# Patient Record
Sex: FEMALE | Race: WHITE | NOT HISPANIC OR LATINO | Employment: UNEMPLOYED | URBAN - METROPOLITAN AREA
[De-identification: names, ages, dates, MRNs, and addresses within clinical notes are randomized per-mention and may not be internally consistent; named-entity substitution may affect disease eponyms.]

---

## 2018-04-30 ENCOUNTER — OFFICE VISIT CONVERTED (OUTPATIENT)
Dept: FAMILY MEDICINE CLINIC | Facility: CLINIC | Age: 23
End: 2018-04-30
Attending: NURSE PRACTITIONER

## 2019-02-05 ENCOUNTER — HOSPITAL ENCOUNTER (OUTPATIENT)
Dept: URGENT CARE | Facility: CLINIC | Age: 24
Discharge: HOME | End: 2019-02-05
Attending: FAMILY MEDICINE

## 2019-02-06 ENCOUNTER — HOSPITAL ENCOUNTER (OUTPATIENT)
Dept: FAMILY MEDICINE CLINIC | Facility: CLINIC | Age: 24
Discharge: HOME | End: 2019-02-06
Attending: NURSE PRACTITIONER

## 2019-02-06 ENCOUNTER — OFFICE VISIT CONVERTED (OUTPATIENT)
Dept: FAMILY MEDICINE CLINIC | Facility: CLINIC | Age: 24
End: 2019-02-06
Attending: NURSE PRACTITIONER

## 2019-02-08 LAB — BACTERIA UR CULT: NORMAL

## 2019-06-19 LAB
BASOPHILS # BLD AUTO: 0.08 10*3/UL (ref 0–0.2)
BASOPHILS NFR BLD AUTO: 0.7 % (ref 0–3)
CONV ABS IMM GRAN: 0.06 10*3/UL (ref 0–0.2)
CONV IMMATURE GRAN: 0.5 % (ref 0–1.8)
DEPRECATED RDW RBC AUTO: 43.1 FL (ref 36.4–46.3)
EOSINOPHIL # BLD AUTO: 0.23 10*3/UL (ref 0–0.7)
EOSINOPHIL # BLD AUTO: 2.1 % (ref 0–7)
ERYTHROCYTE [DISTWIDTH] IN BLOOD BY AUTOMATED COUNT: 14.7 % (ref 11.7–14.4)
HBA1C MFR BLD: 12.8 G/DL (ref 12–16)
HCG INTACT+B SERPL-ACNC: <0.5 M[IU]/ML (ref 0–5)
HCT VFR BLD AUTO: 40.5 % (ref 37–47)
LYMPHOCYTES # BLD AUTO: 3.14 10*3/UL (ref 1–5)
MCH RBC QN AUTO: 25.7 PG (ref 27–31)
MCHC RBC AUTO-ENTMCNC: 31.6 G/DL (ref 33–37)
MCV RBC AUTO: 81.3 FL (ref 81–99)
MONOCYTES # BLD AUTO: 0.99 10*3/UL (ref 0.2–1.2)
MONOCYTES NFR BLD AUTO: 8.9 % (ref 3–10)
NEUTROPHILS # BLD AUTO: 6.64 10*3/UL (ref 2–8)
NEUTROPHILS NFR BLD AUTO: 59.6 % (ref 30–85)
NRBC CBCN: 0 % (ref 0–0.7)
PLATELET # BLD AUTO: 346 10*3/UL (ref 130–400)
PMV BLD AUTO: 10.7 FL (ref 9.4–12.3)
RBC # BLD AUTO: 4.98 10*6/UL (ref 4.2–5.4)
VARIANT LYMPHS NFR BLD MANUAL: 28.2 % (ref 20–45)
WBC # BLD AUTO: 11.14 10*3/UL (ref 4.8–10.8)

## 2019-06-20 ENCOUNTER — HOSPITAL ENCOUNTER (OUTPATIENT)
Dept: PERIOP | Facility: HOSPITAL | Age: 24
Setting detail: HOSPITAL OUTPATIENT SURGERY
Discharge: HOME | End: 2019-06-21
Attending: OBSTETRICS & GYNECOLOGY

## 2019-06-20 LAB
ABO GROUP BLD: NORMAL
BLD GP AB SCN SERPL QL: NORMAL
CONV ABD CONTROL: NORMAL
HBA1C MFR BLD: 12.2 G/DL (ref 12–16)
HCT VFR BLD AUTO: 39.1 % (ref 37–47)
RH BLD: NORMAL

## 2019-06-21 LAB
BASOPHILS # BLD AUTO: 0.06 10*3/UL (ref 0–0.2)
BASOPHILS NFR BLD AUTO: 0.3 % (ref 0–3)
CONV ABS IMM GRAN: 0.16 10*3/UL (ref 0–0.2)
CONV IMMATURE GRAN: 0.7 % (ref 0–1.8)
DEPRECATED RDW RBC AUTO: 44.3 FL (ref 36.4–46.3)
EOSINOPHIL # BLD AUTO: 0.03 10*3/UL (ref 0–0.7)
EOSINOPHIL # BLD AUTO: 0.1 % (ref 0–7)
ERYTHROCYTE [DISTWIDTH] IN BLOOD BY AUTOMATED COUNT: 14.6 % (ref 11.7–14.4)
HBA1C MFR BLD: 11.1 G/DL (ref 12–16)
HCT VFR BLD AUTO: 36.6 % (ref 37–47)
LYMPHOCYTES # BLD AUTO: 1.99 10*3/UL (ref 1–5)
MCH RBC QN AUTO: 25.1 PG (ref 27–31)
MCHC RBC AUTO-ENTMCNC: 30.3 G/DL (ref 33–37)
MCV RBC AUTO: 82.8 FL (ref 81–99)
MONOCYTES # BLD AUTO: 1.91 10*3/UL (ref 0.2–1.2)
MONOCYTES NFR BLD AUTO: 8.5 % (ref 3–10)
NEUTROPHILS # BLD AUTO: 18.28 10*3/UL (ref 2–8)
NEUTROPHILS NFR BLD AUTO: 81.5 % (ref 30–85)
NRBC CBCN: 0 % (ref 0–0.7)
PLATELET # BLD AUTO: 353 10*3/UL (ref 130–400)
PMV BLD AUTO: 11 FL (ref 9.4–12.3)
RBC # BLD AUTO: 4.42 10*6/UL (ref 4.2–5.4)
VARIANT LYMPHS NFR BLD MANUAL: 8.9 % (ref 20–45)
WBC # BLD AUTO: 22.43 10*3/UL (ref 4.8–10.8)

## 2020-07-30 ENCOUNTER — HOSPITAL ENCOUNTER (OUTPATIENT)
Dept: URGENT CARE | Facility: CLINIC | Age: 25
Discharge: HOME | End: 2020-07-30
Attending: FAMILY MEDICINE

## 2020-09-08 ENCOUNTER — TELEPHONE CONVERTED (OUTPATIENT)
Dept: FAMILY MEDICINE CLINIC | Facility: CLINIC | Age: 25
End: 2020-09-08
Attending: NURSE PRACTITIONER

## 2020-09-14 ENCOUNTER — HOSPITAL ENCOUNTER (OUTPATIENT)
Dept: GENERAL RADIOLOGY | Facility: HOSPITAL | Age: 25
Discharge: HOME | End: 2020-09-14
Attending: NURSE PRACTITIONER

## 2020-09-14 LAB
ALBUMIN SERPL-MCNC: 4 G/DL (ref 3.5–5)
ALBUMIN/GLOB SERPL: 1.3 {RATIO} (ref 1.4–2.6)
ALP SERPL-CCNC: 85 U/L (ref 42–98)
ALT SERPL-CCNC: 15 U/L (ref 10–40)
AMYLASE SERPL-CCNC: 36 U/L (ref 30–110)
ANION GAP SERPL CALC-SCNC: 18 MMOL/L (ref 8–19)
AST SERPL-CCNC: 16 U/L (ref 15–50)
BASOPHILS # BLD AUTO: 0.09 10*3/UL (ref 0–0.2)
BASOPHILS NFR BLD AUTO: 0.7 % (ref 0–3)
BILIRUB SERPL-MCNC: 0.31 MG/DL (ref 0.2–1.3)
BUN SERPL-MCNC: 10 MG/DL (ref 5–25)
BUN/CREAT SERPL: 14 {RATIO} (ref 6–20)
C DIFF TOX B STL QL CT TISS CULT: NEGATIVE
CALCIUM SERPL-MCNC: 9.3 MG/DL (ref 8.7–10.4)
CHLORIDE SERPL-SCNC: 105 MMOL/L (ref 99–111)
CONV 027 TOXIN: NEGATIVE
CONV ABS IMM GRAN: 0.06 10*3/UL (ref 0–0.2)
CONV CO2: 22 MMOL/L (ref 22–32)
CONV IMMATURE GRAN: 0.5 % (ref 0–1.8)
CONV TOTAL PROTEIN: 7 G/DL (ref 6.3–8.2)
CREAT UR-MCNC: 0.74 MG/DL (ref 0.5–0.9)
DEPRECATED RDW RBC AUTO: 49.1 FL (ref 36.4–46.3)
EOSINOPHIL # BLD AUTO: 0.38 10*3/UL (ref 0–0.7)
EOSINOPHIL # BLD AUTO: 3.1 % (ref 0–7)
ERYTHROCYTE [DISTWIDTH] IN BLOOD BY AUTOMATED COUNT: 15.8 % (ref 11.7–14.4)
GFR SERPLBLD BASED ON 1.73 SQ M-ARVRAT: >60 ML/MIN/{1.73_M2}
GLOBULIN UR ELPH-MCNC: 3 G/DL (ref 2–3.5)
GLUCOSE SERPL-MCNC: 86 MG/DL (ref 65–99)
HCT VFR BLD AUTO: 43.2 % (ref 37–47)
HGB BLD-MCNC: 13.4 G/DL (ref 12–16)
LIPASE SERPL-CCNC: 26 U/L (ref 5–51)
LYMPHOCYTES # BLD AUTO: 3.53 10*3/UL (ref 1–5)
LYMPHOCYTES NFR BLD AUTO: 29.1 % (ref 20–45)
MCH RBC QN AUTO: 26.8 PG (ref 27–31)
MCHC RBC AUTO-ENTMCNC: 31 G/DL (ref 33–37)
MCV RBC AUTO: 86.4 FL (ref 81–99)
MONOCYTES # BLD AUTO: 0.9 10*3/UL (ref 0.2–1.2)
MONOCYTES NFR BLD AUTO: 7.4 % (ref 3–10)
NEUTROPHILS # BLD AUTO: 7.17 10*3/UL (ref 2–8)
NEUTROPHILS NFR BLD AUTO: 59.2 % (ref 30–85)
NRBC CBCN: 0 % (ref 0–0.7)
OSMOLALITY SERPL CALC.SUM OF ELEC: 290 MOSM/KG (ref 273–304)
PLATELET # BLD AUTO: 374 10*3/UL (ref 130–400)
PMV BLD AUTO: 11.1 FL (ref 9.4–12.3)
POTASSIUM SERPL-SCNC: 3.8 MMOL/L (ref 3.5–5.3)
RBC # BLD AUTO: 5 10*6/UL (ref 4.2–5.4)
SODIUM SERPL-SCNC: 141 MMOL/L (ref 135–147)
TSH SERPL-ACNC: 1.57 M[IU]/L (ref 0.27–4.2)
VIT B12 SERPL-MCNC: 572 PG/ML (ref 211–911)
WBC # BLD AUTO: 12.13 10*3/UL (ref 4.8–10.8)

## 2020-09-16 LAB — BACTERIA SPEC AEROBE CULT: NORMAL

## 2020-09-17 LAB
CONV CELIAC DISEASE AB-IGA: 192 MG/DL (ref 68–408)
TTG IGA SER-ACNC: <2 U/ML (ref 0–3)

## 2020-09-21 LAB
CONV ALPHA-GALACTOSIDASE, INTERPRETATION: NORMAL
CONV ALPHA-GALACTOSIDASE, REVIEW: NORMAL
CONV ALPHA-GALACTOSIDASE, SERUM: 0.17 U/L (ref 0.07–0.46)

## 2020-09-25 ENCOUNTER — TELEMEDICINE CONVERTED (OUTPATIENT)
Dept: GASTROENTEROLOGY | Facility: CLINIC | Age: 25
End: 2020-09-25
Attending: NURSE PRACTITIONER

## 2020-10-02 ENCOUNTER — TELEPHONE CONVERTED (OUTPATIENT)
Dept: FAMILY MEDICINE CLINIC | Facility: CLINIC | Age: 25
End: 2020-10-02
Attending: NURSE PRACTITIONER

## 2020-11-19 ENCOUNTER — HOSPITAL ENCOUNTER (OUTPATIENT)
Dept: PREADMISSION TESTING | Facility: HOSPITAL | Age: 25
Discharge: HOME | End: 2020-11-19
Attending: INTERNAL MEDICINE

## 2020-11-21 LAB — SARS-COV-2 RNA SPEC QL NAA+PROBE: NOT DETECTED

## 2021-03-18 ENCOUNTER — OFFICE VISIT CONVERTED (OUTPATIENT)
Dept: FAMILY MEDICINE CLINIC | Facility: CLINIC | Age: 26
End: 2021-03-18
Attending: NURSE PRACTITIONER

## 2021-03-23 ENCOUNTER — HOSPITAL ENCOUNTER (OUTPATIENT)
Dept: ULTRASOUND IMAGING | Facility: HOSPITAL | Age: 26
Discharge: HOME | End: 2021-03-23
Attending: NURSE PRACTITIONER

## 2021-04-06 ENCOUNTER — OFFICE VISIT CONVERTED (OUTPATIENT)
Dept: ORTHOPEDIC SURGERY | Facility: CLINIC | Age: 26
End: 2021-04-06
Attending: ORTHOPAEDIC SURGERY

## 2021-04-06 ENCOUNTER — CONVERSION ENCOUNTER (OUTPATIENT)
Dept: ORTHOPEDIC SURGERY | Facility: CLINIC | Age: 26
End: 2021-04-06

## 2021-04-12 ENCOUNTER — HOSPITAL ENCOUNTER (OUTPATIENT)
Dept: VACCINE CLINIC | Facility: HOSPITAL | Age: 26
Discharge: HOME | End: 2021-04-12
Attending: INTERNAL MEDICINE

## 2021-04-16 ENCOUNTER — HOSPITAL ENCOUNTER (OUTPATIENT)
Dept: MRI IMAGING | Facility: HOSPITAL | Age: 26
Discharge: HOME | End: 2021-04-16
Attending: ORTHOPAEDIC SURGERY

## 2021-04-23 ENCOUNTER — OFFICE VISIT CONVERTED (OUTPATIENT)
Dept: ORTHOPEDIC SURGERY | Facility: CLINIC | Age: 26
End: 2021-04-23
Attending: ORTHOPAEDIC SURGERY

## 2021-05-03 ENCOUNTER — HOSPITAL ENCOUNTER (OUTPATIENT)
Dept: VACCINE CLINIC | Facility: HOSPITAL | Age: 26
Discharge: HOME | End: 2021-05-03
Attending: INTERNAL MEDICINE

## 2021-05-11 NOTE — H&P
"   History and Physical      Patient Name: Palmira Dwyer   Patient ID: [id number]   Sex: Female   YOB: 1995    Primary Care Provider: Coretta MORSE   Referring Provider: Coretta MORSE    Visit Date: April 6, 2021    Provider: Marco A Pitts MD   Location: [facility]   Location Address: [address] [place] [address]  [id number]   Location Phone: [phone]          Chief Complaint  · Right knee pain      History Of Present Illness  With no improvement.Palmira Dwyer is a 25 year old /White female who presents today to [facility]. Patient presents for evaluation of right knee pain, patient states her knee pain began about a month ago, the beginning of March, she states she has had \"bad knee pain \"when she noticed that she had a \"lump on the back of the knee \". Patient saw her primary care physician, they ordered ultrasound and x-rays which came back negative, they advised patient to rest, take ibuprofen. Patient states that she has not had any recent injury, denies surgeries to the knee. Patient states the pain is deep inside the knee, she also admits to pain in the posterior knee patient denies improvement of her knee pain, states she takes ibuprofen as often as she can to try to relieve the pain but states the pain is persistent. Patient denies locking catching, she states she had recent episode where the knee felt like it was going to buckle but denies buckling. Patient denies swelling. Patient states she has pain throughout the day with activities, including impact or running. Patient has rested the knee with no improvement.       Past Medical History  PTSD (post-traumatic stress disorder); Renal calculus or stone; Yeast vaginitis         Past Surgical History  Cystoscopy; Hysterectomy; Kidney Stone Surgery, Unspecified         Allergy List  CEPHALOSPORINS; PENICILLINS       Allergies Reconciled  Family Medical History  Cancer, Unspecified " "        Social History  Alcohol (Light); Single; Tobacco (Current every day)         Immunizations  Name Date Admin   Tdap 01/04/2019         Review of Systems  · Constitutional  o Denies  o : fever, chills, weight loss  · Cardiovascular  o Denies  o : chest pain, shortness of breath  · Gastrointestinal  o Denies  o : liver disease, heartburn, nausea, blood in stools  · Genitourinary  o Denies  o : painful urination, blood in urine  · Integument  o Denies  o : rash, itching  · Neurologic  o Denies  o : headache, weakness, loss of consciousness  · Musculoskeletal  o Denies  o : painful, swollen joints  · Psychiatric  o Denies  o : drug/alcohol addiction, anxiety, depression      Vitals  Date Time BP Position Site L\R Cuff Size HR RR TEMP (F) WT  HT  BMI kg/m2 BSA m2 O2 Sat FR L/min FiO2 HC       04/06/2021 02:16 PM         156lbs 16oz 5'  4\" 26.95 1.79             Physical Examination  · Constitutional  o Appearance  o : well developed, well-nourished, no obvious deformities present  · Head and Face  o Head  o :   § Inspection  § : normocephalic  o Face  o :   § Inspection  § : no facial lesions  · Eyes  o Conjunctivae  o : conjunctivae normal  o Sclerae  o : sclerae white  · Ears, Nose, Mouth and Throat  o Ears  o :   § External Ears  § : appearance within normal limits  § Hearing  § : intact  o Nose  o :   § External Nose  § : appearance normal  · Neck  o Inspection/Palpation  o : normal appearance  o Range of Motion  o : full range of motion  · Respiratory  o Respiratory Effort  o : breathing unlabored  o Inspection of Chest  o : normal appearance  o Auscultation of Lungs  o : no audible wheezing or rales  · Cardiovascular  o Heart  o : regular rate  · Gastrointestinal  o Abdominal Examination  o : soft and non-tender  · Skin and Subcutaneous Tissue  o General Inspection  o : intact, no rashes  · Psychiatric  o General  o : Alert and oriented x3  o Judgement and Insight  o : judgment and insight intact  o Mood and " Affect  o : mood normal, affect appropriate  · Right Knee  o Inspection  o : Skin is intact, no erythema, ecchymosis, no swelling, no effusion, tenderness to palpation of the posterior knee and medial knee, full extension, flexion 120, stable anterior/posterior drawer, stable to varus/valgus stress mild pain with Lynda.  · Imaging  o Imaging  o : X-ray right knee, 3/23/2021, conclusion: No acute disease.          Assessment  · Right knee pain, unspecified chronicity     719.46/M25.561      Plan  · Medications  o Medications have been Reconciled  o Transition of Care or Provider Policy  · Instructions  o Reviewed the patient's Past Medical, Social, and Family history as well as the ROS at today's visit, no changes.  o Call or return if worsening symptoms.  o Follow up after MRI.  o The above service was scribed by Markos Hooker PA-C on my behalf and I attest to the accuracy of the note. jsb  o Reviewed x-rays with the patient, advised her that we will order MRI of the right knee with contrast, she was given home exercise program, continue to rest and work on home exercises, continue ibuprofen. Follow-up after MRI.            Electronically Signed by: Pardeep Hooker PA-C -Author on April 6, 2021 03:19:17 PM  Electronically Co-signed by: Marco A Pitts MD -Reviewer on April 6, 2021 09:28:40 PM

## 2021-05-13 NOTE — PROGRESS NOTES
Progress Note      Patient Name: Palmira Dwyer   Patient ID: [id number]   Sex: Female   YOB: 1995    Primary Care Provider: Coretta MORSE   Referring Provider: Coretta MORSE    Visit Date: October 2, 2020    Provider: MINI Mhoan   Location: [facility]   Location Address: [address] [place] [address]  [id number]   Location Phone: [phone]          Chief Complaint     medication follow up       History Of Present Illness  TELEHEALTH TELEPHONE VISIT  Palmira Dwyer is a 25 year old /White female who is presenting for evaluation via telehealth telephone visit. Verbal consent obtained before beginning visit.   Provider spent 11 minutes with patient during telehealth visit.   The following staff were present during this visit: gm   Past Medical History/Overview of Patient Symptoms  Palmira Dwyer is a 25 year old /White female who presents for evaluation and treatment of:      Follow-up on diarrhea, she is having about 6 watery stools per day, they have a mucousy texture.  She has not tried any dietary journaling or food elimination.  She has tried Flagyl and Bentyl with no improvement.  Her labs were all unremarkable and stool cultures were normal.  Her white count was elevated to 12.8.  She has had a follow-up appointment with gastro and they have a colonoscopy scheduled for her in November.  She states she stays well-hydrated and overall feels fine.  Both of her children have a dairy intolerance and she is willing to try dairy elimination and then gluten elimination.    i reviewed her note from gastro and reviewed all of her recent labs with her on the phone.       Past Medical History  Disease Name Date Onset Notes   PTSD (post-traumatic stress disorder) --  --    Renal calculus or stone --  --    Yeast vaginitis 09/29/2014 --          Past Surgical History  Procedure Name Date Notes   Cystoscopy --  --     Hysterectomy --  --    Kidney Stone Surgery, Unspecified --  --          Medication List  Name Date Started Instructions   NuLYTELY with Flavor Packs 420 gram oral recon soln 09/25/2020 follow written instructions given by ordering providers office   Probiotic 20 billion cell oral capsule 09/08/2020 take 1 capsule by oral route daily for 30 days         Allergy List  Allergen Name Date Reaction Notes   CEPHALOSPORINS --  Stomach trouble --    PENICILLINS --  Rash --        Allergies Reconciled  Family Medical History  Disease Name Relative/Age Notes   Cancer, Unspecified Grandmother (maternal)/   --          Social History  Finding Status Start/Stop Quantity Notes   Alcohol Light --/-- --  --    Single --  --/-- --  one child   Tobacco Current every day 14/21 1 ppd --          Immunizations  NameDate Admin Mfg Trade Name Lot Number Route Inj VIS Given VIS Publication   Tdap01/04/2019 SKB BOOSTRIX [id number] IM RD 01/04/2019 02/24/2015   Comments: pt tolerated inj well         Review of Systems  · Constitutional  o Denies  o : fever, fatigue, weight loss, weight gain  · Cardiovascular  o Denies  o : lower extremity edema, claudication, chest pressure, palpitations  · Respiratory  o Denies  o : shortness of breath, wheezing, cough, hemoptysis, dyspnea on exertion  · Gastrointestinal  o Admits  o : diarrhea  o Denies  o : nausea, vomiting, constipation, abdominal pain          Assessment  · Chronic diarrhea     787.91/K52.9      Plan  · Orders  o ACO-39: Current medications updated and reviewed (1159F, ) - - 10/02/2020  · Medications  o Medications have been Reconciled  o Transition of Care or Provider Policy  · Instructions  o Plan Of Care: try eliminating dairy for 2 weeks and then try eliminating gluten for 2 weeks. Let me know if this helps. Stay hydrated. f/u with gastro for increased symptoms  · Disposition  o Call or Return if symptoms worsen or persist.            Electronically Signed by: Coretta Arce  MINI Swanson -Author on October 2, 2020 01:38:49 PM

## 2021-05-13 NOTE — PROGRESS NOTES
Quick Note      Patient Name: Palmira Dwyer   Patient ID: [id number]   Sex: Female   YOB: 1995    Primary Care Provider: Coretta MORSE    Visit Date: September 8, 2020    Provider: MINI Mohan   Location: [facility]   Location Address: [address] [facility] [place]  [id number]   Location Phone: [phone]          History Of Present Illness  TELEHEALTH TELEPHONE VISIT  Palmira Dwyer is a 25 year old /White female who is presenting for evaluation via telehealth telephone visit. Verbal consent obtained before beginning visit. Patient alone on call.   Provider spent 13 minutes with patient during telehealth visit.   The following staff were present during this visit: AT/CMA   Past Medical History/Overview of Patient Symptoms  Palmira Dwyer is a 25 year old /White female who presents for evaluation and treatment of:      Patient wants referral to gastro  Diarrhea X 4-5 months- pt has not taken anything for it. She has watery BMs 5-6 x day. They do have well water but she drinks bottled water. No fever. She has low abdominal cramping and pain occassionally. She hasn't taken anything otc. There are no other family members with the same symptoms. She denies acid reflux, no nausea. Occ there is mucous in the stool.     Feet and hands peeling. Started with her finger tips, then her palms, now her feet are peeling. This happened a few weeks after she took an antibiotic for a strep infection. She's not sure if they are related.    She has had multiple tick bites this year.           Assessment  · Abdominal pain     789.00/R10.9  · Diarrhea     787.91/R19.7  · Fatigue     780.79/R53.83  · Peeling skin     709.8/R23.4  · Tick bites       Bitten or stung by nonvenomous insect and other nonvenomous arthropods, initial encounter     919.4/W57.XXXA      Plan  · Orders  o Amylase and lipase panel (23111, 74179) - 789.00/R10.9 -  09/08/2020  o Giardia and Cryptosporidium Enzyme Immunoassay [facility] (04333, 70712) - 787.91/R19.7 - 09/08/2020  o Celiac Disease Antibody Panel(Profile) (CELID) - 787.91/R19.7 - 09/08/2020  o ACO-39: Current medications updated and reviewed () - - 09/08/2020  o ACO-14: Influenza immunization was not administered for reasons documented () - - 09/08/2020  o Physician Telephone Evaluation, 11-20 minutes (56406) - - 09/08/2020  o Female Fatigue Panel (CBC, CMP, TSH, B12) [facility] (84211, 05617, 12390, 23168) - 780.79/R53.83 - 09/08/2020  o Alpha galactosidase ser/plas (19491) - 787.91/R19.7, 919.4/W57.XXXA - 09/08/2020  o Stool bacterial culture (04633) - 787.91/R19.7 - 09/08/2020  o C difficile Toxigenic Assay (PCR) [facility] (20341) - 787.91/R19.7 - 09/08/2020  o C Diff Culture [facility] (33118) - 787.91/R19.7 - 09/08/2020  o Gastroenterology Consultation (GASTR) - 789.00/R10.9, 787.91/R19.7 - 09/08/2020  · Medications  o dicyclomine 10 mg oral capsule   SIG: take 1 capsule (10 mg) by oral route 3 times per day for 30 days   DISP: (90) capsules with 0 refills  Prescribed on 09/08/2020     o Probiotic 20 billion cell oral capsule   SIG: take 1 capsule by oral route daily for 30 days   DISP: (30) capsules with 2 refills  Prescribed on 09/08/2020     o Medications have been Reconciled  o Transition of Care or Provider Policy  · Instructions  o Instructed to seek medical attention urgently for new or worsening symptoms.  o BRAT diet, Avoid dairy products.  o Plan Of Care: will check labs and stool cx, refer to gastro  o Chronic conditions reviewed and taken into consideration for today's treatment plan.  o Rest. Increase Fluids.  o Call the office with any concerns or questions.  · Disposition  o Call or Return if symptoms worsen or persist.  o Care Transition            Electronically Signed by: MINI Mohan -Author on September 8, 2020 02:19:22 PM

## 2021-05-13 NOTE — PROGRESS NOTES
"   Quick Note      Patient Name: Palmira Dwyer   Patient ID: [id number]   Sex: Female   YOB: 1995    Primary Care Provider: Coretta MORSE   Referring Provider: Coretta MORSE    Visit Date: September 25, 2020    Provider: MINI Kwok   [facility]   Location Address: [address] [place] [address], [address] [place] [address]  [id number]   Location Phone: [phone]          History Of Present Illness  TELEHEALTH TELEPHONE VISIT  Palmira Dwyer is a 25 year old /White female who is presenting for evaluation via telehealth telephone visit. Verbal consent obtained before beginning visit.   Provider spent 16 minutes with the patient during the telehealth visit.   The following staff were present during this visit: Betsey Cobb; Amelia Cantu   Past Medical History/ Overview of Patient Symptoms     Patient reports diarrhea for the last 6 months. She is having a bowel movement 6-8x daily, loose stool. Has a \"mucous appearance\" to it. Symptoms do not correlate with meals. Abdominal cramping with BM's. Denies any hematochezia, melena, or family hx of colon cancer.     She has tried dicyclomine and OTC Lomotil with no relief of diarrhea.     Appetite and weight stable. Denies any nausea, vomiting, or frequent NSAID usage.     CBC 9/14/2020: WBC 12.13, hemoglobin 13.4, hematocrit 43.2, platelets 374.  CMP 9/14/2020: GFR greater than 60, alkaline phosphatase 85, ALT 15, AST 16, total bilirubin 0.31.    C. difficile toxin 9/8/2020: Negative.  Giardia and cryptosporidium 9/8/2020: Negative.  Stool culture 9/8/2020:  Celiac profile 9/8/2020: Negative.  Alpha gal 9/8/20: negative       Vitals  Date Time BP Position Site L\R Cuff Size HR RR TEMP (F) WT  HT  BMI kg/m2 BSA m2 O2 Sat HC       09/25/2020 10:18 AM         150lbs 0oz 5'  4\" 25.75 1.75               Assessment  · Diarrhea     787.91/R19.7  · Abdominal " claudia     789.00/R10.9      Plan  · Orders  o Physician Telephone Evaluation, 11-20 minutes (52467) - - 09/25/2020  o Consent for Colonoscopy - Possible risks/complications, benefits, and alternatives to surgical or invasive procedure have been explained to patient and/or legal guardian. -Patient has been evaluated and can tolerate anesthesia and/or sedation. Risks, benefits, and alternatives to anesthesia and sedation have been explained to patient and/or legal guardian. (72452) - - 09/25/2020  · Medications  o Medications have been Reconciled  · Instructions  o Plan Of Care:   o Chronic conditions reviewed and taken into consideration for today's treatment plan.  o Patient instructed to seek medical attention urgently for new or worsening symptoms.  o Call the office with any concerns or questions.  o Electronically Identified Patient Education Materials Provided Electronically  · Disposition  o Follow up after procedure            Electronically Signed by: MINI Kwok -Author on September 25, 2020 10:31:42 AM

## 2021-05-14 VITALS
OXYGEN SATURATION: 98 % | BODY MASS INDEX: 26.8 KG/M2 | RESPIRATION RATE: 16 BRPM | SYSTOLIC BLOOD PRESSURE: 117 MMHG | DIASTOLIC BLOOD PRESSURE: 65 MMHG | TEMPERATURE: 98.4 F | WEIGHT: 157 LBS | HEART RATE: 84 BPM | HEIGHT: 64 IN

## 2021-05-14 VITALS — WEIGHT: 150 LBS | HEIGHT: 64 IN | BODY MASS INDEX: 25.61 KG/M2

## 2021-05-14 VITALS — HEIGHT: 64 IN | WEIGHT: 157 LBS | BODY MASS INDEX: 26.8 KG/M2

## 2021-05-14 NOTE — PROGRESS NOTES
Progress Note      Patient Name: Palmira Dwyer   Patient ID: [id number]   Sex: Female   YOB: 1995    Primary Care Provider: Coretta MORSE   Referring Provider: Coretta MORSE    Visit Date: March 18, 2021    Provider: MINI Mohan   Location: [facility]   Location Address: [address] [place] [address]  [id number]   Location Phone: [phone]          Chief Complaint  · cyst behind R knee      History Of Present Illness  Palmira Dwyer is a 25 year old /White female who presents for evaluation and treatment of:      knot behind the right knee for 2 weeks that's painful. She does admit to bilateral knee pain, although no known injury. She is a stay at home mom. She is not working out.  She has family hx of osteo arthritis. She has been taking motrin 600mg three times a day.       Past Medical History  Disease Name Date Onset Notes   PTSD (post-traumatic stress disorder) --  --    Renal calculus or stone --  --    Yeast vaginitis 09/29/2014 --          Past Surgical History  Procedure Name Date Notes   Cystoscopy --  --    Hysterectomy --  --    Kidney Stone Surgery, Unspecified --  --          Allergy List  Allergen Name Date Reaction Notes   CEPHALOSPORINS --  Stomach trouble --    PENICILLINS --  Rash --        Allergies Reconciled  Family Medical History  Disease Name Relative/Age Notes   Cancer, Unspecified Grandmother (maternal)/   --          Social History  Finding Status Start/Stop Quantity Notes   Alcohol Light --/-- --  --    Single --  --/-- --  one child   Tobacco Current every day 14/21 1 ppd --          Immunizations  NameDate Admin Mfg Trade Name Lot Number Route Inj VIS Given VIS Publication   Tdap01/04/2019 SKB BOOSTRIX [id number] IM RD 01/04/2019 02/24/2015   Comments: pt tolerated inj well         Review of Systems  · Constitutional  o Denies  o : fever, fatigue, weight loss, weight gain  · Cardiovascular  o Denies  o :  "lower extremity edema, claudication, chest pressure, palpitations  · Respiratory  o Denies  o : shortness of breath, wheezing, cough, hemoptysis, dyspnea on exertion  · Gastrointestinal  o Denies  o : nausea, vomiting, diarrhea, constipation, abdominal pain  · Musculoskeletal  o Admits  o : knee pain      Vitals  Date Time BP Position Site L\R Cuff Size HR RR TEMP (F) WT  HT  BMI kg/m2 BSA m2 O2 Sat FR L/min FiO2 HC       03/18/2021 10:23 /65 Sitting    84 - R 16 98.4 156lbs 16oz 5'  4\" 26.95 1.79 98 %            Physical Examination  · Constitutional  o Appearance  o : well developed, well-nourished, no acute distress  · Neck  o Inspection/Palpation  o : normal appearance, no masses or tenderness, trachea midline  o Thyroid  o : gland size normal, nontender, no nodules or masses present on palpation  · Respiratory  o Respiratory Effort  o : breathing unlabored  o Inspection of Chest  o : chest rise symmetric bilaterally  o Auscultation of Lungs  o : clear to auscultation bilaterally throughout inspiration and expiration  · Cardiovascular  o Heart  o :   § Auscultation of Heart  § : regular rate and rhythm, no murmurs, gallops or rubs  o Peripheral Vascular System  o :   § Extremities  § : no edema  · Lymphatic  o Neck  o : no cervical lymphadenopathy, no supraclavicular lymphadenopathy  · Psychiatric  o Mood and Affect  o : mood normal, affect appropriate     small lump palpable right popliteal area, no redness, min. tenderness. Neg homans sign.               Assessment  · Screening for depression     V79.0/Z13.89  · Knee pain     719.46/M25.569  · Popliteal cyst     727.51/M71.20      Plan  · Orders  o ACO-18: Negative screen for clinical depression using a standardized tool () - V79.0/Z13.89 - 03/18/2021  o ACO-14: Influenza immunization was not administered for reasons documented [facility] () - - 03/18/2021   pt declined  o ACO-39: Current medications updated and reviewed (, 9425K) - - " 03/18/2021  o Xray knee right [facility] (75550-SX) - 719.46/M25.569 - 03/18/2021  o Ultrasound of soft tissue of leg (71953) - 719.46/M25.569, 727.51/M71.20 - 03/18/2021   popliteal right/ r/o bakers cyst  · Medications  o Medications have been Reconciled  o Transition of Care or Provider Policy  · Instructions  o Depression Screen completed and scanned into the EMR under the designated folder within the patient's documents.  o Today's PHQ-9 result is __1_  o Handouts were given to patient: uptodate on bakers cyst  o Take all medications as prescribed/directed.  o Patient was educated/instructed on their diagnosis, treatment and medications prior to discharge from the clinic today.  o Patient instructed to seek medical attention urgently for new or worsening symptoms.  o Call the office with any concerns or questions.  o continue NSAIDS/ICe/heat. Will do xray and u/s. Referral to ortho if indicated.   · Disposition  o Call or Return if symptoms worsen or persist.            Electronically Signed by: MINI Mohan -Author on March 18, 2021 12:38:56 PM

## 2021-05-14 NOTE — PROGRESS NOTES
Progress Note      Patient Name: Palmira Dwyer   Patient ID: [id number]   Sex: Female   YOB: 1995    Primary Care Provider: Coretta MORSE   Referring Provider: Coretta MORSE    Visit Date: April 23, 2021    Provider: Marco A Pitts MD   Location: [facility]   Location Address: [address] [place] [address]  [id number]   Location Phone: [phone]          Chief Complaint  · Right knee pain      History Of Present Illness  Palmira Dwyer is a 25 year old /White female who presents today to [facility].      The patient presents here today for follow up evaluation of right knee pain. Overall she states he knee is about the same. She is still having some pain to the back of the knee, some pain deep seated within the knee. She recently had an MRI.       Past Medical History  PTSD (post-traumatic stress disorder); Reflux; Renal calculus or stone; Seasonal allergies; Yeast vaginitis         Past Surgical History  Cesarian Section; Cystoscopy; Hysterectomy; Kidney Stone Surgery, Unspecified         Allergy List  CEPHALOSPORINS; PENICILLINS       Allergies Reconciled  Family Medical History  Cancer, Unspecified; Family history of Arthritis         Social History  Alcohol (Light); Alcohol Use (Current some day); Claustophobic (Unknown); Homemaker.; lives with children; lives with other; lives with parents; Recreational Drug Use (Never); Single; Single.; Tobacco (Current every day)         Review of Systems  · Constitutional  o Denies  o : fever, chills, weight loss  · Cardiovascular  o Denies  o : chest pain, shortness of breath  · Gastrointestinal  o Denies  o : liver disease, heartburn, nausea, blood in stools  · Genitourinary  o Denies  o : painful urination, blood in urine  · Integument  o Denies  o : rash, itching  · Neurologic  o Denies  o : headache, weakness, loss of consciousness  · Musculoskeletal  o Denies  o : painful, swollen  joints  · Psychiatric  o Denies  o : drug/alcohol addiction, anxiety, depression      Physical Examination  · Constitutional  o Appearance  o : well developed, well-nourished, no obvious deformities present  · Head and Face  o Head  o :   § Inspection  § : normocephalic  o Face  o :   § Inspection  § : no facial lesions  · Eyes  o Conjunctivae  o : conjunctivae normal  o Sclerae  o : sclerae white  · Ears, Nose, Mouth and Throat  o Ears  o :   § External Ears  § : appearance within normal limits  § Hearing  § : intact  o Nose  o :   § External Nose  § : appearance normal  · Neck  o Inspection/Palpation  o : normal appearance  o Range of Motion  o : full range of motion  · Respiratory  o Respiratory Effort  o : breathing unlabored  o Inspection of Chest  o : normal appearance  o Auscultation of Lungs  o : no audible wheezing or rales  · Cardiovascular  o Heart  o : regular rate  · Gastrointestinal  o Abdominal Examination  o : soft and non-tender  · Skin and Subcutaneous Tissue  o General Inspection  o : intact, no rashes  · Psychiatric  o General  o : Alert and oriented x3  o Judgement and Insight  o : judgment and insight intact  o Mood and Affect  o : mood normal, affect appropriate  · Right Knee  o Inspection  o : Positive EHL, FHL, GS and TA. Sensation intact to light touch to all 5 nerves of the foot. Positive pulses. Stable to varus/valgus stress. Staple to anterior/posterior drawer. No patella instability. No patella crepitus or compression pain. Mild pain to the posterior knee. Full ROM. Ambulates with non-antalgic gait.   · Imaging  o Imaging  o : MRI [facility] 4/2021- Mild edema in Hoffa''s fat pad suggestive of Hoffa fat pad impingement syndrome 2. 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           Assessment  · Right knee pain, unspecified chronicity     719.46/M25.561  · Patellar maltracking,  right     719.86/M22.8X1      Plan  · Medications  o Medications have been Reconciled  o Transition of Care or Provider Policy  · Instructions  o Reviewed the patient's Past Medical, Social, and Family history as well as the ROS at today's visit, no changes.  o Call or return if worsening symptoms.  o Discussed the treatment options with the patient, operative vs non-operative. I recommend conservative treatment with physical therapy evaluation and treatment. Prescription for diclofenac given today. Follow up in 8 weeks to recheck.   o Electronically Identified Patient Education Materials Provided Electronically            Electronically Signed by: Ade Hawthorne MA -Author on April 26, 2021 10:50:47 AM  Electronically Co-signed by: Marco A Pitts MD -Reviewer on April 26, 2021 10:07:44 PM

## 2021-05-16 VITALS
SYSTOLIC BLOOD PRESSURE: 103 MMHG | RESPIRATION RATE: 14 BRPM | HEIGHT: 64 IN | HEART RATE: 68 BPM | TEMPERATURE: 97.9 F | BODY MASS INDEX: 21.17 KG/M2 | WEIGHT: 124 LBS | OXYGEN SATURATION: 99 % | DIASTOLIC BLOOD PRESSURE: 54 MMHG

## 2021-05-16 VITALS
RESPIRATION RATE: 16 BRPM | DIASTOLIC BLOOD PRESSURE: 66 MMHG | SYSTOLIC BLOOD PRESSURE: 112 MMHG | OXYGEN SATURATION: 99 % | HEART RATE: 78 BPM | BODY MASS INDEX: 25.18 KG/M2 | TEMPERATURE: 98.4 F | HEIGHT: 64 IN | WEIGHT: 147.5 LBS

## 2021-06-17 ENCOUNTER — OFFICE VISIT (OUTPATIENT)
Dept: ORTHOPEDIC SURGERY | Facility: CLINIC | Age: 26
End: 2021-06-17

## 2021-06-17 VITALS — BODY MASS INDEX: 28.41 KG/M2 | HEIGHT: 64 IN | OXYGEN SATURATION: 99 % | HEART RATE: 75 BPM | WEIGHT: 166.4 LBS

## 2021-06-17 DIAGNOSIS — M22.8X1 PATELLAR MALTRACKING, RIGHT: Primary | ICD-10-CM

## 2021-06-17 DIAGNOSIS — M25.861 IMPINGEMENT SYNDROME INVOLVING PATELLAR FAT PAD OF RIGHT KNEE: ICD-10-CM

## 2021-06-17 PROCEDURE — 99212 OFFICE O/P EST SF 10 MIN: CPT | Performed by: PHYSICIAN ASSISTANT

## 2021-06-17 RX ORDER — ARIPIPRAZOLE 5 MG/1
5 TABLET ORAL DAILY
COMMUNITY
End: 2021-06-24

## 2021-06-17 RX ORDER — DICLOFENAC SODIUM 75 MG/1
TABLET, DELAYED RELEASE ORAL
COMMUNITY
Start: 2021-04-23 | End: 2021-06-24

## 2021-06-17 NOTE — PROGRESS NOTES
Chief Complaint  Pain of the Right Knee    Subjective          Palmira Carbajal presents to [facility] for   History of Present Illness    Patient presents for follow-up evaluation of right knee pain, right patellar maltracking, right knee fat pad impingement.  Patient states her knee pain has worsened since her last visit she attended 8 visits of physical therapy, states therapy made her pain worse.  She points to the anterior knee around the kneecap as well as her posterior knee as areas of worse pain.  Patient cannot tolerate therapy and stopped.  Patient has been taken diclofenac with no relief.  Patient denies dislocation of her knee, denies locking catching or buckling of the knee.  Patient does not use any type of bracing.  Patient states her pain began back in March, she denies injury.  Patient denies swelling.  Patient states she has worse pain with driving, cannot sleep at night due to the pain, cannot stand for long periods she has 3 children and it is difficult to take care of her children due to her knee pain.  Patient states the Dr. Pitts mentioned in the past that surgery could help if symptoms do not improve.  Patient would like to discuss these options.  Allergies   Allergen Reactions   • Cephalexin Diarrhea   • Cephalosporins GI Intolerance   • Penicillins Rash        Social History     Socioeconomic History   • Marital status: Single     Spouse name: Not on file   • Number of children: Not on file   • Years of education: Not on file   • Highest education level: Not on file   Tobacco Use   • Smoking status: Current Some Day Smoker   • Smokeless tobacco: Never Used   • Tobacco comment: 0.5 pack per day, smoked 5 years   Vaping Use   • Vaping Use: Every day   Substance and Sexual Activity   • Alcohol use: Yes     Comment: light, occasionally drinks, less than 1 drink per day, has been drinking for 5 years   • Drug use: Never        REVIEW OF  "SYSTEMS    Constitutional: Denies fevers, chills, weight loss  Cardiovascular: Denies chest pain, shortness of breath  Skin: Denies rashes, acute skin changes  Neurologic: Denies headache, loss of consciousness  MSK: Right knee pain.      Objective   Vital Signs:   Pulse 75   Ht 162.6 cm (64\")   Wt 75.5 kg (166 lb 6.4 oz)   SpO2 99%   BMI 28.56 kg/m²     Body mass index is 28.56 kg/m².    Physical Exam    Right knee: Skin is intact, no erythema, no ecchymosis, no swelling, no effusion.  Pain with range of motion, full extension, flexion 120, stable anterior/posterior drawer, stable to varus/varus stress, mild pain with Lynda, negative Lachman.  No patellar crepitus or compression pain.  Mild pain with palpation of the posterior knee.  Ambulates with nonantalgic gait.    Procedures    Imaging Results (Most Recent)     None         MRI [facility] 4/2021- Mild edema in Hoffa''s fat pad suggestive of Hoffa fat pad impingement syndrome 2. 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    Result Review :   The following data was reviewed by: ANGELICA Meza on 06/17/2021:               Assessment and Plan    Diagnoses and all orders for this visit:    1. Patellar maltracking, right (Primary)    2. Impingement syndrome involving patellar fat pad of right knee      Discussed diagnosis and treatment options with the patient, she was given a knee brace today she was advised to use this with activities, she has discontinued physical therapy, she was advised to continue home exercises, continue diclofenac.  Follow-up for next available appointment in 1 to 2 weeks for reevaluation and discussion of possible surgical intervention with Dr. Pitts.        Follow Up   Return 1-2 weeks.  Patient was given instructions and counseling regarding her condition or for health maintenance advice. Please see specific information pulled into the AVS if appropriate.       "

## 2021-06-24 ENCOUNTER — OFFICE VISIT (OUTPATIENT)
Dept: ORTHOPEDIC SURGERY | Facility: CLINIC | Age: 26
End: 2021-06-24

## 2021-06-24 ENCOUNTER — PREP FOR SURGERY (OUTPATIENT)
Dept: OTHER | Facility: HOSPITAL | Age: 26
End: 2021-06-24

## 2021-06-24 VITALS — OXYGEN SATURATION: 99 % | WEIGHT: 166.8 LBS | BODY MASS INDEX: 28.48 KG/M2 | HEART RATE: 77 BPM | HEIGHT: 64 IN

## 2021-06-24 DIAGNOSIS — M22.8X1 MALTRACKING OF RIGHT PATELLA: Primary | ICD-10-CM

## 2021-06-24 DIAGNOSIS — M25.861 IMPINGEMENT SYNDROME INVOLVING PATELLAR FAT PAD OF RIGHT KNEE: ICD-10-CM

## 2021-06-24 DIAGNOSIS — M94.20 CHONDROMALACIA: ICD-10-CM

## 2021-06-24 PROBLEM — M23.91 PATELLAR MALALIGNMENT SYNDROME OF RIGHT KNEE: Status: ACTIVE | Noted: 2021-06-24

## 2021-06-24 PROCEDURE — 99213 OFFICE O/P EST LOW 20 MIN: CPT | Performed by: ORTHOPAEDIC SURGERY

## 2021-06-24 RX ORDER — DICLOFENAC SODIUM 75 MG/1
75 TABLET, DELAYED RELEASE ORAL 2 TIMES DAILY
Qty: 60 TABLET | Refills: 1 | Status: SHIPPED | OUTPATIENT
Start: 2021-06-24 | End: 2022-02-11

## 2021-06-24 RX ORDER — CLINDAMYCIN PHOSPHATE 900 MG/50ML
900 INJECTION INTRAVENOUS ONCE
Status: CANCELLED | OUTPATIENT
Start: 2021-06-24 | End: 2021-06-24

## 2021-06-24 RX ORDER — VENLAFAXINE HYDROCHLORIDE 37.5 MG/1
37.5 CAPSULE, EXTENDED RELEASE ORAL DAILY
COMMUNITY
Start: 2021-06-21 | End: 2022-02-11

## 2021-06-24 NOTE — PROGRESS NOTES
"Chief Complaint  Pain of the Right Knee     Subjective      Palmira Carbajal presents to [facility] for follow up evaluation of the right knee. Physical therapy says it is getting worse and they have discontinued physical therapy. She is wearing a brace on her right knee. She still admits to pain to her knee cap area. She reports her knee pain is keeping her from playing with her kids and her daily activities. The patient has tried conservative treatment such as NSAIDS, physical therapy, bracing and home exercises with no relief.    Allergies   Allergen Reactions   • Cephalexin Diarrhea   • Cephalosporins GI Intolerance   • Penicillins Rash        Social History     Socioeconomic History   • Marital status: Single     Spouse name: Not on file   • Number of children: Not on file   • Years of education: Not on file   • Highest education level: Not on file   Tobacco Use   • Smoking status: Current Some Day Smoker   • Smokeless tobacco: Current User   • Tobacco comment: 0.5 pack per day, smoked 5 years   Vaping Use   • Vaping Use: Every day   Substance and Sexual Activity   • Alcohol use: Yes     Comment: light, occasionally drinks, less than 1 drink per day, has been drinking for 5 years   • Drug use: Never        Review of Systems     Objective   Vital Signs:   Pulse 77   Ht 162.6 cm (64\")   Wt 75.7 kg (166 lb 12.8 oz)   SpO2 99%   BMI 28.63 kg/m²       Physical Exam  Constitutional:       Appearance: Normal appearance. He is well-developed and normal weight.   HENT:      Head: Normocephalic.      Right Ear: Hearing and external ear normal.      Left Ear: Hearing and external ear normal.      Nose: Nose normal.   Eyes:      Conjunctiva/sclera: Conjunctivae normal.   Cardiovascular:      Rate and Rhythm: Normal rate.   Pulmonary:      Effort: Pulmonary effort is normal.      Breath sounds: No wheezing or rales.   Abdominal:      Palpations: Abdomen is soft.      Tenderness: There is " no abdominal tenderness.   Musculoskeletal:      Cervical back: Normal range of motion.   Skin:     Findings: No rash.   Neurological:      Mental Status: He is alert and oriented to person, place, and time.   Psychiatric:         Mood and Affect: Mood and affect normal.         Judgment: Judgment normal.       Ortho Exam      Right knee- ROM 0-120 degrees. Crepitus with ROM. No effusion. Tender to pre-patellar region. Mild tenderness to posterior knee. Stable to varus/valgus stress. Stable to anterior/posterior drawer. Neurovascularly intact. Positive EHL, FHL, GS and TA. Sensation intact to all 5 nerves of the foot. Positive pulses.     Procedures      Imaging Results (Most Recent)     None           Result Review :       No results found.           Assessment and Plan     DX: Patellar mal-tracking. Impingement syndrome involving patellar fat pad of right knee. Chondromalacia      Discussed the treatment options with the patient, operative vs non-operative. Discussed the risks and benefits of operative treatment. The patient expressed understanding and wished to proceed. Plan for Right knee arthroscopic chondroplasty and lateral release. Refill for diclofenac 75mg given today.     Discussed surgery., Risks/benefits discussed with patient including, but not limited to: infection, bleeding, neurovascular damage, malunion, nonunion, aesthetic deformity, need for further surgery, and death., Discussed with patient the implant type being used during surgery and patient understands and desires to proceed., Surgery pamphlet given. and Call or return if worsening symptoms.    Follow Up     Follow up 2 weeks post-operatively.      Patient was given instructions and counseling regarding her condition or for health maintenance advice. Please see specific information pulled into the AVS if appropriate.     Scribed for Marco A Pitts MD by Ade Hawthorne.  06/24/21   09:14 EDT    I have personally performed the  services described in this document as scribed by the above individual and it is both accurate and complete.  Marco A Pitts MD 06/24/21  09:14 EDT

## 2021-10-05 ENCOUNTER — OFFICE VISIT (OUTPATIENT)
Dept: OBSTETRICS AND GYNECOLOGY | Facility: CLINIC | Age: 26
End: 2021-10-05

## 2021-10-05 VITALS
SYSTOLIC BLOOD PRESSURE: 109 MMHG | BODY MASS INDEX: 28.68 KG/M2 | HEIGHT: 64 IN | WEIGHT: 168 LBS | DIASTOLIC BLOOD PRESSURE: 71 MMHG | HEART RATE: 73 BPM

## 2021-10-05 DIAGNOSIS — B96.89 BV (BACTERIAL VAGINOSIS): ICD-10-CM

## 2021-10-05 DIAGNOSIS — N76.0 BV (BACTERIAL VAGINOSIS): ICD-10-CM

## 2021-10-05 DIAGNOSIS — N89.8 VAGINAL DISCHARGE: Primary | ICD-10-CM

## 2021-10-05 DIAGNOSIS — B37.31 YEAST VAGINITIS: ICD-10-CM

## 2021-10-05 LAB
CANDIDA SPECIES: POSITIVE
GARDNERELLA VAGINALIS: POSITIVE
T VAGINALIS DNA VAG QL PROBE+SIG AMP: NEGATIVE

## 2021-10-05 PROCEDURE — 99213 OFFICE O/P EST LOW 20 MIN: CPT | Performed by: OBSTETRICS & GYNECOLOGY

## 2021-10-05 PROCEDURE — 87510 GARDNER VAG DNA DIR PROBE: CPT | Performed by: OBSTETRICS & GYNECOLOGY

## 2021-10-05 PROCEDURE — 87480 CANDIDA DNA DIR PROBE: CPT | Performed by: OBSTETRICS & GYNECOLOGY

## 2021-10-05 PROCEDURE — 87660 TRICHOMONAS VAGIN DIR PROBE: CPT | Performed by: OBSTETRICS & GYNECOLOGY

## 2021-10-05 RX ORDER — FLUCONAZOLE 200 MG/1
200 TABLET ORAL
Qty: 3 TABLET | Refills: 0 | Status: SHIPPED | OUTPATIENT
Start: 2021-10-05 | End: 2022-02-11

## 2021-10-05 RX ORDER — METRONIDAZOLE 500 MG/1
500 TABLET ORAL 2 TIMES DAILY
Qty: 14 TABLET | Refills: 0 | Status: SHIPPED | OUTPATIENT
Start: 2021-10-05 | End: 2021-10-12

## 2021-10-05 NOTE — PROGRESS NOTES
"GYN Visit    CC: Vaginal discharge    HPI:   26 y.o. who complains of 2 weeks of vaginal discharge, vaginal odor, and vaginal itching. Also complains of vaginal pain. No OTC med use.      History: PMHx, Meds, Allergies, PSHx, Social Hx, and POBHx all reviewed and updated.    /71   Pulse 73   Ht 162.6 cm (64\")   Wt 76.2 kg (168 lb)   BMI 28.84 kg/m²     Physical Exam  Vitals and nursing note reviewed. Exam conducted with a chaperone present.   Constitutional:       General: She is not in acute distress.     Appearance: Normal appearance. She is normal weight. She is not ill-appearing.   Neck:      Thyroid: No thyroid mass or thyromegaly.   Abdominal:      General: Abdomen is flat. There is no distension.      Palpations: Abdomen is soft. There is no mass.      Tenderness: There is no abdominal tenderness. There is no guarding or rebound.   Genitourinary:     General: Normal vulva.      Exam position: Lithotomy position.      Labia:         Right: No rash, tenderness, lesion or injury.         Left: No rash, tenderness, lesion or injury.       Vagina: No signs of injury. Vaginal discharge and tenderness present. No erythema, bleeding, lesions or prolapsed vaginal walls.      Cervix: No cervical motion tenderness, discharge, friability, lesion, erythema or cervical bleeding.      Uterus: Normal. Not enlarged, not fixed and not tender.       Adnexa: Right adnexa normal and left adnexa normal.        Right: No mass, tenderness or fullness.          Left: No mass, tenderness or fullness.        Comments: Copious white discharge present within the vaginal vault  Skin:     General: Skin is warm and dry.   Neurological:      Mental Status: She is alert and oriented to person, place, and time.   Psychiatric:         Mood and Affect: Mood normal.         Behavior: Behavior normal.         Thought Content: Thought content normal.           ASSESSMENT AND PLAN:  Diagnoses and all orders for this visit:    1. " Vaginal discharge (Primary)  -     Gardnerella vaginalis, Trichomonas vaginalis, Candida albicans, DNA - Swab, Vagina    2. BV (bacterial vaginosis)  -     metroNIDAZOLE (Flagyl) 500 MG tablet; Take 1 tablet by mouth 2 (Two) Times a Day for 7 days.  Dispense: 14 tablet; Refill: 0    3. Yeast vaginitis  -     fluconazole (Diflucan) 200 MG tablet; Take 1 tablet by mouth Every 3 (Three) Days.  Dispense: 3 tablet; Refill: 0        Follow Up:  Return if symptoms worsen or fail to improve.    Jamshid Veronica MD  10/05/2021

## 2021-10-06 ENCOUNTER — TELEPHONE (OUTPATIENT)
Dept: OBSTETRICS AND GYNECOLOGY | Facility: CLINIC | Age: 26
End: 2021-10-06

## 2021-10-06 NOTE — TELEPHONE ENCOUNTER
----- Message from Jamshid Veronica MD sent at 10/6/2021  8:16 AM EDT -----  Notify patient of results. She was treated at the time of her OV

## 2022-01-06 ENCOUNTER — OFFICE VISIT (OUTPATIENT)
Dept: FAMILY MEDICINE CLINIC | Facility: CLINIC | Age: 27
End: 2022-01-06

## 2022-01-06 ENCOUNTER — DOCUMENTATION (OUTPATIENT)
Dept: FAMILY MEDICINE CLINIC | Facility: CLINIC | Age: 27
End: 2022-01-06

## 2022-01-06 VITALS
WEIGHT: 164.6 LBS | RESPIRATION RATE: 16 BRPM | HEART RATE: 93 BPM | DIASTOLIC BLOOD PRESSURE: 76 MMHG | OXYGEN SATURATION: 99 % | SYSTOLIC BLOOD PRESSURE: 126 MMHG | BODY MASS INDEX: 28.1 KG/M2 | HEIGHT: 64 IN | TEMPERATURE: 98 F

## 2022-01-06 DIAGNOSIS — R30.0 DYSURIA: Primary | ICD-10-CM

## 2022-01-06 DIAGNOSIS — N30.10 INTERSTITIAL CYSTITIS: ICD-10-CM

## 2022-01-06 DIAGNOSIS — R19.7 DIARRHEA, UNSPECIFIED TYPE: ICD-10-CM

## 2022-01-06 DIAGNOSIS — E04.9 ENLARGED THYROID: ICD-10-CM

## 2022-01-06 DIAGNOSIS — F41.9 ANXIETY: ICD-10-CM

## 2022-01-06 LAB
ALBUMIN SERPL-MCNC: 4.5 G/DL (ref 3.5–5.2)
ALBUMIN/GLOB SERPL: 1.9 G/DL
ALP SERPL-CCNC: 87 U/L (ref 39–117)
ALT SERPL W P-5'-P-CCNC: 14 U/L (ref 1–33)
ANION GAP SERPL CALCULATED.3IONS-SCNC: 8.3 MMOL/L (ref 5–15)
AST SERPL-CCNC: 22 U/L (ref 1–32)
BASOPHILS # BLD AUTO: 0.09 10*3/MM3 (ref 0–0.2)
BASOPHILS NFR BLD AUTO: 0.8 % (ref 0–1.5)
BILIRUB BLD-MCNC: ABNORMAL MG/DL
BILIRUB SERPL-MCNC: 0.4 MG/DL (ref 0–1.2)
BUN SERPL-MCNC: 9 MG/DL (ref 6–20)
BUN/CREAT SERPL: 23.1 (ref 7–25)
CALCIUM SPEC-SCNC: 9.3 MG/DL (ref 8.6–10.5)
CHLORIDE SERPL-SCNC: 104 MMOL/L (ref 98–107)
CLARITY, POC: ABNORMAL
CO2 SERPL-SCNC: 24.7 MMOL/L (ref 22–29)
COLOR UR: YELLOW
CREAT SERPL-MCNC: 0.39 MG/DL (ref 0.57–1)
DEPRECATED RDW RBC AUTO: 41.4 FL (ref 37–54)
EOSINOPHIL # BLD AUTO: 0.27 10*3/MM3 (ref 0–0.4)
EOSINOPHIL NFR BLD AUTO: 2.3 % (ref 0.3–6.2)
ERYTHROCYTE [DISTWIDTH] IN BLOOD BY AUTOMATED COUNT: 13.3 % (ref 12.3–15.4)
EXPIRATION DATE: ABNORMAL
GFR SERPL CREATININE-BSD FRML MDRD: >150 ML/MIN/1.73
GLOBULIN UR ELPH-MCNC: 2.4 GM/DL
GLUCOSE SERPL-MCNC: 75 MG/DL (ref 65–99)
GLUCOSE UR STRIP-MCNC: NEGATIVE MG/DL
HCT VFR BLD AUTO: 42 % (ref 34–46.6)
HGB BLD-MCNC: 13.7 G/DL (ref 12–15.9)
IMM GRANULOCYTES # BLD AUTO: 0.05 10*3/MM3 (ref 0–0.05)
IMM GRANULOCYTES NFR BLD AUTO: 0.4 % (ref 0–0.5)
KETONES UR QL: NEGATIVE
LEUKOCYTE EST, POC: NEGATIVE
LYMPHOCYTES # BLD AUTO: 2.83 10*3/MM3 (ref 0.7–3.1)
LYMPHOCYTES NFR BLD AUTO: 23.7 % (ref 19.6–45.3)
Lab: ABNORMAL
MCH RBC QN AUTO: 27.6 PG (ref 26.6–33)
MCHC RBC AUTO-ENTMCNC: 32.6 G/DL (ref 31.5–35.7)
MCV RBC AUTO: 84.7 FL (ref 79–97)
MONOCYTES # BLD AUTO: 1.08 10*3/MM3 (ref 0.1–0.9)
MONOCYTES NFR BLD AUTO: 9.1 % (ref 5–12)
NEUTROPHILS NFR BLD AUTO: 63.7 % (ref 42.7–76)
NEUTROPHILS NFR BLD AUTO: 7.6 10*3/MM3 (ref 1.7–7)
NITRITE UR-MCNC: NEGATIVE MG/ML
NRBC BLD AUTO-RTO: 0 /100 WBC (ref 0–0.2)
PH UR: 6 [PH] (ref 5–8)
PLATELET # BLD AUTO: 325 10*3/MM3 (ref 140–450)
PMV BLD AUTO: 12.1 FL (ref 6–12)
POTASSIUM SERPL-SCNC: 4.8 MMOL/L (ref 3.5–5.2)
PROT SERPL-MCNC: 6.9 G/DL (ref 6–8.5)
PROT UR STRIP-MCNC: NEGATIVE MG/DL
RBC # BLD AUTO: 4.96 10*6/MM3 (ref 3.77–5.28)
RBC # UR STRIP: NEGATIVE /UL
SODIUM SERPL-SCNC: 137 MMOL/L (ref 136–145)
SP GR UR: 1.03 (ref 1–1.03)
TSH SERPL DL<=0.05 MIU/L-ACNC: 1.25 UIU/ML (ref 0.27–4.2)
UROBILINOGEN UR QL: NORMAL
WBC NRBC COR # BLD: 11.92 10*3/MM3 (ref 3.4–10.8)

## 2022-01-06 PROCEDURE — 84480 ASSAY TRIIODOTHYRONINE (T3): CPT | Performed by: NURSE PRACTITIONER

## 2022-01-06 PROCEDURE — 87086 URINE CULTURE/COLONY COUNT: CPT | Performed by: NURSE PRACTITIONER

## 2022-01-06 PROCEDURE — 84436 ASSAY OF TOTAL THYROXINE: CPT | Performed by: NURSE PRACTITIONER

## 2022-01-06 PROCEDURE — 84443 ASSAY THYROID STIM HORMONE: CPT | Performed by: NURSE PRACTITIONER

## 2022-01-06 PROCEDURE — 80053 COMPREHEN METABOLIC PANEL: CPT | Performed by: NURSE PRACTITIONER

## 2022-01-06 PROCEDURE — 99214 OFFICE O/P EST MOD 30 MIN: CPT | Performed by: NURSE PRACTITIONER

## 2022-01-06 PROCEDURE — 85025 COMPLETE CBC W/AUTO DIFF WBC: CPT | Performed by: NURSE PRACTITIONER

## 2022-01-06 PROCEDURE — 84479 ASSAY OF THYROID (T3 OR T4): CPT | Performed by: NURSE PRACTITIONER

## 2022-01-06 RX ORDER — NITROFURANTOIN 25; 75 MG/1; MG/1
100 CAPSULE ORAL 2 TIMES DAILY
Qty: 14 CAPSULE | Refills: 0 | Status: SHIPPED | OUTPATIENT
Start: 2022-01-06 | End: 2022-02-11

## 2022-01-06 NOTE — PROGRESS NOTES
"Chief Complaint  Difficulty Urinating (burning after urinating x one week )    Subjective      History of Present Illness  Palmira Carbajal presents to [facility]     Burning with urination for 1 week. She also has hx of IC. She has not had flank pain, hematuria or fever. No discharge, no odor. No vaginal itch. She has been drinking more water.    Her son's pediatrician noted that her thyroid looked enlarged. She has been told she has a goiter in the past but has not had any follow up for several years.     Palmira Carbajal  reports that she has been smoking cigarettes. She has been smoking about 0.50 packs per day. She has never used smokeless tobacco.. I have educated her on the risk of diseases from using tobacco products such as cancer, COPD and heart disease.     I advised her to quit and she is not willing to quit.    I spent 3  minutes counseling the patient.         Allergies  Cephalexin, Cephalosporins, and Penicillins    Objective     Vitals:    01/06/22 0930   BP: 126/76   Pulse: 93   Resp: 16   Temp: 98 °F (36.7 °C)   SpO2: 99%     Body mass index is 28.25 kg/m².     Review of Systems   Constitutional: Negative for fatigue.   Respiratory: Negative.    Cardiovascular: Negative.    Gastrointestinal: Positive for diarrhea.   Endocrine: Positive for heat intolerance.   Genitourinary: Positive for dysuria. Negative for difficulty urinating.   Neurological: Negative.    Psychiatric/Behavioral: The patient is nervous/anxious.        Physical Exam  Vitals reviewed.   Constitutional:       Appearance: Normal appearance. She is well-developed.   Neck:      Comments: Thyroid feels enlarged, soft tissue surrounding is \"puffy\"  Cardiovascular:      Rate and Rhythm: Normal rate and regular rhythm.      Heart sounds: Normal heart sounds. No murmur heard.      Pulmonary:      Effort: Pulmonary effort is normal.      Breath sounds: Normal breath sounds.   Musculoskeletal:      Cervical " back: Normal range of motion and neck supple. No tenderness.   Neurological:      Mental Status: She is alert and oriented to person, place, and time.      Cranial Nerves: No cranial nerve deficit.      Motor: No weakness.   Psychiatric:         Mood and Affect: Mood and affect normal.         Result Review :    The following data was reviewed by: MINI Mohan on 01/06/2022:       Depression: Not at risk   • PHQ-2 Score: 0                           Assessment and Plan     Diagnoses and all orders for this visit:    1. Dysuria (Primary)  -     POCT urinalysis dipstick, automated  -     nitrofurantoin, macrocrystal-monohydrate, (Macrobid) 100 MG capsule; Take 1 capsule by mouth 2 (Two) Times a Day.  Dispense: 14 capsule; Refill: 0  -     Urine Culture - Urine, Urine, Clean Catch    2. Enlarged thyroid  -     US Head Neck Soft Tissue; Future  -     Thyroid Profile II    3. Diarrhea, unspecified type  -     Comprehensive Metabolic Panel  -     CBC & Differential    4. Interstitial cystitis  Comments:  counseled on dietary triggers    5. Anxiety  Comments:  she reports it's manageable, may be related to thyroid  Orders:  -     TSH    will call with lab and imaging results.         Follow Up     Return if symptoms worsen or fail to improve.    Patient was given instructions and counseling regarding her condition or for health maintenance advice. Please see specific information pulled into the AVS if appropriate.     MINI Mohan

## 2022-01-07 LAB
BACTERIA SPEC AEROBE CULT: NORMAL
FT4I SERPL CALC-MCNC: 1.5 (ref 1.2–4.9)
T3 SERPL-MCNC: 122 NG/DL (ref 71–180)
T3RU NFR SERPL: 25 % (ref 24–39)
T4 SERPL-MCNC: 6 UG/DL (ref 4.5–12)
TSH SERPL DL<=0.005 MIU/L-ACNC: 1.25 UIU/ML (ref 0.45–4.5)

## 2022-02-03 ENCOUNTER — HOSPITAL ENCOUNTER (OUTPATIENT)
Dept: ULTRASOUND IMAGING | Facility: HOSPITAL | Age: 27
Discharge: HOME | End: 2022-02-03
Admitting: NURSE PRACTITIONER

## 2022-02-03 ENCOUNTER — APPOINTMENT (OUTPATIENT)
Dept: ULTRASOUND IMAGING | Facility: HOSPITAL | Age: 27
End: 2022-02-03

## 2022-02-03 DIAGNOSIS — E04.9 ENLARGED THYROID: ICD-10-CM

## 2022-02-03 PROCEDURE — 76536 US EXAM OF HEAD AND NECK: CPT

## 2022-02-07 ENCOUNTER — TELEPHONE (OUTPATIENT)
Dept: FAMILY MEDICINE CLINIC | Facility: CLINIC | Age: 27
End: 2022-02-07

## 2022-02-07 NOTE — TELEPHONE ENCOUNTER
Contacted patient and she wanted to speak with Coretta in regards to her US. Advised patient Coretta was out of the office today and asked if their was anything she would like to ask me. Patient declined and requested Coretta give her a call once she is back in the office.

## 2022-02-07 NOTE — TELEPHONE ENCOUNTER
Caller: Palmira Carbajal    Relationship to patient: Self    Best call back number: [phone]     Patient is needing: PATIENT IS WANTING TO DISCUSS ABOUT HER ULTRA SOUND RESULTS. PLEASE CALL AND ADVISE.

## 2022-02-08 DIAGNOSIS — E04.9 ENLARGED THYROID: Primary | ICD-10-CM

## 2022-02-08 DIAGNOSIS — E04.1 THYROID NODULE: ICD-10-CM

## 2022-02-11 ENCOUNTER — OFFICE VISIT (OUTPATIENT)
Dept: FAMILY MEDICINE CLINIC | Facility: CLINIC | Age: 27
End: 2022-02-11

## 2022-02-11 VITALS
HEIGHT: 64 IN | RESPIRATION RATE: 18 BRPM | HEART RATE: 76 BPM | TEMPERATURE: 98.4 F | SYSTOLIC BLOOD PRESSURE: 114 MMHG | OXYGEN SATURATION: 99 % | WEIGHT: 163 LBS | BODY MASS INDEX: 27.83 KG/M2 | DIASTOLIC BLOOD PRESSURE: 68 MMHG

## 2022-02-11 DIAGNOSIS — Z83.3 FAMILY HISTORY OF DIABETES MELLITUS: ICD-10-CM

## 2022-02-11 DIAGNOSIS — R63.1 INCREASED THIRST: ICD-10-CM

## 2022-02-11 DIAGNOSIS — R51.9 CHRONIC NONINTRACTABLE HEADACHE, UNSPECIFIED HEADACHE TYPE: ICD-10-CM

## 2022-02-11 DIAGNOSIS — G89.29 CHRONIC NONINTRACTABLE HEADACHE, UNSPECIFIED HEADACHE TYPE: ICD-10-CM

## 2022-02-11 DIAGNOSIS — R35.0 INCREASED FREQUENCY OF URINATION: ICD-10-CM

## 2022-02-11 DIAGNOSIS — E04.1 THYROID NODULE: ICD-10-CM

## 2022-02-11 DIAGNOSIS — H53.2 BINOCULAR VISION DISORDER WITH DIPLOPIA: Primary | ICD-10-CM

## 2022-02-11 DIAGNOSIS — F41.9 ANXIETY: ICD-10-CM

## 2022-02-11 LAB
HBA1C MFR BLD: 4.8 % (ref 4.8–5.6)
T3FREE SERPL-MCNC: 2.85 PG/ML (ref 2–4.4)
T4 FREE SERPL-MCNC: 1.03 NG/DL (ref 0.93–1.7)

## 2022-02-11 PROCEDURE — 84439 ASSAY OF FREE THYROXINE: CPT | Performed by: NURSE PRACTITIONER

## 2022-02-11 PROCEDURE — 99214 OFFICE O/P EST MOD 30 MIN: CPT | Performed by: NURSE PRACTITIONER

## 2022-02-11 PROCEDURE — 83519 RIA NONANTIBODY: CPT | Performed by: NURSE PRACTITIONER

## 2022-02-11 PROCEDURE — 86376 MICROSOMAL ANTIBODY EACH: CPT | Performed by: NURSE PRACTITIONER

## 2022-02-11 PROCEDURE — 86255 FLUORESCENT ANTIBODY SCREEN: CPT | Performed by: NURSE PRACTITIONER

## 2022-02-11 PROCEDURE — 84481 FREE ASSAY (FT-3): CPT | Performed by: NURSE PRACTITIONER

## 2022-02-11 PROCEDURE — 83036 HEMOGLOBIN GLYCOSYLATED A1C: CPT | Performed by: NURSE PRACTITIONER

## 2022-02-11 PROCEDURE — 84482 T3 REVERSE: CPT | Performed by: NURSE PRACTITIONER

## 2022-02-11 RX ORDER — CITALOPRAM 20 MG/1
20 TABLET ORAL DAILY
Qty: 30 TABLET | Refills: 0 | Status: SHIPPED | OUTPATIENT
Start: 2022-02-11 | End: 2022-02-22 | Stop reason: SDUPTHER

## 2022-02-11 NOTE — PROGRESS NOTES
"Chief Complaint  Follow-up (Ophthalomology visit  2/9/22)    Subjective      History of Present Illness  Palmira Carbajal presents to [facility]     She has been to see her eye doctor because she was experiencing blurred vision and double vision. He asked her to f/u her for orders to r/o diabetes, thyroid problems, brain lesion and myasthenia gravis. She is very anxious about this. She says she has had this for a long time and didn't think anything about it. She is asking for meds for anxiety. She has tried prozac, paxil, propranol, buspar in the past. The only thing that's helped her was ativan.     She has been having headaches, those are chronic for her.    She has family history of diabetes, she has had increased thirst and urination.     \" Everything else has been doing ok. \"    Thyroid nodule, enlarged area surrounding the thyroid, she has an appt to see ENT this month. TSH was normal. We will do further thyroid labs today.     Palmira Carbajal  reports that she has been smoking cigarettes. She has a 7.50 pack-year smoking history. She has never used smokeless tobacco.. I have educated her on the risk of diseases from using tobacco products such as cancer, COPD and heart disease.     I advised her to quit and she is not willing to quit.    I spent 3  minutes counseling the patient.         Allergies  Cephalexin, Cephalosporins, and Penicillins    Objective     Vitals:    02/11/22 1031   BP: 114/68   Pulse: 76   Resp: 18   Temp: 98.4 °F (36.9 °C)   SpO2: 99%     Body mass index is 27.98 kg/m².     Review of Systems    Physical Exam  Vitals reviewed.   Constitutional:       Appearance: Normal appearance. She is well-developed.   Neck:      Comments: The thyroid feels normal but there is an area surrounding the thyroid that feels like fatty tissue that is noticeable visually and to touch as well.   Cardiovascular:      Rate and Rhythm: Normal rate and regular rhythm.      Heart " sounds: Normal heart sounds. No murmur heard.      Pulmonary:      Effort: Pulmonary effort is normal.      Breath sounds: Normal breath sounds.   Musculoskeletal:      Cervical back: Normal range of motion and neck supple. No tenderness.   Neurological:      Mental Status: She is alert and oriented to person, place, and time.      Cranial Nerves: No cranial nerve deficit.      Motor: No weakness.   Psychiatric:         Mood and Affect: Mood and affect normal.            Result Review :    The following data was reviewed by: MINI Mohan on 02/11/2022:       Depression: Not at risk   • PHQ-2 Score: 0       Common labs    Common Labsle 1/6/22 1/6/22    1007 1007   Glucose  75   BUN  9   Creatinine  0.39 (A)   eGFR Non African Am  >150   Sodium  137   Potassium  4.8   Chloride  104   Calcium  9.3   Albumin  4.50   Total Bilirubin  0.4   Alkaline Phosphatase  87   AST (SGOT)  22   ALT (SGPT)  14   WBC 11.92 (A)    Hemoglobin 13.7    Hematocrit 42.0    Platelets 325    (A) Abnormal value              Data reviewed: Radiologic studies thyroid ultrasound              Assessment and Plan     Diagnoses and all orders for this visit:    1. Binocular vision disorder with diplopia (Primary)  -     MRI Brain Without Contrast; Future  -     CT Orbits With Contrast; Future  -     Myasthenia Gravis Full Panel w/MuSK Reflex    2. Chronic nonintractable headache, unspecified headache type  -     MRI Brain Without Contrast; Future    3. Increased thirst  -     Hemoglobin A1c    4. Increased frequency of urination  -     Hemoglobin A1c    5. Thyroid nodule  -     Thyroid Peroxidase Antibody  -     T3, Reverse  -     T4, free  -     T3, free    6. Family history of diabetes mellitus  -     Hemoglobin A1c    7. Anxiety  -     citalopram (CeleXA) 20 MG tablet; Take 1 tablet by mouth Daily.  Dispense: 30 tablet; Refill: 0            Follow Up     Return in about 4 weeks (around 3/11/2022) for Next scheduled follow  up.    Patient was given instructions and counseling regarding her condition or for health maintenance advice. Please see specific information pulled into the AVS if appropriate.     Coretta Swanson, APRN

## 2022-02-13 LAB — THYROPEROXIDASE AB SERPL-ACNC: <8 IU/ML (ref 0–34)

## 2022-02-15 ENCOUNTER — TELEPHONE (OUTPATIENT)
Dept: FAMILY MEDICINE CLINIC | Facility: CLINIC | Age: 27
End: 2022-02-15

## 2022-02-15 NOTE — TELEPHONE ENCOUNTER
Caller: Palmira Carbajal    Relationship: Self    Best call back number: [phone]    What medication are you requesting: ANXIETY MEDICATION    What are your current symptoms: PATIENT IS HAVING AN MRI ON 03.02.2022, SHE IS CLAUSTROPHOBIC AND HAS BEEN REQUESTED TO ASK FOR A MEDICATION TO HELP HER DURING HER MRI.     Have you had these symptoms before:    [] Yes  [x] No    Have you been treated for these symptoms before:   [] Yes  [x] No    If a prescription is needed, what is your preferred pharmacy and phone number: [place], [place] - [address] [place] [address] [phone] [facility] [phone] FX

## 2022-02-18 LAB — ACETYLCHOLINE MODULATING AB: 0 %

## 2022-02-19 LAB — T3REVERSE SERPL-MCNC: 14.5 NG/DL (ref 9.2–24.1)

## 2022-02-22 ENCOUNTER — OFFICE VISIT (OUTPATIENT)
Dept: FAMILY MEDICINE CLINIC | Facility: CLINIC | Age: 27
End: 2022-02-22

## 2022-02-22 VITALS
HEIGHT: 64 IN | RESPIRATION RATE: 18 BRPM | WEIGHT: 161.6 LBS | SYSTOLIC BLOOD PRESSURE: 111 MMHG | OXYGEN SATURATION: 98 % | HEART RATE: 90 BPM | BODY MASS INDEX: 27.59 KG/M2 | DIASTOLIC BLOOD PRESSURE: 76 MMHG | TEMPERATURE: 98.3 F

## 2022-02-22 DIAGNOSIS — R51.9 CHRONIC NONINTRACTABLE HEADACHE, UNSPECIFIED HEADACHE TYPE: ICD-10-CM

## 2022-02-22 DIAGNOSIS — Z51.81 MEDICATION MONITORING ENCOUNTER: ICD-10-CM

## 2022-02-22 DIAGNOSIS — F41.9 ANXIETY: ICD-10-CM

## 2022-02-22 DIAGNOSIS — H53.2 BINOCULAR VISION DISORDER WITH DIPLOPIA: ICD-10-CM

## 2022-02-22 DIAGNOSIS — F41.8 SITUATIONAL ANXIETY: Primary | ICD-10-CM

## 2022-02-22 DIAGNOSIS — G89.29 CHRONIC NONINTRACTABLE HEADACHE, UNSPECIFIED HEADACHE TYPE: ICD-10-CM

## 2022-02-22 DIAGNOSIS — H53.2 DOUBLE VISION: ICD-10-CM

## 2022-02-22 PROCEDURE — 80305 DRUG TEST PRSMV DIR OPT OBS: CPT | Performed by: NURSE PRACTITIONER

## 2022-02-22 PROCEDURE — 99214 OFFICE O/P EST MOD 30 MIN: CPT | Performed by: NURSE PRACTITIONER

## 2022-02-22 RX ORDER — ALPRAZOLAM 0.25 MG/1
0.25 TABLET ORAL 2 TIMES DAILY PRN
Qty: 2 TABLET | Refills: 0 | Status: CANCELLED | OUTPATIENT
Start: 2022-02-22

## 2022-02-22 RX ORDER — ALPRAZOLAM 0.5 MG/1
0.5 TABLET ORAL 2 TIMES DAILY PRN
Qty: 2 TABLET | Refills: 0 | Status: SHIPPED | OUTPATIENT
Start: 2022-02-22 | End: 2022-03-25

## 2022-02-22 RX ORDER — CITALOPRAM 20 MG/1
20 TABLET ORAL DAILY
Qty: 30 TABLET | Refills: 5 | Status: SHIPPED | OUTPATIENT
Start: 2022-02-22 | End: 2022-03-31 | Stop reason: SDUPTHER

## 2022-02-22 NOTE — PROGRESS NOTES
Chief Complaint  Photophobia (Small spaces, Needs anxiety meds for MRI) and Constipation (Since starting Celexa )    Subjective      History of Present Illness  Palmira Carbajal presents to [facility]    Anxiety disorder.  Improved with Celexa and she would like it refilled.    Double vision and headaches, lab work-up so far has been normal.  We are still waiting on a CT scan and MRI.  She is requesting a benzo to help get her through the imaging.  She has panic disorder in tight spaces.    Palmira Carbajal  reports that she quit smoking 8 days ago. Her smoking use included cigarettes. She has a 7.50 pack-year smoking history. She has never used smokeless tobacco.         Allergies  Cephalexin, Cephalosporins, and Penicillins    Objective     Vitals:    02/22/22 1038   BP: 111/76   Pulse: 90   Resp: 18   Temp: 98.3 °F (36.8 °C)   SpO2: 98%     Body mass index is 27.74 kg/m².     Review of Systems    Physical Exam  Vitals reviewed.   Constitutional:       Appearance: Normal appearance. She is well-developed.   HENT:      Mouth/Throat:      Pharynx: No oropharyngeal exudate.   Neck:      Comments: The thyroid gland itself feels normal without nodules, she does seem to have a fat pad surrounding the thyroid.  Cardiovascular:      Rate and Rhythm: Normal rate and regular rhythm.      Heart sounds: Normal heart sounds. No murmur heard.      Pulmonary:      Effort: Pulmonary effort is normal.      Breath sounds: Normal breath sounds.   Musculoskeletal:      Cervical back: Neck supple. No tenderness.   Neurological:      Mental Status: She is alert and oriented to person, place, and time.      Cranial Nerves: No cranial nerve deficit.      Motor: No weakness.   Psychiatric:         Mood and Affect: Mood and affect normal.              Result Review :    The following data was reviewed by: MINI Mohan on 02/22/2022:       Depression: Not at risk   • PHQ-2 Score: 0        Common labs    Common Labsle 1/6/22 1/6/22 2/11/22    1007 1007    Glucose  75    BUN  9    Creatinine  0.39 (A)    eGFR Non African Am  >150    Sodium  137    Potassium  4.8    Chloride  104    Calcium  9.3    Albumin  4.50    Total Bilirubin  0.4    Alkaline Phosphatase  87    AST (SGOT)  22    ALT (SGPT)  14    WBC 11.92 (A)     Hemoglobin 13.7     Hematocrit 42.0     Platelets 325     Hemoglobin A1C   4.80   (A) Abnormal value              Data reviewed: Consultant notes ophthalmology              Assessment and Plan     Diagnoses and all orders for this visit:    1. Situational anxiety (Primary)  -     ALPRAZolam (Xanax) 0.5 MG tablet; Take 1 tablet by mouth 2 (Two) Times a Day As Needed for Anxiety.  Dispense: 2 tablet; Refill: 0    2. Medication monitoring encounter  -     POC Urine Drug Screen [facility]-Cup    3. Anxiety  -     citalopram (CeleXA) 20 MG tablet; Take 1 tablet by mouth Daily.  Dispense: 30 tablet; Refill: 5    4. Chronic nonintractable headache, unspecified headache type  -     Ambulatory Referral to Neurology    5. Double vision  -     Ambulatory Referral to Neurology    6. Binocular vision disorder with diplopia  Comments:  will call with imaging results, labs were all normal including extensive thyroid test, diabetes, myasthenia gravis testing            Follow Up     Return if symptoms worsen or fail to improve.    Patient was given instructions and counseling regarding her condition or for health maintenance advice. Please see specific information pulled into the AVS if appropriate.     Coretta Swanson, APRN

## 2022-02-23 ENCOUNTER — OFFICE VISIT (OUTPATIENT)
Dept: OTOLARYNGOLOGY | Facility: CLINIC | Age: 27
End: 2022-02-23

## 2022-02-23 VITALS — BODY MASS INDEX: 27.9 KG/M2 | HEIGHT: 64 IN | WEIGHT: 163.4 LBS | TEMPERATURE: 97.7 F

## 2022-02-23 DIAGNOSIS — E01.0 THYROMEGALY: Primary | ICD-10-CM

## 2022-02-23 DIAGNOSIS — E04.1 THYROID NODULE: ICD-10-CM

## 2022-02-23 PROCEDURE — 99203 OFFICE O/P NEW LOW 30 MIN: CPT | Performed by: NURSE PRACTITIONER

## 2022-02-23 NOTE — PROGRESS NOTES
Patient Name: Palmira Carbajal   Visit Date: 2022   Patient ID: [id number]  Provider: MINI Allen    Sex: female  Location: [facility]   YOB: 1995  Location Address: [address] [place] [address], [address] [place] [address],?[place]?[id number]    Primary Care Provider Coretta Jerome APRN  Location Phone: [phone]    Referring Provider: Coretta Swanson, *        Chief Complaint  Thyroid N ODULE    Subjective   Palmira Carbajal is a 26 y.o. female who presents to [facility] today as a consult from Coretta Swanson, * for evaluation of her thyroid.  Patient states over the past year or 2 people have told her that they feel like her thyroid is enlarged.  She states that she has not really noticed an enlargement to her neck.  She states that she has recently been evaluated for thyroid conditions but all of her lab work have come back normal.  She states that she is having some fatigue as well as eye symptoms but she is currently seeing an ophthalmologist for.  She did recently have an ultrasound of her thyroid performed on 2/3/2022 that showed an unremarkable thyroid with the exception of a small 4 mm cystic and solid nodule in the left lobe that is likely a T RADS 2 nodule.  She denies compressive symptoms.  She denies family history of thyroid cancer or personal radiation exposure.      Current Outpatient Medications on File Prior to Visit   Medication Sig   • ALPRAZolam (Xanax) 0.5 MG tablet Take 1 tablet by mouth 2 (Two) Times a Day As Needed for Anxiety.   • citalopram (CeleXA) 20 MG tablet Take 1 tablet by mouth Daily.     No current facility-administered medications on file prior to visit.        Social History     Tobacco Use   • Smoking status: Former Smoker     Packs/day: 0.50     Years: 15.00     Pack years: 7.50     Types: Cigarettes     Quit date: 2022     Years since quittin.0   • Smokeless tobacco: Never Used   •  "Tobacco comment: INST PER ANESTHESIA PROTOCOL   Vaping Use   • Vaping Use: Every day   • Substances: Nicotine, Flavoring   • Devices: Disposable   Substance Use Topics   • Alcohol use: Yes     Comment: light, occasionally drinks, less than 1 drink per day, has been drinking for 5 years   • Drug use: Never       Objective     Vital Signs:   Temp 97.7 °F (36.5 °C) (Temporal)   Ht 162.6 cm (64\")   Wt 74.1 kg (163 lb 6.4 oz)   BMI 28.05 kg/m²       Physical Exam  Constitutional:       General: She is not in acute distress.     Appearance: Normal appearance. She is not ill-appearing.   HENT:      Head: Normocephalic and atraumatic.      Jaw: There is normal jaw occlusion. No tenderness or pain on movement.      Salivary Glands: Right salivary gland is not diffusely enlarged or tender. Left salivary gland is not diffusely enlarged or tender.      Right Ear: Tympanic membrane, ear canal and external ear normal.      Left Ear: Tympanic membrane, ear canal and external ear normal.      Nose: Nose normal. No septal deviation.      Right Sinus: No maxillary sinus tenderness or frontal sinus tenderness.      Left Sinus: No maxillary sinus tenderness or frontal sinus tenderness.      Mouth/Throat:      Lips: Pink. No lesions.      Mouth: Mucous membranes are moist. No oral lesions.      Dentition: Normal dentition.      Tongue: No lesions.      Palate: No mass and lesions.      Pharynx: Oropharynx is clear. Uvula midline.      Tonsils: No tonsillar exudate.   Eyes:      Extraocular Movements: Extraocular movements intact.      Conjunctiva/sclera: Conjunctivae normal.      Pupils: Pupils are equal, round, and reactive to light.   Neck:      Thyroid: Thyromegaly present. No thyroid mass or thyroid tenderness.      Trachea: Trachea normal.   Pulmonary:      Effort: Pulmonary effort is normal. No respiratory distress.   Musculoskeletal:         General: Normal range of motion.      Cervical back: Normal range of motion and neck " supple. No tenderness.   Lymphadenopathy:      Cervical: No cervical adenopathy.   Skin:     General: Skin is warm and dry.   Neurological:      General: No focal deficit present.      Mental Status: She is alert and oriented to person, place, and time.      Cranial Nerves: No cranial nerve deficit.      Motor: No weakness.      Gait: Gait normal.   Psychiatric:         Mood and Affect: Mood normal.         Behavior: Behavior normal.         Thought Content: Thought content normal.         Judgment: Judgment normal.               Result Review :         US Head Neck Soft Tissue (02/03/2022 09:27)  T3, free (02/11/2022 11:37)  T4, free (02/11/2022 11:37)  T3, Reverse (02/11/2022 11:37)  Thyroid Peroxidase Antibody (02/11/2022 11:37)  TSH (01/06/2022 10:07)  Thyroid Profile II (01/06/2022 10:07)       Assessment and Plan    Diagnoses and all orders for this visit:    1. Thyromegaly (Primary)  -     US Thyroid; Future    2. Thyroid nodule  -     US Thyroid; Future    On examination today her overall thyroid appearance does look mildly enlarged with no obvious appearing masses or overlying skin changes.  I have gone over the results of her ultrasound with her today and also reviewed her recent lab work.  I would like to get a repeat thyroid ultrasound in 9 to 12 months for follow-up.       Follow Up   No follow-ups on file.  Patient was given instructions and counseling regarding her condition or for health maintenance advice. Please see specific information pulled into the AVS if appropriate.      MINI Allen

## 2022-02-24 LAB
ACHR BIND AB SER-SCNC: <0.03 NMOL/L (ref 0–0.24)
ACHR BLOCK AB SER-ACNC: 23 % (ref 0–25)
ACHR MOD AB/ACHR TOTAL SFR SER: NORMAL %
MUSK AB SER-SCNC: <1 U/ML
REFLEX INFORMATION: NORMAL
STRIA MUS AB TITR SER IF: NORMAL {TITER}

## 2022-03-02 ENCOUNTER — HOSPITAL ENCOUNTER (OUTPATIENT)
Dept: MRI IMAGING | Facility: HOSPITAL | Age: 27
Discharge: HOME | End: 2022-03-02

## 2022-03-02 ENCOUNTER — HOSPITAL ENCOUNTER (OUTPATIENT)
Dept: CT IMAGING | Facility: HOSPITAL | Age: 27
Discharge: HOME | End: 2022-03-02

## 2022-03-02 DIAGNOSIS — G89.29 CHRONIC NONINTRACTABLE HEADACHE, UNSPECIFIED HEADACHE TYPE: ICD-10-CM

## 2022-03-02 DIAGNOSIS — H53.2 BINOCULAR VISION DISORDER WITH DIPLOPIA: ICD-10-CM

## 2022-03-02 DIAGNOSIS — R51.9 CHRONIC NONINTRACTABLE HEADACHE, UNSPECIFIED HEADACHE TYPE: ICD-10-CM

## 2022-03-02 PROCEDURE — 70551 MRI BRAIN STEM W/O DYE: CPT

## 2022-03-02 PROCEDURE — 0 IOPAMIDOL PER 1 ML: Performed by: NURSE PRACTITIONER

## 2022-03-02 PROCEDURE — 70481 CT ORBIT/EAR/FOSSA W/DYE: CPT

## 2022-03-02 RX ADMIN — IOPAMIDOL 50 ML: 755 INJECTION, SOLUTION INTRAVENOUS at 16:17

## 2022-03-25 ENCOUNTER — OFFICE VISIT (OUTPATIENT)
Dept: NEUROLOGY | Facility: CLINIC | Age: 27
End: 2022-03-25

## 2022-03-25 VITALS
SYSTOLIC BLOOD PRESSURE: 139 MMHG | HEART RATE: 81 BPM | BODY MASS INDEX: 28.61 KG/M2 | WEIGHT: 167.6 LBS | HEIGHT: 64 IN | DIASTOLIC BLOOD PRESSURE: 80 MMHG

## 2022-03-25 DIAGNOSIS — G43.009 MIGRAINE WITHOUT AURA AND WITHOUT STATUS MIGRAINOSUS, NOT INTRACTABLE: Primary | ICD-10-CM

## 2022-03-25 PROBLEM — G89.29 CHRONIC NONINTRACTABLE HEADACHE: Status: RESOLVED | Noted: 2022-02-22 | Resolved: 2022-03-25

## 2022-03-25 PROBLEM — R51.9 CHRONIC NONINTRACTABLE HEADACHE: Status: RESOLVED | Noted: 2022-02-22 | Resolved: 2022-03-25

## 2022-03-25 PROCEDURE — 99204 OFFICE O/P NEW MOD 45 MIN: CPT | Performed by: PSYCHIATRY & NEUROLOGY

## 2022-03-25 RX ORDER — TOPIRAMATE 25 MG/1
TABLET ORAL
Qty: 120 TABLET | Refills: 4 | Status: SHIPPED | OUTPATIENT
Start: 2022-03-25 | End: 2022-07-06

## 2022-03-25 RX ORDER — SUMATRIPTAN 50 MG/1
TABLET, FILM COATED ORAL
Qty: 8 TABLET | Refills: 5 | Status: SHIPPED | OUTPATIENT
Start: 2022-03-25 | End: 2022-05-31

## 2022-03-25 NOTE — PROGRESS NOTES
"Chief Complaint  Migraine (MRI BRAIN [facility] 03/02/2022)    Subjective          Palmira Carbajal is a 26 y.o. female who presents to [facility]  History of Present Illness  26-year-old woman evaluated for chronic headaches.  She states that she started having headaches about a year ago and it severe at least 10 times a month lasting for 5 to 6 hours.  She states that it is been severe for the last 4 to 5 months.  She has a daily headache.  She takes ibuprofen or Tylenol.  She takes it every 6-8 hours.  The headaches are mild to moderate in severity and from a scale of 1-10 4-5 out of 10 and then it goes down to 3 out of 10 after taking Tylenol.  She states that the severe headaches are associated light and noise sensitivity, some nausea.  She states that the headaches are usually around her eye and temples and it is a pressure and throbbing.  She is never taken prophylactic antimigraine therapy in the past.  She said an MRI of the brain which is unremarkable.  Her father has migraines.    She has had a hysterectomy.  She has 3 children.  Has a history of anxiety.    Objective   Vital Signs:   /80 (BP Location: Right arm, Patient Position: Sitting, Cuff Size: Adult)   Pulse 81   Ht 162.6 cm (64.02\")   Wt 76 kg (167 lb 9.6 oz)   BMI 28.75 kg/m²     Physical Exam   She is alert, fluent, phasic, follows commands well.  Optic disc are normal bilaterally visual fields full confrontation, EOMs are full all directions gaze, facial strength is full, soft palate elevation and tongue are normal.  There is no weakness of the upper or lower extremities in these muscle testing.  Fine finger movements are intact.  Reflexes are normoactive and symmetrical in biceps, triceps, patellar's and ankles.  Cerebellar testing is intact.  And Station and gait she is able to tiptoe, heel, peggy and tandem without difficulty.  Heart is regular rhythm normal in rate.        Assessment and " Plan  Diagnoses and all orders for this visit:    1. Migraine without aura and without status migrainosus, not intractable (Primary)  Assessment & Plan:  She has chronic migraine headaches.  I will start her on topiramate at 25 mg initially and increase the dose by 25 mg every week into divided doses until she is taking up to 50 mg twice a day.  Explained to her the adverse effects of the medication including paresthesias, dizziness, sleepiness rarely kidney stones, metabolic acidosis, weight loss.  Should she have any problems she is to call our office.  I discussed with her the rational for giving her prophylactic antimigraine therapy.  For abortive treatment she is to take sumatriptan 50 mg at the onset of headache and repeat again in 2 hours as needed maximum 2/day or 8/month.  Discussed with her that if topiramate is not effective for  Visit I will increase it to a higher dose and start her on amitriptyline.  We will see her again in 6 weeks time for follow-up.          Other orders  -     topiramate (TOPAMAX) 25 MG tablet; 1 daily first week, twice a day second week, 1 in the morning 2 in the evening third week then 2 twice a day fourth-week and on.  Dispense: 120 tablet; Refill: 4  -     SUMAtriptan (IMITREX) 50 MG tablet; Take one tablet at onset of headache. May repeat dose one time in 2 hours if headache not relieved.  Maximum 2/day or 8/month  Dispense: 8 tablet; Refill: 5       Total time spent with the patient and coordinating patient care was 30 minutes.    Follow Up  No follow-ups on file.  Patient was given instructions and counseling regarding her condition or for health maintenance advice. Please see specific information pulled into the AVS if appropriate.

## 2022-03-25 NOTE — ASSESSMENT & PLAN NOTE
She has chronic migraine headaches.  I will start her on topiramate at 25 mg initially and increase the dose by 25 mg every week into divided doses until she is taking up to 50 mg twice a day.  Explained to her the adverse effects of the medication including paresthesias, dizziness, sleepiness rarely kidney stones, metabolic acidosis, weight loss.  Should she have any problems she is to call our office.  I discussed with her the rational for giving her prophylactic antimigraine therapy.  For abortive treatment she is to take sumatriptan 50 mg at the onset of headache and repeat again in 2 hours as needed maximum 2/day or 8/month.  Discussed with her that if topiramate is not effective for  Visit I will increase it to a higher dose and start her on amitriptyline.  We will see her again in 6 weeks time for follow-up.

## 2022-03-31 ENCOUNTER — TELEPHONE (OUTPATIENT)
Dept: FAMILY MEDICINE CLINIC | Facility: CLINIC | Age: 27
End: 2022-03-31

## 2022-03-31 DIAGNOSIS — F41.9 ANXIETY: ICD-10-CM

## 2022-03-31 RX ORDER — CITALOPRAM 20 MG/1
20 TABLET ORAL DAILY
Qty: 30 TABLET | Refills: 5 | Status: SHIPPED | OUTPATIENT
Start: 2022-03-31 | End: 2022-09-08

## 2022-03-31 NOTE — TELEPHONE ENCOUNTER
Caller: Palmira Carbajal    Relationship: Self    Best call back number: [phone]    Requested Prescriptions:   Requested Prescriptions     Pending Prescriptions Disp Refills   • citalopram (CeleXA) 20 MG tablet 30 tablet 5     Sig: Take 1 tablet by mouth Daily.        Pharmacy where request should be sent: [address] [phone] [facility] [phone] FX         Does the patient have less than a 3 day supply:  [x] Yes  [] No    Teresita Mehta Rep   03/31/22 10:47 EDT

## 2022-05-13 ENCOUNTER — OFFICE VISIT (OUTPATIENT)
Dept: NEUROLOGY | Facility: CLINIC | Age: 27
End: 2022-05-13

## 2022-05-13 VITALS
HEIGHT: 64 IN | BODY MASS INDEX: 26.8 KG/M2 | SYSTOLIC BLOOD PRESSURE: 110 MMHG | DIASTOLIC BLOOD PRESSURE: 66 MMHG | WEIGHT: 157 LBS | HEART RATE: 78 BPM

## 2022-05-13 DIAGNOSIS — G43.009 MIGRAINE WITHOUT AURA AND WITHOUT STATUS MIGRAINOSUS, NOT INTRACTABLE: Primary | ICD-10-CM

## 2022-05-13 PROCEDURE — 99214 OFFICE O/P EST MOD 30 MIN: CPT | Performed by: PSYCHIATRY & NEUROLOGY

## 2022-05-13 RX ORDER — PROPRANOLOL HYDROCHLORIDE 40 MG/1
TABLET ORAL
Qty: 120 TABLET | Refills: 4 | Status: SHIPPED | OUTPATIENT
Start: 2022-05-13 | End: 2022-05-31

## 2022-05-13 NOTE — PROGRESS NOTES
"Chief Complaint  Migraine    Subjective          Palmira Carbajal is a 26 y.o. female who presents to [facility]  History of Present Illness  26-year-old woman here for follow-up of her migraine headaches.  She states that her migraine headaches are the same.  She is taking topiramate 50 mg twice a day without any improvement.  She states that the topiramate gave her paresthesias.  She states that she is getting at least 10-15 headaches a month.  She has no history of asthma.  She states that she does not like the side effects of sumatriptan which is making her nauseated and sleepy and she does not want to take it again.    Objective   Vital Signs:   /66 (BP Location: Right arm, Patient Position: Sitting, Cuff Size: Adult)   Pulse 78   Ht 162.6 cm (64.02\")   Wt 71.2 kg (157 lb)   BMI 26.94 kg/m²     Physical Exam   She is alert, fluent, aphasic, follows commands well.  Her heart rate is 76.  She knows how to count her pulse and she showed me how she does it and it was accurate.  Her lungs are clear to auscultation, heart is regular rhythm normal rate.        Assessment and Plan  Diagnoses and all orders for this visit:    1. Migraine without aura and without status migrainosus, not intractable (Primary)  Assessment & Plan:  She is to taper herself off topiramate.  She is to take 50 mg daily for 1 week and then stop taking topiramate.  I will start her on propranolol 40 mg twice a day this week.  I explained to her to take her pulse and if it is over 60 bpm she can increase it to 40 mg in the morning and 80 mg in the evening for the second week and up to 80 mg twice a day thereafter.  Explained to her the adverse effects of propranolol including lightheadedness dizziness, feeling like passing out, tiredness, palpitations, depression.  If she has adverse effects she is to inform our office.  I will see her again in 6 weeks time for follow-up.  Should she not " Personalized Preventive Plan for Maico Wilkins - 12/12/2017  Medicare offers a range of preventive health benefits. Some of the tests and screenings are paid in full while other may be subject to a deductible, co-insurance, and/or copay. Some of these benefits include a comprehensive review of your medical history including lifestyle, illnesses that may run in your family, and various assessments and screenings as appropriate. After reviewing your medical record and screening and assessments performed today your provider may have ordered immunizations, labs, imaging, and/or referrals for you. A list of these orders (if applicable) as well as your Preventive Care list are included within your After Visit Summary for your review. Other Preventive Recommendations:    · A preventive eye exam performed by an eye specialist is recommended every 1-2 years to screen for glaucoma; cataracts, macular degeneration, and other eye disorders. · A preventive dental visit is recommended every 6 months. · Try to get at least 150 minutes of exercise per week or 10,000 steps per day on a pedometer . · Order or download the FREE \"Exercise & Physical Activity: Your Everyday Guide\" from [employer]. Call 4-768.839.8992 or search [employer] online. · You need 9440-1463 mg of calcium and 7614-8568 IU of vitamin D per day. It is possible to meet your calcium requirement with diet alone, but a vitamin D supplement is usually necessary to meet this goal.  · When exposed to the sun, use a sunscreen that protects against both UVA and UVB radiation with an SPF of 30 or greater. Reapply every 2 to 3 hours or after sweating, drying off with a towel, or swimming. · Always wear a seat belt when traveling in a car. Always wear a helmet when riding a bicycle or motorcycle. improve on propranolol I will give her one of the injections since she is taking citalopram and I do not want to start her on amitriptyline.          Other orders  -     propranolol (INDERAL) 40 MG tablet; 1 twice a day for 1 week and increase as needed to  1 in the morning 2 in the evening second week and 2 twice a day thereafter.  Dispense: 120 tablet; Refill: 4       Total time spent with the patient and coordinating patient care was 35 minutes.    Follow Up  No follow-ups on file.  Patient was given instructions and counseling regarding her condition or for health maintenance advice. Please see specific information pulled into the AVS if appropriate.

## 2022-05-13 NOTE — ASSESSMENT & PLAN NOTE
She is to taper herself off topiramate.  She is to take 50 mg daily for 1 week and then stop taking topiramate.  I will start her on propranolol 40 mg twice a day this week.  I explained to her to take her pulse and if it is over 60 bpm she can increase it to 40 mg in the morning and 80 mg in the evening for the second week and up to 80 mg twice a day thereafter.  Explained to her the adverse effects of propranolol including lightheadedness dizziness, feeling like passing out, tiredness, palpitations, depression.  If she has adverse effects she is to inform our office.  I will see her again in 6 weeks time for follow-up.  Should she not improve on propranolol I will give her one of the injections since she is taking citalopram and I do not want to start her on amitriptyline.

## 2022-05-16 ENCOUNTER — TELEPHONE (OUTPATIENT)
Dept: NEUROLOGY | Facility: CLINIC | Age: 27
End: 2022-05-16

## 2022-05-16 NOTE — TELEPHONE ENCOUNTER
Caller: VALENTINE     Relationship: PT    Best call back number:[phone]    What medications are you currently taking:   Current Outpatient Medications on File Prior to Visit   Medication Sig Dispense Refill   • citalopram (CeleXA) 20 MG tablet Take 1 tablet by mouth Daily. 30 tablet 5   • propranolol (INDERAL) 40 MG tablet 1 twice a day for 1 week and increase as needed to  1 in the morning 2 in the evening second week and 2 twice a day thereafter. 120 tablet 4   • SUMAtriptan (IMITREX) 50 MG tablet Take one tablet at onset of headache. May repeat dose one time in 2 hours if headache not relieved.  Maximum 2/day or 8/month 8 tablet 5   • topiramate (TOPAMAX) 25 MG tablet 1 daily first week, twice a day second week, 1 in the morning 2 in the evening third week then 2 twice a day fourth-week and on. 120 tablet 4     No current facility-administered medications on file prior to visit.          When did you start taking these medications: Saturday     Which medication are you concerned about: PROPANOLOL     Who prescribed you this medication: DR. BALBUENA     What are your concerns: PT STATES THAT HER PULSE DROPPED AFTER TAKING THE MEDICATION, STATES THAT SHE TOOK ONE DOES STATES THAT HER PULSE WAS 48-50    How long have you had these concerns: Saturday STATES THAT HER PULSE WAS VERY LOW AND TOOK 5 HOURS FOR THIS TO GO BACK TO NORMAL.

## 2022-05-16 NOTE — TELEPHONE ENCOUNTER
Advised patient that being as she is on 40 mg she can stop medication. Moved patients appt up to 05/31.

## 2022-05-30 ENCOUNTER — HOSPITAL ENCOUNTER (EMERGENCY)
Facility: HOSPITAL | Age: 27
Discharge: HOME | End: 2022-05-30
Attending: EMERGENCY MEDICINE | Admitting: EMERGENCY MEDICINE

## 2022-05-30 VITALS
DIASTOLIC BLOOD PRESSURE: 79 MMHG | HEART RATE: 99 BPM | SYSTOLIC BLOOD PRESSURE: 116 MMHG | OXYGEN SATURATION: 100 % | RESPIRATION RATE: 18 BRPM | BODY MASS INDEX: 27.25 KG/M2 | HEIGHT: 64 IN | TEMPERATURE: 98.2 F | WEIGHT: 159.61 LBS

## 2022-05-30 DIAGNOSIS — T23.201A PARTIAL THICKNESS BURN OF MULTIPLE SITES OF RIGHT HAND, INITIAL ENCOUNTER: Primary | ICD-10-CM

## 2022-05-30 PROCEDURE — 99283 EMERGENCY DEPT VISIT LOW MDM: CPT

## 2022-05-30 RX ORDER — GINSENG 100 MG
1 CAPSULE ORAL DAILY
Qty: 14 G | Refills: 0 | Status: SHIPPED | OUTPATIENT
Start: 2022-05-30 | End: 2022-05-31

## 2022-05-30 RX ORDER — GINSENG 100 MG
1 CAPSULE ORAL ONCE
Status: DISCONTINUED | OUTPATIENT
Start: 2022-05-30 | End: 2022-05-30 | Stop reason: HOSPADM

## 2022-05-30 RX ORDER — LIDOCAINE 40 MG/G
1 CREAM TOPICAL AS NEEDED
Qty: 15 G | Refills: 0 | Status: SHIPPED | OUTPATIENT
Start: 2022-05-30 | End: 2022-06-13

## 2022-05-30 RX ADMIN — IBUPROFEN 600 MG: 200 TABLET, FILM COATED ORAL at 20:32

## 2022-05-31 ENCOUNTER — OFFICE VISIT (OUTPATIENT)
Dept: NEUROLOGY | Facility: CLINIC | Age: 27
End: 2022-05-31

## 2022-05-31 ENCOUNTER — OFFICE VISIT (OUTPATIENT)
Dept: FAMILY MEDICINE CLINIC | Facility: CLINIC | Age: 27
End: 2022-05-31

## 2022-05-31 ENCOUNTER — PRIOR AUTHORIZATION (OUTPATIENT)
Dept: NEUROLOGY | Facility: CLINIC | Age: 27
End: 2022-05-31

## 2022-05-31 VITALS
RESPIRATION RATE: 18 BRPM | OXYGEN SATURATION: 98 % | SYSTOLIC BLOOD PRESSURE: 112 MMHG | DIASTOLIC BLOOD PRESSURE: 64 MMHG | WEIGHT: 157.8 LBS | HEART RATE: 72 BPM | TEMPERATURE: 98.1 F | HEIGHT: 64 IN | BODY MASS INDEX: 26.94 KG/M2

## 2022-05-31 VITALS
HEIGHT: 64 IN | BODY MASS INDEX: 26.96 KG/M2 | WEIGHT: 157.9 LBS | HEART RATE: 75 BPM | DIASTOLIC BLOOD PRESSURE: 70 MMHG | SYSTOLIC BLOOD PRESSURE: 110 MMHG

## 2022-05-31 DIAGNOSIS — K92.1 BLOODY STOOLS: Primary | ICD-10-CM

## 2022-05-31 DIAGNOSIS — T23.241D PARTIAL THICKNESS BURN OF MULTIPLE DIGITS OF RIGHT HAND INCLUDING PARTIAL THICKNESS BURN OF THUMB, SUBSEQUENT ENCOUNTER: ICD-10-CM

## 2022-05-31 DIAGNOSIS — G43.009 MIGRAINE WITHOUT AURA AND WITHOUT STATUS MIGRAINOSUS, NOT INTRACTABLE: Primary | ICD-10-CM

## 2022-05-31 DIAGNOSIS — K64.9 HEMORRHOIDS, UNSPECIFIED HEMORRHOID TYPE: ICD-10-CM

## 2022-05-31 DIAGNOSIS — K52.9 CHRONIC DIARRHEA: ICD-10-CM

## 2022-05-31 PROBLEM — B37.31 YEAST VAGINITIS: Status: RESOLVED | Noted: 2021-10-05 | Resolved: 2022-05-31

## 2022-05-31 PROBLEM — N89.8 VAGINAL DISCHARGE: Status: RESOLVED | Noted: 2021-10-05 | Resolved: 2022-05-31

## 2022-05-31 PROBLEM — F43.10 PTSD (POST-TRAUMATIC STRESS DISORDER): Status: ACTIVE | Noted: 2022-05-31

## 2022-05-31 PROBLEM — T23.241A BURN OF FINGER AND THUMB OF RIGHT HAND, SECOND DEGREE: Status: ACTIVE | Noted: 2022-05-31

## 2022-05-31 PROBLEM — N20.0 RENAL CALCULUS OR STONE: Status: ACTIVE | Noted: 2022-05-31

## 2022-05-31 PROBLEM — K21.9 ACID REFLUX: Status: ACTIVE | Noted: 2022-05-31

## 2022-05-31 PROBLEM — J30.2 SEASONAL ALLERGIC RHINITIS: Status: ACTIVE | Noted: 2022-05-31

## 2022-05-31 PROCEDURE — 96372 THER/PROPH/DIAG INJ SC/IM: CPT | Performed by: PSYCHIATRY & NEUROLOGY

## 2022-05-31 PROCEDURE — 99214 OFFICE O/P EST MOD 30 MIN: CPT | Performed by: NURSE PRACTITIONER

## 2022-05-31 PROCEDURE — 99213 OFFICE O/P EST LOW 20 MIN: CPT | Performed by: PSYCHIATRY & NEUROLOGY

## 2022-05-31 RX ORDER — HYDROCORTISONE ACETATE 25 MG/1
25 SUPPOSITORY RECTAL 2 TIMES DAILY
Qty: 28 SUPPOSITORY | Refills: 0 | Status: SHIPPED | OUTPATIENT
Start: 2022-05-31 | End: 2022-06-13

## 2022-05-31 RX ORDER — FREMANEZUMAB-VFRM 225 MG/1.5ML
225 INJECTION SUBCUTANEOUS
Qty: 1 EACH | Refills: 5 | Status: SHIPPED | OUTPATIENT
Start: 2022-05-31 | End: 2022-08-16

## 2022-05-31 NOTE — PROGRESS NOTES
"Chief Complaint  Medication Problem    Subjective          Palmira Carbajal is a 26 y.o. female who presents to [facility]  History of Present Illness  26-year-old woman here for follow-up for headaches.  She states that she tried propanolol and it made her dizzy woozy and she did not feel right.  She states her pulse rate was in between 48 to 52 bpm and she did not take it again.  She is here for follow-up patient is getting at least 10-15 severe headaches a month that is usually right-sided or left-sided from the front of her eye to the side of her head that is sharp.  It throbs at times.  There is associated light and noise sensitivity.  She states that the last time she had 1 it lasted for 3 days.    Objective   Vital Signs:   /70 (BP Location: Left arm, Patient Position: Sitting, Cuff Size: Adult)   Pulse 75   Ht 162.6 cm (64.02\")   Wt 71.6 kg (157 lb 14.4 oz)   BMI 27.09 kg/m²     Physical Exam   She is alert, fluent, phasic, follows commands well.  Optic disc are normal bilaterally heart is regular rhythm normal in rate.        Assessment and Plan  Diagnoses and all orders for this visit:    1. Migraine without aura and without status migrainosus, not intractable (Primary)  Assessment & Plan:  Discussed with her regarding Ajovy injection and its adverse effects.  She was given a sample of Ajovy injection and our office and we will get it preauthorized that she gets another injection next month and then we will see her again in 2 months time for follow-up.             Total time spent with the patient and coordinating patient care was 20 minutes.    Follow Up  No follow-ups on file.  Patient was given instructions and counseling regarding her condition or for health maintenance advice. Please see specific information pulled into the AVS if appropriate.       "

## 2022-05-31 NOTE — ASSESSMENT & PLAN NOTE
Discussed with her regarding Ajovy injection and its adverse effects.  She was given a sample of Ajovy injection and our office and we will get it preauthorized that she gets another injection next month and then we will see her again in 2 months time for follow-up.

## 2022-05-31 NOTE — PROGRESS NOTES
"Chief Complaint  Establish Care (Transition from [facility] to [facility]), Rectal Bleeding (Blood in stool), and Abdominal Pain    Subjective          Palmira Carbajal, 26 y.o. female presents to [facility]  History of Present Illness   She presents today for an acute visit, needing a referral.  She is also establishing care, she is a previous patient of [facility].    She has complaints of blood in her stool and abdominal pain, and sometimes has constipation/diarrhea.  She states that the Celexa causes her constipation or she would have diarrhea.  She also states her lower abdomen will hurt at times.  She also complains of pain when she has bowel movements, states it feels like glass when she is having a bowel movement.  She states she has a lot of blood with her bowel movements.  She states [facility] was going to send her for colonoscopy but she canceled it due to COVID and other obligations.  She states that she would like a referral now for colonoscopy.    She burned her right hand last night when she tripped while carrying some hot grease.  She states she went to the emergency.  She states she was told she had a second-degree burn.  The ER applied bacitracin and wrapped it with Kerlix.    Objective   Vital Signs:   /64   Pulse 72   Temp 98.1 °F (36.7 °C)   Resp 18   Ht 162.6 cm (64\")   Wt 71.6 kg (157 lb 12.8 oz)   SpO2 98%   BMI 27.09 kg/m²     Current Outpatient Medications on File Prior to Visit   Medication Sig Dispense Refill   • citalopram (CeleXA) 20 MG tablet Take 1 tablet by mouth Daily. 30 tablet 5   • lidocaine (LMX) 4 % cream Apply 1 application topically to the appropriate area as directed As Needed for Mild Pain  or Moderate Pain . 15 g 0   • Fremanezumab-vfrm (Ajovy) 225 MG/1.5ML solution prefilled syringe Inject 225 mg under the skin into the appropriate area as directed Every 30 (Thirty) Days. 1 each 5   • topiramate (TOPAMAX) 25 MG tablet " 1 daily first week, twice a day second week, 1 in the morning 2 in the evening third week then 2 twice a day fourth-week and on. 120 tablet 4   • [DISCONTINUED] bacitracin 500 UNIT/GM ointment Apply 1 application topically to the appropriate area as directed Daily. 14 g 0   • [DISCONTINUED] propranolol (INDERAL) 40 MG tablet 1 twice a day for 1 week and increase as needed to  1 in the morning 2 in the evening second week and 2 twice a day thereafter. 120 tablet 4   • [DISCONTINUED] SUMAtriptan (IMITREX) 50 MG tablet Take one tablet at onset of headache. May repeat dose one time in 2 hours if headache not relieved.  Maximum 2/day or 8/month 8 tablet 5     Current Facility-Administered Medications on File Prior to Visit   Medication Dose Route Frequency Provider Last Rate Last Admin   • Fremanezumab-vfrm solution auto-injector 225 mg  225 mg Subcutaneous Q30 Days Angel Arita MD   225 mg at 05/31/22 1457   • [COMPLETED] ibuprofen (ADVIL,MOTRIN) tablet 600 mg  600 mg Oral Once Royal Terry PA-C   600 mg at 05/30/22 2032   • [DISCONTINUED] bacitracin 500 UNIT/GM ointment 1 application  1 application Topical Once Royal Terry PA-C         Past Medical History:   Diagnosis Date   • Acid reflux    • Kidney stone     Renal calculus or stone   • PONV (postoperative nausea and vomiting)     GOOD RESULTS WITH SCOPALAMINE   • PTSD (post-traumatic stress disorder)     WAKES UP FROM ANESTHESIA WITH PANIC ATTACK   • Seasonal allergies       Physical Exam  Vitals reviewed.   Constitutional:       Appearance: Normal appearance. She is well-developed.   Neck:      Thyroid: No thyroid mass, thyromegaly or thyroid tenderness.   Cardiovascular:      Rate and Rhythm: Normal rate and regular rhythm.      Heart sounds: No murmur heard.    No friction rub. No gallop.   Pulmonary:      Effort: Pulmonary effort is normal.      Breath sounds: Normal breath sounds. No wheezing or rhonchi.   Lymphadenopathy:       Cervical: No cervical adenopathy.   Skin:     General: Skin is warm and dry.      Findings: Burn present.      Comments: Raised blister burn noted to right hand, thumb and index finger.   Neurological:      Mental Status: She is alert and oriented to person, place, and time.      Cranial Nerves: No cranial nerve deficit.   Psychiatric:         Mood and Affect: Mood and affect normal.         Behavior: Behavior normal.         Thought Content: Thought content normal. Thought content does not include homicidal or suicidal ideation.         Judgment: Judgment normal.        Result Review :                 Assessment and Plan    Diagnoses and all orders for this visit:    1. Bloody stools (Primary)  -     Ambulatory Referral to Gastroenterology    2. Chronic diarrhea  -     Ambulatory Referral to Gastroenterology    3. Hemorrhoids, unspecified hemorrhoid type  Assessment & Plan:  I will start her on Anusol suppositories.      4. Partial thickness burn of multiple digits of right hand including partial thickness burn of thumb, subsequent encounter  Assessment & Plan:  Dressing was removed today.  She has second-degree burn with blistering noted.  I had her gently wash her hands with soap and water.  I applied a thin layer of Silvadene cream, Telfa dressing and wrapped with dry Kerlix dressing.  Advised patient to keep the area clean and dry.  I advised her to change the dressing once daily or more often if it gets soiled.  Also advised not to wrap with Coban so the area can breathe.  I will have her follow-up in 3 days or sooner if any worsening of symptoms.      Other orders  -     hydrocortisone (ANUSOL-HC) 25 MG suppository; Insert 1 suppository into the rectum 2 (Two) Times a Day for 14 days.  Dispense: 28 suppository; Refill: 0      Follow Up   Return in about 3 days (around 6/3/2022) for Recheck burn.  Patient was given instructions and counseling regarding her condition or for health maintenance advice. Please see  specific information pulled into the AVS if appropriate.

## 2022-05-31 NOTE — ASSESSMENT & PLAN NOTE
Dressing was removed today.  She has second-degree burn with blistering noted.  I had her gently wash her hands with soap and water.  I applied a thin layer of Silvadene cream, Telfa dressing and wrapped with dry Kerlix dressing.  Advised patient to keep the area clean and dry.  I advised her to change the dressing once daily or more often if it gets soiled.  Also advised not to wrap with Coban so the area can breathe.  I will have her follow-up in 3 days or sooner if any worsening of symptoms.

## 2022-06-01 ENCOUNTER — PATIENT ROUNDING (BHMG ONLY) (OUTPATIENT)
Dept: FAMILY MEDICINE CLINIC | Facility: CLINIC | Age: 27
End: 2022-06-01

## 2022-06-01 NOTE — PROGRESS NOTES
June 1, 2022    Hello, may I speak with Palmira Carbajal?    My name is Amelia      I am  with [facility]  [address] [place] [address] [phone].    Before we get started may I verify your date of birth? 1995    I am calling to officially welcome you to our practice and ask about your recent visit. Is this a good time to talk? yes    Tell me about your visit with us. What things went well?  It went really well.       We're always looking for ways to make our patients' experiences even better. Do you have recommendations on ways we may improve?  no    Overall were you satisfied with your first visit to our practice? yes       I appreciate you taking the time to speak with me today. Is there anything else I can do for you? no      Thank you, and have a great day.

## 2022-06-03 ENCOUNTER — OFFICE VISIT (OUTPATIENT)
Dept: FAMILY MEDICINE CLINIC | Facility: CLINIC | Age: 27
End: 2022-06-03

## 2022-06-03 VITALS
WEIGHT: 159.5 LBS | HEART RATE: 74 BPM | DIASTOLIC BLOOD PRESSURE: 58 MMHG | BODY MASS INDEX: 27.23 KG/M2 | OXYGEN SATURATION: 99 % | SYSTOLIC BLOOD PRESSURE: 107 MMHG | RESPIRATION RATE: 18 BRPM | TEMPERATURE: 98 F | HEIGHT: 64 IN

## 2022-06-03 DIAGNOSIS — M79.641 PAIN OF RIGHT HAND: ICD-10-CM

## 2022-06-03 DIAGNOSIS — T23.241D PARTIAL THICKNESS BURN OF MULTIPLE DIGITS OF RIGHT HAND INCLUDING PARTIAL THICKNESS BURN OF THUMB, SUBSEQUENT ENCOUNTER: Primary | ICD-10-CM

## 2022-06-03 PROCEDURE — 99213 OFFICE O/P EST LOW 20 MIN: CPT | Performed by: NURSE PRACTITIONER

## 2022-06-03 RX ORDER — BACITRACIN ZINC 500 [USP'U]/G
500 OINTMENT TOPICAL 2 TIMES DAILY
COMMUNITY
Start: 2022-06-01 | End: 2022-06-13

## 2022-06-03 RX ORDER — ACETAMINOPHEN AND CODEINE PHOSPHATE 300; 30 MG/1; MG/1
1 TABLET ORAL EVERY 4 HOURS PRN
Qty: 18 TABLET | Refills: 0 | Status: SHIPPED | OUTPATIENT
Start: 2022-06-03 | End: 2022-06-06

## 2022-06-03 NOTE — PROGRESS NOTES
"Chief Complaint  Hand Pain (Follow up on right hand)    Subjective          Palmira Carbajal, 26 y.o. female presents to [facility]  History of Present Illness   26-year-old female presents today for a follow-up on second-degree burn of right hand.  She is complaining that the burn part of her hand is very painful.  She states she has tried taking ibuprofen without any good relief.  She is also using lidocaine cream without any good relief.    Objective   Vital Signs:   /58   Pulse 74   Temp 98 °F (36.7 °C)   Resp 18   Ht 162.6 cm (64.02\")   Wt 72.3 kg (159 lb 8 oz)   SpO2 99%   BMI 27.36 kg/m²     Current Outpatient Medications on File Prior to Visit   Medication Sig Dispense Refill   • bacitracin 500 UNIT/GM ointment Apply 500 g topically to the appropriate area as directed 2 (Two) Times a Day.     • citalopram (CeleXA) 20 MG tablet Take 1 tablet by mouth Daily. 30 tablet 5   • Fremanezumab-vfrm (Ajovy) 225 MG/1.5ML solution prefilled syringe Inject 225 mg under the skin into the appropriate area as directed Every 30 (Thirty) Days. 1 each 5   • hydrocortisone (ANUSOL-HC) 25 MG suppository Insert 1 suppository into the rectum 2 (Two) Times a Day for 14 days. 28 suppository 0   • lidocaine (LMX) 4 % cream Apply 1 application topically to the appropriate area as directed As Needed for Mild Pain  or Moderate Pain . 15 g 0   • topiramate (TOPAMAX) 25 MG tablet 1 daily first week, twice a day second week, 1 in the morning 2 in the evening third week then 2 twice a day fourth-week and on. 120 tablet 4     Current Facility-Administered Medications on File Prior to Visit   Medication Dose Route Frequency Provider Last Rate Last Admin   • Fremanezumab-vfrm solution auto-injector 225 mg  225 mg Subcutaneous Q30 Days Angel Arita MD   225 mg at 05/31/22 5102     Past Medical History:   Diagnosis Date   • Acid reflux    • Kidney stone     Renal calculus or stone   • " PONV (postoperative nausea and vomiting)     GOOD RESULTS WITH SCOPALAMINE   • PTSD (post-traumatic stress disorder)     WAKES UP FROM ANESTHESIA WITH PANIC ATTACK   • Seasonal allergies       Physical Exam  Constitutional:       Appearance: Normal appearance.   Pulmonary:      Effort: Pulmonary effort is normal.   Skin:     Findings: Burn present.      Comments: Raised blister burn noted to right hand, thumb and index finger, only red line around border of blister noted, no other redness noted, very tender while applying creams   Neurological:      Mental Status: She is alert and oriented to person, place, and time.   Psychiatric:         Mood and Affect: Mood normal.         Behavior: Behavior normal.        Result Review :                 Assessment and Plan    Diagnoses and all orders for this visit:    1. Partial thickness burn of multiple digits of right hand including partial thickness burn of thumb, subsequent encounter (Primary)  Assessment & Plan:  Dressing was removed today.  She has second-degree burn with blistering noted.  I applied a thin layer of triple antibiotic ointment, Silvadene cream, Telfa dressing and wrapped with dry Kerlix dressing and then with a breathable Ace bandage.  Advised patient to keep the area clean and dry.  I advised her to change the dressing once daily or more often if it gets soiled.  I will have her follow-up in 3 days or sooner if any worsening of symptoms.    Orders:  -     acetaminophen-codeine (TYLENOL #3) 300-30 MG per tablet; Take 1 tablet by mouth Every 4 (Four) Hours As Needed for Moderate Pain  for up to 3 days.  Dispense: 18 tablet; Refill: 0    2. Pain of right hand  Assessment & Plan:  Right hand pain from second-degree burn not well controlled with ibuprofen or lidocaine cream.  Kenyon was reviewed and is consistent.  I will give her a 3-day supply of Tylenol with codeine.  Please do not consume alcohol, drive or operate heavy machinery while under the  influence of this medication.  Pain medications may cause drowsiness, nausea and constipation.    Orders:  -     acetaminophen-codeine (TYLENOL #3) 300-30 MG per tablet; Take 1 tablet by mouth Every 4 (Four) Hours As Needed for Moderate Pain  for up to 3 days.  Dispense: 18 tablet; Refill: 0      Follow Up   Return in about 3 days (around 6/6/2022) for Recheck burn of hand.  Patient was given instructions and counseling regarding her condition or for health maintenance advice. Please see specific information pulled into the AVS if appropriate.

## 2022-06-03 NOTE — ASSESSMENT & PLAN NOTE
Right hand pain from second-degree burn not well controlled with ibuprofen or lidocaine cream.  Kenyon was reviewed and is consistent.  I will give her a 3-day supply of Tylenol with codeine.  Please do not consume alcohol, drive or operate heavy machinery while under the influence of this medication.  Pain medications may cause drowsiness, nausea and constipation.

## 2022-06-03 NOTE — ASSESSMENT & PLAN NOTE
Dressing was removed today.  She has second-degree burn with blistering noted.  I applied a thin layer of triple antibiotic ointment, Silvadene cream, Telfa dressing and wrapped with dry Kerlix dressing and then with a breathable Ace bandage.  Advised patient to keep the area clean and dry.  I advised her to change the dressing once daily or more often if it gets soiled.  I will have her follow-up in 3 days or sooner if any worsening of symptoms.

## 2022-06-06 ENCOUNTER — OFFICE VISIT (OUTPATIENT)
Dept: FAMILY MEDICINE CLINIC | Facility: CLINIC | Age: 27
End: 2022-06-06

## 2022-06-06 ENCOUNTER — HOSPITAL ENCOUNTER (OUTPATIENT)
Dept: INFUSION THERAPY | Facility: HOSPITAL | Age: 27
Discharge: HOME | End: 2022-06-06
Admitting: NURSE PRACTITIONER

## 2022-06-06 VITALS
BODY MASS INDEX: 27.26 KG/M2 | TEMPERATURE: 98 F | SYSTOLIC BLOOD PRESSURE: 108 MMHG | HEART RATE: 79 BPM | DIASTOLIC BLOOD PRESSURE: 70 MMHG | RESPIRATION RATE: 18 BRPM | HEIGHT: 64 IN | OXYGEN SATURATION: 97 % | WEIGHT: 159.7 LBS

## 2022-06-06 DIAGNOSIS — M79.641 RIGHT HAND PAIN: ICD-10-CM

## 2022-06-06 DIAGNOSIS — K59.03 DRUG-INDUCED CONSTIPATION: ICD-10-CM

## 2022-06-06 DIAGNOSIS — T23.241D PARTIAL THICKNESS BURN OF MULTIPLE DIGITS OF RIGHT HAND INCLUDING PARTIAL THICKNESS BURN OF THUMB, SUBSEQUENT ENCOUNTER: Primary | ICD-10-CM

## 2022-06-06 PROCEDURE — 99214 OFFICE O/P EST MOD 30 MIN: CPT | Performed by: NURSE PRACTITIONER

## 2022-06-06 PROCEDURE — G0463 HOSPITAL OUTPT CLINIC VISIT: HCPCS

## 2022-06-06 RX ORDER — SULFAMETHOXAZOLE AND TRIMETHOPRIM 800; 160 MG/1; MG/1
1 TABLET ORAL 2 TIMES DAILY
Qty: 20 TABLET | Refills: 0 | Status: SHIPPED | OUTPATIENT
Start: 2022-06-06 | End: 2022-06-16

## 2022-06-06 RX ORDER — POLYETHYLENE GLYCOL 3350 17 G/17G
17 POWDER, FOR SOLUTION ORAL DAILY
Qty: 225 G | Refills: 0 | Status: SHIPPED | OUTPATIENT
Start: 2022-06-06 | End: 2022-06-13

## 2022-06-06 RX ORDER — HYDROCODONE BITARTRATE AND ACETAMINOPHEN 5; 325 MG/1; MG/1
1 TABLET ORAL EVERY 4 HOURS PRN
Qty: 18 TABLET | Refills: 0 | Status: SHIPPED | OUTPATIENT
Start: 2022-06-06 | End: 2022-06-09 | Stop reason: SDUPTHER

## 2022-06-06 NOTE — PROGRESS NOTES
"Chief Complaint  Follow-up (Burn rt hand)    Subjective          Palmira Carbajal, 26 y.o. female presents to [facility]  History of Present Illness   She is here today to follow-up on burn of her right hand with pain.  She had burned her right hand when she spilled hot grease on it.  She was in a lot of pain on Friday so I prescribed her Tylenol with codeine to take as needed.  The blister on her hand is now opened up and she is having severe pain.  She states the Tylenol with codeine did not help at all.  She is also having difficulty moving her right thumb.    She is having constipation due to the pain medication.  She states she did start taking some Colace constipation.  Objective   Vital Signs:   /70   Pulse 79   Temp 98 °F (36.7 °C)   Resp 18   Ht 162.6 cm (64.02\")   Wt 72.4 kg (159 lb 11.2 oz)   SpO2 97%   BMI 27.40 kg/m²     Current Outpatient Medications on File Prior to Visit   Medication Sig Dispense Refill   • bacitracin 500 UNIT/GM ointment Apply 500 g topically to the appropriate area as directed 2 (Two) Times a Day.     • citalopram (CeleXA) 20 MG tablet Take 1 tablet by mouth Daily. 30 tablet 5   • Fremanezumab-vfrm (Ajovy) 225 MG/1.5ML solution prefilled syringe Inject 225 mg under the skin into the appropriate area as directed Every 30 (Thirty) Days. 1 each 5   • hydrocortisone (ANUSOL-HC) 25 MG suppository Insert 1 suppository into the rectum 2 (Two) Times a Day for 14 days. 28 suppository 0   • lidocaine (LMX) 4 % cream Apply 1 application topically to the appropriate area as directed As Needed for Mild Pain  or Moderate Pain . 15 g 0   • [DISCONTINUED] acetaminophen-codeine (TYLENOL #3) 300-30 MG per tablet Take 1 tablet by mouth Every 4 (Four) Hours As Needed for Moderate Pain  for up to 3 days. 18 tablet 0   • topiramate (TOPAMAX) 25 MG tablet 1 daily first week, twice a day second week, 1 in the morning 2 in the evening third week then 2 twice " a day fourth-week and on. 120 tablet 4     Current Facility-Administered Medications on File Prior to Visit   Medication Dose Route Frequency Provider Last Rate Last Admin   • Fremanezumab-vfrm solution auto-injector 225 mg  225 mg Subcutaneous Q30 Days Angel Arita MD   225 mg at 05/31/22 1457     Past Medical History:   Diagnosis Date   • Acid reflux    • Kidney stone     Renal calculus or stone   • PONV (postoperative nausea and vomiting)     GOOD RESULTS WITH SCOPALAMINE   • PTSD (post-traumatic stress disorder)     WAKES UP FROM ANESTHESIA WITH PANIC ATTACK   • Seasonal allergies       Physical Exam  Constitutional:       Appearance: Normal appearance. She is normal weight.   Pulmonary:      Effort: Pulmonary effort is normal.   Musculoskeletal:      Right hand: Tenderness present. Decreased range of motion.   Skin:     Comments: Right index finger, right thumb and skin and between the 2 digits erythematous, blister opened.   Neurological:      Mental Status: She is alert and oriented to person, place, and time.   Psychiatric:         Mood and Affect: Mood normal.         Behavior: Behavior normal.         Thought Content: Thought content normal.         Judgment: Judgment normal.        Result Review :                 Assessment and Plan    Diagnoses and all orders for this visit:    1. Partial thickness burn of multiple digits of right hand including partial thickness burn of thumb, subsequent encounter (Primary)  Assessment & Plan:  Blister had ruptured and pain is worse.  Wound is needing debridement and needs wound clinic to evaluate and treat.  Wet-to-dry saline dressing applied and hand wrapped with gauze.  Spoke with wound ostomy nurse and she said she will call the patient to schedule an appointment either today or tomorrow.  She also recommended referral to the wound clinic Dr. Diggs, order placed.  Patient will follow-up with me in 3 days.    Orders:  -     sulfamethoxazole-trimethoprim  (Bactrim DS) 800-160 MG per tablet; Take 1 tablet by mouth 2 (Two) Times a Day for 10 days.  Dispense: 20 tablet; Refill: 0  -     HYDROcodone-acetaminophen (NORCO) 5-325 MG per tablet; Take 1 tablet by mouth Every 4 (Four) Hours As Needed (right hand pain due to severe burn) for up to 3 days.  Dispense: 18 tablet; Refill: 0  -     Cancel: Ambulatory Referral to Wound Clinic  -     Ambulatory Referral to Wound Ostomy  -     Ambulatory Referral to Wound Clinic    2. Right hand pain  Assessment & Plan:  Right hand pain from second-degree burn worse and not controlled with lidocaine cream, ibuprofen or Tylenol with codeine.  Kenyon is reviewed and is consistent.  Urine drug screen was done 3 days ago and was negative which is consistent.  I will give her 3-day supply of hydrocodone-APAP 5-325 mg 1 every 4 hours as needed.  Please do not consume alcohol, drive or operate heavy machinery while under the influence of this medication.  Pain medications may cause drowsiness, nausea and constipation.  Patient will follow-up with me in 3 days.    Orders:  -     HYDROcodone-acetaminophen (NORCO) 5-325 MG per tablet; Take 1 tablet by mouth Every 4 (Four) Hours As Needed (right hand pain due to severe burn) for up to 3 days.  Dispense: 18 tablet; Refill: 0    3. Drug-induced constipation  Assessment & Plan:  Constipation newly identified due to pain medications.  I will start her on MiraLAX once daily while on pain medications.    Orders:  -     polyethylene glycol (MiraLax) 17 GM/SCOOP powder; Take 17 g by mouth Daily.  Dispense: 225 g; Refill: 0      Follow Up   Return in about 3 years (around 6/6/2025) for Recheck RT hand burn.  Patient was given instructions and counseling regarding her condition or for health maintenance advice. Please see specific information pulled into the AVS if appropriate.

## 2022-06-06 NOTE — TELEPHONE ENCOUNTER
Approvedtoday  The request has been approved. The authorization is effective for a maximum of 3 fills from 06/06/2022 to 09/05/2022, as long as the member is enrolled in their current health plan. The request was reviewed and approved by a licensed clinical pharmacist. A written notification letter will follow with additional details.

## 2022-06-06 NOTE — ASSESSMENT & PLAN NOTE
Right hand pain from second-degree burn worse and not controlled with lidocaine cream, ibuprofen or Tylenol with codeine.  Kenyon is reviewed and is consistent.  Urine drug screen was done 3 days ago and was negative which is consistent.  I will give her 3-day supply of hydrocodone-APAP 5-325 mg 1 every 4 hours as needed.  Please do not consume alcohol, drive or operate heavy machinery while under the influence of this medication.  Pain medications may cause drowsiness, nausea and constipation.  Patient will follow-up with me in 3 days.

## 2022-06-06 NOTE — ASSESSMENT & PLAN NOTE
Constipation newly identified due to pain medications.  I will start her on MiraLAX once daily while on pain medications.

## 2022-06-06 NOTE — ASSESSMENT & PLAN NOTE
Blister had ruptured and pain is worse.  Wound is needing debridement and needs wound clinic to evaluate and treat.  Wet-to-dry saline dressing applied and hand wrapped with gauze.  Spoke with wound ostomy nurse and she said she will call the patient to schedule an appointment either today or tomorrow.  She also recommended referral to the wound clinic Dr. Diggs, order placed.  Patient will follow-up with me in 3 days.

## 2022-06-06 NOTE — SIGNIFICANT NOTE
Wound Eval / Progress Noted    REGIS Gerard     Patient Name: Palmira Carbajal  : 1995  MRN: [id number]  Today's Date: 2022                 Admit Date: 2022    Visit Dx:  No diagnosis found.    Patient Active Problem List   Diagnosis    Maltracking of right patella    Impingement syndrome involving patellar fat pad of right knee    Chondromalacia    Patellar malalignment syndrome of right knee    BV (bacterial vaginosis)    Binocular vision disorder with diplopia    Anxiety    Migraine without aura and without status migrainosus, not intractable    Acid reflux    PTSD (post-traumatic stress disorder)    Renal calculus or stone    Seasonal allergic rhinitis    Burn of finger and thumb of right hand, second degree    Hemorrhoids    Right hand pain    Drug-induced constipation        Past Medical History:   Diagnosis Date    Acid reflux     Kidney stone     Renal calculus or stone    PONV (postoperative nausea and vomiting)     GOOD RESULTS WITH SCOPALAMINE    PTSD (post-traumatic stress disorder)     WAKES UP FROM ANESTHESIA WITH PANIC ATTACK    Seasonal allergies         Past Surgical History:   Procedure Laterality Date     SECTION      CYSTOSCOPY      HYSTERECTOMY      KIDNEY STONE SURGERY      unspecified    WISDOM TOOTH EXTRACTION           Physical Assessment:  Wound 22 Right posterior thumb Burn (Active)   Wound Image   22 1545   Dressing Appearance intact;no drainage 22 1545   Closure None 22 1545   Base pink;red;dry 22 1545   Periwound pink;redness 22 1545   Periwound Temperature warm 22 1545   Periwound Skin Turgor soft 22 1545   Edges open 22 1545   Drainage Amount none 22 1545   Care, Wound cleansed with;sterile normal saline 22 1545   Dressing Care dressing applied;non-adherent;other (see comments) 22 1545   Periwound Care absorptive dressing applied 22 1545       Wound 22 Right posterior index  finger Burn (Active)   Wound Image    06/06/22 1545   Dressing Appearance no drainage;intact 06/06/22 1545   Closure None 06/06/22 1545   Base pink;red;dry 06/06/22 1545   Periwound intact;pink 06/06/22 1545   Periwound Temperature warm 06/06/22 1545   Periwound Skin Turgor soft 06/06/22 1545   Edges open 06/06/22 1545   Drainage Amount none 06/06/22 1545   Care, Wound cleansed with;sterile normal saline 06/06/22 1545   Dressing Care dressing applied;non-adherent;gauze;other (see comments) 06/06/22 1545   Periwound Care absorptive dressing applied 06/06/22 1545        Wound Check / Follow-up:  Patient seen today for wound consult after sustaining burn last week. Patient has been seen at PCP's office over that past week. Dressings removed. Superficial tissue loss with peeling. Tissue is pink to red and dry. Cleansed gently with NS and gauze and NS moistened gauze. Initially attempted petroleum based gauze, patient unable to tolerate, stating it burned and was increasing. Dressing removed, wound re-cleansed and then non-adherent oil based dressing applied. Patient tolerated much better with only minimal stinging. Wrapped with gauze roll. Supplies provided for patient to change dressing at home tomorrow and follow-up in [facility] on Wednesday for wound check. Patient has follow-up appointment with Dr. Diggs on 06/15/22.     Impression: Burns to right thumb and index finger to include tissue between.     Short term goals:  Regain skin integrity, daily dressing changes and wound monitoring    Marychuy Thapa RN    6/6/2022    16:12 EDT

## 2022-06-07 ENCOUNTER — OFFICE VISIT (OUTPATIENT)
Dept: GASTROENTEROLOGY | Facility: CLINIC | Age: 27
End: 2022-06-07

## 2022-06-07 VITALS
HEART RATE: 99 BPM | DIASTOLIC BLOOD PRESSURE: 65 MMHG | SYSTOLIC BLOOD PRESSURE: 107 MMHG | BODY MASS INDEX: 27.39 KG/M2 | HEIGHT: 64 IN | WEIGHT: 160.4 LBS

## 2022-06-07 DIAGNOSIS — K62.5 RECTAL BLEEDING: Primary | ICD-10-CM

## 2022-06-07 DIAGNOSIS — R63.0 DECREASED APPETITE: ICD-10-CM

## 2022-06-07 DIAGNOSIS — K62.89 ANAL OR RECTAL PAIN: ICD-10-CM

## 2022-06-07 PROCEDURE — 99214 OFFICE O/P EST MOD 30 MIN: CPT | Performed by: NURSE PRACTITIONER

## 2022-06-07 RX ORDER — POLYETHYLENE GLYCOL 3350, SODIUM SULFATE, SODIUM CHLORIDE, POTASSIUM CHLORIDE, ASCORBIC ACID, SODIUM ASCORBATE 140-9-5.2G
140 KIT ORAL ONCE
Qty: 1 EACH | Refills: 0 | COMMUNITY
Start: 2022-06-07 | End: 2022-06-07

## 2022-06-07 NOTE — PROGRESS NOTES
Chief Complaint  Abdominal Pain (Lower abdomen ), Diarrhea, Constipation, and Black or Bloody Stool    History of Present Illness  Palmira Carbajal is a 26 y.o. who presents to [facility] for follow up of Abdominal Pain (Lower abdomen ), Diarrhea, Constipation, and Black or Bloody Stool.    Ms. Carbajal reports that she is having copious amounts of blood with bowel movements.  Rectal pain present.  Diarrhea has decreased since starting Celexa. Now having a bowel movement daily to every other day. Describes stool to be a #3-4 on the bristol stool chart. Denies melena. Decreased appetite. Has lost 10 lb in the last month however she r/t starting topamax in March. She has since d/c the topamax. Taking NSAIDs daily for the last week due to a right hand injury.  Was previously scheduled for colonoscopy over a year ago however had to cancel due to her children being sick.  Would like to reschedule today.    Labs Result Review Imaging    Past Medical History:   Diagnosis Date   • Acid reflux    • Kidney stone     Renal calculus or stone   • PONV (postoperative nausea and vomiting)     GOOD RESULTS WITH SCOPALAMINE   • PTSD (post-traumatic stress disorder)     WAKES UP FROM ANESTHESIA WITH PANIC ATTACK   • Seasonal allergies        Past Surgical History:   Procedure Laterality Date   •  SECTION     • CYSTOSCOPY     • HYSTERECTOMY     • KIDNEY STONE SURGERY      unspecified   • WISDOM TOOTH EXTRACTION         Current Outpatient Medications on File Prior to Visit   Medication Sig Dispense Refill   • bacitracin 500 UNIT/GM ointment Apply 500 g topically to the appropriate area as directed 2 (Two) Times a Day.     • citalopram (CeleXA) 20 MG tablet Take 1 tablet by mouth Daily. 30 tablet 5   • Fremanezumab-vfrm (Ajovy) 225 MG/1.5ML solution prefilled syringe Inject 225 mg under the skin into the appropriate area as directed Every 30 (Thirty) Days. 1 each 5   •  "HYDROcodone-acetaminophen (NORCO) 5-325 MG per tablet Take 1 tablet by mouth Every 4 (Four) Hours As Needed (right hand pain due to severe burn) for up to 3 days. 18 tablet 0   • hydrocortisone (ANUSOL-HC) 25 MG suppository Insert 1 suppository into the rectum 2 (Two) Times a Day for 14 days. 28 suppository 0   • lidocaine (LMX) 4 % cream Apply 1 application topically to the appropriate area as directed As Needed for Mild Pain  or Moderate Pain . 15 g 0   • polyethylene glycol (MiraLax) 17 GM/SCOOP powder Take 17 g by mouth Daily. 225 g 0   • sulfamethoxazole-trimethoprim (Bactrim DS) 800-160 MG per tablet Take 1 tablet by mouth 2 (Two) Times a Day for 10 days. 20 tablet 0   • topiramate (TOPAMAX) 25 MG tablet 1 daily first week, twice a day second week, 1 in the morning 2 in the evening third week then 2 twice a day fourth-week and on. 120 tablet 4     Current Facility-Administered Medications on File Prior to Visit   Medication Dose Route Frequency Provider Last Rate Last Admin   • Fremanezumab-vfrm solution auto-injector 225 mg  225 mg Subcutaneous Q30 Days Angel Arita MD   225 mg at 05/31/22 8917       Social History     Social History Narrative   • Not on file         Objective     Review of Systems   Constitutional: Positive for appetite change.   Gastrointestinal: Positive for anal bleeding.        Vital Signs:   /65 (BP Location: Left arm, Patient Position: Sitting, Cuff Size: Small Adult)   Pulse 99   Ht 162.6 cm (64\")   Wt 72.8 kg (160 lb 6.4 oz)   BMI 27.53 kg/m²       Physical Exam  Constitutional:       General: She is not in acute distress.     Appearance: Normal appearance. She is well-developed and normal weight.   HENT:      Head: Normocephalic and atraumatic.   Eyes:      Conjunctiva/sclera: Conjunctivae normal.      Pupils: Pupils are equal, round, and reactive to light.      Visual Fields: Right eye visual fields normal and left eye visual fields normal.   Cardiovascular: "      Rate and Rhythm: Normal rate and regular rhythm.      Heart sounds: Normal heart sounds.   Pulmonary:      Effort: Pulmonary effort is normal. No retractions.      Breath sounds: Normal breath sounds and air entry.   Abdominal:      General: Bowel sounds are normal. There is no distension.      Palpations: Abdomen is soft.      Tenderness: There is no abdominal tenderness.      Comments: No appreciable hepatosplenomegaly or ascites   Musculoskeletal:         General: Normal range of motion.      Cervical back: Normal range of motion and neck supple.   Skin:     General: Skin is warm and dry.   Neurological:      Mental Status: She is alert and oriented to person, place, and time.   Psychiatric:         Mood and Affect: Mood and affect normal.         Behavior: Behavior normal.         Result Review :   The following data was reviewed by: MINI Kwok on 06/07/2022:  CBC w/diff    CBC w/Diff 1/6/22   WBC 11.92 (A)   RBC 4.96   Hemoglobin 13.7   Hematocrit 42.0   MCV 84.7   MCH 27.6   MCHC 32.6   RDW 13.3   Platelets 325   Neutrophil Rel % 63.7   Immature Granulocyte Rel % 0.4   Lymphocyte Rel % 23.7   Monocyte Rel % 9.1   Eosinophil Rel % 2.3   Basophil Rel % 0.8   (A) Abnormal value            CMP    CMP 1/6/22   Glucose 75   BUN 9   Creatinine 0.39 (A)   eGFR Non African Am >150   Sodium 137   Potassium 4.8   Chloride 104   Calcium 9.3   Albumin 4.50   Total Bilirubin 0.4   Alkaline Phosphatase 87   AST (SGOT) 22   ALT (SGPT) 14   (A) Abnormal value            Lipase   Date Value Ref Range Status   09/14/2020 26 5 - 51 U/L Final     Amylase   Date Value Ref Range Status   09/14/2020 36 30 - 110 U/L Final             Assessment and Plan    Diagnoses and all orders for this visit:    1. Rectal bleeding (Primary)  -     Case Request; Standing  -     Case Request    2. Anal or rectal pain  -     Case Request; Standing  -     Case Request    3. Decreased appetite  -     Case Request; Standing  -     Case  Request    Other orders  -     Follow Anesthesia Guidelines / Protocol; Future  -     Obtain Informed Consent; Future  -     PEG-KCl-NaCl-NaSulf-Na Asc-C (Plenvu) 140 g reconstituted solution solution; Take 140 g by mouth 1 (One) Time for 1 dose.  Dispense: 1 each; Refill: 0      COLONOSCOPY (N/A)       Follow Up   Return for Follow up after procedure.  Patient was given instructions and counseling regarding her condition or for health maintenance advice. Please see specific information pulled into the AVS if appropriate.

## 2022-06-08 ENCOUNTER — HOSPITAL ENCOUNTER (OUTPATIENT)
Dept: INFUSION THERAPY | Facility: HOSPITAL | Age: 27
Setting detail: INFUSION SERIES
Discharge: HOME | End: 2022-06-08

## 2022-06-08 VITALS
SYSTOLIC BLOOD PRESSURE: 119 MMHG | OXYGEN SATURATION: 100 % | HEART RATE: 68 BPM | RESPIRATION RATE: 16 BRPM | TEMPERATURE: 98.2 F | DIASTOLIC BLOOD PRESSURE: 67 MMHG

## 2022-06-08 DIAGNOSIS — T23.241A PARTIAL THICKNESS BURN OF MULTIPLE DIGITS OF RIGHT HAND INCLUDING PARTIAL THICKNESS BURN OF THUMB, INITIAL ENCOUNTER: Primary | ICD-10-CM

## 2022-06-08 PROCEDURE — G0463 HOSPITAL OUTPT CLINIC VISIT: HCPCS

## 2022-06-08 NOTE — SIGNIFICANT NOTE
Wound Eval / Progress Noted    REGIS Gerard     Patient Name: Palmira Carbajal  : 1995  MRN: [id number]  Today's Date: 2022                 Admit Date: 2022    Visit Dx:    ICD-10-CM ICD-9-CM   1. Partial thickness burn of multiple digits of right hand including partial thickness burn of thumb, initial encounter  T23.241A 944.24       Patient Active Problem List   Diagnosis    Maltracking of right patella    Impingement syndrome involving patellar fat pad of right knee    Chondromalacia    Patellar malalignment syndrome of right knee    BV (bacterial vaginosis)    Binocular vision disorder with diplopia    Anxiety    Migraine without aura and without status migrainosus, not intractable    Acid reflux    PTSD (post-traumatic stress disorder)    Renal calculus or stone    Seasonal allergic rhinitis    Burn of finger and thumb of right hand, second degree    Hemorrhoids    Right hand pain    Drug-induced constipation    Rectal bleeding    Anal or rectal pain    Decreased appetite        Past Medical History:   Diagnosis Date    Acid reflux     Kidney stone     Renal calculus or stone    PONV (postoperative nausea and vomiting)     GOOD RESULTS WITH SCOPALAMINE    PTSD (post-traumatic stress disorder)     WAKES UP FROM ANESTHESIA WITH PANIC ATTACK    Seasonal allergies         Past Surgical History:   Procedure Laterality Date     SECTION      CYSTOSCOPY      HYSTERECTOMY      KIDNEY STONE SURGERY      unspecified    WISDOM TOOTH EXTRACTION           Physical Assessment:  Wound 22 Right posterior thumb Burn (Active)   Wound Image   2245   Dressing Appearance intact;dried drainage 2245   Closure None 22   Base pink;red;dry 2245   Periwound intact;pink 2245   Periwound Temperature warm 2245   Periwound Skin Turgor soft 2245   Edges open 22 0945   Drainage Characteristics/Odor serous 22 0945   Drainage Amount scant  06/08/22 0945   Care, Wound cleansed with;sterile normal saline 06/08/22 0945   Dressing Care dressing applied;foam;gauze;non-adherent;other (see comments) 06/08/22 0945   Periwound Care absorptive dressing applied 06/08/22 0945       Wound 05/30/22 Right posterior index finger Burn (Active)   Wound Image   06/08/22 0945   Dressing Appearance intact;no drainage 06/08/22 0945   Closure None 06/08/22 0945   Base pink;red;dry 06/08/22 0945   Periwound intact;pink 06/08/22 0945   Periwound Temperature warm 06/08/22 0945   Periwound Skin Turgor soft 06/08/22 0945   Edges open 06/08/22 0945   Drainage Amount none 06/08/22 0945   Care, Wound cleansed with;sterile normal saline 06/08/22 0945   Dressing Care dressing applied;gauze;foam;silver impregnated;non-adherent;other (see comments) 06/08/22 0945   Periwound Care absorptive dressing applied 06/08/22 0945        Wound Check / Follow-up:  Patient presents to [facility] for routine wound dressing change and wound check. Dressing was changed yesterday by spouse. Patient tolerated well per her account. Dressing with small amount of dried drainage to thumb. Moistened adaptic with saline and removed. Dried tissue pulled from bed. No bleeding or trauma. Cleansed areas with NS and gauze. Oil based gauze (Adaptic) applied to wounds after cleansing and then covered with silver impregnated foam for added absorption, protection and treatment. Secured with gauze. Patient to change dressing every other day at home. Patient with follow-up at wound clinic on Wednesday with Dr. Diggs. Patient would prefer to not have to come back for wound check unless necessary. Discussed signs and symptoms of infection. Explained that patient should call with any questions or concerns or if she feels wound is worsening before seen next Wednesday.   Verbalized understanding.   Further discussed with patient to continue to mobilize thumb and finger affected by burn to not lose range of motion.     Impression:  Burn to right thumb and index finger    Short term goals:  Regain skin integrity. Dressing changes every other day.    Marychuy Thapa RN    6/8/2022    16:14 EDT

## 2022-06-09 ENCOUNTER — OFFICE VISIT (OUTPATIENT)
Dept: FAMILY MEDICINE CLINIC | Facility: CLINIC | Age: 27
End: 2022-06-09

## 2022-06-09 VITALS
DIASTOLIC BLOOD PRESSURE: 68 MMHG | HEIGHT: 64 IN | SYSTOLIC BLOOD PRESSURE: 118 MMHG | HEART RATE: 82 BPM | WEIGHT: 160.1 LBS | OXYGEN SATURATION: 99 % | TEMPERATURE: 98.2 F | RESPIRATION RATE: 18 BRPM | BODY MASS INDEX: 27.33 KG/M2

## 2022-06-09 DIAGNOSIS — T23.241D PARTIAL THICKNESS BURN OF MULTIPLE DIGITS OF RIGHT HAND INCLUDING PARTIAL THICKNESS BURN OF THUMB, SUBSEQUENT ENCOUNTER: ICD-10-CM

## 2022-06-09 DIAGNOSIS — M79.641 RIGHT HAND PAIN: ICD-10-CM

## 2022-06-09 PROCEDURE — 99213 OFFICE O/P EST LOW 20 MIN: CPT | Performed by: NURSE PRACTITIONER

## 2022-06-09 RX ORDER — HYDROCODONE BITARTRATE AND ACETAMINOPHEN 5; 325 MG/1; MG/1
1 TABLET ORAL EVERY 6 HOURS PRN
Qty: 12 TABLET | Refills: 0 | Status: SHIPPED | OUTPATIENT
Start: 2022-06-09 | End: 2022-06-12

## 2022-06-09 NOTE — ASSESSMENT & PLAN NOTE
Dressing was removed and had to use saline to remove emollient dressing because it was stuck to her thumb.  One area on the thumb is more erythematous than others.  Burn wound was cleansed with saline.  New emollient dressing was applied and wrapped with Kerlix.  Patient will follow-up in 4 days on Monday.

## 2022-06-09 NOTE — PROGRESS NOTES
"Chief Complaint  Burn (Follow up, burn on right hand)    Subjective          Palmira Carbajal, 26 y.o. female presents to [facility]  History of Present Illness   She presents today for follow-up on right hand/finger burn with pain.  She was having severe pain previously that was not relieved with Tylenol with codeine, I started her on hydrocodone for the pain.  She was referred to the wound clinic and has been getting dressing changes per the wound clinic.  She states she is now doing the dressing changes at home.  She has an appointment with the wound doctor next Wednesday.  She states she is still having a lot of pain in her hand.  She has been taking hydrocodone but not so much during the day because she states she is taking care of her children at home and does not want to be drowsy.  She will probably need more pain medication before next Monday.    Objective   Vital Signs:   /68   Pulse 82   Temp 98.2 °F (36.8 °C)   Resp 18   Ht 162.6 cm (64\")   Wt 72.6 kg (160 lb 1.6 oz)   SpO2 99%   BMI 27.48 kg/m²     Current Outpatient Medications on File Prior to Visit   Medication Sig Dispense Refill   • bacitracin 500 UNIT/GM ointment Apply 500 g topically to the appropriate area as directed 2 (Two) Times a Day.     • citalopram (CeleXA) 20 MG tablet Take 1 tablet by mouth Daily. 30 tablet 5   • Fremanezumab-vfrm (Ajovy) 225 MG/1.5ML solution prefilled syringe Inject 225 mg under the skin into the appropriate area as directed Every 30 (Thirty) Days. 1 each 5   • hydrocortisone (ANUSOL-HC) 25 MG suppository Insert 1 suppository into the rectum 2 (Two) Times a Day for 14 days. 28 suppository 0   • lidocaine (LMX) 4 % cream Apply 1 application topically to the appropriate area as directed As Needed for Mild Pain  or Moderate Pain . 15 g 0   • polyethylene glycol (MiraLax) 17 GM/SCOOP powder Take 17 g by mouth Daily. 225 g 0   • sulfamethoxazole-trimethoprim (Bactrim DS) " 800-160 MG per tablet Take 1 tablet by mouth 2 (Two) Times a Day for 10 days. 20 tablet 0   • topiramate (TOPAMAX) 25 MG tablet 1 daily first week, twice a day second week, 1 in the morning 2 in the evening third week then 2 twice a day fourth-week and on. 120 tablet 4   • [DISCONTINUED] HYDROcodone-acetaminophen (NORCO) 5-325 MG per tablet Take 1 tablet by mouth Every 4 (Four) Hours As Needed (right hand pain due to severe burn) for up to 3 days. 18 tablet 0     Current Facility-Administered Medications on File Prior to Visit   Medication Dose Route Frequency Provider Last Rate Last Admin   • Fremanezumab-vfrm solution auto-injector 225 mg  225 mg Subcutaneous Q30 Days MarypiAngel mckinney MD   225 mg at 05/31/22 1457     Past Medical History:   Diagnosis Date   • Acid reflux    • Kidney stone     Renal calculus or stone   • PONV (postoperative nausea and vomiting)     GOOD RESULTS WITH SCOPALAMINE   • PTSD (post-traumatic stress disorder)     WAKES UP FROM ANESTHESIA WITH PANIC ATTACK   • Seasonal allergies       Physical Exam  Vitals and nursing note reviewed.   Constitutional:       Appearance: Normal appearance.   Pulmonary:      Effort: Pulmonary effort is normal.   Skin:     General: Skin is warm and dry.      Findings: Burn present.      Comments: Burn to right thumb and index finger erythematous, no drainage (see media picture).   Neurological:      General: No focal deficit present.      Mental Status: She is alert.   Psychiatric:         Mood and Affect: Mood normal.         Behavior: Behavior normal.        Result Review :                 Assessment and Plan    Diagnoses and all orders for this visit:    1. Partial thickness burn of multiple digits of right hand including partial thickness burn of thumb, subsequent encounter  Assessment & Plan:  Dressing was removed and had to use saline to remove emollient dressing because it was stuck to her thumb.  One area on the thumb is more erythematous than  others.  Burn wound was cleansed with saline.  New emollient dressing was applied and wrapped with Kerlix.  Patient will follow-up in 4 days on Monday.    Orders:  -     HYDROcodone-acetaminophen (NORCO) 5-325 MG per tablet; Take 1 tablet by mouth Every 6 (Six) Hours As Needed (right hand pain due to severe burn) for up to 3 days.  Dispense: 12 tablet; Refill: 0    2. Right hand pain  Assessment & Plan:  Right hand pain from second-degree burn stable on hydrocodone/APAP 5/325 mg as needed.  She is still having pain and will need more hydrocodone before her next appointment in 4 days.  I will prescribe her 12 more tablets.  Kenyon reviewed and is consistent.  Urine drug screen was done last week and was consistent.    Orders:  -     HYDROcodone-acetaminophen (NORCO) 5-325 MG per tablet; Take 1 tablet by mouth Every 6 (Six) Hours As Needed (right hand pain due to severe burn) for up to 3 days.  Dispense: 12 tablet; Refill: 0      Follow Up   Return in about 4 days (around 6/13/2022) for Recheck burn on hand.  Patient was given instructions and counseling regarding her condition or for health maintenance advice. Please see specific information pulled into the AVS if appropriate.

## 2022-06-09 NOTE — ASSESSMENT & PLAN NOTE
Right hand pain from second-degree burn stable on hydrocodone/APAP 5/325 mg as needed.  She is still having pain and will need more hydrocodone before her next appointment in 4 days.  I will prescribe her 12 more tablets.  Kenyon reviewed and is consistent.  Urine drug screen was done last week and was consistent.

## 2022-06-10 ENCOUNTER — PATIENT ROUNDING (BHMG ONLY) (OUTPATIENT)
Dept: FAMILY MEDICINE CLINIC | Facility: CLINIC | Age: 27
End: 2022-06-10

## 2022-06-10 NOTE — PROGRESS NOTES
Haleigh 10, 2022    Hello, may I speak with Palmira Carbajal?    My name is Amelia      I am  with [facility]  [address] [place] [address] [phone].    Before we get started may I verify your date of birth? 1995    I am calling to officially welcome you to our practice and ask about your recent visit. Is this a good time to talk? yes    Tell me about your visit with us. What things went well?  Really good Ireally like her she is super nice she has been great        We're always looking for ways to make our patients' experiences even better. Do you have recommendations on ways we may improve?  no    Overall were you satisfied with your first visit to our practice? yes       I appreciate you taking the time to speak with me today. Is there anything else I can do for you? no      Thank you, and have a great day.

## 2022-06-13 ENCOUNTER — OFFICE VISIT (OUTPATIENT)
Dept: FAMILY MEDICINE CLINIC | Facility: CLINIC | Age: 27
End: 2022-06-13

## 2022-06-13 VITALS
DIASTOLIC BLOOD PRESSURE: 67 MMHG | HEART RATE: 83 BPM | SYSTOLIC BLOOD PRESSURE: 113 MMHG | BODY MASS INDEX: 27.26 KG/M2 | WEIGHT: 159.7 LBS | RESPIRATION RATE: 18 BRPM | TEMPERATURE: 97.9 F | OXYGEN SATURATION: 99 % | HEIGHT: 64 IN

## 2022-06-13 DIAGNOSIS — K62.89 ANAL OR RECTAL PAIN: ICD-10-CM

## 2022-06-13 DIAGNOSIS — T23.241D PARTIAL THICKNESS BURN OF MULTIPLE DIGITS OF RIGHT HAND INCLUDING PARTIAL THICKNESS BURN OF THUMB, SUBSEQUENT ENCOUNTER: Primary | ICD-10-CM

## 2022-06-13 DIAGNOSIS — F41.9 ANXIETY: ICD-10-CM

## 2022-06-13 DIAGNOSIS — G43.009 MIGRAINE WITHOUT AURA AND WITHOUT STATUS MIGRAINOSUS, NOT INTRACTABLE: ICD-10-CM

## 2022-06-13 PROBLEM — B96.89 BV (BACTERIAL VAGINOSIS): Status: RESOLVED | Noted: 2021-10-05 | Resolved: 2022-06-13

## 2022-06-13 PROBLEM — N76.0 BV (BACTERIAL VAGINOSIS): Status: RESOLVED | Noted: 2021-10-05 | Resolved: 2022-06-13

## 2022-06-13 PROBLEM — K59.03 DRUG-INDUCED CONSTIPATION: Status: RESOLVED | Noted: 2022-06-06 | Resolved: 2022-06-13

## 2022-06-13 PROCEDURE — 99214 OFFICE O/P EST MOD 30 MIN: CPT | Performed by: NURSE PRACTITIONER

## 2022-06-13 RX ORDER — FREMANEZUMAB-VFRM 225 MG/1.5ML
225 INJECTION SUBCUTANEOUS
COMMUNITY
Start: 2022-06-09 | End: 2022-06-13 | Stop reason: SDUPTHER

## 2022-06-13 NOTE — ASSESSMENT & PLAN NOTE
Anxiety stable on Celexa, patient will continue current dose.  Follow-up in 3 months or sooner if any new or worsening issues.

## 2022-06-13 NOTE — ASSESSMENT & PLAN NOTE
Dressing was removed from the right hand/fingers. Burn wound was cleansed with saline. New emollient dressing was applied and wrapped with Kerlix.  Patient will follow-up with wound clinic on Wednesday.  Follow-up here as needed.

## 2022-06-13 NOTE — ASSESSMENT & PLAN NOTE
Patient did not improve with Anusol rectal suppositories.  She is scheduled for colonoscopy in October but awaiting for any cancellations to get in sooner.

## 2022-06-13 NOTE — PROGRESS NOTES
"Chief Complaint  Burn (Follow up on hand burn )    Subjective          Palmira Carbajal, 26 y.o. female presents to [facility]  History of Present Illness   She is here to follow-up on right hand burn.  She states it is doing better now but is itching.  She does have an appointment with the wound clinic on Wednesday, 6/15/2022.  She is still taking Bactrim and has a few days left to finish.    She did have originally constipation from taking the pain medications so she was prescribed MiraLAX.  She states she took 1 dose and then she had diarrhea for several days.  She is no longer having any issues with constipation.    Anxiety/PTSD: She is stable on citalopram 20 mg daily.    Rectal pain: She has finished the Anusol rectal suppositories twice daily for 2 weeks but states it did not help.  She did see gastroenterology and she is scheduled for colonoscopy.    Migraine headaches: She is following with neurology and is currently stable on Topamax daily and Ajovy injections monthly.    Objective   Vital Signs:   /67   Pulse 83   Temp 97.9 °F (36.6 °C)   Resp 18   Ht 162.6 cm (64\")   Wt 72.4 kg (159 lb 11.2 oz)   SpO2 99%   BMI 27.41 kg/m²     Current Outpatient Medications on File Prior to Visit   Medication Sig Dispense Refill   • citalopram (CeleXA) 20 MG tablet Take 1 tablet by mouth Daily. 30 tablet 5   • Fremanezumab-vfrm (Ajovy) 225 MG/1.5ML solution prefilled syringe Inject 225 mg under the skin into the appropriate area as directed Every 30 (Thirty) Days. 1 each 5   • sulfamethoxazole-trimethoprim (Bactrim DS) 800-160 MG per tablet Take 1 tablet by mouth 2 (Two) Times a Day for 10 days. 20 tablet 0   • topiramate (TOPAMAX) 25 MG tablet 1 daily first week, twice a day second week, 1 in the morning 2 in the evening third week then 2 twice a day fourth-week and on. 120 tablet 4   • [] HYDROcodone-acetaminophen (NORCO) 5-325 MG per tablet Take 1 tablet by mouth " Every 6 (Six) Hours As Needed (right hand pain due to severe burn) for up to 3 days. 12 tablet 0   • [DISCONTINUED] Ajovy 225 MG/1.5ML solution auto-injector Inject 225 mg as directed Every 30 (Thirty) Days.     • [DISCONTINUED] bacitracin 500 UNIT/GM ointment Apply 500 g topically to the appropriate area as directed 2 (Two) Times a Day.     • [DISCONTINUED] hydrocortisone (ANUSOL-HC) 25 MG suppository Insert 1 suppository into the rectum 2 (Two) Times a Day for 14 days. 28 suppository 0   • [DISCONTINUED] lidocaine (LMX) 4 % cream Apply 1 application topically to the appropriate area as directed As Needed for Mild Pain  or Moderate Pain . 15 g 0   • [DISCONTINUED] polyethylene glycol (MiraLax) 17 GM/SCOOP powder Take 17 g by mouth Daily. 225 g 0     Current Facility-Administered Medications on File Prior to Visit   Medication Dose Route Frequency Provider Last Rate Last Admin   • Fremanezumab-vfrm solution auto-injector 225 mg  225 mg Subcutaneous Q30 Days Angel Arita MD   225 mg at 05/31/22 1457     Past Medical History:   Diagnosis Date   • Acid reflux    • Kidney stone     Renal calculus or stone   • PONV (postoperative nausea and vomiting)     GOOD RESULTS WITH SCOPALAMINE   • PTSD (post-traumatic stress disorder)     WAKES UP FROM ANESTHESIA WITH PANIC ATTACK   • Seasonal allergic rhinitis 5/31/2022      Physical Exam  Vitals reviewed.   Constitutional:       Appearance: Normal appearance. She is well-developed.   Neck:      Thyroid: No thyroid mass, thyromegaly or thyroid tenderness.   Cardiovascular:      Rate and Rhythm: Normal rate and regular rhythm.      Heart sounds: No murmur heard.    No friction rub. No gallop.   Pulmonary:      Effort: Pulmonary effort is normal.      Breath sounds: Normal breath sounds. No wheezing or rhonchi.   Lymphadenopathy:      Cervical: No cervical adenopathy.   Skin:     General: Skin is warm and dry.      Findings: Burn present.      Comments: Burn to right  thumb and index finger erythematous, no drainage.   Neurological:      Mental Status: She is alert and oriented to person, place, and time.      Cranial Nerves: No cranial nerve deficit.   Psychiatric:         Mood and Affect: Mood and affect normal.         Behavior: Behavior normal.         Thought Content: Thought content normal. Thought content does not include homicidal or suicidal ideation.         Judgment: Judgment normal.        Result Review :                 Assessment and Plan    Diagnoses and all orders for this visit:    1. Partial thickness burn of multiple digits of right hand including partial thickness burn of thumb, subsequent encounter (Primary)  Assessment & Plan:  Dressing was removed from the right hand/fingers. Burn wound was cleansed with saline. New emollient dressing was applied and wrapped with Kerlix.  Patient will follow-up with wound clinic on Wednesday.  Follow-up here as needed.      2. Anxiety  Assessment & Plan:  Anxiety stable on Celexa, patient will continue current dose.  Follow-up in 3 months or sooner if any new or worsening issues.      3. Anal or rectal pain  Assessment & Plan:  Patient did not improve with Anusol rectal suppositories.  She is scheduled for colonoscopy in October but awaiting for any cancellations to get in sooner.      4. Migraine without aura and without status migrainosus, not intractable  Assessment & Plan:  Headaches are improving with treatment.  Continue current treatment regimen.  Follow up in 3 months, or sooner should new symptoms or problems arise.            Follow Up   Return in about 3 months (around 9/13/2022) for Next scheduled follow up for anxiety..  Patient was given instructions and counseling regarding her condition or for health maintenance advice. Please see specific information pulled into the AVS if appropriate.

## 2022-06-13 NOTE — ASSESSMENT & PLAN NOTE
Headaches are improving with treatment.  Continue current treatment regimen.  Follow up in 3 months, or sooner should new symptoms or problems arise.

## 2022-06-15 ENCOUNTER — OFFICE VISIT (OUTPATIENT)
Dept: WOUND CARE | Facility: HOSPITAL | Age: 27
End: 2022-06-15

## 2022-06-15 VITALS
SYSTOLIC BLOOD PRESSURE: 106 MMHG | HEART RATE: 77 BPM | DIASTOLIC BLOOD PRESSURE: 64 MMHG | RESPIRATION RATE: 16 BRPM | TEMPERATURE: 97.4 F

## 2022-06-15 DIAGNOSIS — T23.261A PARTIAL THICKNESS BURN OF BACK OF RIGHT HAND, INITIAL ENCOUNTER: ICD-10-CM

## 2022-06-15 DIAGNOSIS — T23.241A PARTIAL THICKNESS BURN OF MULTIPLE DIGITS OF RIGHT HAND INCLUDING PARTIAL THICKNESS BURN OF THUMB, INITIAL ENCOUNTER: Primary | ICD-10-CM

## 2022-06-15 PROCEDURE — 99203 OFFICE O/P NEW LOW 30 MIN: CPT | Performed by: EMERGENCY MEDICINE

## 2022-06-15 PROCEDURE — G0463 HOSPITAL OUTPT CLINIC VISIT: HCPCS | Performed by: EMERGENCY MEDICINE
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
## 2022-06-15 NOTE — SIGNIFICANT NOTE
Epithelial %: %    Exposed Bone: no    Exposed Tendon: no    Impression: improved    Short Term Goals: Short term goal: Increase granultion and decrease size

## 2022-06-21 ENCOUNTER — PREP FOR SURGERY (OUTPATIENT)
Dept: OTHER | Facility: HOSPITAL | Age: 27
End: 2022-06-21

## 2022-06-21 ENCOUNTER — OFFICE VISIT (OUTPATIENT)
Dept: ORTHOPEDIC SURGERY | Facility: CLINIC | Age: 27
End: 2022-06-21

## 2022-06-21 VITALS — WEIGHT: 160 LBS | HEIGHT: 64 IN | BODY MASS INDEX: 27.31 KG/M2 | HEART RATE: 99 BPM | OXYGEN SATURATION: 100 %

## 2022-06-21 DIAGNOSIS — M94.20 CHONDROMALACIA: ICD-10-CM

## 2022-06-21 DIAGNOSIS — M25.861 IMPINGEMENT SYNDROME INVOLVING PATELLAR FAT PAD OF RIGHT KNEE: ICD-10-CM

## 2022-06-21 DIAGNOSIS — M22.8X1 MALTRACKING OF RIGHT PATELLA: Primary | ICD-10-CM

## 2022-06-21 PROCEDURE — 99214 OFFICE O/P EST MOD 30 MIN: CPT | Performed by: ORTHOPAEDIC SURGERY

## 2022-06-21 RX ORDER — CLINDAMYCIN PHOSPHATE 900 MG/50ML
900 INJECTION INTRAVENOUS ONCE
Status: CANCELLED | OUTPATIENT
Start: 2022-06-21 | End: 2022-06-21

## 2022-06-21 NOTE — PROGRESS NOTES
"Chief Complaint  Pain and Initial Evaluation of the Right Knee     Subjective      Palmira Carbajal presents to [facility] for evaluation of the right knee. The patient was scheduled for a Right knee arthroscopic chondroplasty and lateral release but had to cancel due to her child getting sick. She is still having pain. She has not gotten relief with physical therapy, bracing and medication.       Allergies   Allergen Reactions   • Cephalexin Diarrhea   • Cephalosporins GI Intolerance   • Penicillins Rash        Social History     Socioeconomic History   • Marital status:      Spouse name: TRISTAN   • Number of children: 3   Tobacco Use   • Smoking status: Former Smoker     Packs/day: 0.50     Years: 15.00     Pack years: 7.50     Types: Cigarettes     Quit date: 2022     Years since quittin.3   • Smokeless tobacco: Never Used   • Tobacco comment: INST PER ANESTHESIA PROTOCOL   Vaping Use   • Vaping Use: Every day   • Substances: Nicotine, Flavoring   • Devices: Disposable   Substance and Sexual Activity   • Alcohol use: Yes     Comment: light, occasionally drinks, less than 1 drink per day, has been drinking for 5 years   • Drug use: Never   • Sexual activity: Yes     Partners: Male        Review of Systems     Objective   Vital Signs:   Pulse 99   Ht 162.6 cm (64\")   Wt 72.6 kg (160 lb)   SpO2 100%   BMI 27.46 kg/m²       Physical Exam  Constitutional:       Appearance: Normal appearance. The patient is well-developed and normal weight.   HENT:      Head: Normocephalic.      Right Ear: Hearing and external ear normal.      Left Ear: Hearing and external ear normal.      Nose: Nose normal.   Eyes:      Conjunctiva/sclera: Conjunctivae normal.   Cardiovascular:      Rate and Rhythm: Normal rate.   Pulmonary:      Effort: Pulmonary effort is normal.      Breath sounds: No wheezing or rales.   Abdominal:      Palpations: Abdomen is soft.      Tenderness: There is no " abdominal tenderness.   Musculoskeletal:      Cervical back: Normal range of motion.   Skin:     Findings: No rash.   Neurological:      Mental Status: The patient is alert and oriented to person, place, and time.   Psychiatric:         Mood and Affect: Mood and affect normal.         Judgment: Judgment normal.       Ortho Exam      Right knee- pain with knee extension. ROM 0-130 degrees. Positive crepitus. Neurovascularly intact. Positive EHL, FHL, GS and TA. Sensation intact to all 5 nerves of the foot. Positive pulses. Patella apprehension. 1+ lateral translation. Lateral patella tilt. No patella compression. Stable to varus/valgus stress. Stable to anterior/posterior drawer. Negative Lachman's. Negative Lynda's.     Procedures      Imaging Results (Most Recent)     None           Result Review :       No results found.           Assessment and Plan     Diagnoses and all orders for this visit:    1. Maltracking of right patella (Primary)    2. Impingement syndrome involving patellar fat pad of right knee    3. Chondromalacia        Discussed the treatment options with the patient, operative vs non-operative. Discussed the risks and benefits of operative treatment. The patient expressed understanding and wished to proceed. Plan for Right knee arthroscopic chondroplasty and lateral release.       Discussed surgery., Risks/benefits discussed with patient including, but not limited to: infection, bleeding, neurovascular damage, malunion, nonunion, aesthetic deformity, need for further surgery, and death., Discussed with patient the implant type being used during surgery and patient understands and desires to proceed., Surgery pamphlet given. and Call or return if worsening symptoms.    Follow Up     2 weeks postoperatively.        Patient was given instructions and counseling regarding her condition or for health maintenance advice. Please see specific information pulled into the AVS if appropriate.     Scribed  for Marco A Pitts MD by Ade Hawthorne.  06/21/22   08:47 EDT    I have personally performed the services described in this document as scribed by the above individual and it is both accurate and complete. Marco A Pitts MD 06/21/22

## 2022-07-06 RX ORDER — LANOLIN ALCOHOL/MO/W.PET/CERES
3 CREAM (GRAM) TOPICAL NIGHTLY
COMMUNITY
End: 2022-10-10

## 2022-07-11 ENCOUNTER — ANESTHESIA EVENT (OUTPATIENT)
Dept: PERIOP | Facility: HOSPITAL | Age: 27
End: 2022-07-11

## 2022-07-11 ENCOUNTER — ANESTHESIA (OUTPATIENT)
Dept: PERIOP | Facility: HOSPITAL | Age: 27
End: 2022-07-11

## 2022-07-11 ENCOUNTER — HOSPITAL ENCOUNTER (OUTPATIENT)
Facility: HOSPITAL | Age: 27
Setting detail: HOSPITAL OUTPATIENT SURGERY
Discharge: HOME | End: 2022-07-11
Attending: ORTHOPAEDIC SURGERY | Admitting: ORTHOPAEDIC SURGERY

## 2022-07-11 VITALS
DIASTOLIC BLOOD PRESSURE: 88 MMHG | WEIGHT: 161.16 LBS | TEMPERATURE: 97 F | RESPIRATION RATE: 27 BRPM | HEART RATE: 64 BPM | SYSTOLIC BLOOD PRESSURE: 124 MMHG | OXYGEN SATURATION: 100 % | BODY MASS INDEX: 27.51 KG/M2 | HEIGHT: 64 IN

## 2022-07-11 DIAGNOSIS — M22.8X1 MALTRACKING OF RIGHT PATELLA: ICD-10-CM

## 2022-07-11 PROCEDURE — 25010000002 MIDAZOLAM PER 1 MG: Performed by: ANESTHESIOLOGY

## 2022-07-11 PROCEDURE — 25010000002 FENTANYL CITRATE (PF) 50 MCG/ML SOLUTION: Performed by: NURSE ANESTHETIST, CERTIFIED REGISTERED

## 2022-07-11 PROCEDURE — 29873 ARTHRS KNEE SURG W/LAT RLS: CPT | Performed by: ORTHOPAEDIC SURGERY

## 2022-07-11 PROCEDURE — 25010000002 KETOROLAC TROMETHAMINE PER 15 MG: Performed by: NURSE ANESTHETIST, CERTIFIED REGISTERED

## 2022-07-11 PROCEDURE — 25010000002 MORPHINE PER 10 MG: Performed by: ORTHOPAEDIC SURGERY

## 2022-07-11 PROCEDURE — 25010000002 HYDROMORPHONE 1 MG/ML SOLUTION: Performed by: NURSE ANESTHETIST, CERTIFIED REGISTERED

## 2022-07-11 PROCEDURE — 25010000002 PROPOFOL 10 MG/ML EMULSION: Performed by: NURSE ANESTHETIST, CERTIFIED REGISTERED

## 2022-07-11 PROCEDURE — 25010000002 ONDANSETRON PER 1 MG: Performed by: NURSE ANESTHETIST, CERTIFIED REGISTERED

## 2022-07-11 RX ORDER — MORPHINE SULFATE 1 MG/ML
INJECTION, SOLUTION EPIDURAL; INTRATHECAL; INTRAVENOUS AS NEEDED
Status: DISCONTINUED | OUTPATIENT
Start: 2022-07-11 | End: 2022-07-11 | Stop reason: HOSPADM

## 2022-07-11 RX ORDER — MIDAZOLAM HYDROCHLORIDE 1 MG/ML
2 INJECTION INTRAMUSCULAR; INTRAVENOUS ONCE
Status: COMPLETED | OUTPATIENT
Start: 2022-07-11 | End: 2022-07-11

## 2022-07-11 RX ORDER — PROPOFOL 10 MG/ML
VIAL (ML) INTRAVENOUS AS NEEDED
Status: DISCONTINUED | OUTPATIENT
Start: 2022-07-11 | End: 2022-07-11 | Stop reason: SURG

## 2022-07-11 RX ORDER — FENTANYL CITRATE 50 UG/ML
INJECTION, SOLUTION INTRAMUSCULAR; INTRAVENOUS AS NEEDED
Status: DISCONTINUED | OUTPATIENT
Start: 2022-07-11 | End: 2022-07-11 | Stop reason: SURG

## 2022-07-11 RX ORDER — HYDROCODONE BITARTRATE AND ACETAMINOPHEN 7.5; 325 MG/1; MG/1
1 TABLET ORAL EVERY 4 HOURS PRN
Qty: 30 TABLET | Refills: 0 | Status: SHIPPED | OUTPATIENT
Start: 2022-07-11 | End: 2022-07-25 | Stop reason: SDUPTHER

## 2022-07-11 RX ORDER — PROMETHAZINE HYDROCHLORIDE 25 MG/1
25 SUPPOSITORY RECTAL ONCE AS NEEDED
Status: DISCONTINUED | OUTPATIENT
Start: 2022-07-11 | End: 2022-07-11 | Stop reason: HOSPADM

## 2022-07-11 RX ORDER — KETOROLAC TROMETHAMINE 30 MG/ML
INJECTION, SOLUTION INTRAMUSCULAR; INTRAVENOUS AS NEEDED
Status: DISCONTINUED | OUTPATIENT
Start: 2022-07-11 | End: 2022-07-11 | Stop reason: SURG

## 2022-07-11 RX ORDER — ONDANSETRON 2 MG/ML
4 INJECTION INTRAMUSCULAR; INTRAVENOUS ONCE AS NEEDED
Status: DISCONTINUED | OUTPATIENT
Start: 2022-07-11 | End: 2022-07-11 | Stop reason: HOSPADM

## 2022-07-11 RX ORDER — SODIUM CHLORIDE, SODIUM LACTATE, POTASSIUM CHLORIDE, CALCIUM CHLORIDE 600; 310; 30; 20 MG/100ML; MG/100ML; MG/100ML; MG/100ML
9 INJECTION, SOLUTION INTRAVENOUS CONTINUOUS PRN
Status: DISCONTINUED | OUTPATIENT
Start: 2022-07-11 | End: 2022-07-11 | Stop reason: HOSPADM

## 2022-07-11 RX ORDER — SCOLOPAMINE TRANSDERMAL SYSTEM 1 MG/1
1 PATCH, EXTENDED RELEASE TRANSDERMAL ONCE
Status: DISCONTINUED | OUTPATIENT
Start: 2022-07-11 | End: 2022-07-11 | Stop reason: HOSPADM

## 2022-07-11 RX ORDER — MEPERIDINE HYDROCHLORIDE 25 MG/ML
12.5 INJECTION INTRAMUSCULAR; INTRAVENOUS; SUBCUTANEOUS
Status: DISCONTINUED | OUTPATIENT
Start: 2022-07-11 | End: 2022-07-11 | Stop reason: HOSPADM

## 2022-07-11 RX ORDER — PROMETHAZINE HYDROCHLORIDE 12.5 MG/1
25 TABLET ORAL ONCE AS NEEDED
Status: DISCONTINUED | OUTPATIENT
Start: 2022-07-11 | End: 2022-07-11 | Stop reason: HOSPADM

## 2022-07-11 RX ORDER — DEXMEDETOMIDINE HYDROCHLORIDE 100 UG/ML
INJECTION, SOLUTION INTRAVENOUS AS NEEDED
Status: DISCONTINUED | OUTPATIENT
Start: 2022-07-11 | End: 2022-07-11 | Stop reason: SURG

## 2022-07-11 RX ORDER — BUPIVACAINE HYDROCHLORIDE 5 MG/ML
INJECTION, SOLUTION EPIDURAL; INTRACAUDAL AS NEEDED
Status: DISCONTINUED | OUTPATIENT
Start: 2022-07-11 | End: 2022-07-11 | Stop reason: HOSPADM

## 2022-07-11 RX ORDER — GLYCOPYRROLATE 0.2 MG/ML
INJECTION INTRAMUSCULAR; INTRAVENOUS AS NEEDED
Status: DISCONTINUED | OUTPATIENT
Start: 2022-07-11 | End: 2022-07-11 | Stop reason: SURG

## 2022-07-11 RX ORDER — ACETAMINOPHEN 500 MG
1000 TABLET ORAL ONCE
Status: COMPLETED | OUTPATIENT
Start: 2022-07-11 | End: 2022-07-11

## 2022-07-11 RX ORDER — LIDOCAINE HYDROCHLORIDE 20 MG/ML
INJECTION, SOLUTION EPIDURAL; INFILTRATION; INTRACAUDAL; PERINEURAL AS NEEDED
Status: DISCONTINUED | OUTPATIENT
Start: 2022-07-11 | End: 2022-07-11 | Stop reason: SURG

## 2022-07-11 RX ORDER — CLINDAMYCIN PHOSPHATE 900 MG/50ML
900 INJECTION INTRAVENOUS ONCE
Status: COMPLETED | OUTPATIENT
Start: 2022-07-11 | End: 2022-07-11

## 2022-07-11 RX ORDER — OXYCODONE HYDROCHLORIDE 5 MG/1
5 TABLET ORAL
Status: COMPLETED | OUTPATIENT
Start: 2022-07-11 | End: 2022-07-11

## 2022-07-11 RX ADMIN — HYDROMORPHONE HYDROCHLORIDE 0.5 MG: 1 INJECTION, SOLUTION INTRAMUSCULAR; INTRAVENOUS; SUBCUTANEOUS at 11:13

## 2022-07-11 RX ADMIN — LIDOCAINE HYDROCHLORIDE 100 MG: 20 INJECTION, SOLUTION EPIDURAL; INFILTRATION; INTRACAUDAL; PERINEURAL at 09:54

## 2022-07-11 RX ADMIN — GLYCOPYRROLATE 0.2 MG: 0.2 INJECTION INTRAMUSCULAR; INTRAVENOUS at 10:22

## 2022-07-11 RX ADMIN — OXYCODONE HYDROCHLORIDE 5 MG: 5 TABLET ORAL at 11:30

## 2022-07-11 RX ADMIN — FENTANYL CITRATE 25 MCG: 50 INJECTION, SOLUTION INTRAMUSCULAR; INTRAVENOUS at 10:31

## 2022-07-11 RX ADMIN — HYDROMORPHONE HYDROCHLORIDE 0.5 MG: 1 INJECTION, SOLUTION INTRAMUSCULAR; INTRAVENOUS; SUBCUTANEOUS at 11:03

## 2022-07-11 RX ADMIN — OXYCODONE HYDROCHLORIDE 5 MG: 5 TABLET ORAL at 11:14

## 2022-07-11 RX ADMIN — DEXMEDETOMIDINE HYDROCHLORIDE 5 MCG: 100 INJECTION, SOLUTION, CONCENTRATE INTRAVENOUS at 10:31

## 2022-07-11 RX ADMIN — DEXMEDETOMIDINE HYDROCHLORIDE 10 MCG: 100 INJECTION, SOLUTION, CONCENTRATE INTRAVENOUS at 09:58

## 2022-07-11 RX ADMIN — PROPOFOL 180 MG: 10 INJECTION, EMULSION INTRAVENOUS at 09:54

## 2022-07-11 RX ADMIN — SODIUM CHLORIDE, POTASSIUM CHLORIDE, SODIUM LACTATE AND CALCIUM CHLORIDE 9 ML/HR: 600; 310; 30; 20 INJECTION, SOLUTION INTRAVENOUS at 09:43

## 2022-07-11 RX ADMIN — FENTANYL CITRATE 25 MCG: 50 INJECTION, SOLUTION INTRAMUSCULAR; INTRAVENOUS at 10:24

## 2022-07-11 RX ADMIN — ACETAMINOPHEN 1000 MG: 500 TABLET ORAL at 09:43

## 2022-07-11 RX ADMIN — FENTANYL CITRATE 25 MCG: 50 INJECTION, SOLUTION INTRAMUSCULAR; INTRAVENOUS at 09:54

## 2022-07-11 RX ADMIN — ONDANSETRON 4 MG: 2 INJECTION INTRAMUSCULAR; INTRAVENOUS at 11:01

## 2022-07-11 RX ADMIN — SCOPALAMINE 1 PATCH: 1 PATCH, EXTENDED RELEASE TRANSDERMAL at 09:43

## 2022-07-11 RX ADMIN — KETOROLAC TROMETHAMINE 30 MG: 30 INJECTION, SOLUTION INTRAMUSCULAR; INTRAVENOUS at 10:30

## 2022-07-11 RX ADMIN — CLINDAMYCIN IN 5 PERCENT DEXTROSE 900 MG: 18 INJECTION, SOLUTION INTRAVENOUS at 09:57

## 2022-07-11 RX ADMIN — SODIUM CHLORIDE, POTASSIUM CHLORIDE, SODIUM LACTATE AND CALCIUM CHLORIDE: 600; 310; 30; 20 INJECTION, SOLUTION INTRAVENOUS at 10:36

## 2022-07-11 RX ADMIN — MIDAZOLAM HYDROCHLORIDE 2 MG: 1 INJECTION, SOLUTION INTRAMUSCULAR; INTRAVENOUS at 09:49

## 2022-07-11 NOTE — ANESTHESIA PREPROCEDURE EVALUATION
Anesthesia Evaluation     Patient summary reviewed and Nursing notes reviewed   history of anesthetic complications: PONV  NPO Solid Status: > 8 hours  NPO Liquid Status: > 2 hours           Airway   Mallampati: I  TM distance: >3 FB  Neck ROM: full  No difficulty expected  Dental    (+) poor dentition    Pulmonary - negative pulmonary ROS and normal exam    breath sounds clear to auscultation  Cardiovascular - negative cardio ROS and normal exam  Exercise tolerance: good (4-7 METS)    Rhythm: regular  Rate: normal        Neuro/Psych- negative ROS  GI/Hepatic/Renal/Endo - negative ROS     Musculoskeletal (-) negative ROS    Abdominal    Substance History - negative use     OB/GYN negative ob/gyn ROS         Other - negative ROS                       Anesthesia Plan    ASA 2     general       Anesthetic plan, risks, benefits, and alternatives have been provided, discussed and informed consent has been obtained with: patient and spouse/significant other.        CODE STATUS:

## 2022-07-11 NOTE — ANESTHESIA POSTPROCEDURE EVALUATION
Patient: Palmira Carbajal    Procedure Summary     Date: 07/11/22 Room / Location: [facility]  / [facility]    Anesthesia Start: 0952 Anesthesia Stop: 1044    Procedure: KNEE ARTHROSCOPY WITH A LATERAL RELEASE AND CHONDROPLASTY (Right Knee) Diagnosis:       Maltracking of right patella      (Maltracking of right patella [M22.8X1])    Surgeons: Marco A Pitts MD Provider: Andrew Patel MD    Anesthesia Type: general ASA Status: 2          Anesthesia Type: general    Vitals  Vitals Value Taken Time   /88 07/11/22 1112   Temp 36.1 °C (97 °F) 07/11/22 1040   Pulse 61 07/11/22 1116   Resp 27 07/11/22 1111   SpO2 100 % 07/11/22 1116   Vitals shown include unvalidated device data.        Post Anesthesia Care and Evaluation    Patient location during evaluation: bedside  Patient participation: complete - patient participated  Level of consciousness: awake  Pain management: adequate    Airway patency: patent  Anesthetic complications: No anesthetic complications  PONV Status: none  Cardiovascular status: acceptable and stable  Respiratory status: acceptable  Hydration status: acceptable    Comments: An Anesthesiologist personally participated in the most demanding procedures (including induction and emergence if applicable) in the anesthesia plan, monitored the course of anesthesia administration at frequent intervals and remained physically present and available for immediate diagnosis and treatment of emergencies.

## 2022-07-11 NOTE — OP NOTE
KNEE ARTHROSCOPY  Procedure Report    Patient Name:  Palmira Carbajal  YOB: 1995    Date of Surgery:  7/11/2022     Indications: The patient has failed conservative treatment and wishes to proceed with operative treatment.  We discussed the risk of surgery including bleeding, infection, damage to neurovascular structures, anesthesia complications, continued pain and disability, need for additional procedures among others.  Informed consent was obtained and they wished to proceed.    Pre-op Diagnosis:   Maltracking of right patella [M22.8X1]       Post-Op Diagnosis Codes:     * Maltracking of right patella [M22.8X1]   Right patella chondromalacia    Procedure/CPT® Codes:      Procedure(s):  Right KNEE ARTHROSCOPY WITH A LATERAL RELEASE AND CHONDROPLASTY    Staff:  Surgeon(s):  Marco A Pitts MD    Assistant: Luis Enrique Antonio RN    Anesthesia: General    Estimated Blood Loss: 5 mL    Implants:    Nothing was implanted during the procedure    Specimen:          None        Findings: Right knee patellar maltracking, chondromalacia    Complications: None    Description of Procedure: Operative site was marked in the preoperative holding area.  The patient was brought to the operating room and placed supine on the operating room table.  General anesthesia was given.  All bony prominences were padded.  The operative leg had a nonsterile tourniquet placed on the thigh and was placed in arthroscopic leg infante.  The opposite leg was placed in a padded leg infante and the foot of the bed was lowered.      The patient received preoperative antibiotic.  After formal timeout was held, Esmarch was used to exsanguinate the limb and tourniquet was inflated.  A stab incision was made over the anterolateral aspect of the knee and a trocar was inserted into the knee.  The knee was extended, and diagnostic arthroscopy was performed. Anteromedial portal was made with the spinal needle from outside in.  A stab  incision was made.  An arthroscopic shaver was inserted into the knee and the knee was débrided.      Patellofemoral inspection revealed maltracking of the patella.  There was a lateral tilt and subluxation of the patella.  No evidence of patellar dislocation.  Mild chondromalacia of the inferior patella on the lateral side with grade I-II chondromalacia.  Small area of grade I-II chondromalacia in the central trochlea.  This was debrided for a chondroplasty with a motorized shaver on the patellar and trochlear side.  A surface ablation probe with a 45 degree hook was used to perform a lateral release.  The arthroscope was switched to the medial portal and from the lateral portal of the ablation probe was used to release the retinaculum from the superior to inferior patella.  The patellar tracking was improved after the release.     The knee was flexed.  The valgus stress was placed to the knee.  A probe was inserted into the medial compartment.  Medial compartment exam revealed intact medial compartment cartilage.  Intact medial meniscus.      At this point attention was turned to the notch in the femur.  The ACL was probed and found to be stable. The PCL was intact.     The lateral compartment findings included lateral tibiofemoral cartilage and intact lateral meniscus.    At this point then fluid was drained from the knee.  Tourniquet was released.  The incisions were closed with 3-0 nylon in figure-of-8 fashion.  Local anesthetic, 10 mL of Marcaine and 10 mg of morphine, were injected into the knee.  Xeroform, 4x4s, soft roll and an Ace wrap were placed.  The patient awoke from anesthesia in stable condition.  There were no complications.  All counts were correct.  The patient was stable to recovery.     Assistant: Luis Enrique Antonio RN  was responsible for performing the following activities: Retraction, Suction, Irrigation and Placing Dressing and their skilled assistance was necessary for the success of  this case.    Marco A Pitts MD     Date: 7/11/2022  Time: 10:42 EDT

## 2022-07-22 ENCOUNTER — TREATMENT (OUTPATIENT)
Dept: PHYSICAL THERAPY | Facility: CLINIC | Age: 27
End: 2022-07-22

## 2022-07-22 DIAGNOSIS — M25.661 DECREASED RANGE OF MOTION OF RIGHT KNEE: ICD-10-CM

## 2022-07-22 DIAGNOSIS — Z98.890 S/P RIGHT KNEE ARTHROSCOPY: Primary | ICD-10-CM

## 2022-07-22 DIAGNOSIS — R29.898 WEAKNESS OF RIGHT LOWER EXTREMITY: ICD-10-CM

## 2022-07-22 DIAGNOSIS — M25.561 ACUTE PAIN OF RIGHT KNEE: ICD-10-CM

## 2022-07-22 DIAGNOSIS — R26.9 GAIT DISTURBANCE: ICD-10-CM

## 2022-07-22 PROCEDURE — 97161 PT EVAL LOW COMPLEX 20 MIN: CPT | Performed by: PHYSICAL THERAPIST

## 2022-07-22 NOTE — PROGRESS NOTES
Physical Therapy Initial Evaluation and Plan of Care    Patient: Palmira Carbajal   : 1995  Diagnosis/ICD-10 Code:  S/P right knee arthroscopy [Z98.890]  Referring practitioner: Marco A Pitts MD  Date of Initial Visit: 2022  Today's Date: 2022  Patient seen for 1 sessions           Subjective Questionnaire: LEFS: 25/80 or 60-79% limited      Subjective Evaluation    History of Present Illness  Mechanism of injury: Pt had a R knee scope with lateral release on 22.  Pt has had issues with her knee for about a year and a half but had to postpone her surgery.  Pt used crutches for about one week after her knee surgery.  Pt is now ambulating without an AD.  Pt is continuing to ice and elevate her knee.  Pt reports her knee is still sore and rates it a 8/10 at worst, 4/10 currently, and a 3/10 at lowest.  Pt was not working prior to this surgery.  Pt would like to be able to walk around normally and not have pain while doing it.      Medical history: kidney stone removal surgery, exploratory lap (x2)    Pain  Current pain ratin  At best pain rating: 3  At worst pain ratin  Relieving factors: ice  Aggravating factors: movement, stairs, standing, ambulation, prolonged positioning and sleeping    Social Support  Lives in: one-story house (no CAITLIN)  Lives with: spouse and young children    Treatments  Previous treatment: physical therapy  Patient Goals  Patient goals for therapy: decreased pain, increased motion, increased strength, independence with ADLs/IADLs and return to sport/leisure activities             Objective          Tenderness     Right Knee   Tenderness in the lateral joint line, medial joint line and patellar tendon.     Neurological Testing     Sensation     Knee   Left Knee   Intact: light touch    Right Knee   Intact: light touch     Additional Neurological Details  B LE sensation intact    Active Range of Motion   Left Knee   Flexion: 144 degrees     Right Knee    Flexion: 107 degrees   Extension: 0 degrees     Passive Range of Motion     Right Knee   Flexion: 120 degrees   Extension: 0 degrees     Patellar Mobility     Right Knee Hypermobile in the medial, lateral, superior and inferior patellar tendon(s).     Strength/Myotome Testing     Left Hip   Planes of Motion   Flexion: 4+  Abduction: 5    Right Hip   Planes of Motion   Flexion: 3  Abduction: 4-    Left Knee   Flexion: 5  Extension: 5    Right Knee   Flexion: 5  Extension: 4-    Left Ankle/Foot   Dorsiflexion: 5  Plantar flexion: 5  Inversion: 5  Eversion: 5    Right Ankle/Foot   Dorsiflexion: 5  Plantar flexion: 5  Inversion: 5  Eversion: 5        See Exercise, Manual, and Modality Logs for complete treatment.       Assessment & Plan     Assessment  Impairments: abnormal gait, abnormal or restricted ROM, impaired balance, impaired physical strength, lacks appropriate home exercise program and pain with function  Functional Limitations: sleeping, walking, uncomfortable because of pain, sitting, standing and stooping  Assessment details: Pt presents with limitations, noted below, that impede her ability to walk, go up/down stairs, and perform ADLs.  The patient presents with a diagnosis of s/p R knee scope with lateral release and has R knee pain, decreased ROM, weakness, and gait dysfunction.  The patient would like to go to therapy at [facility] in [place] since that is where she lives.  She was provided with a HEP and exercises were reviewed today.  She will discharge from PT at this facility.  She also has a follow up with orthopedics on Monday 7/25/22.  Prognosis: good    Plan  Therapy options: will not be seen for skilled therapy services  Treatment plan discussed with: patient        History # of Personal Factors and/or Comorbidities: LOW (0)  Examination of Body System(s): # of elements: LOW (1-2)  Clinical Presentation: STABLE   Clinical Decision Making: LOW       Timed:         Manual Therapy:         mins   73451;     Therapeutic Exercise:         mins  95555;     Neuromuscular Harper:        mins  29011;    Therapeutic Activity:          mins  17365;     Gait Training:           mins  42587;     Ultrasound:          mins  31019;    Ionto                                   mins   06467  Self Care                            mins   91286  Aquatic Therapy                 mins   55140      Un-Timed:  Electrical Stimulation:         mins  87538 ( );  Dry Needling          mins self-pay  Traction          mins 72883  Low Eval     30     Mins  17135  Mod Eval          Mins  06770  High Eval                            Mins  44882  Re-Eval                               mins  16011  Canalith Repos         mins 74895      Timed Treatment:   0   mins   Total Treatment:     30   mins    PT SIGNATURE: Electronically signed by YOSEPH Hammond License: [id number]      Initial Certification  Certification Period: 7/22/2022 thru 10/19/2022  I certify that the therapy services are furnished while this patient is under my care.  The services outlined above are required by this patient, and will be reviewed every 90 days.     PHYSICIAN: Marco A Pitts MD  NPI: [id number]                                      DATE:        Please sign and return via fax to [phone].Thank you, [facility].

## 2022-07-25 ENCOUNTER — OFFICE VISIT (OUTPATIENT)
Dept: ORTHOPEDIC SURGERY | Facility: CLINIC | Age: 27
End: 2022-07-25

## 2022-07-25 VITALS — WEIGHT: 165.6 LBS | HEART RATE: 94 BPM | OXYGEN SATURATION: 98 % | BODY MASS INDEX: 28.27 KG/M2 | HEIGHT: 64 IN

## 2022-07-25 DIAGNOSIS — M22.8X1 MALTRACKING OF RIGHT PATELLA: ICD-10-CM

## 2022-07-25 DIAGNOSIS — Z47.89 AFTERCARE FOLLOWING SURGERY OF THE MUSCULOSKELETAL SYSTEM: Primary | ICD-10-CM

## 2022-07-25 PROCEDURE — 99024 POSTOP FOLLOW-UP VISIT: CPT | Performed by: PHYSICIAN ASSISTANT

## 2022-07-25 RX ORDER — HYDROCODONE BITARTRATE AND ACETAMINOPHEN 7.5; 325 MG/1; MG/1
TABLET ORAL
Qty: 32 TABLET | Refills: 0 | Status: SHIPPED | OUTPATIENT
Start: 2022-07-25 | End: 2022-08-16

## 2022-07-25 NOTE — PROGRESS NOTES
"Chief Complaint  Pain and Follow-up of the Right Knee and Suture / Staple Removal    Subjective          Palmira Carbajal is a 27 y.o. female  presents to [facility] for   History of Present Illness      Patient presents for 2-week postoperative evaluation of right knee arthroscopic chondroplasty with lateral release, 2022.  Patient states she started physical therapy this past Friday she is going to  and [place] she is stating that she ran out of her pain medication she is requesting a refill.  Patient states last week she twisted her knee and this did cause some pain in the lateral knee but states she is up and walking well she presents without walker/knee brace or cane.  Patient states incisions have healed well, sutures were removed today, no new complaints.      Allergies   Allergen Reactions   • Cephalexin Diarrhea   • Cephalosporins GI Intolerance   • Penicillins Rash        Social History     Socioeconomic History   • Marital status:      Spouse name: TRISTAN   • Number of children: 3   Tobacco Use   • Smoking status: Former Smoker     Packs/day: 0.50     Years: 15.00     Pack years: 7.50     Types: Cigarettes     Quit date: 2022     Years since quittin.4   • Smokeless tobacco: Never Used   • Tobacco comment: INST PER ANESTHESIA PROTOCOL   Vaping Use   • Vaping Use: Every day   • Substances: Nicotine, Flavoring   • Devices: Disposable   Substance and Sexual Activity   • Alcohol use: Yes     Comment: light, occasionally drinks, less than 1 drink per day, has been drinking for 5 years   • Drug use: Never   • Sexual activity: Defer        REVIEW OF SYSTEMS    Constitutional: Denies fevers, chills, weight loss  Cardiovascular: Denies chest pain, shortness of breath  Skin: Denies rashes, acute skin changes  Neurologic: Denies headache, loss of consciousness  MSK: Right knee pain      Objective   Vital Signs:   Pulse 94   Ht 162.6 cm (64\")   Wt 75.1 kg (165 " lb 9.6 oz)   SpO2 98%   BMI 28.43 kg/m²     Body mass index is 28.43 kg/m².    Physical Exam    Right knee: Sutures removed today, incisions are healing well, no erythema, no ecchymosis, no swelling, no effusion, tender palpation of the lateral knee, full extension, flexion 120, stable to varus/valgus stress, stable anterior/posterior drawer.    Procedures    Imaging Results (Most Recent)     None           Result Review :   The following data was reviewed by: ANGELICA Meza on 07/25/2022:               Assessment and Plan {CC Problem List  Visit Diagnosis   ROS  Review (Popup)  Health Maintenance  Quality  BestPractice  Medications  SmartSets  SnapShot Encounters  Media :23}   Diagnoses and all orders for this visit:    1. Aftercare following surgery of right knee arthroscopic chondroplasty and lateral release, 7/11/2022 (Primary)  -     Ambulatory Referral to Physical Therapy Evaluate and treat (2-3x/week for 6-8 weeks), Ortho, POST OP        Reviewed operative imaging and reports with the patient, she was advised to continue plan for physical therapy new orders written to go to PT [facility], follow-up in 4 weeks for recheck.  Follow-up sooner if any new or concerning symptoms occur, patient agreed    Call or return if worsening symptoms.    Follow Up   Return in about 4 weeks (around 8/22/2022) for Recheck.  Patient was given instructions and counseling regarding her condition or for health maintenance advice. Please see specific information pulled into the AVS if appropriate.

## 2022-07-26 ENCOUNTER — TELEPHONE (OUTPATIENT)
Dept: FAMILY MEDICINE CLINIC | Facility: CLINIC | Age: 27
End: 2022-07-26

## 2022-08-16 ENCOUNTER — PRIOR AUTHORIZATION (OUTPATIENT)
Dept: NEUROLOGY | Facility: CLINIC | Age: 27
End: 2022-08-16

## 2022-08-16 ENCOUNTER — OFFICE VISIT (OUTPATIENT)
Dept: NEUROLOGY | Facility: CLINIC | Age: 27
End: 2022-08-16

## 2022-08-16 VITALS
DIASTOLIC BLOOD PRESSURE: 65 MMHG | BODY MASS INDEX: 28.17 KG/M2 | HEIGHT: 64 IN | WEIGHT: 165 LBS | HEART RATE: 87 BPM | SYSTOLIC BLOOD PRESSURE: 105 MMHG

## 2022-08-16 DIAGNOSIS — G43.009 MIGRAINE WITHOUT AURA AND WITHOUT STATUS MIGRAINOSUS, NOT INTRACTABLE: Primary | ICD-10-CM

## 2022-08-16 PROCEDURE — 99213 OFFICE O/P EST LOW 20 MIN: CPT | Performed by: PSYCHIATRY & NEUROLOGY

## 2022-08-16 NOTE — ASSESSMENT & PLAN NOTE
I will try her on Emgality.  She was given a loading dose sample 240 mg in our office to take home with her to be given the end of this month.  She will be injecting herself 120 mg monthly thereafter.  She was shown how to inject the medication.  I will see her again in 2 months time for follow-up.

## 2022-08-16 NOTE — PROGRESS NOTES
"Chief Complaint  Migraine    Subjective          Palmira Carbajal is a 27 y.o. female who presents to [facility]  History of Present Illness  27-year-old woman here for follow-up for migraine headaches.  She states that Ajovy is helping her but not that much.  It is only 30% effective.  Prior to getting Ajovy for the last 3 months she was getting 10-15 headaches a month and 4 of those headaches were severe lasting from a day to 2 days.  Now she is getting 2-3 severe headaches a month and getting 5-6 regular headaches a month.  She is getting 8-10 headaches a month at this time.  She has not tried Emgality.    Objective   Vital Signs:   /65   Pulse 87   Ht 162.6 cm (64.02\")   Wt 74.8 kg (165 lb)   BMI 28.31 kg/m²     Physical Exam   Alert, fluent, phasic, follows commands well.  Optic disc are normal bilaterally there is no focal weakness but heart is regular rhythm normal in rate.        Assessment and Plan  Diagnoses and all orders for this visit:    1. Migraine without aura and without status migrainosus, not intractable (Primary)  Assessment & Plan:      I will try her on Emgality.  She was given a loading dose sample 240 mg in our office to take home with her to be given the end of this month.  She will be injecting herself 120 mg monthly thereafter.  She was shown how to inject the medication.  I will see her again in 2 months time for follow-up.      Other orders  -     galcanezumab-gnlm (EMGALITY) 120 MG/ML auto-injector pen; Inject 1 mL under the skin into the appropriate area as directed Every 30 (Thirty) Days.  Dispense: 1.12 mL; Refill: 6       Total time spent with the patient and coordinating patient care was 25 minutes.    Follow Up  No follow-ups on file.  Patient was given instructions and counseling regarding her condition or for health maintenance advice. Please see specific information pulled into the AVS if appropriate.       "

## 2022-08-17 NOTE — TELEPHONE ENCOUNTER
Patient was given sample in office, she is due for injection on 08/29. Will wait to do PA until September so she has longer time frame on auth.,

## 2022-08-26 ENCOUNTER — APPOINTMENT (OUTPATIENT)
Dept: CT IMAGING | Facility: HOSPITAL | Age: 27
End: 2022-08-26

## 2022-08-26 ENCOUNTER — APPOINTMENT (OUTPATIENT)
Dept: ULTRASOUND IMAGING | Facility: HOSPITAL | Age: 27
End: 2022-08-26

## 2022-08-26 ENCOUNTER — HOSPITAL ENCOUNTER (EMERGENCY)
Facility: HOSPITAL | Age: 27
Discharge: HOME | End: 2022-08-26
Attending: EMERGENCY MEDICINE | Admitting: EMERGENCY MEDICINE

## 2022-08-26 VITALS
OXYGEN SATURATION: 96 % | HEART RATE: 58 BPM | DIASTOLIC BLOOD PRESSURE: 61 MMHG | TEMPERATURE: 98.7 F | RESPIRATION RATE: 16 BRPM | HEIGHT: 64 IN | WEIGHT: 165.12 LBS | SYSTOLIC BLOOD PRESSURE: 96 MMHG | BODY MASS INDEX: 28.19 KG/M2

## 2022-08-26 DIAGNOSIS — R10.13 EPIGASTRIC PAIN: Primary | ICD-10-CM

## 2022-08-26 DIAGNOSIS — K21.9 GERD WITHOUT ESOPHAGITIS: ICD-10-CM

## 2022-08-26 LAB
ALBUMIN SERPL-MCNC: 4.5 G/DL (ref 3.5–5.2)
ALBUMIN/GLOB SERPL: 1.5 G/DL
ALP SERPL-CCNC: 87 U/L (ref 39–117)
ALT SERPL W P-5'-P-CCNC: 12 U/L (ref 1–33)
ANION GAP SERPL CALCULATED.3IONS-SCNC: 12.6 MMOL/L (ref 5–15)
AST SERPL-CCNC: 14 U/L (ref 1–32)
BASOPHILS # BLD AUTO: 0.08 10*3/MM3 (ref 0–0.2)
BASOPHILS NFR BLD AUTO: 0.7 % (ref 0–1.5)
BILIRUB SERPL-MCNC: 0.2 MG/DL (ref 0–1.2)
BILIRUB UR QL STRIP: NEGATIVE
BUN SERPL-MCNC: 10 MG/DL (ref 6–20)
BUN/CREAT SERPL: 15.2 (ref 7–25)
CALCIUM SPEC-SCNC: 9.2 MG/DL (ref 8.6–10.5)
CHLORIDE SERPL-SCNC: 104 MMOL/L (ref 98–107)
CLARITY UR: CLEAR
CO2 SERPL-SCNC: 22.4 MMOL/L (ref 22–29)
COLOR UR: YELLOW
CREAT SERPL-MCNC: 0.66 MG/DL (ref 0.57–1)
DEPRECATED RDW RBC AUTO: 41 FL (ref 37–54)
EGFRCR SERPLBLD CKD-EPI 2021: 122 ML/MIN/1.73
EOSINOPHIL # BLD AUTO: 0.35 10*3/MM3 (ref 0–0.4)
EOSINOPHIL NFR BLD AUTO: 3.1 % (ref 0.3–6.2)
ERYTHROCYTE [DISTWIDTH] IN BLOOD BY AUTOMATED COUNT: 13.2 % (ref 12.3–15.4)
GLOBULIN UR ELPH-MCNC: 3 GM/DL
GLUCOSE SERPL-MCNC: 90 MG/DL (ref 65–99)
GLUCOSE UR STRIP-MCNC: NEGATIVE MG/DL
H PYLORI IGG SER IA-ACNC: NEGATIVE
HCT VFR BLD AUTO: 41 % (ref 34–46.6)
HGB BLD-MCNC: 13.5 G/DL (ref 12–15.9)
HGB UR QL STRIP.AUTO: NEGATIVE
HOLD SPECIMEN: 11
HOLD SPECIMEN: 11
IMM GRANULOCYTES # BLD AUTO: 0.04 10*3/MM3 (ref 0–0.05)
IMM GRANULOCYTES NFR BLD AUTO: 0.4 % (ref 0–0.5)
KETONES UR QL STRIP: NEGATIVE
LEUKOCYTE ESTERASE UR QL STRIP.AUTO: NEGATIVE
LIPASE SERPL-CCNC: 68 U/L (ref 13–60)
LYMPHOCYTES # BLD AUTO: 3.31 10*3/MM3 (ref 0.7–3.1)
LYMPHOCYTES NFR BLD AUTO: 29.7 % (ref 19.6–45.3)
MCH RBC QN AUTO: 28 PG (ref 26.6–33)
MCHC RBC AUTO-ENTMCNC: 32.9 G/DL (ref 31.5–35.7)
MCV RBC AUTO: 85.1 FL (ref 79–97)
MONOCYTES # BLD AUTO: 1.05 10*3/MM3 (ref 0.1–0.9)
MONOCYTES NFR BLD AUTO: 9.4 % (ref 5–12)
NEUTROPHILS NFR BLD AUTO: 56.7 % (ref 42.7–76)
NEUTROPHILS NFR BLD AUTO: 6.31 10*3/MM3 (ref 1.7–7)
NITRITE UR QL STRIP: NEGATIVE
NRBC BLD AUTO-RTO: 0 /100 WBC (ref 0–0.2)
PH UR STRIP.AUTO: 7.5 [PH] (ref 5–8)
PLATELET # BLD AUTO: 339 10*3/MM3 (ref 140–450)
PMV BLD AUTO: 10.1 FL (ref 6–12)
POTASSIUM SERPL-SCNC: 4 MMOL/L (ref 3.5–5.2)
PROT SERPL-MCNC: 7.5 G/DL (ref 6–8.5)
PROT UR QL STRIP: NEGATIVE
RBC # BLD AUTO: 4.82 10*6/MM3 (ref 3.77–5.28)
SODIUM SERPL-SCNC: 139 MMOL/L (ref 136–145)
SP GR UR STRIP: 1.02 (ref 1–1.03)
UROBILINOGEN UR QL STRIP: NORMAL
WBC NRBC COR # BLD: 11.14 10*3/MM3 (ref 3.4–10.8)
WHOLE BLOOD HOLD COAG: 11
WHOLE BLOOD HOLD SPECIMEN: 11

## 2022-08-26 PROCEDURE — 86677 HELICOBACTER PYLORI ANTIBODY: CPT | Performed by: NURSE PRACTITIONER

## 2022-08-26 PROCEDURE — 36415 COLL VENOUS BLD VENIPUNCTURE: CPT

## 2022-08-26 PROCEDURE — 99283 EMERGENCY DEPT VISIT LOW MDM: CPT

## 2022-08-26 PROCEDURE — 96375 TX/PRO/DX INJ NEW DRUG ADDON: CPT

## 2022-08-26 PROCEDURE — 80053 COMPREHEN METABOLIC PANEL: CPT | Performed by: EMERGENCY MEDICINE

## 2022-08-26 PROCEDURE — 83690 ASSAY OF LIPASE: CPT | Performed by: EMERGENCY MEDICINE

## 2022-08-26 PROCEDURE — 25010000002 ONDANSETRON PER 1 MG: Performed by: NURSE PRACTITIONER

## 2022-08-26 PROCEDURE — 74176 CT ABD & PELVIS W/O CONTRAST: CPT

## 2022-08-26 PROCEDURE — 81003 URINALYSIS AUTO W/O SCOPE: CPT | Performed by: EMERGENCY MEDICINE

## 2022-08-26 PROCEDURE — 96374 THER/PROPH/DIAG INJ IV PUSH: CPT

## 2022-08-26 PROCEDURE — 85025 COMPLETE CBC W/AUTO DIFF WBC: CPT | Performed by: EMERGENCY MEDICINE

## 2022-08-26 PROCEDURE — 76705 ECHO EXAM OF ABDOMEN: CPT

## 2022-08-26 RX ORDER — ESOMEPRAZOLE MAGNESIUM 20 MG/1
FOR SUSPENSION ORAL
Qty: 42 EACH | Refills: 0 | Status: SHIPPED | OUTPATIENT
Start: 2022-08-26 | End: 2022-09-23

## 2022-08-26 RX ORDER — DICYCLOMINE HYDROCHLORIDE 10 MG/1
20 CAPSULE ORAL ONCE
Status: COMPLETED | OUTPATIENT
Start: 2022-08-26 | End: 2022-08-26

## 2022-08-26 RX ORDER — SODIUM CHLORIDE 0.9 % (FLUSH) 0.9 %
10 SYRINGE (ML) INJECTION AS NEEDED
Status: DISCONTINUED | OUTPATIENT
Start: 2022-08-26 | End: 2022-08-26 | Stop reason: HOSPADM

## 2022-08-26 RX ORDER — LIDOCAINE HYDROCHLORIDE 20 MG/ML
15 SOLUTION OROPHARYNGEAL ONCE
Status: COMPLETED | OUTPATIENT
Start: 2022-08-26 | End: 2022-08-26

## 2022-08-26 RX ORDER — PANTOPRAZOLE SODIUM 40 MG/10ML
80 INJECTION, POWDER, LYOPHILIZED, FOR SOLUTION INTRAVENOUS ONCE
Status: COMPLETED | OUTPATIENT
Start: 2022-08-26 | End: 2022-08-26

## 2022-08-26 RX ORDER — DICYCLOMINE HCL 20 MG
20 TABLET ORAL EVERY 6 HOURS PRN
Qty: 36 TABLET | Refills: 0 | Status: SHIPPED | OUTPATIENT
Start: 2022-08-26

## 2022-08-26 RX ORDER — ALUMINA, MAGNESIA, AND SIMETHICONE 2400; 2400; 240 MG/30ML; MG/30ML; MG/30ML
15 SUSPENSION ORAL ONCE
Status: COMPLETED | OUTPATIENT
Start: 2022-08-26 | End: 2022-08-26

## 2022-08-26 RX ORDER — ONDANSETRON 2 MG/ML
4 INJECTION INTRAMUSCULAR; INTRAVENOUS ONCE
Status: COMPLETED | OUTPATIENT
Start: 2022-08-26 | End: 2022-08-26

## 2022-08-26 RX ORDER — NAPROXEN 500 MG/1
500 TABLET ORAL EVERY 12 HOURS PRN
Qty: 36 TABLET | Refills: 0 | Status: SHIPPED | OUTPATIENT
Start: 2022-08-26 | End: 2022-11-07

## 2022-08-26 RX ORDER — ONDANSETRON 4 MG/1
4 TABLET, ORALLY DISINTEGRATING ORAL EVERY 6 HOURS PRN
Qty: 28 TABLET | Refills: 0 | Status: SHIPPED | OUTPATIENT
Start: 2022-08-26 | End: 2022-10-07

## 2022-08-26 RX ADMIN — ONDANSETRON 4 MG: 2 INJECTION INTRAMUSCULAR; INTRAVENOUS at 09:04

## 2022-08-26 RX ADMIN — PANTOPRAZOLE SODIUM 80 MG: 40 INJECTION, POWDER, FOR SOLUTION INTRAVENOUS at 09:01

## 2022-08-26 RX ADMIN — ALUMINUM HYDROXIDE, MAGNESIUM HYDROXIDE, AND DIMETHICONE 15 ML: 400; 400; 40 SUSPENSION ORAL at 09:05

## 2022-08-26 RX ADMIN — LIDOCAINE HYDROCHLORIDE 15 ML: 20 SOLUTION ORAL; TOPICAL at 09:05

## 2022-08-26 RX ADMIN — DICYCLOMINE HYDROCHLORIDE 20 MG: 10 CAPSULE ORAL at 11:46

## 2022-08-26 RX ADMIN — SODIUM CHLORIDE 1000 ML: 9 INJECTION, SOLUTION INTRAVENOUS at 09:00

## 2022-08-26 NOTE — ED PROVIDER NOTES
Subjective   Pt presents with complaint of epigastric pain and nausea that comes in waves since waking up yesterday morning. Denies vomiting, diarrhea, constipation, fever, chills, cough, chest pain or shortness of breath. She states she has been taking tylenol/ibuprofen for the last week for sinus congestion/cold symptoms. She is awake and alert and in no acute distress on exam. Mother is at BS.      History provided by:  Patient   used: No        Review of Systems   Constitutional: Negative.  Negative for fever.   HENT: Positive for congestion and rhinorrhea.    Eyes: Negative.    Respiratory: Negative.  Negative for cough and shortness of breath.    Cardiovascular: Negative.  Negative for chest pain and palpitations.   Gastrointestinal: Positive for abdominal pain and nausea. Negative for constipation and diarrhea.   Endocrine: Negative.    Genitourinary: Negative.  Negative for difficulty urinating and dysuria.   Musculoskeletal: Negative.    Skin: Negative.    Allergic/Immunologic: Negative.    Neurological: Negative.    Hematological: Negative.    Psychiatric/Behavioral: Negative.        Past Medical History:   Diagnosis Date   • Acid reflux    • Kidney stone     HX OF   • Maltracking of right patella    • Migraines    • PONV (postoperative nausea and vomiting)     GOOD RESULTS WITH SCOPALAMINE   • PTSD (post-traumatic stress disorder)     WAKES UP FROM ANESTHESIA WITH PANIC ATTACK   • Seasonal allergic rhinitis 2022       Allergies   Allergen Reactions   • Cephalexin Diarrhea   • Cephalosporins GI Intolerance   • Penicillins Rash       Past Surgical History:   Procedure Laterality Date   •  SECTION     • CYSTOSCOPY     • HYSTERECTOMY     • KIDNEY STONE SURGERY      unspecified   • KNEE ARTHROSCOPY Right 2022    Procedure: KNEE ARTHROSCOPY WITH A LATERAL RELEASE AND CHONDROPLASTY;  Surgeon: Marco A Pitts MD;  Location: [facility] OR [facility];  Service: Orthopedics;   Laterality: Right;   • WISDOM TOOTH EXTRACTION         Family History   Problem Relation Age of Onset   • Arthritis Mother    • Colon polyps Father    • Diabetes Father    • Cancer Maternal Grandmother    • Malig Hyperthermia Neg Hx        Social History     Socioeconomic History   • Marital status:      Spouse name: TRISTAN   • Number of children: 3   Tobacco Use   • Smoking status: Former Smoker     Packs/day: 0.50     Years: 15.00     Pack years: 7.50     Types: Cigarettes     Quit date: 2022     Years since quittin.5   • Smokeless tobacco: Never Used   • Tobacco comment: INST PER ANESTHESIA PROTOCOL   Vaping Use   • Vaping Use: Every day   • Substances: Nicotine, Flavoring   • Devices: Disposable   Substance and Sexual Activity   • Alcohol use: Yes     Comment: light, occasionally drinks, less than 1 drink per day, has been drinking for 5 years   • Drug use: Never   • Sexual activity: Defer           Objective   Physical Exam  Vitals and nursing note reviewed.   Constitutional:       Appearance: Normal appearance. She is normal weight.   HENT:      Head: Normocephalic and atraumatic.      Nose: Congestion and rhinorrhea present.      Mouth/Throat:      Mouth: Mucous membranes are moist.   Eyes:      Pupils: Pupils are equal, round, and reactive to light.   Cardiovascular:      Rate and Rhythm: Normal rate and regular rhythm.      Pulses: Normal pulses.   Pulmonary:      Effort: Pulmonary effort is normal.      Breath sounds: Normal breath sounds.   Abdominal:      General: Abdomen is flat. Bowel sounds are normal.      Palpations: Abdomen is soft.      Tenderness: There is abdominal tenderness in the right upper quadrant and epigastric area.   Musculoskeletal:         General: Normal range of motion.      Cervical back: Normal range of motion.   Skin:     General: Skin is warm and dry.      Capillary Refill: Capillary refill takes less than 2 seconds.   Neurological:      General: No focal deficit  present.      Mental Status: She is alert and oriented to person, place, and time. Mental status is at baseline.   Psychiatric:         Mood and Affect: Mood normal.         Procedures           ED Course                                           MDM  Number of Diagnoses or Management Options  Epigastric pain: new and requires workup  GERD without esophagitis: new and requires workup     Amount and/or Complexity of Data Reviewed  Clinical lab tests: reviewed and ordered  Tests in the radiology section of CPT®: reviewed and ordered    Risk of Complications, Morbidity, and/or Mortality  Presenting problems: low  Diagnostic procedures: low  Management options: low    Patient Progress  Patient progress: stable      Final diagnoses:   Epigastric pain   GERD without esophagitis       ED Disposition  ED Disposition     ED Disposition   Discharge    Condition   Stable    Comment   --             Jaja Castillo, APRN  [id number] [place] [address] [phone]    Schedule an appointment as soon as possible for a visit            Medication List      New Prescriptions    esomeprazole 20 MG packet  Commonly known as: NexIUM  Take 40 mg by mouth Every Morning Before Breakfast for 14 days, THEN 20 mg Every Morning Before Breakfast for 14 days.  Start taking on: August 26, 2022     ondansetron ODT 4 MG disintegrating tablet  Commonly known as: ZOFRAN-ODT  Place 1 tablet on the tongue Every 6 (Six) Hours As Needed for Nausea or Vomiting.        Changed    citalopram 20 MG tablet  Commonly known as: CeleXA  Take 1 tablet by mouth Daily.  What changed: when to take this           Where to Get Your Medications      These medications were sent to [facility] - [place], [place] - [address] [place], [place] [address] - [phone]  - [phone]   [address] [facility] [address]    Phone: [phone]   · esomeprazole 20 MG packet  · ondansetron ODT 4 MG disintegrating tablet          Eulalia Ley,  APRN  08/26/22 1037

## 2022-09-08 ENCOUNTER — OFFICE VISIT (OUTPATIENT)
Dept: FAMILY MEDICINE CLINIC | Facility: CLINIC | Age: 27
End: 2022-09-08

## 2022-09-08 VITALS
HEART RATE: 95 BPM | HEIGHT: 64 IN | DIASTOLIC BLOOD PRESSURE: 70 MMHG | RESPIRATION RATE: 12 BRPM | BODY MASS INDEX: 28.83 KG/M2 | WEIGHT: 168.9 LBS | TEMPERATURE: 98.1 F | OXYGEN SATURATION: 98 % | SYSTOLIC BLOOD PRESSURE: 108 MMHG

## 2022-09-08 DIAGNOSIS — F41.1 GENERALIZED ANXIETY DISORDER: ICD-10-CM

## 2022-09-08 DIAGNOSIS — N20.0 KIDNEY STONE ON LEFT SIDE: ICD-10-CM

## 2022-09-08 DIAGNOSIS — R10.13 EPIGASTRIC PAIN: ICD-10-CM

## 2022-09-08 DIAGNOSIS — R52 GENERALIZED PAIN: Primary | ICD-10-CM

## 2022-09-08 LAB — ASO AB SERPL-ACNC: NEGATIVE [IU]/ML

## 2022-09-08 PROCEDURE — 86225 DNA ANTIBODY NATIVE: CPT | Performed by: NURSE PRACTITIONER

## 2022-09-08 PROCEDURE — 86431 RHEUMATOID FACTOR QUANT: CPT | Performed by: NURSE PRACTITIONER

## 2022-09-08 PROCEDURE — 84550 ASSAY OF BLOOD/URIC ACID: CPT | Performed by: NURSE PRACTITIONER

## 2022-09-08 PROCEDURE — 99214 OFFICE O/P EST MOD 30 MIN: CPT | Performed by: NURSE PRACTITIONER

## 2022-09-08 PROCEDURE — 86140 C-REACTIVE PROTEIN: CPT | Performed by: NURSE PRACTITIONER

## 2022-09-08 PROCEDURE — 86038 ANTINUCLEAR ANTIBODIES: CPT | Performed by: NURSE PRACTITIONER

## 2022-09-08 PROCEDURE — 86063 ANTISTREPTOLYSIN O SCREEN: CPT | Performed by: NURSE PRACTITIONER

## 2022-09-08 RX ORDER — DULOXETIN HYDROCHLORIDE 30 MG/1
30 CAPSULE, DELAYED RELEASE ORAL DAILY
Qty: 30 CAPSULE | Refills: 1 | Status: SHIPPED | OUTPATIENT
Start: 2022-09-08 | End: 2022-09-12 | Stop reason: SINTOL

## 2022-09-08 NOTE — PROGRESS NOTES
"Chief Complaint  Anxiety (3 month follow up Anxiety /ER visit abd pain 8/26/22) and Headache    Subjective          Palmira Carbajal, 27 y.o. female presents to [facility]  History of Present Illness   She presents today for 3-month follow-up on anxiety.  She is on Celexa 20 mg every evening.  She is complaining of generalized body aches all the time.  States she cannot stand to be touched.  She states that this generalized ache is new for her over the past few months.  She takes Tylenol or ibuprofen daily.  She states she is having difficulty sleeping.  She has been taking melatonin without much relief.  She states she had upper epigastric pain and went to the emergency room on 8/26/2022.  She thinks she is developed an ulcer due to taking ibuprofen.  She is now taking Nexium.  She states that they did a CT scan in the emergency room which did show she had a 6 mm kidney stone in the kidney.    PHQ-9 Total Score: 0   Objective   Vital Signs:   /70   Pulse 95   Temp 98.1 °F (36.7 °C)   Resp 12   Ht 162.6 cm (64\")   Wt 76.6 kg (168 lb 14.4 oz)   SpO2 98%   BMI 28.99 kg/m²     Current Outpatient Medications on File Prior to Visit   Medication Sig Dispense Refill   • esomeprazole (NexIUM) 20 MG packet Take 40 mg by mouth Every Morning Before Breakfast for 14 days, THEN 20 mg Every Morning Before Breakfast for 14 days. 42 each 0   • galcanezumab-gnlm (EMGALITY) 120 MG/ML auto-injector pen Inject 1 mL under the skin into the appropriate area as directed Every 30 (Thirty) Days. 1.12 mL 6   • melatonin 3 MG tablet Take 3 mg by mouth Every Night.     • naproxen (EC NAPROSYN) 500 MG EC tablet Take 1 tablet by mouth Every 12 (Twelve) Hours As Needed for Mild Pain . 36 tablet 0   • ondansetron ODT (ZOFRAN-ODT) 4 MG disintegrating tablet Place 1 tablet on the tongue Every 6 (Six) Hours As Needed for Nausea or Vomiting. 28 tablet 0   • [DISCONTINUED] citalopram (CeleXA) 20 MG tablet " Take 1 tablet by mouth Daily. (Patient taking differently: Take 20 mg by mouth Every Night.) 30 tablet 5   • dicyclomine (BENTYL) 20 MG tablet Take 1 tablet by mouth Every 6 (Six) Hours As Needed (abdominal cramping). 36 tablet 0     No current facility-administered medications on file prior to visit.     Past Medical History:   Diagnosis Date   • Acid reflux    • Kidney stone     HX OF   • Maltracking of right patella    • Migraines    • PONV (postoperative nausea and vomiting)     GOOD RESULTS WITH SCOPALAMINE   • PTSD (post-traumatic stress disorder)     WAKES UP FROM ANESTHESIA WITH PANIC ATTACK   • Seasonal allergic rhinitis 05/31/2022      Physical Exam  Vitals reviewed.   Constitutional:       Appearance: Normal appearance. She is well-developed.   Neck:      Thyroid: No thyroid mass, thyromegaly or thyroid tenderness.   Cardiovascular:      Rate and Rhythm: Normal rate and regular rhythm.      Heart sounds: No murmur heard.    No friction rub. No gallop.   Pulmonary:      Effort: Pulmonary effort is normal.      Breath sounds: Normal breath sounds. No wheezing or rhonchi.   Lymphadenopathy:      Cervical: No cervical adenopathy.   Skin:     General: Skin is warm and dry.   Neurological:      Mental Status: She is alert and oriented to person, place, and time.      Cranial Nerves: No cranial nerve deficit.   Psychiatric:         Mood and Affect: Mood and affect normal.         Behavior: Behavior normal.         Thought Content: Thought content normal. Thought content does not include homicidal or suicidal ideation.         Judgment: Judgment normal.        Result Review :     CMP    CMP 1/6/22 8/26/22   Glucose 75 90   BUN 9 10   Creatinine 0.39 (A) 0.66   eGFR Non African Am >150    Sodium 137 139   Potassium 4.8 4.0   Chloride 104 104   Calcium 9.3 9.2   Albumin 4.50 4.50   Total Bilirubin 0.4 0.2   Alkaline Phosphatase 87 87   AST (SGOT) 22 14   ALT (SGPT) 14 12   (A) Abnormal value            CBC w/diff     CBC w/Diff 1/6/22 8/26/22   WBC 11.92 (A) 11.14 (A)   RBC 4.96 4.82   Hemoglobin 13.7 13.5   Hematocrit 42.0 41.0   MCV 84.7 85.1   MCH 27.6 28.0   MCHC 32.6 32.9   RDW 13.3 13.2   Platelets 325 339   Neutrophil Rel % 63.7 56.7   Immature Granulocyte Rel % 0.4 0.4   Lymphocyte Rel % 23.7 29.7   Monocyte Rel % 9.1 9.4   Eosinophil Rel % 2.3 3.1   Basophil Rel % 0.8 0.7   (A) Abnormal value            Data reviewed: Radiologic studies CT abdomen and pelvis reviewed.          Assessment and Plan    Diagnoses and all orders for this visit:    1. Generalized pain (Primary)  Assessment & Plan:  We will check labs to rule out physiological cause.  I will start her on Cymbalta 30 mg daily to help with both her anxiety and pain.  She will follow-up in 1 month or sooner if any worsening of symptoms.    Orders:  -     Uric acid  -     Antistreptolysin O screen  -     Rheumatoid factor  -     C-reactive protein  -     LUDA    2. Generalized anxiety disorder  Assessment & Plan:  Psychological condition is worsening.  Medication changes per orders.  I will have her wean off of Celexa, advised to take half a tablet daily for 1 week and then stop.  She will start Cymbalta 30 mg once daily which will also help with her generalized aches.  Psychological condition  will be reassessed in 4 weeks.      3. Kidney stone on left side  Assessment & Plan:  Kidney stone is nonobstructing and nonpainful at this time.  No further work-up or referral needed at this time.      4. Epigastric pain  Assessment & Plan:  Patient will continue Nexium.      Other orders  -     DULoxetine (CYMBALTA) 30 MG capsule; Take 1 capsule by mouth Daily.  Dispense: 30 capsule; Refill: 1      Follow Up   Return in about 4 weeks (around 10/6/2022).  Patient was given instructions and counseling regarding her condition or for health maintenance advice. Please see specific information pulled into the AVS if appropriate.

## 2022-09-08 NOTE — ASSESSMENT & PLAN NOTE
We will check labs to rule out physiological cause.  I will start her on Cymbalta 30 mg daily to help with both her anxiety and pain.  She will follow-up in 1 month or sooner if any worsening of symptoms.

## 2022-09-08 NOTE — ASSESSMENT & PLAN NOTE
Psychological condition is worsening.  Medication changes per orders.  I will have her wean off of Celexa, advised to take half a tablet daily for 1 week and then stop.  She will start Cymbalta 30 mg once daily which will also help with her generalized aches.  Psychological condition  will be reassessed in 4 weeks.

## 2022-09-08 NOTE — ASSESSMENT & PLAN NOTE
Kidney stone is nonobstructing and nonpainful at this time.  No further work-up or referral needed at this time.

## 2022-09-09 LAB
CHROMATIN AB SERPL-ACNC: <10 IU/ML (ref 0–14)
CRP SERPL-MCNC: 0.77 MG/DL (ref 0–0.5)
URATE SERPL-MCNC: 4.7 MG/DL (ref 2.4–5.7)

## 2022-09-10 LAB
DSDNA IGG SERPL IA-ACNC: NEGATIVE [IU]/ML
NUCLEAR IGG SER IA-RTO: NEGATIVE

## 2022-09-12 ENCOUNTER — TELEPHONE (OUTPATIENT)
Dept: FAMILY MEDICINE CLINIC | Facility: CLINIC | Age: 27
End: 2022-09-12

## 2022-09-12 DIAGNOSIS — R52 GENERALIZED PAIN: ICD-10-CM

## 2022-09-12 DIAGNOSIS — F41.1 GENERALIZED ANXIETY DISORDER: Primary | ICD-10-CM

## 2022-09-12 RX ORDER — DESVENLAFAXINE SUCCINATE 50 MG/1
50 TABLET, EXTENDED RELEASE ORAL DAILY
Qty: 30 TABLET | Refills: 2 | Status: SHIPPED | OUTPATIENT
Start: 2022-09-12 | End: 2022-10-07 | Stop reason: SDUPTHER

## 2022-09-12 NOTE — TELEPHONE ENCOUNTER
Caller: Palmira Carbajal    Relationship: Self    Best call back number: [phone]    What is the best time to reach you: ANY    Who are you requesting to speak with (clinical staff, provider,  specific staff member): CLINICAL    What was the call regarding: PATIENT STATED THAT ON 9/8/22 SHE STARTED CYMBALTA 30 MG AND IS UNABLE TO SLEEP AND FEELING GLITTERY WITH RACING THOUGHTS.     Do you require a callback: YES

## 2022-09-12 NOTE — TELEPHONE ENCOUNTER
I called and spoke with patient and told her to stop the Cymbalta.  I am going to start her on Pristiq 50 mg once daily.  She will probably need a prior authorization for this medication.  She has tried and failed on Cymbalta, Celexa and Abilify.

## 2022-09-27 ENCOUNTER — TELEPHONE (OUTPATIENT)
Dept: GASTROENTEROLOGY | Facility: CLINIC | Age: 27
End: 2022-09-27

## 2022-10-07 ENCOUNTER — OFFICE VISIT (OUTPATIENT)
Dept: FAMILY MEDICINE CLINIC | Facility: CLINIC | Age: 27
End: 2022-10-07

## 2022-10-07 VITALS
HEART RATE: 98 BPM | OXYGEN SATURATION: 98 % | TEMPERATURE: 97.8 F | BODY MASS INDEX: 28.71 KG/M2 | HEIGHT: 64 IN | WEIGHT: 168.2 LBS | DIASTOLIC BLOOD PRESSURE: 81 MMHG | SYSTOLIC BLOOD PRESSURE: 129 MMHG

## 2022-10-07 DIAGNOSIS — R52 GENERALIZED PAIN: ICD-10-CM

## 2022-10-07 DIAGNOSIS — F51.01 PRIMARY INSOMNIA: ICD-10-CM

## 2022-10-07 DIAGNOSIS — F33.1 MODERATE EPISODE OF RECURRENT MAJOR DEPRESSIVE DISORDER: ICD-10-CM

## 2022-10-07 DIAGNOSIS — L68.0 FEMALE HIRSUTISM: ICD-10-CM

## 2022-10-07 DIAGNOSIS — F41.1 GENERALIZED ANXIETY DISORDER: Primary | ICD-10-CM

## 2022-10-07 DIAGNOSIS — N83.201 RIGHT OVARIAN CYST: ICD-10-CM

## 2022-10-07 PROCEDURE — 99214 OFFICE O/P EST MOD 30 MIN: CPT | Performed by: NURSE PRACTITIONER

## 2022-10-07 RX ORDER — HYDROXYZINE HYDROCHLORIDE 25 MG/1
25-50 TABLET, FILM COATED ORAL 3 TIMES DAILY PRN
Qty: 90 TABLET | Refills: 2 | Status: SHIPPED | OUTPATIENT
Start: 2022-10-07 | End: 2022-10-28 | Stop reason: SINTOL

## 2022-10-07 RX ORDER — SPIRONOLACTONE 50 MG/1
50 TABLET, FILM COATED ORAL EVERY MORNING
Qty: 30 TABLET | Refills: 0 | Status: SHIPPED | OUTPATIENT
Start: 2022-10-07 | End: 2022-11-07 | Stop reason: SDUPTHER

## 2022-10-07 RX ORDER — DESVENLAFAXINE 100 MG/1
100 TABLET, EXTENDED RELEASE ORAL DAILY
Qty: 30 TABLET | Refills: 2 | Status: SHIPPED | OUTPATIENT
Start: 2022-10-07 | End: 2022-10-28

## 2022-10-07 NOTE — ASSESSMENT & PLAN NOTE
We will be increasing the Pristiq dose to see if this helps with her generalized pain.  She also takes naproxen as needed.

## 2022-10-07 NOTE — ASSESSMENT & PLAN NOTE
Due to her hirsutism and previous history of cysts on her ovaries we will get a pelvic ultrasound for diagnosis of PCOS.

## 2022-10-07 NOTE — PROGRESS NOTES
"Chief Complaint  Anxiety (Three month follow up ), Medication Problem (Pristiq- Medication is not helping), and Insomnia (Has not improved )    Subjective          Palmira Carbajal, 27 y.o. female presents to [facility]  History of Present Illness   She presents today for a follow-up on anxiety and depression.  She is currently on Pristiq 50 mg daily and does not feel like it is helping.  She has been on this medication for about 1 month.  She also has chronic generalized pain.  She had previously been on Celexa but it did not help with her chronic pain also.  We tried Cymbalta but she had side effects.  She states that she cannot sleep at night.  She has tried trazodone in the past without relief.    She states she has had a hysterectomy but has 1 ovary.  She has some previous cyst on her ovaries but never has been told she has PCOS.  She complains of hair on her neck and she complains of neck swelling.  She has had a thyroid ultrasound that was normal except a small cyst.  She is following with ENT for her thyroid.  Objective   Vital Signs:   /81   Pulse 98   Temp 97.8 °F (36.6 °C)   Ht 162.6 cm (64\")   Wt 76.3 kg (168 lb 3.2 oz)   SpO2 98%   BMI 28.87 kg/m²     Current Outpatient Medications on File Prior to Visit   Medication Sig Dispense Refill   • dicyclomine (BENTYL) 20 MG tablet Take 1 tablet by mouth Every 6 (Six) Hours As Needed (abdominal cramping). 36 tablet 0   • galcanezumab-gnlm (EMGALITY) 120 MG/ML auto-injector pen Inject 1 mL under the skin into the appropriate area as directed Every 30 (Thirty) Days. 1.12 mL 6   • melatonin 3 MG tablet Take 3 mg by mouth Every Night.     • naproxen (EC NAPROSYN) 500 MG EC tablet Take 1 tablet by mouth Every 12 (Twelve) Hours As Needed for Mild Pain . 36 tablet 0   • [DISCONTINUED] desvenlafaxine (Pristiq) 50 MG 24 hr tablet Take 1 tablet by mouth Daily. 30 tablet 2   • [DISCONTINUED] ondansetron ODT (ZOFRAN-ODT) 4 MG " disintegrating tablet Place 1 tablet on the tongue Every 6 (Six) Hours As Needed for Nausea or Vomiting. 28 tablet 0     No current facility-administered medications on file prior to visit.     Past Medical History:   Diagnosis Date   • Acid reflux    • Kidney stone     HX OF   • Maltracking of right patella    • Migraines    • PONV (postoperative nausea and vomiting)     GOOD RESULTS WITH SCOPALAMINE   • PTSD (post-traumatic stress disorder)     WAKES UP FROM ANESTHESIA WITH PANIC ATTACK   • Seasonal allergic rhinitis 05/31/2022      Physical Exam  Vitals reviewed.   Constitutional:       Appearance: Normal appearance. She is well-developed and overweight.   Neck:      Thyroid: No thyroid mass, thyromegaly or thyroid tenderness.      Comments: Anterior swelling noted below thyroid gland.  Large patch of male pattern dark hair noted to anterior neck.  Cardiovascular:      Rate and Rhythm: Normal rate and regular rhythm.      Heart sounds: No murmur heard.    No friction rub. No gallop.   Pulmonary:      Effort: Pulmonary effort is normal.      Breath sounds: Normal breath sounds. No wheezing or rhonchi.   Lymphadenopathy:      Cervical: No cervical adenopathy.   Skin:     General: Skin is warm and dry.   Neurological:      Mental Status: She is alert and oriented to person, place, and time.      Cranial Nerves: No cranial nerve deficit.   Psychiatric:         Mood and Affect: Mood and affect normal.         Behavior: Behavior normal.         Thought Content: Thought content normal. Thought content does not include homicidal or suicidal ideation.         Judgment: Judgment normal.        Result Review :                 Assessment and Plan    Diagnoses and all orders for this visit:    1. Generalized anxiety disorder (Primary)  Assessment & Plan:  Psychological condition is unchanged.  Medication changes per orders.  I will increase her Pristiq dose to 100 mg daily.  We will also get a GeneSight test today due to her  multiple drug failures.  Psychological condition  will be reassessed In 3 weeks.    Orders:  -     GeneSight - Swab,  -     desvenlafaxine (Pristiq) 100 MG 24 hr tablet; Take 1 tablet by mouth Daily.  Dispense: 30 tablet; Refill: 2  -     hydrOXYzine (ATARAX) 25 MG tablet; Take 1-2 tablets by mouth 3 (Three) Times a Day As Needed for Anxiety.  Dispense: 90 tablet; Refill: 2    2. Primary insomnia  -     hydrOXYzine (ATARAX) 25 MG tablet; Take 1-2 tablets by mouth 3 (Three) Times a Day As Needed for Anxiety.  Dispense: 90 tablet; Refill: 2    3. Moderate episode of recurrent major depressive disorder (HCC)  Assessment & Plan:  Patient's depression is recurrent and is moderate without psychosis. Their depression is currently active and the condition is unchanged. This will be reassessed In 3 weeks. F/U as described:I will increase Pristiq dose today to 100 mg daily.  We will also get a GeneSight test today due to not having good response to antidepressants.    Orders:  -     GeneSight - Swab,  -     desvenlafaxine (Pristiq) 100 MG 24 hr tablet; Take 1 tablet by mouth Daily.  Dispense: 30 tablet; Refill: 2    4. Generalized pain  Assessment & Plan:  We will be increasing the Pristiq dose to see if this helps with her generalized pain.  She also takes naproxen as needed.    Orders:  -     desvenlafaxine (Pristiq) 100 MG 24 hr tablet; Take 1 tablet by mouth Daily.  Dispense: 30 tablet; Refill: 2    5. Female hirsutism  Assessment & Plan:  I will start her on spironolactone 50 mg every morning.  She will be following up with me in 3 weeks.    Orders:  -     spironolactone (Aldactone) 50 MG tablet; Take 1 tablet by mouth Every Morning.  Dispense: 30 tablet; Refill: 0  -     US Pelvis Complete; Future    6. Right ovarian cyst  Assessment & Plan:  Due to her hirsutism and previous history of cysts on her ovaries we will get a pelvic ultrasound for diagnosis of PCOS.    Orders:  -     US Pelvis Complete; Future      Follow Up    Return in about 3 weeks (around 10/28/2022) for Recheck Anx/Depression/Genesight results.  Patient was given instructions and counseling regarding her condition or for health maintenance advice. Please see specific information pulled into the AVS if appropriate.       Jaja Castillo, APRN  10/07/2022

## 2022-10-07 NOTE — ASSESSMENT & PLAN NOTE
Psychological condition is unchanged.  Medication changes per orders.  I will increase her Pristiq dose to 100 mg daily.  We will also get a GeneSTech urSelf test today due to her multiple drug failures.  Psychological condition  will be reassessed In 3 weeks.

## 2022-10-07 NOTE — ASSESSMENT & PLAN NOTE
I will start her on spironolactone 50 mg every morning.  She will be following up with me in 3 weeks.

## 2022-10-07 NOTE — ASSESSMENT & PLAN NOTE
Patient's depression is recurrent and is moderate without psychosis. Their depression is currently active and the condition is unchanged. This will be reassessed In 3 weeks. F/U as described:I will increase Pristiq dose today to 100 mg daily.  We will also get a GeneSight test today due to not having good response to antidepressants.

## 2022-10-11 ENCOUNTER — HOSPITAL ENCOUNTER (OUTPATIENT)
Facility: HOSPITAL | Age: 27
Setting detail: HOSPITAL OUTPATIENT SURGERY
Discharge: HOME | End: 2022-10-11
Attending: INTERNAL MEDICINE | Admitting: INTERNAL MEDICINE

## 2022-10-11 ENCOUNTER — TELEPHONE (OUTPATIENT)
Dept: GASTROENTEROLOGY | Facility: CLINIC | Age: 27
End: 2022-10-11

## 2022-10-11 ENCOUNTER — ANESTHESIA EVENT (OUTPATIENT)
Dept: GASTROENTEROLOGY | Facility: HOSPITAL | Age: 27
End: 2022-10-11

## 2022-10-11 ENCOUNTER — ANESTHESIA (OUTPATIENT)
Dept: GASTROENTEROLOGY | Facility: HOSPITAL | Age: 27
End: 2022-10-11

## 2022-10-11 VITALS
BODY MASS INDEX: 28.46 KG/M2 | TEMPERATURE: 98.3 F | DIASTOLIC BLOOD PRESSURE: 64 MMHG | WEIGHT: 165.79 LBS | OXYGEN SATURATION: 100 % | RESPIRATION RATE: 16 BRPM | SYSTOLIC BLOOD PRESSURE: 93 MMHG | HEART RATE: 84 BPM

## 2022-10-11 LAB — B-HCG UR QL: NEGATIVE

## 2022-10-11 PROCEDURE — 45378 DIAGNOSTIC COLONOSCOPY: CPT | Performed by: INTERNAL MEDICINE

## 2022-10-11 PROCEDURE — 81025 URINE PREGNANCY TEST: CPT | Performed by: ANESTHESIOLOGY

## 2022-10-11 PROCEDURE — 25010000002 PROPOFOL 10 MG/ML EMULSION: Performed by: NURSE ANESTHETIST, CERTIFIED REGISTERED

## 2022-10-11 RX ORDER — HYDROCORTISONE ACETATE 25 MG/1
25 SUPPOSITORY RECTAL 2 TIMES DAILY PRN
Qty: 28 SUPPOSITORY | Refills: 0 | Status: SHIPPED | OUTPATIENT
Start: 2022-10-11 | End: 2022-10-25

## 2022-10-11 RX ORDER — SODIUM CHLORIDE, SODIUM LACTATE, POTASSIUM CHLORIDE, CALCIUM CHLORIDE 600; 310; 30; 20 MG/100ML; MG/100ML; MG/100ML; MG/100ML
30 INJECTION, SOLUTION INTRAVENOUS CONTINUOUS
Status: DISCONTINUED | OUTPATIENT
Start: 2022-10-11 | End: 2022-10-11 | Stop reason: HOSPADM

## 2022-10-11 RX ORDER — LIDOCAINE HYDROCHLORIDE 20 MG/ML
INJECTION, SOLUTION EPIDURAL; INFILTRATION; INTRACAUDAL; PERINEURAL AS NEEDED
Status: DISCONTINUED | OUTPATIENT
Start: 2022-10-11 | End: 2022-10-11 | Stop reason: SURG

## 2022-10-11 RX ORDER — PROPOFOL 10 MG/ML
VIAL (ML) INTRAVENOUS AS NEEDED
Status: DISCONTINUED | OUTPATIENT
Start: 2022-10-11 | End: 2022-10-11 | Stop reason: SURG

## 2022-10-11 RX ADMIN — LIDOCAINE HYDROCHLORIDE 60 MG: 20 INJECTION, SOLUTION EPIDURAL; INFILTRATION; INTRACAUDAL; PERINEURAL at 09:05

## 2022-10-11 RX ADMIN — SODIUM CHLORIDE, POTASSIUM CHLORIDE, SODIUM LACTATE AND CALCIUM CHLORIDE 30 ML/HR: 600; 310; 30; 20 INJECTION, SOLUTION INTRAVENOUS at 08:35

## 2022-10-11 RX ADMIN — PROPOFOL 100 MG: 10 INJECTION, EMULSION INTRAVENOUS at 09:05

## 2022-10-11 RX ADMIN — PROPOFOL 200 MCG/KG/MIN: 10 INJECTION, EMULSION INTRAVENOUS at 09:05

## 2022-10-11 NOTE — H&P
Pre Procedure History & Physical    Chief Complaint:   Anorectal pain, rectal bleeding    Subjective     HPI:   28 yo F here for eval of anorectal pain, rectal bleeding.    Past Medical History:   Past Medical History:   Diagnosis Date   • Acid reflux    • Kidney stone     HX OF   • Maltracking of right patella    • Migraines    • PONV (postoperative nausea and vomiting)     GOOD RESULTS WITH SCOPALAMINE   • PTSD (post-traumatic stress disorder)     WAKES UP FROM ANESTHESIA WITH PANIC ATTACK   • Seasonal allergic rhinitis 2022       Past Surgical History:  Past Surgical History:   Procedure Laterality Date   •  SECTION     • CYSTOSCOPY     • HYSTERECTOMY     • KIDNEY STONE SURGERY      unspecified   • KNEE ARTHROSCOPY Right 2022    Procedure: KNEE ARTHROSCOPY WITH A LATERAL RELEASE AND CHONDROPLASTY;  Surgeon: Marco A Pitts MD;  [place] [facility] [place] [facility] [place]  Service: [place]opedics;  Laterality: Right;   • WISDOM TOOTH EXTRACTION         Family History:  Family History   Problem Relation Age of Onset   • Arthritis Mother    • Colon polyps Father    • Diabetes Father    • Cancer Maternal Grandmother    • Malig Hyperthermia Neg Hx        Social History:   reports that she quit smoking about 7 months ago. Her smoking use included cigarettes. She has a 7.50 pack-year smoking history. She has never used smokeless tobacco. She reports current alcohol use. She reports that she does not use drugs.    Medications:   Medications Prior to Admission   Medication Sig Dispense Refill Last Dose   • desvenlafaxine (Pristiq) 100 MG 24 hr tablet Take 1 tablet by mouth Daily. 30 tablet 2 10/9/2022   • dicyclomine (BENTYL) 20 MG tablet Take 1 tablet by mouth Every 6 (Six) Hours As Needed (abdominal cramping). 36 tablet 0 Past Month   • galcanezumab-gnlm (EMGALITY) 120 MG/ML auto-injector pen Inject 1 mL under the skin into the appropriate area as directed Every 30 (Thirty) Days. 1.12 mL 6 2022   • hydrOXYzine  (ATARAX) 25 MG tablet Take 1-2 tablets by mouth 3 (Three) Times a Day As Needed for Anxiety. 90 tablet 2 10/9/2022   • naproxen (EC NAPROSYN) 500 MG EC tablet Take 1 tablet by mouth Every 12 (Twelve) Hours As Needed for Mild Pain . 36 tablet 0 10/7/2022   • spironolactone (Aldactone) 50 MG tablet Take 1 tablet by mouth Every Morning. 30 tablet 0 10/9/2022       Allergies:  Penicillins, Cephalexin, Cephalosporins, and Cymbalta [duloxetine hcl]    ROS:    Pertinent items are noted in HPI     Objective     Blood pressure 111/78, pulse 76, temperature 98 °F (36.7 °C), temperature source Temporal, resp. rate 18, weight 75.2 kg (165 lb 12.6 oz), SpO2 98 %.    Physical Exam   Constitutional: Pt is oriented to person, place, and time and well-developed, well-nourished, and in no distress.   Mouth/Throat: Oropharynx is clear and moist.   Neck: Normal range of motion.   Cardiovascular: Normal rate, regular rhythm and normal heart sounds.    Pulmonary/Chest: Effort normal and breath sounds normal.   Abdominal: Soft. Nontender  Skin: Skin is warm and dry.   Psychiatric: Mood, memory, affect and judgment normal.     Assessment & Plan     Diagnosis:  Anorectal pain, rectal bleeding    Anticipated Surgical Procedure:  Colonoscopy    The risks, benefits, and alternatives of this procedure have been discussed with the patient or the responsible party- the patient understands and agrees to proceed.

## 2022-10-11 NOTE — ANESTHESIA POSTPROCEDURE EVALUATION
Patient: Palmira Carbajal    Procedure Summary     Date: 10/11/22 Room / Location: [facility] ENDOSCOPY 1 / [facility] END[facility]COPY    Anesthesia Start: 0901 Anesthesia Stop: 0926    Procedure: COLONOSCOPY Diagnosis:       Rectal bleeding      Anal or rectal pain      Decreased appetite      (Rectal bleeding [K62.5])      (Anal or rectal pain [K62.89])      (Decreased appetite [R63.0])    Surgeons: Dyan Griffin MD Provider: Reyes, Mirabelle, DO    Anesthesia Type: general ASA Status: 2          Anesthesia Type: general    Vitals  Vitals Value Taken Time   BP 93/64 10/11/22 0940   Temp 36.8 °C (98.3 °F) 10/11/22 0940   Pulse 65 10/11/22 0944   Resp 16 10/11/22 0940   SpO2 100 % 10/11/22 0944   Vitals shown include unvalidated device data.        Post Anesthesia Care and Evaluation    Patient location during evaluation: bedside  Patient participation: complete - patient participated  Level of consciousness: awake  Pain management: adequate    Airway patency: patent  Anesthetic complications: No anesthetic complications  PONV Status: none  Cardiovascular status: acceptable and stable  Respiratory status: acceptable  Hydration status: acceptable    Comments: An Anesthesiologist personally participated in the most demanding procedures (including induction and emergence if applicable) in the anesthesia plan, monitored the course of anesthesia administration at frequent intervals and remained physically present and available for immediate diagnosis and treatment of emergencies.

## 2022-10-11 NOTE — ANESTHESIA PREPROCEDURE EVALUATION
Anesthesia Evaluation     Patient summary reviewed and Nursing notes reviewed   history of anesthetic complications:  NPO Solid Status: > 8 hours  NPO Liquid Status: > 2 hours           Airway   Mallampati: I  TM distance: >3 FB  Neck ROM: full  No difficulty expected  Dental - normal exam         Pulmonary - negative pulmonary ROS and normal exam    breath sounds clear to auscultation  Cardiovascular - negative cardio ROS and normal exam  Exercise tolerance: good (4-7 METS)    Rhythm: regular  Rate: normal        Neuro/Psych  (+) headaches, psychiatric history Anxiety and PTSD,    GI/Hepatic/Renal/Endo    (+)  GERD, GI bleeding lower , renal disease stones,     Musculoskeletal     Abdominal    Substance History - negative use     OB/GYN negative ob/gyn ROS         Other   arthritis (knee chondromalacia patellae),      ROS/Med Hx Other: PAT Nursing Notes unavailable.                 Anesthesia Plan    ASA 2     general     (Patient understands anesthesia not responsible for dental damage.)  intravenous induction     Anesthetic plan, risks, benefits, and alternatives have been provided, discussed and informed consent has been obtained with: patient.  Pre-procedure education provided  Use of blood products discussed with patient .   Plan discussed with CRNA.        CODE STATUS:

## 2022-10-11 NOTE — DISCHARGE INSTRUCTIONS
You may resume normal activity on 10/12/22 at 0925.  Stay away from greasy, gas producing, and spicy foods today.  Start off with bland foods.  Be near a bathroom when you eat, the bowel prep might still be working it's way through your body.  It is normal to find a little blood on your toilet paper.  IF YOU ARE PASSING BLOOD CLOTS OR FILLING THE TOILET, CALL 911 AND/OR GO TO YOUR NEAREST ER.    NO DRIVING, SIGNING LEGAL DOCUMENTS, OR ALCOHOL CONSUMPTION FOR 24 HOURS.

## 2022-10-11 NOTE — ANESTHESIA POSTPROCEDURE EVALUATION
Patient: Palmira Carbajal    Procedure Summary     Date: 10/11/22 Room / Location: [facility] ENDOSCOPY 1 / [facility] ENDOSCOPY    Anesthesia Start: 0901 Anesthesia Stop: 0926    Procedure: COLONOSCOPY Diagnosis:       Rectal bleeding      Anal or rectal pain      Decreased appetite      (Rectal bleeding [K62.5])      (Anal or rectal pain [K62.89])      (Decreased appetite [R63.0])    Surgeons: Dyan Griffin MD Provider: Reyes, Mirabelle, DO    Anesthesia Type: general ASA Status: 2          Anesthesia Type: general    Vitals  Vitals Value Taken Time   BP 93/64 10/11/22 0940   Temp 36.8 °C (98.3 °F) 10/11/22 0940   Pulse 65 10/11/22 0944   Resp 16 10/11/22 0940   SpO2 100 % 10/11/22 0944   Vitals shown include unvalidated device data.        Post Anesthesia Care and Evaluation    Patient location during evaluation: bedside  Patient participation: complete - patient participated  Level of consciousness: awake  Pain management: adequate    Airway patency: patent  Anesthetic complications: No anesthetic complications  PONV Status: none  Cardiovascular status: acceptable and stable  Respiratory status: acceptable  Hydration status: acceptable    Comments: An Anesthesiologist personally participated in the most demanding procedures (including induction and emergence if applicable) in the anesthesia plan, monitored the course of anesthesia administration at frequent intervals and remained physically present and available for immediate diagnosis and treatment of emergencies.

## 2022-10-11 NOTE — TELEPHONE ENCOUNTER
Please place patient in colon recall for 5 years.  Mail letter to patient and PCP.  Schedule follow-up.

## 2022-10-11 NOTE — NURSING NOTE
Patient vitals are stable.  No complaints of pain or discomfort.  Pertinent information/education provided in discharge packet.  Family contacted and educated on discharge instructions and complications that could happen at home.  Patient and family verbalize understanding and are able to perform teach back.      Ria Pinto RN 09:28 EDT 10/11/2022

## 2022-10-27 DIAGNOSIS — F41.1 GENERALIZED ANXIETY DISORDER: ICD-10-CM

## 2022-10-28 ENCOUNTER — OFFICE VISIT (OUTPATIENT)
Dept: FAMILY MEDICINE CLINIC | Facility: CLINIC | Age: 27
End: 2022-10-28

## 2022-10-28 ENCOUNTER — TELEPHONE (OUTPATIENT)
Dept: FAMILY MEDICINE CLINIC | Facility: CLINIC | Age: 27
End: 2022-10-28

## 2022-10-28 VITALS
BODY MASS INDEX: 28.82 KG/M2 | WEIGHT: 168.8 LBS | HEIGHT: 64 IN | SYSTOLIC BLOOD PRESSURE: 117 MMHG | HEART RATE: 78 BPM | TEMPERATURE: 97.9 F | OXYGEN SATURATION: 99 % | DIASTOLIC BLOOD PRESSURE: 76 MMHG

## 2022-10-28 DIAGNOSIS — F33.1 MODERATE EPISODE OF RECURRENT MAJOR DEPRESSIVE DISORDER: Primary | ICD-10-CM

## 2022-10-28 DIAGNOSIS — R52 GENERALIZED BODY ACHES: ICD-10-CM

## 2022-10-28 DIAGNOSIS — F51.01 PRIMARY INSOMNIA: ICD-10-CM

## 2022-10-28 PROCEDURE — 99214 OFFICE O/P EST MOD 30 MIN: CPT | Performed by: NURSE PRACTITIONER

## 2022-10-28 RX ORDER — LEVOMILNACIPRAN HYDROCHLORIDE 80 MG/1
80 CAPSULE, EXTENDED RELEASE ORAL DAILY
Qty: 30 CAPSULE | Refills: 2 | Status: SHIPPED | OUTPATIENT
Start: 2022-10-28 | End: 2022-11-02 | Stop reason: SINTOL

## 2022-10-28 RX ORDER — QUETIAPINE FUMARATE 25 MG/1
25 TABLET, FILM COATED ORAL NIGHTLY
Qty: 30 TABLET | Refills: 1 | Status: SHIPPED | OUTPATIENT
Start: 2022-10-28 | End: 2022-12-02 | Stop reason: SINTOL

## 2022-10-28 NOTE — ASSESSMENT & PLAN NOTE
I discussed with her that the Fetzima may help with her body aches.  We will reevaluate when she returns in 5 weeks.

## 2022-10-28 NOTE — ASSESSMENT & PLAN NOTE
Patient's depression is recurrent and is moderate without psychosis. Their depression is currently active and the condition is unchanged. This will be reassessed in 5 weeks. F/U as described:We reviewed the [facility] profile today and will try her on Fetzima.  Advised her to continue Pristiq until she is able to get the Fetzima prescription.  Instructed her to stop Pristiq when she gets the Fetzima prescription and start Fetzima the next day..

## 2022-10-28 NOTE — PROGRESS NOTES
"Chief Complaint  Anxiety (Anxiety/Depression follow up ) and Medication Problem (Pristiq causing ace breakouts, and Atarax is causing paranomia and jitteriness )    Subjective          Palmira Carbajal, 27 y.o. female presents to [facility]  History of Present Illness   She presents today for a follow-up on depression, anxiety and generalized body aches.  She was started on Pristiq and the dose was increased to 100 mg 3 weeks ago.  She states she still does not feel any different while on it.  She has had side effects to multiple drugs in the past.  We did do a Ideal Me test and we have the results today to review.  She has multiple gene drug interactions.  Pristiq is actually does not have any gene drug interactions but she is complaining that she is not feeling any different even with the dose increase.  She wants to change to a different medication.  She also wants something that will help her with her generalized body aches.  She thinks she may have fibromyalgia, states her mother has fibromyalgia.  She also is complaining that hydroxyzine does not help with her sleep and an increase in dose caused her side effects.  She is wanting something to help her sleep also.  Objective   Vital Signs:   /76 (BP Location: Left arm, Patient Position: Sitting, Cuff Size: Adult)   Pulse 78   Temp 97.9 °F (36.6 °C)   Ht 162.6 cm (64\")   Wt 76.6 kg (168 lb 12.8 oz)   SpO2 99%   BMI 28.97 kg/m²     Current Outpatient Medications on File Prior to Visit   Medication Sig Dispense Refill   • dicyclomine (BENTYL) 20 MG tablet Take 1 tablet by mouth Every 6 (Six) Hours As Needed (abdominal cramping). 36 tablet 0   • galcanezumab-gnlm (EMGALITY) 120 MG/ML auto-injector pen Inject 1 mL under the skin into the appropriate area as directed Every 30 (Thirty) Days. 1.12 mL 6   • naproxen (EC NAPROSYN) 500 MG EC tablet Take 1 tablet by mouth Every 12 (Twelve) Hours As Needed for Mild Pain . 36 " tablet 0   • spironolactone (Aldactone) 50 MG tablet Take 1 tablet by mouth Every Morning. 30 tablet 0   • [DISCONTINUED] desvenlafaxine (Pristiq) 100 MG 24 hr tablet Take 1 tablet by mouth Daily. 30 tablet 2   • [DISCONTINUED] hydrOXYzine (ATARAX) 25 MG tablet Take 1-2 tablets by mouth 3 (Three) Times a Day As Needed for Anxiety. 90 tablet 2     No current facility-administered medications on file prior to visit.     Past Medical History:   Diagnosis Date   • Acid reflux    • Kidney stone     HX OF   • Maltracking of right patella    • Migraines    • PONV (postoperative nausea and vomiting)     GOOD RESULTS WITH SCOPALAMINE   • PTSD (post-traumatic stress disorder)     WAKES UP FROM ANESTHESIA WITH PANIC ATTACK   • Seasonal allergic rhinitis 05/31/2022      Physical Exam  Vitals reviewed.   Constitutional:       Appearance: Normal appearance. She is well-developed.   Neck:      Thyroid: No thyroid mass, thyromegaly or thyroid tenderness.   Cardiovascular:      Rate and Rhythm: Normal rate and regular rhythm.      Heart sounds: No murmur heard.    No friction rub. No gallop.   Pulmonary:      Effort: Pulmonary effort is normal.      Breath sounds: Normal breath sounds. No wheezing or rhonchi.   Lymphadenopathy:      Cervical: No cervical adenopathy.   Skin:     General: Skin is warm and dry.   Neurological:      Mental Status: She is alert and oriented to person, place, and time.      Cranial Nerves: No cranial nerve deficit.   Psychiatric:         Mood and Affect: Mood and affect normal.         Behavior: Behavior normal.         Thought Content: Thought content normal. Thought content does not include homicidal or suicidal ideation.         Judgment: Judgment normal.        Result Review :            [employer] results reviewed with patient today, copy given to patient.     Assessment and Plan    Diagnoses and all orders for this visit:    1. Moderate episode of recurrent major depressive disorder (HCC)  (Primary)  Assessment & Plan:  Patient's depression is recurrent and is moderate without psychosis. Their depression is currently active and the condition is unchanged. This will be reassessed in 5 weeks. F/U as described:We reviewed the GeneSight profile today and will try her on Fetzima.  Advised her to continue Pristiq until she is able to get the Fetzima prescription.  Instructed her to stop Pristiq when she gets the Fetzima prescription and start Fetzima the next day..    Orders:  -     Levomilnacipran HCl ER (Fetzima) 80 MG capsule sustained-release 24 hr; Take 80 mg by mouth Daily.  Dispense: 30 capsule; Refill: 2    2. Generalized body aches  Assessment & Plan:  I discussed with her that the Fetzima may help with her body aches.  We will reevaluate when she returns in 5 weeks.    Orders:  -     Levomilnacipran HCl ER (Fetzima) 80 MG capsule sustained-release 24 hr; Take 80 mg by mouth Daily.  Dispense: 30 capsule; Refill: 2    3. Primary insomnia  Assessment & Plan:  Insomnia not controlled with hydroxyzine or other previous medications.  I will start her on Seroquel 25 mg every evening.  She will follow-up with me in about 5 weeks.    Orders:  -     QUEtiapine (SEROquel) 25 MG tablet; Take 1 tablet by mouth Every Night.  Dispense: 30 tablet; Refill: 1      Follow Up   Return in about 5 weeks (around 12/2/2022) for Recheck depression/insomnia/general body aches.  Patient was given instructions and counseling regarding her condition or for health maintenance advice. Please see specific information pulled into the AVS if appropriate.       Jaja Castillo, APRN  10/28/2022

## 2022-10-28 NOTE — TELEPHONE ENCOUNTER
Caller: Palmira Carbajal    Relationship: Self    Best call back number: [phone]    What medications are you currently taking:   Current Outpatient Medications on File Prior to Visit   Medication Sig Dispense Refill   • dicyclomine (BENTYL) 20 MG tablet Take 1 tablet by mouth Every 6 (Six) Hours As Needed (abdominal cramping). 36 tablet 0   • galcanezumab-gnlm (EMGALITY) 120 MG/ML auto-injector pen Inject 1 mL under the skin into the appropriate area as directed Every 30 (Thirty) Days. 1.12 mL 6   • Levomilnacipran HCl ER (Fetzima) 80 MG capsule sustained-release 24 hr Take 80 mg by mouth Daily. 30 capsule 2   • naproxen (EC NAPROSYN) 500 MG EC tablet Take 1 tablet by mouth Every 12 (Twelve) Hours As Needed for Mild Pain . 36 tablet 0   • QUEtiapine (SEROquel) 25 MG tablet Take 1 tablet by mouth Every Night. 30 tablet 1   • spironolactone (Aldactone) 50 MG tablet Take 1 tablet by mouth Every Morning. 30 tablet 0   • [DISCONTINUED] desvenlafaxine (Pristiq) 100 MG 24 hr tablet Take 1 tablet by mouth Daily. 30 tablet 2   • [DISCONTINUED] hydrOXYzine (ATARAX) 25 MG tablet Take 1-2 tablets by mouth 3 (Three) Times a Day As Needed for Anxiety. 90 tablet 2     No current facility-administered medications on file prior to visit.          When did you start taking these medications: NOT STARTED YET    Which medication are you concerned about: SEROQUEL    Who prescribed you this medication: TOYA SALAZAR    What are your concerns: THE PATIENT STATED SHE GOT BACK HER GENE SITE TESTING RESULTS AND SEROQUEL IS ON THE LIST OF MEDICATIONS SHE MAY HAVE A REACTION TO. SHE WOULD LIKE A CALL BACK TO DISCUSS IF LUNESTA WOULD BE POSSIBLE TO TRY.    How long have you had these concerns: 10/28/22

## 2022-10-28 NOTE — ASSESSMENT & PLAN NOTE
Insomnia not controlled with hydroxyzine or other previous medications.  I will start her on Seroquel 25 mg every evening.  She will follow-up with me in about 5 weeks.

## 2022-10-31 ENCOUNTER — OFFICE VISIT (OUTPATIENT)
Dept: NEUROLOGY | Facility: CLINIC | Age: 27
End: 2022-10-31

## 2022-10-31 ENCOUNTER — HOSPITAL ENCOUNTER (OUTPATIENT)
Dept: ULTRASOUND IMAGING | Facility: HOSPITAL | Age: 27
Discharge: HOME | End: 2022-10-31

## 2022-10-31 VITALS
SYSTOLIC BLOOD PRESSURE: 137 MMHG | HEIGHT: 64 IN | HEART RATE: 103 BPM | DIASTOLIC BLOOD PRESSURE: 83 MMHG | BODY MASS INDEX: 29.06 KG/M2 | WEIGHT: 170.2 LBS

## 2022-10-31 DIAGNOSIS — G43.009 MIGRAINE WITHOUT AURA AND WITHOUT STATUS MIGRAINOSUS, NOT INTRACTABLE: Primary | ICD-10-CM

## 2022-10-31 DIAGNOSIS — L68.0 FEMALE HIRSUTISM: ICD-10-CM

## 2022-10-31 DIAGNOSIS — N83.201 RIGHT OVARIAN CYST: ICD-10-CM

## 2022-10-31 DIAGNOSIS — E04.1 THYROID NODULE: ICD-10-CM

## 2022-10-31 DIAGNOSIS — E01.0 THYROMEGALY: ICD-10-CM

## 2022-10-31 PROCEDURE — 76536 US EXAM OF HEAD AND NECK: CPT

## 2022-10-31 PROCEDURE — 99212 OFFICE O/P EST SF 10 MIN: CPT | Performed by: PSYCHIATRY & NEUROLOGY

## 2022-10-31 PROCEDURE — 76830 TRANSVAGINAL US NON-OB: CPT

## 2022-10-31 NOTE — ASSESSMENT & PLAN NOTE
I discussed with her that I will refer her for Botox injections.  She is continue taking Emgality until she is evaluated for Botox injections.

## 2022-10-31 NOTE — PROGRESS NOTES
"Chief Complaint  Migraine    Subjective          Palmira Carbajal is a 27 y.o. female who presents to [facility]  History of Present Illness  27-year-old woman here for follow-up of her migraine headaches.  She states that she still getting 10 headaches a month.  She states that the last 1 and half weeks before her next Emgality shot she gets a headache on a daily basis and is like she is not taking any medications.  He gets severe.  They can last for 5 to 6 hours.  Otherwise she is getting 1-2 headaches a week that is not as severe but still there.  It is better than when she was taking Ajovy.    Objective   Vital Signs:   /83   Pulse 103   Ht 162.6 cm (64\")   Wt 77.2 kg (170 lb 3.2 oz)   BMI 29.21 kg/m²     Physical Exam   Alert, fluent, phasic, follows commands well.  Optic disc are normal bilaterally heart is regular rhythm normal in rate.        Assessment and Plan  Diagnoses and all orders for this visit:    1. Migraine without aura and without status migrainosus, not intractable (Primary)  Assessment & Plan:  I discussed with her that I will refer her for Botox injections.  She is continue taking Emgality until she is evaluated for Botox injections.           Total time spent with the patient and coordinating patient care was 15 minutes.    Follow Up  No follow-ups on file.  Patient was given instructions and counseling regarding her condition or for health maintenance advice. Please see specific information pulled into the AVS if appropriate.       "

## 2022-11-02 ENCOUNTER — TELEPHONE (OUTPATIENT)
Dept: FAMILY MEDICINE CLINIC | Facility: CLINIC | Age: 27
End: 2022-11-02

## 2022-11-02 DIAGNOSIS — F33.1 MODERATE EPISODE OF RECURRENT MAJOR DEPRESSIVE DISORDER: Primary | ICD-10-CM

## 2022-11-02 DIAGNOSIS — R52 GENERALIZED BODY ACHES: ICD-10-CM

## 2022-11-02 DIAGNOSIS — F41.1 GENERALIZED ANXIETY DISORDER: ICD-10-CM

## 2022-11-02 RX ORDER — VILAZODONE HYDROCHLORIDE 10 MG/1
10 TABLET ORAL DAILY
Qty: 30 TABLET | Refills: 1 | Status: SHIPPED | OUTPATIENT
Start: 2022-11-02 | End: 2022-11-07 | Stop reason: SINTOL

## 2022-11-02 NOTE — TELEPHONE ENCOUNTER
Caller: Palmira Carbajal    Relationship: Self    Best call back number: [phone]    What is the best time to reach you: N/A    Who are you requesting to speak with (clinical staff, provider,  specific staff member): CLINICAL    Do you know the name of the person who called: PATIENT    What was the call regarding: PATIENT WAS PUT ON TWO NEW MEDICATIONS. THE SEROQUEL HAS BEEN GREAT. HOWEVER, THE FETZIMA HAS  CAUSED HER TO SWEAT PROFUSELY. PLEASE CALL PATIENT BACK AND ADVISE.    Do you require a callback: YES

## 2022-11-07 ENCOUNTER — TELEPHONE (OUTPATIENT)
Dept: FAMILY MEDICINE CLINIC | Facility: CLINIC | Age: 27
End: 2022-11-07

## 2022-11-07 DIAGNOSIS — F33.1 MODERATE EPISODE OF RECURRENT MAJOR DEPRESSIVE DISORDER: ICD-10-CM

## 2022-11-07 DIAGNOSIS — R52 GENERALIZED BODY ACHES: ICD-10-CM

## 2022-11-07 DIAGNOSIS — L68.0 FEMALE HIRSUTISM: ICD-10-CM

## 2022-11-07 DIAGNOSIS — F41.1 GENERALIZED ANXIETY DISORDER: Primary | ICD-10-CM

## 2022-11-07 RX ORDER — AMITRIPTYLINE HYDROCHLORIDE 25 MG/1
25 TABLET, FILM COATED ORAL NIGHTLY
Qty: 30 TABLET | Refills: 0 | Status: SHIPPED | OUTPATIENT
Start: 2022-11-07 | End: 2022-11-07

## 2022-11-07 RX ORDER — SPIRONOLACTONE 50 MG/1
50 TABLET, FILM COATED ORAL EVERY MORNING
Qty: 30 TABLET | Refills: 2 | Status: SHIPPED | OUTPATIENT
Start: 2022-11-07

## 2022-11-07 NOTE — TELEPHONE ENCOUNTER
I called patient and discussed with her about previous treatments.  I will start her on amitriptyline 25 mg every evening.  Advised her to hold Seroquel because amitriptyline will probably help her sleep enough.  She will follow-up with me on 12/2/2022 as scheduled.

## 2022-11-07 NOTE — TELEPHONE ENCOUNTER
If she thinks that it is causing restless legs she can stop taking it.  I am going to refer her to psychiatry for further treatment of her depression and anxiety (make sure this is okay with her and send this message back to me and I will put the order in).  I sent the refill for spironolactone.  Continue all other meds as prescribed.

## 2022-11-07 NOTE — TELEPHONE ENCOUNTER
Caller: Palmira Carbajal    Relationship: Self    Best call back number:    [phone]      What medications are you currently taking:   Current Outpatient Medications on File Prior to Visit   Medication Sig Dispense Refill   • dicyclomine (BENTYL) 20 MG tablet Take 1 tablet by mouth Every 6 (Six) Hours As Needed (abdominal cramping). 36 tablet 0   • galcanezumab-gnlm (EMGALITY) 120 MG/ML auto-injector pen Inject 1 mL under the skin into the appropriate area as directed Every 30 (Thirty) Days. 1.12 mL 6   • naproxen (EC NAPROSYN) 500 MG EC tablet Take 1 tablet by mouth Every 12 (Twelve) Hours As Needed for Mild Pain . 36 tablet 0   • QUEtiapine (SEROquel) 25 MG tablet Take 1 tablet by mouth Every Night. 30 tablet 1   • spironolactone (Aldactone) 50 MG tablet Take 1 tablet by mouth Every Morning. 30 tablet 0   • vilazodone (VIIBRYD) 10 MG tablet tablet Take 1 tablet by mouth Daily. 30 tablet 1     No current facility-administered medications on file prior to visit.          When did you start taking these medications: 11/2/22    Which medication are you concerned about: vilazodone (VIIBRYD) 10 MG tablet tablet    Who prescribed you this medication: TOYA SALAZAR    What are your concerns: SINCE PATIENT HAS BEEN TAKING THE VIBRYD, THE SEROQUEL DOESN'T SEEM TO BE WORKING.   SEROQUEL WAS WORKING GREAT UNTIL SHE STARTED TAKING THE VIBRYD    THINKS IT IS CAUSING RESTLESS LEGS AS WELL    How long have you had these concerns: 11/2/22

## 2022-11-07 NOTE — TELEPHONE ENCOUNTER
ACCORDING TO HER [employer]  TEST IT SAYS THAT SHE SHOULDN'T TAKE THE ELAVIL UNDER THE SIGNIFICANT GENE DRUG INTERACTION COLUMN SO SHE WANTS TO KNOW WHAT SHE SHOULD DO NOW

## 2022-11-07 NOTE — TELEPHONE ENCOUNTER
I called and spoke with patient and advised her not to take amitriptyline after reviewing [facility] results again.  We discussed she mostly has generalized body aches and is thinking she might have fibromyalgia.  We will start diclofenac 50 mg twice daily.  Advised her to stop naproxen.  She will continue Seroquel at night to help her sleep.  She will follow-up with me on 12/2/2022 or sooner if any new or worsening of symptoms.

## 2022-11-07 NOTE — TELEPHONE ENCOUNTER
Caller: Palmira Carbajal    Relationship: Self    Best call back number: [phone]    Who are you requesting to speak with (clinical staff, provider,  specific staff member): MEDICAL STAFF    What was the call regarding: PATIENT IS BEING TAKEN OFF OF VIIBRYD AND GOING TO BE REFERRED TO PSYCH FOR AN ANTIDEPRESSANT. SHE NEEDS A MEDICATION FOR HER FIBROMYALGIA SINCE SHE IS BEING TAKEN OFF OF VIBRIID. SHE WOULD LIKE TO KNOW IF SOMETHING CAN BE PRESCRIBED FOR HER FIBROMYALGIA.

## 2022-11-07 NOTE — TELEPHONE ENCOUNTER
Caller: Palmira Carbajal    Relationship: Self    Best call back number:    [phone]      Requested Prescriptions:   Requested Prescriptions     Pending Prescriptions Disp Refills   • spironolactone (Aldactone) 50 MG tablet 30 tablet 0     Sig: Take 1 tablet by mouth Every Morning.        Pharmacy where request should be sent: [address] [phone] [facility] [phone] [facility]     Additional details provided by patient:     Does the patient have less than a 3 day supply:  [x] Yes  [] No    Teresita Garcia Rep   11/07/22 10:29 EST

## 2022-11-07 NOTE — TELEPHONE ENCOUNTER
Palmira Carbajal 1995  aware and voiced understanding. She is fine with being referred to psychiatry

## 2022-11-23 ENCOUNTER — OFFICE VISIT (OUTPATIENT)
Dept: OTOLARYNGOLOGY | Facility: CLINIC | Age: 27
End: 2022-11-23

## 2022-11-23 ENCOUNTER — LAB (OUTPATIENT)
Dept: LAB | Facility: HOSPITAL | Age: 27
End: 2022-11-23

## 2022-11-23 VITALS — BODY MASS INDEX: 30.15 KG/M2 | WEIGHT: 176.6 LBS | TEMPERATURE: 97.6 F | HEIGHT: 64 IN

## 2022-11-23 DIAGNOSIS — R53.83 FATIGUE, UNSPECIFIED TYPE: ICD-10-CM

## 2022-11-23 DIAGNOSIS — E04.1 THYROID NODULE: Primary | ICD-10-CM

## 2022-11-23 DIAGNOSIS — M79.10 MYALGIA: ICD-10-CM

## 2022-11-23 LAB
25(OH)D3 SERPL-MCNC: 18.4 NG/ML (ref 30–100)
VIT B12 BLD-MCNC: 457 PG/ML (ref 211–946)

## 2022-11-23 PROCEDURE — 36415 COLL VENOUS BLD VENIPUNCTURE: CPT

## 2022-11-23 PROCEDURE — 99214 OFFICE O/P EST MOD 30 MIN: CPT | Performed by: NURSE PRACTITIONER

## 2022-11-23 PROCEDURE — 82607 VITAMIN B-12: CPT

## 2022-11-23 PROCEDURE — 82306 VITAMIN D 25 HYDROXY: CPT

## 2022-11-23 RX ORDER — AMITRIPTYLINE HYDROCHLORIDE 25 MG/1
TABLET, FILM COATED ORAL
COMMUNITY
Start: 2022-11-07 | End: 2022-12-02

## 2022-11-23 NOTE — PROGRESS NOTES
Patient Name: Palmira Carbajal   Visit Date: 11/23/2022   Patient ID: [id number]  Provider: MINI Allen    Sex: female  Location: [facility]   YOB: 1995  Location Address: [address] [place] [address], [address] [place] [address],?[place]?[id number]    Primary Care Provider Jaja Castillo APRN  Location Phone: [phone]    Referring Provider: No ref. provider found        Chief Complaint  9 month US Results    Subjective          Palmira Carbajal is a 27 y.o. female who presents to [facility] for a follow-up visit of thyroid nodule.  I last saw her in February of this year at which time she had a small 4 mm left-sided thyroid nodule that was partially cystic.  She presents today for repeat ultrasound results.  This was performed on 10/31/2022 and shows a normal thyroid size and echotexture.  There is a small colloid cyst in the left thyroid lobe measuring 3 mm today with impression of no further evaluation required.  Patient states that she has had extreme fatigue and myalgias since July.  She states she is being worked up for this by her PCP.  She states she is very tired and feels like she constantly has the flu because her body is so achy.  She does not take a multivitamin.  I was able to review her labs that showed elevation in C-reactive protein but normal LUDA.      Current Outpatient Medications on File Prior to Visit   Medication Sig   • amitriptyline (ELAVIL) 25 MG tablet    • diclofenac (VOLTAREN) 50 MG EC tablet Take 1 tablet by mouth 2 (Two) Times a Day.   • dicyclomine (BENTYL) 20 MG tablet Take 1 tablet by mouth Every 6 (Six) Hours As Needed (abdominal cramping).   • galcanezumab-gnlm (EMGALITY) 120 MG/ML auto-injector pen Inject 1 mL under the skin into the appropriate area as directed Every 30 (Thirty) Days.   • QUEtiapine (SEROquel) 25 MG tablet Take 1 tablet by mouth Every Night.   • spironolactone (Aldactone) 50 MG tablet Take 1  "tablet by mouth Every Morning.     No current facility-administered medications on file prior to visit.        Social History     Tobacco Use   • Smoking status: Former     Packs/day: 0.50     Years: 15.00     Pack years: 7.50     Types: Cigarettes     Quit date: 2022     Years since quittin.7   • Smokeless tobacco: Never   • Tobacco comments:     INST PER ANESTHESIA PROTOCOL   Vaping Use   • Vaping Use: Every day   • Substances: Nicotine, Flavoring   • Devices: Disposable   Substance Use Topics   • Alcohol use: Yes     Comment: light, occasionally drinks, less than 1 drink per day, has been drinking for 5 years   • Drug use: Never        Objective     Vital Signs:   Temp 97.6 °F (36.4 °C) (Temporal)   Ht 162.6 cm (64\")   Wt 80.1 kg (176 lb 9.6 oz)   BMI 30.31 kg/m²       Physical Exam  Constitutional:       General: She is not in acute distress.     Appearance: Normal appearance. She is not ill-appearing.   HENT:      Head: Normocephalic and atraumatic.      Jaw: There is normal jaw occlusion. No tenderness or pain on movement.      Salivary Glands: Right salivary gland is not diffusely enlarged or tender. Left salivary gland is not diffusely enlarged or tender.      Right Ear: Tympanic membrane, ear canal and external ear normal.      Left Ear: Tympanic membrane, ear canal and external ear normal.      Nose: Nose normal. No septal deviation.      Right Sinus: No maxillary sinus tenderness or frontal sinus tenderness.      Left Sinus: No maxillary sinus tenderness or frontal sinus tenderness.      Mouth/Throat:      Lips: Pink. No lesions.      Mouth: Mucous membranes are moist. No oral lesions.      Dentition: Normal dentition.      Tongue: No lesions.      Palate: No mass and lesions.      Pharynx: Oropharynx is clear. Uvula midline.      Tonsils: No tonsillar exudate.   Eyes:      Extraocular Movements: Extraocular movements intact.      Conjunctiva/sclera: Conjunctivae normal.      Pupils: Pupils " are equal, round, and reactive to light.   Neck:      Thyroid: No thyroid mass, thyromegaly or thyroid tenderness.      Trachea: Trachea normal.   Pulmonary:      Effort: Pulmonary effort is normal. No respiratory distress.   Musculoskeletal:         General: Normal range of motion.      Cervical back: Normal range of motion and neck supple. No tenderness.   Lymphadenopathy:      Cervical: No cervical adenopathy.   Skin:     General: Skin is warm and dry.   Neurological:      General: No focal deficit present.      Mental Status: She is alert and oriented to person, place, and time.      Cranial Nerves: No cranial nerve deficit.      Motor: No weakness.      Gait: Gait normal.   Psychiatric:         Mood and Affect: Mood normal.         Behavior: Behavior normal.         Thought Content: Thought content normal.         Judgment: Judgment normal.                Result Review :          US Thyroid (10/31/2022 15:02)  See HPI     Assessment and Plan    Diagnoses and all orders for this visit:    1. Thyroid nodule (Primary)    2. Fatigue, unspecified type  -     Vitamin D 25 Hydroxy; Future  -     Vitamin B12; Future    3. Myalgia  -     Vitamin D 25 Hydroxy; Future  -     Vitamin B12; Future    No further work-up needed for tiny thyroid cyst.  I will order some lab work to check vitamin D and B12 levels because of her fatigue and myalgias.  I will do a phone visit follow-up afterwards.  I encouraged her to start taking a women's multivitamin.       Follow Up   No follow-ups on file.  Patient was given instructions and counseling regarding her condition or for health maintenance advice. Please see specific information pulled into the AVS if appropriate.     MINI Allen

## 2022-11-29 ENCOUNTER — OFFICE VISIT (OUTPATIENT)
Dept: OTOLARYNGOLOGY | Facility: CLINIC | Age: 27
End: 2022-11-29

## 2022-11-29 DIAGNOSIS — E04.1 THYROID NODULE: ICD-10-CM

## 2022-11-29 DIAGNOSIS — R53.83 FATIGUE, UNSPECIFIED TYPE: Primary | ICD-10-CM

## 2022-11-29 DIAGNOSIS — E55.9 VITAMIN D DEFICIENCY: ICD-10-CM

## 2022-11-29 DIAGNOSIS — M79.10 MYALGIA: ICD-10-CM

## 2022-11-29 PROCEDURE — 99212 OFFICE O/P EST SF 10 MIN: CPT | Performed by: NURSE PRACTITIONER

## 2022-11-29 RX ORDER — ERGOCALCIFEROL 1.25 MG/1
50000 CAPSULE ORAL WEEKLY
Qty: 12 CAPSULE | Refills: 0 | Status: SHIPPED | OUTPATIENT
Start: 2022-11-29 | End: 2023-02-15

## 2022-11-29 NOTE — PROGRESS NOTES
Patient Name: Palmira Carbajal   Visit Date: 11/29/2022   Patient ID: [id number]  Provider: MINI Allen    Sex: female  Location: [facility]   YOB: 1995  Location Address: [address] [place] [address], [address] [place] [address],?[place]?[id number]    Primary Care Provider Jaja Castillo APRN  Location Phone: [phone]    Referring Provider: No ref. provider found        Chief Complaint  Phone visit Lab results    Subjective          Palmira Carbajal is a 27 y.o. female who presents to [facility] for a follow-up visit of phone visit follow-up today to review her recent lab work.  I last saw her for thyroid nodules she mentioned significant fatigue and myalgias.  My recommendation was to do some blood work checking vitamin D and B12 levels.  We did forget these results back that showed normal vitamin B-12 but a low vitamin D at 18.4.  I discussed my recommendation with her would be to go on a 12-week high-dose of vitamin D and have her PCP recheck her levels at that time.  She is agreeable to this.      Current Outpatient Medications on File Prior to Visit   Medication Sig   • amitriptyline (ELAVIL) 25 MG tablet    • diclofenac (VOLTAREN) 50 MG EC tablet Take 1 tablet by mouth 2 (Two) Times a Day.   • dicyclomine (BENTYL) 20 MG tablet Take 1 tablet by mouth Every 6 (Six) Hours As Needed (abdominal cramping).   • galcanezumab-gnlm (EMGALITY) 120 MG/ML auto-injector pen Inject 1 mL under the skin into the appropriate area as directed Every 30 (Thirty) Days.   • QUEtiapine (SEROquel) 25 MG tablet Take 1 tablet by mouth Every Night.   • spironolactone (Aldactone) 50 MG tablet Take 1 tablet by mouth Every Morning.     No current facility-administered medications on file prior to visit.        Social History     Tobacco Use   • Smoking status: Former     Packs/day: 0.50     Years: 15.00     Pack years: 7.50     Types: Cigarettes     Quit date: 2/14/2022      Years since quittin.7   • Smokeless tobacco: Never   • Tobacco comments:     INST PER ANESTHESIA PROTOCOL   Vaping Use   • Vaping Use: Every day   • Substances: Nicotine, Flavoring   • Devices: Disposable   Substance Use Topics   • Alcohol use: Yes     Comment: light, occasionally drinks, less than 1 drink per day, has been drinking for 5 years   • Drug use: Never        Objective     Vital Signs:   There were no vitals taken for this visit.      Physical Exam         No physical exam, phone visit  Result Review :               Assessment and Plan    Diagnoses and all orders for this visit:    1. Fatigue, unspecified type (Primary)    2. Myalgia    3. Thyroid nodule    4. Vitamin D deficiency  -     vitamin D (ERGOCALCIFEROL) 1.25 MG (22661 UT) capsule capsule; Take 1 capsule by mouth 1 (One) Time Per Week for 12 doses.  Dispense: 12 capsule; Refill: 0           Follow Up   No follow-ups on file.  Patient was given instructions and counseling regarding her condition or for health maintenance advice. Please see specific information pulled into the AVS if appropriate.     MINI Allen

## 2022-12-02 ENCOUNTER — OFFICE VISIT (OUTPATIENT)
Dept: FAMILY MEDICINE CLINIC | Facility: CLINIC | Age: 27
End: 2022-12-02

## 2022-12-02 VITALS
HEART RATE: 84 BPM | BODY MASS INDEX: 29.91 KG/M2 | DIASTOLIC BLOOD PRESSURE: 74 MMHG | WEIGHT: 175.2 LBS | HEIGHT: 64 IN | OXYGEN SATURATION: 98 % | SYSTOLIC BLOOD PRESSURE: 125 MMHG | TEMPERATURE: 97.5 F

## 2022-12-02 DIAGNOSIS — N20.0 KIDNEY STONE: ICD-10-CM

## 2022-12-02 DIAGNOSIS — F51.01 PRIMARY INSOMNIA: ICD-10-CM

## 2022-12-02 DIAGNOSIS — F33.1 MODERATE EPISODE OF RECURRENT MAJOR DEPRESSIVE DISORDER: ICD-10-CM

## 2022-12-02 DIAGNOSIS — E55.9 VITAMIN D DEFICIENCY: ICD-10-CM

## 2022-12-02 DIAGNOSIS — F41.1 GENERALIZED ANXIETY DISORDER: ICD-10-CM

## 2022-12-02 DIAGNOSIS — R52 GENERALIZED BODY ACHES: Primary | ICD-10-CM

## 2022-12-02 PROCEDURE — 99214 OFFICE O/P EST MOD 30 MIN: CPT | Performed by: NURSE PRACTITIONER

## 2022-12-02 RX ORDER — DICLOFENAC SODIUM 75 MG/1
75 TABLET, DELAYED RELEASE ORAL 2 TIMES DAILY
Qty: 60 TABLET | Refills: 2 | Status: SHIPPED | OUTPATIENT
Start: 2022-12-02 | End: 2023-03-07

## 2022-12-02 NOTE — ASSESSMENT & PLAN NOTE
She has tried and failed multiple medications.  She is stable.  She has an appointment with psychiatry next week.

## 2022-12-02 NOTE — ASSESSMENT & PLAN NOTE
Generalized body aches not improving.  I will increase diclofenac dose to 75 mg twice daily.  I will also refer her to rheumatology for further evaluation and treatment.

## 2022-12-02 NOTE — PROGRESS NOTES
"Chief Complaint  Depression, Insomnia, and Generalized Body Aches    Subjective          Palmira Carbajal, 27 y.o. female presents to [facility]  History of Present Illness   She presents today for a follow-up on fibromyalgia symptoms/general body aches, insomnia, and depression.  She has tried and failed multiple antidepressant medications due to side effects or due to being ineffective.  I have prescribed Fetzima on her last visit but she had side effects.  I then sent her in amitriptyline but after reviewing the NERIight results again, it was noted that it was on the severe gene drug interaction so I told her not to take it.  I started her on diclofenac 50 mg twice daily and had her stop naproxen to see if that would help with her body aches.  She states that she does not feel the diclofenac is helping much with her generalized body aches but does help her knee pain.  She was given Seroquel at night to help her sleep but states she stopped it after 2 weeks due to weight gain.  She has tried and failed multiple medications.  She does have an appointment with psychiatry next week.    She did see ENT who checked her vitamin D level.  Her vitamin D level was low so she was started on vitamin D.  She was told she needs to have that rechecked in 3 months.    She has had a kidney stone in the past.  She states she feels like she is having another kidney stone \"moving.\"  She was wanting a referral to urology.  Objective   Vital Signs:   /74   Pulse 84   Temp 97.5 °F (36.4 °C)   Ht 162.6 cm (64\")   Wt 79.5 kg (175 lb 3.2 oz)   SpO2 98%   BMI 30.07 kg/m²       Current Outpatient Medications:   •  diclofenac (VOLTAREN) 75 MG EC tablet, Take 1 tablet by mouth 2 (Two) Times a Day., Disp: 60 tablet, Rfl: 2  •  dicyclomine (BENTYL) 20 MG tablet, Take 1 tablet by mouth Every 6 (Six) Hours As Needed (abdominal cramping)., Disp: 36 tablet, Rfl: 0  •  galcanezumab-gnlm (EMGALITY) 120 " MG/ML auto-injector pen, Inject 1 mL under the skin into the appropriate area as directed Every 30 (Thirty) Days., Disp: 1.12 mL, Rfl: 6  •  spironolactone (Aldactone) 50 MG tablet, Take 1 tablet by mouth Every Morning., Disp: 30 tablet, Rfl: 2  •  vitamin D (ERGOCALCIFEROL) 1.25 MG (15110 UT) capsule capsule, Take 1 capsule by mouth 1 (One) Time Per Week for 12 doses., Disp: 12 capsule, Rfl: 0   Past Medical History:   Diagnosis Date   • Acid reflux    • Kidney stone     HX OF   • Maltracking of right patella    • PONV (postoperative nausea and vomiting)     GOOD RESULTS WITH SCOPALAMINE   • PTSD (post-traumatic stress disorder)     WAKES UP FROM ANESTHESIA WITH PANIC ATTACK   • Seasonal allergic rhinitis 05/31/2022      Physical Exam  Vitals reviewed.   Constitutional:       Appearance: Normal appearance. She is well-developed.   Neck:      Thyroid: No thyroid mass, thyromegaly or thyroid tenderness.   Cardiovascular:      Rate and Rhythm: Normal rate and regular rhythm.      Heart sounds: No murmur heard.    No friction rub. No gallop.   Pulmonary:      Effort: Pulmonary effort is normal.      Breath sounds: Normal breath sounds. No wheezing or rhonchi.   Lymphadenopathy:      Cervical: No cervical adenopathy.   Skin:     General: Skin is warm and dry.   Neurological:      Mental Status: She is alert and oriented to person, place, and time.      Cranial Nerves: No cranial nerve deficit.   Psychiatric:         Mood and Affect: Mood and affect normal.         Behavior: Behavior normal.         Thought Content: Thought content normal. Thought content does not include homicidal or suicidal ideation.         Judgment: Judgment normal.        Result Review :                 Assessment and Plan    Diagnoses and all orders for this visit:    1. Generalized body aches (Primary)  Assessment & Plan:  Generalized body aches not improving.  I will increase diclofenac dose to 75 mg twice daily.  I will also refer her to  rheumatology for further evaluation and treatment.    Orders:  -     diclofenac (VOLTAREN) 75 MG EC tablet; Take 1 tablet by mouth 2 (Two) Times a Day.  Dispense: 60 tablet; Refill: 2  -     Cancel: Ambulatory Referral to Rheumatology  -     Ambulatory Referral to Rheumatology    2. Vitamin D deficiency  Assessment & Plan:  We will check her vitamin D level on her follow-up in 3 months.      3. Kidney stone  Comments:  I advised her she will need to have a CT of the scan with renal protocol before she can be referred to urology.  Advised to go to the ER for any worsening of sy  Orders:  -     CT Abdomen Pelvis Stone Protocol; Future    4. Generalized anxiety disorder  Assessment & Plan:  She has tried and failed multiple medications.  She is stable.  She has an appointment with psychiatry next week.      5. Moderate episode of recurrent major depressive disorder (HCC)  Assessment & Plan:  Patient's depression is recurrent and is moderate without psychosis.  She is stable.  No suicidal or homicidal ideation.  Their depression is currently active and the condition is unchanged. F/U as described:patient referred to Mental Health Specialist.      6. Primary insomnia  Assessment & Plan:  She has tried and failed multiple medications.  She will see psychiatry next week.        Follow Up   Return in about 3 months (around 3/2/2023) for Next scheduled follow up vitamin D, body aches.  Patient was given instructions and counseling regarding her condition or for health maintenance advice. Please see specific information pulled into the AVS if appropriate.       Jaja Castillo, APRN  12/02/2022

## 2022-12-02 NOTE — ASSESSMENT & PLAN NOTE
Patient's depression is recurrent and is moderate without psychosis.  She is stable.  No suicidal or homicidal ideation.  Their depression is currently active and the condition is unchanged. F/U as described:patient referred to Mental Health Specialist.

## 2022-12-05 ENCOUNTER — PRIOR AUTHORIZATION (OUTPATIENT)
Dept: NEUROLOGY | Facility: CLINIC | Age: 27
End: 2022-12-05

## 2022-12-05 NOTE — TELEPHONE ENCOUNTER
Emgality PA has been initiated through [facility].

## 2022-12-08 ENCOUNTER — OFFICE VISIT (OUTPATIENT)
Dept: PSYCHIATRY | Facility: CLINIC | Age: 27
End: 2022-12-08

## 2022-12-08 VITALS
WEIGHT: 174.2 LBS | BODY MASS INDEX: 29.74 KG/M2 | SYSTOLIC BLOOD PRESSURE: 121 MMHG | HEIGHT: 64 IN | HEART RATE: 98 BPM | DIASTOLIC BLOOD PRESSURE: 82 MMHG

## 2022-12-08 DIAGNOSIS — F41.1 GENERALIZED ANXIETY DISORDER: ICD-10-CM

## 2022-12-08 DIAGNOSIS — F43.10 POST TRAUMATIC STRESS DISORDER (PTSD): ICD-10-CM

## 2022-12-08 DIAGNOSIS — F51.05 INSOMNIA DUE TO MENTAL DISORDER: ICD-10-CM

## 2022-12-08 DIAGNOSIS — F33.1 MAJOR DEPRESSIVE DISORDER, RECURRENT EPISODE, MODERATE: Primary | ICD-10-CM

## 2022-12-08 PROCEDURE — 90792 PSYCH DIAG EVAL W/MED SRVCS: CPT | Performed by: NURSE PRACTITIONER

## 2022-12-08 RX ORDER — LAMOTRIGINE 25 MG/1
25 TABLET ORAL DAILY
Qty: 30 TABLET | Refills: 0 | Status: SHIPPED | OUTPATIENT
Start: 2022-12-08 | End: 2022-12-29 | Stop reason: SDUPTHER

## 2022-12-08 NOTE — TREATMENT PLAN
Multi-Disciplinary Problems (from Behavioral Health Treatment Plan)    Active Problems     Problem: Anxiety  Start Date: 12/08/22    Problem Details: The patient self-scales this problem as a 5 with 10 being the worst.        Goal Priority Start Date Expected End Date End Date    Patient will develop and implement behavioral and cognitive strategies to reduce anxiety and irrational fears. -- 12/08/22 -- --    Goal Details: Progress toward goal:  Not appropriate to rate progress toward goal since this is the initial treatment plan.        Goal Intervention Frequency Start Date End Date    Help patient explore past emotional issues in relation to present anxiety. Weekly 12/08/22 --    Intervention Details: Duration of treatment until until remission of symptoms.        Goal Intervention Frequency Start Date End Date    Help patient develop an awareness of their cognitive and physical responses to anxiety. Weekly 12/08/22 --    Intervention Details: Duration of treatment until until remission of symptoms.              Problem: Depression  Start Date: 12/08/22    Problem Details: The patient self-scales this problem as a 5 with 10 being the worst.        Goal Priority Start Date Expected End Date End Date    Patient will demonstrate the ability to initiate new constructive life skills outside of sessions on a consistent basis. -- 12/08/22 -- --    Goal Details: Progress toward goal:  Not appropriate to rate progress toward goal since this is the initial treatment plan.        Goal Intervention Frequency Start Date End Date    Assist patient in setting attainable activities of daily living goals. PRN 12/08/22 --    Goal Intervention Frequency Start Date End Date    Provide education about depression Weekly 12/08/22 --    Intervention Details: Duration of treatment until until remission of symptoms.        Goal Intervention Frequency Start Date End Date    Assist patient in developing healthy coping strategies. Weekly  12/08/22 --    Intervention Details: Duration of treatment until until remission of symptoms.              Problem: Post Traumatic Stress  Start Date: 12/08/22    Problem Details: The patient self-scales this problem as a 5 with 10 being the worst.        Goal Priority Start Date Expected End Date End Date    Patient will process and move through trauma in a way that improves self regard and the patients ability to function optimally in the world around them. -- 12/08/22 -- --    Goal Details: Progress toward goal:  Not appropriate to rate progress toward goal since this is the initial treatment plan.        Goal Intervention Frequency Start Date End Date    Assist patient in identifying ways that trauma has negatively impacted their view of themselves and the world. Weekly 12/08/22 --    Intervention Details: Duration of treatment until until remission of symptoms.        Goal Intervention Frequency Start Date End Date    Process trauma in the context of the safe session environment. Weekly 12/08/22 --    Intervention Details: Duration of treatment until until remission of symptoms.        Goal Intervention Frequency Start Date End Date    Develop a plan of behavior changes that will reduce the stress of the trauma. Weekly 12/08/22 --    Intervention Details: Duration of treatment until until remission of symptoms.                    Reviewed By     Cassius Cantrell APRN 12/08/22 1017                 I have discussed and reviewed this treatment plan with the patient.

## 2022-12-08 NOTE — PROGRESS NOTES
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 MARSHALL MD       ELECTRONICALLY SIGNED AND APPROVED BY: CLAY MARSHALL MD ON 8/26/2022 AT 9:21                 Assessment & Plan   Diagnoses and all orders for this visit:    1. Major depressive disorder, recurrent episode, moderate (HCC) (Primary)  -     lamoTRIgine (LaMICtal) 25 MG tablet; Take 1 tablet by mouth Daily for 30 days.  Dispense: 30 tablet; Refill: 0    2. Generalized anxiety disorder    3. Insomnia due to mental disorder    4. Post traumatic stress disorder (PTSD)      Presents with history of depression, anxiety and PTSD. Will start lamotrigine to target depression. Psychotherapy for anxiety and PTSD. Sleep hygiene education for insomnia. 20 minutes of supportive psychotherapy with goal to strengthen defenses, promote problems solving, restore adaptive functioning and provide symptom relief. The therapeutic alliance was strengthened to encourage the patient to express their thoughts and feelings. Esteem building was enhanced through praise, reassurance, normalizing and encouragement. Coping skills were enhanced to build distress tolerance skills and emotional regulation. Allowed patient to freely discuss issues without interruption or judgement with unconditional positive regard, active listening skills, and empathy. Provided a safe,  confidential environment to facilitate the development of a positive therapeutic relationship and encourage open, honest communication. Assisted patient in identifying risk factors which would indicate the need for higher level of care including thoughts to harm self or others and/or self-harming behavior and encouraged patient to contact this office, call 911, or present to the nearest emergency room should any of these events occur. Assisted patient in processing session content; acknowledged and normalized patient's thoughts, feelings, and concerns by utilizing a person-centered approach in efforts to build appropriate rapport and a positive therapeutic relationship with open and honest communication. Patient given education on medication side effects, diagnosis/illness and relapse symptoms. Plan to continue supportive psychotherapy in next appointment to provide symptom relief. 4 weeks    Diagnoses: as above  Symptoms: as above  Functional status: good  Mental Status Exam: as above     Treatment plan: medication management and supportive psychotherapy  Prognosis: good  Progress: initial visit    Visit Diagnoses:    ICD-10-CM ICD-9-CM   1. Major depressive disorder, recurrent episode, moderate (HCC)  F33.1 296.32   2. Generalized anxiety disorder  F41.1 300.02   3. Insomnia due to mental disorder  F51.05 300.9     327.02   4. Post traumatic stress disorder (PTSD)  F43.10 309.81       PLAN:  1. Safety: No acute safety concerns.   2. Therapy: Declines  3. Risk Assessment: Risk of self-harm acutely is moderate.  Risk factors include anxiety disorder, mood disorder, and recent psychosocial stressors (pandemic). Protective factors include no family history, denies access to guns/weapons, no present SI, minimal AODA, healthcare seeking, future orientation, willingness to engage in care.  Risk of self-harm chronically is also moderate, but could be further elevated in the event of treatment noncompliance and/or  AODA.  4. Medications: Start lamotrigine 25mg po qhs to target mood. Risks, benefits, alternatives discussed with patient including rash, rebound depressive or manic symptoms if prompt discontinuation, GI upset, agitation, sedation/falls risk.  After discussion of these risks and benefits, patient voiced understanding and agreed to proceed.  5. Labs/studies: No labs/studies ordered at this time  6. Follow-up: 4 weeks    Patient screened positive for depression based on a PHQ-9 score of 22 on 12/8/2022. Follow-up recommendations include: Suicide Risk Assessment performed.         TREATMENT PLAN/GOALS: Continue supportive psychotherapy efforts and medications as indicated. Treatment and medication options discussed during today's visit. Patient ackowledged and verbally consented to continue with current treatment plan and was educated on the importance of compliance with treatment and follow-up appointments.      MEDICATION ISSUES:  DEBORAH reviewed as expected.  Discussed medication options and treatment plan of prescribed medication as well as the risks, benefits, and side effects including potential falls, possible impaired driving and metabolic adversities among others. Patient is agreeable to call the office with any worsening of symptoms or onset of side effects. Patient is agreeable to call 911 or go to the nearest ER should he/she begin having SI/HI. No medication side effects or related complaints today.     MEDS ORDERED DURING VISIT:  New Medications Ordered This Visit   Medications   • lamoTRIgine (LaMICtal) 25 MG tablet     Sig: Take 1 tablet by mouth Daily for 30 days.     Dispense:  30 tablet     Refill:  0       Return in about 4 weeks (around 1/5/2023) for Next scheduled follow up.         This document has been electronically signed by MINI Poon  December 8, 2022 10:33 EST      Part of this note may be an electronic transcription/translation of spoken language to printed text using the Dragon  Dictation System.

## 2022-12-09 ENCOUNTER — HOSPITAL ENCOUNTER (OUTPATIENT)
Dept: CT IMAGING | Facility: HOSPITAL | Age: 27
Discharge: HOME | End: 2022-12-09
Admitting: NURSE PRACTITIONER

## 2022-12-09 DIAGNOSIS — N20.0 KIDNEY STONE: ICD-10-CM

## 2022-12-09 PROCEDURE — 74176 CT ABD & PELVIS W/O CONTRAST: CPT

## 2022-12-12 ENCOUNTER — TELEPHONE (OUTPATIENT)
Dept: FAMILY MEDICINE CLINIC | Facility: CLINIC | Age: 27
End: 2022-12-12

## 2022-12-12 NOTE — TELEPHONE ENCOUNTER
Please let the patient know that the level rheumatologist will not see her for her diagnosis.  I am going to send her to Dr. Zamora, rheumatologist in [facility].  Can you change the referral to Dr. Zamora?

## 2022-12-13 ENCOUNTER — TELEPHONE (OUTPATIENT)
Dept: FAMILY MEDICINE CLINIC | Facility: CLINIC | Age: 27
End: 2022-12-13

## 2022-12-13 NOTE — TELEPHONE ENCOUNTER
Advised patient that we will wait and let psychiatry treat her to see if she gets better.  You may close out the referral for rheumatology.

## 2022-12-13 NOTE — TELEPHONE ENCOUNTER
"(I faxed over appt request and records to Dr JENNA Zamora\"s office this morning).  The patient just called and states Dr Zamora's office just called her and told her they do not treat for the diagnosis on the referral.  Patient wanting to know what to do now?  "

## 2022-12-15 ENCOUNTER — TELEPHONE (OUTPATIENT)
Dept: GASTROENTEROLOGY | Facility: CLINIC | Age: 27
End: 2022-12-15

## 2022-12-15 NOTE — TELEPHONE ENCOUNTER
Called patient to inform her that Betsey Gaffney will no longer be with the practice and that I was needing to cancel their appointment on 1/26/2023. Patient verbalized understand and requested a 6 month recall.

## 2022-12-15 NOTE — TELEPHONE ENCOUNTER
Caller: Palmira Carbajal    Relationship: Self    Best call back number: [phone]    Who are you requesting to speak with (clinical staff, provider,  specific staff member): PROVIDER    What was the call regarding: PATIENT STATES SHE DOESN'T AGREE TO PSYCHIATRY TAKING OVER HER PAIN MANAGEMENT MEDICATION. PATIENT STATES SHE WOULD LIKE A CALL FROM KATHIE SALAZAR DIRECTLY TO DISCUSS THIS ISSUE.    Do you require a callback: YES

## 2022-12-15 NOTE — TELEPHONE ENCOUNTER
If she is wanting pain management, I will refer her to pain management.  I did not say that psychiatry will treat her pain but I felt the medications prescribed by psychiatry may help with her pain.  Let me know if she wants pain management referral?

## 2022-12-20 ENCOUNTER — PREP FOR SURGERY (OUTPATIENT)
Dept: OTHER | Facility: HOSPITAL | Age: 27
End: 2022-12-20

## 2022-12-20 ENCOUNTER — OFFICE VISIT (OUTPATIENT)
Dept: UROLOGY | Facility: CLINIC | Age: 27
End: 2022-12-20

## 2022-12-20 VITALS — BODY MASS INDEX: 29.81 KG/M2 | RESPIRATION RATE: 16 BRPM | HEIGHT: 64 IN | WEIGHT: 174.6 LBS

## 2022-12-20 DIAGNOSIS — N20.0 LEFT NEPHROLITHIASIS: Primary | ICD-10-CM

## 2022-12-20 DIAGNOSIS — N20.0 KIDNEY STONE ON LEFT SIDE: Primary | ICD-10-CM

## 2022-12-20 DIAGNOSIS — R10.9 FLANK PAIN: ICD-10-CM

## 2022-12-20 PROCEDURE — 99204 OFFICE O/P NEW MOD 45 MIN: CPT | Performed by: UROLOGY

## 2022-12-20 RX ORDER — CEFAZOLIN SODIUM 2 G/100ML
2 INJECTION, SOLUTION INTRAVENOUS ONCE
Status: CANCELLED | OUTPATIENT
Start: 2022-12-20 | End: 2022-12-20

## 2022-12-20 NOTE — PROGRESS NOTES
UROLOGY OFFICE follow up NOTE    Subjective   HPI  Palmira Carbajal is a 27 y.o. female.  Presents for evaluation of nephrolithiasis.     The patient reports that she has a history of kidney stones. She states that she had a kidney stone removed 7 to 8 years ago by Dr. Thompson.  The patient reports that she had a laser lithotripsy and a stent placed.     She notes that she has been having recent stone symptoms.   The patient reports that she has been experiencing pain in her kidney.   She states that she was having symptoms within the past month.    She states that she has had enough stones in the past 10 years that she knows what they feel like.     The patient reports that she has a history of urinary tract infections and interstitial cystitis.  _____________  CT abdomen pelvis stone protocol, 2022: CT scan of the abdomen and pelvis without contrast demonstrating 7 mm nonobstructing stone in the   lower pole collecting system of the left kidney, unchanged.  Tiny 1 mm stones are seen in the lower pole collecting system of the right kidney.     Medical History:  Past Medical History:   Diagnosis Date   • Acid reflux    • Chronic pain disorder    • Depression    • Kidney stone     HX OF   • Maltracking of right patella    • Panic disorder    • PONV (postoperative nausea and vomiting)     GOOD RESULTS WITH SCOPALAMINE   • PTSD (post-traumatic stress disorder)     WAKES UP FROM ANESTHESIA WITH PANIC ATTACK   • Seasonal allergic rhinitis 2022   • Self-injurious behavior    • Suicide attempt (HCC)         Social History:  Social History     Socioeconomic History   • Marital status:      Spouse name: TRISTAN   • Number of children: 3   Tobacco Use   • Smoking status: Former     Packs/day: 0.50     Years: 15.00     Pack years: 7.50     Types: Cigarettes     Quit date: 2022     Years since quittin.8   • Smokeless tobacco: Never   • Tobacco comments:     INST PER ANESTHESIA PROTOCOL   Vaping  Use   • Vaping Use: Every day   • Substances: Nicotine, Flavoring   • Devices: Disposable   Substance and Sexual Activity   • Alcohol use: Not Currently   • Drug use: Not Currently     Types: Marijuana   • Sexual activity: Defer        Family History:  Family History   Problem Relation Age of Onset   • Arthritis Mother    • Thyroid disease Father    • Colon polyps Father    • Diabetes Father    • Drug abuse Maternal Uncle    • Alcohol abuse Maternal Uncle    • Cancer Maternal Grandmother    • Malig Hyperthermia Neg Hx         Surgical History:  Past Surgical History:   Procedure Laterality Date   •  SECTION     • COLONOSCOPY N/A 10/11/2022    Procedure: COLONOSCOPY;  Surgeon: Dyan Griffin MD;  Location: [facility] ENDOSCOPY;  Service: Gastroenterology;  Laterality: N/A;  HEMORRHOIDS   • CYSTOSCOPY     • HYSTERECTOMY     • KIDNEY STONE SURGERY      unspecified   • KNEE ARTHROSCOPY Right 2022    Procedure: KNEE ARTHROSCOPY WITH A LATERAL RELEASE AND CHONDROPLASTY;  Surgeon: Marco A Pitts MD;  [place] [facility] [place] [facility] [place]  Service: Orthopedics;  Laterality: Right;   • WISDOM TOOTH EXTRACTION          Allergies:  Allergies   Allergen Reactions   • Penicillins Rash   • Cephalexin Diarrhea   • Cephalosporins GI Intolerance   • Cymbalta [Duloxetine Hcl] Other (See Comments)     Jittery and insomnia        Current Medications:  Current Outpatient Medications   Medication Sig Dispense Refill   • diclofenac (VOLTAREN) 75 MG EC tablet Take 1 tablet by mouth 2 (Two) Times a Day. 60 tablet 2   • dicyclomine (BENTYL) 20 MG tablet Take 1 tablet by mouth Every 6 (Six) Hours As Needed (abdominal cramping). 36 tablet 0   • galcanezumab-gnlm (EMGALITY) 120 MG/ML auto-injector pen Inject 1 mL under the skin into the appropriate area as directed Every 30 (Thirty) Days. 1.12 mL 6   • lamoTRIgine (LaMICtal) 25 MG tablet Take 1 tablet by mouth Daily for 30 days. 30 tablet 0   • spironolactone (Aldactone) 50 MG tablet  "Take 1 tablet by mouth Every Morning. 30 tablet 2   • vitamin D (ERGOCALCIFEROL) 1.25 MG (50916 UT) capsule capsule Take 1 capsule by mouth 1 (One) Time Per Week for 12 doses. 12 capsule 0     No current facility-administered medications for this visit.       Review of systems  A review of systems was performed, and positive findings are noted in the HPI.    Objective     Vital Signs:   Resp 16   Ht 162.6 cm (64.02\")   Wt 79.2 kg (174 lb 9.6 oz)   BMI 29.96 kg/m²       Physical exam  No acute distress, well-nourished  Awake alert and oriented  Mood normal; affect normal    Results  PROCEDURE:  CT ABDOMEN PELVIS STONE PROTOCOL     COMPARISON: [facility], CT, CT ABDOMEN PELVIS WO CONTRAST, 8/26/2022, 11:34.     INDICATIONS:  LT FLANK PAIN, LT MID ABDOMEN PAIN, POSS KIDNEY STONE     TECHNIQUE:    CT images were created without intravenous contrast.       PROTOCOL:     Standard imaging protocol performed                 RADIATION:      DLP: 546.3mGy*cm               Automated exposure control was utilized to minimize radiation dose.      FINDINGS:          The lung bases are clear.     The liver is of normal size.  The gallbladder is not abnormally distended.  No pancreatic or   adrenal mass is evident.  The spleen is of normal size.     Six mm nonobstructing stone is seen in the lower pole collecting system of the left kidney.  Tiny 1   mm stones are seen in the lower pole collecting system of the right kidney.  No ureteral stones are   seen.  There is no evidence of hydronephrosis.  The urinary bladder is not abnormally distended.     The uterus is absent.  No pelvic mass is seen.     Bowel loops are not abnormally dilated.  The appendix is normal.  No diverticula are evident.     The anterior abdominal wall is intact, no hernia is evident.     Bony structures appear intact.     CONTINUED ON NEXT PAGE...           IMPRESSION:                 CT scan of the abdomen and pelvis without contrast " demonstrating 7 mm nonobstructing stone in the   lower pole collecting system of the left kidney, unchanged.     Tiny 1 mm stones are seen in the lower pole collecting system of the right kidney.     Post hysterectomy.            ALICE TALBERT MD         Electronically Signed and Approved By: ALICE TALBERT MD on 12/09/2022 at 16:52       Problem List:  Patient Active Problem List   Diagnosis   • Maltracking of right patella   • Impingement syndrome involving patellar fat pad of right knee   • Chondromalacia   • Patellar malalignment syndrome of right knee   • Binocular vision disorder with diplopia   • Generalized anxiety disorder   • Migraine without aura and without status migrainosus, not intractable   • Acid reflux   • PTSD (post-traumatic stress disorder)   • Kidney stone on left side   • Seasonal allergic rhinitis   • Burn of finger and thumb of right hand, second degree   • Hemorrhoids   • Right hand pain   • Rectal bleeding   • Anal or rectal pain   • Decreased appetite   • Aftercare following surgery of right knee arthroscopic chondroplasty and lateral release, 7/11/2022   • Generalized body aches   • Epigastric pain   • Female hirsutism   • Right ovarian cyst   • Moderate episode of recurrent major depressive disorder (HCC)   • Primary insomnia   • Vitamin D deficiency       Assessment & Plan      Diagnoses and all orders for this visit:    1. Kidney stone on left side (Primary)      Left flank pain, left nephrolithiasis- CT images from 12/9/2022 and 8/26/2022 personally reviewed by me, demonstrated patient discussed with patient at length; left nonobstructing intrarenal calculus in lower pole; stable in size and position since 8/26/2022.   Discussed the natural history of intrarenal stones which are typically asymptomatic.    Uncertain if this intrarenal stone is the source of her left flank pain and current complaints.       Discussed that there is no evidence of ureteral calculi, hydronephrosis, or  obstructive uropathy.  Discussed that it is these findings on CT scan which are usually attributable to intermittent renal colic and stone symptoms.   Believe it is unlikely that patient's current symptoms are due to her intrarenal stones shifting however, patient very convinced that this is the source of her symptoms.      Discussed that the only management for this intrarenal stone is continued observation versus surgical intervention.    Discussed the option of proceeding with cystoscopy, left retrograde pyelogram, ureteroscopy, laser lithotripsy, and stent with patient at length as definitive management of her nephrolithiasis.  Risk, benefits, and alternatives discussed with patient.  Risks discussed in details including worsening and/or persistent pain despite stone treatment, bleeding, infection, damage to surrounding structures, stone recurrence, and stricture.    Patient also notes understanding that if unable to reach the level of the stone or if significant purulent discharge noted upon initiation of procedure that stent will be placed in definitive stone management will be deferred until the collecting system is optimized.  Patient notes understanding of the above, participated in the discussion.  Wishes to proceed with definitive stone management.    Schedule OR  All questions addressed                 MDM moderate: 1 chronic illness with exacerbation, progression; decision regarding surgery including patient or procedure identified risk factors; independent interpretation of test performed by other professional; i.e. actual CT imaging from 12/9/2022 and 8/26/2022 performed at [facility] reviewed with patient  provided independent interpretation and management recommendation    Transcribed from ambient dictation for Melisa Garcia MD by Isabella Robertson.  12/20/22   09:26 EST    Patient or patient representative verbalized consent to the visit recording.  I have personally performed the services described in this  document as transcribed by the above individual, and it is both accurate and complete.

## 2022-12-29 ENCOUNTER — OFFICE VISIT (OUTPATIENT)
Dept: PSYCHIATRY | Facility: CLINIC | Age: 27
End: 2022-12-29

## 2022-12-29 VITALS
DIASTOLIC BLOOD PRESSURE: 71 MMHG | HEIGHT: 64 IN | WEIGHT: 175 LBS | SYSTOLIC BLOOD PRESSURE: 127 MMHG | BODY MASS INDEX: 29.88 KG/M2

## 2022-12-29 DIAGNOSIS — F51.05 INSOMNIA DUE TO MENTAL DISORDER: ICD-10-CM

## 2022-12-29 DIAGNOSIS — F43.10 POST TRAUMATIC STRESS DISORDER (PTSD): ICD-10-CM

## 2022-12-29 DIAGNOSIS — F41.1 GENERALIZED ANXIETY DISORDER: ICD-10-CM

## 2022-12-29 DIAGNOSIS — F33.1 MAJOR DEPRESSIVE DISORDER, RECURRENT EPISODE, MODERATE: Primary | ICD-10-CM

## 2022-12-29 PROCEDURE — 90833 PSYTX W PT W E/M 30 MIN: CPT | Performed by: NURSE PRACTITIONER

## 2022-12-29 PROCEDURE — 99214 OFFICE O/P EST MOD 30 MIN: CPT | Performed by: NURSE PRACTITIONER

## 2022-12-29 RX ORDER — LAMOTRIGINE 100 MG/1
50 TABLET ORAL DAILY
Qty: 30 TABLET | Refills: 0 | Status: SHIPPED | OUTPATIENT
Start: 2022-12-29 | End: 2023-02-21 | Stop reason: SDUPTHER

## 2022-12-29 RX ORDER — AMITRIPTYLINE HYDROCHLORIDE 10 MG/1
10 TABLET, FILM COATED ORAL NIGHTLY
Qty: 30 TABLET | Refills: 0 | Status: SHIPPED | OUTPATIENT
Start: 2022-12-29 | End: 2023-01-19 | Stop reason: SDUPTHER

## 2022-12-29 NOTE — PROGRESS NOTES
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 tablet by mouth Every Night for 30 days.  Dispense: 30 tablet; Refill: 0    2. Generalized anxiety disorder    3. Insomnia due to mental disorder    4. Post traumatic stress disorder (PTSD)      Increase lamotrigine to target depression. Start amitriptyline to target depression and anxiety. Psychotherapy for anxiety and PTSD. Sleep hygiene education for insomnia. 16 minutes of supportive psychotherapy with goal to strengthen defenses, promote problems solving, restore adaptive functioning and provide symptom relief. The therapeutic alliance was strengthened to encourage the patient to express their thoughts and feelings. Esteem building was enhanced through praise, reassurance, normalizing and encouragement. Coping skills were enhanced to build distress tolerance skills and emotional regulation. Allowed patient to freely discuss issues without interruption or judgement with unconditional positive regard, active listening skills, and empathy. Provided a safe, confidential environment to facilitate the development of a positive therapeutic relationship and encourage open, honest communication. Assisted patient in identifying risk factors which would indicate the need for higher level of care including thoughts to harm self or others and/or self-harming behavior  and encouraged patient to contact this office, call 911, or present to the nearest emergency room should any of these events occur. Assisted patient in processing session content; acknowledged and normalized patient's thoughts, feelings, and concerns by utilizing a person-centered approach in efforts to build appropriate rapport and a positive therapeutic relationship with open and honest communication. Patient given education on medication side effects, diagnosis/illness and relapse symptoms. Plan to continue supportive psychotherapy in next appointment to provide symptom relief. 4 weeks    Diagnoses: as above  Symptoms: as above  Functional status: good  Mental Status Exam: as above     Treatment plan: medication management and supportive psychotherapy  Prognosis: good  Progress: continued improvement    Visit Diagnoses:    ICD-10-CM ICD-9-CM   1. Major depressive disorder, recurrent episode, moderate (HCC)  F33.1 296.32   2. Generalized anxiety disorder  F41.1 300.02   3. Insomnia due to mental disorder  F51.05 300.9     327.02   4. Post traumatic stress disorder (PTSD)  F43.10 309.81       PLAN:  1. Safety: No acute safety concerns.   2. Therapy: Declines  3. Risk Assessment: Risk of self-harm acutely is moderate.  Risk factors include anxiety disorder, mood disorder, and recent psychosocial stressors (pandemic). Protective factors include no family history, denies access to guns/weapons, no present SI, minimal AODA, healthcare seeking, future orientation, willingness to engage in care.  Risk of self-harm chronically is also moderate, but could be further elevated in the event of treatment noncompliance and/or AODA.  4. Medications: Increase lamotrigine 25mg to 50mg po qhs to target mood. Risks, benefits, alternatives discussed with patient including rash, rebound depressive or manic symptoms if prompt discontinuation, GI upset, agitation, sedation/falls risk.  After discussion of these risks and benefits,  patient voiced understanding and agreed to proceed. Start amitriptyline 10mg po qhs to target depression and anxiety. Risks, benefits, alternatives discussed with patient including sedation, dizziness, falls, GI upset, constipation, urinary retention, dry mouth.  After discussion of these risks and benefits, the patient voiced understanding and agreed to proceed.  5. Labs/studies: No labs/studies ordered at this time  6. Follow-up: 4 weeks    Patient screened positive for depression based on a PHQ-9 score of 22 on 12/8/2022. Follow-up recommendations include: Suicide Risk Assessment performed.         TREATMENT PLAN/GOALS: Continue supportive psychotherapy efforts and medications as indicated. Treatment and medication options discussed during today's visit. Patient ackowledged and verbally consented to continue with current treatment plan and was educated on the importance of compliance with treatment and follow-up appointments.      MEDICATION ISSUES:  DEBORAH reviewed as expected.  Discussed medication options and treatment plan of prescribed medication as well as the risks, benefits, and side effects including potential falls, possible impaired driving and metabolic adversities among others. Patient is agreeable to call the office with any worsening of symptoms or onset of side effects. Patient is agreeable to call 911 or go to the nearest ER should he/she begin having SI/HI. No medication side effects or related complaints today.     MEDS ORDERED DURING VISIT:  New Medications Ordered This Visit   Medications   • lamoTRIgine (LaMICtal) 100 MG tablet     Sig: Take 0.5 tablets by mouth Daily for 60 days.     Dispense:  30 tablet     Refill:  0   • amitriptyline (ELAVIL) 10 MG tablet     Sig: Take 1 tablet by mouth Every Night for 30 days.     Dispense:  30 tablet     Refill:  0       Return in about 4 weeks (around 1/26/2023) for Next scheduled follow up.         This document has been electronically signed by  Cassius Cantrell, APRN  December 29, 2022 09:59 EST      Part of this note may be an electronic transcription/translation of spoken language to printed text using the Dragon Dictation System.

## 2023-01-19 ENCOUNTER — OFFICE VISIT (OUTPATIENT)
Dept: PSYCHIATRY | Facility: CLINIC | Age: 28
End: 2023-01-19
Payer: COMMERCIAL

## 2023-01-19 VITALS
WEIGHT: 173.8 LBS | HEIGHT: 64 IN | SYSTOLIC BLOOD PRESSURE: 108 MMHG | BODY MASS INDEX: 29.67 KG/M2 | DIASTOLIC BLOOD PRESSURE: 64 MMHG | HEART RATE: 101 BPM

## 2023-01-19 DIAGNOSIS — F41.1 GENERALIZED ANXIETY DISORDER: ICD-10-CM

## 2023-01-19 DIAGNOSIS — F43.10 POST TRAUMATIC STRESS DISORDER (PTSD): ICD-10-CM

## 2023-01-19 DIAGNOSIS — F33.1 MAJOR DEPRESSIVE DISORDER, RECURRENT EPISODE, MODERATE: Primary | ICD-10-CM

## 2023-01-19 DIAGNOSIS — F51.01 PRIMARY INSOMNIA: ICD-10-CM

## 2023-01-19 PROCEDURE — 99214 OFFICE O/P EST MOD 30 MIN: CPT | Performed by: NURSE PRACTITIONER

## 2023-01-19 PROCEDURE — 90833 PSYTX W PT W E/M 30 MIN: CPT | Performed by: NURSE PRACTITIONER

## 2023-01-19 RX ORDER — AMITRIPTYLINE HYDROCHLORIDE 25 MG/1
25 TABLET, FILM COATED ORAL NIGHTLY
Qty: 30 TABLET | Refills: 0 | Status: SHIPPED | OUTPATIENT
Start: 2023-01-19 | End: 2023-02-18

## 2023-01-19 NOTE — PROGRESS NOTES
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 screened positive for depression based on a PHQ-9 score of 22 on 12/8/2022. Follow-up recommendations include: Suicide Risk Assessment performed.         TREATMENT PLAN/GOALS: Continue supportive psychotherapy efforts and medications as indicated. Treatment and medication options discussed during today's visit. Patient ackowledged and verbally consented to continue with current treatment plan and was educated on the importance of compliance with treatment and follow-up appointments.      MEDICATION ISSUES:  DEBORAH reviewed as expected.  Discussed medication options and treatment plan of prescribed medication as well as the risks, benefits, and side effects including potential falls, possible impaired driving and metabolic adversities among others. Patient is agreeable to call the office with any worsening of symptoms or onset of side effects. Patient is agreeable to call 911 or go to the nearest ER should he/she begin having SI/HI. No medication side effects or related complaints today.     MEDS ORDERED DURING VISIT:  New Medications Ordered This Visit   Medications   • amitriptyline (ELAVIL) 25 MG tablet     Sig: Take 1 tablet by mouth Every Night for 30 days.     Dispense:  30 tablet     Refill:  0       Return in about 4 weeks (around 2/16/2023).         This document has  been electronically signed by MINI Poon  January 19, 2023 12:48 EST      Part of this note may be an electronic transcription/translation of spoken language to printed text using the Dragon Dictation System.

## 2023-01-26 RX ORDER — MELATONIN 10 MG
10 CAPSULE ORAL
COMMUNITY

## 2023-01-27 ENCOUNTER — ANESTHESIA EVENT (OUTPATIENT)
Dept: PERIOP | Facility: HOSPITAL | Age: 28
End: 2023-01-27
Payer: COMMERCIAL

## 2023-01-30 ENCOUNTER — HOSPITAL ENCOUNTER (OUTPATIENT)
Facility: HOSPITAL | Age: 28
Setting detail: HOSPITAL OUTPATIENT SURGERY
Discharge: HOME | End: 2023-01-30
Attending: UROLOGY | Admitting: UROLOGY
Payer: COMMERCIAL

## 2023-01-30 ENCOUNTER — ANESTHESIA (OUTPATIENT)
Dept: PERIOP | Facility: HOSPITAL | Age: 28
End: 2023-01-30
Payer: COMMERCIAL

## 2023-01-30 ENCOUNTER — TELEPHONE (OUTPATIENT)
Dept: UROLOGY | Facility: CLINIC | Age: 28
End: 2023-01-30

## 2023-01-30 ENCOUNTER — APPOINTMENT (OUTPATIENT)
Dept: GENERAL RADIOLOGY | Facility: HOSPITAL | Age: 28
End: 2023-01-30
Payer: COMMERCIAL

## 2023-01-30 VITALS
HEART RATE: 54 BPM | BODY MASS INDEX: 29.7 KG/M2 | SYSTOLIC BLOOD PRESSURE: 112 MMHG | OXYGEN SATURATION: 100 % | DIASTOLIC BLOOD PRESSURE: 63 MMHG | WEIGHT: 173.94 LBS | RESPIRATION RATE: 16 BRPM | TEMPERATURE: 97.6 F | HEIGHT: 64 IN

## 2023-01-30 DIAGNOSIS — N13.30 HYDRONEPHROSIS, UNSPECIFIED HYDRONEPHROSIS TYPE: Primary | ICD-10-CM

## 2023-01-30 DIAGNOSIS — N20.0 LEFT NEPHROLITHIASIS: ICD-10-CM

## 2023-01-30 LAB
LAB AP CASE REPORT: NORMAL
LAB AP CLINICAL INFORMATION: NORMAL
PATH REPORT.FINAL DX SPEC: NORMAL
PATH REPORT.GROSS SPEC: NORMAL

## 2023-01-30 PROCEDURE — 88300 SURGICAL PATH GROSS: CPT | Performed by: UROLOGY

## 2023-01-30 PROCEDURE — 25010000002 KETOROLAC TROMETHAMINE PER 15 MG: Performed by: NURSE ANESTHETIST, CERTIFIED REGISTERED

## 2023-01-30 PROCEDURE — C2617 STENT, NON-COR, TEM W/O DEL: HCPCS | Performed by: UROLOGY

## 2023-01-30 PROCEDURE — C1769 GUIDE WIRE: HCPCS | Performed by: UROLOGY

## 2023-01-30 PROCEDURE — S0260 H&P FOR SURGERY: HCPCS | Performed by: UROLOGY

## 2023-01-30 PROCEDURE — 25010000002 ONDANSETRON PER 1 MG: Performed by: NURSE ANESTHETIST, CERTIFIED REGISTERED

## 2023-01-30 PROCEDURE — 25010000002 HYDROMORPHONE 1 MG/ML SOLUTION: Performed by: NURSE ANESTHETIST, CERTIFIED REGISTERED

## 2023-01-30 PROCEDURE — 82365 CALCULUS SPECTROSCOPY: CPT | Performed by: UROLOGY

## 2023-01-30 PROCEDURE — 25010000002 FENTANYL CITRATE (PF) 50 MCG/ML SOLUTION: Performed by: NURSE ANESTHETIST, CERTIFIED REGISTERED

## 2023-01-30 PROCEDURE — 25010000002 CEFAZOLIN IN DEXTROSE 2-4 GM/100ML-% SOLUTION: Performed by: UROLOGY

## 2023-01-30 PROCEDURE — 63710000001 PROMETHAZINE PER 12.5 MG: Performed by: NURSE ANESTHETIST, CERTIFIED REGISTERED

## 2023-01-30 PROCEDURE — 52356 CYSTO/URETERO W/LITHOTRIPSY: CPT | Performed by: UROLOGY

## 2023-01-30 PROCEDURE — 74420 UROGRAPHY RTRGR +-KUB: CPT

## 2023-01-30 PROCEDURE — 25010000002 MIDAZOLAM PER 1 MG: Performed by: ANESTHESIOLOGY

## 2023-01-30 PROCEDURE — C1894 INTRO/SHEATH, NON-LASER: HCPCS | Performed by: UROLOGY

## 2023-01-30 PROCEDURE — 25010000002 DEXAMETHASONE PER 1 MG: Performed by: NURSE ANESTHETIST, CERTIFIED REGISTERED

## 2023-01-30 PROCEDURE — 25010000002 PROPOFOL 10 MG/ML EMULSION: Performed by: NURSE ANESTHETIST, CERTIFIED REGISTERED

## 2023-01-30 PROCEDURE — C1758 CATHETER, URETERAL: HCPCS | Performed by: UROLOGY

## 2023-01-30 DEVICE — STNT CLASSC DBL PIG 4.5F 24CM: Type: IMPLANTABLE DEVICE | Site: URETER | Status: FUNCTIONAL

## 2023-01-30 RX ORDER — ROCURONIUM BROMIDE 10 MG/ML
INJECTION, SOLUTION INTRAVENOUS AS NEEDED
Status: DISCONTINUED | OUTPATIENT
Start: 2023-01-30 | End: 2023-01-30 | Stop reason: SURG

## 2023-01-30 RX ORDER — OXYCODONE HYDROCHLORIDE 5 MG/1
5 TABLET ORAL
Status: DISCONTINUED | OUTPATIENT
Start: 2023-01-30 | End: 2023-01-30 | Stop reason: HOSPADM

## 2023-01-30 RX ORDER — DEXMEDETOMIDINE HYDROCHLORIDE 100 UG/ML
INJECTION, SOLUTION INTRAVENOUS AS NEEDED
Status: DISCONTINUED | OUTPATIENT
Start: 2023-01-30 | End: 2023-01-30 | Stop reason: SURG

## 2023-01-30 RX ORDER — OXYBUTYNIN CHLORIDE 5 MG/1
5 TABLET ORAL 3 TIMES DAILY PRN
Qty: 12 TABLET | Refills: 0 | Status: SHIPPED | OUTPATIENT
Start: 2023-01-30 | End: 2023-02-09

## 2023-01-30 RX ORDER — PROPOFOL 10 MG/ML
VIAL (ML) INTRAVENOUS AS NEEDED
Status: DISCONTINUED | OUTPATIENT
Start: 2023-01-30 | End: 2023-01-30 | Stop reason: SURG

## 2023-01-30 RX ORDER — ONDANSETRON 2 MG/ML
4 INJECTION INTRAMUSCULAR; INTRAVENOUS ONCE AS NEEDED
Status: DISCONTINUED | OUTPATIENT
Start: 2023-01-30 | End: 2023-01-30 | Stop reason: HOSPADM

## 2023-01-30 RX ORDER — FENTANYL CITRATE 50 UG/ML
INJECTION, SOLUTION INTRAMUSCULAR; INTRAVENOUS AS NEEDED
Status: DISCONTINUED | OUTPATIENT
Start: 2023-01-30 | End: 2023-01-30 | Stop reason: SURG

## 2023-01-30 RX ORDER — MIDAZOLAM HYDROCHLORIDE 1 MG/ML
2 INJECTION INTRAMUSCULAR; INTRAVENOUS ONCE
Status: COMPLETED | OUTPATIENT
Start: 2023-01-30 | End: 2023-01-30

## 2023-01-30 RX ORDER — ACETAMINOPHEN 325 MG/1
650 TABLET ORAL ONCE
Status: DISCONTINUED | OUTPATIENT
Start: 2023-01-30 | End: 2023-01-30 | Stop reason: HOSPADM

## 2023-01-30 RX ORDER — PHENAZOPYRIDINE HYDROCHLORIDE 200 MG/1
200 TABLET, FILM COATED ORAL 3 TIMES DAILY PRN
Qty: 20 TABLET | Refills: 0 | Status: SHIPPED | OUTPATIENT
Start: 2023-01-30 | End: 2023-02-09

## 2023-01-30 RX ORDER — DEXAMETHASONE SODIUM PHOSPHATE 4 MG/ML
INJECTION, SOLUTION INTRA-ARTICULAR; INTRALESIONAL; INTRAMUSCULAR; INTRAVENOUS; SOFT TISSUE AS NEEDED
Status: DISCONTINUED | OUTPATIENT
Start: 2023-01-30 | End: 2023-01-30 | Stop reason: SURG

## 2023-01-30 RX ORDER — KETOROLAC TROMETHAMINE 10 MG/1
10 TABLET, FILM COATED ORAL EVERY 6 HOURS PRN
Qty: 6 TABLET | Refills: 0 | OUTPATIENT
Start: 2023-01-30 | End: 2023-02-01

## 2023-01-30 RX ORDER — LIDOCAINE HYDROCHLORIDE 20 MG/ML
INJECTION, SOLUTION EPIDURAL; INFILTRATION; INTRACAUDAL; PERINEURAL AS NEEDED
Status: DISCONTINUED | OUTPATIENT
Start: 2023-01-30 | End: 2023-01-30 | Stop reason: SURG

## 2023-01-30 RX ORDER — SCOLOPAMINE TRANSDERMAL SYSTEM 1 MG/1
1 PATCH, EXTENDED RELEASE TRANSDERMAL ONCE
Status: DISCONTINUED | OUTPATIENT
Start: 2023-01-30 | End: 2023-01-30 | Stop reason: HOSPADM

## 2023-01-30 RX ORDER — MAGNESIUM HYDROXIDE 1200 MG/15ML
LIQUID ORAL AS NEEDED
Status: DISCONTINUED | OUTPATIENT
Start: 2023-01-30 | End: 2023-01-30 | Stop reason: HOSPADM

## 2023-01-30 RX ORDER — PROMETHAZINE HYDROCHLORIDE 12.5 MG/1
12.5 TABLET ORAL ONCE AS NEEDED
Status: DISCONTINUED | OUTPATIENT
Start: 2023-01-30 | End: 2023-01-30 | Stop reason: HOSPADM

## 2023-01-30 RX ORDER — CEFAZOLIN SODIUM 2 G/100ML
2 INJECTION, SOLUTION INTRAVENOUS ONCE
Status: COMPLETED | OUTPATIENT
Start: 2023-01-30 | End: 2023-01-30

## 2023-01-30 RX ORDER — TAMSULOSIN HYDROCHLORIDE 0.4 MG/1
1 CAPSULE ORAL DAILY
Qty: 10 CAPSULE | Refills: 0 | Status: SHIPPED | OUTPATIENT
Start: 2023-01-30 | End: 2023-02-09

## 2023-01-30 RX ORDER — PROMETHAZINE HYDROCHLORIDE 25 MG/1
25 SUPPOSITORY RECTAL ONCE AS NEEDED
Status: COMPLETED | OUTPATIENT
Start: 2023-01-30 | End: 2023-01-30

## 2023-01-30 RX ORDER — ACETAMINOPHEN 500 MG
1000 TABLET ORAL ONCE
Status: COMPLETED | OUTPATIENT
Start: 2023-01-30 | End: 2023-01-30

## 2023-01-30 RX ORDER — KETOROLAC TROMETHAMINE 30 MG/ML
INJECTION, SOLUTION INTRAMUSCULAR; INTRAVENOUS AS NEEDED
Status: DISCONTINUED | OUTPATIENT
Start: 2023-01-30 | End: 2023-01-30 | Stop reason: SURG

## 2023-01-30 RX ORDER — SODIUM CHLORIDE, SODIUM LACTATE, POTASSIUM CHLORIDE, CALCIUM CHLORIDE 600; 310; 30; 20 MG/100ML; MG/100ML; MG/100ML; MG/100ML
9 INJECTION, SOLUTION INTRAVENOUS CONTINUOUS PRN
Status: DISCONTINUED | OUTPATIENT
Start: 2023-01-30 | End: 2023-01-30 | Stop reason: HOSPADM

## 2023-01-30 RX ORDER — OXYCODONE HYDROCHLORIDE AND ACETAMINOPHEN 5; 325 MG/1; MG/1
1-2 TABLET ORAL EVERY 6 HOURS PRN
Qty: 14 TABLET | Refills: 0 | Status: SHIPPED | OUTPATIENT
Start: 2023-01-30 | End: 2023-02-09

## 2023-01-30 RX ORDER — ONDANSETRON 4 MG/1
4 TABLET, FILM COATED ORAL ONCE AS NEEDED
Status: DISCONTINUED | OUTPATIENT
Start: 2023-01-30 | End: 2023-01-30 | Stop reason: HOSPADM

## 2023-01-30 RX ORDER — PROMETHAZINE HYDROCHLORIDE 12.5 MG/1
25 TABLET ORAL ONCE AS NEEDED
Status: COMPLETED | OUTPATIENT
Start: 2023-01-30 | End: 2023-01-30

## 2023-01-30 RX ORDER — IBUPROFEN 600 MG/1
600 TABLET ORAL EVERY 6 HOURS PRN
Status: DISCONTINUED | OUTPATIENT
Start: 2023-01-30 | End: 2023-01-30 | Stop reason: HOSPADM

## 2023-01-30 RX ORDER — MEPERIDINE HYDROCHLORIDE 25 MG/ML
12.5 INJECTION INTRAMUSCULAR; INTRAVENOUS; SUBCUTANEOUS
Status: DISCONTINUED | OUTPATIENT
Start: 2023-01-30 | End: 2023-01-30 | Stop reason: HOSPADM

## 2023-01-30 RX ADMIN — PROPOFOL 160 MG: 10 INJECTION, EMULSION INTRAVENOUS at 08:37

## 2023-01-30 RX ADMIN — HYDROMORPHONE HYDROCHLORIDE 0.5 MG: 1 INJECTION, SOLUTION INTRAMUSCULAR; INTRAVENOUS; SUBCUTANEOUS at 10:01

## 2023-01-30 RX ADMIN — DEXMEDETOMIDINE HYDROCHLORIDE 10 MCG: 100 INJECTION, SOLUTION, CONCENTRATE INTRAVENOUS at 09:02

## 2023-01-30 RX ADMIN — PROMETHAZINE HYDROCHLORIDE 25 MG: 12.5 TABLET ORAL at 10:31

## 2023-01-30 RX ADMIN — SUGAMMADEX 200 MG: 100 INJECTION, SOLUTION INTRAVENOUS at 09:10

## 2023-01-30 RX ADMIN — FENTANYL CITRATE 50 MCG: 50 INJECTION, SOLUTION INTRAMUSCULAR; INTRAVENOUS at 09:08

## 2023-01-30 RX ADMIN — DEXAMETHASONE SODIUM PHOSPHATE 4 MG: 4 INJECTION, SOLUTION INTRA-ARTICULAR; INTRALESIONAL; INTRAMUSCULAR; INTRAVENOUS; SOFT TISSUE at 08:42

## 2023-01-30 RX ADMIN — ACETAMINOPHEN 1000 MG: 500 TABLET ORAL at 07:35

## 2023-01-30 RX ADMIN — MIDAZOLAM HYDROCHLORIDE 2 MG: 2 INJECTION, SOLUTION INTRAMUSCULAR; INTRAVENOUS at 08:27

## 2023-01-30 RX ADMIN — ONDANSETRON 4 MG: 2 INJECTION INTRAMUSCULAR; INTRAVENOUS at 09:43

## 2023-01-30 RX ADMIN — OXYCODONE HYDROCHLORIDE 5 MG: 5 TABLET ORAL at 10:32

## 2023-01-30 RX ADMIN — FENTANYL CITRATE 50 MCG: 50 INJECTION, SOLUTION INTRAMUSCULAR; INTRAVENOUS at 08:35

## 2023-01-30 RX ADMIN — CEFAZOLIN SODIUM 2 G: 2 INJECTION, SOLUTION INTRAVENOUS at 08:41

## 2023-01-30 RX ADMIN — KETOROLAC TROMETHAMINE 30 MG: 30 INJECTION, SOLUTION INTRAMUSCULAR; INTRAVENOUS at 09:06

## 2023-01-30 RX ADMIN — SCOPALAMINE 1 PATCH: 1 PATCH, EXTENDED RELEASE TRANSDERMAL at 07:36

## 2023-01-30 RX ADMIN — DEXMEDETOMIDINE HYDROCHLORIDE 10 MCG: 100 INJECTION, SOLUTION, CONCENTRATE INTRAVENOUS at 08:48

## 2023-01-30 RX ADMIN — SODIUM CHLORIDE, POTASSIUM CHLORIDE, SODIUM LACTATE AND CALCIUM CHLORIDE 9 ML/HR: 600; 310; 30; 20 INJECTION, SOLUTION INTRAVENOUS at 07:28

## 2023-01-30 RX ADMIN — ROCURONIUM BROMIDE 40 MG: 10 INJECTION, SOLUTION INTRAVENOUS at 08:37

## 2023-01-30 RX ADMIN — LIDOCAINE HYDROCHLORIDE 80 MG: 20 INJECTION, SOLUTION EPIDURAL; INFILTRATION; INTRACAUDAL; PERINEURAL at 08:35

## 2023-01-30 RX ADMIN — HYDROMORPHONE HYDROCHLORIDE 0.5 MG: 1 INJECTION, SOLUTION INTRAMUSCULAR; INTRAVENOUS; SUBCUTANEOUS at 09:50

## 2023-01-30 NOTE — ANESTHESIA POSTPROCEDURE EVALUATION
Patient: Palmira Carbajal    Procedure Summary     Date: 01/30/23 Room / Location: [facility] OR  / [facility] MAIN OR    Anesthesia Start: 0831 Anesthesia Stop: 0923    Procedure: CYSTOSCOPY URETEROSCOPY RETROGRADE PYELOGRAM HOLMIUM LASER STENT INSERTION, left (Left) Diagnosis:       Left nephrolithiasis      (Left nephrolithiasis [N20.0])    Surgeons: Melisa Garcia MD Provider: Dusty Deng MD    Anesthesia Type: general ASA Status: 2          Anesthesia Type: general    Vitals  Vitals Value Taken Time   /49 01/30/23 0948   Temp 36.4 °C (97.5 °F) 01/30/23 0920   Pulse 64 01/30/23 0949   Resp 16 01/30/23 0920   SpO2 100 % 01/30/23 0949   Vitals shown include unvalidated device data.        Post Anesthesia Care and Evaluation    Patient location during evaluation: bedside  Patient participation: complete - patient participated  Level of consciousness: awake  Pain management: adequate    Airway patency: patent  Anesthetic complications: No anesthetic complications  PONV Status: none  Cardiovascular status: acceptable and stable  Respiratory status: acceptable  Hydration status: acceptable    Comments: An Anesthesiologist personally participated in the most demanding procedures (including induction and emergence if applicable) in the anesthesia plan, monitored the course of anesthesia administration at frequent intervals and remained physically present and available for immediate diagnosis and treatment of emergencies.

## 2023-01-30 NOTE — ANESTHESIA POSTPROCEDURE EVALUATION
Patient: Palmira Carbajal    Procedure Summary     Date: 01/30/23 Room / Location: [facility] OR 07 / [facility] MAIN OR    Anesthesia Start: 0831 Anesthesia Stop: 0923    Procedure: CYSTOSCOPY URETEROSCOPY RETROGRADE PYELOGRAM HOLMIUM LASER STENT INSERTION, left (Left) Diagnosis:       Left nephrolithiasis      (Left nephrolithiasis [N20.0])    Surgeons: Melisa Garcia MD Provider: Dusty Deng MD    Anesthesia Type: general ASA Status: 2          Anesthesia Type: general    Vitals  Vitals Value Taken Time   BP 90/44 01/30/23 0933   Temp     Pulse 60 01/30/23 0937   Resp     SpO2 98 % 01/30/23 0937   Vitals shown include unvalidated device data.        Post Anesthesia Care and Evaluation    Patient location during evaluation: bedside  Patient participation: complete - patient participated  Level of consciousness: awake  Pain management: adequate    Airway patency: patent  Anesthetic complications: No anesthetic complications  PONV Status: none  Cardiovascular status: acceptable and stable  Respiratory status: acceptable  Hydration status: acceptable    Comments: An Anesthesiologist personally participated in the most demanding procedures (including induction and emergence if applicable) in the anesthesia plan, monitored the course of anesthesia administration at frequent intervals and remained physically present and available for immediate diagnosis and treatment of emergencies.

## 2023-01-30 NOTE — TELEPHONE ENCOUNTER
Patient called and stated that her stent has dropped out it is now hanging out of her urethra she stated that she is urinating all the time. This nurse notified dr manuel and she stated that she would need to take out stent ureteral spasm are expected but if woresening despite medication and are unbearable  Of with fever of 100.4 or greater to go the ed.

## 2023-01-30 NOTE — ANESTHESIA PREPROCEDURE EVALUATION
Anesthesia Evaluation     Patient summary reviewed and Nursing notes reviewed   history of anesthetic complications: PONV  NPO Solid Status: > 8 hours  NPO Liquid Status: > 2 hours           Airway   Mallampati: I  TM distance: >3 FB  Neck ROM: full  No difficulty expected  Dental      Pulmonary - normal exam    breath sounds clear to auscultation  (+) a smoker Former,   Cardiovascular - negative cardio ROS and normal exam  Exercise tolerance: good (4-7 METS)    Rhythm: regular  Rate: normal        Neuro/Psych  (+) psychiatric history PTSD and Depression,    GI/Hepatic/Renal/Endo    (+)   renal disease stones,     Musculoskeletal     Abdominal    Substance History      OB/GYN          Other        ROS/Med Hx Other: >4METS, HX PONV, PANIC ATTACKS UPON AWAKENING FROM ANESTHESIA. KT                   Anesthesia Plan    ASA 2     general     (Patient understands anesthesia not responsible for dental damage.)  intravenous induction     Anesthetic plan, risks, benefits, and alternatives have been provided, discussed and informed consent has been obtained with: patient.    Plan discussed with CRNA.        CODE STATUS:

## 2023-01-30 NOTE — ANESTHESIA POSTPROCEDURE EVALUATION
Patient: Palmira Carbajal    Procedure Summary     Date: 01/30/23 Room / Location: [facility] OR 07 / [facility] MAIN OR    Anesthesia Start: 0831 Anesthesia Stop: 0923    Procedure: CYSTOSCOPY URETEROSCOPY RETROGRADE PYELOGRAM HOLMIUM LASER STENT INSERTION, left (Left) Diagnosis:       Left nephrolithiasis      (Left nephrolithiasis [N20.0])    Surgeons: Melisa Garcia MD Provider: Dusty Deng MD    Anesthesia Type: general ASA Status: 2          Anesthesia Type: general    Vitals  Vitals Value Taken Time   BP 90/44 01/30/23 0933   Temp     Pulse 60 01/30/23 0937   Resp     SpO2 98 % 01/30/23 0937   Vitals shown include unvalidated device data.        Post Anesthesia Care and Evaluation    Patient location during evaluation: bedside  Patient participation: complete - patient participated  Level of consciousness: awake  Pain management: adequate    Airway patency: patent  Anesthetic complications: No anesthetic complications  PONV Status: none  Cardiovascular status: acceptable and stable  Respiratory status: acceptable  Hydration status: acceptable    Comments: An Anesthesiologist personally participated in the most demanding procedures (including induction and emergence if applicable) in the anesthesia plan, monitored the course of anesthesia administration at frequent intervals and remained physically present and available for immediate diagnosis and treatment of emergencies.

## 2023-02-01 ENCOUNTER — TELEPHONE (OUTPATIENT)
Dept: UROLOGY | Facility: CLINIC | Age: 28
End: 2023-02-01
Payer: COMMERCIAL

## 2023-02-01 ENCOUNTER — APPOINTMENT (OUTPATIENT)
Dept: CT IMAGING | Facility: HOSPITAL | Age: 28
End: 2023-02-01
Payer: COMMERCIAL

## 2023-02-01 ENCOUNTER — HOSPITAL ENCOUNTER (EMERGENCY)
Facility: HOSPITAL | Age: 28
Discharge: HOME | End: 2023-02-01
Attending: EMERGENCY MEDICINE | Admitting: EMERGENCY MEDICINE
Payer: COMMERCIAL

## 2023-02-01 VITALS
BODY MASS INDEX: 31.92 KG/M2 | OXYGEN SATURATION: 96 % | TEMPERATURE: 98.3 F | HEART RATE: 87 BPM | HEIGHT: 64 IN | SYSTOLIC BLOOD PRESSURE: 134 MMHG | DIASTOLIC BLOOD PRESSURE: 78 MMHG | RESPIRATION RATE: 20 BRPM | WEIGHT: 186.95 LBS

## 2023-02-01 DIAGNOSIS — R10.32 LEFT LOWER QUADRANT ABDOMINAL PAIN: Primary | ICD-10-CM

## 2023-02-01 DIAGNOSIS — N20.1 URETEROLITHIASIS: ICD-10-CM

## 2023-02-01 DIAGNOSIS — N39.0 ACUTE UTI: ICD-10-CM

## 2023-02-01 LAB
ALBUMIN SERPL-MCNC: 3.7 G/DL (ref 3.5–5.2)
ALBUMIN/GLOB SERPL: 1.4 G/DL
ALP SERPL-CCNC: 83 U/L (ref 39–117)
ALT SERPL W P-5'-P-CCNC: 10 U/L (ref 1–33)
ANION GAP SERPL CALCULATED.3IONS-SCNC: 8.7 MMOL/L (ref 5–15)
AST SERPL-CCNC: 9 U/L (ref 1–32)
BACTERIA UR QL AUTO: ABNORMAL /HPF
BASOPHILS # BLD AUTO: 0.08 10*3/MM3 (ref 0–0.2)
BASOPHILS NFR BLD AUTO: 0.5 % (ref 0–1.5)
BILIRUB SERPL-MCNC: <0.2 MG/DL (ref 0–1.2)
BILIRUB UR QL STRIP: NEGATIVE
BUN SERPL-MCNC: 8 MG/DL (ref 6–20)
BUN/CREAT SERPL: 12.9 (ref 7–25)
CALCIUM SPEC-SCNC: 8.6 MG/DL (ref 8.6–10.5)
CHLORIDE SERPL-SCNC: 104 MMOL/L (ref 98–107)
CLARITY UR: CLEAR
CO2 SERPL-SCNC: 25.3 MMOL/L (ref 22–29)
COLOR UR: ABNORMAL
CREAT SERPL-MCNC: 0.62 MG/DL (ref 0.57–1)
DEPRECATED RDW RBC AUTO: 41.8 FL (ref 37–54)
EGFRCR SERPLBLD CKD-EPI 2021: 123.8 ML/MIN/1.73
EOSINOPHIL # BLD AUTO: 0.4 10*3/MM3 (ref 0–0.4)
EOSINOPHIL NFR BLD AUTO: 2.3 % (ref 0.3–6.2)
ERYTHROCYTE [DISTWIDTH] IN BLOOD BY AUTOMATED COUNT: 13.4 % (ref 12.3–15.4)
GLOBULIN UR ELPH-MCNC: 2.7 GM/DL
GLUCOSE SERPL-MCNC: 113 MG/DL (ref 65–99)
GLUCOSE UR STRIP-MCNC: NEGATIVE MG/DL
HCT VFR BLD AUTO: 38.7 % (ref 34–46.6)
HGB BLD-MCNC: 12.8 G/DL (ref 12–15.9)
HGB UR QL STRIP.AUTO: ABNORMAL
HOLD SPECIMEN: NORMAL
HOLD SPECIMEN: NORMAL
HYALINE CASTS UR QL AUTO: ABNORMAL /LPF
IMM GRANULOCYTES # BLD AUTO: 0.1 10*3/MM3 (ref 0–0.05)
IMM GRANULOCYTES NFR BLD AUTO: 0.6 % (ref 0–0.5)
KETONES UR QL STRIP: NEGATIVE
LEUKOCYTE ESTERASE UR QL STRIP.AUTO: ABNORMAL
LIPASE SERPL-CCNC: 28 U/L (ref 13–60)
LYMPHOCYTES # BLD AUTO: 5.18 10*3/MM3 (ref 0.7–3.1)
LYMPHOCYTES NFR BLD AUTO: 30.1 % (ref 19.6–45.3)
MCH RBC QN AUTO: 28.2 PG (ref 26.6–33)
MCHC RBC AUTO-ENTMCNC: 33.1 G/DL (ref 31.5–35.7)
MCV RBC AUTO: 85.2 FL (ref 79–97)
MONOCYTES # BLD AUTO: 1.07 10*3/MM3 (ref 0.1–0.9)
MONOCYTES NFR BLD AUTO: 6.2 % (ref 5–12)
NEUTROPHILS NFR BLD AUTO: 10.36 10*3/MM3 (ref 1.7–7)
NEUTROPHILS NFR BLD AUTO: 60.3 % (ref 42.7–76)
NITRITE UR QL STRIP: POSITIVE
NRBC BLD AUTO-RTO: 0 /100 WBC (ref 0–0.2)
PH UR STRIP.AUTO: 7 [PH] (ref 5–8)
PLATELET # BLD AUTO: 346 10*3/MM3 (ref 140–450)
PMV BLD AUTO: 10.7 FL (ref 6–12)
POTASSIUM SERPL-SCNC: 3.3 MMOL/L (ref 3.5–5.2)
PROT SERPL-MCNC: 6.4 G/DL (ref 6–8.5)
PROT UR QL STRIP: ABNORMAL
RBC # BLD AUTO: 4.54 10*6/MM3 (ref 3.77–5.28)
RBC # UR STRIP: ABNORMAL /HPF
REF LAB TEST METHOD: ABNORMAL
SODIUM SERPL-SCNC: 138 MMOL/L (ref 136–145)
SP GR UR STRIP: <=1.005 (ref 1–1.03)
SQUAMOUS #/AREA URNS HPF: ABNORMAL /HPF
UROBILINOGEN UR QL STRIP: ABNORMAL
WBC # UR STRIP: ABNORMAL /HPF
WBC NRBC COR # BLD: 17.19 10*3/MM3 (ref 3.4–10.8)
WHOLE BLOOD HOLD COAG: NORMAL
WHOLE BLOOD HOLD SPECIMEN: NORMAL

## 2023-02-01 PROCEDURE — 81001 URINALYSIS AUTO W/SCOPE: CPT | Performed by: EMERGENCY MEDICINE

## 2023-02-01 PROCEDURE — 83690 ASSAY OF LIPASE: CPT | Performed by: EMERGENCY MEDICINE

## 2023-02-01 PROCEDURE — 25010000002 KETOROLAC TROMETHAMINE PER 15 MG: Performed by: EMERGENCY MEDICINE

## 2023-02-01 PROCEDURE — 80053 COMPREHEN METABOLIC PANEL: CPT | Performed by: EMERGENCY MEDICINE

## 2023-02-01 PROCEDURE — 96374 THER/PROPH/DIAG INJ IV PUSH: CPT

## 2023-02-01 PROCEDURE — 99283 EMERGENCY DEPT VISIT LOW MDM: CPT

## 2023-02-01 PROCEDURE — 85025 COMPLETE CBC W/AUTO DIFF WBC: CPT | Performed by: EMERGENCY MEDICINE

## 2023-02-01 PROCEDURE — 74176 CT ABD & PELVIS W/O CONTRAST: CPT

## 2023-02-01 PROCEDURE — 36415 COLL VENOUS BLD VENIPUNCTURE: CPT

## 2023-02-01 PROCEDURE — 87086 URINE CULTURE/COLONY COUNT: CPT | Performed by: EMERGENCY MEDICINE

## 2023-02-01 RX ORDER — DICLOFENAC SODIUM 75 MG/1
75 TABLET, DELAYED RELEASE ORAL 2 TIMES DAILY PRN
Qty: 20 TABLET | Refills: 0 | Status: SHIPPED | OUTPATIENT
Start: 2023-02-01 | End: 2023-03-07

## 2023-02-01 RX ORDER — SODIUM CHLORIDE 0.9 % (FLUSH) 0.9 %
10 SYRINGE (ML) INJECTION AS NEEDED
Status: DISCONTINUED | OUTPATIENT
Start: 2023-02-01 | End: 2023-02-01 | Stop reason: HOSPADM

## 2023-02-01 RX ORDER — SULFAMETHOXAZOLE AND TRIMETHOPRIM 800; 160 MG/1; MG/1
1 TABLET ORAL ONCE
Status: COMPLETED | OUTPATIENT
Start: 2023-02-01 | End: 2023-02-01

## 2023-02-01 RX ORDER — KETOROLAC TROMETHAMINE 30 MG/ML
30 INJECTION, SOLUTION INTRAMUSCULAR; INTRAVENOUS ONCE
Status: COMPLETED | OUTPATIENT
Start: 2023-02-01 | End: 2023-02-01

## 2023-02-01 RX ORDER — SULFAMETHOXAZOLE AND TRIMETHOPRIM 800; 160 MG/1; MG/1
1 TABLET ORAL 2 TIMES DAILY
Qty: 20 TABLET | Refills: 0 | Status: SHIPPED | OUTPATIENT
Start: 2023-02-01 | End: 2023-02-14

## 2023-02-01 RX ADMIN — SULFAMETHOXAZOLE AND TRIMETHOPRIM 1 TABLET: 800; 160 TABLET ORAL at 17:36

## 2023-02-01 RX ADMIN — KETOROLAC TROMETHAMINE 30 MG: 30 INJECTION, SOLUTION INTRAMUSCULAR; INTRAVENOUS at 15:56

## 2023-02-01 NOTE — ED NOTES
Time: 3:06 PM EST  Date of encounter:  2023  Independent Historian/Clinical History and Information was obtained by:   Patient  Chief Complaint: Abdominal pain    History is limited by: N/A    History of Present Illness:  Patient is a 27 y.o. year old female who presents to the emergency department for evaluation of abdominal pain. Pt states that she underwent last lithotripsy with stent placement on . Following her procedure, the pt removed her stent and since then has been experiencing left sided abdominal pain and left sided flank pain. She states that her pain has been constant since onset and she describes her pain as stabbing/aching. She denies any aggravating or relieving factors. She has used percocet and toradol with no symptomatic improvement. Pt states that her pain is currently an 8-8.5/10. Pt also reports urinary frequency, urgency, and a subjective fever. Pt denies hematuria, nausea, vomiting, and diarrhea.    HPI    Patient Care Team  Primary Care Provider: Jaja Castillo APRN    Past Medical History:     Allergies   Allergen Reactions   • Cephalexin Diarrhea   • Cephalosporins GI Intolerance   • Cymbalta [Duloxetine Hcl] Other (See Comments)     Jittery and insomnia   • Penicillins Rash     Past Medical History:   Diagnosis Date   • Acid reflux    • Chronic pain disorder    • Depression    • Eczema    • Kidney stone     HX OF   • Maltracking of right patella    • Panic disorder    • PONV (postoperative nausea and vomiting)     GOOD RESULTS WITH SCOPALAMINE   • PTSD (post-traumatic stress disorder)     WAKES UP FROM ANESTHESIA WITH PANIC ATTACK   • Seasonal allergic rhinitis 2022   • Self-injurious behavior     as a teenager   • Suicide attempt (HCC)      Past Surgical History:   Procedure Laterality Date   •  SECTION     • COLONOSCOPY N/A 10/11/2022    Procedure: COLONOSCOPY;  Surgeon: Dyan Griffin MD;  Location: [facility] ENDOSCOPY;  Service:  Gastroenterology;  Laterality: N/A;  HEMORRHOIDS   • CYSTOSCOPY     • CYSTOSCOPY URETEROSCOPY Left 1/30/2023    Procedure: CYSTOSCOPY URETEROSCOPY RETROGRADE PYELOGRAM HOLMIUM LASER STENT INSERTION, left;  Surgeon: Melisa Garcia MD;  Location: [facility] MAIN OR;  Service: Urology;  Laterality: Left;   • HYSTERECTOMY     • KIDNEY STONE SURGERY      unspecified   • KNEE ARTHROSCOPY Right 7/11/2022    Procedure: KNEE ARTHROSCOPY WITH A LATERAL RELEASE AND CHONDROPLASTY;  Surgeon: Marco A Pitts MD;  Location: [facility] OR [facility];  Service: Orthopedics;  Laterality: Right;   • WISDOM TOOTH EXTRACTION       Family History   Problem Relation Age of Onset   • Arthritis Mother    • Thyroid disease Father    • Colon polyps Father    • Diabetes Father    • Drug abuse Maternal Uncle    • Alcohol abuse Maternal Uncle    • Cancer Maternal Grandmother    • Malig Hyperthermia Neg Hx        Home Medications:  Prior to Admission medications    Medication Sig Start Date End Date Taking? Authorizing Provider   amitriptyline (ELAVIL) 25 MG tablet Take 1 tablet by mouth Every Night for 30 days. 1/19/23 2/18/23  Cassius Cantrell APRN   diclofenac (VOLTAREN) 75 MG EC tablet Take 1 tablet by mouth 2 (Two) Times a Day. 12/2/22   Jaja Castillo APRN   dicyclomine (BENTYL) 20 MG tablet Take 1 tablet by mouth Every 6 (Six) Hours As Needed (abdominal cramping). 8/26/22   Eulalia Ley APRN   galcanezumab-gnlm (EMGALITY) 120 MG/ML auto-injector pen Inject 1 mL under the skin into the appropriate area as directed Every 30 (Thirty) Days. 8/16/22   Angel Arita MD   ketorolac (TORADOL) 10 MG tablet Take 1 tablet by mouth Every 6 (Six) Hours As Needed for Moderate Pain. 1/30/23   Melisa Garcia MD   lamoTRIgine (LaMICtal) 100 MG tablet Take 0.5 tablets by mouth Daily for 60 days.  Patient taking differently: Take 50 mg by mouth Every Evening. 12/29/22 2/27/23  Cassius Cantrell APRN   Melatonin 10 MG capsule Take 10 mg by  mouth.    Provider, MD Amrit   oxybutynin (DITROPAN) 5 MG tablet Take 1 tablet by mouth 3 (Three) Times a Day As Needed (Bladder spasm). 23   Melisa Garcia MD   oxyCODONE-acetaminophen (Percocet) 5-325 MG per tablet Take 1-2 tablets by mouth Every 6 (Six) Hours As Needed for Severe Pain. 23   Melisa Garcia MD   phenazopyridine (Pyridium) 200 MG tablet Take 1 tablet by mouth 3 (Three) Times a Day As Needed for Bladder Spasms (Burning, urinary discomfort). 23   Melisa Garcia MD   spironolactone (Aldactone) 50 MG tablet Take 1 tablet by mouth Every Morning. 22   Jaja Castillo APRN   tamsulosin (FLOMAX) 0.4 MG capsule 24 hr capsule Take 1 capsule by mouth Daily. 23   Melisa Garcia MD   vitamin D (ERGOCALCIFEROL) 1.25 MG (79281 UT) capsule capsule Take 1 capsule by mouth 1 (One) Time Per Week for 12 doses. 11/29/22 2/15/23  Virginia Santana APRN        Social History:   Social History     Tobacco Use   • Smoking status: Former     Packs/day: 0.50     Years: 15.00     Pack years: 7.50     Types: Cigarettes     Quit date: 2022     Years since quittin.9   • Smokeless tobacco: Never   Vaping Use   • Vaping Use: Every day   • Substances: Nicotine, Flavoring   • Devices: Disposable   Substance Use Topics   • Alcohol use: Not Currently   • Drug use: Not Currently     Types: Marijuana         Review of Systems:  Review of Systems   Constitutional: Positive for fever. Negative for chills and diaphoresis.   HENT: Negative for congestion, ear pain and sore throat.    Eyes: Negative for pain.   Respiratory: Negative for cough, chest tightness and shortness of breath.    Cardiovascular: Negative for chest pain and leg swelling.   Gastrointestinal: Positive for abdominal pain (left sided). Negative for diarrhea, nausea and vomiting.   Genitourinary: Positive for flank pain (left sided), frequency and urgency. Negative for hematuria.   Musculoskeletal: Negative for back  "pain and joint swelling.   Skin: Negative for pallor.   Neurological: Negative for seizures and headaches.   All other systems reviewed and are negative.      Physical Exam:  /78 (BP Location: Right arm, Patient Position: Lying)   Pulse 87   Temp 98.3 °F (36.8 °C) (Oral)   Resp 20   Ht 162.6 cm (64\")   Wt 84.8 kg (186 lb 15.2 oz)   SpO2 96%   BMI 32.09 kg/m²     Physical Exam  Vitals and nursing note reviewed.   Constitutional:       General: She is not in acute distress.     Appearance: She is not ill-appearing.   Cardiovascular:      Rate and Rhythm: Normal rate and regular rhythm.      Heart sounds: Normal heart sounds.   Pulmonary:      Effort: Pulmonary effort is normal. No respiratory distress.      Breath sounds: Normal breath sounds.   Abdominal:      General: Abdomen is flat. Bowel sounds are normal. There is no distension.      Palpations: Abdomen is soft.      Tenderness: There is abdominal tenderness in the left lower quadrant. There is left CVA tenderness. There is no right CVA tenderness, guarding or rebound.   Skin:     General: Skin is warm and dry.   Neurological:      Mental Status: She is alert and oriented to person, place, and time.              Procedures:  Procedures      Medical Decision Making:      Comorbidities that affect care:    Nephrolithiasis, depression, acid reflux    External Notes reviewed:    Hospital Discharge Summary, Previous Admission Note, Previous Clinic Note and Previous Operation Note      The following orders were placed and all results were independently analyzed by me:  Orders Placed This Encounter   Procedures   • Urine Culture - Urine, Urine, Clean Catch   • CT Abdomen Pelvis Without Contrast   • [place] Draw   • Comprehensive Metabolic Panel   • Lipase   • Urinalysis With Microscopic If Indicated (No Culture) - Urine, Clean Catch   • CBC Auto Differential   • Urinalysis, Microscopic Only - Urine, Clean Catch   • NPO Diet NPO Type: Strict NPO   • Undress " & Gown   • Insert Peripheral IV   • CBC & Differential   • Green Top (Gel)   • Lavender Top   • Gold Top - SST   • Light Blue Top       Medications Given in the Emergency Department:  Medications   sodium chloride 0.9 % flush 10 mL (has no administration in time range)   sulfamethoxazole-trimethoprim (BACTRIM DS,SEPTRA DS) 800-160 MG per tablet 1 tablet (has no administration in time range)   ketorolac (TORADOL) injection 30 mg (30 mg Intravenous Given 2/1/23 1711)        ED Course:     The patient was seen and evaluated the ED by me.  The above history and physical examination was performed as document.  Diagnostic data was obtained peer results reviewed.  Findings were discussed with the patient.  Of note, her left side looks good.  Patient is noted to be passing a 2 mm stone on her right side.  This is not the location of her pain.  There is no evidence of any kind of hydronephrosis or other intra-abdominal pathology on the left side.  Patient's urine is nitrate positive and will be treated.  Urine culture will be obtained.  Patient be treated with Bactrim for 10 days and instructed to follow-up with Dr. Garcia if her symptoms or not improving after completion of her treatment.    Labs:    Lab Results (last 24 hours)     Procedure Component Value Units Date/Time    CBC & Differential [[id number]]  (Abnormal) Collected: 02/01/23 1424    Specimen: Blood Updated: 02/01/23 1603    Narrative:      The following orders were created for panel order CBC & Differential.  Procedure                               Abnormality         Status                     ---------                               -----------         ------                     CBC Auto Differential[[id number]]        Abnormal            Final result                 Please view results for these tests on the individual orders.    Comprehensive Metabolic Panel [[id number]]  (Abnormal) Collected: 02/01/23 1424    Specimen: Blood Updated: 02/01/23 1618      Glucose 113 mg/dL      BUN 8 mg/dL      Creatinine 0.62 mg/dL      Sodium 138 mmol/L      Potassium 3.3 mmol/L      Chloride 104 mmol/L      CO2 25.3 mmol/L      Calcium 8.6 mg/dL      Total Protein 6.4 g/dL      Albumin 3.7 g/dL      ALT (SGPT) 10 U/L      AST (SGOT) 9 U/L      Alkaline Phosphatase 83 U/L      Total Bilirubin <0.2 mg/dL      Globulin 2.7 gm/dL      A/G Ratio 1.4 g/dL      BUN/Creatinine Ratio 12.9     Anion Gap 8.7 mmol/L      eGFR 123.8 mL/min/1.73     Narrative:      GFR Normal >60  Chronic Kidney Disease <60  Kidney Failure <15      Lipase [[id number]]  (Normal) Collected: 02/01/23 1424    Specimen: Blood Updated: 02/01/23 1618     Lipase 28 U/L     CBC Auto Differential [[id number]]  (Abnormal) Collected: 02/01/23 1424    Specimen: Blood Updated: 02/01/23 1603     WBC 17.19 10*3/mm3      RBC 4.54 10*6/mm3      Hemoglobin 12.8 g/dL      Hematocrit 38.7 %      MCV 85.2 fL      MCH 28.2 pg      MCHC 33.1 g/dL      RDW 13.4 %      RDW-SD 41.8 fl      MPV 10.7 fL      Platelets 346 10*3/mm3      Neutrophil % 60.3 %      Lymphocyte % 30.1 %      Monocyte % 6.2 %      Eosinophil % 2.3 %      Basophil % 0.5 %      Immature Grans % 0.6 %      Neutrophils, Absolute 10.36 10*3/mm3      Lymphocytes, Absolute 5.18 10*3/mm3      Monocytes, Absolute 1.07 10*3/mm3      Eosinophils, Absolute 0.40 10*3/mm3      Basophils, Absolute 0.08 10*3/mm3      Immature Grans, Absolute 0.10 10*3/mm3      nRBC 0.0 /100 WBC     Urinalysis With Microscopic If Indicated (No Culture) - Urine, Clean Catch [[id number]]  (Abnormal) Collected: 02/01/23 1553    Specimen: Urine, Clean Catch Updated: 02/01/23 1607     Color, UA Dark Yellow     Appearance, UA Clear     pH, UA 7.0     Specific Gravity, UA <=1.005     Glucose, UA Negative     Ketones, UA Negative     Bilirubin, UA Negative     Blood, UA Large (3+)     Protein, UA Trace     Leuk Esterase, UA Small (1+)     Nitrite, UA Positive     Urobilinogen, UA 1.0 E.U./dL    Urinalysis,  Microscopic Only - Urine, Clean Catch [[id number]]  (Abnormal) Collected: 02/01/23 1553    Specimen: Urine, Clean Catch Updated: 02/01/23 1607     RBC, UA Too Numerous to Count /HPF      WBC, UA 6-12 /HPF      Bacteria, UA None Seen /HPF      Squamous Epithelial Cells, UA 0-2 /HPF      Hyaline Casts, UA 3-6 /LPF      Methodology Automated Microscopy    Urine Culture - Urine, Urine, Clean Catch [[id number]] Collected: 02/01/23 1553    Specimen: Urine, Clean Catch Updated: 02/01/23 1721           Imaging:    CT Abdomen Pelvis Without Contrast    Result Date: 2/1/2023  PROCEDURE: CT ABDOMEN PELVIS WO CONTRAST  COMPARISON: 12/09/2022  INDICATIONS: left renal colic. left flank pain  TECHNIQUE: CT images were created without intravenous contrast.   PROTOCOL:   Standard imaging protocol performed    RADIATION:   DLP: 521.4 mGy*cm   Automated exposure control was utilized to minimize radiation dose.  FINDINGS:  There are calcified granulomatous changes in the lung bases.  There are calcified granulomatous changes in the spleen and liver.  Limited unenhanced evaluation of the spleen, pancreas, adrenals, liver is otherwise grossly unremarkable.  The gallbladder is contracted.  There is mild right-sided hydronephrosis and proximal hydroureter.  There is a 2-3 mm calculus which appears to be in the distal right ureter just proximal to the ureterovesical junction.  Evaluation is somewhat limited due to adjacent phleboliths.  There is a nonobstructing 2 mm calculus in the upper pole and two 1 mm calculi in the lower pole of the right kidney.  There is a nonobstructing 3-4 mm calculus in the lower pole of the left kidney.  No left ureteral calculi are identified.  Urinary bladder is grossly unremarkable.  Patient is status post hysterectomy.  There is a small hiatal hernia.  There is no evidence of bowel obstruction, free air or free fluid.  The appendix is unremarkable.  There is moderate colonic fecal retention.  Visualized  osseous structures are unremarkable for age.         1. There is mild right renal collecting system dilatation, with probable distal right ureteral 2 mm calculus.  2. Additional nonobstructing intrarenal calculi bilaterally.      SHARON RUFFIN MD       Electronically Signed and Approved By: SHARON RUFFIN MD on 2/01/2023 at 16:30                 Differential Diagnosis and Discussion:    Abdominal Pain: Based on the patient's signs and symptoms, I considered abdominal aortic aneurysm, small bowel obstruction, pancreatitis, acute cholecystitis, acute appendecitis, peptic ulcer disease, gastritis, colitis, endocrine disorders, irritable bowel syndrome and other differential diagnosis an etiology of the patient's abdominal pain.    All labs were reviewed and analyzed by me.  CT scan radiology interpretation was reviewed by me.    MDM     Patient Care Considerations:          Consultants/Shared Management Plan:    None    Social Determinants of Health:    Patient is independent, reliable, and has access to care.       Disposition and Care Coordination:    Discharged: The patient is suitable and stable for discharge with no need for consideration of observation or admission.    I have explained the patient´s condition, diagnoses and treatment plan based on the information available to me at this time. I have answered questions and addressed any concerns. The patient has a good  understanding of the patient´s diagnosis, condition, and treatment plan as can be expected at this point. The vital signs have been stable. The patient´s condition is stable and appropriate for discharge from the emergency department.      The patient will pursue further outpatient evaluation with the primary care physician or other designated or consulting physician as outlined in the discharge instructions. They are agreeable to this plan of care and follow-up instructions have been explained in detail. The patient has received these instructions in  written format and have expressed an understanding of the discharge instructions. The patient is aware that any significant change in condition or worsening of symptoms should prompt an immediate return to this or the closest emergency department or call to 911.  I have explained discharge medications and the need for follow up with the patient/caretakers. This was also printed in the discharge instructions. Patient was discharged with the following medications and follow up:      Medication List      New Prescriptions    sulfamethoxazole-trimethoprim 800-160 MG per tablet  Commonly known as: BACTRIM DS,SEPTRA DS  Take 1 tablet by mouth 2 (Two) Times a Day.        Changed    * diclofenac 75 MG EC tablet  Commonly known as: VOLTAREN  Take 1 tablet by mouth 2 (Two) Times a Day.  What changed: Another medication with the same name was added. Make sure you understand how and when to take each.     * diclofenac 75 MG EC tablet  Commonly known as: VOLTAREN  Take 1 tablet by mouth 2 (Two) Times a Day As Needed (Pain).  What changed: You were already taking a medication with the same name, and this prescription was added. Make sure you understand how and when to take each.     lamoTRIgine 100 MG tablet  Commonly known as: LaMICtal  Take 0.5 tablets by mouth Daily for 60 days.  What changed: when to take this         * This list has 2 medication(s) that are the same as other medications prescribed for you. Read the directions carefully, and ask your doctor or other care provider to review them with you.            Stop    ketorolac 10 MG tablet  Commonly known as: TORADOL           Where to Get Your Medications      These medications were sent to [place], [place] - [address] [facility] - [phone] [facility] [phone] [address] [address]    Phone: [phone]   · diclofenac 75 MG EC tablet  · sulfamethoxazole-trimethoprim 800-160 MG per tablet      Jaja Castillo,  APRN  [address] [place] [address]  [phone]      As needed    Melisa Garcia MD  [address] [place] [address]  [phone]    In 1 week  If symptoms fail to resolve or improve       Final diagnoses:   Left lower quadrant abdominal pain   Acute UTI   Ureterolithiasis        ED Disposition     ED Disposition   Discharge    Condition   Stable    Comment   --             This medical record created using voice recognition software.             Kory Dorman DO  02/01/23 [id number]

## 2023-02-01 NOTE — DISCHARGE INSTRUCTIONS
Rest at home.  Drink plenty fluids.  Take home medications as prescribed.  Take the antibiotics as prescribed.  Follow-up with Dr. Gomez in 7 to 10 days if symptoms or not improving.  Return to the ER in the interim for worsening or uncontrollable pain, fever greater than 101, intractable vomiting, or any other concerns issues that may arise.

## 2023-02-01 NOTE — TELEPHONE ENCOUNTER
Cystoscopy, left retrograde pyelogram, ureteroscopy, laser lithotripsy, ureteral stent insertion on 01/30/23.    Patient said her stent was coming out some, and she was instructed to remove it on 01/30/23, so she removed it.      She has extreme pain on on her left side in her ureter and bladder.  No issue passing urine.  Temperature 99.5.  Some chills.  Unsure if the pain is from removing stent.    Pain medication is not controlling her pain.  She is out of Toradol, and only has a few Percocet left, but they aren't really helping.    She doesn't know what she needs to do.  Does she need to go to ER, or be seen in office?  Doesn't know if she needs more medication.

## 2023-02-02 ENCOUNTER — CLINICAL SUPPORT (OUTPATIENT)
Dept: UROLOGY | Facility: CLINIC | Age: 28
End: 2023-02-02
Payer: COMMERCIAL

## 2023-02-02 ENCOUNTER — TELEPHONE (OUTPATIENT)
Dept: UROLOGY | Facility: CLINIC | Age: 28
End: 2023-02-02
Payer: COMMERCIAL

## 2023-02-02 VITALS — HEIGHT: 64 IN | BODY MASS INDEX: 31.76 KG/M2 | WEIGHT: 186 LBS | RESPIRATION RATE: 16 BRPM

## 2023-02-02 DIAGNOSIS — R33.9 URINARY RETENTION: ICD-10-CM

## 2023-02-02 LAB — BACTERIA SPEC AEROBE CULT: NO GROWTH

## 2023-02-02 PROCEDURE — 51701 INSERT BLADDER CATHETER: CPT | Performed by: UROLOGY

## 2023-02-02 NOTE — PROGRESS NOTES
"Chief Complaint  Urinary Retention    Subjective        Palmira Carbajal presents to [facility] UROLOGY  History of Present Illness    Objective   Vital Signs:  Resp 16   Ht 162.6 cm (64\")   Wt 84.4 kg (186 lb)   BMI 31.93 kg/m²   Estimated body mass index is 31.93 kg/m² as calculated from the following:    Height as of this encounter: 162.6 cm (64\").    Weight as of this encounter: 84.4 kg (186 lb).             Physical Exam   Result Review :            Insert Non-indwelling Bladder Catheter    Date/Time: 2/2/2023 5:04 PM  Performed by: Mavis Ricketts RN  Authorized by: Melisa Garcia MD   Preparation: Patient was prepped and draped in the usual sterile fashion.  Local anesthesia used: no    Anesthesia:  Local anesthesia used: no    Sedation:  Patient sedated: no    Patient tolerance: patient tolerated the procedure well with no immediate complications  Comments: Patient was taught how to cic.  Patient was able to return demonstration on how to cic. All questions were addressed and supply was given.             Assessment and Plan   There are no diagnoses linked to this encounter.         Follow Up   No follow-ups on file.  Patient was given instructions and counseling regarding her condition or for health maintenance advice. Please see specific information pulled into the AVS if appropriate.       "

## 2023-02-02 NOTE — PROGRESS NOTES
Patient here today to learn how to cic. Patient was draped and was able to return demonstration on how to cic.  Patient was taught to wash hands clean area and then insert catheter also when done emptying bladder to dispose of catheter, additonal information was sent with the patient if patient had any additional questions, also was educated that there is a urolologist on call 24/7.  All questions was addressed.  Patient had in bladder 100ml of orange urine, she stated that she urinated 45 minutes before this office visit.

## 2023-02-02 NOTE — TELEPHONE ENCOUNTER
Patient called to f/u on p/o concerns.    She said she has a temperature of 100.00.    Per Mavis, I told her that Dr. Garcia has the message and is reviewing records and someone will call her.

## 2023-02-02 NOTE — TELEPHONE ENCOUNTER
Cystoscopy, left retrograde pyelogram, ureteroscopy, laser lithotripsy, ureteral stent insertion 01/30/23.    Her stent was coming out the same day, and she removed, as she was instructed.    Patient called yesterday with p/o concern.  She went to the ER.  She was told she has a bad UTI and is passing a stone right now, and there is swelling of kidney on right side.  Blood in urine.    She is still in extreme pain.  Nausea.  Feels like she has to urinate a lot, but stream feels like it is struggling to come out.  She is urinating and it is more than a dribble.     She was prescribed Diclofenac and Bactrim, and said she was given Toradol in the hospital.    She asked if something can be called in for pain, and to find out what to do.

## 2023-02-03 ENCOUNTER — TELEPHONE (OUTPATIENT)
Dept: UROLOGY | Facility: CLINIC | Age: 28
End: 2023-02-03
Payer: COMMERCIAL

## 2023-02-03 LAB
COLOR STONE: NORMAL
COM MFR STONE: 70 %
COMPN STONE: NORMAL
HYDROXYAPATITE: 30 %
LABORATORY COMMENT REPORT: NORMAL
Lab: NORMAL
Lab: NORMAL
PHOTO: NORMAL
SIZE STONE: NORMAL MM
SPEC SOURCE SUBJ: NORMAL
STONE ANALYSIS-IMP: NORMAL
WT STONE: 36 MG

## 2023-02-03 NOTE — TELEPHONE ENCOUNTER
Called and spoke with patient and she stated that she is feeling so much better today she has cic x 2 and this is a very helpful tool. Will call on Monday to see how patient is doing as well to see if she needs more catheters.

## 2023-02-06 ENCOUNTER — TELEPHONE (OUTPATIENT)
Dept: UROLOGY | Facility: CLINIC | Age: 28
End: 2023-02-06
Payer: COMMERCIAL

## 2023-02-06 NOTE — TELEPHONE ENCOUNTER
----- Message from Mavis Ricketts RN sent at 2/3/2023 11:28 AM EST -----  Can you call patient on Monday 2/6/23 to see if she is needing more catheters.

## 2023-02-09 ENCOUNTER — TELEMEDICINE (OUTPATIENT)
Dept: NEUROLOGY | Facility: CLINIC | Age: 28
End: 2023-02-09
Payer: COMMERCIAL

## 2023-02-09 ENCOUNTER — PRIOR AUTHORIZATION (OUTPATIENT)
Dept: NEUROLOGY | Facility: CLINIC | Age: 28
End: 2023-02-09

## 2023-02-09 ENCOUNTER — PATIENT MESSAGE (OUTPATIENT)
Dept: NEUROLOGY | Facility: CLINIC | Age: 28
End: 2023-02-09

## 2023-02-09 ENCOUNTER — TELEPHONE (OUTPATIENT)
Dept: NEUROLOGY | Facility: CLINIC | Age: 28
End: 2023-02-09

## 2023-02-09 DIAGNOSIS — G43.019 INTRACTABLE MIGRAINE WITHOUT AURA AND WITHOUT STATUS MIGRAINOSUS: Primary | ICD-10-CM

## 2023-02-09 PROBLEM — G43.009 MIGRAINE WITHOUT AURA AND WITHOUT STATUS MIGRAINOSUS, NOT INTRACTABLE: Status: RESOLVED | Noted: 2022-03-25 | Resolved: 2023-02-09

## 2023-02-09 PROCEDURE — 99215 OFFICE O/P EST HI 40 MIN: CPT | Performed by: NURSE PRACTITIONER

## 2023-02-09 RX ORDER — RIZATRIPTAN BENZOATE 10 MG/1
10 TABLET ORAL ONCE AS NEEDED
Qty: 9 TABLET | Refills: 3 | Status: SHIPPED | OUTPATIENT
Start: 2023-02-09

## 2023-02-09 RX ORDER — SODIUM CHLORIDE 9 MG/ML
250 INJECTION, SOLUTION INTRAVENOUS ONCE
Status: CANCELLED | OUTPATIENT
Start: 2023-02-27

## 2023-02-09 NOTE — TELEPHONE ENCOUNTER
PT CALLED I HEARD ABOUT CEILING BUT JUST RESCHED. AFTER HUNG UP FOUND OUT YOU ALL WERE GOING TO CALL TO RESCHED. PLEASE CALL PT AND TRY TO GET IN SOONER HAD TO PUT HER IN MAY. ALSO SHE MAY BE ABLE TO DO VIDEO APPT

## 2023-02-09 NOTE — PROGRESS NOTES
Chief Complaint  Migraine    Subjective          Palmira Carbajal presents to [facility]  History of Present Illness  She reports that she developed headaches many years ago. Since that time, her headaches have progressively worsened.   Currently, she reports headaches that are located frontal and retro-orbital. She characterizes the headaches as 6-8/10 in severity, throbbing  and sharp in nature with associated photophobia. She reports headaches last 5+ hours. She reports 10 headache days per month. She denies associated aura. She denies focal numbness, weakness, speech and vision changes.   Triggers: stress   Symptoms improved by: Dark quiet room and Sleep   She states she is sleeping poorly. Reports getting 5 hours of sleep per night. denies snoring. Reports unrefreshing sleep.   Prior prophylactic medications include: topiramate, propranolol, amitriptyline, ajovy, emgality,   She  uses abortive therapy such as: imitrex,   Caffeine Use: 1 servings daily  Childbearing potential : hysterectomy   History of Kidney Stones: Yes  She denies a family history of cerebral aneurysm.     Having good success with Emgality for the first 2 weeks, but significant wearing off about the last 10 days of the injection cycle.       Objective   Vital Signs:   There were no vitals taken for this visit.    Physical Exam  HENT:      Head: Normocephalic.   Pulmonary:      Effort: Pulmonary effort is normal.   Neurological:      Mental Status: She is alert and oriented to person, place, and time.      Sensory: Sensation is intact.      Motor: Motor function is intact.      Coordination: Coordination is intact.      Deep Tendon Reflexes: Reflexes are normal and symmetric.        Neurologic Exam     Mental Status   Oriented to person, place, and time.        Result Review :               Assessment and Plan    Diagnoses and all orders for this visit:    1. Intractable migraine without aura and  without status migrainosus (Primary)  Assessment & Plan:  D/c Emgality due to wearing off.  Will start Vyepti infusions for preventative therapy.  Maxalt PRN for abortive therapy.          Orders:  -     rizatriptan (Maxalt) 10 MG tablet; Take 1 tablet by mouth 1 (One) Time As Needed for Migraine. May repeat in 2 hours once if needed  Dispense: 9 tablet; Refill: 3  -     Ambulatory Referral to [facility] For Infusion Treatment    I spent 42 minutes caring for Palmira on this date of service. This time includes time spent by me in the following activities:preparing for the visit, reviewing tests, obtaining and/or reviewing a separately obtained history, performing a medically appropriate examination and/or evaluation , counseling and educating the patient/family/caregiver, ordering medications, tests, or procedures, documenting information in the medical record and independently interpreting results and communicating that information with the patient/family/caregiver  Follow Up   No follow-ups on file.  Patient was given instructions and counseling regarding her condition or for health maintenance advice. Please see specific information pulled into the AVS if appropriate.

## 2023-02-09 NOTE — ASSESSMENT & PLAN NOTE
D/c Emgality due to wearing off.  Will start Vyepti infusions for preventative therapy.  Maxalt PRN for abortive therapy.

## 2023-02-14 ENCOUNTER — OFFICE VISIT (OUTPATIENT)
Dept: OBSTETRICS AND GYNECOLOGY | Facility: CLINIC | Age: 28
End: 2023-02-14
Payer: COMMERCIAL

## 2023-02-14 VITALS
WEIGHT: 179 LBS | HEART RATE: 91 BPM | SYSTOLIC BLOOD PRESSURE: 112 MMHG | BODY MASS INDEX: 30.56 KG/M2 | HEIGHT: 64 IN | DIASTOLIC BLOOD PRESSURE: 77 MMHG

## 2023-02-14 DIAGNOSIS — N63.10 MASS OF RIGHT BREAST, UNSPECIFIED QUADRANT: ICD-10-CM

## 2023-02-14 DIAGNOSIS — Z01.411 ENCOUNTER FOR GYNECOLOGICAL EXAMINATION WITH ABNORMAL FINDING: Primary | ICD-10-CM

## 2023-02-14 DIAGNOSIS — N64.52 NIPPLE DISCHARGE: ICD-10-CM

## 2023-02-14 DIAGNOSIS — N89.8 VAGINAL ITCHING: ICD-10-CM

## 2023-02-14 LAB
CANDIDA SPECIES: NEGATIVE
GARDNERELLA VAGINALIS: NEGATIVE
T VAGINALIS DNA VAG QL PROBE+SIG AMP: NEGATIVE

## 2023-02-14 PROCEDURE — 3008F BODY MASS INDEX DOCD: CPT | Performed by: NURSE PRACTITIONER

## 2023-02-14 PROCEDURE — 87660 TRICHOMONAS VAGIN DIR PROBE: CPT | Performed by: NURSE PRACTITIONER

## 2023-02-14 PROCEDURE — 84146 ASSAY OF PROLACTIN: CPT | Performed by: NURSE PRACTITIONER

## 2023-02-14 PROCEDURE — 87480 CANDIDA DNA DIR PROBE: CPT | Performed by: NURSE PRACTITIONER

## 2023-02-14 PROCEDURE — 99395 PREV VISIT EST AGE 18-39: CPT | Performed by: NURSE PRACTITIONER

## 2023-02-14 PROCEDURE — 99214 OFFICE O/P EST MOD 30 MIN: CPT | Performed by: NURSE PRACTITIONER

## 2023-02-14 PROCEDURE — 2014F MENTAL STATUS ASSESS: CPT | Performed by: NURSE PRACTITIONER

## 2023-02-14 PROCEDURE — 87510 GARDNER VAG DNA DIR PROBE: CPT | Performed by: NURSE PRACTITIONER

## 2023-02-14 RX ORDER — FLUCONAZOLE 150 MG/1
150 TABLET ORAL ONCE
Qty: 1 TABLET | Refills: 0 | Status: SHIPPED | OUTPATIENT
Start: 2023-02-14 | End: 2023-02-14

## 2023-02-14 NOTE — PROGRESS NOTES
Venipuncture Blood Specimen Collection  Venipuncture performed in left arm by Ashlee Walker with good hemostasis. Patient tolerated the procedure well without complications.   02/14/23   Ashlee Walker

## 2023-02-14 NOTE — PROGRESS NOTES
"Well Woman Visit    CC: Scheduled annual well gyn visit  Chief Complaint   Patient presents with   • Gynecologic Exam     Wwe and Yeast screen        Myriad intake in the past?: No    NOT DONE TODAY due to: patient declines. Her dad has colon cancer and she recently has a screening colonoscopy.    s/p HYST, still has one or both ovaries, benign indications  Social History     Substance and Sexual Activity   Sexual Activity Defer       HPI:   27 y.o.     Menses: none - hysterectomy       PCP: does manage PMHx and preventative labs  History: PMHx, Meds, Allergies, PSHx, Social Hx, and POBHx all reviewed and updated.    Pt has concerns she would like to discuss. Finished antibiotics about a week ago and has been having vaginal odor and itching. She has tried a 3 day Monistat with little relief. She also has felt a lump on her right breast at home via self exam. She has had milky/clear discharge from her right breast for 2 months that is produced spontaneously and with expression. She denies breast pain or tenderness.    PHYSICAL EXAM:  /77   Pulse 91   Ht 162.6 cm (64.02\")   Wt 81.2 kg (179 lb)   Breastfeeding No   BMI 30.71 kg/m²  Not found.  General- NAD, alert and oriented, appropriate  Psych- Normal mood, good memory  Neck- No masses, no thyroid enlargement  CV- Regular rhythm, no murnurs  Resp- CTA to bases, no wheezes  Abdomen- Soft, non distended, non tender, no masses    Breast left-  Bilaterally symmetrical, no masses, non tender, no nipple discharge  Breast right- Bilaterally symmetrical, no nipple discharge, small pea-sized lump palpated at 5 o'clock, non tender    External genitalia- Normal female, no lesions  Urethra/meatus- Normal, no masses, non tender  Bladder- Normal, no masses, non tender  Vagina- Normal, no atrophy, no lesions, thick white discharge.  Prolapse: none noted, not examined with split speculum to delineate  Cvx- Absent  Uterus- Absent  Adnexa- " Absent  Anus/Rectum/Perineum- Not performed    Lymphatic- No palpable neck, axillary, or groin nodes  Ext- No edema, no cyanosis    Skin- No lesions, no rashes, no acanthosis nigricans      ASSESSMENT and PLAN:    Diagnoses and all orders for this visit:    1. Encounter for gynecological examination with abnormal finding (Primary)    2. Mass of right breast, unspecified quadrant  -     Mammo Diagnostic Digital Tomosynthesis Bilateral With CAD; Future  -     US Breast Right Limited; Future    3. Nipple discharge  -     Mammo Diagnostic Digital Tomosynthesis Bilateral With CAD; Future  -     US Breast Right Limited; Future  -     Prolactin    4. Vaginal itching  -     Gardnerella vaginalis, Trichomonas vaginalis, Candida albicans, DNA - Swab, Vagina  -     fluconazole (Diflucan) 150 MG tablet; Take 1 tablet by mouth 1 (One) Time for 1 dose.  Dispense: 1 tablet; Refill: 0        Preventative:  • BREAST HEALTH- Monthly self breast exam importance and how to reviewed. MMG and/or MRI (prn) reviewed per society guidelines and her individual history. Screen: Updated today  • Smoking status- NON SMOKER  MYRIAD: she declines today  SAFE SEX/condoms importance reviewed.    She understands the importance of having any ordered tests to be performed in a timely fashion.  The risks of not performing them include, but are not limited to, advanced cancer stages, bone loss from osteoporosis and/or subsequent increase in morbidity and/or mortality.  She is encouraged to review her results online and/or contact or office if she has questions.     Follow Up:  Return in about 1 year (around 2/14/2024) for Annual physical.            Srini Green, APRN  02/14/2023    [facility] [address]  [facility]  [address] [place] [address]  Dept: [phone]  Loc: [phone]

## 2023-02-15 LAB — PROLACTIN SERPL-MCNC: 8.11 NG/ML (ref 4.79–23.3)

## 2023-02-21 ENCOUNTER — OFFICE VISIT (OUTPATIENT)
Dept: PSYCHIATRY | Facility: CLINIC | Age: 28
End: 2023-02-21
Payer: COMMERCIAL

## 2023-02-21 VITALS
WEIGHT: 175 LBS | HEART RATE: 97 BPM | BODY MASS INDEX: 30.02 KG/M2 | DIASTOLIC BLOOD PRESSURE: 70 MMHG | SYSTOLIC BLOOD PRESSURE: 102 MMHG

## 2023-02-21 DIAGNOSIS — F51.05 INSOMNIA DUE TO MENTAL DISORDER: ICD-10-CM

## 2023-02-21 DIAGNOSIS — F43.10 POST TRAUMATIC STRESS DISORDER (PTSD): ICD-10-CM

## 2023-02-21 DIAGNOSIS — F41.1 GENERALIZED ANXIETY DISORDER: ICD-10-CM

## 2023-02-21 DIAGNOSIS — F33.1 MAJOR DEPRESSIVE DISORDER, RECURRENT EPISODE, MODERATE: Primary | ICD-10-CM

## 2023-02-21 PROCEDURE — 90833 PSYTX W PT W E/M 30 MIN: CPT | Performed by: NURSE PRACTITIONER

## 2023-02-21 PROCEDURE — 99214 OFFICE O/P EST MOD 30 MIN: CPT | Performed by: NURSE PRACTITIONER

## 2023-02-21 RX ORDER — ZOLPIDEM TARTRATE 5 MG/1
5 TABLET ORAL NIGHTLY PRN
Qty: 30 TABLET | Refills: 0 | Status: SHIPPED | OUTPATIENT
Start: 2023-02-21 | End: 2023-03-20 | Stop reason: SDUPTHER

## 2023-02-21 RX ORDER — LAMOTRIGINE 100 MG/1
50 TABLET ORAL 2 TIMES DAILY
Qty: 30 TABLET | Refills: 2 | Status: SHIPPED | OUTPATIENT
Start: 2023-02-21 | End: 2023-05-22

## 2023-02-21 RX ORDER — AMITRIPTYLINE HYDROCHLORIDE 10 MG/1
10 TABLET, FILM COATED ORAL NIGHTLY
Qty: 30 TABLET | Refills: 2 | Status: SHIPPED | OUTPATIENT
Start: 2023-02-21 | End: 2023-05-22

## 2023-02-21 NOTE — PROGRESS NOTES
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 lamotrigine to target depression. Continue amitriptyline to target depression and anxiety. Psychotherapy for anxiety and PTSD. Start ambien to target insomnia. 18 minutes of supportive psychotherapy with goal to strengthen defenses, promote problems solving, restore adaptive functioning and provide symptom relief. The therapeutic alliance was strengthened to encourage the patient to express their thoughts and feelings. Esteem building was enhanced through praise, reassurance, normalizing and encouragement. Coping skills were enhanced to build distress tolerance skills and emotional regulation. Allowed patient to freely discuss issues without interruption or judgement with unconditional positive regard, active listening skills, and empathy. Provided a safe, confidential environment to facilitate the development of a positive therapeutic relationship and encourage open, honest communication. Assisted patient in identifying risk factors which would indicate the need for higher level of care including thoughts to harm self or others and/or self-harming behavior and encouraged patient to contact this office, call 911, or present to the nearest emergency room should any of these events occur. Assisted patient in processing  session content; acknowledged and normalized patient's thoughts, feelings, and concerns by utilizing a person-centered approach in efforts to build appropriate rapport and a positive therapeutic relationship with open and honest communication. Patient given education on medication side effects, diagnosis/illness and relapse symptoms. Plan to continue supportive psychotherapy in next appointment to provide symptom relief. 4 weeks    Diagnoses: as above  Symptoms: as above  Functional status: good  Mental Status Exam: as above     Treatment plan: medication management and supportive psychotherapy  Prognosis: good  Progress: continued improvement    Visit Diagnoses:    ICD-10-CM ICD-9-CM   1. Major depressive disorder, recurrent episode, moderate (HCC)  F33.1 296.32   2. Generalized anxiety disorder  F41.1 300.02   3. Post traumatic stress disorder (PTSD)  F43.10 309.81   4. Insomnia due to mental disorder  F51.05 300.9     327.02       PLAN:  1. Safety: No acute safety concerns.   2. Therapy: Declines  3. Risk Assessment: Risk of self-harm acutely is moderate.  Risk factors include anxiety disorder, mood disorder, and recent psychosocial stressors (pandemic). Protective factors include no family history, denies access to guns/weapons, no present SI, minimal AODA, healthcare seeking, future orientation, willingness to engage in care.  Risk of self-harm chronically is also moderate, but could be further elevated in the event of treatment noncompliance and/or AODA.  4. Medications: Increase lamotrigine 50mg po qhs to 50mg po bid to target mood. Risks, benefits, alternatives discussed with patient including rash, rebound depressive or manic symptoms if prompt discontinuation, GI upset, agitation, sedation/falls risk.  After discussion of these risks and benefits, patient voiced understanding and agreed to proceed. Continue amitriptyline 10 mg po qhs to target depression and anxiety. Risks, benefits, alternatives discussed  with patient including sedation, dizziness, falls, GI upset, constipation, urinary retention, dry mouth.  After discussion of these risks and benefits, the patient voiced understanding and agreed to proceed. Start ambien 5mg po qhs to target insomnia. Risks, benefits, side effects discussed with patient including sedation, dizziness, GI upset, hallucinations, sleepwalking, sleep-eating.  After discussion of these risks and benefits, the patient voiced understanding and agreed to proceed.  5. Labs/studies: No labs/studies ordered at this time  6. Follow-up: 4 weeks    Patient screened positive for depression based on a PHQ-9 score of 22 on 12/8/2022. Follow-up recommendations include: Suicide Risk Assessment performed.         TREATMENT PLAN/GOALS: Continue supportive psychotherapy efforts and medications as indicated. Treatment and medication options discussed during today's visit. Patient ackowledged and verbally consented to continue with current treatment plan and was educated on the importance of compliance with treatment and follow-up appointments.      MEDICATION ISSUES:  DEBORAH reviewed as expected.  Discussed medication options and treatment plan of prescribed medication as well as the risks, benefits, and side effects including potential falls, possible impaired driving and metabolic adversities among others. Patient is agreeable to call the office with any worsening of symptoms or onset of side effects. Patient is agreeable to call 911 or go to the nearest ER should he/she begin having SI/HI. No medication side effects or related complaints today.     MEDS ORDERED DURING VISIT:  New Medications Ordered This Visit   Medications   • lamoTRIgine (LaMICtal) 100 MG tablet     Sig: Take 0.5 tablets by mouth 2 (Two) Times a Day for 90 days.     Dispense:  30 tablet     Refill:  2   • amitriptyline (ELAVIL) 10 MG tablet     Sig: Take 1 tablet by mouth Every Night for 90 days.     Dispense:  30 tablet     Refill:   2   • zolpidem (Ambien) 5 MG tablet     Sig: Take 1 tablet by mouth At Night As Needed for Sleep for up to 30 days.     Dispense:  30 tablet     Refill:  0       Return in about 4 weeks (around 3/21/2023) for Next scheduled follow up.         This document has been electronically signed by MINI Poon  February 21, 2023 11:44 EST      Part of this note may be an electronic transcription/translation of spoken language to printed text using the Dragon Dictation System.

## 2023-02-22 ENCOUNTER — TELEPHONE (OUTPATIENT)
Dept: GASTROENTEROLOGY | Facility: CLINIC | Age: 28
End: 2023-02-22
Payer: COMMERCIAL

## 2023-02-22 NOTE — TELEPHONE ENCOUNTER
Called and spoke with patient regarding scheduling an appointment with Kayla. Patient agreed to be scheduled and was scheduled 04/05/2023 at 10:15. Patient verbalized understanding.

## 2023-03-01 ENCOUNTER — HOSPITAL ENCOUNTER (OUTPATIENT)
Dept: INFUSION THERAPY | Facility: HOSPITAL | Age: 28
Discharge: HOME | End: 2023-03-01
Admitting: NURSE PRACTITIONER
Payer: COMMERCIAL

## 2023-03-01 VITALS
RESPIRATION RATE: 16 BRPM | HEIGHT: 64 IN | OXYGEN SATURATION: 100 % | BODY MASS INDEX: 29.77 KG/M2 | SYSTOLIC BLOOD PRESSURE: 121 MMHG | DIASTOLIC BLOOD PRESSURE: 70 MMHG | TEMPERATURE: 98.3 F | HEART RATE: 85 BPM | WEIGHT: 174.38 LBS

## 2023-03-01 DIAGNOSIS — G43.019 INTRACTABLE MIGRAINE WITHOUT AURA AND WITHOUT STATUS MIGRAINOSUS: Primary | ICD-10-CM

## 2023-03-01 PROCEDURE — 25010000002 EPTINEZUMAB-JJMR 100 MG/ML SOLUTION 1 ML VIAL: Performed by: NURSE PRACTITIONER

## 2023-03-01 PROCEDURE — 96365 THER/PROPH/DIAG IV INF INIT: CPT

## 2023-03-01 RX ORDER — SODIUM CHLORIDE 9 MG/ML
250 INJECTION, SOLUTION INTRAVENOUS ONCE
Status: DISCONTINUED | OUTPATIENT
Start: 2023-03-01 | End: 2023-03-03 | Stop reason: HOSPADM

## 2023-03-01 RX ORDER — SODIUM CHLORIDE 9 MG/ML
250 INJECTION, SOLUTION INTRAVENOUS ONCE
OUTPATIENT
Start: 2023-05-24

## 2023-03-01 RX ADMIN — EPTINEZUMAB-JJMR 100 MG: 100 INJECTION INTRAVENOUS at 11:00

## 2023-03-03 ENCOUNTER — HOSPITAL ENCOUNTER (OUTPATIENT)
Dept: ULTRASOUND IMAGING | Facility: HOSPITAL | Age: 28
Discharge: HOME | End: 2023-03-03
Payer: COMMERCIAL

## 2023-03-03 ENCOUNTER — HOSPITAL ENCOUNTER (OUTPATIENT)
Dept: MAMMOGRAPHY | Facility: HOSPITAL | Age: 28
Discharge: HOME | End: 2023-03-03
Payer: COMMERCIAL

## 2023-03-03 DIAGNOSIS — N63.10 MASS OF RIGHT BREAST, UNSPECIFIED QUADRANT: ICD-10-CM

## 2023-03-03 DIAGNOSIS — N64.52 NIPPLE DISCHARGE: ICD-10-CM

## 2023-03-03 PROCEDURE — G0279 TOMOSYNTHESIS, MAMMO: HCPCS

## 2023-03-03 PROCEDURE — 77066 DX MAMMO INCL CAD BI: CPT

## 2023-03-03 PROCEDURE — 76642 ULTRASOUND BREAST LIMITED: CPT

## 2023-03-06 ENCOUNTER — HOSPITAL ENCOUNTER (OUTPATIENT)
Dept: ULTRASOUND IMAGING | Facility: HOSPITAL | Age: 28
Discharge: HOME | End: 2023-03-06
Admitting: UROLOGY
Payer: COMMERCIAL

## 2023-03-06 DIAGNOSIS — N13.30 HYDRONEPHROSIS, UNSPECIFIED HYDRONEPHROSIS TYPE: ICD-10-CM

## 2023-03-06 PROCEDURE — 76775 US EXAM ABDO BACK WALL LIM: CPT

## 2023-03-07 ENCOUNTER — TELEPHONE (OUTPATIENT)
Dept: OBSTETRICS AND GYNECOLOGY | Facility: CLINIC | Age: 28
End: 2023-03-07
Payer: COMMERCIAL

## 2023-03-07 ENCOUNTER — OFFICE VISIT (OUTPATIENT)
Dept: FAMILY MEDICINE CLINIC | Facility: CLINIC | Age: 28
End: 2023-03-07
Payer: COMMERCIAL

## 2023-03-07 VITALS
SYSTOLIC BLOOD PRESSURE: 113 MMHG | TEMPERATURE: 97.8 F | HEART RATE: 92 BPM | WEIGHT: 174.5 LBS | BODY MASS INDEX: 29.79 KG/M2 | DIASTOLIC BLOOD PRESSURE: 66 MMHG | HEIGHT: 64 IN | OXYGEN SATURATION: 99 %

## 2023-03-07 DIAGNOSIS — D72.829 LEUKOCYTOSIS, UNSPECIFIED TYPE: ICD-10-CM

## 2023-03-07 DIAGNOSIS — E87.6 HYPOKALEMIA: ICD-10-CM

## 2023-03-07 DIAGNOSIS — J84.10 CALCIFIED GRANULOMA OF LUNG: ICD-10-CM

## 2023-03-07 DIAGNOSIS — F33.1 MODERATE EPISODE OF RECURRENT MAJOR DEPRESSIVE DISORDER: ICD-10-CM

## 2023-03-07 DIAGNOSIS — N64.52 NIPPLE DISCHARGE: Primary | ICD-10-CM

## 2023-03-07 DIAGNOSIS — F51.01 PRIMARY INSOMNIA: ICD-10-CM

## 2023-03-07 DIAGNOSIS — E55.9 VITAMIN D DEFICIENCY: Primary | ICD-10-CM

## 2023-03-07 PROBLEM — J98.4 CALCIFIED GRANULOMA OF LUNG: Status: ACTIVE | Noted: 2023-03-07

## 2023-03-07 LAB
25(OH)D3 SERPL-MCNC: 28.7 NG/ML (ref 30–100)
ANION GAP SERPL CALCULATED.3IONS-SCNC: 11.7 MMOL/L (ref 5–15)
BASOPHILS # BLD AUTO: 0.09 10*3/MM3 (ref 0–0.2)
BASOPHILS NFR BLD AUTO: 0.7 % (ref 0–1.5)
BUN SERPL-MCNC: 7 MG/DL (ref 6–20)
BUN/CREAT SERPL: 8.8 (ref 7–25)
CALCIUM SPEC-SCNC: 9.3 MG/DL (ref 8.6–10.5)
CHLORIDE SERPL-SCNC: 102 MMOL/L (ref 98–107)
CO2 SERPL-SCNC: 25.3 MMOL/L (ref 22–29)
CREAT SERPL-MCNC: 0.8 MG/DL (ref 0.57–1)
DEPRECATED RDW RBC AUTO: 38.8 FL (ref 37–54)
EGFRCR SERPLBLD CKD-EPI 2021: 102.4 ML/MIN/1.73
EOSINOPHIL # BLD AUTO: 0.24 10*3/MM3 (ref 0–0.4)
EOSINOPHIL NFR BLD AUTO: 1.8 % (ref 0.3–6.2)
ERYTHROCYTE [DISTWIDTH] IN BLOOD BY AUTOMATED COUNT: 12.7 % (ref 12.3–15.4)
GLUCOSE SERPL-MCNC: 83 MG/DL (ref 65–99)
HCT VFR BLD AUTO: 42.4 % (ref 34–46.6)
HGB BLD-MCNC: 13.5 G/DL (ref 12–15.9)
IMM GRANULOCYTES # BLD AUTO: 0.05 10*3/MM3 (ref 0–0.05)
IMM GRANULOCYTES NFR BLD AUTO: 0.4 % (ref 0–0.5)
LYMPHOCYTES # BLD AUTO: 3.19 10*3/MM3 (ref 0.7–3.1)
LYMPHOCYTES NFR BLD AUTO: 24.5 % (ref 19.6–45.3)
MCH RBC QN AUTO: 27.3 PG (ref 26.6–33)
MCHC RBC AUTO-ENTMCNC: 31.8 G/DL (ref 31.5–35.7)
MCV RBC AUTO: 85.7 FL (ref 79–97)
MONOCYTES # BLD AUTO: 0.95 10*3/MM3 (ref 0.1–0.9)
MONOCYTES NFR BLD AUTO: 7.3 % (ref 5–12)
NEUTROPHILS NFR BLD AUTO: 65.3 % (ref 42.7–76)
NEUTROPHILS NFR BLD AUTO: 8.48 10*3/MM3 (ref 1.7–7)
NRBC BLD AUTO-RTO: 0 /100 WBC (ref 0–0.2)
PLATELET # BLD AUTO: 416 10*3/MM3 (ref 140–450)
PMV BLD AUTO: 10.9 FL (ref 6–12)
POTASSIUM SERPL-SCNC: 4.4 MMOL/L (ref 3.5–5.2)
RBC # BLD AUTO: 4.95 10*6/MM3 (ref 3.77–5.28)
SODIUM SERPL-SCNC: 139 MMOL/L (ref 136–145)
WBC NRBC COR # BLD: 13 10*3/MM3 (ref 3.4–10.8)

## 2023-03-07 PROCEDURE — 99214 OFFICE O/P EST MOD 30 MIN: CPT | Performed by: NURSE PRACTITIONER

## 2023-03-07 PROCEDURE — 1159F MED LIST DOCD IN RCRD: CPT | Performed by: NURSE PRACTITIONER

## 2023-03-07 PROCEDURE — 80048 BASIC METABOLIC PNL TOTAL CA: CPT | Performed by: NURSE PRACTITIONER

## 2023-03-07 PROCEDURE — 1160F RVW MEDS BY RX/DR IN RCRD: CPT | Performed by: NURSE PRACTITIONER

## 2023-03-07 PROCEDURE — 85025 COMPLETE CBC W/AUTO DIFF WBC: CPT | Performed by: NURSE PRACTITIONER

## 2023-03-07 PROCEDURE — 82306 VITAMIN D 25 HYDROXY: CPT | Performed by: NURSE PRACTITIONER

## 2023-03-07 NOTE — ASSESSMENT & PLAN NOTE
We will plan to monitor the calcified granulomas of lung due to patient concern, we will plan to repeat CT scan in 6 months.

## 2023-03-07 NOTE — PROGRESS NOTES
Chief Complaint  Generalized Body Aches    Subjective          Palmira Carbajal, 27 y.o. female presents to [facility]  History of Present Illness   She presents today for a follow-up on fibromyalgia symptoms/general body aches, insomnia, and depression.  She has tried and failed multiple antidepressant medications due to side effects or due to being ineffective.  I have prescribed Fetzima on her last visit but she had side effects.  I then sent her in amitriptyline but after reviewing the [employer] results again, it was noted that it was on the severe gene drug interaction so I told her not to take it.  I started her on diclofenac 50 mg twice daily and ha she was sent to psychiatry.  She was started on Lamictal and amitriptyline.  I had her stop naproxen to see if that would help with her body aches.  She states that she does not feel the diclofenac is helping much with her generalized body aches but does help her knee pain.  She was given Seroquel at night to help her sleep but states she stopped it after 2 weeks due to weight gain.  She has tried and failed multiple medications.      Depression; Currently being seen by Cassius Cantrell with [facility]. She is due for a follow-up with him on 03/13/23. She reports improvement in symptoms. She states she feels like her moods are more stabilized. No thoughts of self-harm or harm to others.     Insomnia; She states her sleep is improved on Ambien. He started her on that medication 2 weeks ago and now she sleeps 8 - 9 hours a night. She also has a referral to sleep medicine in May for further evaluation. She feels rested when she wakes up. Denies any difficulties with functioning throughout the day.     Generalized body aches; She states the Elavil seems to be helping. She states she still gets them but they are more infrequent now.     Vitamin D deficiency: Her vitamin D level was low so she was started on vitamin D by ENT.  She was told  "she needs to have that rechecked in 3 months. She has completed her Vitamin D treatments initially prescribed.    After having Kidney stone surgery and UTI, she went to the ED for abdominal pain and a CT of pelvis was conducted on 02/01/23 and she is concerned about the calcified granulomas reflected on the lung, liver and spleen.       Tobacco Use: Medium Risk   • Smoking Tobacco Use: Former   • Smokeless Tobacco Use: Never   • Passive Exposure: Not on file      Objective   Vital Signs:   /66   Pulse 92   Temp 97.8 °F (36.6 °C)   Ht 162.6 cm (64\")   Wt 79.2 kg (174 lb 8 oz)   SpO2 99%   BMI 29.95 kg/m²       Current Outpatient Medications:   •  amitriptyline (ELAVIL) 10 MG tablet, Take 1 tablet by mouth Every Night for 90 days., Disp: 30 tablet, Rfl: 2  •  dicyclomine (BENTYL) 20 MG tablet, Take 1 tablet by mouth Every 6 (Six) Hours As Needed (abdominal cramping)., Disp: 36 tablet, Rfl: 0  •  lamoTRIgine (LaMICtal) 100 MG tablet, Take 0.5 tablets by mouth 2 (Two) Times a Day for 90 days., Disp: 30 tablet, Rfl: 2  •  Melatonin 10 MG capsule, Take 10 mg by mouth., Disp: , Rfl:   •  rizatriptan (Maxalt) 10 MG tablet, Take 1 tablet by mouth 1 (One) Time As Needed for Migraine. May repeat in 2 hours once if needed, Disp: 9 tablet, Rfl: 3  •  sodium chloride 0.9 % solution 100 mL with Eptinezumab-jjmr 100 MG/ML solution 100 mg, Infuse 100 mg into a venous catheter Every 3 (Three) Months., Disp: , Rfl:   •  spironolactone (Aldactone) 50 MG tablet, Take 1 tablet by mouth Every Morning., Disp: 30 tablet, Rfl: 2  •  zolpidem (Ambien) 5 MG tablet, Take 1 tablet by mouth At Night As Needed for Sleep for up to 30 days., Disp: 30 tablet, Rfl: 0   Past Medical History:   Diagnosis Date   • Acid reflux    • Chronic pain disorder    • Depression    • Eczema    • Intractable migraine without aura and without status migrainosus 2/9/2023   • Kidney stone     HX OF   • Maltracking of right patella    • Panic disorder    • " PONV (postoperative nausea and vomiting)     GOOD RESULTS WITH SCOPALAMINE   • PTSD (post-traumatic stress disorder)     WAKES UP FROM ANESTHESIA WITH PANIC ATTACK   • Seasonal allergic rhinitis 05/31/2022   • Self-injurious behavior     as a teenager   • Suicide attempt (HCC) 2007      Physical Exam  Vitals reviewed.   Constitutional:       Appearance: Normal appearance. She is well-developed and overweight.   Neck:      Thyroid: No thyroid mass, thyromegaly or thyroid tenderness.   Cardiovascular:      Rate and Rhythm: Normal rate and regular rhythm.      Heart sounds: Normal heart sounds. No murmur heard.    No friction rub. No gallop.   Pulmonary:      Effort: Pulmonary effort is normal.      Breath sounds: Normal breath sounds. No wheezing or rhonchi.   Musculoskeletal:      Cervical back: Normal range of motion.   Lymphadenopathy:      Cervical: No cervical adenopathy.   Skin:     General: Skin is warm and dry.   Neurological:      Mental Status: She is alert and oriented to person, place, and time.      Cranial Nerves: No cranial nerve deficit.   Psychiatric:         Attention and Perception: Attention normal.         Mood and Affect: Mood and affect normal.         Speech: Speech normal.         Behavior: Behavior normal.         Thought Content: Thought content normal. Thought content does not include homicidal or suicidal ideation.         Cognition and Memory: Cognition normal.         Judgment: Judgment normal.        Result Review :   {The following data was reviewed by MINI Keating    CT Abdomen Pelvis Without Contrast    Result Date: 2/1/2023     1. There is mild right renal collecting system dilatation, with probable distal right ureteral 2 mm calculus.  2. Additional nonobstructing intrarenal calculi bilaterally.      SHARON RUFFIN MD       Electronically Signed and Approved By: SHARON RUFFIN MD on 2/01/2023 at 16:30             US Renal Bilateral    Result Date: 3/6/2023    1. Normal renal  ultrasound.  No evidence of obstructive uropathy. 2. Incidental 11 mm left renal cyst      Marco A Manzo MD       Electronically Signed and Approved By: Marco A Manzo MD on 3/06/2023 at 9:46             CT Abdomen Pelvis Stone Protocol    Result Date: 12/9/2022    CT scan of the abdomen and pelvis without contrast demonstrating 7 mm nonobstructing stone in the lower pole collecting system of the left kidney, unchanged.  Tiny 1 mm stones are seen in the lower pole collecting system of the right kidney.  Post hysterectomy.     ALICE TALBERT MD       Electronically Signed and Approved By: ALICE TALBERT MD on 12/09/2022 at 16:52             FL Retrograde Pyelogram In OR    Result Date: 1/30/2023    1. Intraoperative Imaging during left ureteral evaluation, intervention, and placement of stent.      CYNTHIA VALENCIA MD       Electronically Signed and Approved By: CYNTHIA VALENCIA MD on 1/30/2023 at 9:28             Mammo Diagnostic Digital Tomosynthesis Bilateral With CAD    Result Date: 3/3/2023   Benign bilateral mammogram and focused right breast ultrasound.  RECOMMENDATION(S):  CLINICAL EVALUATION.   BIRADS:  DIAGNOSTIC CATEGORY 2--BENIGN FINDING   BREAST COMPOSITION: Heterogeneously dense,which may obscure small masses.  PLEASE NOTE:  A NORMAL MAMMOGRAM DOES NOT EXCLUDE THE POSSIBILITY OF BREAST CANCER. ANY CLINICALLY SUSPICIOUS PALPABLE LUMP SHOULD BE BIOPSIED.      JULIA SMALLS MD       Electronically Signed and Approved By: JULIA SMALLS MD on 3/03/2023 at 10:37               CMP   CMP    CMP 8/26/22 2/1/23   Glucose 90 113 (A)   BUN 10 8   Creatinine 0.66 0.62   eGFR 122.0 123.8   Sodium 139 138   Potassium 4.0 3.3 (A)   Chloride 104 104   Calcium 9.2 8.6   Total Protein 7.5 6.4   Albumin 4.50 3.7   Globulin 3.0 2.7   Total Bilirubin 0.2 <0.2   Alkaline Phosphatase 87 83   AST (SGOT) 14 9   ALT (SGPT) 12 10   Albumin/Globulin Ratio 1.5 1.4   BUN/Creatinine Ratio 15.2 12.9   Anion Gap 12.6 8.7   (A)  Abnormal value       Comments are available for some flowsheets but are not being displayed.           CBC w/ DIFF   CBC w/diff    CBC w/Diff 8/26/22 2/1/23   WBC 11.14 (A) 17.19 (A)   RBC 4.82 4.54   Hemoglobin 13.5 12.8   Hematocrit 41.0 38.7   MCV 85.1 85.2   MCH 28.0 28.2   MCHC 32.9 33.1   RDW 13.2 13.4   Platelets 339 346   Neutrophil Rel % 56.7 60.3   Immature Granulocyte Rel % 0.4 0.6 (A)   Lymphocyte Rel % 29.7 30.1   Monocyte Rel % 9.4 6.2   Eosinophil Rel % 3.1 2.3   Basophil Rel % 0.7 0.5   (A) Abnormal value            VITD   Lab Results   Component Value Date    TOJW88II 18.4 (L) 11/23/2022       Assessment and Plan    Diagnoses and all orders for this visit:    1. Vitamin D deficiency (Primary)  Assessment & Plan:  I will redraw Vitamin D level today for follow-up assessment. Will await results to determine continue treatment.    Orders:  -     Vitamin D 25 hydroxy    2. Primary insomnia  Assessment & Plan:  Currently managed with Ambien 5mg nightly and occasionally Melatonin. Keep appointment scheduled with Sleep Medicine.      3. Moderate episode of recurrent major depressive disorder (HCC)  Assessment & Plan:  Patient's depression is recurrent and is moderate without psychosis. Their depression is currently active and the condition is improving with treatment. This will be reassessed at the next regular appointment. F/U as described:patient will continue current medication therapy and continue treatment with mental health provider..      4. Leukocytosis, unspecified type  Comments:  I will redraw CBC to for re-evaluation and assessment.   Orders:  -     CBC w AUTO Differential    5. Hypokalemia  Comments:  I will redraw BMP for further evaluation and assessment.   Orders:  -     Basic Metabolic Panel    6. Calcified granuloma of lung (HCC)  Assessment & Plan:  We will plan to monitor the calcified granulomas of lung due to patient concern, we will plan to repeat CT scan in 6 months.        Follow  Up   Return in about 6 months (around 9/7/2023) for Next scheduled follow up Vit D def, chronic pain, f/u repeat CT chest/abdomen.  Patient was given instructions and counseling regarding her condition or for health maintenance advice. Please see specific information pulled into the AVS if appropriate.     Parts of this note are electronic transcriptions/translations of spoken language to printed text using the Dragon Dictation system.    I reviewed all the information by my student Marychuy Thapa, MINI student, and I attest that all information is correct.      MINI Keating  03/07/2023

## 2023-03-07 NOTE — TELEPHONE ENCOUNTER
----- Message from Ashlee Walker sent at 3/7/2023 11:24 AM EST -----  Called patient aware of information would like referral placed please.

## 2023-03-07 NOTE — ASSESSMENT & PLAN NOTE
Patient's depression is recurrent and is moderate without psychosis. Their depression is currently active and the condition is improving with treatment. This will be reassessed at the next regular appointment. F/U as described:patient will continue current medication therapy and continue treatment with mental health provider..

## 2023-03-07 NOTE — ASSESSMENT & PLAN NOTE
I will redraw Vitamin D level today for follow-up assessment. Will await results to determine continue treatment.

## 2023-03-07 NOTE — ASSESSMENT & PLAN NOTE
Currently managed with Ambien 5mg nightly and occasionally Melatonin. Keep appointment scheduled with Sleep Medicine.

## 2023-03-14 ENCOUNTER — LAB (OUTPATIENT)
Dept: ONCOLOGY | Facility: HOSPITAL | Age: 28
End: 2023-03-14
Payer: COMMERCIAL

## 2023-03-14 ENCOUNTER — CONSULT (OUTPATIENT)
Dept: ONCOLOGY | Facility: HOSPITAL | Age: 28
End: 2023-03-14
Payer: COMMERCIAL

## 2023-03-14 VITALS
DIASTOLIC BLOOD PRESSURE: 71 MMHG | BODY MASS INDEX: 30.35 KG/M2 | SYSTOLIC BLOOD PRESSURE: 119 MMHG | TEMPERATURE: 98.2 F | HEART RATE: 99 BPM | RESPIRATION RATE: 18 BRPM | WEIGHT: 176.81 LBS | OXYGEN SATURATION: 98 %

## 2023-03-14 DIAGNOSIS — Z86.2 HISTORY OF LEUKOCYTOSIS: ICD-10-CM

## 2023-03-14 DIAGNOSIS — Z86.2 HISTORY OF LEUKOCYTOSIS: Primary | ICD-10-CM

## 2023-03-14 LAB
CHROMATIN AB SERPL-ACNC: <10 IU/ML (ref 0–14)
ERYTHROCYTE [SEDIMENTATION RATE] IN BLOOD: 25 MM/HR (ref 0–20)
LDH SERPL-CCNC: 157 U/L (ref 135–214)

## 2023-03-14 PROCEDURE — 1160F RVW MEDS BY RX/DR IN RCRD: CPT | Performed by: INTERNAL MEDICINE

## 2023-03-14 PROCEDURE — 85652 RBC SED RATE AUTOMATED: CPT

## 2023-03-14 PROCEDURE — 86431 RHEUMATOID FACTOR QUANT: CPT

## 2023-03-14 PROCEDURE — 1126F AMNT PAIN NOTED NONE PRSNT: CPT | Performed by: INTERNAL MEDICINE

## 2023-03-14 PROCEDURE — 1159F MED LIST DOCD IN RCRD: CPT | Performed by: INTERNAL MEDICINE

## 2023-03-14 PROCEDURE — 99204 OFFICE O/P NEW MOD 45 MIN: CPT | Performed by: INTERNAL MEDICINE

## 2023-03-14 PROCEDURE — 36415 COLL VENOUS BLD VENIPUNCTURE: CPT

## 2023-03-14 PROCEDURE — G0463 HOSPITAL OUTPT CLINIC VISIT: HCPCS | Performed by: INTERNAL MEDICINE

## 2023-03-14 PROCEDURE — 83615 LACTATE (LD) (LDH) ENZYME: CPT

## 2023-03-14 PROCEDURE — 86038 ANTINUCLEAR ANTIBODIES: CPT

## 2023-03-14 NOTE — PROGRESS NOTES
Chief Complaint  Leukocytosis    Jaja Castillo, A*  Jaja Castillo, APRN    Subjective          Palmira MARTA Carbajal presents to [facility] for leukocytosis    Ms. Carbajal is a 26 yo F with hx of endometriosis s/p hysterectomy, migraines, anxiety who presents for new [facility] hematolgoy evaluateion regarding elevated WBC. WBC elevation present since at least 2019. She states she does not feel sick.  She denies fevers, chills. She does have occasional night sweats but states she only has one ovary. She denies weight loss. No diarrhea, rash, cough, joint pains. Quit smoking in 2021. Works at home.     Hematology History    Elevated WBC since at least 2019. Quit smokign 2/2021 9/14/20: WBC 12.13, hgb 13.4, plt 374    8/26/22: WBC 11.14, Abs lymphs up at 3.31, monocytes 1.05. Hgb 13.5, plt 339    3/7/23: WBC 13.00, Abs Neutrophils up at 8.48, ALC increased to 3.19, monocytes 0.95, normal basophils and eosinophils. Hgb 13.5, plt 416        Review of Systems   Constitutional: Positive for fatigue.   All other systems reviewed and are negative.    Current Outpatient Medications on File Prior to Visit   Medication Sig Dispense Refill   • amitriptyline (ELAVIL) 10 MG tablet Take 1 tablet by mouth Every Night for 90 days. 30 tablet 2   • dicyclomine (BENTYL) 20 MG tablet Take 1 tablet by mouth Every 6 (Six) Hours As Needed (abdominal cramping). 36 tablet 0   • lamoTRIgine (LaMICtal) 100 MG tablet Take 0.5 tablets by mouth 2 (Two) Times a Day for 90 days. 30 tablet 2   • Melatonin 10 MG capsule Take 10 mg by mouth.     • rizatriptan (Maxalt) 10 MG tablet Take 1 tablet by mouth 1 (One) Time As Needed for Migraine. May repeat in 2 hours once if needed 9 tablet 3   • sodium chloride 0.9 % solution 100 mL with Eptinezumab-jjmr 100 MG/ML solution 100 mg Infuse 100 mg into a venous catheter Every 3 (Three) Months.     • spironolactone (Aldactone) 50 MG tablet Take 1 tablet by  mouth Every Morning. 30 tablet 2   • vitamin D3 (Vitamin D) 125 MCG (5000 UT) capsule capsule Take 1 capsule by mouth Daily. 90 capsule 3   • zolpidem (Ambien) 5 MG tablet Take 1 tablet by mouth At Night As Needed for Sleep for up to 30 days. 30 tablet 0     No current facility-administered medications on file prior to visit.       Allergies   Allergen Reactions   • Cephalexin Diarrhea   • Cephalosporins GI Intolerance   • Cymbalta [Duloxetine Hcl] Other (See Comments)     Jittery and insomnia   • Penicillins Rash     Past Medical History:   Diagnosis Date   • Acid reflux    • Chronic pain disorder    • Depression    • Eczema    • Intractable migraine without aura and without status migrainosus 2023   • Kidney stone     HX OF   • Maltracking of right patella    • Panic disorder    • PONV (postoperative nausea and vomiting)     GOOD RESULTS WITH SCOPALAMINE   • PTSD (post-traumatic stress disorder)     WAKES UP FROM ANESTHESIA WITH PANIC ATTACK   • Seasonal allergic rhinitis 2022   • Self-injurious behavior     as a teenager   • Suicide attempt (HCC)      Past Surgical History:   Procedure Laterality Date   •  SECTION     • COLONOSCOPY N/A 10/11/2022    Procedure: COLONOSCOPY;  Surgeon: Dyan Griffin MD;  Location: [facility] ENDOSCOPY;  Service: Gastroenterology;  Laterality: N/A;  HEMORRHOIDS   • CYSTOSCOPY     • CYSTOSCOPY URETEROSCOPY Left 2023    Procedure: CYSTOSCOPY URETEROSCOPY RETROGRADE PYELOGRAM HOLMIUM LASER STENT INSERTION, left;  Surgeon: Melisa Garcia MD;  Location: [facility] OR;  Service: Urology;  Laterality: Left;   • HYSTERECTOMY     • KIDNEY STONE SURGERY      unspecified   • KNEE ARTHROSCOPY Right 2022    Procedure: KNEE ARTHROSCOPY WITH A LATERAL RELEASE AND CHONDROPLASTY;  Surgeon: Marco A Pitts MD;  Location: [facility] OR [facility];  Service: Orthopedics;  Laterality: Right;   • WISDOM TOOTH EXTRACTION       Social History     Socioeconomic History    • Marital status:      Spouse name: TRISTAN   • Number of children: 3   Tobacco Use   • Smoking status: Former     Packs/day: 0.50     Years: 15.00     Pack years: 7.50     Types: Cigarettes     Quit date: 2022     Years since quittin.0   • Smokeless tobacco: Never   Vaping Use   • Vaping Use: Every day   • Substances: Nicotine, Flavoring   • Devices: Disposable   Substance and Sexual Activity   • Alcohol use: Not Currently   • Drug use: Not Currently     Types: Marijuana   • Sexual activity: Defer     Family History   Problem Relation Age of Onset   • Arthritis Mother    • Thyroid disease Father    • Colon polyps Father    • Diabetes Father    • Drug abuse Maternal Uncle    • Alcohol abuse Maternal Uncle    • Cancer Maternal Grandmother    • Malig Hyperthermia Neg Hx        Objective   Physical Exam  Well appearing patient in no acute distress on RA  Anicteric sclerae, no rash on exposed skin  Respirations non-labored  Awake, alert, and oriented x 4. Speech intact. No gross neurologic deficit  Appropriate mood and affect    Vitals:    23 0807   BP: 119/71   Pulse: 99   Resp: 18   Temp: 98.2 °F (36.8 °C)   TempSrc: Temporal   SpO2: 98%   Weight: 80.2 kg (176 lb 12.9 oz)   PainSc: 0-No pain               PHQ-9 Total Score:                      Result Review :   The following data was reviewed by: Luis Enrique Wiggins MD on 23:  Lab Results   Component Value Date    HGB 13.5 2023    HCT 42.4 2023    MCV 85.7 2023     2023    WBC 13.00 (H) 2023    NEUTROABS 8.48 (H) 2023    LYMPHSABS 3.19 (H) 2023    MONOSABS 0.95 (H) 2023    EOSABS 0.24 2023    BASOSABS 0.09 2023     Lab Results   Component Value Date    GLUCOSE 83 2023    BUN 7 2023    CREATININE 0.80 2023     2023    K 4.4 2023     2023    CO2 25.3 2023    CALCIUM 9.3 2023    PROTEINTOT 6.4 2023    ALBUMIN  3.7 02/01/2023    BILITOT <0.2 02/01/2023    ALKPHOS 83 02/01/2023    AST 9 02/01/2023    ALT 10 02/01/2023     Lab Results   Component Value Date    FREET4 1.03 02/11/2022    TSH 1.250 01/06/2022    TSH 1.250 01/06/2022     Labs personally reviewed and WBC elevated with elevation in neutrophils, lymphocytes and monocytes. hgb and plt normal.       3/7/23 PCP note personally reviewed      US Renal Bilateral    Result Date: 3/6/2023    1. Normal renal ultrasound.  No evidence of obstructive uropathy. 2. Incidental 11 mm left renal cyst      Marco A Manzo MD       Electronically Signed and Approved By: Marco A Manzo MD on 3/06/2023 at 9:46             Mammo Diagnostic Digital Tomosynthesis Bilateral With CAD    Result Date: 3/3/2023   Benign bilateral mammogram and focused right breast ultrasound.  RECOMMENDATION(S):  CLINICAL EVALUATION.   BIRADS:  DIAGNOSTIC CATEGORY 2--BENIGN FINDING   BREAST COMPOSITION: Heterogeneously dense,which may obscure small masses.  PLEASE NOTE:  A NORMAL MAMMOGRAM DOES NOT EXCLUDE THE POSSIBILITY OF BREAST CANCER. ANY CLINICALLY SUSPICIOUS PALPABLE LUMP SHOULD BE BIOPSIED.      JULIA SMALLS MD       Electronically Signed and Approved By: JULIA SMALLS MD on 3/03/2023 at 10:37                   Assessment and Plan    Diagnoses and all orders for this visit:    1. History of leukocytosis (Primary)  -     Lactate Dehydrogenase; Future  -     Sedimentation Rate; Future  -     Antinuclear Antigen Antibody, IFA; Future  -     Rheumatoid Factor; Future  -     Flow Cytometry, Blood; Future  -     BCR-ABL1, CML / ALL, PCR, Quant; Future    Chronic mild leukocytosis present since at least 2019 (do not know true chronicity) with mainly involvement of monocytes, lymphocytes and neutrophils. Basophils and eosinophils normal. Hgb and plt counts are preserved which is reassuring. Will help rule out bone marrow process with flow cytometry. Will also rule out CML with BCR/ABL testing. LUDA, sed  rate, RF, and LDH ordered as well. Could be related to prior smoking history. RTC 3 weeks to discuss results.         Patient Follow Up: 3 weeks for lab review  Patient was given instructions and counseling regarding her condition or for health maintenance advice. Please see specific information pulled into the AVS if appropriate.

## 2023-03-15 LAB — ANA SER QL IF: NEGATIVE

## 2023-03-16 ENCOUNTER — OFFICE VISIT (OUTPATIENT)
Dept: SURGERY | Facility: CLINIC | Age: 28
End: 2023-03-16
Payer: COMMERCIAL

## 2023-03-16 ENCOUNTER — OFFICE VISIT (OUTPATIENT)
Dept: UROLOGY | Facility: CLINIC | Age: 28
End: 2023-03-16
Payer: COMMERCIAL

## 2023-03-16 VITALS — RESPIRATION RATE: 15 BRPM | BODY MASS INDEX: 30.05 KG/M2 | HEIGHT: 64 IN | WEIGHT: 176 LBS

## 2023-03-16 VITALS — BODY MASS INDEX: 30.15 KG/M2 | WEIGHT: 176.6 LBS | RESPIRATION RATE: 14 BRPM | HEIGHT: 64 IN

## 2023-03-16 DIAGNOSIS — N64.52 NIPPLE DISCHARGE: Primary | ICD-10-CM

## 2023-03-16 DIAGNOSIS — R39.15 URINARY URGENCY: ICD-10-CM

## 2023-03-16 DIAGNOSIS — N20.0 CALCIUM OXALATE KIDNEY STONES: Primary | ICD-10-CM

## 2023-03-16 DIAGNOSIS — N28.1 RENAL CYST: ICD-10-CM

## 2023-03-16 LAB — Lab: NORMAL

## 2023-03-16 PROCEDURE — 99213 OFFICE O/P EST LOW 20 MIN: CPT | Performed by: UROLOGY

## 2023-03-16 PROCEDURE — 1160F RVW MEDS BY RX/DR IN RCRD: CPT | Performed by: UROLOGY

## 2023-03-16 PROCEDURE — 1159F MED LIST DOCD IN RCRD: CPT | Performed by: UROLOGY

## 2023-03-16 PROCEDURE — 99203 OFFICE O/P NEW LOW 30 MIN: CPT | Performed by: SURGERY

## 2023-03-16 PROCEDURE — 1159F MED LIST DOCD IN RCRD: CPT | Performed by: SURGERY

## 2023-03-16 PROCEDURE — 1160F RVW MEDS BY RX/DR IN RCRD: CPT | Performed by: SURGERY

## 2023-03-16 NOTE — PROGRESS NOTES
"    UROLOGY OFFICE follow-up NOTE    Subjective   HPI  Palmira Carbajal is a 27 y.o. female.  Presents for follow-up status post left ureteroscopy, stone treatment, 1/30/2023.  Patient subsequently underwent stent removal in office and presents with renal ultrasound prior to today's appointment.Patient states that she has been doing well since the procedure.  The patient feels much better.     Complains of urinary urgency.  This has been happening for a long time, prior to procedure.   The patient feels like she urinates constantly. Her urine is clear.   The patient tries not to drink caffeine. She does not drink soda.  _______________  Stone analysis 1/30/2023: 70% calcium oxalate; 30% hydroxyapatite    Renal ultrasound 3/6/2023: Normal renal ultrasound; no evidence of obstructive uropathy; incidental 11 mm left renal cyst    CT abdomen pelvis 2/1/2023: There is mild right renal collecting system dilatation, with probable distal right ureteral 2 mm calculus. Additional nonobstructing intrarenal calculi bilaterally.        Review of systems  A review of systems was performed, and positive findings are noted in the HPI.    Objective     Vital Signs:   Resp 14   Ht 162.6 cm (64\")   Wt 80.1 kg (176 lb 9.6 oz)   BMI 30.31 kg/m²       Physical exam  No acute distress, well-nourished  Awake alert and oriented  Mood normal; affect normal    Problem List:  Patient Active Problem List   Diagnosis   • Maltracking of right patella   • Impingement syndrome involving patellar fat pad of right knee   • Chondromalacia   • Patellar malalignment syndrome of right knee   • Binocular vision disorder with diplopia   • Generalized anxiety disorder   • Acid reflux   • PTSD (post-traumatic stress disorder)   • Kidney stone on left side   • Seasonal allergic rhinitis   • Burn of finger and thumb of right hand, second degree   • Hemorrhoids   • Right hand pain   • Rectal bleeding   • Anal or rectal pain   • Decreased appetite   • " Aftercare following surgery of right knee arthroscopic chondroplasty and lateral release, 7/11/2022   • Generalized body aches   • Epigastric pain   • Female hirsutism   • Right ovarian cyst   • Moderate episode of recurrent major depressive disorder (HCC)   • Primary insomnia   • Vitamin D deficiency   • Intractable migraine without aura and without status migrainosus   • Calcified granuloma of lung (HCC)   • Nipple discharge       Assessment & Plan        Diagnoses and all orders for this visit:    1. Calcium oxalate kidney stones (Primary)    2. Urinary urgency    3. Renal cyst      Renal ultrasound and chemical stone analysis discussed with patient at length.  Renal ultrasound imaging reviewed, patient with simple left renal cyst.  This does not require work-up or further dedicated imaging.  No hydronephrosis on ultrasound.  No further urologic intervention indicated at this time.    We discussed that her chemical analysis came back as primarily calcium oxalate, 70%, and hydroxyapatite, 30%.    Discussed dietary modifications for prevention of stone growth and recurrence including increased hydration, decrease sodium, normal calcium, low animal protein diet.   Discussed seeking prompt treatment if she feels like she has UTI; recommend consideration of starting a cranberry supplement.   Informational handouts provided.      Urinary urgency-discussed at length; recommended behavioral modifications including timed and double voiding.  We discussed that she needs to pay attention to what she is eating, what she is drinking, stress level, situation, constipation, sexual activity, timing of the month as far as her cycle as all of these can impact urinary urgency and modifications after recognizing exacerbating factors can improve symptoms.    Follow-up as needed.   Patient encouraged to arrange follow-up should she have bothersome urinary urgency despite the above recommendations  All questions and concerns have been  addressed at this time.        Transcribed from ambient dictation for Melisa Garcia MD by Isabella Robertson.  03/16/23   12:35 EDT    Patient or patient representative verbalized consent to the visit recording.  I have personally performed the services described in this document as transcribed by the above individual, and it is both accurate and complete.

## 2023-03-16 NOTE — PROGRESS NOTES
Chief Complaint: Breast Discharge    Subjective         History of Present Illness  Palmira Carbajal is a 27 y.o. female presents to [facility] to be seen for breast pain and nodules.  She is also having some nipple discharge that is spontaneous and clear from the right breast.  Her imaging is shown below:    Narrative & Impression   PROCEDURE:  MAMMO DIAGNOSTIC DIGITAL TOMOSYNTHESIS BILATERAL W CAD     COMPARISON:  [facility], US, US BREAST RIGHT LIMITED, 3/03/2023, 9:55.     VIEWS:  DIAGNOSTIC VIEWS WERE OBTAINED UTILIZING 3D TOMOSYNTHESIS AND R2 CAD SOFTWARE     INDICATIONS:  Right breast lump, nipple discharge     VIEWS:              Routine views of both breasts.  Right CC and right MLO focal compression views.  Right mL   view.  Bilateral exaggerated CC views.  Focused right breast ultrasound.     FINDINGS:          No suspicious masses, areas of architectural distortion or suspicious microcalcifications are   identified in either breast.     Focused ultrasound examination of the 10 o'clock position and 4 o'clock position of the right   breast in the areas of the palpable abnormalities described by the patient demonstrates normal   heterogeneous parenchyma.  No solid or cystic masses are identified.  There are no areas of   abnormal shadowing.     IMPRESSION:     Benign bilateral mammogram and focused right breast ultrasound.     RECOMMENDATION(S):               CLINICAL EVALUATION.       BIRADS:               DIAGNOSTIC CATEGORY 2--BENIGN FINDING       BREAST COMPOSITION:          Heterogeneously dense,which may obscure small masses.     PLEASE NOTE:  A NORMAL MAMMOGRAM DOES NOT EXCLUDE THE POSSIBILITY OF BREAST CANCER.   ANY CLINICALLY SUSPICIOUS PALPABLE LUMP SHOULD BE BIOPSIED.          Her prolactin was normal when checked recently.      Objective     Past Medical History:   Diagnosis Date   • Acid reflux    • Chronic pain disorder    • Depression     • Eczema    • Intractable migraine without aura and without status migrainosus 2023   • Kidney stone     HX OF   • Maltracking of right patella    • Panic disorder    • PONV (postoperative nausea and vomiting)     GOOD RESULTS WITH SCOPALAMINE   • PTSD (post-traumatic stress disorder)     WAKES UP FROM ANESTHESIA WITH PANIC ATTACK   • Seasonal allergic rhinitis 2022   • Self-injurious behavior     as a teenager   • Suicide attempt (HCC)        Past Surgical History:   Procedure Laterality Date   •  SECTION     • COLONOSCOPY N/A 10/11/2022    Procedure: COLONOSCOPY;  Surgeon: Dyan Griffin MD;  Location: [facility] ENDOSCOPY;  Service: Gastroenterology;  Laterality: N/A;  HEMORRHOIDS   • CYSTOSCOPY     • CYSTOSCOPY URETEROSCOPY Left 2023    Procedure: CYSTOSCOPY URETEROSCOPY RETROGRADE PYELOGRAM HOLMIUM LASER STENT INSERTION, left;  Surgeon: Melisa Garcia MD;  Location: [facility] MAIN OR;  Service: Urology;  Laterality: Left;   • HYSTERECTOMY     • KIDNEY STONE SURGERY      unspecified   • KNEE ARTHROSCOPY Right 2022    Procedure: KNEE ARTHROSCOPY WITH A LATERAL RELEASE AND CHONDROPLASTY;  Surgeon: Marco A Pitts MD;  Location: [facility] OR [facility];  Service: Orthopedics;  Laterality: Right;   • WISDOM TOOTH EXTRACTION           Current Outpatient Medications:   •  amitriptyline (ELAVIL) 10 MG tablet, Take 1 tablet by mouth Every Night for 90 days., Disp: 30 tablet, Rfl: 2  •  dicyclomine (BENTYL) 20 MG tablet, Take 1 tablet by mouth Every 6 (Six) Hours As Needed (abdominal cramping)., Disp: 36 tablet, Rfl: 0  •  lamoTRIgine (LaMICtal) 100 MG tablet, Take 0.5 tablets by mouth 2 (Two) Times a Day for 90 days., Disp: 30 tablet, Rfl: 2  •  Melatonin 10 MG capsule, Take 10 mg by mouth., Disp: , Rfl:   •  rizatriptan (Maxalt) 10 MG tablet, Take 1 tablet by mouth 1 (One) Time As Needed for Migraine. May repeat in 2 hours once if needed, Disp: 9 tablet, Rfl: 3  •  sodium chloride 0.9  "% solution 100 mL with Eptinezumab-jjmr 100 MG/ML solution 100 mg, Infuse 100 mg into a venous catheter Every 3 (Three) Months., Disp: , Rfl:   •  spironolactone (Aldactone) 50 MG tablet, Take 1 tablet by mouth Every Morning., Disp: 30 tablet, Rfl: 2  •  vitamin D3 (Vitamin D) 125 MCG (5000 UT) capsule capsule, Take 1 capsule by mouth Daily., Disp: 90 capsule, Rfl: 3  •  zolpidem (Ambien) 5 MG tablet, Take 1 tablet by mouth At Night As Needed for Sleep for up to 30 days., Disp: 30 tablet, Rfl: 0    Allergies   Allergen Reactions   • Cephalexin Diarrhea   • Cephalosporins GI Intolerance   • Cymbalta [Duloxetine Hcl] Other (See Comments)     Jittery and insomnia   • Penicillins Rash        Family History   Problem Relation Age of Onset   • Arthritis Mother    • Thyroid disease Father    • Colon polyps Father    • Diabetes Father    • Drug abuse Maternal Uncle    • Alcohol abuse Maternal Uncle    • Cancer Maternal Grandmother    • Malig Hyperthermia Neg Hx        Social History     Socioeconomic History   • Marital status:      Spouse name: TRISTAN   • Number of children: 3   Tobacco Use   • Smoking status: Former     Packs/day: 0.50     Years: 15.00     Pack years: 7.50     Types: Cigarettes     Quit date: 2022     Years since quittin.0   • Smokeless tobacco: Never   Vaping Use   • Vaping Use: Every day   • Substances: Nicotine, Flavoring   • Devices: Disposable   Substance and Sexual Activity   • Alcohol use: Not Currently   • Drug use: Not Currently     Types: Marijuana   • Sexual activity: Defer       Vital Signs:   Resp 15   Ht 162.6 cm (64\")   Wt 79.8 kg (176 lb)   BMI 30.21 kg/m²    Review of Systems    Physical Exam  Vitals and nursing note reviewed.   Constitutional:       Appearance: Normal appearance.   HENT:      Head: Normocephalic and atraumatic.   Eyes:      Extraocular Movements: Extraocular movements intact.      Pupils: Pupils are equal, round, and reactive to light.   Cardiovascular: "      Pulses: Normal pulses.   Pulmonary:      Effort: Pulmonary effort is normal. No accessory muscle usage or respiratory distress.   Chest:   Breasts:     Right: Nipple discharge present. No inverted nipple, mass or skin change.      Left: Normal. No inverted nipple, mass, nipple discharge or skin change.      Comments: Both breasts dense and with nodularities and she is able to express clear discharge from multiple ducts  Abdominal:      General: Abdomen is flat.      Palpations: Abdomen is soft.      Tenderness: There is no abdominal tenderness. There is no guarding.   Musculoskeletal:         General: No swelling, tenderness or deformity.      Cervical back: Neck supple.   Lymphadenopathy:      Upper Body:      Right upper body: No supraclavicular or axillary adenopathy.      Left upper body: No supraclavicular or axillary adenopathy.   Skin:     General: Skin is warm and dry.   Neurological:      General: No focal deficit present.      Mental Status: She is alert and oriented to person, place, and time.   Psychiatric:         Mood and Affect: Mood normal.         Thought Content: Thought content normal.          Result Review :               Assessment and Plan    Diagnoses and all orders for this visit:    1. Nipple discharge (Primary)  -     MRI Breast Bilateral Screening With & Without Contrast; Future    Will check breast MRI to better evaluate nipple discharge etiology        Follow Up   Return in about 4 weeks (around 4/13/2023).  Patient was given instructions and counseling regarding her condition or for health maintenance advice. Please see specific information pulled into the AVS if appropriate.         This document has been electronically signed by Marian Benitez MD  March 16, 2023 15:03 EDT

## 2023-03-17 LAB — REF LAB TEST METHOD: NORMAL

## 2023-03-20 ENCOUNTER — TELEMEDICINE (OUTPATIENT)
Dept: PSYCHIATRY | Facility: CLINIC | Age: 28
End: 2023-03-20
Payer: COMMERCIAL

## 2023-03-20 DIAGNOSIS — F41.1 GENERALIZED ANXIETY DISORDER: ICD-10-CM

## 2023-03-20 DIAGNOSIS — F51.05 INSOMNIA DUE TO MENTAL DISORDER: ICD-10-CM

## 2023-03-20 DIAGNOSIS — F43.10 POST TRAUMATIC STRESS DISORDER (PTSD): ICD-10-CM

## 2023-03-20 DIAGNOSIS — F33.1 MAJOR DEPRESSIVE DISORDER, RECURRENT EPISODE, MODERATE: Primary | ICD-10-CM

## 2023-03-20 PROCEDURE — 1160F RVW MEDS BY RX/DR IN RCRD: CPT | Performed by: NURSE PRACTITIONER

## 2023-03-20 PROCEDURE — 99214 OFFICE O/P EST MOD 30 MIN: CPT | Performed by: NURSE PRACTITIONER

## 2023-03-20 PROCEDURE — 1159F MED LIST DOCD IN RCRD: CPT | Performed by: NURSE PRACTITIONER

## 2023-03-20 PROCEDURE — 90833 PSYTX W PT W E/M 30 MIN: CPT | Performed by: NURSE PRACTITIONER

## 2023-03-20 RX ORDER — ZOLPIDEM TARTRATE 5 MG/1
5 TABLET ORAL NIGHTLY PRN
Qty: 30 TABLET | Refills: 0 | Status: SHIPPED | OUTPATIENT
Start: 2023-03-21 | End: 2023-04-20

## 2023-03-20 RX ORDER — AMITRIPTYLINE HYDROCHLORIDE 10 MG/1
10 TABLET, FILM COATED ORAL DAILY
Qty: 30 TABLET | Refills: 2 | COMMUNITY
Start: 2023-02-21 | End: 2023-03-20

## 2023-03-20 RX ORDER — DOXYCYCLINE HYCLATE 100 MG/1
CAPSULE ORAL
COMMUNITY
Start: 2023-03-19

## 2023-03-20 RX ORDER — BROMPHENIRAMINE MALEATE, PSEUDOEPHEDRINE HYDROCHLORIDE, AND DEXTROMETHORPHAN HYDROBROMIDE 2; 30; 10 MG/5ML; MG/5ML; MG/5ML
5-10 SYRUP ORAL
COMMUNITY
Start: 2023-03-19

## 2023-03-20 NOTE — PROGRESS NOTES
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 echotexture.  Small colloid cyst inferior left thyroid lobe.  No further evaluation is required.     SHARON ROBIN MD       Electronically Signed and Approved By: SHARON ROBIN MD on 10/31/2022 at 18:43             US Abdomen Limited    Result Date: 8/26/2022    1. Unremarkable right upper quadrant ultrasound.     CLAY MARSHALL MD       ELECTRONICALLY SIGNED AND APPROVED BY: CLAY MARSHALL MD ON 8/26/2022 AT 9:21                 Assessment & Plan   Diagnoses and all orders for this visit:    1. Major depressive disorder, recurrent episode, moderate (HCC) (Primary)    2. Generalized anxiety disorder    3. Post traumatic stress disorder (PTSD)    4. Insomnia due to mental disorder  -     zolpidem (Ambien) 5 MG tablet; Take 1 tablet by mouth At Night As Needed for Sleep for up to 30 days.  Dispense: 30 tablet; Refill: 0      Continue lamotrigine to target depression. Continue amitriptyline to target depression and anxiety. Psychotherapy for anxiety and PTSD. Continue ambien to target insomnia. 16 minutes of supportive psychotherapy with goal to strengthen defenses, promote problems solving, restore adaptive functioning and provide symptom relief. The therapeutic alliance was strengthened to  encourage the patient to express their thoughts and feelings. Esteem building was enhanced through praise, reassurance, normalizing and encouragement. Coping skills were enhanced to build distress tolerance skills and emotional regulation. Allowed patient to freely discuss issues without interruption or judgement with unconditional positive regard, active listening skills, and empathy. Provided a safe, confidential environment to facilitate the development of a positive therapeutic relationship and encourage open, honest communication. Assisted patient in identifying risk factors which would indicate the need for higher level of care including thoughts to harm self or others and/or self-harming behavior and encouraged patient to contact this office, call 911, or present to the nearest emergency room should any of these events occur. Assisted patient in processing session content; acknowledged and normalized patient's thoughts, feelings, and concerns by utilizing a person-centered approach in efforts to build appropriate rapport and a positive therapeutic relationship with open and honest communication. Patient given education on medication side effects, diagnosis/illness and relapse symptoms. Plan to continue supportive psychotherapy in next appointment to provide symptom relief. 4 weeks    Diagnoses: as above  Symptoms: as above  Functional status: good  Mental Status Exam: as above     Treatment plan: medication management and supportive psychotherapy  Prognosis: good  Progress: continued improvement    Visit Diagnoses:    ICD-10-CM ICD-9-CM   1. Major depressive disorder, recurrent episode, moderate (HCC)  F33.1 296.32   2. Generalized anxiety disorder  F41.1 300.02   3. Post traumatic stress disorder (PTSD)  F43.10 309.81   4. Insomnia due to mental disorder  F51.05 300.9     327.02       PLAN:  11. Safety: No acute safety concerns.   12. Therapy: Declines  13. Risk Assessment: Risk of self-harm acutely is  moderate.  Risk factors include anxiety disorder, mood disorder, and recent psychosocial stressors (pandemic). Protective factors include no family history, denies access to guns/weapons, no present SI, minimal AODA, healthcare seeking, future orientation, willingness to engage in care.  Risk of self-harm chronically is also moderate, but could be further elevated in the event of treatment noncompliance and/or AODA.  14. Medications: Increase lamotrigine 50mg po qhs to 50mg po bid to target mood. Risks, benefits, alternatives discussed with patient including rash, rebound depressive or manic symptoms if prompt discontinuation, GI upset, agitation, sedation/falls risk.  After discussion of these risks and benefits, patient voiced understanding and agreed to proceed. Continue amitriptyline 10 mg po qhs to target depression and anxiety. Risks, benefits, alternatives discussed with patient including sedation, dizziness, falls, GI upset, constipation, urinary retention, dry mouth.  After discussion of these risks and benefits, the patient voiced understanding and agreed to proceed. Start ambien 5mg po qhs to target insomnia. Risks, benefits, side effects discussed with patient including sedation, dizziness, GI upset, hallucinations, sleepwalking, sleep-eating.  After discussion of these risks and benefits, the patient voiced understanding and agreed to proceed.  15. Labs/studies: No labs/studies ordered at this time  16. Follow-up: 4 weeks    Patient screened positive for depression based on a PHQ-9 score of 22 on 12/8/2022. Follow-up recommendations include: Suicide Risk Assessment performed.         TREATMENT PLAN/GOALS: Continue supportive psychotherapy efforts and medications as indicated. Treatment and medication options discussed during today's visit. Patient ackowledged and verbally consented to continue with current treatment plan and was educated on the importance of compliance with treatment and follow-up  appointments.      MEDICATION ISSUES:  DEBORAH reviewed as expected.  Discussed medication options and treatment plan of prescribed medication as well as the risks, benefits, and side effects including potential falls, possible impaired driving and metabolic adversities among others. Patient is agreeable to call the office with any worsening of symptoms or onset of side effects. Patient is agreeable to call 911 or go to the nearest ER should he/she begin having SI/HI. No medication side effects or related complaints today.     MEDS ORDERED DURING VISIT:  New Medications Ordered This Visit   Medications   • zolpidem (Ambien) 5 MG tablet     Sig: Take 1 tablet by mouth At Night As Needed for Sleep for up to 30 days.     Dispense:  30 tablet     Refill:  0       Return in about 4 weeks (around 4/17/2023) for Next scheduled follow up.         This document has been electronically signed by MINI Poon  March 20, 2023 12:35 EDT      Part of this note may be an electronic transcription/translation of spoken language to printed text using the Dragon Dictation System.

## 2023-03-29 ENCOUNTER — HOSPITAL ENCOUNTER (OUTPATIENT)
Dept: MRI IMAGING | Facility: HOSPITAL | Age: 28
Discharge: HOME | End: 2023-03-29
Admitting: SURGERY
Payer: COMMERCIAL

## 2023-03-29 DIAGNOSIS — N64.52 NIPPLE DISCHARGE: ICD-10-CM

## 2023-03-29 PROCEDURE — 0 GADOBENATE DIMEGLUMINE 529 MG/ML SOLUTION: Performed by: SURGERY

## 2023-03-29 PROCEDURE — A9577 INJ MULTIHANCE: HCPCS | Performed by: SURGERY

## 2023-03-29 PROCEDURE — 77049 MRI BREAST C-+ W/CAD BI: CPT

## 2023-03-29 RX ADMIN — GADOBENATE DIMEGLUMINE 15 ML: 529 INJECTION, SOLUTION INTRAVENOUS at 09:46

## 2023-04-04 ENCOUNTER — OFFICE VISIT (OUTPATIENT)
Dept: ONCOLOGY | Facility: HOSPITAL | Age: 28
End: 2023-04-04
Payer: COMMERCIAL

## 2023-04-04 VITALS
BODY MASS INDEX: 30.42 KG/M2 | RESPIRATION RATE: 18 BRPM | OXYGEN SATURATION: 100 % | WEIGHT: 177.25 LBS | SYSTOLIC BLOOD PRESSURE: 119 MMHG | DIASTOLIC BLOOD PRESSURE: 81 MMHG | TEMPERATURE: 98 F | HEART RATE: 87 BPM

## 2023-04-04 DIAGNOSIS — Z86.2 HISTORY OF LEUKOCYTOSIS: Primary | ICD-10-CM

## 2023-04-04 PROCEDURE — G0463 HOSPITAL OUTPT CLINIC VISIT: HCPCS | Performed by: INTERNAL MEDICINE

## 2023-04-04 PROCEDURE — 1160F RVW MEDS BY RX/DR IN RCRD: CPT | Performed by: INTERNAL MEDICINE

## 2023-04-04 PROCEDURE — 1159F MED LIST DOCD IN RCRD: CPT | Performed by: INTERNAL MEDICINE

## 2023-04-04 PROCEDURE — 1126F AMNT PAIN NOTED NONE PRSNT: CPT | Performed by: INTERNAL MEDICINE

## 2023-04-04 PROCEDURE — 99213 OFFICE O/P EST LOW 20 MIN: CPT | Performed by: INTERNAL MEDICINE

## 2023-04-04 RX ORDER — ALBUTEROL SULFATE 2.5 MG/3ML
SOLUTION RESPIRATORY (INHALATION)
COMMUNITY
Start: 2023-03-21

## 2023-04-04 NOTE — PROGRESS NOTES
Chief Complaint  Leukocytosis    Jaja Castillo, A*  Jaja Castillo, APRN    Subjective          Palmira Carbajal presents to [facility] for leukocytosis    Ms. Carbajal is a 26 yo F with hx of endometriosis s/p hysterectomy, migraines, anxiety who presents for follow up regarding elevated WBC. WBC elevation present since at least 2019. She had bronchitis a couple of weeks ago but has completely recovered. No other updates from last visit. No fevers, chills.     Hematology History    Elevated WBC since at least 2019. Quit smoking 2/2021 9/14/20: WBC 12.13, hgb 13.4, plt 374    8/26/22: WBC 11.14, Abs lymphs up at 3.31, monocytes 1.05. Hgb 13.5, plt 339    3/7/23: WBC 13.00, Abs Neutrophils up at 8.48, ALC increased to 3.19, monocytes 0.95, normal basophils and eosinophils. Hgb 13.5, plt 416    3/14/23: , LUDA negative, RF negative, flow cytometry normal, BCR/ABL not detected, ESR 25 (upper limit normal 20)      Review of Systems   Constitutional: Positive for fatigue.   All other systems reviewed and are negative.    Current Outpatient Medications on File Prior to Visit   Medication Sig Dispense Refill   • albuterol (PROVENTIL) (2.5 MG/3ML) 0.083% nebulizer solution      • amitriptyline (ELAVIL) 10 MG tablet Take 1 tablet by mouth Every Night for 90 days. 30 tablet 2   • brompheniramine-pseudoephedrine-DM 30-2-10 MG/5ML syrup Take 5-10 mL by mouth.     • dicyclomine (BENTYL) 20 MG tablet Take 1 tablet by mouth Every 6 (Six) Hours As Needed (abdominal cramping). 36 tablet 0   • doxycycline (VIBRAMYCIN) 100 MG capsule      • lamoTRIgine (LaMICtal) 100 MG tablet Take 0.5 tablets by mouth 2 (Two) Times a Day for 90 days. 30 tablet 2   • Melatonin 10 MG capsule Take 10 mg by mouth.     • rizatriptan (Maxalt) 10 MG tablet Take 1 tablet by mouth 1 (One) Time As Needed for Migraine. May repeat in 2 hours once if needed 9 tablet 3   • sodium chloride 0.9 %  solution 100 mL with Eptinezumab-jjmr 100 MG/ML solution 100 mg Infuse 100 mg into a venous catheter Every 3 (Three) Months.     • spironolactone (Aldactone) 50 MG tablet Take 1 tablet by mouth Every Morning. 30 tablet 2   • vitamin D3 (Vitamin D) 125 MCG (5000 UT) capsule capsule Take 1 capsule by mouth Daily. 90 capsule 3   • zolpidem (Ambien) 5 MG tablet Take 1 tablet by mouth At Night As Needed for Sleep for up to 30 days. 30 tablet 0     No current facility-administered medications on file prior to visit.       Allergies   Allergen Reactions   • Cephalexin Diarrhea   • Cephalosporins GI Intolerance   • Cymbalta [Duloxetine Hcl] Other (See Comments)     Jittery and insomnia   • Penicillins Rash     Past Medical History:   Diagnosis Date   • Acid reflux    • Chronic pain disorder    • Depression    • Eczema    • Intractable migraine without aura and without status migrainosus 2023   • Kidney stone     HX OF   • Maltracking of right patella    • Panic disorder    • PONV (postoperative nausea and vomiting)     GOOD RESULTS WITH SCOPALAMINE   • PTSD (post-traumatic stress disorder)     WAKES UP FROM ANESTHESIA WITH PANIC ATTACK   • Seasonal allergic rhinitis 2022   • Self-injurious behavior     as a teenager   • Suicide attempt      Past Surgical History:   Procedure Laterality Date   •  SECTION     • COLONOSCOPY N/A 10/11/2022    Procedure: COLONOSCOPY;  Surgeon: Dyan Griffin MD;  Location: [facility] ENDOSCOPY;  Service: Gastroenterology;  Laterality: N/A;  HEMORRHOIDS   • CYSTOSCOPY     • CYSTOSCOPY URETEROSCOPY Left 2023    Procedure: CYSTOSCOPY URETEROSCOPY RETROGRADE PYELOGRAM HOLMIUM LASER STENT INSERTION, left;  Surgeon: Melisa Garcia MD;  Location: [facility] MAIN OR;  Service: Urology;  Laterality: Left;   • HYSTERECTOMY     • KIDNEY STONE SURGERY      unspecified   • KNEE ARTHROSCOPY Right 2022    Procedure: KNEE ARTHROSCOPY WITH A LATERAL RELEASE AND CHONDROPLASTY;   Surgeon: Marco A Pitts MD;  [place] [facility] [place] [facility] [place]  Service: [place]opedics;  Laterality: Right;   • WISDOM TOOTH EXTRACTION       Social History     Socioeconomic History   • Marital status:      Spouse name: TRISTAN   • Number of children: 3   Tobacco Use   • Smoking status: Former     Packs/day: 0.50     Years: 15.00     Pack years: 7.50     Types: Cigarettes     Quit date: 2022     Years since quittin.1   • Smokeless tobacco: Never   Vaping Use   • Vaping Use: Every day   • Substances: Nicotine, Flavoring   • Devices: Disposable   Substance and Sexual Activity   • Alcohol use: Not Currently   • Drug use: Not Currently     Types: Marijuana   • Sexual activity: Defer     Family History   Problem Relation Age of Onset   • Arthritis Mother    • Thyroid disease Father    • Colon polyps Father    • Diabetes Father    • Drug abuse Maternal Uncle    • Alcohol abuse Maternal Uncle    • Cancer Maternal Grandmother    • Malig Hyperthermia Neg Hx        Objective   Physical Exam    Well appearing patient in no acute distress on RA  Anicteric sclerae, no rash on exposed skin  Respirations non-labored  Awake, alert, and oriented x 4. Speech intact.  Appropriate mood and affect    Vitals:    23 1106   BP: 119/81   Pulse: 87   Resp: 18   Temp: 98 °F (36.7 °C)   TempSrc: Temporal   SpO2: 100%   Weight: 80.4 kg (177 lb 4 oz)   PainSc: 0-No pain               PHQ-9 Total Score:               Result Review :   The following data was reviewed by: Luis Enrique Wiggins MD on 23:  Lab Results   Component Value Date    HGB 13.5 2023    HCT 42.4 2023    MCV 85.7 2023     2023    WBC 13.00 (H) 2023    NEUTROABS 8.48 (H) 2023    LYMPHSABS 3.19 (H) 2023    MONOSABS 0.95 (H) 2023    EOSABS 0.24 2023    BASOSABS 0.09 2023     Lab Results   Component Value Date    GLUCOSE 83 2023    BUN 7 2023    CREATININE 0.80 2023      03/07/2023    K 4.4 03/07/2023     03/07/2023    CO2 25.3 03/07/2023    CALCIUM 9.3 03/07/2023    PROTEINTOT 6.4 02/01/2023    ALBUMIN 3.7 02/01/2023    BILITOT <0.2 02/01/2023    ALKPHOS 83 02/01/2023    AST 9 02/01/2023    ALT 10 02/01/2023     Lab Results   Component Value Date    FREET4 1.03 02/11/2022    TSH 1.250 01/06/2022    TSH 1.250 01/06/2022     Labs personally reviewed and WBC elevated with elevation in neutrophils, lymphocytes and monocytes. hgb and plt normal.       3/7/23 PCP note personally reviewed    Flow cytometry normal. BCR/ABL negative. LDH normal. ESR 25.       MRI Breast Bilateral With & Without Contrast    Result Date: 3/29/2023  No MR evidence of malignancy in either breast.  No cause for her unilateral right nipple discharge is identified.  Further management should be based on clinical findings and suspicion.  Annual screening mammography beginning age 40 is recommended.  BIRADS: DIAGNOSTIC CATEGORY 1--NEGATIVE.   RECOMMENDATION(S): CLINICAL EVALUATION.    PLEASE NOTE:  A NORMAL MRI DOES NOT EXCLUDE THE POSSIBILITY OF BREAST CANCER.  A CLINICALLY SUSPICIOUS PALPABLE LUMP SHOULD BE BIOPSIED.      RAJI MORSE DO       Electronically Signed and Approved By: RAJI MORSE DO on 3/29/2023 at 18:04             US Renal Bilateral    Result Date: 3/6/2023    1. Normal renal ultrasound.  No evidence of obstructive uropathy. 2. Incidental 11 mm left renal cyst      Marco A Manzo MD       Electronically Signed and Approved By: Marco A Manzo MD on 3/06/2023 at 9:46                   Assessment and Plan    Diagnoses and all orders for this visit:    1. History of leukocytosis (Primary)  -     CBC & Differential; Future    Chronic mild leukocytosis present since at least 2019 (do not know true chronicity) with mainly involvement of monocytes, lymphocytes and neutrophils. Basophils and eosinophils normal. Hgb and plt counts are preserved which is reassuring. Flow cytometry without any  immunophenoatypia which is reassuring. CML ruled out as BCR/ABL negative. LUDA/RF negative. LDH normal. Unlikely bone marrow process. Reassurance provided. Will repeat CBC in 1 year. Can consider bone marrow biopsy if WBC worsens or she develops anemia and/or thrombocytopenia.       Patient Follow Up: 1 year with CBC  Patient was given instructions and counseling regarding her condition or for health maintenance advice. Please see specific information pulled into the AVS if appropriate.

## 2023-04-05 ENCOUNTER — OFFICE VISIT (OUTPATIENT)
Dept: GASTROENTEROLOGY | Facility: CLINIC | Age: 28
End: 2023-04-05
Payer: COMMERCIAL

## 2023-04-05 VITALS
HEIGHT: 64 IN | WEIGHT: 176.5 LBS | HEART RATE: 100 BPM | DIASTOLIC BLOOD PRESSURE: 70 MMHG | SYSTOLIC BLOOD PRESSURE: 119 MMHG | BODY MASS INDEX: 30.13 KG/M2

## 2023-04-05 DIAGNOSIS — K59.00 CONSTIPATION, UNSPECIFIED CONSTIPATION TYPE: ICD-10-CM

## 2023-04-05 DIAGNOSIS — Z80.0 FH: COLON CANCER: ICD-10-CM

## 2023-04-05 DIAGNOSIS — K62.89 ANAL OR RECTAL PAIN: ICD-10-CM

## 2023-04-05 DIAGNOSIS — K62.5 RECTAL BLEEDING: Primary | ICD-10-CM

## 2023-04-05 PROCEDURE — 99214 OFFICE O/P EST MOD 30 MIN: CPT | Performed by: NURSE PRACTITIONER

## 2023-04-05 PROCEDURE — 1159F MED LIST DOCD IN RCRD: CPT | Performed by: NURSE PRACTITIONER

## 2023-04-05 PROCEDURE — 1160F RVW MEDS BY RX/DR IN RCRD: CPT | Performed by: NURSE PRACTITIONER

## 2023-04-05 RX ORDER — LIDOCAINE 5 G/100G
1 CREAM RECTAL; TOPICAL 3 TIMES DAILY PRN
Qty: 1 EACH | Refills: 0 | COMMUNITY
Start: 2023-04-05

## 2023-04-05 RX ORDER — LIDOCAINE 5 G/100G
1 CREAM RECTAL; TOPICAL 3 TIMES DAILY PRN
Qty: 2 EACH | Refills: 0 | COMMUNITY
Start: 2023-04-05

## 2023-04-05 RX ORDER — POLYETHYLENE GLYCOL 3350 17 G/17G
17 POWDER, FOR SOLUTION ORAL DAILY
Qty: 5 PACKET | Refills: 0 | COMMUNITY
Start: 2023-04-05

## 2023-04-05 NOTE — PATIENT INSTRUCTIONS
Start a high fiber diet    Start daily miralax (samples given today)    Start recticare rectal cream up to 3 times a day as needed for hemorrhoids

## 2023-04-05 NOTE — PROGRESS NOTES
Chief Complaint   Rectal Bleeding    History of Present Illness       Palmira Carbajal is a 27 y.o. female who presents to [facility] for follow-up for rectal bleeding.  She is new to me today.  She was last seen in the office by nurse practitioner Leopoldo Gaffney on 6/7/2022.      Still having rectal bleeding after BMs. Still having constipation and having to strain. Has been taking stool softeners without relief. Maybe has BM once a week.  Underwent colonoscopy with Dr. Griffin on 10/11/2022.  Colonoscopy was normal except for some internal and external nonbleeding hemorrhoids.  Recall in 5 years.      Denies any reflux or trouble swallowing. Good appetite.  Denies any abdominal pain, nausea and vomiting or melena.  Weight is stable.    Significant GI FH colon cancer with father.       Results       Result Review :       CMP    CMP 8/26/22 2/1/23 3/7/23   Glucose 90 113 (A) 83   BUN 10 8 7   Creatinine 0.66 0.62 0.80   eGFR 122.0 123.8 102.4   Sodium 139 138 139   Potassium 4.0 3.3 (A) 4.4   Chloride 104 104 102   Calcium 9.2 8.6 9.3   Total Protein 7.5 6.4    Albumin 4.50 3.7    Globulin 3.0 2.7    Total Bilirubin 0.2 <0.2    Alkaline Phosphatase 87 83    AST (SGOT) 14 9    ALT (SGPT) 12 10    Albumin/Globulin Ratio 1.5 1.4    BUN/Creatinine Ratio 15.2 12.9 8.8   Anion Gap 12.6 8.7 11.7   (A) Abnormal value       Comments are available for some flowsheets but are not being displayed.           CBC    CBC 8/26/22 2/1/23 3/7/23   WBC 11.14 (A) 17.19 (A) 13.00 (A)   RBC 4.82 4.54 4.95   Hemoglobin 13.5 12.8 13.5   Hematocrit 41.0 38.7 42.4   MCV 85.1 85.2 85.7   MCH 28.0 28.2 27.3   MCHC 32.9 33.1 31.8   RDW 13.2 13.4 12.7   Platelets 339 346 416   (A) Abnormal value              Lipase   Lipase   Date Value Ref Range Status   02/01/2023 28 13 - 60 U/L Final     Amylase   Amylase   Date Value Ref Range Status   09/14/2020 36 30 - 110 U/L Final     Vitamin D   25 Hydroxy, Vitamin D    Date Value Ref Range Status   2023 28.7 (L) 30.0 - 100.0 ng/ml Final             Colonoscopy done by Dr. Griffin on 10/11/2022 showed internal and external nonbleeding hemorrhoids otherwise entire colon was normal.  Recall in 5 years.  Past Medical History       Past Medical History:   Diagnosis Date   • Acid reflux    • Chronic pain disorder    • Depression    • Eczema    • Intractable migraine without aura and without status migrainosus 2023   • Kidney stone     HX OF   • Maltracking of right patella    • Panic disorder    • PONV (postoperative nausea and vomiting)     GOOD RESULTS WITH SCOPALAMINE   • PTSD (post-traumatic stress disorder)     WAKES UP FROM ANESTHESIA WITH PANIC ATTACK   • Seasonal allergic rhinitis 2022   • Self-injurious behavior     as a teenager   • Suicide attempt        Past Surgical History:   Procedure Laterality Date   •  SECTION     • COLONOSCOPY N/A 10/11/2022    Procedure: COLONOSCOPY;  Surgeon: Dyan Griffin MD;  Location: [facility] ENDOSCOPY;  Service: Gastroenterology;  Laterality: N/A;  HEMORRHOIDS   • CYSTOSCOPY     • CYSTOSCOPY URETEROSCOPY Left 2023    Procedure: CYSTOSCOPY URETEROSCOPY RETROGRADE PYELOGRAM HOLMIUM LASER STENT INSERTION, left;  Surgeon: Melisa Garcia MD;  Location: [facility] MAIN OR;  Service: Urology;  Laterality: Left;   • HYSTERECTOMY     • KIDNEY STONE SURGERY      unspecified   • KNEE ARTHROSCOPY Right 2022    Procedure: KNEE ARTHROSCOPY WITH A LATERAL RELEASE AND CHONDROPLASTY;  Surgeon: Marco A Pitts MD;  [place] [facility] [place] [facility]  Service: Orthopedics;  Laterality: Right;   • WISDOM TOOTH EXTRACTION           Current Outpatient Medications:   •  albuterol (PROVENTIL) (2.5 MG/3ML) 0.083% nebulizer solution, , Disp: , Rfl:   •  amitriptyline (ELAVIL) 10 MG tablet, Take 1 tablet by mouth Every Night for 90 days., Disp: 30 tablet, Rfl: 2  •  brompheniramine-pseudoephedrine-DM 30-2-10 MG/5ML syrup, Take  5-10 mL by mouth., Disp: , Rfl:   •  dicyclomine (BENTYL) 20 MG tablet, Take 1 tablet by mouth Every 6 (Six) Hours As Needed (abdominal cramping)., Disp: 36 tablet, Rfl: 0  •  doxycycline (VIBRAMYCIN) 100 MG capsule, , Disp: , Rfl:   •  lamoTRIgine (LaMICtal) 100 MG tablet, Take 0.5 tablets by mouth 2 (Two) Times a Day for 90 days., Disp: 30 tablet, Rfl: 2  •  Melatonin 10 MG capsule, Take 10 mg by mouth., Disp: , Rfl:   •  rizatriptan (Maxalt) 10 MG tablet, Take 1 tablet by mouth 1 (One) Time As Needed for Migraine. May repeat in 2 hours once if needed, Disp: 9 tablet, Rfl: 3  •  sodium chloride 0.9 % solution 100 mL with Eptinezumab-jjmr 100 MG/ML solution 100 mg, Infuse 100 mg into a venous catheter Every 3 (Three) Months., Disp: , Rfl:   •  spironolactone (Aldactone) 50 MG tablet, Take 1 tablet by mouth Every Morning., Disp: 30 tablet, Rfl: 2  •  vitamin D3 (Vitamin D) 125 MCG (5000 UT) capsule capsule, Take 1 capsule by mouth Daily., Disp: 90 capsule, Rfl: 3  •  zolpidem (Ambien) 5 MG tablet, Take 1 tablet by mouth At Night As Needed for Sleep for up to 30 days., Disp: 30 tablet, Rfl: 0     Allergies   Allergen Reactions   • Cephalexin Diarrhea   • Cephalosporins GI Intolerance   • Cymbalta [Duloxetine Hcl] Other (See Comments)     Jittery and insomnia   • Penicillins Rash       Family History   Problem Relation Age of Onset   • Arthritis Mother    • Thyroid disease Father    • Colon polyps Father    • Diabetes Father    • Drug abuse Maternal Uncle    • Alcohol abuse Maternal Uncle    • Cancer Maternal Grandmother    • Malig Hyperthermia Neg Hx         Social History     Social History Narrative   • Not on file       Objective       Review of Systems   Constitutional: Negative for appetite change, fever, unexpected weight gain and unexpected weight loss.   HENT: Negative for trouble swallowing.    Respiratory: Negative for cough, choking, chest tightness, shortness of breath, wheezing and stridor.   "  Cardiovascular: Negative for chest pain, palpitations and leg swelling.   Gastrointestinal: Positive for anal bleeding, constipation and rectal pain. Negative for abdominal distention, abdominal pain, blood in stool, diarrhea, nausea, vomiting, GERD and indigestion.        Vital Signs:   /70 (BP Location: Left arm, Patient Position: Sitting, Cuff Size: Adult)   Pulse 100   Ht 162.6 cm (64\")   Wt 80.1 kg (176 lb 8 oz)   BMI 30.30 kg/m²       Physical Exam  Constitutional:       General: She is not in acute distress.     Appearance: She is well-developed. She is not ill-appearing.   HENT:      Head: Normocephalic.   Eyes:      Pupils: Pupils are equal, round, and reactive to light.   Cardiovascular:      Rate and Rhythm: Normal rate and regular rhythm.      Heart sounds: Normal heart sounds.   Pulmonary:      Effort: Pulmonary effort is normal.      Breath sounds: Normal breath sounds.   Abdominal:      General: Bowel sounds are normal. There is no distension.      Palpations: Abdomen is soft. There is no mass.      Tenderness: There is no abdominal tenderness. There is no guarding or rebound.      Hernia: No hernia is present.   Musculoskeletal:         General: Normal range of motion.   Skin:     General: Skin is warm and dry.   Neurological:      Mental Status: She is alert and oriented to person, place, and time.   Psychiatric:         Speech: Speech normal.         Behavior: Behavior normal.         Judgment: Judgment normal.           Assessment & Plan          Assessment and Plan    Diagnoses and all orders for this visit:    1. Rectal bleeding (Primary)    2. Constipation, unspecified constipation type    3. Anal or rectal pain    4. FH: colon cancer    Reviewed colonoscopy results with her today.  Colonoscopy was normal except for some internal and external nonbleeding hemorrhoids.  Recall in 5 years due to her family history.  Still struggling with constipation and hemorrhoids.  Would like her to " start daily MiraLAX.  Samples given today.  We will also give her some samples of RectiCare rectal cream to use up to 3 times a day for her hemorrhoids.  Recommend she start a high-fiber diet.  Otherwise doing well.  Patient to call the office in 2 weeks with an update.  Patient to follow-up with me in 3 months.  Patient is agreeable to the plan.            Follow Up       Follow Up   Return in about 3 months (around 7/5/2023) for RECTAL BLEEDING.  Patient was given instructions and counseling regarding her condition or for health maintenance advice. Please see specific information pulled into the AVS if appropriate.

## 2023-04-11 NOTE — PROGRESS NOTES
Chief Complaint: Follow-up    Subjective         History of Present Illness  Palmira Carbajal is a 27 y.o. female presents to [facility] to be seen for breast pain and nodules.  She is also having some nipple discharge that is spontaneous and clear from the right breast.  Her imaging is shown below:    Narrative & Impression   PROCEDURE:  MAMMO DIAGNOSTIC DIGITAL TOMOSYNTHESIS BILATERAL W CAD     COMPARISON:  [facility], US, US BREAST RIGHT LIMITED, 3/03/2023, 9:55.     VIEWS:  DIAGNOSTIC VIEWS WERE OBTAINED UTILIZING 3D TOMOSYNTHESIS AND R2 CAD SOFTWARE     INDICATIONS:  Right breast lump, nipple discharge     VIEWS:              Routine views of both breasts.  Right CC and right MLO focal compression views.  Right mL   view.  Bilateral exaggerated CC views.  Focused right breast ultrasound.     FINDINGS:          No suspicious masses, areas of architectural distortion or suspicious microcalcifications are   identified in either breast.     Focused ultrasound examination of the 10 o'clock position and 4 o'clock position of the right   breast in the areas of the palpable abnormalities described by the patient demonstrates normal   heterogeneous parenchyma.  No solid or cystic masses are identified.  There are no areas of   abnormal shadowing.     IMPRESSION:     Benign bilateral mammogram and focused right breast ultrasound.     RECOMMENDATION(S):               CLINICAL EVALUATION.       BIRADS:               DIAGNOSTIC CATEGORY 2--BENIGN FINDING       BREAST COMPOSITION:          Heterogeneously dense,which may obscure small masses.     PLEASE NOTE:  A NORMAL MAMMOGRAM DOES NOT EXCLUDE THE POSSIBILITY OF BREAST CANCER.   ANY CLINICALLY SUSPICIOUS PALPABLE LUMP SHOULD BE BIOPSIED.          Her prolactin was normal when checked recently.  We ordered her breast MRI and it is shown below:    Narrative & Impression   PROCEDURE:  MRI BREAST BILATERAL W WO  CONTRAST     COMPARISON: [facility], US, US BREAST RIGHT LIMITED, 3/03/2023, 9:55.    [facility], MG, MAMMO DIAGNOSTIC DIGITAL TOMOSYNTHESIS BILATERAL W CAD,   3/03/2023, 10:14.     INDICATIONS:  RIGHT CLEAR/CLOUDY NIPPLE DISCHARGE X 2 MONTHS. NO PERSONAL OR FAMILY H/O BREAST CA.    27-year-old female     CONTRAST:      15ML  Multihance I.V.     TECHNIQUE:    Breast MRI was performed using dynamic intravenous gadolinium infusion, thin sections,   and a dedicated breast coil.  Contrast enhancement analysis was assisted by computer-aided   detection.       AMOUNT OF FIBROGLANDULAR TISSUE:        Heterogeneous fibroglandular tissue     BACKGROUND PARENCHYMAL ENHANCEMENT:       Marked, symmetric     FINDINGS:  Contrast bolus is adequate.  No suspicious mass or non mass enhancement above background   parenchymal enhancement is identified.  No duct ectasia or abnormal T1 signal hyper intensity.  No   nipple retraction or skin thickening.  No axillary or internal mammary adenopathy.  Bone marrow   signal characteristics in the visualized anterior bony thorax are normal.     IMPRESSION:  No MR evidence of malignancy in either breast.  No cause for her unilateral right nipple discharge   is identified.  Further management should be based on clinical findings and suspicion.  Annual   screening mammography beginning age 40 is recommended.     BIRADS:  DIAGNOSTIC CATEGORY 1--NEGATIVE.       RECOMMENDATION(S):  CLINICAL EVALUATION.                      PLEASE NOTE:  A NORMAL MRI DOES NOT EXCLUDE THE POSSIBILITY OF BREAST CANCER.  A CLINICALLY   SUSPICIOUS PALPABLE LUMP SHOULD BE BIOPSIED.           Objective     Past Medical History:   Diagnosis Date   • Acid reflux    • Chronic pain disorder    • Depression    • Eczema    • Intractable migraine without aura and without status migrainosus 2/9/2023   • Kidney stone     HX OF   • Maltracking of right patella    • Panic disorder    • PONV  (postoperative nausea and vomiting)     GOOD RESULTS WITH SCOPALAMINE   • PTSD (post-traumatic stress disorder)     WAKES UP FROM ANESTHESIA WITH PANIC ATTACK   • Seasonal allergic rhinitis 2022   • Self-injurious behavior     as a teenager   • Suicide attempt        Past Surgical History:   Procedure Laterality Date   •  SECTION     • COLONOSCOPY N/A 10/11/2022    Procedure: COLONOSCOPY;  Surgeon: Dyan Griffin MD;  Location: [facility] ENDOSCOPY;  Service: Gastroenterology;  Laterality: N/A;  HEMORRHOIDS   • CYSTOSCOPY     • CYSTOSCOPY URETEROSCOPY Left 2023    Procedure: CYSTOSCOPY URETEROSCOPY RETROGRADE PYELOGRAM HOLMIUM LASER STENT INSERTION, left;  Surgeon: Melisa Garcia MD;  Location: [facility] MAIN OR;  Service: Urology;  Laterality: Left;   • HYSTERECTOMY     • KIDNEY STONE SURGERY      unspecified   • KNEE ARTHROSCOPY Right 2022    Procedure: KNEE ARTHROSCOPY WITH A LATERAL RELEASE AND CHONDROPLASTY;  Surgeon: Marco A Pitts MD;  Location: [facility] OR OSC;  Service: Orthopedics;  Laterality: Right;   • WISDOM TOOTH EXTRACTION           Current Outpatient Medications:   •  albuterol (PROVENTIL) (2.5 MG/3ML) 0.083% nebulizer solution, , Disp: , Rfl:   •  amitriptyline (ELAVIL) 10 MG tablet, Take 1 tablet by mouth Every Night for 90 days., Disp: 30 tablet, Rfl: 2  •  brompheniramine-pseudoephedrine-DM 30-2-10 MG/5ML syrup, Take 5-10 mL by mouth., Disp: , Rfl:   •  dicyclomine (BENTYL) 20 MG tablet, Take 1 tablet by mouth Every 6 (Six) Hours As Needed (abdominal cramping)., Disp: 36 tablet, Rfl: 0  •  doxycycline (VIBRAMYCIN) 100 MG capsule, , Disp: , Rfl:   •  lamoTRIgine (LaMICtal) 100 MG tablet, Take 0.5 tablets by mouth 2 (Two) Times a Day for 90 days., Disp: 30 tablet, Rfl: 2  •  Lidocaine, Anorectal, (RectiCare) 5 % cream cream, Apply 1 g topically to the appropriate area as directed 3 (Three) Times a Day As Needed (prn)., Disp: 2 each, Rfl: 0  •  Lidocaine,  Anorectal, (RectiCare) 5 % cream cream, Apply 1 g topically to the appropriate area as directed 3 (Three) Times a Day As Needed (prn)., Disp: 1 each, Rfl: 0  •  Melatonin 10 MG capsule, Take 10 mg by mouth., Disp: , Rfl:   •  polyethylene glycol (MIRALAX) 17 g packet, Take 17 g by mouth Daily., Disp: 5 packet, Rfl: 0  •  rizatriptan (Maxalt) 10 MG tablet, Take 1 tablet by mouth 1 (One) Time As Needed for Migraine. May repeat in 2 hours once if needed, Disp: 9 tablet, Rfl: 3  •  sodium chloride 0.9 % solution 100 mL with Eptinezumab-jjmr 100 MG/ML solution 100 mg, Infuse 100 mg into a venous catheter Every 3 (Three) Months., Disp: , Rfl:   •  spironolactone (Aldactone) 50 MG tablet, Take 1 tablet by mouth Every Morning., Disp: 30 tablet, Rfl: 2  •  vitamin D3 (Vitamin D) 125 MCG (5000 UT) capsule capsule, Take 1 capsule by mouth Daily., Disp: 90 capsule, Rfl: 3  •  zolpidem (Ambien) 5 MG tablet, Take 1 tablet by mouth At Night As Needed for Sleep for up to 30 days., Disp: 30 tablet, Rfl: 0    Allergies   Allergen Reactions   • Cephalexin Diarrhea   • Cephalosporins GI Intolerance   • Cymbalta [Duloxetine Hcl] Other (See Comments)     Jittery and insomnia   • Penicillins Rash        Family History   Problem Relation Age of Onset   • Arthritis Mother    • Thyroid disease Father    • Colon polyps Father    • Diabetes Father    • Drug abuse Maternal Uncle    • Alcohol abuse Maternal Uncle    • Cancer Maternal Grandmother    • Malig Hyperthermia Neg Hx        Social History     Socioeconomic History   • Marital status:      Spouse name: TRISTAN   • Number of children: 3   Tobacco Use   • Smoking status: Former     Packs/day: 0.50     Years: 15.00     Pack years: 7.50     Types: Cigarettes     Quit date: 2022     Years since quittin.1   • Smokeless tobacco: Never   Vaping Use   • Vaping Use: Every day   • Substances: Nicotine, Flavoring   • Devices: Disposable   Substance and Sexual Activity   • Alcohol use:  Not Currently   • Drug use: Not Currently     Types: Marijuana   • Sexual activity: Defer       Vital Signs:   There were no vitals taken for this visit.   Review of Systems    Physical Exam  Vitals and nursing note reviewed.   Constitutional:       Appearance: Normal appearance.   HENT:      Head: Normocephalic and atraumatic.   Eyes:      Extraocular Movements: Extraocular movements intact.      Pupils: Pupils are equal, round, and reactive to light.   Cardiovascular:      Pulses: Normal pulses.   Pulmonary:      Effort: Pulmonary effort is normal. No accessory muscle usage or respiratory distress.   Chest:   Breasts:     Right: Nipple discharge present. No inverted nipple, mass or skin change.      Left: Normal. No inverted nipple, mass, nipple discharge or skin change.      Comments: Both breasts dense and with nodularities and she is able to express clear discharge from multiple ducts  Abdominal:      General: Abdomen is flat.      Palpations: Abdomen is soft.      Tenderness: There is no abdominal tenderness. There is no guarding.   Musculoskeletal:         General: No swelling, tenderness or deformity.      Cervical back: Neck supple.   Lymphadenopathy:      Upper Body:      Right upper body: No supraclavicular or axillary adenopathy.      Left upper body: No supraclavicular or axillary adenopathy.   Skin:     General: Skin is warm and dry.   Neurological:      General: No focal deficit present.      Mental Status: She is alert and oriented to person, place, and time.   Psychiatric:         Mood and Affect: Mood normal.         Thought Content: Thought content normal.          Result Review :               Assessment and Plan    Diagnoses and all orders for this visit:    1. Nipple discharge (Primary)    Her mri was benign results and she will followup prn  Return to routine screening  Will call back if sx worsen        Follow Up   Return if symptoms worsen or fail to improve.  Patient was given instructions  and counseling regarding her condition or for health maintenance advice. Please see specific information pulled into the AVS if appropriate.         This document has been electronically signed by Marian Benitez MD  April 12, 2023 11:08 EDT

## 2023-04-12 ENCOUNTER — OFFICE VISIT (OUTPATIENT)
Dept: SURGERY | Facility: CLINIC | Age: 28
End: 2023-04-12
Payer: COMMERCIAL

## 2023-04-12 DIAGNOSIS — N64.52 NIPPLE DISCHARGE: Primary | ICD-10-CM

## 2023-04-12 PROCEDURE — 1160F RVW MEDS BY RX/DR IN RCRD: CPT | Performed by: SURGERY

## 2023-04-12 PROCEDURE — 99024 POSTOP FOLLOW-UP VISIT: CPT | Performed by: SURGERY

## 2023-04-12 PROCEDURE — 1159F MED LIST DOCD IN RCRD: CPT | Performed by: SURGERY

## 2023-04-18 ENCOUNTER — TELEMEDICINE (OUTPATIENT)
Dept: PSYCHIATRY | Facility: CLINIC | Age: 28
End: 2023-04-18
Payer: COMMERCIAL

## 2023-04-18 DIAGNOSIS — F41.1 GENERALIZED ANXIETY DISORDER: ICD-10-CM

## 2023-04-18 DIAGNOSIS — F43.10 POST TRAUMATIC STRESS DISORDER (PTSD): ICD-10-CM

## 2023-04-18 DIAGNOSIS — F51.05 INSOMNIA DUE TO MENTAL DISORDER: ICD-10-CM

## 2023-04-18 DIAGNOSIS — F33.1 MAJOR DEPRESSIVE DISORDER, RECURRENT EPISODE, MODERATE: Primary | ICD-10-CM

## 2023-04-18 PROCEDURE — 99214 OFFICE O/P EST MOD 30 MIN: CPT | Performed by: NURSE PRACTITIONER

## 2023-04-18 PROCEDURE — 1159F MED LIST DOCD IN RCRD: CPT | Performed by: NURSE PRACTITIONER

## 2023-04-18 PROCEDURE — 1160F RVW MEDS BY RX/DR IN RCRD: CPT | Performed by: NURSE PRACTITIONER

## 2023-04-18 PROCEDURE — 90833 PSYTX W PT W E/M 30 MIN: CPT | Performed by: NURSE PRACTITIONER

## 2023-04-18 RX ORDER — ZOLPIDEM TARTRATE 10 MG/1
10 TABLET ORAL NIGHTLY PRN
Qty: 30 TABLET | Refills: 0 | Status: SHIPPED | OUTPATIENT
Start: 2023-04-18 | End: 2023-05-18

## 2023-04-18 NOTE — PROGRESS NOTES
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 ALICE TALBERT MD on 8/26/2022 at 11:58             US Non-ob Transvaginal    Result Date: 10/31/2022   Normal right ovary.  Regression of previously seen large right ovarian cyst.     SHARON ROBIN MD       Electronically Signed and Approved By: SHARON ROBIN MD on 10/31/2022 at 15:26             US Thyroid    Result Date: 10/31/2022   Normal thyroid size and echotexture.  Small colloid cyst inferior left thyroid lobe.  No further evaluation is required.     SHARON ROBIN MD       Electronically Signed and Approved By: SHARON ROBIN MD on 10/31/2022 at 18:43             US Abdomen Limited    Result Date: 8/26/2022    1. Unremarkable right upper quadrant ultrasound.     CLAY MARSHALL MD       ELECTRONICALLY SIGNED AND APPROVED BY: CLAY MARSHALL MD ON 8/26/2022 AT 9:21                 Assessment & Plan   Diagnoses and all orders for this visit:    1. Major depressive disorder, recurrent episode, moderate (Primary)    2. Generalized anxiety disorder    3. Post traumatic stress disorder (PTSD)    4. Insomnia due to mental disorder  -     zolpidem (Ambien) 10 MG tablet; Take 1 tablet by mouth At Night As Needed for Sleep for up to 30 days.  Dispense: 30 tablet; Refill: 0      Continue  lamotrigine to target depression. Continue amitriptyline to target depression and anxiety. Psychotherapy for anxiety and PTSD. Increase ambien to target insomnia. 16 minutes of supportive psychotherapy with goal to strengthen defenses, promote problems solving, restore adaptive functioning and provide symptom relief. The therapeutic alliance was strengthened to encourage the patient to express their thoughts and feelings. Esteem building was enhanced through praise, reassurance, normalizing and encouragement. Coping skills were enhanced to build distress tolerance skills and emotional regulation. Allowed patient to freely discuss issues without interruption or judgement with unconditional positive regard, active listening skills, and empathy. Provided a safe, confidential environment to facilitate the development of a positive therapeutic relationship and encourage open, honest communication. Assisted patient in identifying risk factors which would indicate the need for higher level of care including thoughts to harm self or others and/or self-harming behavior and encouraged patient to contact this office, call 911, or present to the nearest emergency room should any of these events occur. Assisted patient in processing session content; acknowledged and normalized patient's thoughts, feelings, and concerns by utilizing a person-centered approach in efforts to build appropriate rapport and a positive therapeutic relationship with open and honest communication. Patient given education on medication side effects, diagnosis/illness and relapse symptoms. Plan to continue supportive psychotherapy in next appointment to provide symptom relief. 4 weeks    Diagnoses: as above  Symptoms: as above  Functional status: good  Mental Status Exam: as above     Treatment plan: medication management and supportive psychotherapy  Prognosis: good  Progress: continued improvement    Visit Diagnoses:    ICD-10-CM ICD-9-CM   1. Major  depressive disorder, recurrent episode, moderate  F33.1 296.32   2. Generalized anxiety disorder  F41.1 300.02   3. Post traumatic stress disorder (PTSD)  F43.10 309.81   4. Insomnia due to mental disorder  F51.05 300.9     327.02       PLAN:  12. Safety: No acute safety concerns.   13. Therapy: Declines  14. Risk Assessment: Risk of self-harm acutely is moderate.  Risk factors include anxiety disorder, mood disorder, and recent psychosocial stressors (pandemic). Protective factors include no family history, denies access to guns/weapons, no present SI, minimal AODA, healthcare seeking, future orientation, willingness to engage in care.  Risk of self-harm chronically is also moderate, but could be further elevated in the event of treatment noncompliance and/or AODA.  15. Medications: Continue lamotrigine 50mg po bid to target mood. Risks, benefits, alternatives discussed with patient including rash, rebound depressive or manic symptoms if prompt discontinuation, GI upset, agitation, sedation/falls risk.  After discussion of these risks and benefits, patient voiced understanding and agreed to proceed. Continue amitriptyline 10 mg po qhs to target depression and anxiety. Risks, benefits, alternatives discussed with patient including sedation, dizziness, falls, GI upset, constipation, urinary retention, dry mouth.  After discussion of these risks and benefits, the patient voiced understanding and agreed to proceed. Increase ambien 5mg to 10mg po qhs to target insomnia. Risks, benefits, side effects discussed with patient including sedation, dizziness, GI upset, hallucinations, sleepwalking, sleep-eating.  After discussion of these risks and benefits, the patient voiced understanding and agreed to proceed.  16. Labs/studies: No labs/studies ordered at this time  17. Follow-up: 4 weeks    Patient screened positive for depression based on a PHQ-9 score of 22 on 12/8/2022. Follow-up recommendations include: Suicide Risk  Assessment performed.         TREATMENT PLAN/GOALS: Continue supportive psychotherapy efforts and medications as indicated. Treatment and medication options discussed during today's visit. Patient ackowledged and verbally consented to continue with current treatment plan and was educated on the importance of compliance with treatment and follow-up appointments.      MEDICATION ISSUES:  DEBORAH reviewed as expected.  Discussed medication options and treatment plan of prescribed medication as well as the risks, benefits, and side effects including potential falls, possible impaired driving and metabolic adversities among others. Patient is agreeable to call the office with any worsening of symptoms or onset of side effects. Patient is agreeable to call 911 or go to the nearest ER should he/she begin having SI/HI. No medication side effects or related complaints today.     MEDS ORDERED DURING VISIT:  New Medications Ordered This Visit   Medications   • zolpidem (Ambien) 10 MG tablet     Sig: Take 1 tablet by mouth At Night As Needed for Sleep for up to 30 days.     Dispense:  30 tablet     Refill:  0       Return in about 4 weeks (around 5/16/2023) for Next scheduled follow up.         This document has been electronically signed by MINI Poon  April 18, 2023 10:18 EDT      Part of this note may be an electronic transcription/translation of spoken language to printed text using the Dragon Dictation System.

## 2023-05-05 ENCOUNTER — OFFICE VISIT (OUTPATIENT)
Dept: SLEEP MEDICINE | Facility: HOSPITAL | Age: 28
End: 2023-05-05
Payer: COMMERCIAL

## 2023-05-05 VITALS
WEIGHT: 178.2 LBS | DIASTOLIC BLOOD PRESSURE: 79 MMHG | OXYGEN SATURATION: 100 % | HEART RATE: 118 BPM | HEIGHT: 64 IN | BODY MASS INDEX: 30.42 KG/M2 | SYSTOLIC BLOOD PRESSURE: 115 MMHG

## 2023-05-05 DIAGNOSIS — G47.33 OSA (OBSTRUCTIVE SLEEP APNEA): Primary | ICD-10-CM

## 2023-05-05 PROCEDURE — G0463 HOSPITAL OUTPT CLINIC VISIT: HCPCS

## 2023-05-05 NOTE — PROGRESS NOTES
[facility]      Patient Care Team:  Jaja Castillo APRN as PCP - General (Nurse Practitioner)  Virginia Santana APRN as Nurse Practitioner (Otolaryngology)  Melisa Garcia MD as Consulting Physician (Urology)  Marian Benitez MD as Consulting Physician (General Surgery)    Referring Provider: Cassius Cantrell APRN    Chief complaint:   Insomnia    History of present illness:    Subjective   This is a 27 year old female patient with hx of Depression and anxiety.    She takes Lamictal and amitriptyline.    Insomnia started about a year ago and has been getting worse overtime.     Sleep schedule:  -Bedtime: 10 PM  -Sleep latency: 2-3 h w/o the Ambien. With Ambien it takes her 30 min. She said that she;s been very diligent with sleep hygiene, No phone or TV. No caffeine in the afternoon. Mind is racing.   -Wake up time: 6:30 AM , does not feel refreshed  -Nocturnal awakenin-2 times because of nocturia when she takes Ambien and ever hour when she does not take it..      She previously tired Trazodone, Hydroxyzine and Seroquel without benefit.   No side effects from Ambien.    She reported loud snoring which sometimes awakens her which can occur every night. Snoring disturbs her . She has occasionally awakened herself gasping and choking.  Additional symptoms include waking up with a dry mouth, headache and episodes of sleep paralysis.    ESS: Total score: 2     Review of Systems  Constitutional: No fever or chills. No changes in appetite.   ENMT: No sinus congestion, postnasal drip, hoarsness  Cardiovascular: No chest pain, palpitation or legs swelling.    Respiratory: No dyspnea, cough or wheezing.  Gastrointestinal: No constipation, diarrhea, abdominal pain or acid reflux  Neurology: No headache, weakness, numbness or dizziness.   Musculoskeletal: No joints pain, stiffness or swelling.   Psychiatry: Anxiety and  depression.  Hem/Lymphatic: No swollen glands or easy bruising.  Integumentary: No rash.  Endocrinology: No excessive thirst, cold or warm intolerance.   Urinary: No dysuria, bloody urine or frequent urination.     History  Past Medical History:   Diagnosis Date   • Acid reflux    • Anxiety    • Chronic pain disorder    • Depression    • Eczema    • Intractable migraine without aura and without status migrainosus 2023   • Kidney stone     HX OF   • Maltracking of right patella    • Migraines    • Panic disorder    • PONV (postoperative nausea and vomiting)     GOOD RESULTS WITH SCOPALAMINE   • PTSD (post-traumatic stress disorder)     WAKES UP FROM ANESTHESIA WITH PANIC ATTACK   • Seasonal allergic rhinitis 2022   • Self-injurious behavior     as a teenager   • Suicide attempt    ,   Past Surgical History:   Procedure Laterality Date   •  SECTION     • COLONOSCOPY N/A 10/11/2022    Procedure: COLONOSCOPY;  Surgeon: Dyan Griffin MD;  Location: [facility];  Service: Gastroenterology;  Laterality: N/A;  HEMORRHOIDS   • CYSTOSCOPY     • CYSTOSCOPY URETEROSCOPY Left 2023    Procedure: CYSTOSCOPY URETEROSCOPY RETROGRADE PYELOGRAM HOLMIUM LASER STENT INSERTION, left;  Surgeon: Melisa Garcia MD;  Location: [facility] MAIN OR;  Service: Urology;  Laterality: Left;   • HYSTERECTOMY     • KIDNEY STONE SURGERY      unspecified   • KNEE ARTHROSCOPY Right 2022    Procedure: KNEE ARTHROSCOPY WITH A LATERAL RELEASE AND CHONDROPLASTY;  Surgeon: Marco A Pitts MD;  [place] [facility] [place] [facility]  Service: Orthopedics;  Laterality: Right;   • WISDOM TOOTH EXTRACTION     ,   Family History   Problem Relation Age of Onset   • Arthritis Mother    • Restless legs syndrome Mother    • Thyroid disease Father    • Colon polyps Father    • Diabetes Father    • Drug abuse Maternal Uncle    • Alcohol abuse Maternal Uncle    • Cancer Maternal Grandmother    • Sleep apnea Son    • Bo  Hyperthermia Neg Hx    ,   Social History     Socioeconomic History   • Marital status:      Spouse name: TRISTAN   • Number of children: 3   Tobacco Use   • Smoking status: Former     Packs/day: 0.50     Years: 15.00     Pack years: 7.50     Types: Cigarettes     Quit date: 2022     Years since quittin.2   • Smokeless tobacco: Never   Vaping Use   • Vaping Use: Every day   • Substances: Nicotine, Flavoring   • Devices: Disposable   Substance and Sexual Activity   • Alcohol use: Not Currently   • Drug use: Not Currently     Types: Marijuana   • Sexual activity: Defer     E-cigarette/Vaping   • E-cigarette/Vaping Use Current Every Day User    • Comments INST PER ANESTHESIA PROTCOL      E-cigarette/Vaping Substances   • Nicotine Yes    • THC No    • CBD No    • Flavoring Yes      E-cigarette/Vaping Devices   • Disposable Yes    • Pre-filled or Refillable Cartridge No    • Refillable Tank No    • Pre-filled Pod No         and Allergies:  Cephalexin, Cephalosporins, Cymbalta [duloxetine hcl], and Penicillins    Medications:    Current Outpatient Medications:   •  amitriptyline (ELAVIL) 10 MG tablet, Take 1 tablet by mouth Every Night for 90 days., Disp: 30 tablet, Rfl: 2  •  lamoTRIgine (LaMICtal) 100 MG tablet, Take 0.5 tablets by mouth 2 (Two) Times a Day for 90 days., Disp: 30 tablet, Rfl: 2  •  zolpidem (Ambien) 10 MG tablet, Take 1 tablet by mouth At Night As Needed for Sleep for up to 30 days., Disp: 30 tablet, Rfl: 0  •  albuterol (PROVENTIL) (2.5 MG/3ML) 0.083% nebulizer solution, , Disp: , Rfl:   •  brompheniramine-pseudoephedrine-DM 30-2-10 MG/5ML syrup, Take 5-10 mL by mouth., Disp: , Rfl:   •  dicyclomine (BENTYL) 20 MG tablet, Take 1 tablet by mouth Every 6 (Six) Hours As Needed (abdominal cramping)., Disp: 36 tablet, Rfl: 0  •  doxycycline (VIBRAMYCIN) 100 MG capsule, , Disp: , Rfl:   •  Lidocaine, Anorectal, (RectiCare) 5 % cream cream, Apply 1 g topically to the appropriate area as directed  "3 (Three) Times a Day As Needed (prn)., Disp: 2 each, Rfl: 0  •  Lidocaine, Anorectal, (RectiCare) 5 % cream cream, Apply 1 g topically to the appropriate area as directed 3 (Three) Times a Day As Needed (prn)., Disp: 1 each, Rfl: 0  •  Melatonin 10 MG capsule, Take 1 capsule by mouth., Disp: , Rfl:   •  polyethylene glycol (MIRALAX) 17 g packet, Take 17 g by mouth Daily., Disp: 5 packet, Rfl: 0  •  rizatriptan (Maxalt) 10 MG tablet, Take 1 tablet by mouth 1 (One) Time As Needed for Migraine. May repeat in 2 hours once if needed, Disp: 9 tablet, Rfl: 3  •  sodium chloride 0.9 % solution 100 mL with Eptinezumab-jjmr 100 MG/ML solution 100 mg, Infuse 100 mg into a venous catheter Every 3 (Three) Months., Disp: , Rfl:   •  spironolactone (Aldactone) 50 MG tablet, Take 1 tablet by mouth Every Morning., Disp: 30 tablet, Rfl: 2  •  vitamin D3 (Vitamin D) 125 MCG (5000 UT) capsule capsule, Take 1 capsule by mouth Daily., Disp: 90 capsule, Rfl: 3      Objective   Vital Signs:  Vitals:    05/05/23 1300   BP: 115/79   Pulse: 118   SpO2: 100%   Weight: 80.8 kg (178 lb 3.2 oz)   Height: 162.6 cm (64\")     Body mass index is 30.59 kg/m².  Neck Circumference: 13.5 inches     Physical Exam:  Neck Circumference: 13.5 inches     Constitutional: Not in acute distress.  Eyes: Injected conjunctiva, EOMI. pupils equal reactive to light.  ENMT: Atkinson score 2. Mallampati score 2. No oral thrush. Tonsils grade 1.  Neck: Large. No thyromegaly.  Trachea midline.  Heart: Regular rhythm and rate, no murmur  Lungs: Good and equal air entry bilaterally. No crackles or wheezing.  Nonlabored breathing.       Abdomen: Obese.  Soft.  No tenderness.  Positive bowel sounds.  Extremities: No cyanosis, clubbing or pitting edema.  Warm extremities and well-perfused.  Neuro: Conscious, alert, oriented x3.  Gait is normal.  Strength 5/5 in arms and legs.  Psych: Appropriate mood and affect.    Integumentary: No rash.  Normal skin turgor.  Lymphatic: No " palpable cervical or supraclavicular lymph nodes.    Diagnostic data:  Lab Results   Component Value Date    HGBA1C 4.80 02/11/2022     No results found for: CHOL, TRIG, HDL  Hemoglobin   Date Value Ref Range Status   03/07/2023 13.5 12.0 - 15.9 g/dL Final     CO2   Date Value Ref Range Status   03/07/2023 25.3 22.0 - 29.0 mmol/L Final       Assessment   1. Snoring  2. Chronic insomnia  3. Obesity, BMI 30      Plan:  Check PSG. We discussed the pathophysiology of sleep apnea, testing and therapy which include CPAP and weight loss.  Patient is agreeable with CPAP therapy if needed.    Counseled for weight loss.  Encouraged to exercise regularly and cut down on carbohydrates.  Discussed that losing weight may decrease the severity of sleep apnea and obviate the need of CPAP therapy.    Continue Ambien for now.  If no significant sleep apnea then we could try different agent.  Middle insomnia could also be related to sleep apnea and may improve with PAP therapy.  Counseled for sleep hygiene.        Marya Luis MD  05/05/23  14:41 EDT    This note was dictated utilizing Dragon dictation

## 2023-05-11 ENCOUNTER — OFFICE VISIT (OUTPATIENT)
Dept: PSYCHIATRY | Facility: CLINIC | Age: 28
End: 2023-05-11
Payer: COMMERCIAL

## 2023-05-11 VITALS
DIASTOLIC BLOOD PRESSURE: 74 MMHG | BODY MASS INDEX: 30.56 KG/M2 | SYSTOLIC BLOOD PRESSURE: 113 MMHG | WEIGHT: 179 LBS | HEART RATE: 100 BPM | HEIGHT: 64 IN

## 2023-05-11 DIAGNOSIS — F43.10 POST TRAUMATIC STRESS DISORDER (PTSD): ICD-10-CM

## 2023-05-11 DIAGNOSIS — F51.05 INSOMNIA DUE TO MENTAL DISORDER: ICD-10-CM

## 2023-05-11 DIAGNOSIS — F33.1 MAJOR DEPRESSIVE DISORDER, RECURRENT EPISODE, MODERATE: Primary | ICD-10-CM

## 2023-05-11 DIAGNOSIS — F41.1 GENERALIZED ANXIETY DISORDER: ICD-10-CM

## 2023-05-11 RX ORDER — ZOLPIDEM TARTRATE 10 MG/1
10 TABLET ORAL NIGHTLY PRN
Qty: 30 TABLET | Refills: 0 | Status: SHIPPED | OUTPATIENT
Start: 2023-05-16 | End: 2023-06-15

## 2023-05-11 RX ORDER — LAMOTRIGINE 100 MG/1
50 TABLET ORAL 2 TIMES DAILY
Qty: 30 TABLET | Refills: 2 | Status: SHIPPED | OUTPATIENT
Start: 2023-05-11 | End: 2023-08-09

## 2023-05-11 RX ORDER — AMITRIPTYLINE HYDROCHLORIDE 10 MG/1
10 TABLET, FILM COATED ORAL NIGHTLY
Qty: 30 TABLET | Refills: 2 | Status: SHIPPED | OUTPATIENT
Start: 2023-05-11 | End: 2023-08-09

## 2023-05-11 NOTE — PROGRESS NOTES
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 collecting system of the left kidney.  Post hysterectomy.     ALICE TALBERT MD       Electronically Signed and Approved By: ALICE TALBERT MD on 8/26/2022 at 11:58             US Non-ob Transvaginal    Result Date: 10/31/2022   Normal right ovary.  Regression of previously seen large right ovarian cyst.     SHARON ROBIN MD       Electronically Signed and Approved By: SHARON ROBIN MD on 10/31/2022 at 15:26             US Thyroid    Result Date: 10/31/2022   Normal thyroid size and echotexture.  Small colloid cyst inferior left thyroid lobe.  No further evaluation is required.     SHARON ROBIN MD       Electronically Signed and Approved By: SHARON ROBIN MD on 10/31/2022 at 18:43             US Abdomen Limited    Result Date: 8/26/2022    1. Unremarkable right upper quadrant ultrasound.     CLAY MARSHALL MD       ELECTRONICALLY SIGNED AND APPROVED BY: CLAY MARSHALL MD ON 8/26/2022 AT 9:21                 Assessment & Plan   Diagnoses and all orders for this visit:    1. Major depressive disorder, recurrent episode, moderate (Primary)  -     lamoTRIgine (LaMICtal) 100 MG tablet; Take 0.5 tablets by mouth 2 (Two)  Times a Day for 90 days.  Dispense: 30 tablet; Refill: 2  -     amitriptyline (ELAVIL) 10 MG tablet; Take 1 tablet by mouth Every Night for 90 days.  Dispense: 30 tablet; Refill: 2    2. Generalized anxiety disorder    3. Post traumatic stress disorder (PTSD)    4. Insomnia due to mental disorder  -     zolpidem (Ambien) 10 MG tablet; Take 1 tablet by mouth At Night As Needed for Sleep for up to 30 days.  Dispense: 30 tablet; Refill: 0      Continue lamotrigine to target depression. Continue amitriptyline to target depression and anxiety. Psychotherapy for anxiety and PTSD. Continue ambien to target insomnia. 16 minutes of supportive psychotherapy with goal to strengthen defenses, promote problems solving, restore adaptive functioning and provide symptom relief. The therapeutic alliance was strengthened to encourage the patient to express their thoughts and feelings. Esteem building was enhanced through praise, reassurance, normalizing and encouragement. Coping skills were enhanced to build distress tolerance skills and emotional regulation. Allowed patient to freely discuss issues without interruption or judgement with unconditional positive regard, active listening skills, and empathy. Provided a safe, confidential environment to facilitate the development of a positive therapeutic relationship and encourage open, honest communication. Assisted patient in identifying risk factors which would indicate the need for higher level of care including thoughts to harm self or others and/or self-harming behavior and encouraged patient to contact this office, call 911, or present to the nearest emergency room should any of these events occur. Assisted patient in processing session content; acknowledged and normalized patient's thoughts, feelings, and concerns by utilizing a person-centered approach in efforts to build appropriate rapport and a positive therapeutic relationship with open and honest communication. Patient  given education on medication side effects, diagnosis/illness and relapse symptoms. Plan to continue supportive psychotherapy in next appointment to provide symptom relief. 4 weeks    Diagnoses: as above  Symptoms: as above  Functional status: good  Mental Status Exam: as above     Treatment plan: medication management and supportive psychotherapy  Prognosis: good  Progress: continued improvement    Visit Diagnoses:    ICD-10-CM ICD-9-CM   1. Major depressive disorder, recurrent episode, moderate  F33.1 296.32   2. Generalized anxiety disorder  F41.1 300.02   3. Post traumatic stress disorder (PTSD)  F43.10 309.81   4. Insomnia due to mental disorder  F51.05 300.9     327.02       PLAN:  12. Safety: No acute safety concerns.   13. Therapy: Declines  14. Risk Assessment: Risk of self-harm acutely is moderate.  Risk factors include anxiety disorder, mood disorder, and recent psychosocial stressors (pandemic). Protective factors include no family history, denies access to guns/weapons, no present SI, minimal AODA, healthcare seeking, future orientation, willingness to engage in care.  Risk of self-harm chronically is also moderate, but could be further elevated in the event of treatment noncompliance and/or AODA.  15. Medications: Continue lamotrigine 50mg po bid to target mood. Risks, benefits, alternatives discussed with patient including rash, rebound depressive or manic symptoms if prompt discontinuation, GI upset, agitation, sedation/falls risk.  After discussion of these risks and benefits, patient voiced understanding and agreed to proceed. Continue amitriptyline 10 mg po qhs to target depression and anxiety. Risks, benefits, alternatives discussed with patient including sedation, dizziness, falls, GI upset, constipation, urinary retention, dry mouth.  After discussion of these risks and benefits, the patient voiced understanding and agreed to proceed. Continue ambien 10mg po qhs to target insomnia. Risks,  benefits, side effects discussed with patient including sedation, dizziness, GI upset, hallucinations, sleepwalking, sleep-eating.  After discussion of these risks and benefits, the patient voiced understanding and agreed to proceed.  16. Labs/studies: No labs/studies ordered at this time  17. Follow-up: 4 weeks    Patient screened positive for depression based on a PHQ-9 score of 22 on 12/8/2022. Follow-up recommendations include: Suicide Risk Assessment performed.         TREATMENT PLAN/GOALS: Continue supportive psychotherapy efforts and medications as indicated. Treatment and medication options discussed during today's visit. Patient ackowledged and verbally consented to continue with current treatment plan and was educated on the importance of compliance with treatment and follow-up appointments.      MEDICATION ISSUES:  DEBORAH reviewed as expected.  Discussed medication options and treatment plan of prescribed medication as well as the risks, benefits, and side effects including potential falls, possible impaired driving and metabolic adversities among others. Patient is agreeable to call the office with any worsening of symptoms or onset of side effects. Patient is agreeable to call 911 or go to the nearest ER should he/she begin having SI/HI. No medication side effects or related complaints today.     MEDS ORDERED DURING VISIT:  New Medications Ordered This Visit   Medications   • zolpidem (Ambien) 10 MG tablet     Sig: Take 1 tablet by mouth At Night As Needed for Sleep for up to 30 days.     Dispense:  30 tablet     Refill:  0   • lamoTRIgine (LaMICtal) 100 MG tablet     Sig: Take 0.5 tablets by mouth 2 (Two) Times a Day for 90 days.     Dispense:  30 tablet     Refill:  2   • amitriptyline (ELAVIL) 10 MG tablet     Sig: Take 1 tablet by mouth Every Night for 90 days.     Dispense:  30 tablet     Refill:  2       Return in about 4 weeks (around 6/8/2023) for Next scheduled follow up.         This document  has been electronically signed by MINI Poon  May 11, 2023 11:36 EDT      Part of this note may be an electronic transcription/translation of spoken language to printed text using the Dragon Dictation System.

## 2023-05-22 ENCOUNTER — PRIOR AUTHORIZATION (OUTPATIENT)
Dept: PSYCHIATRY | Facility: CLINIC | Age: 28
End: 2023-05-22
Payer: COMMERCIAL

## 2023-05-22 NOTE — TELEPHONE ENCOUNTER
"PA DENIAL WAS RECEIVED FOR ZOLPIDEM 10 MG TABLET.    RESPONSE FROM INSURANCE IS \"Our guideline named SEDATIVE HYPNOTICS ZOLPIDEM requires the following rule(s) be met for approval:A. The member has had a trial (at least 3 weeks) of non-pharmacological therapies (such as stimulus control, sleep restriction, sleep hygiene measures, and relaxation therapy)Your doctor told us that you have a diagnosis of insomnia (a type of sleep disorder). We do not have information showing you have tried non-pharmacological therapies. This is why your request is denied.\"    AN APPEAL HAS BEEN INITIATED AND PROCESSED THRU CMM.    KEY IS:  [id number]  "

## 2023-05-22 NOTE — TELEPHONE ENCOUNTER
PA FOR ZOLPIDEM 10 MG TABLET HAS BEEN INITIATED AND PROCESSED THRU [facility].    WAITING ON RESPONSE FROM INSURANCE.    KEY IS:  [id number]

## 2023-05-24 ENCOUNTER — HOSPITAL ENCOUNTER (OUTPATIENT)
Dept: SLEEP MEDICINE | Facility: HOSPITAL | Age: 28
End: 2023-05-24
Payer: COMMERCIAL

## 2023-05-24 DIAGNOSIS — G47.33 OSA (OBSTRUCTIVE SLEEP APNEA): ICD-10-CM

## 2023-05-24 PROCEDURE — 95810 POLYSOM 6/> YRS 4/> PARAM: CPT

## 2023-05-28 ENCOUNTER — APPOINTMENT (OUTPATIENT)
Dept: CT IMAGING | Facility: HOSPITAL | Age: 28
End: 2023-05-28

## 2023-05-28 ENCOUNTER — HOSPITAL ENCOUNTER (EMERGENCY)
Facility: HOSPITAL | Age: 28
Discharge: HOME | End: 2023-05-28
Attending: EMERGENCY MEDICINE | Admitting: EMERGENCY MEDICINE

## 2023-05-28 VITALS
WEIGHT: 177.69 LBS | TEMPERATURE: 97.9 F | HEIGHT: 64 IN | SYSTOLIC BLOOD PRESSURE: 122 MMHG | BODY MASS INDEX: 30.34 KG/M2 | RESPIRATION RATE: 16 BRPM | OXYGEN SATURATION: 98 % | HEART RATE: 61 BPM | DIASTOLIC BLOOD PRESSURE: 76 MMHG

## 2023-05-28 DIAGNOSIS — R10.9 ABDOMINAL PAIN, UNSPECIFIED ABDOMINAL LOCATION: Primary | ICD-10-CM

## 2023-05-28 LAB
ALBUMIN SERPL-MCNC: 3.9 G/DL (ref 3.5–5.2)
ALBUMIN/GLOB SERPL: 1.3 G/DL
ALP SERPL-CCNC: 97 U/L (ref 39–117)
ALT SERPL W P-5'-P-CCNC: 16 U/L (ref 1–33)
ANION GAP SERPL CALCULATED.3IONS-SCNC: 9.8 MMOL/L (ref 5–15)
AST SERPL-CCNC: 13 U/L (ref 1–32)
BASOPHILS # BLD AUTO: 0.08 10*3/MM3 (ref 0–0.2)
BASOPHILS NFR BLD AUTO: 0.4 % (ref 0–1.5)
BILIRUB SERPL-MCNC: 0.2 MG/DL (ref 0–1.2)
BILIRUB UR QL STRIP: NEGATIVE
BUN SERPL-MCNC: 8 MG/DL (ref 6–20)
BUN/CREAT SERPL: 11.4 (ref 7–25)
CALCIUM SPEC-SCNC: 9 MG/DL (ref 8.6–10.5)
CHLORIDE SERPL-SCNC: 95 MMOL/L (ref 98–107)
CLARITY UR: CLEAR
CO2 SERPL-SCNC: 24.2 MMOL/L (ref 22–29)
COLOR UR: YELLOW
CREAT SERPL-MCNC: 0.7 MG/DL (ref 0.57–1)
DEPRECATED RDW RBC AUTO: 39.7 FL (ref 37–54)
EGFRCR SERPLBLD CKD-EPI 2021: 120.2 ML/MIN/1.73
EOSINOPHIL # BLD AUTO: 0.39 10*3/MM3 (ref 0–0.4)
EOSINOPHIL NFR BLD AUTO: 2.2 % (ref 0.3–6.2)
ERYTHROCYTE [DISTWIDTH] IN BLOOD BY AUTOMATED COUNT: 13.2 % (ref 12.3–15.4)
GLOBULIN UR ELPH-MCNC: 3.1 GM/DL
GLUCOSE SERPL-MCNC: 98 MG/DL (ref 65–99)
GLUCOSE UR STRIP-MCNC: NEGATIVE MG/DL
HCT VFR BLD AUTO: 39.6 % (ref 34–46.6)
HGB BLD-MCNC: 13.1 G/DL (ref 12–15.9)
HGB UR QL STRIP.AUTO: NEGATIVE
HOLD SPECIMEN: NORMAL
HOLD SPECIMEN: NORMAL
IMM GRANULOCYTES # BLD AUTO: 0.1 10*3/MM3 (ref 0–0.05)
IMM GRANULOCYTES NFR BLD AUTO: 0.6 % (ref 0–0.5)
KETONES UR QL STRIP: NEGATIVE
LEUKOCYTE ESTERASE UR QL STRIP.AUTO: NEGATIVE
LIPASE SERPL-CCNC: 23 U/L (ref 13–60)
LYMPHOCYTES # BLD AUTO: 3.67 10*3/MM3 (ref 0.7–3.1)
LYMPHOCYTES NFR BLD AUTO: 20.6 % (ref 19.6–45.3)
MCH RBC QN AUTO: 27.3 PG (ref 26.6–33)
MCHC RBC AUTO-ENTMCNC: 33.1 G/DL (ref 31.5–35.7)
MCV RBC AUTO: 82.7 FL (ref 79–97)
MONOCYTES # BLD AUTO: 1.16 10*3/MM3 (ref 0.1–0.9)
MONOCYTES NFR BLD AUTO: 6.5 % (ref 5–12)
NEUTROPHILS NFR BLD AUTO: 12.41 10*3/MM3 (ref 1.7–7)
NEUTROPHILS NFR BLD AUTO: 69.7 % (ref 42.7–76)
NITRITE UR QL STRIP: NEGATIVE
NRBC BLD AUTO-RTO: 0 /100 WBC (ref 0–0.2)
PH UR STRIP.AUTO: 7.5 [PH] (ref 5–8)
PLATELET # BLD AUTO: 381 10*3/MM3 (ref 140–450)
PMV BLD AUTO: 9.9 FL (ref 6–12)
POTASSIUM SERPL-SCNC: 3.8 MMOL/L (ref 3.5–5.2)
PROT SERPL-MCNC: 7 G/DL (ref 6–8.5)
PROT UR QL STRIP: NEGATIVE
QT INTERVAL: 390 MS
RBC # BLD AUTO: 4.79 10*6/MM3 (ref 3.77–5.28)
SODIUM SERPL-SCNC: 129 MMOL/L (ref 136–145)
SP GR UR STRIP: <=1.005 (ref 1–1.03)
TROPONIN T SERPL HS-MCNC: <6 NG/L
UROBILINOGEN UR QL STRIP: NORMAL
WBC NRBC COR # BLD: 17.81 10*3/MM3 (ref 3.4–10.8)
WHOLE BLOOD HOLD COAG: NORMAL
WHOLE BLOOD HOLD SPECIMEN: NORMAL

## 2023-05-28 PROCEDURE — 83690 ASSAY OF LIPASE: CPT

## 2023-05-28 PROCEDURE — 74177 CT ABD & PELVIS W/CONTRAST: CPT

## 2023-05-28 PROCEDURE — 84484 ASSAY OF TROPONIN QUANT: CPT

## 2023-05-28 PROCEDURE — 80053 COMPREHEN METABOLIC PANEL: CPT

## 2023-05-28 PROCEDURE — 81003 URINALYSIS AUTO W/O SCOPE: CPT | Performed by: EMERGENCY MEDICINE

## 2023-05-28 PROCEDURE — 25010000002 KETOROLAC TROMETHAMINE PER 15 MG: Performed by: EMERGENCY MEDICINE

## 2023-05-28 PROCEDURE — 96374 THER/PROPH/DIAG INJ IV PUSH: CPT

## 2023-05-28 PROCEDURE — 85025 COMPLETE CBC W/AUTO DIFF WBC: CPT

## 2023-05-28 PROCEDURE — 25510000001 IOPAMIDOL PER 1 ML: Performed by: EMERGENCY MEDICINE

## 2023-05-28 PROCEDURE — 93005 ELECTROCARDIOGRAM TRACING: CPT

## 2023-05-28 PROCEDURE — 36415 COLL VENOUS BLD VENIPUNCTURE: CPT

## 2023-05-28 PROCEDURE — 93005 ELECTROCARDIOGRAM TRACING: CPT | Performed by: EMERGENCY MEDICINE

## 2023-05-28 PROCEDURE — 99283 EMERGENCY DEPT VISIT LOW MDM: CPT

## 2023-05-28 RX ORDER — SODIUM CHLORIDE 0.9 % (FLUSH) 0.9 %
10 SYRINGE (ML) INJECTION AS NEEDED
Status: DISCONTINUED | OUTPATIENT
Start: 2023-05-28 | End: 2023-05-28 | Stop reason: HOSPADM

## 2023-05-28 RX ORDER — ESOMEPRAZOLE MAGNESIUM 40 MG/1
40 CAPSULE, DELAYED RELEASE ORAL
Qty: 20 CAPSULE | Refills: 0 | Status: SHIPPED | OUTPATIENT
Start: 2023-05-28

## 2023-05-28 RX ORDER — KETOROLAC TROMETHAMINE 15 MG/ML
15 INJECTION, SOLUTION INTRAMUSCULAR; INTRAVENOUS ONCE
Status: COMPLETED | OUTPATIENT
Start: 2023-05-28 | End: 2023-05-28

## 2023-05-28 RX ADMIN — KETOROLAC TROMETHAMINE 15 MG: 15 INJECTION, SOLUTION INTRAMUSCULAR; INTRAVENOUS at 12:40

## 2023-05-28 RX ADMIN — SODIUM CHLORIDE 1000 ML: 9 INJECTION, SOLUTION INTRAVENOUS at 12:40

## 2023-05-28 RX ADMIN — IOPAMIDOL 100 ML: 755 INJECTION, SOLUTION INTRAVENOUS at 13:12

## 2023-05-28 NOTE — ED PROVIDER NOTES
Time: 12:05 PM EDT  Date of encounter:  2023  Independent Historian/Clinical History and Information was obtained by:   Patient  Chief Complaint: Abdominal pain, vomiting, tachycardia.    History is limited by: N/A    History of Present Illness:  Patient is a 27 y.o. year old female who presents to the emergency department for evaluation of left upper abdominal pain which has been coming on for the last couple of days.  She did have 1 episode of vomiting with it.  She noted that she was tachycardic yesterday.  She currently states that the pain is better however has been coming and going.  The patient does have a history of kidney stones.  Patient denies any recent fever chills cough sore throat runny nose or congestion.    HPI    Patient Care Team  Primary Care Provider: Jaja Castillo APRN    Past Medical History:     Allergies   Allergen Reactions   • Cephalexin Diarrhea   • Cephalosporins GI Intolerance   • Cymbalta [Duloxetine Hcl] Other (See Comments)     Jittery and insomnia   • Penicillins Rash     Past Medical History:   Diagnosis Date   • Acid reflux    • Anxiety    • Chronic pain disorder    • Depression    • Eczema    • Intractable migraine without aura and without status migrainosus 2023   • Kidney stone     HX OF   • Maltracking of right patella    • Migraines    • Panic disorder    • PONV (postoperative nausea and vomiting)     GOOD RESULTS WITH SCOPALAMINE   • PTSD (post-traumatic stress disorder)     WAKES UP FROM ANESTHESIA WITH PANIC ATTACK   • Seasonal allergic rhinitis 2022   • Self-injurious behavior     as a teenager   • Suicide attempt      Past Surgical History:   Procedure Laterality Date   •  SECTION     • COLONOSCOPY N/A 10/11/2022    Procedure: COLONOSCOPY;  Surgeon: Dyan Griffin MD;  Location: [facility] ENDOSCOPY;  Service: Gastroenterology;  Laterality: N/A;  HEMORRHOIDS   • CYSTOSCOPY     • CYSTOSCOPY URETEROSCOPY Left 2023     Procedure: CYSTOSCOPY URETEROSCOPY RETROGRADE PYELOGRAM HOLMIUM LASER STENT INSERTION, left;  Surgeon: Melisa Garcia MD;  Location: [facility] OR;  Service: Urology;  Laterality: Left;   • HYSTERECTOMY     • KIDNEY STONE SURGERY      unspecified   • KNEE ARTHROSCOPY Right 7/11/2022    Procedure: KNEE ARTHROSCOPY WITH A LATERAL RELEASE AND CHONDROPLASTY;  Surgeon: Marco A Pitts MD;  [facility];  Service: Orthopedics;  Laterality: Right;   • WISDOM TOOTH EXTRACTION       Family History   Problem Relation Age of Onset   • Arthritis Mother    • Restless legs syndrome Mother    • Thyroid disease Father    • Colon polyps Father    • Diabetes Father    • Drug abuse Maternal Uncle    • Alcohol abuse Maternal Uncle    • Cancer Maternal Grandmother    • Sleep apnea Son    • Malig Hyperthermia Neg Hx        Home Medications:  Prior to Admission medications    Medication Sig Start Date End Date Taking? Authorizing Provider   amitriptyline (ELAVIL) 10 MG tablet Take 1 tablet by mouth Every Night for 90 days. 5/11/23 8/9/23  Cassius Cantrell APRN   dicyclomine (BENTYL) 20 MG tablet Take 1 tablet by mouth Every 6 (Six) Hours As Needed (abdominal cramping). 8/26/22   Eulalia Ley APRN   lamoTRIgine (LaMICtal) 100 MG tablet Take 0.5 tablets by mouth 2 (Two) Times a Day for 90 days. 5/11/23 8/9/23  Cassius Cantrell APRN   polyethylene glycol (MIRALAX) 17 g packet Take 17 g by mouth Daily. 4/5/23   Kayla Brody APRN   rizatriptan (Maxalt) 10 MG tablet Take 1 tablet by mouth 1 (One) Time As Needed for Migraine. May repeat in 2 hours once if needed 2/9/23   Zahra Paulino APRN   sodium chloride 0.9 % solution 100 mL with Eptinezumab-jjmr 100 MG/ML solution 100 mg Infuse 100 mg into a venous catheter Every 3 (Three) Months.    Provider, MD Amrit   vitamin D3 (Vitamin D) 125 MCG (5000 UT) capsule capsule Take 1 capsule by mouth Daily. 3/8/23   Jaja Castillo APRN   zolpidem (Ambien)  "10 MG tablet Take 1 tablet by mouth At Night As Needed for Sleep for up to 30 days. 5/16/23 6/15/23  ÓscarCassius duke APRN        Social History:   Social History     Tobacco Use   • Smoking status: Former     Packs/day: 0.50     Years: 15.00     Pack years: 7.50     Types: Cigarettes     Quit date: 2022     Years since quittin.2   • Smokeless tobacco: Never   Vaping Use   • Vaping Use: Every day   • Substances: Nicotine, Flavoring   • Devices: Disposable   Substance Use Topics   • Alcohol use: Not Currently   • Drug use: Not Currently     Types: Marijuana         Review of Systems:  Review of Systems   Constitutional: Negative for chills and fever.   HENT: Negative for congestion, ear pain and sore throat.    Eyes: Negative for pain.   Respiratory: Negative for cough, chest tightness and shortness of breath.    Cardiovascular: Positive for palpitations. Negative for chest pain.   Gastrointestinal: Positive for abdominal pain and vomiting. Negative for diarrhea and nausea.   Genitourinary: Negative for flank pain and hematuria.   Musculoskeletal: Negative for joint swelling.   Skin: Negative for pallor.   Neurological: Negative for seizures and headaches.   All other systems reviewed and are negative.       Physical Exam:  /76   Pulse 61   Temp 97.9 °F (36.6 °C)   Resp 16   Ht 162.6 cm (64\")   Wt 80.6 kg (177 lb 11.1 oz)   SpO2 98%   BMI 30.50 kg/m²     Physical Exam  Vitals and nursing note reviewed.   Constitutional:       General: She is not in acute distress.     Appearance: Normal appearance. She is not toxic-appearing.   HENT:      Head: Normocephalic and atraumatic.      Mouth/Throat:      Mouth: Mucous membranes are moist.   Eyes:      General: No scleral icterus.  Cardiovascular:      Rate and Rhythm: Normal rate and regular rhythm.      Pulses: Normal pulses.      Heart sounds: Normal heart sounds.   Pulmonary:      Effort: Pulmonary effort is normal. No respiratory distress.      " Breath sounds: Normal breath sounds.   Abdominal:      General: Abdomen is flat.      Palpations: Abdomen is soft.      Tenderness: There is abdominal tenderness in the left upper quadrant.   Musculoskeletal:         General: Normal range of motion.      Cervical back: Normal range of motion and neck supple.   Skin:     General: Skin is warm and dry.      Capillary Refill: Capillary refill takes less than 2 seconds.   Neurological:      General: No focal deficit present.      Mental Status: She is alert and oriented to person, place, and time. Mental status is at baseline.   Psychiatric:         Mood and Affect: Mood normal.         Behavior: Behavior normal.                  Procedures:  Procedures      Medical Decision Making:      Comorbidities that affect care:    History of kidney stones    External Notes reviewed:    Previous Clinic Note: For sleep apnea and depression      The following orders were placed and all results were independently analyzed by me:  Orders Placed This Encounter   Procedures   • CT Abdomen Pelvis With Contrast   • [place] Draw   • Comprehensive Metabolic Panel   • Lipase   • Single High Sensitivity Troponin T   • Urinalysis With Microscopic If Indicated (No Culture) - Urine, Clean Catch   • CBC Auto Differential   • ECG 12 Lead ED Triage Standing Order; Abdominal Pain (Upper)   • CBC & Differential   • Green Top (Gel)   • Lavender Top   • Gold Top - SST   • Light Blue Top       Medications Given in the Emergency Department:  Medications   sodium chloride 0.9 % bolus 1,000 mL (0 mL Intravenous Stopped 5/28/23 1310)   ketorolac (TORADOL) injection 15 mg (15 mg Intravenous Given 5/28/23 1240)   iopamidol (ISOVUE-370) 76 % injection 100 mL (100 mL Intravenous Given 5/28/23 1312)        ED Course:         Labs:    Results for orders placed or performed during the hospital encounter of 05/28/23   Comprehensive Metabolic Panel    Specimen: Blood   Result Value Ref Range    Glucose 98 65 - 99  mg/dL    BUN 8 6 - 20 mg/dL    Creatinine 0.70 0.57 - 1.00 mg/dL    Sodium 129 (L) 136 - 145 mmol/L    Potassium 3.8 3.5 - 5.2 mmol/L    Chloride 95 (L) 98 - 107 mmol/L    CO2 24.2 22.0 - 29.0 mmol/L    Calcium 9.0 8.6 - 10.5 mg/dL    Total Protein 7.0 6.0 - 8.5 g/dL    Albumin 3.9 3.5 - 5.2 g/dL    ALT (SGPT) 16 1 - 33 U/L    AST (SGOT) 13 1 - 32 U/L    Alkaline Phosphatase 97 39 - 117 U/L    Total Bilirubin 0.2 0.0 - 1.2 mg/dL    Globulin 3.1 gm/dL    A/G Ratio 1.3 g/dL    BUN/Creatinine Ratio 11.4 7.0 - 25.0    Anion Gap 9.8 5.0 - 15.0 mmol/L    eGFR 120.2 >60.0 mL/min/1.73   Lipase    Specimen: Blood   Result Value Ref Range    Lipase 23 13 - 60 U/L   Single High Sensitivity Troponin T    Specimen: Blood   Result Value Ref Range    HS Troponin T <6 <10 ng/L   Urinalysis With Microscopic If Indicated (No Culture) - Urine, Clean Catch    Specimen: Urine, Clean Catch   Result Value Ref Range    Color, UA Yellow Yellow, Straw    Appearance, UA Clear Clear    pH, UA 7.5 5.0 - 8.0    Specific Gravity, UA <=1.005 1.005 - 1.030    Glucose, UA Negative Negative    Ketones, UA Negative Negative    Bilirubin, UA Negative Negative    Blood, UA Negative Negative    Protein, UA Negative Negative    Leuk Esterase, UA Negative Negative    Nitrite, UA Negative Negative    Urobilinogen, UA 0.2 E.U./dL 0.2 - 1.0 E.U./dL   CBC Auto Differential    Specimen: Blood   Result Value Ref Range    WBC 17.81 (H) 3.40 - 10.80 10*3/mm3    RBC 4.79 3.77 - 5.28 10*6/mm3    Hemoglobin 13.1 12.0 - 15.9 g/dL    Hematocrit 39.6 34.0 - 46.6 %    MCV 82.7 79.0 - 97.0 fL    MCH 27.3 26.6 - 33.0 pg    MCHC 33.1 31.5 - 35.7 g/dL    RDW 13.2 12.3 - 15.4 %    RDW-SD 39.7 37.0 - 54.0 fl    MPV 9.9 6.0 - 12.0 fL    Platelets 381 140 - 450 10*3/mm3    Neutrophil % 69.7 42.7 - 76.0 %    Lymphocyte % 20.6 19.6 - 45.3 %    Monocyte % 6.5 5.0 - 12.0 %    Eosinophil % 2.2 0.3 - 6.2 %    Basophil % 0.4 0.0 - 1.5 %    Immature Grans % 0.6 (H) 0.0 - 0.5 %     Neutrophils, Absolute 12.41 (H) 1.70 - 7.00 10*3/mm3    Lymphocytes, Absolute 3.67 (H) 0.70 - 3.10 10*3/mm3    Monocytes, Absolute 1.16 (H) 0.10 - 0.90 10*3/mm3    Eosinophils, Absolute 0.39 0.00 - 0.40 10*3/mm3    Basophils, Absolute 0.08 0.00 - 0.20 10*3/mm3    Immature Grans, Absolute 0.10 (H) 0.00 - 0.05 10*3/mm3    nRBC 0.0 0.0 - 0.2 /100 WBC   ECG 12 Lead ED Triage Standing Order; Abdominal Pain (Upper)   Result Value Ref Range    QT Interval 390 ms   Green Top (Gel)   Result Value Ref Range    Extra Tube Hold for add-ons.    Lavender Top   Result Value Ref Range    Extra Tube hold for add-on    Gold Top - SST   Result Value Ref Range    Extra Tube Hold for add-ons.    Light Blue Top   Result Value Ref Range    Extra Tube Hold for add-ons.          Lab Results (last 24 hours)     ** No results found for the last 24 hours. **           Imaging:    CT Abdomen Pelvis With Contrast    Result Date: 5/28/2023  PROCEDURE: CT ABDOMEN PELVIS W CONTRAST  COMPARISON: [facility], CT, CT ABDOMEN PELVIS WO CONTRAST, 2/01/2023, 16:08.  INDICATIONS: Left upper quadrant abdominal pain  TECHNIQUE: After obtaining the patient's consent, CT images were created with non-ionic intravenous contrast material.   PROTOCOL:   Standard imaging protocol performed    RADIATION:   DLP: 579.3mGy*cm   Automated exposure control was utilized to minimize radiation dose. CONTRAST: 93cc Isovue 370 I.V. LABS:   eGFR: >60ml/min/1.73m2  FINDINGS:  The visualized portions of the lung bases demonstrate no acute process.  The liver, pancreas and spleen demonstrate no acute process.  The liver appears diffusely hypodense.  The gallbladder appears unremarkable.  No evidence of biliary dilatation. The adrenal glands appear unremarkable.  The kidneys appear normal in size.  No evidence of nephrolithiasis.  No evidence of hydronephrosis.  No focal lesions identified.  No dilated  loops of bowel identified.  No evidence of obstruction.   Moderate stool burden identified.  No free air.  No mesenteric fluid collections identified.  The appendix appears unremarkable.  No suspicious adenopathy identified.  No significant free fluid.  The pelvic organs demonstrate no acute process.  No acute osseous abnormality is identified.         1. No acute intra-abdominal or intrapelvic process. 2. Hepatic steatosis. 3. Ancillary findings as described above.     SUZY COLEMAN MD       Electronically Signed and Approved By: SUZY COLEMAN MD on 5/28/2023 at 13:21                 Differential Diagnosis and Discussion:    Abdominal Pain: Based on the patient's signs and symptoms, I considered abdominal aortic aneurysm, small bowel obstruction, pancreatitis, acute cholecystitis, acute appendecitis, peptic ulcer disease, gastritis, colitis, endocrine disorders, irritable bowel syndrome and other differential diagnosis an etiology of the patient's abdominal pain.    All labs were reviewed and interpreted by me.    MDM         Patient Care Considerations:    Ultrasound abdomen; however, CT is more comprehensive      Consultants/Shared Management Plan:    None    Social Determinants of Health:    Patient is independent, reliable, and has access to care.       Disposition and Care Coordination:    Discharged: The patient is suitable and stable for discharge with no need for consideration of observation or admission.    I have explained discharge medications and the need for follow up with the patient/caretakers. This was also printed in the discharge instructions. Patient was discharged with the following medications and follow up:      Medication List      New Prescriptions    esomeprazole 40 MG capsule  Commonly known as: nexIUM  Take 1 capsule by mouth Every Morning Before Breakfast.           Where to Get Your Medications      These medications were sent to [facility]/pharmacy #[id number] - [place], [place] - [address] - [phone]  - [phone]  [address]  [place] [address]    Hours: 24-hours Phone: [phone]   · esomeprazole 40 MG capsule      Jaja Castillo, APRN  [id number] [place] [id number] [phone]    In 2 days  If no better.       Final diagnoses:   Abdominal pain, unspecified abdominal location        ED Disposition     ED Disposition   Discharge    Condition   Stable    Comment   --             This medical record created using voice recognition software.           Cesario Gonzalez, DO  05/29/23 1044

## 2023-06-07 ENCOUNTER — TELEPHONE (OUTPATIENT)
Dept: SLEEP MEDICINE | Facility: HOSPITAL | Age: 28
End: 2023-06-07
Payer: COMMERCIAL

## 2023-06-07 LAB — QT INTERVAL: 390 MS

## 2023-06-13 ENCOUNTER — TELEMEDICINE (OUTPATIENT)
Dept: PSYCHIATRY | Facility: CLINIC | Age: 28
End: 2023-06-13
Payer: COMMERCIAL

## 2023-06-13 ENCOUNTER — PRIOR AUTHORIZATION (OUTPATIENT)
Dept: PSYCHIATRY | Facility: CLINIC | Age: 28
End: 2023-06-13

## 2023-06-13 DIAGNOSIS — F41.1 GENERALIZED ANXIETY DISORDER: ICD-10-CM

## 2023-06-13 DIAGNOSIS — F33.1 MAJOR DEPRESSIVE DISORDER, RECURRENT EPISODE, MODERATE: Primary | ICD-10-CM

## 2023-06-13 DIAGNOSIS — F51.05 INSOMNIA DUE TO MENTAL DISORDER: ICD-10-CM

## 2023-06-13 DIAGNOSIS — F43.10 POST TRAUMATIC STRESS DISORDER (PTSD): ICD-10-CM

## 2023-06-13 RX ORDER — ZOLPIDEM TARTRATE 10 MG/1
10 TABLET ORAL NIGHTLY PRN
Qty: 30 TABLET | Refills: 0 | Status: SHIPPED | OUTPATIENT
Start: 2023-06-20 | End: 2023-06-13 | Stop reason: SDUPTHER

## 2023-06-13 RX ORDER — ZOLPIDEM TARTRATE 10 MG/1
10 TABLET ORAL NIGHTLY PRN
Qty: 30 TABLET | Refills: 0 | Status: SHIPPED | OUTPATIENT
Start: 2023-06-20

## 2023-06-13 NOTE — TELEPHONE ENCOUNTER
"PER PROVIDER  ÓscarCassius APRN to [facility] Clinical Pool  \"Sent to new pharmacy for 6/20. Per DEBORAH patient picked up medicine on 5/22. Please call pharmacy to verify. Thanks.\"     I CALLED PHARMACY AND THEY VERIFIED THAT PT PICKED UP THE MEDICATION ON 5-22-23.  "

## 2023-06-13 NOTE — TELEPHONE ENCOUNTER
"PER PROVIDER  \"Cassius Cantrell APRN to [facility] [place] [facility] Clinical Pool  \"Ok please let patient know per state law we can only fill controlled medications two days early which is 6/20.\"    I CALLED PT TO RELAY PROVIDERS INFORMATION.     NO ANSWER.  I LEFT VOICEMAIL FOR PT TO CALL OUR OFFICE BACK.  "

## 2023-06-13 NOTE — PROGRESS NOTES
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 Collaboration (CKD-EPI) equation refit without adjustment for race.    Lipase 08/26/2022 68 (H)  13 - 60 U/L Final    Color, UA 08/26/2022 Yellow  Yellow, Straw Final    Appearance, UA 08/26/2022 Clear  Clear Final    pH, UA 08/26/2022 7.5  5.0 - 8.0 Final    Specific Gravity, UA 08/26/2022 1.018  1.005 - 1.030 Final    Glucose, UA 08/26/2022 Negative  Negative Final    Ketones, UA 08/26/2022 Negative  Negative Final    Bilirubin, UA 08/26/2022 Negative  Negative Final    Blood, UA 08/26/2022 Negative  Negative Final    Protein, UA 08/26/2022 Negative  Negative Final    Leuk Esterase, UA 08/26/2022 Negative  Negative Final    Nitrite, UA 08/26/2022 Negative  Negative Final    Urobilinogen, UA 08/26/2022 1.0 E.U./dL  0.2 - 1.0 E.U./dL Final    Extra Tube 08/26/2022 11   Final    Extra Tube 08/26/2022 11   Final    Extra Tube 08/26/2022 11   Final    Extra Tube 08/26/2022 11   Final    WBC 08/26/2022 11.14 (H)  3.40 - 10.80 10*3/mm3 Final    RBC 08/26/2022 4.82  3.77 - 5.28 10*6/mm3 Final    Hemoglobin 08/26/2022 13.5  12.0 - 15.9 g/dL Final    Hematocrit 08/26/2022 41.0  34.0 - 46.6 % Final    MCV 08/26/2022 85.1  79.0 - 97.0 fL  Final    MCH 08/26/2022 28.0  26.6 - 33.0 pg Final    MCHC 08/26/2022 32.9  31.5 - 35.7 g/dL Final    RDW 08/26/2022 13.2  12.3 - 15.4 % Final    RDW-SD 08/26/2022 41.0  37.0 - 54.0 fl Final    MPV 08/26/2022 10.1  6.0 - 12.0 fL Final    Platelets 08/26/2022 339  140 - 450 10*3/mm3 Final    Neutrophil % 08/26/2022 56.7  42.7 - 76.0 % Final    Lymphocyte % 08/26/2022 29.7  19.6 - 45.3 % Final    Monocyte % 08/26/2022 9.4  5.0 - 12.0 % Final    Eosinophil % 08/26/2022 3.1  0.3 - 6.2 % Final    Basophil % 08/26/2022 0.7  0.0 - 1.5 % Final    Immature Grans % 08/26/2022 0.4  0.0 - 0.5 % Final    Neutrophils, Absolute 08/26/2022 6.31  1.70 - 7.00 10*3/mm3 Final    Lymphocytes, Absolute 08/26/2022 3.31 (H)  0.70 - 3.10 10*3/mm3 Final    Monocytes, Absolute 08/26/2022 1.05 (H)  0.10 - 0.90 10*3/mm3 Final    Eosinophils, Absolute 08/26/2022 0.35  0.00 - 0.40 10*3/mm3 Final    Basophils, Absolute 08/26/2022 0.08  0.00 - 0.20 10*3/mm3 Final    Immature Grans, Absolute 08/26/2022 0.04  0.00 - 0.05 10*3/mm3 Final    nRBC 08/26/2022 0.0  0.0 - 0.2 /100 WBC Final    H. pylori IgG 08/26/2022 Negative  Negative, Equivocal Final   There may be more visits with results that are not included.       EKG Results:  No orders to display       Imaging Results:  CT Abdomen Pelvis Without Contrast    Result Date: 8/26/2022    CT scan of the abdomen and pelvis without contrast demonstrating 6 mm nonobstructing stone the lower pole collecting system of the left kidney.  Post hysterectomy.     ALICE TALBERT MD       Electronically Signed and Approved By: ALICE TALBERT MD on 8/26/2022 at 11:58             US Non-ob Transvaginal    Result Date: 10/31/2022   Normal right ovary.  Regression of previously seen large right ovarian cyst.     SHARON ROBIN MD       Electronically Signed and Approved By: SHARON ROBIN MD on 10/31/2022 at 15:26             US Thyroid    Result Date: 10/31/2022   Normal thyroid size and echotexture.  Small colloid cyst  inferior left thyroid lobe.  No further evaluation is required.     SHARON ROBIN MD       Electronically Signed and Approved By: SHARON ROBIN MD on 10/31/2022 at 18:43             US Abdomen Limited    Result Date: 8/26/2022    1. Unremarkable right upper quadrant ultrasound.     CLAY MARSHALL MD       ELECTRONICALLY SIGNED AND APPROVED BY: CLAY MARSHALL MD ON 8/26/2022 AT 9:21                 Assessment & Plan   Diagnoses and all orders for this visit:    1. Major depressive disorder, recurrent episode, moderate (Primary)    2. Generalized anxiety disorder    3. Post traumatic stress disorder (PTSD)    4. Insomnia due to mental disorder  -     zolpidem (Ambien) 10 MG tablet; Take 1 tablet by mouth At Night As Needed for Sleep.  Dispense: 30 tablet; Refill: 0        Continue lamotrigine to target depression. Continue amitriptyline to target depression and anxiety. Psychotherapy for anxiety and PTSD. Continue ambien to target insomnia. 16 minutes of supportive psychotherapy with goal to strengthen defenses, promote problems solving, restore adaptive functioning and provide symptom relief. The therapeutic alliance was strengthened to encourage the patient to express their thoughts and feelings. Esteem building was enhanced through praise, reassurance, normalizing and encouragement. Coping skills were enhanced to build distress tolerance skills and emotional regulation. Allowed patient to freely discuss issues without interruption or judgement with unconditional positive regard, active listening skills, and empathy. Provided a safe, confidential environment to facilitate the development of a positive therapeutic relationship and encourage open, honest communication. Assisted patient in identifying risk factors which would indicate the need for higher level of care including thoughts to harm self or others and/or self-harming behavior and encouraged patient to contact this office, call 911, or present to the nearest  emergency room should any of these events occur. Assisted patient in processing session content; acknowledged and normalized patient's thoughts, feelings, and concerns by utilizing a person-centered approach in efforts to build appropriate rapport and a positive therapeutic relationship with open and honest communication. Patient given education on medication side effects, diagnosis/illness and relapse symptoms. Plan to continue supportive psychotherapy in next appointment to provide symptom relief. 4 weeks    Diagnoses: as above  Symptoms: as above  Functional status: good  Mental Status Exam: as above     Treatment plan: medication management and supportive psychotherapy  Prognosis: good  Progress: continued improvement    Visit Diagnoses:    ICD-10-CM ICD-9-CM   1. Major depressive disorder, recurrent episode, moderate  F33.1 296.32   2. Generalized anxiety disorder  F41.1 300.02   3. Post traumatic stress disorder (PTSD)  F43.10 309.81   4. Insomnia due to mental disorder  F51.05 300.9     327.02         PLAN:  Safety: No acute safety concerns.   Therapy: Declines  Risk Assessment: Risk of self-harm acutely is moderate.  Risk factors include anxiety disorder, mood disorder, and recent psychosocial stressors (pandemic). Protective factors include no family history, denies access to guns/weapons, no present SI, minimal AODA, healthcare seeking, future orientation, willingness to engage in care.  Risk of self-harm chronically is also moderate, but could be further elevated in the event of treatment noncompliance and/or AODA.  Medications: Continue lamotrigine 50mg po bid to target mood. Risks, benefits, alternatives discussed with patient including rash, rebound depressive or manic symptoms if prompt discontinuation, GI upset, agitation, sedation/falls risk.  After discussion of these risks and benefits, patient voiced understanding and agreed to proceed. Continue amitriptyline 10 mg po qhs to target depression and  anxiety. Risks, benefits, alternatives discussed with patient including sedation, dizziness, falls, GI upset, constipation, urinary retention, dry mouth.  After discussion of these risks and benefits, the patient voiced understanding and agreed to proceed. Continue ambien 10mg po qhs to target insomnia. Risks, benefits, side effects discussed with patient including sedation, dizziness, GI upset, hallucinations, sleepwalking, sleep-eating.  After discussion of these risks and benefits, the patient voiced understanding and agreed to proceed.  Labs/studies: No labs/studies ordered at this time  Follow-up: 4 weeks    Patient screened positive for depression based on a PHQ-9 score of 2 on 6/13/2023. Follow-up recommendations include: Suicide Risk Assessment performed.         TREATMENT PLAN/GOALS: Continue supportive psychotherapy efforts and medications as indicated. Treatment and medication options discussed during today's visit. Patient ackowledged and verbally consented to continue with current treatment plan and was educated on the importance of compliance with treatment and follow-up appointments.      MEDICATION ISSUES:  DEBORAH reviewed as expected.  Discussed medication options and treatment plan of prescribed medication as well as the risks, benefits, and side effects including potential falls, possible impaired driving and metabolic adversities among others. Patient is agreeable to call the office with any worsening of symptoms or onset of side effects. Patient is agreeable to call 911 or go to the nearest ER should he/she begin having SI/HI. No medication side effects or related complaints today.     MEDS ORDERED DURING VISIT:  New Medications Ordered This Visit   Medications    zolpidem (Ambien) 10 MG tablet     Sig: Take 1 tablet by mouth At Night As Needed for Sleep.     Dispense:  30 tablet     Refill:  0       Return in about 4 weeks (around 7/11/2023) for Next scheduled follow up.         This document  has been electronically signed by MINI Poon  June 13, 2023 11:14 EDT      Part of this note may be an electronic transcription/translation of spoken language to printed text using the Dragon Dictation System.

## 2023-06-13 NOTE — TELEPHONE ENCOUNTER
PT CALLED AND STATED THAT HER MEDICATION WAS SENT TO THE WRONG PHARMACY. THAT IT SHOULD HAVE BEEN SENT TO [facility].    THE RX IS ALSO WRITTEN FOR THE 6-20-23 AND PT IS NEEDING THE RX EARLIER THAN THAT. PREVIOUS REFILL OF THIS MEDICATION WAS ON 5-16-23.    MEDICATION PENDED TO [facility].    ALSO THIS MEDICATION WAS PREVIOUSLY DENIED BY HER INSURANCE ALONG WITH A DENIAL ON THE APPEAL.    PLEASE REVIEW.      Prior Authorization with Cassius Cantrell APRN (05/22/2023)

## 2023-06-13 NOTE — TELEPHONE ENCOUNTER
PA FOR AMBIEN 10 MG TABLET INITIATED AND PROCESSED THRU [facility].    KEY IS:  [id number]    PA APPROVAL RECEIVED FOR PTS AMBIEN 10 MG TABLET.    APPROVAL DATES ARE 06/13/2023-12/12/2023.    WAITING ON FAX COMMUNICATION FROM INSURANCE.

## 2023-06-14 NOTE — TELEPHONE ENCOUNTER
I CALLED PT TO RELAY PROVIDERS MESSAGE.    NO ANSWER. RESULTING IN LEAVING A VOICEMAIL FOR PT TO CALL OUR OFFICE BACK.

## 2023-06-16 NOTE — TELEPHONE ENCOUNTER
Called pt back to relay the following information from provider. Pt reported medication was sent to incorrect pharmacy and needed it to go to [facility] instead of [facility].     zolpidem (Ambien) 10 MG tablet (06/20/2023)    Pt was notified order was corrected to appropriate pharmacy to pickup 6/20. Pt expressed understanding, all questions answered and chart updated with correct preferred pharmacy.

## 2023-07-05 PROBLEM — R11.2 NAUSEA AND VOMITING: Status: ACTIVE | Noted: 2023-07-05

## 2023-08-10 ENCOUNTER — TELEMEDICINE (OUTPATIENT)
Dept: PSYCHIATRY | Facility: CLINIC | Age: 28
End: 2023-08-10
Payer: COMMERCIAL

## 2023-08-10 DIAGNOSIS — F51.05 INSOMNIA DUE TO MENTAL DISORDER: ICD-10-CM

## 2023-08-10 DIAGNOSIS — F41.1 GENERALIZED ANXIETY DISORDER: Primary | ICD-10-CM

## 2023-08-10 DIAGNOSIS — F33.1 MAJOR DEPRESSIVE DISORDER, RECURRENT EPISODE, MODERATE: ICD-10-CM

## 2023-08-10 DIAGNOSIS — F43.10 POST TRAUMATIC STRESS DISORDER (PTSD): ICD-10-CM

## 2023-08-10 RX ORDER — LAMOTRIGINE 100 MG/1
50 TABLET ORAL 2 TIMES DAILY
Qty: 30 TABLET | Refills: 2 | Status: SHIPPED | OUTPATIENT
Start: 2023-08-10 | End: 2023-11-08

## 2023-08-10 NOTE — PROGRESS NOTES
Subjective   Palmira Carbajal is a 28 y.o. female who presents today for follow up. Mode of visit: Video  Location of provider: Home  Location of patient: Home  Does the patient consent to use a video/audio connection for your medical care today? Yes  The visit included audio and video interaction. No technical issues occurred during this visit.    Referring Provider:  No referring provider defined for this encounter.    Chief Complaint:  Depression, anxiety    History of Present Illness:     Depression/mood: has improved  Kids started back in school   Anxiety: has improved  Denies excessive worries   Working on positive coping skills  PTSD: denies s/sx  Sleep disturbance: endorses   Low energy: denies  Low motivation/Interest: denies  Appetite change: denies  Medication compliant  Side effects: denies  Refills needed  Denies SI HI AVH    Access to Firearms: Denies    PHQ-9 Depression Screening  PHQ-9 Total Score: (P) 4    Little interest or pleasure in doing things? (P) 0-->not at all   Feeling down, depressed, or hopeless? (P) 1-->several days   Trouble falling or staying asleep, or sleeping too much? (P) 2-->more than half the days   Feeling tired or having little energy? (P) 1-->several days   Poor appetite or overeating? (P) 0-->not at all   Feeling bad about yourself - or that you are a failure or have let yourself or your family down? (P) 0-->not at all   Trouble concentrating on things, such as reading the newspaper or watching television? (P) 0-->not at all   Moving or speaking so slowly that other people could have noticed? Or the opposite - being so fidgety or restless that you have been moving around a lot more than usual? (P) 0-->not at all   Thoughts that you would be better off dead, or of hurting yourself in some way? (P) 0-->not at all   PHQ-9 Total Score (P) 4     STACY-7  Feeling nervous, anxious or on edge: (P) Several days  Not being able to stop or control worrying: (P) Several days  Worrying  too much about different things: (P) Several days  Trouble Relaxing: (P) Several days  Being so restless that it is hard to sit still: (P) Not at all  Feeling afraid as if something awful might happen: (P) Not at all  Becoming easily annoyed or irritable: (P) Not at all  STACY 7 Total Score: (P) 4  If you checked any problems, how difficult have these problems made it for you to do your work, take care of things at home, or get along with other people: (P) Somewhat difficult    Past Surgical History:  Past Surgical History:   Procedure Laterality Date     SECTION      COLONOSCOPY N/A 10/11/2022    Procedure: COLONOSCOPY;  Surgeon: Dyan Griffin MD;  Location: [facility]OSCOPY;  Service: Gastroenterology;  Laterality: N/A;  HEMORRHOIDS    CYSTOSCOPY      CYSTOSCOPY URETEROSCOPY Left 2023    Procedure: CYSTOSCOPY URETEROSCOPY RETROGRADE PYELOGRAM HOLMIUM LASER STENT INSERTION, left;  Surgeon: Melisa Garcai MD;  Location: [facility] MAIN OR;  Service: Urology;  Laterality: Left;    HYSTERECTOMY      KIDNEY STONE SURGERY      unspecified    KNEE ARTHROSCOPY Right 2022    Procedure: KNEE ARTHROSCOPY WITH A LATERAL RELEASE AND CHONDROPLASTY;  Surgeon: Marco A Pitts MD;  Location: [facility];  Service: Orthopedics;  Laterality: Right;    WISDOM TOOTH EXTRACTION         Problem List:  Patient Active Problem List   Diagnosis    Maltracking of right patella    Impingement syndrome involving patellar fat pad of right knee    Chondromalacia    Patellar malalignment syndrome of right knee    Binocular vision disorder with diplopia    Generalized anxiety disorder    Acid reflux    PTSD (post-traumatic stress disorder)    Kidney stone on left side    Seasonal allergic rhinitis    Burn of finger and thumb of right hand, second degree    Hemorrhoids    Right hand pain    Rectal bleeding    Anal or rectal pain    Decreased appetite    Aftercare following surgery of right knee arthroscopic  chondroplasty and lateral release, 7/11/2022    Generalized body aches    Epigastric pain    Female hirsutism    Right ovarian cyst    Moderate episode of recurrent major depressive disorder    Primary insomnia    Vitamin D deficiency    Intractable migraine without aura and without status migrainosus    Calcified granuloma of lung    Nipple discharge    Nausea and vomiting       Allergy:   Allergies   Allergen Reactions    Cephalexin Diarrhea    Cephalosporins GI Intolerance    Cymbalta [Duloxetine Hcl] Other (See Comments)     Jittery and insomnia    Penicillins Rash        Discontinued Medications:  Medications Discontinued During This Encounter   Medication Reason    lamoTRIgine (LaMICtal) 100 MG tablet Reorder       Current Medications:   Current Outpatient Medications   Medication Sig Dispense Refill    lamoTRIgine (LaMICtal) 100 MG tablet Take 0.5 tablets by mouth 2 (Two) Times a Day for 90 days. 30 tablet 2    dicyclomine (BENTYL) 20 MG tablet Take 1 tablet by mouth Every 6 (Six) Hours As Needed (abdominal cramping). 36 tablet 0    eletriptan (RELPAX) 20 MG tablet One tablet at HA onset, may repeat in 2 hours if needed. 9 tablet 3    esomeprazole (nexIUM) 40 MG capsule Take 1 capsule by mouth Every Morning Before Breakfast. 30 capsule 3    polyethylene glycol (MIRALAX) 17 g packet Take 17 g by mouth Daily. 5 packet 0    rizatriptan (Maxalt) 10 MG tablet Take 1 tablet by mouth 1 (One) Time As Needed for Migraine. May repeat in 2 hours once if needed 9 tablet 3    sodium chloride 0.9 % solution 100 mL with Eptinezumab-jjmr 100 MG/ML solution 100 mg Infuse 100 mg into a venous catheter Every 3 (Three) Months.      vitamin D3 (Vitamin D) 125 MCG (5000 UT) capsule capsule Take 1 capsule by mouth Daily. 90 capsule 3    zolpidem (Ambien) 10 MG tablet Take 1 tablet by mouth At Night As Needed for Sleep. 30 tablet 0     No current facility-administered medications for this visit.       Past Medical History:  Past  Medical History:   Diagnosis Date    Acid reflux     Anxiety     Chronic pain disorder     Depression     Eczema     Fatty liver     Intractable migraine without aura and without status migrainosus 2023    Kidney stone     HX OF    Maltracking of right patella     Migraines     Panic disorder     PONV (postoperative nausea and vomiting)     GOOD RESULTS WITH SCOPALAMINE    PTSD (post-traumatic stress disorder)     WAKES UP FROM ANESTHESIA WITH PANIC ATTACK    Seasonal allergic rhinitis 2022    Self-injurious behavior     as a teenager    Suicide attempt        Past Psychiatric History:  Began Treatment: Age 12  Diagnoses: Depression, anxiety, PTSD  Psychiatrist: King Vogt previously  Therapist: King Vogt previously  Admission History: Starke Edmond previously  Medication Trials: Zolft, paxil, prozac, lexapro, desvenlafaxine, effexor, cymbalta, quetiapine, trazodone, hydroxyzine  Self Harm: Hx cutting as teenager  Suicide Attempts: Overdose age 12     Substance Abuse History:   Types: Denies  Withdrawal Symptoms: Not applicable  Longest Period Sober: Not applicable  AA: Not applicable     Social History:  Martial Status:   Employed: Not currently  Kids: Three, ages 9, 6 and 3  House: With  and children   History: Denies    Social History     Socioeconomic History    Marital status:      Spouse name: TRISTAN    Number of children: 3   Tobacco Use    Smoking status: Former     Packs/day: 0.50     Years: 15.00     Pack years: 7.50     Types: Cigarettes     Quit date: 2022     Years since quittin.4    Smokeless tobacco: Never   Vaping Use    Vaping Use: Every day    Substances: Nicotine, Flavoring    Devices: Disposable   Substance and Sexual Activity    Alcohol use: Not Currently    Drug use: Not Currently     Types: Marijuana    Sexual activity: Defer       Family History   Problem Relation Age of Onset    Arthritis Mother     Restless  legs syndrome Mother     Thyroid disease Father     Colon polyps Father     Diabetes Father     Drug abuse Maternal Uncle     Alcohol abuse Maternal Uncle     Cancer Maternal Grandmother     Sleep apnea Son     Malig Hyperthermia Neg Hx      Mental Status Exam:   Hygiene:   Good  Cooperation:  Cooperative  Eye Contact:  Good  Psychomotor Behavior: Appropriate  Affect:  Appropriate  Mood: euthymic  Hopelessness: Optimistic  Speech:  Normal  Thought Process:  Goal directed  Thought Content:  Normal  Suicidal:  Denies  Homicidal:  Denies  Hallucinations:  Denies  Delusion:  Denies  Memory:  Intact  Orientation:  Person, place, time and situation  Reliability:  Good  Insight:  Good  Judgement:  Good  Impulse Control:  Good  Physical/Medical Issues:  No    Review of Systems:  Review of Systems   Constitutional:  Negative for appetite change, diaphoresis, fatigue and unexpected weight change.   HENT:  Negative for drooling, tinnitus and trouble swallowing.    Eyes:  Negative for visual disturbance.   Respiratory:  Negative for cough, chest tightness and shortness of breath.    Cardiovascular:  Negative for chest pain and palpitations.   Gastrointestinal:  Negative for abdominal pain, constipation, diarrhea, nausea and vomiting.   Endocrine: Negative for cold intolerance and heat intolerance.   Genitourinary:  Negative for difficulty urinating.   Musculoskeletal:  Negative for arthralgias and myalgias.   Skin:  Negative for rash.   Allergic/Immunologic: Negative for immunocompromised state.   Neurological:  Negative for dizziness, tremors, seizures and headaches.   Psychiatric/Behavioral:  Negative for agitation, dysphoric mood, hallucinations, self-injury, sleep disturbance and suicidal ideas. The patient is not nervous/anxious.      Vital Signs:   There were no vitals taken for this visit.     Lab Results:   Admission on 05/28/2023, Discharged on 05/28/2023   Component Date Value Ref Range Status    QT Interval  05/28/2023 390  ms Final    Glucose 05/28/2023 98  65 - 99 mg/dL Final    BUN 05/28/2023 8  6 - 20 mg/dL Final    Creatinine 05/28/2023 0.70  0.57 - 1.00 mg/dL Final    Sodium 05/28/2023 129 (L)  136 - 145 mmol/L Final    Potassium 05/28/2023 3.8  3.5 - 5.2 mmol/L Final    Chloride 05/28/2023 95 (L)  98 - 107 mmol/L Final    CO2 05/28/2023 24.2  22.0 - 29.0 mmol/L Final    Calcium 05/28/2023 9.0  8.6 - 10.5 mg/dL Final    Total Protein 05/28/2023 7.0  6.0 - 8.5 g/dL Final    Albumin 05/28/2023 3.9  3.5 - 5.2 g/dL Final    ALT (SGPT) 05/28/2023 16  1 - 33 U/L Final    AST (SGOT) 05/28/2023 13  1 - 32 U/L Final    Alkaline Phosphatase 05/28/2023 97  39 - 117 U/L Final    Total Bilirubin 05/28/2023 0.2  0.0 - 1.2 mg/dL Final    Globulin 05/28/2023 3.1  gm/dL Final    A/G Ratio 05/28/2023 1.3  g/dL Final    BUN/Creatinine Ratio 05/28/2023 11.4  7.0 - 25.0 Final    Anion Gap 05/28/2023 9.8  5.0 - 15.0 mmol/L Final    eGFR 05/28/2023 120.2  >60.0 mL/min/1.73 Final    Lipase 05/28/2023 23  13 - 60 U/L Final    HS Troponin T 05/28/2023 <6  <10 ng/L Final    Color, UA 05/28/2023 Yellow  Yellow, Straw Final    Appearance, UA 05/28/2023 Clear  Clear Final    pH, UA 05/28/2023 7.5  5.0 - 8.0 Final    Specific Gravity, UA 05/28/2023 <=1.005  1.005 - 1.030 Final    Glucose, UA 05/28/2023 Negative  Negative Final    Ketones, UA 05/28/2023 Negative  Negative Final    Bilirubin, UA 05/28/2023 Negative  Negative Final    Blood, UA 05/28/2023 Negative  Negative Final    Protein, UA 05/28/2023 Negative  Negative Final    Leuk Esterase, UA 05/28/2023 Negative  Negative Final    Nitrite, UA 05/28/2023 Negative  Negative Final    Urobilinogen, UA 05/28/2023 0.2 E.U./dL  0.2 - 1.0 E.U./dL Final    Extra Tube 05/28/2023 Hold for add-ons.   Final    Auto resulted.    Extra Tube 05/28/2023 hold for add-on   Final    Auto resulted    Extra Tube 05/28/2023 Hold for add-ons.   Final    Auto resulted.    Extra Tube 05/28/2023 Hold for add-ons.    Final    Auto resulted    WBC 05/28/2023 17.81 (H)  3.40 - 10.80 10*3/mm3 Final    RBC 05/28/2023 4.79  3.77 - 5.28 10*6/mm3 Final    Hemoglobin 05/28/2023 13.1  12.0 - 15.9 g/dL Final    Hematocrit 05/28/2023 39.6  34.0 - 46.6 % Final    MCV 05/28/2023 82.7  79.0 - 97.0 fL Final    MCH 05/28/2023 27.3  26.6 - 33.0 pg Final    MCHC 05/28/2023 33.1  31.5 - 35.7 g/dL Final    RDW 05/28/2023 13.2  12.3 - 15.4 % Final    RDW-SD 05/28/2023 39.7  37.0 - 54.0 fl Final    MPV 05/28/2023 9.9  6.0 - 12.0 fL Final    Platelets 05/28/2023 381  140 - 450 10*3/mm3 Final    Neutrophil % 05/28/2023 69.7  42.7 - 76.0 % Final    Lymphocyte % 05/28/2023 20.6  19.6 - 45.3 % Final    Monocyte % 05/28/2023 6.5  5.0 - 12.0 % Final    Eosinophil % 05/28/2023 2.2  0.3 - 6.2 % Final    Basophil % 05/28/2023 0.4  0.0 - 1.5 % Final    Immature Grans % 05/28/2023 0.6 (H)  0.0 - 0.5 % Final    Neutrophils, Absolute 05/28/2023 12.41 (H)  1.70 - 7.00 10*3/mm3 Final    Lymphocytes, Absolute 05/28/2023 3.67 (H)  0.70 - 3.10 10*3/mm3 Final    Monocytes, Absolute 05/28/2023 1.16 (H)  0.10 - 0.90 10*3/mm3 Final    Eosinophils, Absolute 05/28/2023 0.39  0.00 - 0.40 10*3/mm3 Final    Basophils, Absolute 05/28/2023 0.08  0.00 - 0.20 10*3/mm3 Final    Immature Grans, Absolute 05/28/2023 0.10 (H)  0.00 - 0.05 10*3/mm3 Final    nRBC 05/28/2023 0.0  0.0 - 0.2 /100 WBC Final   Lab on 03/14/2023   Component Date Value Ref Range Status    LDH 03/14/2023 157  135 - 214 U/L Final    Sed Rate 03/14/2023 25 (H)  0 - 20 mm/hr Final    LUDA 03/14/2023 Negative   Final                                         Negative   <1:80                                       Borderline  1:80                                       Positive   >1:80  ICAP nomenclature: AC-0  For more information about Hep-2 cell patterns use  ANApatterns.org, the official website for the International  Consensus on Antinuclear Antibody (LUDA) Patterns (ICAP).    Rheumatoid Factor Quantitative 03/14/2023  <10.0  0.0 - 14.0 IU/mL Final    Reference Lab Report 03/14/2023 See separate report   Final    Consult Global Result 03/14/2023 Comment^Text^TXT   Final    Peripheral Blood:  No significant immunophenotypic abnormality detected.  DISCLAIMER: REFER TO HARDCOPY OR PDF FOR COMPLETE RESULT.   If synopsis provided, clinical decisions should not be   based on this interfaced synopsis alone.  Performed at:  1 - [facility]  [address] [id number]  :  Mishel Echavarria M.D., Ph.D., Phone:  [phone]   Office Visit on 03/07/2023   Component Date Value Ref Range Status    25 Hydroxy, Vitamin D 03/07/2023 28.7 (L)  30.0 - 100.0 ng/ml Final    Glucose 03/07/2023 83  65 - 99 mg/dL Final    BUN 03/07/2023 7  6 - 20 mg/dL Final    Creatinine 03/07/2023 0.80  0.57 - 1.00 mg/dL Final    Sodium 03/07/2023 139  136 - 145 mmol/L Final    Potassium 03/07/2023 4.4  3.5 - 5.2 mmol/L Final    Chloride 03/07/2023 102  98 - 107 mmol/L Final    CO2 03/07/2023 25.3  22.0 - 29.0 mmol/L Final    Calcium 03/07/2023 9.3  8.6 - 10.5 mg/dL Final    BUN/Creatinine Ratio 03/07/2023 8.8  7.0 - 25.0 Final    Anion Gap 03/07/2023 11.7  5.0 - 15.0 mmol/L Final    eGFR 03/07/2023 102.4  >60.0 mL/min/1.73 Final    WBC 03/07/2023 13.00 (H)  3.40 - 10.80 10*3/mm3 Final    RBC 03/07/2023 4.95  3.77 - 5.28 10*6/mm3 Final    Hemoglobin 03/07/2023 13.5  12.0 - 15.9 g/dL Final    Hematocrit 03/07/2023 42.4  34.0 - 46.6 % Final    MCV 03/07/2023 85.7  79.0 - 97.0 fL Final    MCH 03/07/2023 27.3  26.6 - 33.0 pg Final    MCHC 03/07/2023 31.8  31.5 - 35.7 g/dL Final    RDW 03/07/2023 12.7  12.3 - 15.4 % Final    RDW-SD 03/07/2023 38.8  37.0 - 54.0 fl Final    MPV 03/07/2023 10.9  6.0 - 12.0 fL Final    Platelets 03/07/2023 416  140 - 450 10*3/mm3 Final    Neutrophil % 03/07/2023 65.3  42.7 - 76.0 % Final    Lymphocyte % 03/07/2023 24.5  19.6 - 45.3 % Final    Monocyte % 03/07/2023 7.3  5.0 - 12.0 % Final     Eosinophil % 03/07/2023 1.8  0.3 - 6.2 % Final    Basophil % 03/07/2023 0.7  0.0 - 1.5 % Final    Immature Grans % 03/07/2023 0.4  0.0 - 0.5 % Final    Neutrophils, Absolute 03/07/2023 8.48 (H)  1.70 - 7.00 10*3/mm3 Final    Lymphocytes, Absolute 03/07/2023 3.19 (H)  0.70 - 3.10 10*3/mm3 Final    Monocytes, Absolute 03/07/2023 0.95 (H)  0.10 - 0.90 10*3/mm3 Final    Eosinophils, Absolute 03/07/2023 0.24  0.00 - 0.40 10*3/mm3 Final    Basophils, Absolute 03/07/2023 0.09  0.00 - 0.20 10*3/mm3 Final    Immature Grans, Absolute 03/07/2023 0.05  0.00 - 0.05 10*3/mm3 Final    nRBC 03/07/2023 0.0  0.0 - 0.2 /100 WBC Final   Office Visit on 02/14/2023   Component Date Value Ref Range Status    GARDNERELLA VAGINALIS 02/14/2023 Negative  Negative Final    TRICHOMONAS VAGINALIS 02/14/2023 Negative  Negative Final    JESIKA SPECIES 02/14/2023 Negative  Negative Final    Prolactin 02/14/2023 8.11  4.79 - 23.30 ng/mL Final       EKG Results:  No orders to display       Imaging Results:  CT Abdomen Pelvis With Contrast    Result Date: 5/28/2023    1. No acute intra-abdominal or intrapelvic process. 2. Hepatic steatosis. 3. Ancillary findings as described above.     SUZY COLEMAN MD       Electronically Signed and Approved By: SUZY COLEAMN MD on 5/28/2023 at 13:21             MRI Breast Bilateral With & Without Contrast    Result Date: 3/29/2023  No MR evidence of malignancy in either breast.  No cause for her unilateral right nipple discharge is identified.  Further management should be based on clinical findings and suspicion.  Annual screening mammography beginning age 40 is recommended.  BIRADS: DIAGNOSTIC CATEGORY 1--NEGATIVE.   RECOMMENDATION(S): CLINICAL EVALUATION.    PLEASE NOTE:  A NORMAL MRI DOES NOT EXCLUDE THE POSSIBILITY OF BREAST CANCER.  A CLINICALLY SUSPICIOUS PALPABLE LUMP SHOULD BE BIOPSIED.      RAJI MORSE DO       Electronically Signed and Approved By: RAJI MORSE DO on 3/29/2023 at 18:04                Assessment & Plan   Diagnoses and all orders for this visit:    1. Generalized anxiety disorder (Primary)    2. Major depressive disorder, recurrent episode, moderate  -     lamoTRIgine (LaMICtal) 100 MG tablet; Take 0.5 tablets by mouth 2 (Two) Times a Day for 90 days.  Dispense: 30 tablet; Refill: 2    3. Insomnia due to mental disorder    4. Post traumatic stress disorder (PTSD)        Continue lamotrigine to target depression. Continue amitriptyline to target depression and anxiety. Psychotherapy for anxiety and PTSD. Continue ambien to target insomnia. Supportive psychotherapy with goal to strengthen defenses, promote problems solving, restore adaptive functioning and provide symptom relief. The therapeutic alliance was strengthened to encourage the patient to express their thoughts and feelings. Esteem building was enhanced through praise, reassurance, normalizing and encouragement. Stressors: none at this time. Coping skills were enhanced to build distress tolerance skills and emotional regulation. Coping skills utilized: Positive Distraction. Allowed patient to freely discuss issues without interruption or judgement with unconditional positive regard, active listening skills, and empathy. Current goal: Practice stress management techniques. Provided a safe, confidential environment to facilitate the development of a positive therapeutic relationship and encourage open, honest communication. Assisted patient in identifying risk factors which would indicate the need for higher level of care including thoughts to harm self or others and/or self-harming behavior and encouraged patient to contact this office, call 911, or present to the nearest emergency room should any of these events occur. Assisted patient in processing session content; acknowledged and normalized patient's thoughts, feelings, and concerns by utilizing a person-centered approach in efforts to build appropriate rapport and a positive  therapeutic relationship with open and honest communication. Patient given education on medication side effects, diagnosis/illness and relapse symptoms.  Plan to continue supportive psychotherapy in next appointment to provide symptom relief. At least 20 minutes of coping skill utilization recommended per day. 25 minutes of supportive psychotherapy. 4 weeks    Diagnoses: as above  Symptoms: as above  Functional status: good  Mental Status Exam: as above     Treatment plan: medication management and supportive psychotherapy  Prognosis: good  Progress: Continued Improvement    Visit Diagnoses:    ICD-10-CM ICD-9-CM   1. Generalized anxiety disorder  F41.1 300.02   2. Major depressive disorder, recurrent episode, moderate  F33.1 296.32   3. Insomnia due to mental disorder  F51.05 300.9     327.02   4. Post traumatic stress disorder (PTSD)  F43.10 309.81       PLAN:  Safety: No acute safety concerns  Therapy: Declines  Risk Assessment: Risk of self-harm acutely is moderate.  Risk factors include anxiety disorder, mood disorder, and recent psychosocial stressors (pandemic). Protective factors include no family history, denies access to guns/weapons, no present SI, no history of suicide attempts or self-harm in the past, minimal AODA, healthcare seeking, future orientation, willingness to engage in care.  Risk of self-harm chronically is also moderate, but could be further elevated in the event of treatment noncompliance and/or AODA.  Meds: Continue lamotrigine 50mg po bid to target mood. Risks, benefits, alternatives discussed with patient including rash, rebound depressive or manic symptoms if prompt discontinuation, GI upset, agitation, sedation/falls risk.  After discussion of these risks and benefits, patient voiced understanding and agreed to proceed. Continue amitriptyline 10 mg po qhs to target depression and anxiety. Risks, benefits, alternatives discussed with patient including sedation, dizziness, falls, GI  upset, constipation, urinary retention, dry mouth.  After discussion of these risks and benefits, the patient voiced understanding and agreed to proceed. Continue ambien 10mg po qhs to target insomnia. Risks, benefits, side effects discussed with patient including sedation, dizziness, GI upset, hallucinations, sleepwalking, sleep-eating.  After discussion of these risks and benefits, the patient voiced understanding and agreed to proceed.   Labs:  No labs at this time  Follow up: 4 weeks    Patient screened positive for depression based on a PHQ-9 score of 4 on 8/10/2023. Follow-up recommendations include: Suicide Risk Assessment performed.           TREATMENT PLAN/GOALS: Continue supportive psychotherapy efforts and medications as indicated. Treatment and medication options discussed during today's visit. Patient acknowledged and verbally consented to continue with current treatment plan and was educated on the importance of compliance with treatment and follow-up appointments.    MEDICATION ISSUES:  DEBORAH reviewed as expected.  Discussed medication options and treatment plan of prescribed medication as well as the risks, benefits, and side effects including potential falls, possible impaired driving and metabolic adversities among others. Patient is agreeable to call the office with any worsening of symptoms or onset of side effects. Patient is agreeable to call 911 or go to the nearest ER should he/she begin having SI/HI. No medication side effects or related complaints today.     MEDS ORDERED DURING VISIT:  New Medications Ordered This Visit   Medications    lamoTRIgine (LaMICtal) 100 MG tablet     Sig: Take 0.5 tablets by mouth 2 (Two) Times a Day for 90 days.     Dispense:  30 tablet     Refill:  2       Return in about 4 weeks (around 9/7/2023) for Next scheduled follow up.         This document has been electronically signed by MINI Poon  August 10, 2023 10:53 EDT    Dictated Utilizing Dragon  Dictation: Part of this note may be an electronic transcription/translation of spoken language to printed text using the Dragon Dictation System.

## 2023-08-15 ENCOUNTER — HOSPITAL ENCOUNTER (OUTPATIENT)
Dept: INFUSION THERAPY | Facility: HOSPITAL | Age: 28
Discharge: HOME | End: 2023-08-15
Admitting: NURSE PRACTITIONER
Payer: COMMERCIAL

## 2023-08-15 ENCOUNTER — TELEPHONE (OUTPATIENT)
Dept: NEUROLOGY | Facility: CLINIC | Age: 28
End: 2023-08-15
Payer: COMMERCIAL

## 2023-08-15 ENCOUNTER — PRIOR AUTHORIZATION (OUTPATIENT)
Dept: NEUROLOGY | Facility: CLINIC | Age: 28
End: 2023-08-15
Payer: COMMERCIAL

## 2023-08-15 VITALS
SYSTOLIC BLOOD PRESSURE: 124 MMHG | OXYGEN SATURATION: 100 % | BODY MASS INDEX: 30.45 KG/M2 | RESPIRATION RATE: 16 BRPM | DIASTOLIC BLOOD PRESSURE: 68 MMHG | WEIGHT: 178.35 LBS | TEMPERATURE: 98.1 F | HEIGHT: 64 IN | HEART RATE: 79 BPM

## 2023-08-15 DIAGNOSIS — G43.019 INTRACTABLE MIGRAINE WITHOUT AURA AND WITHOUT STATUS MIGRAINOSUS: Primary | ICD-10-CM

## 2023-08-15 PROCEDURE — 96365 THER/PROPH/DIAG IV INF INIT: CPT

## 2023-08-15 PROCEDURE — 25010000002 EPTINEZUMAB-JJMR 100 MG/ML SOLUTION 1 ML VIAL: Performed by: NURSE PRACTITIONER

## 2023-08-15 RX ORDER — SODIUM CHLORIDE 9 MG/ML
250 INJECTION, SOLUTION INTRAVENOUS ONCE
OUTPATIENT
Start: 2023-08-16

## 2023-08-15 RX ORDER — RIMEGEPANT SULFATE 75 MG/75MG
75 TABLET, ORALLY DISINTEGRATING ORAL DAILY PRN
Qty: 8 TABLET | Refills: 5 | Status: SHIPPED | OUTPATIENT
Start: 2023-08-15 | End: 2023-08-15 | Stop reason: SDUPTHER

## 2023-08-15 RX ORDER — SODIUM CHLORIDE 9 MG/ML
250 INJECTION, SOLUTION INTRAVENOUS ONCE
Status: DISCONTINUED | OUTPATIENT
Start: 2023-08-15 | End: 2023-08-17 | Stop reason: HOSPADM

## 2023-08-15 RX ORDER — RIMEGEPANT SULFATE 75 MG/75MG
75 TABLET, ORALLY DISINTEGRATING ORAL DAILY PRN
Qty: 8 TABLET | Refills: 5 | Status: SHIPPED | OUTPATIENT
Start: 2023-08-15

## 2023-08-15 RX ADMIN — EPTINEZUMAB-JJMR 300 MG: 100 INJECTION INTRAVENOUS at 10:00

## 2023-08-15 NOTE — TELEPHONE ENCOUNTER
Provider: GILBERTO PARRA  Caller: PATIENT  Relationship to Patient: SELF  Pharmacy: [facility]  Phone Number: [phone]  Reason for Call: PATIENT STATES THE ELITRIPTAN IS CAUSING THE SAME SIDE EFFECTS AS HER PRIOR MEDICATIONS. CAUSING TIGHTNESS AND DISCOMFORT.    WOULD LIKE TO DISCUSS UPPING THE VYEPTI INFUSIONS, SHE STATES SHE GOT HER INFUSION THIS MORNING AND HER PAPERWORK STATES SHE ONLY GOT THE 100MG RATHER THAN THE 300MG THAT HAD BEEN DISCUSSED.    PLEASE ADVISE, THANK YOU.

## 2023-08-15 NOTE — TELEPHONE ENCOUNTER
Orders are in for 300mg infusion, I don't see where she got 100mg today.  I can switch her abortive to Nurtec PRN

## 2023-08-15 NOTE — TELEPHONE ENCOUNTER
PATIENT CALLING TO ADVISE, ALL MEDICATIONS FROM THIS MORNING WERE SENT TO [facility].    PATIENT USES [facility]    PATIENT ASKING FOR ALL MEDICATIONS TO BE CANCELLED AT [facility] AND RESENT TO [facility]    THANK YOU

## 2023-08-23 ENCOUNTER — APPOINTMENT (OUTPATIENT)
Dept: GENERAL RADIOLOGY | Facility: HOSPITAL | Age: 28
End: 2023-08-23
Payer: COMMERCIAL

## 2023-08-23 ENCOUNTER — HOSPITAL ENCOUNTER (EMERGENCY)
Facility: HOSPITAL | Age: 28
Discharge: HOME | End: 2023-08-23
Attending: EMERGENCY MEDICINE
Payer: COMMERCIAL

## 2023-08-23 VITALS
BODY MASS INDEX: 30.49 KG/M2 | OXYGEN SATURATION: 97 % | WEIGHT: 178.57 LBS | TEMPERATURE: 98.8 F | HEART RATE: 85 BPM | SYSTOLIC BLOOD PRESSURE: 107 MMHG | RESPIRATION RATE: 18 BRPM | DIASTOLIC BLOOD PRESSURE: 78 MMHG | HEIGHT: 64 IN

## 2023-08-23 DIAGNOSIS — R00.2 PALPITATIONS: Primary | ICD-10-CM

## 2023-08-23 DIAGNOSIS — R00.0 SINUS TACHYCARDIA: ICD-10-CM

## 2023-08-23 LAB
ALBUMIN SERPL-MCNC: 4.1 G/DL (ref 3.5–5.2)
ALBUMIN/GLOB SERPL: 1.3 G/DL
ALP SERPL-CCNC: 91 U/L (ref 39–117)
ALT SERPL W P-5'-P-CCNC: 13 U/L (ref 1–33)
ANION GAP SERPL CALCULATED.3IONS-SCNC: 11.2 MMOL/L (ref 5–15)
AST SERPL-CCNC: 11 U/L (ref 1–32)
BASOPHILS # BLD AUTO: 0.11 10*3/MM3 (ref 0–0.2)
BASOPHILS NFR BLD AUTO: 0.6 % (ref 0–1.5)
BILIRUB SERPL-MCNC: <0.2 MG/DL (ref 0–1.2)
BILIRUB UR QL STRIP: NEGATIVE
BUN SERPL-MCNC: 8 MG/DL (ref 6–20)
BUN/CREAT SERPL: 10.5 (ref 7–25)
CALCIUM SPEC-SCNC: 9 MG/DL (ref 8.6–10.5)
CHLORIDE SERPL-SCNC: 104 MMOL/L (ref 98–107)
CLARITY UR: CLEAR
CO2 SERPL-SCNC: 21.8 MMOL/L (ref 22–29)
COLOR UR: YELLOW
CREAT SERPL-MCNC: 0.76 MG/DL (ref 0.57–1)
DEPRECATED RDW RBC AUTO: 41.2 FL (ref 37–54)
EGFRCR SERPLBLD CKD-EPI 2021: 108.3 ML/MIN/1.73
EOSINOPHIL # BLD AUTO: 0.28 10*3/MM3 (ref 0–0.4)
EOSINOPHIL NFR BLD AUTO: 1.6 % (ref 0.3–6.2)
ERYTHROCYTE [DISTWIDTH] IN BLOOD BY AUTOMATED COUNT: 13.8 % (ref 12.3–15.4)
GLOBULIN UR ELPH-MCNC: 3.1 GM/DL
GLUCOSE SERPL-MCNC: 108 MG/DL (ref 65–99)
GLUCOSE UR STRIP-MCNC: NEGATIVE MG/DL
HCT VFR BLD AUTO: 41 % (ref 34–46.6)
HGB BLD-MCNC: 13.6 G/DL (ref 12–15.9)
HGB UR QL STRIP.AUTO: NEGATIVE
HOLD SPECIMEN: NORMAL
HOLD SPECIMEN: NORMAL
IMM GRANULOCYTES # BLD AUTO: 0.08 10*3/MM3 (ref 0–0.05)
IMM GRANULOCYTES NFR BLD AUTO: 0.5 % (ref 0–0.5)
KETONES UR QL STRIP: NEGATIVE
LEUKOCYTE ESTERASE UR QL STRIP.AUTO: NEGATIVE
LYMPHOCYTES # BLD AUTO: 4.73 10*3/MM3 (ref 0.7–3.1)
LYMPHOCYTES NFR BLD AUTO: 27.8 % (ref 19.6–45.3)
MAGNESIUM SERPL-MCNC: 2.1 MG/DL (ref 1.6–2.6)
MCH RBC QN AUTO: 27.4 PG (ref 26.6–33)
MCHC RBC AUTO-ENTMCNC: 33.2 G/DL (ref 31.5–35.7)
MCV RBC AUTO: 82.7 FL (ref 79–97)
MONOCYTES # BLD AUTO: 1.3 10*3/MM3 (ref 0.1–0.9)
MONOCYTES NFR BLD AUTO: 7.7 % (ref 5–12)
NEUTROPHILS NFR BLD AUTO: 10.49 10*3/MM3 (ref 1.7–7)
NEUTROPHILS NFR BLD AUTO: 61.8 % (ref 42.7–76)
NITRITE UR QL STRIP: NEGATIVE
NRBC BLD AUTO-RTO: 0 /100 WBC (ref 0–0.2)
PH UR STRIP.AUTO: 7.5 [PH] (ref 5–8)
PLATELET # BLD AUTO: 393 10*3/MM3 (ref 140–450)
PMV BLD AUTO: 10.4 FL (ref 6–12)
POTASSIUM SERPL-SCNC: 3.8 MMOL/L (ref 3.5–5.2)
PROT SERPL-MCNC: 7.2 G/DL (ref 6–8.5)
PROT UR QL STRIP: NEGATIVE
RBC # BLD AUTO: 4.96 10*6/MM3 (ref 3.77–5.28)
SODIUM SERPL-SCNC: 137 MMOL/L (ref 136–145)
SP GR UR STRIP: 1.02 (ref 1–1.03)
T4 FREE SERPL-MCNC: 1.1 NG/DL (ref 0.93–1.7)
TROPONIN T SERPL HS-MCNC: <6 NG/L
TSH SERPL DL<=0.05 MIU/L-ACNC: 1.51 UIU/ML (ref 0.27–4.2)
UROBILINOGEN UR QL STRIP: ABNORMAL
WBC NRBC COR # BLD: 16.99 10*3/MM3 (ref 3.4–10.8)
WHOLE BLOOD HOLD COAG: NORMAL
WHOLE BLOOD HOLD SPECIMEN: NORMAL

## 2023-08-23 PROCEDURE — 93005 ELECTROCARDIOGRAM TRACING: CPT

## 2023-08-23 PROCEDURE — 80053 COMPREHEN METABOLIC PANEL: CPT

## 2023-08-23 PROCEDURE — 99284 EMERGENCY DEPT VISIT MOD MDM: CPT

## 2023-08-23 PROCEDURE — 71045 X-RAY EXAM CHEST 1 VIEW: CPT

## 2023-08-23 PROCEDURE — 93005 ELECTROCARDIOGRAM TRACING: CPT | Performed by: EMERGENCY MEDICINE

## 2023-08-23 PROCEDURE — 84439 ASSAY OF FREE THYROXINE: CPT | Performed by: EMERGENCY MEDICINE

## 2023-08-23 PROCEDURE — 85025 COMPLETE CBC W/AUTO DIFF WBC: CPT

## 2023-08-23 PROCEDURE — 84484 ASSAY OF TROPONIN QUANT: CPT

## 2023-08-23 PROCEDURE — 36415 COLL VENOUS BLD VENIPUNCTURE: CPT

## 2023-08-23 PROCEDURE — 84443 ASSAY THYROID STIM HORMONE: CPT

## 2023-08-23 PROCEDURE — 81003 URINALYSIS AUTO W/O SCOPE: CPT | Performed by: EMERGENCY MEDICINE

## 2023-08-23 PROCEDURE — 83735 ASSAY OF MAGNESIUM: CPT

## 2023-08-23 PROCEDURE — 93010 ELECTROCARDIOGRAM REPORT: CPT | Performed by: SPECIALIST

## 2023-08-23 RX ORDER — IBUPROFEN 600 MG/1
600 TABLET ORAL ONCE
Status: COMPLETED | OUTPATIENT
Start: 2023-08-23 | End: 2023-08-23

## 2023-08-23 RX ORDER — SODIUM CHLORIDE 0.9 % (FLUSH) 0.9 %
10 SYRINGE (ML) INJECTION AS NEEDED
Status: DISCONTINUED | OUTPATIENT
Start: 2023-08-23 | End: 2023-08-23 | Stop reason: HOSPADM

## 2023-08-23 RX ADMIN — IBUPROFEN 600 MG: 600 TABLET ORAL at 16:59

## 2023-08-24 ENCOUNTER — TELEPHONE (OUTPATIENT)
Dept: GASTROENTEROLOGY | Facility: CLINIC | Age: 28
End: 2023-08-24
Payer: COMMERCIAL

## 2023-08-24 LAB — QT INTERVAL: 303 MS

## 2023-08-24 NOTE — DISCHARGE INSTRUCTIONS
Please follow-up with your primary care provider and discuss the need for Holter monitor    Please follow-up with cardiology and discuss the need for Holter monitor and echocardiogram    Please avoid all stimulants including caffeine, energy drinks    Return to the emergency room immediately for chest pain, chest pressure, shortness of breath, near passing out, passing out, unusual fatigue or any new symptoms you are concerned with

## 2023-08-24 NOTE — ED PROVIDER NOTES
Time: 9:07 PM EDT  Date of encounter:  8/23/2023  Independent Historian/Clinical History and Information was obtained by:   Patient  Chief Complaint: Palpitations    History is limited by: N/A    History of Present Illness:  Patient is a 28 y.o. year old female who presents to the emergency department for evaluation of palpitations.  The patient notes that she had an acute onset of palpitations and fast rapid heart rate.  She states that it was pretty rapid all day ending around 5:00 just after arrival in the emergency room.  She describes a heart rate as fast and rapid.  She did check her pulse manually at home and thought that at times her heart rate was as high as 140.  She denied any chest pain, shortness of breath, near passing out or passing out.  The patient had no nausea, vomiting or diaphoresis.  The patient notes that she has had similar symptoms in the past but has not been sustained.  She did slightly feel lightheaded with it and did state that it was worse with standing up the patient has now been asymptomatic in emergency room for over 4 hours.  The patient denies any thyroid problems.  The patient denies any drug use.  The patient states that she does not use caffeine.    HPI    Patient Care Team  Primary Care Provider: Jaja Castillo APRN    Past Medical History:     Allergies   Allergen Reactions    Cephalexin Diarrhea    Cephalosporins GI Intolerance    Cymbalta [Duloxetine Hcl] Other (See Comments)     Jittery and insomnia    Penicillins Rash     Past Medical History:   Diagnosis Date    Acid reflux     Anxiety     Chronic pain disorder     Depression     Eczema     Fatty liver     Intractable migraine without aura and without status migrainosus 02/09/2023    Kidney stone     HX OF    Maltracking of right patella     Migraines     Panic disorder     PONV (postoperative nausea and vomiting)     GOOD RESULTS WITH SCOPALAMINE    PTSD (post-traumatic stress disorder)     WAKES UP FROM  ANESTHESIA WITH PANIC ATTACK    Seasonal allergic rhinitis 2022    Self-injurious behavior     as a teenager    Suicide attempt      Past Surgical History:   Procedure Laterality Date     SECTION      COLONOSCOPY N/A 10/11/2022    Procedure: COLONOSCOPY;  Surgeon: Dyan Griffin MD;  Location: [facility] ENDOSCOPY;  Service: Gastroenterology;  Laterality: N/A;  HEMORRHOIDS    CYSTOSCOPY      CYSTOSCOPY URETEROSCOPY Left 2023    Procedure: CYSTOSCOPY URETEROSCOPY RETROGRADE PYELOGRAM HOLMIUM LASER STENT INSERTION, left;  Surgeon: Melisa Garcia MD;  Location: [facility] MAIN OR;  Service: Urology;  Laterality: Left;    HYSTERECTOMY      KIDNEY STONE SURGERY      unspecified    KNEE ARTHROSCOPY Right 2022    Procedure: KNEE ARTHROSCOPY WITH A LATERAL RELEASE AND CHONDROPLASTY;  Surgeon: Marco A Pitts MD;  Location: [facility] OR [facility];  Service: Orthopedics;  Laterality: Right;    WISDOM TOOTH EXTRACTION       Family History   Problem Relation Age of Onset    Arthritis Mother     Restless legs syndrome Mother     Thyroid disease Father     Colon polyps Father     Diabetes Father     Drug abuse Maternal Uncle     Alcohol abuse Maternal Uncle     Cancer Maternal Grandmother     Sleep apnea Son     Malig Hyperthermia Neg Hx        Home Medications:  Prior to Admission medications    Medication Sig Start Date End Date Taking? Authorizing Provider   dicyclomine (BENTYL) 20 MG tablet Take 1 tablet by mouth Every 6 (Six) Hours As Needed (abdominal cramping). 22   Eulalia Ley APRN   esomeprazole (nexIUM) 40 MG capsule Take 1 capsule by mouth Every Morning Before Breakfast. 23   Kayla Brody APRN   lamoTRIgine (LaMICtal) 100 MG tablet Take 0.5 tablets by mouth 2 (Two) Times a Day for 90 days. 8/10/23 11/8/23  Cassius Cantrell APRN   polyethylene glycol (MIRALAX) 17 g packet Take 17 g by mouth Daily. 23   Kayla Brody APRN   Rimegepant Sulfate (Nurtec) 75  MG tablet dispersible tablet Take 1 tablet by mouth Daily As Needed (migraine). 8/15/23   Zahra Paulino APRN   sodium chloride 0.9 % solution 100 mL with Eptinezumab-jjmr 100 MG/ML solution 100 mg Infuse 100 mg into a venous catheter Every 3 (Three) Months.    Provider, MD Amrit   vitamin D3 (Vitamin D) 125 MCG (5000 UT) capsule capsule Take 1 capsule by mouth Daily. 3/8/23   Jaja Castillo APRN   zolpidem (Ambien) 10 MG tablet Take 1 tablet by mouth At Night As Needed for Sleep. 23   ÓscarCassius APRN        Social History:   Social History     Tobacco Use    Smoking status: Former     Packs/day: 0.50     Years: 15.00     Pack years: 7.50     Types: Cigarettes     Quit date: 2022     Years since quittin.5    Smokeless tobacco: Never   Vaping Use    Vaping Use: Every day    Substances: Nicotine, Flavoring    Devices: Disposable   Substance Use Topics    Alcohol use: Not Currently    Drug use: Not Currently     Types: Marijuana         Review of Systems:  Review of Systems   Constitutional:  Negative for chills, diaphoresis and fever.   HENT:  Negative for congestion, postnasal drip, rhinorrhea and sore throat.    Eyes:  Negative for photophobia.   Respiratory:  Negative for cough, chest tightness and shortness of breath.    Cardiovascular:  Positive for palpitations. Negative for chest pain and leg swelling.   Gastrointestinal:  Negative for abdominal pain, diarrhea, nausea and vomiting.   Genitourinary:  Negative for difficulty urinating, dysuria, flank pain, frequency, hematuria and urgency.   Musculoskeletal:  Negative for neck pain and neck stiffness.   Skin:  Negative for pallor and rash.   Neurological:  Positive for light-headedness. Negative for dizziness, syncope, weakness, numbness and headaches.   Hematological:  Negative for adenopathy. Does not bruise/bleed easily.   Psychiatric/Behavioral: Negative.        Physical Exam:  /77   Pulse 83   Temp 98.8 øF (37.1 øC)  "(Oral)   Resp 18   Ht 162.6 cm (64\")   Wt 81 kg (178 lb 9.2 oz)   SpO2 97%   BMI 30.65 kg/mý     Physical Exam  Vitals and nursing note reviewed.   Constitutional:       General: She is not in acute distress.     Appearance: Normal appearance. She is not ill-appearing, toxic-appearing or diaphoretic.   HENT:      Head: Normocephalic and atraumatic.      Mouth/Throat:      Mouth: Mucous membranes are moist.   Eyes:      Pupils: Pupils are equal, round, and reactive to light.   Neck:      Thyroid: Thyromegaly present. No thyroid mass or thyroid tenderness.   Cardiovascular:      Rate and Rhythm: Normal rate and regular rhythm.      Pulses: Normal pulses.           Carotid pulses are 2+ on the right side and 2+ on the left side.       Radial pulses are 2+ on the right side and 2+ on the left side.        Femoral pulses are 2+ on the right side and 2+ on the left side.       Popliteal pulses are 2+ on the right side and 2+ on the left side.        Dorsalis pedis pulses are 2+ on the right side and 2+ on the left side.        Posterior tibial pulses are 2+ on the right side and 2+ on the left side.      Heart sounds: Normal heart sounds. No murmur heard.  Pulmonary:      Effort: Pulmonary effort is normal. No accessory muscle usage, respiratory distress or retractions.      Breath sounds: Normal breath sounds. No wheezing, rhonchi or rales.   Abdominal:      General: Abdomen is flat. There is no distension.      Palpations: Abdomen is soft. There is no mass or pulsatile mass.      Tenderness: There is no abdominal tenderness. There is no right CVA tenderness, left CVA tenderness, guarding or rebound.      Comments: No rigidity   Musculoskeletal:         General: No swelling, tenderness or deformity.      Cervical back: Neck supple. No tenderness.      Right lower leg: No edema.      Left lower leg: No edema.   Skin:     General: Skin is warm and dry.      Capillary Refill: Capillary refill takes less than 2 seconds. "      Coloration: Skin is not jaundiced or pale.      Findings: No erythema.   Neurological:      General: No focal deficit present.      Mental Status: She is alert and oriented to person, place, and time. Mental status is at baseline.      Cranial Nerves: Cranial nerves 2-12 are intact. No cranial nerve deficit.      Sensory: Sensation is intact. No sensory deficit.      Motor: Motor function is intact. No weakness or pronator drift.      Coordination: Coordination is intact. Coordination normal.   Psychiatric:         Mood and Affect: Mood normal.         Behavior: Behavior normal.                Procedures:  Procedures      Medical Decision Making:      Comorbidities that affect care:    Anxiety disorder    External Notes reviewed:    None      The following orders were placed and all results were independently analyzed by me:  Orders Placed This Encounter   Procedures    XR Chest 1 View    [place] Draw    Comprehensive Metabolic Panel    Magnesium    Single High Sensitivity Troponin T    TSH    CBC Auto Differential    Urinalysis With Microscopic If Indicated (No Culture) - Urine, Clean Catch    T4, Free    NPO Diet NPO Type: Strict NPO    Undress & Gown    Continuous Pulse Oximetry    Orthostatic Vitals    Oxygen Therapy- Nasal Cannula; Titrate 1-6 LPM Per SpO2; 90 - 95%    ECG 12 Lead ED Triage Standing Order; Dysrhythmia    Insert Peripheral IV    CBC & Differential    Green Top (Gel)    Lavender Top    Gold Top - SST    Light Blue Top       Medications Given in the Emergency Department:  Medications   sodium chloride 0.9 % flush 10 mL (has no administration in time range)   ibuprofen (ADVIL,MOTRIN) tablet 600 mg (600 mg Oral Given 8/23/23 1659)        ED Course:    ED Course as of 08/23/23 2113   Wed Aug 23, 2023   1629 EKG:    Rhythm: Sinus tachycardia  Rate: 116  Intervals: Normal CA and QT interval  T-wave: Isolated nonspecific T wave inversion III, nonspecific T wave flattening  ST Segment: Nonspecific  ST depression V4, V5, V6, there is no obvious pathological ST elevation or reciprocal ST depression to suggest STEMI    EKG Comparison: EKG is changed from EKG performed May 28, 2023, as today's EKG has tachycardia present    Interpreted by me   [SD]      ED Course User Index  [SD] Dusty Henry DO       Labs:    Lab Results (last 24 hours)       Procedure Component Value Units Date/Time    CBC & Differential [[id number]]  (Abnormal) Collected: 08/23/23 1634    Specimen: Blood Updated: 08/23/23 1641    Narrative:      The following orders were created for panel order CBC & Differential.  Procedure                               Abnormality         Status                     ---------                               -----------         ------                     CBC Auto Differential[[id number]]        Abnormal            Final result                 Please view results for these tests on the individual orders.    Comprehensive Metabolic Panel [[id number]]  (Abnormal) Collected: 08/23/23 1634    Specimen: Blood Updated: 08/23/23 1700     Glucose 108 mg/dL      BUN 8 mg/dL      Creatinine 0.76 mg/dL      Sodium 137 mmol/L      Potassium 3.8 mmol/L      Chloride 104 mmol/L      CO2 21.8 mmol/L      Calcium 9.0 mg/dL      Total Protein 7.2 g/dL      Albumin 4.1 g/dL      ALT (SGPT) 13 U/L      AST (SGOT) 11 U/L      Alkaline Phosphatase 91 U/L      Total Bilirubin <0.2 mg/dL      Globulin 3.1 gm/dL      A/G Ratio 1.3 g/dL      BUN/Creatinine Ratio 10.5     Anion Gap 11.2 mmol/L      eGFR 108.3 mL/min/1.73     Narrative:      GFR Normal >60  Chronic Kidney Disease <60  Kidney Failure <15      Magnesium [[id number]]  (Normal) Collected: 08/23/23 1634    Specimen: Blood Updated: 08/23/23 1700     Magnesium 2.1 mg/dL     Single High Sensitivity Troponin T [[id number]]  (Normal) Collected: 08/23/23 1634    Specimen: Blood Updated: 08/23/23 1706     HS Troponin T <6 ng/L     Narrative:      High Sensitive Troponin T Reference  Range:  <10.0 ng/L- Negative Female for AMI  <15.0 ng/L- Negative Male for AMI  >=10 - Abnormal Female indicating possible myocardial injury.  >=15 - Abnormal Male indicating possible myocardial injury.   Clinicians would have to utilize clinical acumen, EKG, Troponin, and serial changes to determine if it is an Acute Myocardial Infarction or myocardial injury due to an underlying chronic condition.         TSH [[id number]]  (Normal) Collected: 08/23/23 1634    Specimen: Blood Updated: 08/23/23 1706     TSH 1.510 uIU/mL     CBC Auto Differential [[id number]]  (Abnormal) Collected: 08/23/23 1634    Specimen: Blood Updated: 08/23/23 1641     WBC 16.99 10*3/mm3      RBC 4.96 10*6/mm3      Hemoglobin 13.6 g/dL      Hematocrit 41.0 %      MCV 82.7 fL      MCH 27.4 pg      MCHC 33.2 g/dL      RDW 13.8 %      RDW-SD 41.2 fl      MPV 10.4 fL      Platelets 393 10*3/mm3      Neutrophil % 61.8 %      Lymphocyte % 27.8 %      Monocyte % 7.7 %      Eosinophil % 1.6 %      Basophil % 0.6 %      Immature Grans % 0.5 %      Neutrophils, Absolute 10.49 10*3/mm3      Lymphocytes, Absolute 4.73 10*3/mm3      Monocytes, Absolute 1.30 10*3/mm3      Eosinophils, Absolute 0.28 10*3/mm3      Basophils, Absolute 0.11 10*3/mm3      Immature Grans, Absolute 0.08 10*3/mm3      nRBC 0.0 /100 WBC     T4, Free [[id number]]  (Normal) Collected: 08/23/23 1634    Specimen: Blood Updated: 08/23/23 2058     Free T4 1.10 ng/dL     Narrative:      Results may be falsely increased if patient taking Biotin.      Urinalysis With Microscopic If Indicated (No Culture) - Urine, Clean Catch [[id number]]  (Abnormal) Collected: 08/23/23 1742    Specimen: Urine, Clean Catch Updated: 08/23/23 1756     Color, UA Yellow     Appearance, UA Clear     pH, UA 7.5     Specific Gravity, UA 1.020     Glucose, UA Negative     Ketones, UA Negative     Bilirubin, UA Negative     Blood, UA Negative     Protein, UA Negative     Leuk Esterase, UA Negative     Nitrite, UA Negative      Urobilinogen, UA 2.0 E.U./dL    Narrative:      Urine microscopic not indicated.             Imaging:    XR Chest 1 View    Result Date: 8/23/2023  PROCEDURE: XR CHEST 1 VW  COMPARISON: [facility], CR, CHEST PA/AP & LAT 2V, 1/28/2019, 4:07.  INDICATIONS: Dysrhythmia Triage Protocol; tachycardia  FINDINGS:  The heart mediastinal contours are stable.  There is evidence of old granulomatous disease.  No focal consolidation noted.  Osseous structures are unremarkable        1. No acute process       YUKI BOYLE MD       Electronically Signed and Approved By: YUKI BOYLE MD on 8/23/2023 at 17:03                Differential Diagnosis and Discussion:    Palpitations: Differential diagnosis includes but is not limited to anxiety, atrioventricular blocks, mitral valve disease, hypoxia, coronary artery disease, hypokalemia, anemia, fever, COPD, congestive heart failure, pericarditis, Meli-Parkinson-White syndrome, pulmonary embolism, SVT, atrial fibrillation, atrial flutter, sinus tachycardia, thyrotoxicosis, and pheochromocytoma.    All labs were reviewed and interpreted by me.  EKG was interpreted by me.    MDM  Number of Diagnoses or Management Options  Palpitations  Sinus tachycardia  Diagnosis management comments: The patient's vital signs upon arrival were stable with exception of sinus tachycardia present.    The patient's evaluation in the emergency room was unremarkable.    Patient's EKG demonstrated sinus tachycardia with a rate of 116.  However, there is no evidence of dysrhythmia or other acute abnormalities.    The patient's thyroid panel was normal and thyrotoxicosis and large storm was not present.    Patient's urinalysis was normal.    The patient had a normal high-sensitivity troponin.    The patient's CBC was reviewed and shows no abnormalities of critical concern.  Of note, there is no anemia requiring a blood transfusion and the platelet count is acceptable    The patient's CMP  was reviewed and shows no abnormalities of critical concern.  Of note, the patient's sodium and potassium are acceptable.  The patient's liver enzymes are unremarkable.  The patient's renal function including creatinine is preserved.  The patient has a normal anion gap.    The patient had a normal magnesium    The patient's heart rate returned to a normal sinus rhythm shortly after arrival.  The patient had no further tachycardia or dysrhythmia.  The patient will be referred back to their primary care physician for Holter monitor.  The patient will also be referred to cardiology to review the Holter monitor and to perform echocardiogram.  The patient will avoid all stimulants and we have discussed them.    The patient was given very specific instructions on when and why to return to the emergency room.  The patient voiced understanding and felt comfortable with the discharge instructions.  They would return to the emergency room if necessary.  The patient appears appropriate for discharge and outpatient follow-up.         Amount and/or Complexity of Data Reviewed  Clinical lab tests: reviewed  Tests in the radiology section of [facility]: reviewed  Tests in the medicine section of [facility]: reviewed  Decide to obtain previous medical records or to obtain history from someone other than the patient: yes           Social Determinants of Health:    Patient is independent, reliable, and has access to care.       Disposition and Care Coordination:    Discharged: The patient is suitable and stable for discharge with no need for consideration of observation or admission.    I have explained discharge medications and the need for follow up with the patient/caretakers. This was also printed in the discharge instructions. Patient was discharged with the following medications and follow up:      Medication List      No changes were made to your prescriptions during this visit.      Jaja Castillo, APRN  [address] [place]  [place] [address]  [phone]    On 8/24/2023  Call for appointment, discuss need to schedule Holter monitor    Dusty Nj MD  [address] [place] [address] [place] [address]  [phone]    On 8/24/2023  Sinus tachycardia and palpitation, call for appointment       Final diagnoses:   Palpitations   Sinus tachycardia        ED Disposition       ED Disposition   Discharge    Condition   Stable    Comment   --               This medical record created using voice recognition software.             Dusty Henry DO  08/23/23 2116

## 2023-08-25 ENCOUNTER — OFFICE VISIT (OUTPATIENT)
Dept: SLEEP MEDICINE | Facility: HOSPITAL | Age: 28
End: 2023-08-25
Payer: COMMERCIAL

## 2023-08-25 VITALS
DIASTOLIC BLOOD PRESSURE: 77 MMHG | BODY MASS INDEX: 30.05 KG/M2 | HEIGHT: 64 IN | HEART RATE: 108 BPM | WEIGHT: 176 LBS | SYSTOLIC BLOOD PRESSURE: 116 MMHG | OXYGEN SATURATION: 99 %

## 2023-08-25 DIAGNOSIS — G47.00 INSOMNIA, UNSPECIFIED TYPE: Primary | ICD-10-CM

## 2023-08-25 PROCEDURE — G0463 HOSPITAL OUTPT CLINIC VISIT: HCPCS

## 2023-08-25 NOTE — PROGRESS NOTES
[facility]                          Chief Complaint:   F/up insomnia and results of PSG.    History of present illness:   Subjective     27-year-old female patient with history of depression and anxiety, on Lamictal and amitriptyline. She complains about insomnia, difficulties falling asleep and staying asleep. In addition she has symptoms of snoring and occasional gasping episodes     PSG 2023: AHI 0.6; RDI 1.6; SE 89%; REM sleep 18%; N3 25%;.    She stated that she exceptionally had a good night sleep at the sleep lab.  The study was done without premedication.    She continues to complain about not getting enough sleep and having difficulties falling asleep.    Sleep schedule:  -Bedtime: 10 PM  -Sleep latency: Couple of hours.  No TV or cell phone in bed.  -Wake up time: 6:30 AM, and around 7 AM on the weekends.  -Nocturnal awakenin-5 times because of change in position.    ESS: Total score: 6     REVIEW OF SYSTEMS:   HEENT: No nasal congestion or postnasal drip   CARDIOVASCULAR: No chest pain, chest pressure or chest discomfort. No palpitations or edema.   RESPIRATORY: No shortness of breath, cough or sputum.   GASTROINTESTINAL: No abdominal bloating or reflux   NEUROLOGICAL/PSYCHOATRY: Anxiety and depression.  Morning headache.    Past Medical History:  Past Medical History:   Diagnosis Date    Acid reflux     Anxiety     Chronic pain disorder     Depression     Eczema     Fatty liver     Intractable migraine without aura and without status migrainosus 2023    Kidney stone     HX OF    Maltracking of right patella     Migraines     Panic disorder     PONV (postoperative nausea and vomiting)     GOOD RESULTS WITH SCOPALAMINE    PTSD (post-traumatic stress disorder)     WAKES UP FROM ANESTHESIA WITH PANIC ATTACK    Seasonal allergic rhinitis 2022    Self-injurious behavior     as a teenager    Suicide attempt    ,   Past Surgical History:   Procedure  Laterality Date     SECTION      COLONOSCOPY N/A 10/11/2022    Procedure: COLONOSCOPY;  Surgeon: Dyan Griffin MD;  Location: [facility];  Service: Gastroenterology;  Laterality: N/A;  HEMORRHOIDS    CYSTOSCOPY      CYSTOSCOPY URETEROSCOPY Left 2023    Procedure: CYSTOSCOPY URETEROSCOPY RETROGRADE PYELOGRAM HOLMIUM LASER STENT INSERTION, left;  Surgeon: Melisa Garcia MD;  Location: [facility] MAIN OR;  Service: Urology;  Laterality: Left;    HYSTERECTOMY      KIDNEY STONE SURGERY      unspecified    KNEE ARTHROSCOPY Right 2022    Procedure: KNEE ARTHROSCOPY WITH A LATERAL RELEASE AND CHONDROPLASTY;  Surgeon: Marco A Pitts MD;  Location: [facility] OR [facility];  Service: Orthopedics;  Laterality: Right;    WISDOM TOOTH EXTRACTION     ,   Social History     Socioeconomic History    Marital status:      Spouse name: TRISTAN    Number of children: 3   Tobacco Use    Smoking status: Former     Packs/day: 0.50     Years: 15.00     Pack years: 7.50     Types: Cigarettes     Quit date: 2022     Years since quittin.5    Smokeless tobacco: Never   Vaping Use    Vaping Use: Every day    Substances: Nicotine, Flavoring    Devices: Disposable   Substance and Sexual Activity    Alcohol use: Not Currently    Drug use: Not Currently     Types: Marijuana    Sexual activity: Defer     E-cigarette/Vaping    E-cigarette/Vaping Use Current Every Day User     Comments INST PER ANESTHESIA PROTCOL      E-cigarette/Vaping Substances    Nicotine Yes     THC No     CBD No     Flavoring Yes      E-cigarette/Vaping Devices    Disposable Yes     Pre-filled or Refillable Cartridge No     Refillable Tank No     Pre-filled Pod No          , and Allergies:  Cephalexin, Cephalosporins, Cymbalta [duloxetine hcl], and Penicillins    Medication Review:     Current Outpatient Medications:     dicyclomine (BENTYL) 20 MG tablet, Take 1 tablet by mouth Every 6 (Six) Hours As Needed (abdominal cramping)., Disp:  "36 tablet, Rfl: 0    esomeprazole (nexIUM) 40 MG capsule, Take 1 capsule by mouth Every Morning Before Breakfast., Disp: 30 capsule, Rfl: 3    lamoTRIgine (LaMICtal) 100 MG tablet, Take 0.5 tablets by mouth 2 (Two) Times a Day for 90 days., Disp: 30 tablet, Rfl: 2    polyethylene glycol (MIRALAX) 17 g packet, Take 17 g by mouth Daily., Disp: 5 packet, Rfl: 0    Rimegepant Sulfate (Nurtec) 75 MG tablet dispersible tablet, Take 1 tablet by mouth Daily As Needed (migraine)., Disp: 8 tablet, Rfl: 5    sodium chloride 0.9 % solution 100 mL with Eptinezumab-jjmr 100 MG/ML solution 100 mg, Infuse 100 mg into a venous catheter Every 3 (Three) Months., Disp: , Rfl:     vitamin D3 (Vitamin D) 125 MCG (5000 UT) capsule capsule, Take 1 capsule by mouth Daily., Disp: 90 capsule, Rfl: 3    zolpidem (Ambien) 10 MG tablet, Take 1 tablet by mouth At Night As Needed for Sleep., Disp: 30 tablet, Rfl: 0      Objective   Vital Signs:  Vitals:    08/25/23 0900   BP: 116/77   Pulse: 108   SpO2: 99%   Weight: 79.8 kg (176 lb)   Height: 162.6 cm (64.02\")     Body mass index is 30.2 kg/mý.          Physical Exam:   General Appearance:    Alert, cooperative, in no acute distress   ENMT:  Freidman score 2   Neck:  Large. Trachea midline. No thyromegaly.   Lungs:     Clear to auscultation,respirations regular, even and  unlabored    Heart:    Regular rhythm and normal rate, normal S1 and S2, no Murmur.   Abdomen:     Obese.  Soft.  No tenderness.  No HSM    Neuro:   Conscious, alert, oriented x3. Appropriate mood and affect.    Extremities:   Moves all extremities well, no edema, no cyanosis, no Redness              Diagnostic data:    Lab Results   Component Value Date    HGBA1C 4.80 02/11/2022     No results found for: CHOL, TRIG, HDL  Hemoglobin   Date Value Ref Range Status   08/23/2023 13.6 12.0 - 15.9 g/dL Final     CO2   Date Value Ref Range Status   08/23/2023 21.8 (L) 22.0 - 29.0 mmol/L Final        Assessment   Snoring, no " MICHI  Insomnia: Multifactorial.  Inadequate sleep hygiene  Obesity, BMI 30        PLAN:    Recommended nonpharmacological treatment for her insomnia.  Again we discussed sleep hygiene, meditation, relaxation techniques.  Positional therapy and weight loss are recommended for snoring.              This note was dictated utilizing Dragon dictation

## 2023-08-31 NOTE — PRE-PROCEDURE INSTRUCTIONS
"Instructed on date and arrival time of 1200. Come to entrance \"C\".  Must have  over age 18 to drive home.  May have two visitors; however, children under 12 must stay in waiting room.  Discussed diet/NPO.  May take medications as usual except for blood thinners, diabetic medications, or weight loss medications.  Bring list of medications to hospital.  Verbalized understanding of instructions given.  Instructed to call for questions or concerns.  "

## 2023-09-06 NOTE — PROGRESS NOTES
"Chief Complaint  Vitamin D Deficiency, Acne, and Obesity    Subjective          Palmira Carbajal, 28 y.o. female presents to [facility]  History of Present Illness   Patient presents today for 6-month follow-up.    She went to [facility] emergency room on 8/23/2023 for palpitations and rapid heart rate.  EKG in the ER showed sinus tachycardia, probable left atrial enlargement.  She also had lab work-up done, mildly elevated glucose, elevated WBCs which has been consistently elevated for the past 2 years.  Thyroid levels were normal.  It was noted on exam that she had thyromegaly.  Chest x-ray was normal.    Vitamin D deficiency: She was on vitamin D units daily but did not know she was supposed to continue.     Depression/insomnia and general body aches: She is following with psychiatry Cassius Cantrell.      Previous CT scan showed Calcified granuloma of lung. We will plan to monitor the calcified granulomas of lung due to patient concern, we will plan to repeat CT scan.     She has had a leukocytosis.  She has seen hematology.  Her white count was increased more in the ER.  It was also noted that she had elevated glucose level.    She is complaining of acne on her face.  She is wanting to try Accutane.       Tobacco Use: Medium Risk    Smoking Tobacco Use: Former    Smokeless Tobacco Use: Never    Passive Exposure: Not on file      Objective   Vital Signs:   /79 (BP Location: Left arm, Patient Position: Sitting, Cuff Size: Adult)   Pulse 82   Temp 97.8 °F (36.6 °C)   Ht 162.6 cm (64.02\")   Wt 79.6 kg (175 lb 6.4 oz)   SpO2 100%   BMI 30.09 kg/m²       Current Outpatient Medications:     amitriptyline (ELAVIL) 10 MG tablet, Take 1 tablet by mouth Every Night., Disp: , Rfl:     dicyclomine (BENTYL) 20 MG tablet, Take 1 tablet by mouth Every 6 (Six) Hours As Needed (abdominal cramping)., Disp: 36 tablet, Rfl: 0    esomeprazole (nexIUM) 40 MG capsule, Take 1 " capsule by mouth Every Morning Before Breakfast., Disp: 30 capsule, Rfl: 3    lamoTRIgine (LaMICtal) 100 MG tablet, Take 0.5 tablets by mouth 2 (Two) Times a Day for 90 days., Disp: 30 tablet, Rfl: 2    polyethylene glycol (MIRALAX) 17 g packet, Take 17 g by mouth Daily., Disp: 5 packet, Rfl: 0    Rimegepant Sulfate (Nurtec) 75 MG tablet dispersible tablet, Take 1 tablet by mouth Daily As Needed (migraine)., Disp: 8 tablet, Rfl: 5    sodium chloride 0.9 % solution 100 mL with Eptinezumab-jjmr 100 MG/ML solution 100 mg, Infuse 100 mg into a venous catheter Every 3 (Three) Months., Disp: , Rfl:     vitamin D3 (Vitamin D) 125 MCG (5000 UT) capsule capsule, Take 1 capsule by mouth Daily., Disp: 90 capsule, Rfl: 1    clindamycin-benzoyl peroxide (BenzaClin) 1-5 % gel, Apply 1 application  topically to the appropriate area as directed Every Night., Disp: 35 g, Rfl: 0   Past Medical History:   Diagnosis Date    Acid reflux     Anxiety     Chronic pain disorder     Depression     Eczema     Fatty liver     Intractable migraine without aura and without status migrainosus 02/09/2023    Kidney stone     HX OF    Maltracking of right patella     Migraines     Panic disorder     PONV (postoperative nausea and vomiting)     GOOD RESULTS WITH SCOPALAMINE    PTSD (post-traumatic stress disorder)     WAKES UP FROM ANESTHESIA WITH PANIC ATTACK    Seasonal allergic rhinitis 05/31/2022    Self-injurious behavior     as a teenager    Suicide attempt 2007      Physical Exam  Vitals reviewed.   Constitutional:       Appearance: Normal appearance. She is well-developed. She is obese.   Neck:      Thyroid: No thyroid mass, thyromegaly or thyroid tenderness.   Cardiovascular:      Rate and Rhythm: Normal rate and regular rhythm.      Heart sounds: No murmur heard.    No friction rub. No gallop.   Pulmonary:      Effort: Pulmonary effort is normal.      Breath sounds: Normal breath sounds. No wheezing or rhonchi.   Lymphadenopathy:       Cervical: No cervical adenopathy.   Skin:     General: Skin is warm and dry.   Neurological:      Mental Status: She is alert and oriented to person, place, and time.      Cranial Nerves: No cranial nerve deficit.   Psychiatric:         Mood and Affect: Mood and affect normal.         Behavior: Behavior normal.         Thought Content: Thought content normal. Thought content does not include homicidal or suicidal ideation.         Judgment: Judgment normal.      Result Review :   {The following data was reviewed by MINI Keating    CT Abdomen Pelvis With Contrast    Result Date: 5/28/2023    1. No acute intra-abdominal or intrapelvic process. 2. Hepatic steatosis. 3. Ancillary findings as described above.     SUZY COLEMAN MD       Electronically Signed and Approved By: SUZY COLEMAN MD on 5/28/2023 at 13:21             XR Chest 1 View    Result Date: 8/23/2023    1. No acute process       YUKI BOYLE MD       Electronically Signed and Approved By: YUKI BOYLE MD on 8/23/2023 at 17:03               Common Labs   Common labs          3/7/2023    12:24 5/28/2023    10:33 8/23/2023    16:34   Common Labs   Glucose 83  98  108    BUN 7  8  8    Creatinine 0.80  0.70  0.76    Sodium 139  129  137    Potassium 4.4  3.8  3.8    Chloride 102  95  104    Calcium 9.3  9.0  9.0    Albumin  3.9  4.1    Total Bilirubin  0.2  <0.2    Alkaline Phosphatase  97  91    AST (SGOT)  13  11    ALT (SGPT)  16  13    WBC 13.00  17.81  16.99    Hemoglobin 13.5  13.1  13.6    Hematocrit 42.4  39.6  41.0    Platelets 416  381  393      TSH   TSH          8/23/2023    16:34   TSH   TSH 1.510      VITD   Lab Results   Component Value Date    ENRZ25VB 28.7 (L) 03/07/2023     FREET4   Lab Results   Component Value Date    FREET4 1.10 08/23/2023            Assessment and Plan    Diagnoses and all orders for this visit:    1. Vitamin D deficiency (Primary)  Assessment & Plan:  I advised her that she needs to be taking vitamin D  daily.  We will recheck vitamin D today with her labs.    Orders:  -     Vitamin D,25-Hydroxy  -     vitamin D3 (Vitamin D) 125 MCG (5000 UT) capsule capsule; Take 1 capsule by mouth Daily.  Dispense: 90 capsule; Refill: 1    2. Class 1 obesity with body mass index (BMI) of 30.0 to 30.9 in adult, unspecified obesity type, unspecified whether serious comorbidity present  Assessment & Plan:  Patient's (Body mass index is 30.09 kg/m².) indicates that they are obese (BMI >30) with health conditions that include none . Weight is unchanged. BMI  is above average; BMI management plan is completed. We discussed low calorie, low carb based diet program, portion control, increasing exercise, joining a fitness center or start home based exercise program, and Weight Watchers or other Commercial based weight reduction program.     Orders:  -     Lipid Panel  -     Cancel: Insulin, Free & Total, Serum  -     Insulin, Free & Total, Serum    3. Elevated glucose level  Comments:  We will check an A1c today with her labs.  Orders:  -     Hemoglobin A1c    4. Calcified granuloma of lung  Assessment & Plan:  Will repeat CT chest.     Orders:  -     CT Chest With Contrast; Future    5. Leukocytosis, unspecified type  Comments:  I will recheck CBC today with her labs to make sure the WBCs have decreased.  Orders:  -     CBC Auto Differential    6. Acne vulgaris  Assessment & Plan:  Advised her she will have to see dermatology to be prescribe Accutane.  I will prescribe BenzaClin while she waits to get in with dermatology.    Orders:  -     clindamycin-benzoyl peroxide (BenzaClin) 1-5 % gel; Apply 1 application  topically to the appropriate area as directed Every Night.  Dispense: 35 g; Refill: 0  -     Ambulatory Referral to Dermatology        Follow Up   Return in about 3 months (around 12/7/2023) for Next scheduled follow up.  Patient was given instructions and counseling regarding her condition or for health maintenance advice. Please  see specific information pulled into the AVS if appropriate.     Parts of this note are electronic transcriptions/translations of spoken language to printed text using the Dragon Dictation system.      Jaja Castillo, MINI  09/07/2023

## 2023-09-07 ENCOUNTER — OFFICE VISIT (OUTPATIENT)
Dept: FAMILY MEDICINE CLINIC | Facility: CLINIC | Age: 28
End: 2023-09-07
Payer: COMMERCIAL

## 2023-09-07 ENCOUNTER — TELEPHONE (OUTPATIENT)
Dept: FAMILY MEDICINE CLINIC | Facility: CLINIC | Age: 28
End: 2023-09-07

## 2023-09-07 ENCOUNTER — ANESTHESIA EVENT (OUTPATIENT)
Dept: GASTROENTEROLOGY | Facility: HOSPITAL | Age: 28
End: 2023-09-07
Payer: COMMERCIAL

## 2023-09-07 VITALS
HEIGHT: 64 IN | WEIGHT: 175.4 LBS | OXYGEN SATURATION: 100 % | DIASTOLIC BLOOD PRESSURE: 79 MMHG | BODY MASS INDEX: 29.94 KG/M2 | HEART RATE: 82 BPM | TEMPERATURE: 97.8 F | SYSTOLIC BLOOD PRESSURE: 112 MMHG

## 2023-09-07 DIAGNOSIS — J84.10 CALCIFIED GRANULOMA OF LUNG: ICD-10-CM

## 2023-09-07 DIAGNOSIS — L70.0 ACNE VULGARIS: ICD-10-CM

## 2023-09-07 DIAGNOSIS — D72.829 LEUKOCYTOSIS, UNSPECIFIED TYPE: ICD-10-CM

## 2023-09-07 DIAGNOSIS — R73.09 ELEVATED GLUCOSE LEVEL: ICD-10-CM

## 2023-09-07 DIAGNOSIS — E55.9 VITAMIN D DEFICIENCY: Primary | ICD-10-CM

## 2023-09-07 DIAGNOSIS — E66.9 CLASS 1 OBESITY WITH BODY MASS INDEX (BMI) OF 30.0 TO 30.9 IN ADULT, UNSPECIFIED OBESITY TYPE, UNSPECIFIED WHETHER SERIOUS COMORBIDITY PRESENT: ICD-10-CM

## 2023-09-07 PROBLEM — E66.811 CLASS 1 OBESITY WITH BODY MASS INDEX (BMI) OF 30.0 TO 30.9 IN ADULT: Status: ACTIVE | Noted: 2023-09-07

## 2023-09-07 LAB
25(OH)D3 SERPL-MCNC: 24 NG/ML (ref 30–100)
BASOPHILS # BLD AUTO: 0.1 10*3/MM3 (ref 0–0.2)
BASOPHILS NFR BLD AUTO: 0.7 % (ref 0–1.5)
CHOLEST SERPL-MCNC: 167 MG/DL (ref 0–200)
DEPRECATED RDW RBC AUTO: 38.8 FL (ref 37–54)
EOSINOPHIL # BLD AUTO: 0.17 10*3/MM3 (ref 0–0.4)
EOSINOPHIL NFR BLD AUTO: 1.1 % (ref 0.3–6.2)
ERYTHROCYTE [DISTWIDTH] IN BLOOD BY AUTOMATED COUNT: 12.9 % (ref 12.3–15.4)
HBA1C MFR BLD: 5.4 % (ref 4.8–5.6)
HCT VFR BLD AUTO: 43.5 % (ref 34–46.6)
HDLC SERPL-MCNC: 56 MG/DL (ref 40–60)
HGB BLD-MCNC: 14.1 G/DL (ref 12–15.9)
IMM GRANULOCYTES # BLD AUTO: 0.07 10*3/MM3 (ref 0–0.05)
IMM GRANULOCYTES NFR BLD AUTO: 0.5 % (ref 0–0.5)
LDLC SERPL CALC-MCNC: 96 MG/DL (ref 0–100)
LDLC/HDLC SERPL: 1.69 {RATIO}
LYMPHOCYTES # BLD AUTO: 3.77 10*3/MM3 (ref 0.7–3.1)
LYMPHOCYTES NFR BLD AUTO: 24.9 % (ref 19.6–45.3)
MCH RBC QN AUTO: 27 PG (ref 26.6–33)
MCHC RBC AUTO-ENTMCNC: 32.4 G/DL (ref 31.5–35.7)
MCV RBC AUTO: 83.2 FL (ref 79–97)
MONOCYTES # BLD AUTO: 0.76 10*3/MM3 (ref 0.1–0.9)
MONOCYTES NFR BLD AUTO: 5 % (ref 5–12)
NEUTROPHILS NFR BLD AUTO: 10.26 10*3/MM3 (ref 1.7–7)
NEUTROPHILS NFR BLD AUTO: 67.8 % (ref 42.7–76)
NRBC BLD AUTO-RTO: 0 /100 WBC (ref 0–0.2)
PLATELET # BLD AUTO: 426 10*3/MM3 (ref 140–450)
PMV BLD AUTO: 11.5 FL (ref 6–12)
RBC # BLD AUTO: 5.23 10*6/MM3 (ref 3.77–5.28)
TRIGL SERPL-MCNC: 82 MG/DL (ref 0–150)
VLDLC SERPL-MCNC: 15 MG/DL (ref 5–40)
WBC NRBC COR # BLD: 15.13 10*3/MM3 (ref 3.4–10.8)

## 2023-09-07 PROCEDURE — 82306 VITAMIN D 25 HYDROXY: CPT | Performed by: NURSE PRACTITIONER

## 2023-09-07 PROCEDURE — 80061 LIPID PANEL: CPT | Performed by: NURSE PRACTITIONER

## 2023-09-07 PROCEDURE — 83525 ASSAY OF INSULIN: CPT | Performed by: NURSE PRACTITIONER

## 2023-09-07 PROCEDURE — 85025 COMPLETE CBC W/AUTO DIFF WBC: CPT | Performed by: NURSE PRACTITIONER

## 2023-09-07 PROCEDURE — 83036 HEMOGLOBIN GLYCOSYLATED A1C: CPT | Performed by: NURSE PRACTITIONER

## 2023-09-07 PROCEDURE — 83527 ASSAY OF INSULIN: CPT | Performed by: NURSE PRACTITIONER

## 2023-09-07 RX ORDER — AMITRIPTYLINE HYDROCHLORIDE 10 MG/1
10 TABLET, FILM COATED ORAL NIGHTLY
COMMUNITY
Start: 2023-08-29 | End: 2023-09-11 | Stop reason: SDUPTHER

## 2023-09-07 RX ORDER — CLINDAMYCIN AND BENZOYL PEROXIDE 10; 50 MG/G; MG/G
1 GEL TOPICAL NIGHTLY
Qty: 35 G | Refills: 0 | Status: SHIPPED | OUTPATIENT
Start: 2023-09-07

## 2023-09-07 NOTE — PATIENT INSTRUCTIONS
Obesity, Adult  Obesity is the condition of having too much total body fat. Being overweight or obese means that your weight is greater than what is considered healthy for your body size. Obesity is determined by a measurement called BMI (body mass index). BMI is an estimate of body fat and is calculated from height and weight. For adults, a BMI of 30 or higher is considered obese.  Obesity can lead to other health concerns and major illnesses, including:  Stroke.  Coronary artery disease (CAD).  Type 2 diabetes.  Some types of cancer, including cancers of the colon, breast, uterus, and gallbladder.  High blood pressure (hypertension).  High cholesterol.  Gallbladder stones.  Obesity can also contribute to:  Osteoarthritis.  Sleep apnea.  Infertility problems.  What are the causes?  Common causes of this condition include:  Eating daily meals that are high in calories, sugar, and fat.  Drinking high amounts of sugar-sweetened beverages, such as soft drinks.  Being born with genes that may make you more likely to become obese.  Having a medical condition that causes obesity, including:  Hypothyroidism.  Polycystic ovarian syndrome (PCOS).  Binge-eating disorder.  Cushing syndrome.  Taking certain medicines, such as steroids, antidepressants, and seizure medicines.  Not being physically active (sedentary lifestyle).  Not getting enough sleep.  What increases the risk?  The following factors may make you more likely to develop this condition:  Having a family history of obesity.  Living in an area with limited access to:  [place], recreation centers, or sidewalks.  Healthy food choices, such as grocery stores and [employer] markets.  What are the signs or symptoms?  The main sign of this condition is having too much body fat.  How is this diagnosed?  This condition is diagnosed based on:  Your BMI. If you are an adult with a BMI of 30 or higher, you are considered obese.  Your waist circumference. This measures the  distance around your waistline.  Your skinfold thickness. Your health care provider may gently pinch a fold of your skin and measure it.  You may have other tests to check for underlying conditions.  How is this treated?  Treatment for this condition often includes changing your lifestyle. Treatment may include some or all of the following:  Dietary changes. This may include developing a healthy meal plan.  Regular physical activity. This may include activity that causes your heart to beat faster (aerobic exercise) and strength training. Work with your health care provider to design an exercise program that works for you.  Medicine to help you lose weight if you are unable to lose one pound a week after six weeks of healthy eating and more physical activity.  Treating conditions that cause the obesity (underlying conditions).  Surgery. Surgical options may include gastric banding and gastric bypass. Surgery may be done if:  Other treatments have not helped to improve your condition.  You have a BMI of 40 or higher.  You have life-threatening health problems related to obesity.  Follow these instructions at home:  Eating and drinking    Follow recommendations from your health care provider about what you eat and drink. Your health care provider may advise you to:  Limit fast food, sweets, and processed snack foods.  Choose low-fat options, such as low-fat milk instead of whole milk.  Eat five or more servings of fruits or vegetables every day.  Choose healthy foods when you eat out.  Keep low-fat snacks available.  Limit sugary drinks, such as soda, fruit juice, sweetened iced tea, and flavored milk.  Drink enough water to keep your urine pale yellow.  Do not follow a fad diet. Fad diets can be unhealthy and even dangerous.  Other healthful choices include:  Eat at home more often. This gives you more control over what you eat.  Learn to read food labels. This will help you understand how much food is considered one  serving.  Learn what a healthy serving size is.  Physical activity  Exercise regularly, as told by your health care provider.  Most adults should get up to 150 minutes of moderate-intensity exercise every week.  Ask your health care provider what types of exercise are safe for you and how often you should exercise.  Warm up and stretch before being active.  Cool down and stretch after being active.  Rest between periods of activity.  Lifestyle  Work with your health care provider and a dietitian to set a weight-loss goal that is healthy and reasonable for you.  Limit your screen time.  Find ways to reward yourself that do not involve food.  Do not drink alcohol if:  Your health care provider tells you not to drink.  You are pregnant, may be pregnant, or are planning to become pregnant.  If you drink alcohol:  Limit how much you have to:  0-1 drink a day for women.  0-2 drinks a day for men.  Know how much alcohol is in your drink. In the [place] one drink equals one 12 oz bottle of beer (355 mL), one 5 oz glass of wine (148 mL), or one 1½ oz glass of hard liquor (44 mL).  General instructions  Keep a weight-loss journal to keep track of the food you eat and how much exercise you get.  Take over-the-counter and prescription medicines only as told by your health care provider.  Take vitamins and supplements only as told by your health care provider.  Consider joining a support group. Your health care provider may be able to recommend a support group.  Pay attention to your mental health as obesity can lead to depression or self esteem issues.  Keep all follow-up visits. This is important.  Contact a health care provider if:  You are unable to meet your weight-loss goal after six weeks of dietary and lifestyle changes.  You have trouble breathing.  Summary  Obesity is the condition of having too much total body fat.  Being overweight or obese means that your weight is greater than what is considered healthy for your body  size.  Work with your health care provider and a dietitian to set a weight-loss goal that is healthy and reasonable for you.  Exercise regularly, as told by your health care provider. Ask your health care provider what types of exercise are safe for you and how often you should exercise.  This information is not intended to replace advice given to you by your health care provider. Make sure you discuss any questions you have with your health care provider.  Document Revised: 07/26/2022 Document Reviewed: 07/26/2022  [employer] Patient Education © 2023 [employer]    Exercising to Lose Weight  Getting regular exercise is important for everyone. It is especially important if you are overweight. Being overweight increases your risk of heart disease, stroke, diabetes, high blood pressure, and several types of cancer. Exercising, and reducing the calories you consume, can help you lose weight and improve fitness and health.  Exercise can be moderate or vigorous intensity. To lose weight, most people need to do a certain amount of moderate or vigorous-intensity exercise each week.  How can exercise affect me?  You lose weight when you exercise enough to burn more calories than you eat. Exercise also reduces body fat and builds muscle. The more muscle you have, the more calories you burn. Exercise also:  Improves mood.  Reduces stress and tension.  Improves your overall fitness, flexibility, and endurance.  Increases bone strength.  Moderate-intensity exercise    Moderate-intensity exercise is any activity that gets you moving enough to burn at least three times more energy (calories) than if you were sitting.  Examples of moderate exercise include:  Walking a mile in 15 minutes.  Doing light yard work.  Biking at an easy pace.  Most people should get at least 150 minutes of moderate-intensity exercise a week to maintain their body weight.  Vigorous-intensity exercise  Vigorous-intensity exercise is any activity that gets  you moving enough to burn at least six times more calories than if you were sitting. When you exercise at this intensity, you should be working hard enough that you are not able to carry on a conversation.  Examples of vigorous exercise include:  Running.  Playing a team sport, such as football, basketball, and soccer.  Jumping rope.  Most people should get at least 75 minutes a week of vigorous exercise to maintain their body weight.  What actions can I take to lose weight?  The amount of exercise you need to lose weight depends on:  Your age.  The type of exercise.  Any health conditions you have.  Your overall physical ability.  Talk to your health care provider about how much exercise you need and what types of activities are safe for you.  Nutrition    Make changes to your diet as told by your health care provider or diet and nutrition specialist (dietitian). This may include:  Eating fewer calories.  Eating more protein.  Eating less unhealthy fats.  Eating a diet that includes fresh fruits and vegetables, whole grains, low-fat dairy products, and lean protein.  Avoiding foods with added fat, salt, and sugar.  Drink plenty of water while you exercise to prevent dehydration or heat stroke.  Activity  Choose an activity that you enjoy and set realistic goals. Your health care provider can help you make an exercise plan that works for you.  Exercise at a moderate or vigorous intensity most days of the week.  The intensity of exercise may vary from person to person. You can tell how intense a workout is for you by paying attention to your breathing and heartbeat. Most people will notice their breathing and heartbeat get faster with more intense exercise.  Do resistance training twice each week, such as:  Push-ups.  Sit-ups.  Lifting weights.  Using resistance bands.  Getting short amounts of exercise can be just as helpful as long, structured periods of exercise. If you have trouble finding time to exercise, try  doing these things as part of your daily routine:  Get up, stretch, and walk around every 30 minutes throughout the day.  Go for a walk during your lunch break.  Park your car farther away from your destination.  If you take public transportation, get off one stop early and walk the rest of the way.  Make phone calls while standing up and walking around.  Take the stairs instead of elevators or escalators.  Wear comfortable clothes and shoes with good support.  Do not exercise so much that you hurt yourself, feel dizzy, or get very short of breath.  Where to find more information  [place] Department of Health and Human Services: www.hhs.gov  Centers for Disease Control and Prevention: www.cdc.gov  Contact a health care provider:  Before starting a new exercise program.  If you have questions or concerns about your weight.  If you have a medical problem that keeps you from exercising.  Get help right away if:  You have any of the following while exercising:  Injury.  Dizziness.  Difficulty breathing or shortness of breath that does not go away when you stop exercising.  Chest pain.  Rapid heartbeat.  These symptoms may represent a serious problem that is an emergency. Do not wait to see if the symptoms will go away. Get medical help right away. Call your local emergency services (911 in the [place] Do not drive yourself to the hospital.  Summary  Getting regular exercise is especially important if you are overweight.  Being overweight increases your risk of heart disease, stroke, diabetes, high blood pressure, and several types of cancer.  Losing weight happens when you burn more calories than you eat.  Reducing the amount of calories you eat, and getting regular moderate or vigorous exercise each week, helps you lose weight.  This information is not intended to replace advice given to you by your health care provider. Make sure you discuss any questions you have with your health care provider.  Document Revised:  02/13/2022 Document Reviewed: 02/13/2022  [employer] Patient Education © 2023 [employer]    MyPlate from [employer]    MyPlate is an outline of a general healthy diet based on the Dietary Guidelines for Americans, 7644-2250, from the [place]. Department of Agriculture (USDA). It sets guidelines for how much food you should eat from each food group based on your age, sex, and level of physical activity.  What are tips for following MyPlate?  To follow MyPlate recommendations:  Eat a wide variety of fruits and vegetables, grains, and protein foods.  Serve smaller portions and eat less food throughout the day.  Limit portion sizes to avoid overeating.  Enjoy your food.  Get at least 150 minutes of exercise every week. This is about 30 minutes each day, 5 or more days per week.  It can be difficult to have every meal look like MyPlate. Think about MyPlate as eating guidelines for an entire day, rather than each individual meal.  Fruits and vegetables  Make one half of your plate fruits and vegetables.  Eat many different colors of fruits and vegetables each day.  For a 2,000-calorie daily food plan, eat:  2½ cups of vegetables every day.  2 cups of fruit every day.  1 cup is equal to:  1 cup raw or cooked vegetables.  1 cup raw fruit.  1 medium-sized orange, apple, or banana.  1 cup 100% fruit or vegetable juice.  2 cups raw leafy greens, such as lettuce, spinach, or kale.  ½ cup dried fruit.  Grains  One fourth of your plate should be grains.  Make at least half of the grains you eat each day whole grains.  For a 2,000-calorie daily food plan, eat 6 oz of grains every day.  1 oz is equal to:  1 slice bread.  1 cup cereal.  ½ cup cooked rice, cereal, or pasta.  Protein  One fourth of your plate should be protein.  Eat a wide variety of protein foods, including meat, poultry, fish, eggs, beans, nuts, and tofu.  For a 2,000-calorie daily food plan, eat 5½ oz of protein every day.  1 oz is equal to:  1 oz meat, poultry, or fish.  ¼  cup cooked beans.  1 egg.  ½ oz nuts or seeds.  1 Tbsp peanut butter.  Dairy  Drink fat-free or low-fat (1%) milk.  Eat or drink dairy as a side to meals.  For a 2,000-calorie daily food plan, eat or drink 3 cups of dairy every day.  1 cup is equal to:  1 cup milk, yogurt, cottage cheese, or soy milk (soy beverage).  2 oz processed cheese.  1½ oz natural cheese.  Fats, oils, salt, and sugars  Only small amounts of oils are recommended.  Avoid foods that are high in calories and low in nutritional value (empty calories), like foods high in fat or added sugars.  Choose foods that are low in salt (sodium). Choose foods that have less than 140 milligrams (mg) of sodium per serving.  Drink water instead of sugary drinks. Drink enough fluid to keep your urine pale yellow.  Where to find support  Work with your health care provider or a dietitian to develop a customized eating plan that is right for you.  Download an phil (mobile application) to help you track your daily food intake.  Where to find more information  USDA: ChooseMyPlate.gov  Summary  MyPlate is a general guideline for healthy eating from the USDA. It is based on the Dietary Guidelines for Americans, 4912-1721.  In general, fruits and vegetables should take up one half of your plate, grains should take up one fourth of your plate, and protein should take up one fourth of your plate.  This information is not intended to replace advice given to you by your health care provider. Make sure you discuss any questions you have with your health care provider.  Document Revised: 11/08/2021 Document Reviewed: 11/08/2021  Elsevier Patient Education © 2023 Elsevier Inc.

## 2023-09-07 NOTE — TELEPHONE ENCOUNTER
Caller: Palmira Carbajal    Relationship: Self    Best call back number: [phone]         What specialty or service is being requested: DERMATOLOGY      What is the office location:        [facility] [place]     What is the office phone number:  [phone]    Any additional details:     THE PATIENT SAID SHE WAS ABLE TO SCHEDULE AN APPOINTMENT WITH  [facility]. SHE SAID THEY SAID THEY WERE ABLE TO SCHEDULE HER WITHOUT A REFERRAL BUT ONE CAN BE SENT TO THEM SO IT CAN BE ON FILE. SHE SAID TO CANCEL THE REFERRAL THAT WAS SENT TO [place]

## 2023-09-07 NOTE — TELEPHONE ENCOUNTER
You can send the referral to them, I did not put for a little while just put referral to dermatology.

## 2023-09-08 ENCOUNTER — ANESTHESIA (OUTPATIENT)
Dept: GASTROENTEROLOGY | Facility: HOSPITAL | Age: 28
End: 2023-09-08
Payer: COMMERCIAL

## 2023-09-08 ENCOUNTER — HOSPITAL ENCOUNTER (OUTPATIENT)
Facility: HOSPITAL | Age: 28
Setting detail: HOSPITAL OUTPATIENT SURGERY
Discharge: HOME | End: 2023-09-08
Attending: INTERNAL MEDICINE | Admitting: INTERNAL MEDICINE
Payer: COMMERCIAL

## 2023-09-08 VITALS
OXYGEN SATURATION: 100 % | WEIGHT: 174.6 LBS | SYSTOLIC BLOOD PRESSURE: 121 MMHG | TEMPERATURE: 97.8 F | HEART RATE: 79 BPM | RESPIRATION RATE: 21 BRPM | DIASTOLIC BLOOD PRESSURE: 67 MMHG | BODY MASS INDEX: 29.95 KG/M2

## 2023-09-08 DIAGNOSIS — R11.2 NAUSEA AND VOMITING, UNSPECIFIED VOMITING TYPE: ICD-10-CM

## 2023-09-08 DIAGNOSIS — R10.13 DYSPEPSIA: ICD-10-CM

## 2023-09-08 DIAGNOSIS — R10.13 EPIGASTRIC PAIN: ICD-10-CM

## 2023-09-08 PROCEDURE — 43239 EGD BIOPSY SINGLE/MULTIPLE: CPT | Performed by: INTERNAL MEDICINE

## 2023-09-08 PROCEDURE — 25010000002 PROPOFOL 10 MG/ML EMULSION: Performed by: NURSE ANESTHETIST, CERTIFIED REGISTERED

## 2023-09-08 PROCEDURE — 88305 TISSUE EXAM BY PATHOLOGIST: CPT | Performed by: INTERNAL MEDICINE

## 2023-09-08 RX ORDER — LIDOCAINE HYDROCHLORIDE 20 MG/ML
INJECTION, SOLUTION EPIDURAL; INFILTRATION; INTRACAUDAL; PERINEURAL AS NEEDED
Status: DISCONTINUED | OUTPATIENT
Start: 2023-09-08 | End: 2023-09-08 | Stop reason: SURG

## 2023-09-08 RX ORDER — PROPOFOL 10 MG/ML
VIAL (ML) INTRAVENOUS AS NEEDED
Status: DISCONTINUED | OUTPATIENT
Start: 2023-09-08 | End: 2023-09-08 | Stop reason: SURG

## 2023-09-08 RX ORDER — SODIUM CHLORIDE, SODIUM LACTATE, POTASSIUM CHLORIDE, CALCIUM CHLORIDE 600; 310; 30; 20 MG/100ML; MG/100ML; MG/100ML; MG/100ML
30 INJECTION, SOLUTION INTRAVENOUS CONTINUOUS
Status: DISCONTINUED | OUTPATIENT
Start: 2023-09-08 | End: 2023-09-08 | Stop reason: HOSPADM

## 2023-09-08 RX ADMIN — PROPOFOL 200 MCG/KG/MIN: 10 INJECTION, EMULSION INTRAVENOUS at 14:01

## 2023-09-08 RX ADMIN — PROPOFOL 100 MG: 10 INJECTION, EMULSION INTRAVENOUS at 14:01

## 2023-09-08 RX ADMIN — SODIUM CHLORIDE, POTASSIUM CHLORIDE, SODIUM LACTATE AND CALCIUM CHLORIDE 30 ML/HR: 600; 310; 30; 20 INJECTION, SOLUTION INTRAVENOUS at 12:40

## 2023-09-08 RX ADMIN — LIDOCAINE HYDROCHLORIDE 100 MG: 20 INJECTION, SOLUTION EPIDURAL; INFILTRATION; INTRACAUDAL; PERINEURAL at 14:01

## 2023-09-08 NOTE — ASSESSMENT & PLAN NOTE
Patient's (Body mass index is 30.09 kg/m².) indicates that they are obese (BMI >30) with health conditions that include none . Weight is unchanged. BMI  is above average; BMI management plan is completed. We discussed low calorie, low carb based diet program, portion control, increasing exercise, joining a fitness center or start home based exercise program, and Weight Watchers or other Commercial based weight reduction program.

## 2023-09-08 NOTE — ASSESSMENT & PLAN NOTE
I advised her that she needs to be taking vitamin D daily.  We will recheck vitamin D today with her labs.

## 2023-09-08 NOTE — ANESTHESIA PREPROCEDURE EVALUATION
Anesthesia Evaluation     history of anesthetic complications:  PONV  NPO Solid Status: > 8 hours  NPO Liquid Status: > 2 hours           Airway   Mallampati: II  TM distance: >3 FB  Neck ROM: full  No difficulty expected  Dental    (+) poor dentition    Pulmonary - normal exam   Cardiovascular - normal exam        Neuro/Psych  (+) headaches, psychiatric history PTSD  GI/Hepatic/Renal/Endo    (+) obesity, morbid obesity, GERD well controlled, GI bleeding , liver disease, renal disease    Musculoskeletal     Abdominal    Substance History      OB/GYN          Other   arthritis,                   Anesthesia Plan    ASA 2     general   total IV anesthesia  intravenous induction     Anesthetic plan, risks, benefits, and alternatives have been provided, discussed and informed consent has been obtained with: patient.  Pre-procedure education provided  Plan discussed with CRNA.    CODE STATUS:

## 2023-09-08 NOTE — H&P
Pre Procedure History & Physical    Chief Complaint:   Nausea, vomiting, epigastric pain, dyspepsia    Subjective     HPI:   27 yo F here for eval of nausea, vomiting, epigastric pain, dyspepsia.    Past Medical History:   Past Medical History:   Diagnosis Date    Acid reflux     Anxiety     Chronic pain disorder     Depression     Eczema     Fatty liver     Intractable migraine without aura and without status migrainosus 2023    Kidney stone     HX OF    Maltracking of right patella     Migraines     Panic disorder     PONV (postoperative nausea and vomiting)     GOOD RESULTS WITH SCOPALAMINE    PTSD (post-traumatic stress disorder)     WAKES UP FROM ANESTHESIA WITH PANIC ATTACK    Seasonal allergic rhinitis 2022    Self-injurious behavior     as a teenager    Suicide attempt        Past Surgical History:  Past Surgical History:   Procedure Laterality Date     SECTION      COLONOSCOPY N/A 10/11/2022    Procedure: COLONOSCOPY;  Surgeon: Dyan Griffin MD;  Location: [facility] ENDOSCOPY;  Service: Gastroenterology;  Laterality: N/A;  HEMORRHOIDS    CYSTOSCOPY      CYSTOSCOPY URETEROSCOPY Left 2023    Procedure: CYSTOSCOPY URETEROSCOPY RETROGRADE PYELOGRAM HOLMIUM LASER STENT INSERTION, left;  Surgeon: Melisa Garcia MD;  Location: [facility] MAIN OR;  Service: Urology;  Laterality: Left;    HYSTERECTOMY      KIDNEY STONE SURGERY      unspecified    KNEE ARTHROSCOPY Right 2022    Procedure: KNEE ARTHROSCOPY WITH A LATERAL RELEASE AND CHONDROPLASTY;  Surgeon: Marco A Pitts MD;  [place] [facility] [place] [facility] [place];  Service: Orthopedics;  Laterality: Right;    WISDOM TOOTH EXTRACTION         Family History:  Family History   Problem Relation Age of Onset    Arthritis Mother     Restless legs syndrome Mother     Thyroid disease Father     Colon polyps Father     Diabetes Father     Drug abuse Maternal Uncle     Alcohol abuse Maternal Uncle     Cancer Maternal Grandmother     Sleep apnea  Son     Bo Hyperthermia Neg Hx        Social History:   reports that she quit smoking about 18 months ago. Her smoking use included cigarettes. She has a 7.50 pack-year smoking history. She has never used smokeless tobacco. She reports that she does not currently use alcohol. She reports that she does not currently use drugs after having used the following drugs: Marijuana.    Medications:   Medications Prior to Admission   Medication Sig Dispense Refill Last Dose    amitriptyline (ELAVIL) 10 MG tablet Take 1 tablet by mouth Every Night.   9/7/2023    esomeprazole (nexIUM) 40 MG capsule Take 1 capsule by mouth Every Morning Before Breakfast. 30 capsule 3 9/7/2023    lamoTRIgine (LaMICtal) 100 MG tablet Take 0.5 tablets by mouth 2 (Two) Times a Day for 90 days. 30 tablet 2 9/7/2023    Rimegepant Sulfate (Nurtec) 75 MG tablet dispersible tablet Take 1 tablet by mouth Daily As Needed (migraine). 8 tablet 5 Past Month    sodium chloride 0.9 % solution 100 mL with Eptinezumab-jjmr 100 MG/ML solution 100 mg Infuse 100 mg into a venous catheter Every 3 (Three) Months.   Past Month    vitamin D3 (Vitamin D) 125 MCG (5000 UT) capsule capsule Take 1 capsule by mouth Daily. 90 capsule 1 9/7/2023    clindamycin-benzoyl peroxide (BenzaClin) 1-5 % gel Apply 1 application  topically to the appropriate area as directed Every Night. 35 g 0     dicyclomine (BENTYL) 20 MG tablet Take 1 tablet by mouth Every 6 (Six) Hours As Needed (abdominal cramping). 36 tablet 0 More than a month    polyethylene glycol (MIRALAX) 17 g packet Take 17 g by mouth Daily. 5 packet 0        Allergies:  Cephalexin, Cephalosporins, Cymbalta [duloxetine hcl], and Penicillins    ROS:    Pertinent items are noted in HPI     Objective     Blood pressure 120/76, pulse 80, temperature 98.1 °F (36.7 °C), temperature source Temporal, resp. rate 16, weight 79.2 kg (174 lb 9.7 oz), SpO2 98 %, not currently breastfeeding.    Physical Exam   Constitutional: Pt is  oriented to person, place, and time and well-developed, well-nourished, and in no distress.   Mouth/Throat: Oropharynx is clear and moist.   Neck: Normal range of motion.   Cardiovascular: Normal rate, regular rhythm and normal heart sounds.    Pulmonary/Chest: Effort normal and breath sounds normal.   Abdominal: Soft. Nontender  Skin: Skin is warm and dry.   Psychiatric: Mood, memory, affect and judgment normal.     Assessment & Plan     Diagnosis:  Nausea, vomiting, epigastric pain, dyspepsia    Anticipated Surgical Procedure:  EGD    The risks, benefits, and alternatives of this procedure have been discussed with the patient or the responsible party- the patient understands and agrees to proceed.

## 2023-09-08 NOTE — ANESTHESIA POSTPROCEDURE EVALUATION
Patient: Palmira Carbajal    Procedure Summary       Date: 09/08/23 Room / Location: [facility] ENDOSCOPY 4 / [facility] ENDOSCOPY    Anesthesia Start: 1358 Anesthesia Stop: 1423    Procedure: ESOPHAGOGASTRODUODENOSCOPY WITH BIOPSIES Diagnosis:       Epigastric pain      Dyspepsia      Nausea and vomiting, unspecified vomiting type      (Epigastric pain [R10.13])      (Dyspepsia [R10.13])      (Nausea and vomiting, unspecified vomiting type [R11.2])    Surgeons: Dyan Griffin MD Provider: Dat Rawls CRNA    Anesthesia Type: general ASA Status: 2            Anesthesia Type: general    Vitals  Vitals Value Taken Time   /67 09/08/23 1437   Temp 36.6 °C (97.8 °F) 09/08/23 1437   Pulse 79 09/08/23 1437   Resp 21 09/08/23 1437   SpO2 100 % 09/08/23 1437           Post Anesthesia Care and Evaluation    Patient location during evaluation: bedside  Patient participation: complete - patient participated  Level of consciousness: awake  Pain management: adequate    Airway patency: patent  Anesthetic complications: No anesthetic complications  PONV Status: controlled  Cardiovascular status: acceptable and stable  Respiratory status: acceptable

## 2023-09-08 NOTE — ANESTHESIA POSTPROCEDURE EVALUATION
Patient: Palmira Carbajal    Procedure Summary       Date: 09/08/23 Room / Location: [facility] ENDOSCOPY 4 / [facility] ENDOSCOPY    Anesthesia Start: 1358 Anesthesia Stop: 1423    Procedure: ESOPHAGOGASTRODUODENOSCOPY WITH BIOPSIES Diagnosis:       Epigastric pain      Dyspepsia      Nausea and vomiting, unspecified vomiting type      (Epigastric pain [R10.13])      (Dyspepsia [R10.13])      (Nausea and vomiting, unspecified vomiting type [R11.2])    Surgeons: Dyan Griffin MD Provider: Dat Rawls CRNA    Anesthesia Type: general ASA Status: 2            Anesthesia Type: general    Vitals  Vitals Value Taken Time   /67 09/08/23 1437   Temp 36.6 °C (97.8 °F) 09/08/23 1437   Pulse 79 09/08/23 1437   Resp 21 09/08/23 1437   SpO2 100 % 09/08/23 1437           Post Anesthesia Care and Evaluation    Post-procedure mental status: acceptable.  Pain management: satisfactory to patient    Airway patency: patent  Anesthetic complications: No anesthetic complications    Cardiovascular status: acceptable  Respiratory status: acceptable    Comments: Per chart review

## 2023-09-08 NOTE — ASSESSMENT & PLAN NOTE
Advised her she will have to see dermatology to be prescribe Accutane.  I will prescribe BenzaClin while she waits to get in with dermatology.

## 2023-09-11 ENCOUNTER — TELEMEDICINE (OUTPATIENT)
Dept: PSYCHIATRY | Facility: CLINIC | Age: 28
End: 2023-09-11
Payer: COMMERCIAL

## 2023-09-11 DIAGNOSIS — F33.1 MAJOR DEPRESSIVE DISORDER, RECURRENT EPISODE, MODERATE: Primary | ICD-10-CM

## 2023-09-11 DIAGNOSIS — F51.05 INSOMNIA DUE TO MENTAL DISORDER: ICD-10-CM

## 2023-09-11 DIAGNOSIS — F41.1 GENERALIZED ANXIETY DISORDER: ICD-10-CM

## 2023-09-11 DIAGNOSIS — F43.10 POST TRAUMATIC STRESS DISORDER (PTSD): ICD-10-CM

## 2023-09-11 RX ORDER — AMITRIPTYLINE HYDROCHLORIDE 10 MG/1
10 TABLET, FILM COATED ORAL NIGHTLY
Qty: 30 TABLET | Refills: 2 | Status: SHIPPED | OUTPATIENT
Start: 2023-09-11

## 2023-09-11 NOTE — PROGRESS NOTES
Subjective   Palmira Carbajal is a 28 y.o. female who presents today for follow up. Mode of visit: Video  Location of provider: Home  Location of patient: Home  Does the patient consent to use a video/audio connection for your medical care today? Yes  The visit included audio and video interaction. No technical issues occurred during this visit.    Referring Provider:  No referring provider defined for this encounter.    Chief Complaint:  Depression, anxiety    History of Present Illness:     Depression/mood: has improved  Things going good at home  Anxiety: has been high at times  Excessive worries at times  Working on positive coping skills  PTSD: denies s/sx   Sleep disturbance: denies  Low energy: denies  Substance use: denies   Medication compliant  Side effects: denies  Refills needed    Access to Firearms: Denies    PHQ-9 Depression Screening  PHQ-9 Total Score: (P) 9    Little interest or pleasure in doing things? (P) 0-->not at all   Feeling down, depressed, or hopeless? (P) 1-->several days   Trouble falling or staying asleep, or sleeping too much? (P) 3-->nearly every day   Feeling tired or having little energy? (P) 3-->nearly every day   Poor appetite or overeating? (P) 1-->several days   Feeling bad about yourself - or that you are a failure or have let yourself or your family down? (P) 1-->several days   Trouble concentrating on things, such as reading the newspaper or watching television? (P) 0-->not at all   Moving or speaking so slowly that other people could have noticed? Or the opposite - being so fidgety or restless that you have been moving around a lot more than usual? (P) 0-->not at all   Thoughts that you would be better off dead, or of hurting yourself in some way? (P) 0-->not at all   PHQ-9 Total Score (P) 9     STACY-7  Feeling nervous, anxious or on edge: (P) Several days  Not being able to stop or control worrying: (P) Nearly every day  Worrying too much about different things: (P) More  than half the days  Trouble Relaxing: (P) Several days  Being so restless that it is hard to sit still: (P) Not at all  Feeling afraid as if something awful might happen: (P) Several days  Becoming easily annoyed or irritable: (P) Not at all  STACY 7 Total Score: (P) 8  If you checked any problems, how difficult have these problems made it for you to do your work, take care of things at home, or get along with other people: (P) Somewhat difficult    Past Surgical History:  Past Surgical History:   Procedure Laterality Date     SECTION      COLONOSCOPY N/A 10/11/2022    Procedure: COLONOSCOPY;  Surgeon: Dyan Griffin MD;  Location: [facility] ENDOSCOPY;  Service: Gastroenterology;  Laterality: N/A;  HEMORRHOIDS    CYSTOSCOPY      CYSTOSCOPY URETEROSCOPY Left 2023    Procedure: CYSTOSCOPY URETEROSCOPY RETROGRADE PYELOGRAM HOLMIUM LASER STENT INSERTION, left;  Surgeon: Melisa Garcia MD;  Location: [facility] MAIN OR;  Service: Urology;  Laterality: Left;    ENDOSCOPY N/A 2023    Procedure: ESOPHAGOGASTRODUODENOSCOPY WITH BIOPSIES;  Surgeon: Dyan Griffin MD;  Location: [facility] ENDOSCOPY;  Service: Gastroenterology;  Laterality: N/A;  ESOPHAGITIS, GASTRITIS    HYSTERECTOMY      KIDNEY STONE SURGERY      unspecified    KNEE ARTHROSCOPY Right 2022    Procedure: KNEE ARTHROSCOPY WITH A LATERAL RELEASE AND CHONDROPLASTY;  Surgeon: Marco A Pitts MD;  Location: [facility] OR [facility];  Service: Orthopedics;  Laterality: Right;    WISDOM TOOTH EXTRACTION         Problem List:  Patient Active Problem List   Diagnosis    Maltracking of right patella    Impingement syndrome involving patellar fat pad of right knee    Chondromalacia    Patellar malalignment syndrome of right knee    Binocular vision disorder with diplopia    Generalized anxiety disorder    Acid reflux    PTSD (post-traumatic stress disorder)    Kidney stone on left side    Seasonal allergic rhinitis    Burn of finger and thumb of  right hand, second degree    Hemorrhoids    Right hand pain    Rectal bleeding    Anal or rectal pain    Decreased appetite    Aftercare following surgery of right knee arthroscopic chondroplasty and lateral release, 7/11/2022    Generalized body aches    Epigastric pain    Female hirsutism    Right ovarian cyst    Moderate episode of recurrent major depressive disorder    Primary insomnia    Vitamin D deficiency    Intractable migraine without aura and without status migrainosus    Calcified granuloma of lung    Nipple discharge    Nausea and vomiting    Class 1 obesity with body mass index (BMI) of 30.0 to 30.9 in adult    Acne vulgaris       Allergy:   Allergies   Allergen Reactions    Cephalexin Diarrhea    Cephalosporins GI Intolerance    Cymbalta [Duloxetine Hcl] Other (See Comments)     Jittery and insomnia    Penicillins Rash        Discontinued Medications:  Medications Discontinued During This Encounter   Medication Reason    amitriptyline (ELAVIL) 10 MG tablet Reorder       Current Medications:   Current Outpatient Medications   Medication Sig Dispense Refill    amitriptyline (ELAVIL) 10 MG tablet Take 1 tablet by mouth Every Night. 30 tablet 2    clindamycin-benzoyl peroxide (BenzaClin) 1-5 % gel Apply 1 application  topically to the appropriate area as directed Every Night. 35 g 0    dicyclomine (BENTYL) 20 MG tablet Take 1 tablet by mouth Every 6 (Six) Hours As Needed (abdominal cramping). 36 tablet 0    esomeprazole (nexIUM) 40 MG capsule Take 1 capsule by mouth Every Morning Before Breakfast. 30 capsule 3    lamoTRIgine (LaMICtal) 100 MG tablet Take 0.5 tablets by mouth 2 (Two) Times a Day for 90 days. 30 tablet 2    polyethylene glycol (MIRALAX) 17 g packet Take 17 g by mouth Daily. 5 packet 0    Rimegepant Sulfate (Nurtec) 75 MG tablet dispersible tablet Take 1 tablet by mouth Daily As Needed (migraine). 8 tablet 5    sodium chloride 0.9 % solution 100 mL with Eptinezumab-jjmr 100 MG/ML solution  100 mg Infuse 100 mg into a venous catheter Every 3 (Three) Months.      vitamin D3 (Vitamin D) 125 MCG (5000 UT) capsule capsule Take 1 capsule by mouth Daily. 90 capsule 1     No current facility-administered medications for this visit.       Past Medical History:  Past Medical History:   Diagnosis Date    Acid reflux     Anxiety     Chronic pain disorder     Depression     Eczema     Fatty liver     Intractable migraine without aura and without status migrainosus 2023    Kidney stone     HX OF    Maltracking of right patella     Migraines     Panic disorder     PONV (postoperative nausea and vomiting)     GOOD RESULTS WITH SCOPALAMINE    PTSD (post-traumatic stress disorder)     WAKES UP FROM ANESTHESIA WITH PANIC ATTACK    Seasonal allergic rhinitis 2022    Self-injurious behavior     as a teenager    Suicide attempt        Past Psychiatric History:  Began Treatment: Age 12  Diagnoses: Depression, anxiety, PTSD  Psychiatrist: [facility] previously  Therapist: [facility]t previously  Admission History: [facility] previously  Medication Trials: Zolft, paxil, prozac, lexapro, desvenlafaxine, effexor, cymbalta, quetiapine, trazodone, hydroxyzine, ambien   Self Harm: Hx cutting as teenager  Suicide Attempts: Overdose age 12     Substance Abuse History:   Types: Denies  Withdrawal Symptoms: Not applicable  Longest Period Sober: Not applicable  AA: Not applicable     Social History:  Martial Status:   Employed: Not currently  Kids: Three, ages 9, 6 and 3  House: With  and children   History: Denies    Social History     Socioeconomic History    Marital status:      Spouse name: TRISTAN    Number of children: 3   Tobacco Use    Smoking status: Former     Packs/day: 0.50     Years: 15.00     Pack years: 7.50     Types: Cigarettes     Quit date: 2022     Years since quittin.5    Smokeless tobacco: Never   Vaping Use    Vaping Use: Every  day    Substances: Nicotine, Flavoring    Devices: Disposable   Substance and Sexual Activity    Alcohol use: Not Currently    Drug use: Not Currently     Types: Marijuana    Sexual activity: Defer       Family History   Problem Relation Age of Onset    Arthritis Mother     Restless legs syndrome Mother     Thyroid disease Father     Colon polyps Father     Diabetes Father     Drug abuse Maternal Uncle     Alcohol abuse Maternal Uncle     Cancer Maternal Grandmother     Sleep apnea Son     Malig Hyperthermia Neg Hx      Mental Status Exam:   Hygiene:   good  Cooperation:  Cooperative  Eye Contact:  Good  Psychomotor Behavior:  Appropriate  Affect:  Appropriate  Mood: normal  Hopelessness: Denies  Speech:  Normal  Thought Process:  Linear  Thought Content:  Mood congruent  Suicidal:  None  Homicidal:  None  Hallucinations:  None  Delusion:  None  Memory:  Intact  Orientation:  Person, Place, Time, and Situation  Reliability:  good  Insight:  Good  Judgement:  Good  Impulse Control:  Good  Physical/Medical Issues:  No      Review of Systems:  Review of Systems   Constitutional:  Negative for appetite change, diaphoresis, fatigue and unexpected weight change.   HENT:  Negative for drooling, tinnitus and trouble swallowing.    Eyes:  Negative for visual disturbance.   Respiratory:  Negative for cough, chest tightness and shortness of breath.    Cardiovascular:  Negative for chest pain and palpitations.   Gastrointestinal:  Negative for abdominal pain, constipation, diarrhea, nausea and vomiting.   Endocrine: Negative for cold intolerance and heat intolerance.   Genitourinary:  Negative for difficulty urinating.   Musculoskeletal:  Negative for arthralgias and myalgias.   Skin:  Negative for rash.   Allergic/Immunologic: Negative for immunocompromised state.   Neurological:  Negative for dizziness, tremors, seizures and headaches.   Psychiatric/Behavioral:  Negative for agitation, dysphoric mood, hallucinations,  self-injury, sleep disturbance and suicidal ideas. The patient is nervous/anxious.      Vital Signs:   There were no vitals taken for this visit.     Lab Results:   Office Visit on 09/07/2023   Component Date Value Ref Range Status    Total Cholesterol 09/07/2023 167  0 - 200 mg/dL Final    Triglycerides 09/07/2023 82  0 - 150 mg/dL Final    HDL Cholesterol 09/07/2023 56  40 - 60 mg/dL Final    LDL Cholesterol  09/07/2023 96  0 - 100 mg/dL Final    VLDL Cholesterol 09/07/2023 15  5 - 40 mg/dL Final    LDL/HDL Ratio 09/07/2023 1.69   Final    Hemoglobin A1C 09/07/2023 5.40  4.80 - 5.60 % Final    25 Hydroxy, Vitamin D 09/07/2023 24.0 (L)  30.0 - 100.0 ng/ml Final    WBC 09/07/2023 15.13 (H)  3.40 - 10.80 10*3/mm3 Final    RBC 09/07/2023 5.23  3.77 - 5.28 10*6/mm3 Final    Hemoglobin 09/07/2023 14.1  12.0 - 15.9 g/dL Final    Hematocrit 09/07/2023 43.5  34.0 - 46.6 % Final    MCV 09/07/2023 83.2  79.0 - 97.0 fL Final    MCH 09/07/2023 27.0  26.6 - 33.0 pg Final    MCHC 09/07/2023 32.4  31.5 - 35.7 g/dL Final    RDW 09/07/2023 12.9  12.3 - 15.4 % Final    RDW-SD 09/07/2023 38.8  37.0 - 54.0 fl Final    MPV 09/07/2023 11.5  6.0 - 12.0 fL Final    Platelets 09/07/2023 426  140 - 450 10*3/mm3 Final    Neutrophil % 09/07/2023 67.8  42.7 - 76.0 % Final    Lymphocyte % 09/07/2023 24.9  19.6 - 45.3 % Final    Monocyte % 09/07/2023 5.0  5.0 - 12.0 % Final    Eosinophil % 09/07/2023 1.1  0.3 - 6.2 % Final    Basophil % 09/07/2023 0.7  0.0 - 1.5 % Final    Immature Grans % 09/07/2023 0.5  0.0 - 0.5 % Final    Neutrophils, Absolute 09/07/2023 10.26 (H)  1.70 - 7.00 10*3/mm3 Final    Lymphocytes, Absolute 09/07/2023 3.77 (H)  0.70 - 3.10 10*3/mm3 Final    Monocytes, Absolute 09/07/2023 0.76  0.10 - 0.90 10*3/mm3 Final    Eosinophils, Absolute 09/07/2023 0.17  0.00 - 0.40 10*3/mm3 Final    Basophils, Absolute 09/07/2023 0.10  0.00 - 0.20 10*3/mm3 Final    Immature Grans, Absolute 09/07/2023 0.07 (H)  0.00 - 0.05 10*3/mm3 Final     nRBC 09/07/2023 0.0  0.0 - 0.2 /100 WBC Final   Admission on 08/23/2023, Discharged on 08/23/2023   Component Date Value Ref Range Status    QT Interval 08/23/2023 303  ms Final    Glucose 08/23/2023 108 (H)  65 - 99 mg/dL Final    BUN 08/23/2023 8  6 - 20 mg/dL Final    Creatinine 08/23/2023 0.76  0.57 - 1.00 mg/dL Final    Sodium 08/23/2023 137  136 - 145 mmol/L Final    Potassium 08/23/2023 3.8  3.5 - 5.2 mmol/L Final    Chloride 08/23/2023 104  98 - 107 mmol/L Final    CO2 08/23/2023 21.8 (L)  22.0 - 29.0 mmol/L Final    Calcium 08/23/2023 9.0  8.6 - 10.5 mg/dL Final    Total Protein 08/23/2023 7.2  6.0 - 8.5 g/dL Final    Albumin 08/23/2023 4.1  3.5 - 5.2 g/dL Final    ALT (SGPT) 08/23/2023 13  1 - 33 U/L Final    AST (SGOT) 08/23/2023 11  1 - 32 U/L Final    Alkaline Phosphatase 08/23/2023 91  39 - 117 U/L Final    Total Bilirubin 08/23/2023 <0.2  0.0 - 1.2 mg/dL Final    Globulin 08/23/2023 3.1  gm/dL Final    A/G Ratio 08/23/2023 1.3  g/dL Final    BUN/Creatinine Ratio 08/23/2023 10.5  7.0 - 25.0 Final    Anion Gap 08/23/2023 11.2  5.0 - 15.0 mmol/L Final    eGFR 08/23/2023 108.3  >60.0 mL/min/1.73 Final    Magnesium 08/23/2023 2.1  1.6 - 2.6 mg/dL Final    HS Troponin T 08/23/2023 <6  <10 ng/L Final    TSH 08/23/2023 1.510  0.270 - 4.200 uIU/mL Final    Extra Tube 08/23/2023 Hold for add-ons.   Final    Auto resulted.    Extra Tube 08/23/2023 hold for add-on   Final    Auto resulted    Extra Tube 08/23/2023 Hold for add-ons.   Final    Auto resulted.    Extra Tube 08/23/2023 Hold for add-ons.   Final    Auto resulted    WBC 08/23/2023 16.99 (H)  3.40 - 10.80 10*3/mm3 Final    RBC 08/23/2023 4.96  3.77 - 5.28 10*6/mm3 Final    Hemoglobin 08/23/2023 13.6  12.0 - 15.9 g/dL Final    Hematocrit 08/23/2023 41.0  34.0 - 46.6 % Final    MCV 08/23/2023 82.7  79.0 - 97.0 fL Final    MCH 08/23/2023 27.4  26.6 - 33.0 pg Final    MCHC 08/23/2023 33.2  31.5 - 35.7 g/dL Final    RDW 08/23/2023 13.8  12.3 - 15.4 % Final     RDW-SD 08/23/2023 41.2  37.0 - 54.0 fl Final    MPV 08/23/2023 10.4  6.0 - 12.0 fL Final    Platelets 08/23/2023 393  140 - 450 10*3/mm3 Final    Neutrophil % 08/23/2023 61.8  42.7 - 76.0 % Final    Lymphocyte % 08/23/2023 27.8  19.6 - 45.3 % Final    Monocyte % 08/23/2023 7.7  5.0 - 12.0 % Final    Eosinophil % 08/23/2023 1.6  0.3 - 6.2 % Final    Basophil % 08/23/2023 0.6  0.0 - 1.5 % Final    Immature Grans % 08/23/2023 0.5  0.0 - 0.5 % Final    Neutrophils, Absolute 08/23/2023 10.49 (H)  1.70 - 7.00 10*3/mm3 Final    Lymphocytes, Absolute 08/23/2023 4.73 (H)  0.70 - 3.10 10*3/mm3 Final    Monocytes, Absolute 08/23/2023 1.30 (H)  0.10 - 0.90 10*3/mm3 Final    Eosinophils, Absolute 08/23/2023 0.28  0.00 - 0.40 10*3/mm3 Final    Basophils, Absolute 08/23/2023 0.11  0.00 - 0.20 10*3/mm3 Final    Immature Grans, Absolute 08/23/2023 0.08 (H)  0.00 - 0.05 10*3/mm3 Final    nRBC 08/23/2023 0.0  0.0 - 0.2 /100 WBC Final    Color, UA 08/23/2023 Yellow  Yellow, Straw Final    Appearance, UA 08/23/2023 Clear  Clear Final    pH, UA 08/23/2023 7.5  5.0 - 8.0 Final    Specific Gravity, UA 08/23/2023 1.020  1.005 - 1.030 Final    Glucose, UA 08/23/2023 Negative  Negative Final    Ketones, UA 08/23/2023 Negative  Negative Final    Bilirubin, UA 08/23/2023 Negative  Negative Final    Blood, UA 08/23/2023 Negative  Negative Final    Protein, UA 08/23/2023 Negative  Negative Final    Leuk Esterase, UA 08/23/2023 Negative  Negative Final    Nitrite, UA 08/23/2023 Negative  Negative Final    Urobilinogen, UA 08/23/2023 2.0 E.U./dL (A)  0.2 - 1.0 E.U./dL Final    Free T4 08/23/2023 1.10  0.93 - 1.70 ng/dL Final   Admission on 05/28/2023, Discharged on 05/28/2023   Component Date Value Ref Range Status    QT Interval 05/28/2023 390  ms Final    Glucose 05/28/2023 98  65 - 99 mg/dL Final    BUN 05/28/2023 8  6 - 20 mg/dL Final    Creatinine 05/28/2023 0.70  0.57 - 1.00 mg/dL Final    Sodium 05/28/2023 129 (L)  136 - 145 mmol/L Final     Potassium 05/28/2023 3.8  3.5 - 5.2 mmol/L Final    Chloride 05/28/2023 95 (L)  98 - 107 mmol/L Final    CO2 05/28/2023 24.2  22.0 - 29.0 mmol/L Final    Calcium 05/28/2023 9.0  8.6 - 10.5 mg/dL Final    Total Protein 05/28/2023 7.0  6.0 - 8.5 g/dL Final    Albumin 05/28/2023 3.9  3.5 - 5.2 g/dL Final    ALT (SGPT) 05/28/2023 16  1 - 33 U/L Final    AST (SGOT) 05/28/2023 13  1 - 32 U/L Final    Alkaline Phosphatase 05/28/2023 97  39 - 117 U/L Final    Total Bilirubin 05/28/2023 0.2  0.0 - 1.2 mg/dL Final    Globulin 05/28/2023 3.1  gm/dL Final    A/G Ratio 05/28/2023 1.3  g/dL Final    BUN/Creatinine Ratio 05/28/2023 11.4  7.0 - 25.0 Final    Anion Gap 05/28/2023 9.8  5.0 - 15.0 mmol/L Final    eGFR 05/28/2023 120.2  >60.0 mL/min/1.73 Final    Lipase 05/28/2023 23  13 - 60 U/L Final    HS Troponin T 05/28/2023 <6  <10 ng/L Final    Color, UA 05/28/2023 Yellow  Yellow, Straw Final    Appearance, UA 05/28/2023 Clear  Clear Final    pH, UA 05/28/2023 7.5  5.0 - 8.0 Final    Specific Gravity, UA 05/28/2023 <=1.005  1.005 - 1.030 Final    Glucose, UA 05/28/2023 Negative  Negative Final    Ketones, UA 05/28/2023 Negative  Negative Final    Bilirubin, UA 05/28/2023 Negative  Negative Final    Blood, UA 05/28/2023 Negative  Negative Final    Protein, UA 05/28/2023 Negative  Negative Final    Leuk Esterase, UA 05/28/2023 Negative  Negative Final    Nitrite, UA 05/28/2023 Negative  Negative Final    Urobilinogen, UA 05/28/2023 0.2 E.U./dL  0.2 - 1.0 E.U./dL Final    Extra Tube 05/28/2023 Hold for add-ons.   Final    Auto resulted.    Extra Tube 05/28/2023 hold for add-on   Final    Auto resulted    Extra Tube 05/28/2023 Hold for add-ons.   Final    Auto resulted.    Extra Tube 05/28/2023 Hold for add-ons.   Final    Auto resulted    WBC 05/28/2023 17.81 (H)  3.40 - 10.80 10*3/mm3 Final    RBC 05/28/2023 4.79  3.77 - 5.28 10*6/mm3 Final    Hemoglobin 05/28/2023 13.1  12.0 - 15.9 g/dL Final    Hematocrit 05/28/2023 39.6  34.0 -  46.6 % Final    MCV 05/28/2023 82.7  79.0 - 97.0 fL Final    MCH 05/28/2023 27.3  26.6 - 33.0 pg Final    MCHC 05/28/2023 33.1  31.5 - 35.7 g/dL Final    RDW 05/28/2023 13.2  12.3 - 15.4 % Final    RDW-SD 05/28/2023 39.7  37.0 - 54.0 fl Final    MPV 05/28/2023 9.9  6.0 - 12.0 fL Final    Platelets 05/28/2023 381  140 - 450 10*3/mm3 Final    Neutrophil % 05/28/2023 69.7  42.7 - 76.0 % Final    Lymphocyte % 05/28/2023 20.6  19.6 - 45.3 % Final    Monocyte % 05/28/2023 6.5  5.0 - 12.0 % Final    Eosinophil % 05/28/2023 2.2  0.3 - 6.2 % Final    Basophil % 05/28/2023 0.4  0.0 - 1.5 % Final    Immature Grans % 05/28/2023 0.6 (H)  0.0 - 0.5 % Final    Neutrophils, Absolute 05/28/2023 12.41 (H)  1.70 - 7.00 10*3/mm3 Final    Lymphocytes, Absolute 05/28/2023 3.67 (H)  0.70 - 3.10 10*3/mm3 Final    Monocytes, Absolute 05/28/2023 1.16 (H)  0.10 - 0.90 10*3/mm3 Final    Eosinophils, Absolute 05/28/2023 0.39  0.00 - 0.40 10*3/mm3 Final    Basophils, Absolute 05/28/2023 0.08  0.00 - 0.20 10*3/mm3 Final    Immature Grans, Absolute 05/28/2023 0.10 (H)  0.00 - 0.05 10*3/mm3 Final    nRBC 05/28/2023 0.0  0.0 - 0.2 /100 WBC Final   Lab on 03/14/2023   Component Date Value Ref Range Status    LDH 03/14/2023 157  135 - 214 U/L Final    Sed Rate 03/14/2023 25 (H)  0 - 20 mm/hr Final    LUDA 03/14/2023 Negative   Final                                         Negative   <1:80                                       Borderline  1:80                                       Positive   >1:80  ICAP nomenclature: AC-0  For more information about Hep-2 cell patterns use  ANApatterns.org, the official website for the International  Consensus on Antinuclear Antibody (LUDA) Patterns (ICAP).    Rheumatoid Factor Quantitative 03/14/2023 <10.0  0.0 - 14.0 IU/mL Final    Reference Lab Report 03/14/2023 See separate report   Final    Consult Global Result 03/14/2023 Comment^Text^TXT   Final    Peripheral Blood:  No significant immunophenotypic abnormality  detected.  DISCLAIMER: REFER TO HARDCOPY OR PDF FOR COMPLETE RESULT.   If synopsis provided, clinical decisions should not be   based on this interfaced synopsis alone.  Performed at:  1 - [facility]  [address], [place]  [address]  :  Mishel Echavarria M.D., Ph.D., Phone:  [phone]       EKG Results:  No orders to display       Imaging Results:  CT Abdomen Pelvis With Contrast    Result Date: 5/28/2023    1. No acute intra-abdominal or intrapelvic process. 2. Hepatic steatosis. 3. Ancillary findings as described above.     SUZY COLEMAN MD       Electronically Signed and Approved By: SUZY COLEMAN MD on 5/28/2023 at 13:21             MRI Breast Bilateral With & Without Contrast    Result Date: 3/29/2023  No MR evidence of malignancy in either breast.  No cause for her unilateral right nipple discharge is identified.  Further management should be based on clinical findings and suspicion.  Annual screening mammography beginning age 40 is recommended.  BIRADS: DIAGNOSTIC CATEGORY 1--NEGATIVE.   RECOMMENDATION(S): CLINICAL EVALUATION.    PLEASE NOTE:  A NORMAL MRI DOES NOT EXCLUDE THE POSSIBILITY OF BREAST CANCER.  A CLINICALLY SUSPICIOUS PALPABLE LUMP SHOULD BE BIOPSIED.      RAJI MORSE DO       Electronically Signed and Approved By: RAJI MORSE DO on 3/29/2023 at 18:04               Assessment & Plan   Diagnoses and all orders for this visit:    1. Major depressive disorder, recurrent episode, moderate (Primary)  -     amitriptyline (ELAVIL) 10 MG tablet; Take 1 tablet by mouth Every Night.  Dispense: 30 tablet; Refill: 2    2. Generalized anxiety disorder    3. Insomnia due to mental disorder    4. Post traumatic stress disorder (PTSD)      Continue lamotrigine to target depression. Continue amitriptyline to target depression and anxiety. Psychotherapy for anxiety and PTSD. Stopped ambien. Supportive psychotherapy with goal to strengthen defenses,  promote problems solving, restore adaptive functioning and provide symptom relief. Stressors: Physical.  Coping skills utilized: Positive Thoughts. Current goal: Write down positive interactions. Assisted patient in processing session content; acknowledged and normalized patient's thoughts, feelings, and concerns by utilizing a person-centered approach in efforts to build appropriate rapport and a positive therapeutic relationship with open and honest communication. Assisted patient in identifying risk factors which would indicate the need for higher level of care including thoughts to harm self or others and/or self-harming behavior and encouraged patient to contact this office, call 911, or present to the nearest emergency room should any of these events occur. Patient given education on medication side effects, diagnosis/illness and relapse symptoms.  Plan to continue supportive psychotherapy in next appointment to provide symptom relief. At least 20 minutes of coping skill utilization recommended per day. 17 minutes of supportive psychotherapy. 4 weeks    Diagnoses: as above  Symptoms: as above  Functional status: good  Mental Status Exam: as above     Treatment plan: medication management and supportive psychotherapy  Prognosis: good  Progress: Continued Improvement    Visit Diagnoses:    ICD-10-CM ICD-9-CM   1. Major depressive disorder, recurrent episode, moderate  F33.1 296.32   2. Generalized anxiety disorder  F41.1 300.02   3. Insomnia due to mental disorder  F51.05 300.9     327.02   4. Post traumatic stress disorder (PTSD)  F43.10 309.81       PLAN:  Safety: No acute safety concerns  Therapy: Declines  Risk Assessment: Risk of self-harm acutely is moderate.  Risk factors include anxiety disorder, mood disorder, and recent psychosocial stressors (pandemic). Protective factors include no family history, denies access to guns/weapons, no present SI, no history of suicide attempts or self-harm in the past,  minimal AODA, healthcare seeking, future orientation, willingness to engage in care.  Risk of self-harm chronically is also moderate, but could be further elevated in the event of treatment noncompliance and/or AODA.  Meds: Continue lamotrigine 50mg po bid to target mood. Risks, benefits, alternatives discussed with patient including rash, rebound depressive or manic symptoms if prompt discontinuation, GI upset, agitation, sedation/falls risk.  After discussion of these risks and benefits, patient voiced understanding and agreed to proceed. Continue amitriptyline 10 mg po qhs to target depression and anxiety. Risks, benefits, alternatives discussed with patient including sedation, dizziness, falls, GI upset, constipation, urinary retention, dry mouth.  After discussion of these risks and benefits, the patient voiced understanding and agreed to proceed. Patient stopped ambien.   Labs:  No labs at this time  Follow up: 4 weeks    Patient screened positive for depression based on a PHQ-9 score of 9 on 9/11/2023. Follow-up recommendations include: Suicide Risk Assessment performed.           TREATMENT PLAN/GOALS: Continue supportive psychotherapy efforts and medications as indicated. Treatment and medication options discussed during today's visit. Patient acknowledged and verbally consented to continue with current treatment plan and was educated on the importance of compliance with treatment and follow-up appointments.    MEDICATION ISSUES:  DEBORAH reviewed as expected.  Discussed medication options and treatment plan of prescribed medication as well as the risks, benefits, and side effects including potential falls, possible impaired driving and metabolic adversities among others. Patient is agreeable to call the office with any worsening of symptoms or onset of side effects. Patient is agreeable to call 911 or go to the nearest ER should he/she begin having SI/HI. No medication side effects or related complaints  today.     MEDS ORDERED DURING VISIT:  New Medications Ordered This Visit   Medications    amitriptyline (ELAVIL) 10 MG tablet     Sig: Take 1 tablet by mouth Every Night.     Dispense:  30 tablet     Refill:  2       Return in about 4 weeks (around 10/9/2023) for Next scheduled follow up.         This document has been electronically signed by MINI Poon  September 11, 2023 12:02 EDT    Dictated Utilizing Dragon Dictation: Part of this note may be an electronic transcription/translation of spoken language to printed text using the Dragon Dictation System.

## 2023-09-12 ENCOUNTER — TELEPHONE (OUTPATIENT)
Dept: GASTROENTEROLOGY | Facility: CLINIC | Age: 28
End: 2023-09-12
Payer: COMMERCIAL

## 2023-09-12 ENCOUNTER — OFFICE VISIT (OUTPATIENT)
Dept: CARDIOLOGY | Facility: CLINIC | Age: 28
End: 2023-09-12
Payer: COMMERCIAL

## 2023-09-12 VITALS
HEIGHT: 64 IN | SYSTOLIC BLOOD PRESSURE: 122 MMHG | BODY MASS INDEX: 30.22 KG/M2 | HEART RATE: 90 BPM | DIASTOLIC BLOOD PRESSURE: 81 MMHG | WEIGHT: 177 LBS

## 2023-09-12 DIAGNOSIS — R55 POSTURAL DIZZINESS WITH PRESYNCOPE: ICD-10-CM

## 2023-09-12 DIAGNOSIS — R10.13 DYSPEPSIA: Primary | ICD-10-CM

## 2023-09-12 DIAGNOSIS — R42 POSTURAL DIZZINESS WITH PRESYNCOPE: ICD-10-CM

## 2023-09-12 DIAGNOSIS — R10.13 EPIGASTRIC PAIN: ICD-10-CM

## 2023-09-12 DIAGNOSIS — R00.2 PALPITATIONS: Primary | ICD-10-CM

## 2023-09-12 LAB
CYTO UR: NORMAL
LAB AP CASE REPORT: NORMAL
LAB AP CLINICAL INFORMATION: NORMAL
PATH REPORT.FINAL DX SPEC: NORMAL
PATH REPORT.GROSS SPEC: NORMAL

## 2023-09-12 PROCEDURE — 99204 OFFICE O/P NEW MOD 45 MIN: CPT | Performed by: INTERNAL MEDICINE

## 2023-09-12 NOTE — TELEPHONE ENCOUNTER
----- Message from Dyan Griffin MD sent at 9/12/2023 11:39 AM EDT -----  Order placed for Celiac labs.

## 2023-09-12 NOTE — PROGRESS NOTES
Chief Complaint  Rapid Heart Rate and Palpitations    Subjective        Palmira Carbajal presents to [facility] CARDIOLOGY  History of present illness:    Patient is a 28-year-old female with no significant past medical history who presents with palpitations and presyncope.  She states the palpitations can occur just sitting still.  She will feel her heart pause and then a hard heartbeat.  She also feels it racing.  She does note the palpitations are worse when she gets up and moves around.  She will feel her heart racing and beating hard and she will get presyncopal.  She does drink lots of water and very rare caffeine.  She does not use a lot of salt.  She went to the emergency department with 1 of these palpitations but it had settled down before she got there.  She states that went up to 150 to 160 bpm.  She does not have a regular exercise program.  She notes no tobacco or alcohol.  Mother and father have no heart disease.      Past Medical History:   Diagnosis Date    Acid reflux     Anxiety     Chronic pain disorder     Depression     Eczema     Fatty liver     Intractable migraine without aura and without status migrainosus 2023    Kidney stone     HX OF    Maltracking of right patella     Migraines     Panic disorder     PONV (postoperative nausea and vomiting)     GOOD RESULTS WITH SCOPALAMINE    PTSD (post-traumatic stress disorder)     WAKES UP FROM ANESTHESIA WITH PANIC ATTACK    Seasonal allergic rhinitis 2022    Self-injurious behavior     as a teenager    Suicide attempt          Past Surgical History:   Procedure Laterality Date     SECTION      COLONOSCOPY N/A 10/11/2022    Procedure: COLONOSCOPY;  Surgeon: Dayn Griffin MD;  Location: [facility] ENDOSCOPY;  Service: Gastroenterology;  Laterality: N/A;  HEMORRHOIDS    CYSTOSCOPY      CYSTOSCOPY URETEROSCOPY Left 2023    Procedure: CYSTOSCOPY URETEROSCOPY RETROGRADE PYELOGRAM HOLMIUM LASER STENT  INSERTION, left;  Surgeon: Melisa Garcia MD;  Location: [facility] MAIN OR;  Service: Urology;  Laterality: Left;    ENDOSCOPY N/A 2023    Procedure: ESOPHAGOGASTRODUODENOSCOPY WITH BIOPSIES;  Surgeon: Dyan Griffin MD;  Location: [facility] ENDOSCOPY;  Service: Gastroenterology;  Laterality: N/A;  ESOPHAGITIS, GASTRITIS    HYSTERECTOMY      KIDNEY STONE SURGERY      unspecified    KNEE ARTHROSCOPY Right 2022    Procedure: KNEE ARTHROSCOPY WITH A LATERAL RELEASE AND CHONDROPLASTY;  Surgeon: Marco A Pitts MD;  Location: [facility] OR [facility];  Service: Orthopedics;  Laterality: Right;    WISDOM TOOTH EXTRACTION            Social History     Socioeconomic History    Marital status:      Spouse name: TRISTAN    Number of children: 3   Tobacco Use    Smoking status: Former     Packs/day: 0.50     Years: 15.00     Pack years: 7.50     Types: Cigarettes     Quit date: 2022     Years since quittin.5    Smokeless tobacco: Never   Vaping Use    Vaping Use: Every day    Substances: Nicotine, Flavoring    Devices: Disposable   Substance and Sexual Activity    Alcohol use: Not Currently    Drug use: Not Currently     Types: Marijuana    Sexual activity: Defer         Family History   Problem Relation Age of Onset    Arthritis Mother     Restless legs syndrome Mother     Thyroid disease Father     Colon polyps Father     Diabetes Father     Drug abuse Maternal Uncle     Alcohol abuse Maternal Uncle     Cancer Maternal Grandmother     Sleep apnea Son     Malig Hyperthermia Neg Hx           Allergies   Allergen Reactions    Cephalexin Diarrhea    Cephalosporins GI Intolerance    Cymbalta [Duloxetine Hcl] Other (See Comments)     Jittery and insomnia    Penicillins Rash            Current Outpatient Medications:     amitriptyline (ELAVIL) 10 MG tablet, Take 1 tablet by mouth Every Night., Disp: 30 tablet, Rfl: 2    clindamycin-benzoyl peroxide (BenzaClin) 1-5 % gel, Apply 1 application  topically to the  "appropriate area as directed Every Night., Disp: 35 g, Rfl: 0    dicyclomine (BENTYL) 20 MG tablet, Take 1 tablet by mouth Every 6 (Six) Hours As Needed (abdominal cramping)., Disp: 36 tablet, Rfl: 0    esomeprazole (nexIUM) 40 MG capsule, Take 1 capsule by mouth Every Morning Before Breakfast., Disp: 30 capsule, Rfl: 3    lamoTRIgine (LaMICtal) 100 MG tablet, Take 0.5 tablets by mouth 2 (Two) Times a Day for 90 days., Disp: 30 tablet, Rfl: 2    polyethylene glycol (MIRALAX) 17 g packet, Take 17 g by mouth Daily., Disp: 5 packet, Rfl: 0    Rimegepant Sulfate (Nurtec) 75 MG tablet dispersible tablet, Take 1 tablet by mouth Daily As Needed (migraine)., Disp: 8 tablet, Rfl: 5    sodium chloride 0.9 % solution 100 mL with Eptinezumab-jjmr 100 MG/ML solution 100 mg, Infuse 100 mg into a venous catheter Every 3 (Three) Months., Disp: , Rfl:     vitamin D3 (Vitamin D) 125 MCG (5000 UT) capsule capsule, Take 1 capsule by mouth Daily., Disp: 90 capsule, Rfl: 1      ROS:  Cardiac review of systems is positive for palpitations and presyncope    Objective     /81   Pulse 90   Ht 162.6 cm (64.02\")   Wt 80.3 kg (177 lb)   BMI 30.36 kg/m²       General Appearance:   well developed  well nourished  HENT:   oropharynx moist  lips not cyanotic  Respiratory:  no respiratory distress  normal breath sounds  no rales  Cardiovascular:  no jugular venous distention  regular rhythm  S1 normal, S2 normal  no S3, no S4   no murmur  no rub, no thrill  No carotid bruit  pedal pulses normal  lower extremity edema: none    Musculoskeletal:  no clubbing of fingers.   normocephalic, head atraumatic  Skin:   warm, dry  Psychiatric:  judgement and insight appropriate  normal mood and affect    ECHO:    STRESS:    CATH:  No results found for this or any previous visit.    BMP:     Glucose   Date Value Ref Range Status   08/23/2023 108 (H) 65 - 99 mg/dL Final     BUN   Date Value Ref Range Status   08/23/2023 8 6 - 20 mg/dL Final "     Creatinine   Date Value Ref Range Status   08/23/2023 0.76 0.57 - 1.00 mg/dL Final     Sodium   Date Value Ref Range Status   08/23/2023 137 136 - 145 mmol/L Final     Potassium   Date Value Ref Range Status   08/23/2023 3.8 3.5 - 5.2 mmol/L Final     Chloride   Date Value Ref Range Status   08/23/2023 104 98 - 107 mmol/L Final     CO2   Date Value Ref Range Status   08/23/2023 21.8 (L) 22.0 - 29.0 mmol/L Final     Calcium   Date Value Ref Range Status   08/23/2023 9.0 8.6 - 10.5 mg/dL Final     BUN/Creatinine Ratio   Date Value Ref Range Status   08/23/2023 10.5 7.0 - 25.0 Final     Anion Gap   Date Value Ref Range Status   08/23/2023 11.2 5.0 - 15.0 mmol/L Final     eGFR   Date Value Ref Range Status   08/23/2023 108.3 >60.0 mL/min/1.73 Final     LIPIDS:  Total Cholesterol   Date Value Ref Range Status   09/07/2023 167 0 - 200 mg/dL Final     Triglycerides   Date Value Ref Range Status   09/07/2023 82 0 - 150 mg/dL Final     HDL Cholesterol   Date Value Ref Range Status   09/07/2023 56 40 - 60 mg/dL Final     LDL Cholesterol    Date Value Ref Range Status   09/07/2023 96 0 - 100 mg/dL Final     VLDL Cholesterol   Date Value Ref Range Status   09/07/2023 15 5 - 40 mg/dL Final     LDL/HDL Ratio   Date Value Ref Range Status   09/07/2023 1.69  Final         Procedures             ASSESSMENT:  Diagnoses and all orders for this visit:    1. Palpitations (Primary)    2. Postural dizziness with presyncope         PLAN:    1.  We will place a 1 week event recorder on the patient to try to catch some of these palpitations.  I am not sure if this is sinus tachycardia with drops in her blood pressure or an abnormal heart rhythm.  What she feels at night sounds like ectopic beats.  2.  Reviewed patient's emergency department visit.  Her EKG showed sinus tachycardia with no real significant changes.  Her troponins were normal along with TSH and chest x-ray.  3.  Patient states her normal blood pressure runs in the 100 to  110 mmHg systolic.  4.  Encourage patient increase her salt intake.  We will then bring her back in 4 weeks.  If she is still symptomatic we could try Florinef at that time versus a combination of propranolol and midodrine.  Could also consider tilt table.  She does have a history of anxiety.  Patient's orthostatic blood pressure measurements showed a heart rate increased from 77 up to 85 bpm.  Her blood pressure remained on the low normal and but did not drop significantly.  5.  Patient's cardiac physical exam is entirely normal.      Return in about 4 weeks (around 10/10/2023).     Patient was given instructions and counseling regarding her condition or for health maintenance advice. Please see specific information pulled into the AVS if appropriate.         Dusty Nj MD   9/12/2023  09:02 EDT

## 2023-09-12 NOTE — TELEPHONE ENCOUNTER
Spoke to patient and informed of Dr. Griffin's result note and recommendations. Verified patient understanding.    Verified patient is taking Nexium as prescribed.    Follow up appointment scheduled with MINI Barone on Tuesday, 10/31/23 at 1045.

## 2023-09-12 NOTE — TELEPHONE ENCOUNTER
----- Message from Dyan Griffin MD sent at 9/12/2023 11:39 AM EDT -----  Reflux esophagitis noted on esophagus biopsies.  Normal gastric biopsies.    Mild non-specific changes noted in the duodenum.  Would recommend to update Celiac testing based on these findings.  Celiac testing previously in 2020 negative.    Please arrange f/u appointment.

## 2023-09-14 LAB
INSULIN FREE SERPL-ACNC: 14 UU/ML
INSULIN SERPL-ACNC: 14 UU/ML

## 2023-09-15 ENCOUNTER — PATIENT ROUNDING (BHMG ONLY) (OUTPATIENT)
Dept: CARDIOLOGY | Facility: CLINIC | Age: 28
End: 2023-09-15
Payer: COMMERCIAL

## 2023-09-18 ENCOUNTER — LAB (OUTPATIENT)
Dept: LAB | Facility: HOSPITAL | Age: 28
End: 2023-09-18
Payer: COMMERCIAL

## 2023-09-18 DIAGNOSIS — R10.13 EPIGASTRIC PAIN: ICD-10-CM

## 2023-09-18 DIAGNOSIS — R10.13 DYSPEPSIA: ICD-10-CM

## 2023-09-18 PROCEDURE — 36415 COLL VENOUS BLD VENIPUNCTURE: CPT

## 2023-09-18 PROCEDURE — 86364 TISS TRNSGLTMNASE EA IG CLAS: CPT

## 2023-09-18 PROCEDURE — 86258 DGP ANTIBODY EACH IG CLASS: CPT

## 2023-09-18 PROCEDURE — 82784 ASSAY IGA/IGD/IGG/IGM EACH: CPT

## 2023-09-19 LAB
GLIADIN PEPTIDE IGA SER-ACNC: 6 UNITS (ref 0–19)
IGA SERPL-MCNC: 191 MG/DL (ref 87–352)
TTG IGA SER-ACNC: <2 U/ML (ref 0–3)

## 2023-09-21 ENCOUNTER — TELEPHONE (OUTPATIENT)
Dept: GASTROENTEROLOGY | Facility: CLINIC | Age: 28
End: 2023-09-21
Payer: COMMERCIAL

## 2023-09-21 NOTE — TELEPHONE ENCOUNTER
----- Message from Dyan Griffin MD sent at 9/21/2023  3:32 PM EDT -----  Labs negative for Celiac disease.

## 2023-10-03 ENCOUNTER — TELEMEDICINE (OUTPATIENT)
Dept: PSYCHIATRY | Facility: CLINIC | Age: 28
End: 2023-10-03
Payer: COMMERCIAL

## 2023-10-03 DIAGNOSIS — F43.10 POST TRAUMATIC STRESS DISORDER (PTSD): ICD-10-CM

## 2023-10-03 DIAGNOSIS — F41.1 GENERALIZED ANXIETY DISORDER: ICD-10-CM

## 2023-10-03 DIAGNOSIS — F51.05 INSOMNIA DUE TO MENTAL DISORDER: ICD-10-CM

## 2023-10-03 DIAGNOSIS — Z51.81 MEDICATION MONITORING ENCOUNTER: ICD-10-CM

## 2023-10-03 DIAGNOSIS — F33.1 MAJOR DEPRESSIVE DISORDER, RECURRENT EPISODE, MODERATE: Primary | ICD-10-CM

## 2023-10-03 NOTE — PROGRESS NOTES
"Subjective   Palmira Carbajal is a 28 y.o. female who presents today for follow up. Mode of visit: Video  Location of provider: Home  Location of patient: Home  Does the patient consent to use a video/audio connection for your medical care today? Yes  The visit included audio and video interaction. No technical issues occurred during this visit.    Referring Provider:  No referring provider defined for this encounter.    Chief Complaint:  Depression, anxiety    History of Present Illness:     Depression/mood: has improved  Looking forward to fall  Anxiety: has been high at times  Excessive worries   \" has had to come home and take care of kids because of my anxiety.\"  Trouble relaxing  Working on positive coping skills  PTSD: denies s/sx   Sleep disturbance: y  Low energy: y  Substance use: denies   Medication compliant  Side effects: denies  Refills needed    Access to Firearms: Denies    PHQ-9 Depression Screening  PHQ-9 Total Score: (P) 9    Little interest or pleasure in doing things? (P) 1-->several days   Feeling down, depressed, or hopeless? (P) 1-->several days   Trouble falling or staying asleep, or sleeping too much? (P) 3-->nearly every day   Feeling tired or having little energy? (P) 2-->more than half the days   Poor appetite or overeating? (P) 1-->several days   Feeling bad about yourself - or that you are a failure or have let yourself or your family down? (P) 1-->several days   Trouble concentrating on things, such as reading the newspaper or watching television? (P) 0-->not at all   Moving or speaking so slowly that other people could have noticed? Or the opposite - being so fidgety or restless that you have been moving around a lot more than usual? (P) 0-->not at all   Thoughts that you would be better off dead, or of hurting yourself in some way? (P) 0-->not at all   PHQ-9 Total Score (P) 9     STACY-7  Feeling nervous, anxious or on edge: (P) More than half the days  Not being able to stop " or control worrying: (P) Nearly every day  Worrying too much about different things: (P) Nearly every day  Trouble Relaxing: (P) More than half the days  Being so restless that it is hard to sit still: (P) Not at all  Feeling afraid as if something awful might happen: (P) Not at all  Becoming easily annoyed or irritable: (P) Several days  STACY 7 Total Score: (P) 11  If you checked any problems, how difficult have these problems made it for you to do your work, take care of things at home, or get along with other people: (P) Somewhat difficult    Past Surgical History:  Past Surgical History:   Procedure Laterality Date     SECTION      COLONOSCOPY N/A 10/11/2022    Procedure: COLONOSCOPY;  Surgeon: Dyan Griffin MD;  Location: [facility] ENDOSCOPY;  Service: Gastroenterology;  Laterality: N/A;  HEMORRHOIDS    CYSTOSCOPY      CYSTOSCOPY URETEROSCOPY Left 2023    Procedure: CYSTOSCOPY URETEROSCOPY RETROGRADE PYELOGRAM HOLMIUM LASER STENT INSERTION, left;  Surgeon: Melisa Garcia MD;  Location: [facility] MAIN OR;  Service: Urology;  Laterality: Left;    ENDOSCOPY N/A 2023    Procedure: ESOPHAGOGASTRODUODENOSCOPY WITH BIOPSIES;  Surgeon: Dyan Griffin MD;  Location: [facility] ENDOSCOPY;  Service: Gastroenterology;  Laterality: N/A;  ESOPHAGITIS, GASTRITIS    HYSTERECTOMY      KIDNEY STONE SURGERY      unspecified    KNEE ARTHROSCOPY Right 2022    Procedure: KNEE ARTHROSCOPY WITH A LATERAL RELEASE AND CHONDROPLASTY;  Surgeon: Marco A Pitts MD;  Location: [facility] OR [facility];  Service: Orthopedics;  Laterality: Right;    WISDOM TOOTH EXTRACTION         Problem List:  Patient Active Problem List   Diagnosis    Maltracking of right patella    Impingement syndrome involving patellar fat pad of right knee    Chondromalacia    Patellar malalignment syndrome of right knee    Binocular vision disorder with diplopia    Generalized anxiety disorder    Acid reflux    PTSD (post-traumatic stress  disorder)    Kidney stone on left side    Seasonal allergic rhinitis    Burn of finger and thumb of right hand, second degree    Hemorrhoids    Right hand pain    Rectal bleeding    Anal or rectal pain    Decreased appetite    Aftercare following surgery of right knee arthroscopic chondroplasty and lateral release, 7/11/2022    Generalized body aches    Epigastric pain    Female hirsutism    Right ovarian cyst    Moderate episode of recurrent major depressive disorder    Primary insomnia    Vitamin D deficiency    Intractable migraine without aura and without status migrainosus    Calcified granuloma of lung    Nipple discharge    Nausea and vomiting    Class 1 obesity with body mass index (BMI) of 30.0 to 30.9 in adult    Acne vulgaris       Allergy:   Allergies   Allergen Reactions    Cephalexin Diarrhea    Cephalosporins GI Intolerance    Cymbalta [Duloxetine Hcl] Other (See Comments)     Jittery and insomnia    Penicillins Rash        Discontinued Medications:  There are no discontinued medications.      Current Medications:   Current Outpatient Medications   Medication Sig Dispense Refill    amitriptyline (ELAVIL) 10 MG tablet Take 1 tablet by mouth Every Night. 30 tablet 2    clindamycin-benzoyl peroxide (BenzaClin) 1-5 % gel Apply 1 application  topically to the appropriate area as directed Every Night. 35 g 0    dicyclomine (BENTYL) 20 MG tablet Take 1 tablet by mouth Every 6 (Six) Hours As Needed (abdominal cramping). 36 tablet 0    esomeprazole (nexIUM) 40 MG capsule Take 1 capsule by mouth Every Morning Before Breakfast. 30 capsule 3    lamoTRIgine (LaMICtal) 100 MG tablet Take 0.5 tablets by mouth 2 (Two) Times a Day for 90 days. 30 tablet 2    polyethylene glycol (MIRALAX) 17 g packet Take 17 g by mouth Daily. 5 packet 0    Rimegepant Sulfate (Nurtec) 75 MG tablet dispersible tablet Take 1 tablet by mouth Daily As Needed (migraine). 8 tablet 5    sodium chloride 0.9 % solution 100 mL with  Eptinezumab-jjmr 100 MG/ML solution 100 mg Infuse 100 mg into a venous catheter Every 3 (Three) Months.      vitamin D3 (Vitamin D) 125 MCG (5000 UT) capsule capsule Take 1 capsule by mouth Daily. 90 capsule 1     No current facility-administered medications for this visit.     Past Medical History:  Past Medical History:   Diagnosis Date    Acid reflux     Anxiety     Chronic pain disorder     Depression     Eczema     Fatty liver     Intractable migraine without aura and without status migrainosus 2023    Kidney stone     HX OF    Maltracking of right patella     Migraines     Panic disorder     PONV (postoperative nausea and vomiting)     GOOD RESULTS WITH SCOPALAMINE    PTSD (post-traumatic stress disorder)     WAKES UP FROM ANESTHESIA WITH PANIC ATTACK    Seasonal allergic rhinitis 2022    Self-injurious behavior     as a teenager    Suicide attempt        Past Psychiatric History:  Began Treatment: Age 12  Diagnoses: Depression, anxiety, PTSD  Psychiatrist: King Vogt previously  Therapist: [facility] Vogt previously  Admission History: [facility] previously  Medication Trials: Zolft, paxil, prozac, lexapro, desvenlafaxine, effexor, cymbalta, quetiapine, trazodone, hydroxyzine, ambien   Self Harm: Hx cutting as teenager  Suicide Attempts: Overdose age 12     Substance Abuse History:   Types: Denies  Withdrawal Symptoms: Not applicable  Longest Period Sober: Not applicable  AA: Not applicable     Social History:  Martial Status:   Employed: Not currently  Kids: Three, ages 9, 6 and 3  House: With  and children   History: Denies    Social History     Socioeconomic History    Marital status:      Spouse name: TRISTAN    Number of children: 3   Tobacco Use    Smoking status: Former     Packs/day: 0.50     Years: 15.00     Pack years: 7.50     Types: Cigarettes     Quit date: 2022     Years since quittin.6    Smokeless tobacco: Never    Vaping Use    Vaping Use: Every day    Substances: Nicotine, Flavoring    Devices: Disposable   Substance and Sexual Activity    Alcohol use: Not Currently    Drug use: Not Currently     Types: Marijuana    Sexual activity: Defer       Family History   Problem Relation Age of Onset    Arthritis Mother     Restless legs syndrome Mother     Thyroid disease Father     Colon polyps Father     Diabetes Father     Drug abuse Maternal Uncle     Alcohol abuse Maternal Uncle     Cancer Maternal Grandmother     Sleep apnea Son     Bo Hyperthermia Neg Hx      Mental Status Exam:   Hygiene:   good  Cooperation:  Cooperative  Eye Contact:  Good  Psychomotor Behavior:  Appropriate  Affect:  Appropriate  Mood: normal  Hopelessness: Denies  Speech:  Normal  Thought Process:  Linear  Thought Content:  Mood congruent  Suicidal:  denies  Homicidal:  denies  Hallucinations:  denies  Delusion:  denies  Memory:  Intact  Orientation:  Person, Place, Time, and Situation  Reliability:  good  Insight:  Good  Judgement:  Good  Impulse Control:  Good  Physical/Medical Issues:  No      Review of Systems:  Review of Systems   Constitutional:  Negative for appetite change, diaphoresis, fatigue and unexpected weight change.   HENT:  Negative for drooling, tinnitus and trouble swallowing.    Eyes:  Negative for visual disturbance.   Respiratory:  Negative for cough, chest tightness and shortness of breath.    Cardiovascular:  Negative for chest pain and palpitations.   Gastrointestinal:  Negative for abdominal pain, constipation, diarrhea, nausea and vomiting.   Endocrine: Negative for cold intolerance and heat intolerance.   Genitourinary:  Negative for difficulty urinating.   Musculoskeletal:  Negative for arthralgias and myalgias.   Skin:  Negative for rash.   Allergic/Immunologic: Negative for immunocompromised state.   Neurological:  Negative for dizziness, tremors, seizures and headaches.   Psychiatric/Behavioral:  Negative for  agitation, dysphoric mood, hallucinations, self-injury, sleep disturbance and suicidal ideas. The patient is nervous/anxious.      Vital Signs:   There were no vitals taken for this visit.     Lab Results:   Lab on 09/18/2023   Component Date Value Ref Range Status    Gliadin Deamidated Peptide Ab, IgA 09/18/2023 6  0 - 19 units Final                       Negative                   0 - 19                     Weak Positive             20 - 30                     Moderate to Strong Positive   >30    Tissue Transglutaminase IgA 09/18/2023 <2  0 - 3 U/mL Final                                  Negative        0 -  3                                Weak Positive   4 - 10                                Positive           >10   Tissue Transglutaminase (tTG) has been identified   as the endomysial antigen.  Studies have demonstr-   ated that endomysial IgA antibodies have over 99%   specificity for gluten sensitive enteropathy.    IgA 09/18/2023 191  87 - 352 mg/dL Final   Admission on 09/08/2023, Discharged on 09/08/2023   Component Date Value Ref Range Status    Case Report 09/08/2023    Final                    Value:Surgical Pathology Report                         Case: [id number]                                  Authorizing Provider:  Dyan Griffin MD Collected:           09/08/2023 02:15 PM          Ordering Location:     [facility] Received:            09/11/2023 08:32 AM                                 SUITES                                                                       Pathologist:           Amelia Jean Baptiste MD                                                     Specimens:   1) - Small Intestine, Duodenum, DUODENUM BIOPSIES                                                   2) - Gastric, Antrum, GASTRIC ANTRUM BIOPSIES                                                       3) - GE Junction, GE JUNCTION BIOPSIES                                                     Clinical  Information 09/08/2023    Final                    Value:This result contains rich text formatting which cannot be displayed here.    Final Diagnosis 09/08/2023    Final                    Value:This result contains rich text formatting which cannot be displayed here.    Gross Description 09/08/2023    Final                    Value:This result contains rich text formatting which cannot be displayed here.    Microscopic Description 09/08/2023    Final                    Value:This result contains rich text formatting which cannot be displayed here.   Office Visit on 09/07/2023   Component Date Value Ref Range Status    Total Cholesterol 09/07/2023 167  0 - 200 mg/dL Final    Triglycerides 09/07/2023 82  0 - 150 mg/dL Final    HDL Cholesterol 09/07/2023 56  40 - 60 mg/dL Final    LDL Cholesterol  09/07/2023 96  0 - 100 mg/dL Final    VLDL Cholesterol 09/07/2023 15  5 - 40 mg/dL Final    LDL/HDL Ratio 09/07/2023 1.69   Final    Hemoglobin A1C 09/07/2023 5.40  4.80 - 5.60 % Final    25 Hydroxy, Vitamin D 09/07/2023 24.0 (L)  30.0 - 100.0 ng/ml Final    WBC 09/07/2023 15.13 (H)  3.40 - 10.80 10*3/mm3 Final    RBC 09/07/2023 5.23  3.77 - 5.28 10*6/mm3 Final    Hemoglobin 09/07/2023 14.1  12.0 - 15.9 g/dL Final    Hematocrit 09/07/2023 43.5  34.0 - 46.6 % Final    MCV 09/07/2023 83.2  79.0 - 97.0 fL Final    MCH 09/07/2023 27.0  26.6 - 33.0 pg Final    MCHC 09/07/2023 32.4  31.5 - 35.7 g/dL Final    RDW 09/07/2023 12.9  12.3 - 15.4 % Final    RDW-SD 09/07/2023 38.8  37.0 - 54.0 fl Final    MPV 09/07/2023 11.5  6.0 - 12.0 fL Final    Platelets 09/07/2023 426  140 - 450 10*3/mm3 Final    Neutrophil % 09/07/2023 67.8  42.7 - 76.0 % Final    Lymphocyte % 09/07/2023 24.9  19.6 - 45.3 % Final    Monocyte % 09/07/2023 5.0  5.0 - 12.0 % Final    Eosinophil % 09/07/2023 1.1  0.3 - 6.2 % Final    Basophil % 09/07/2023 0.7  0.0 - 1.5 % Final    Immature Grans % 09/07/2023 0.5  0.0 - 0.5 % Final    Neutrophils, Absolute 09/07/2023  10.26 (H)  1.70 - 7.00 10*3/mm3 Final    Lymphocytes, Absolute 09/07/2023 3.77 (H)  0.70 - 3.10 10*3/mm3 Final    Monocytes, Absolute 09/07/2023 0.76  0.10 - 0.90 10*3/mm3 Final    Eosinophils, Absolute 09/07/2023 0.17  0.00 - 0.40 10*3/mm3 Final    Basophils, Absolute 09/07/2023 0.10  0.00 - 0.20 10*3/mm3 Final    Immature Grans, Absolute 09/07/2023 0.07 (H)  0.00 - 0.05 10*3/mm3 Final    nRBC 09/07/2023 0.0  0.0 - 0.2 /100 WBC Final    Insulin, Free 09/07/2023 14  uU/mL Final    Reference Range:  Pubertal Children and  Adults (fasting): 0 - 17    Insulin 09/07/2023 14  uU/mL Final    Non-Diabetic:  In the absence of insulin-binding antibodies,  the free and total insulin assays are equivalent. However,  this assay is intended for use in diabetics with insulin  autoantibody present. Measurement is performed on  acid-treated samples and, therefore, the sensitivity and  absolute values by this method may differ from our direct  insulin ICMA.  Insulin Dependent Diabetic Patients:  Free Insulin levels  vary depending on the capacity and affinity of circulating  insulin-binding antibodies and the dose of insulin given to  the patient.  Total insulin levels represent free insulin  and antibody bound insulin fractions.  This test was developed and its performance characteristics  determined by [employer]. It has not been cleared or approved  by the Food and Drug Administration.   Admission on 08/23/2023, Discharged on 08/23/2023   Component Date Value Ref Range Status    QT Interval 08/23/2023 303  ms Final    Glucose 08/23/2023 108 (H)  65 - 99 mg/dL Final    BUN 08/23/2023 8  6 - 20 mg/dL Final    Creatinine 08/23/2023 0.76  0.57 - 1.00 mg/dL Final    Sodium 08/23/2023 137  136 - 145 mmol/L Final    Potassium 08/23/2023 3.8  3.5 - 5.2 mmol/L Final    Chloride 08/23/2023 104  98 - 107 mmol/L Final    CO2 08/23/2023 21.8 (L)  22.0 - 29.0 mmol/L Final    Calcium 08/23/2023 9.0  8.6 - 10.5 mg/dL Final    Total Protein  08/23/2023 7.2  6.0 - 8.5 g/dL Final    Albumin 08/23/2023 4.1  3.5 - 5.2 g/dL Final    ALT (SGPT) 08/23/2023 13  1 - 33 U/L Final    AST (SGOT) 08/23/2023 11  1 - 32 U/L Final    Alkaline Phosphatase 08/23/2023 91  39 - 117 U/L Final    Total Bilirubin 08/23/2023 <0.2  0.0 - 1.2 mg/dL Final    Globulin 08/23/2023 3.1  gm/dL Final    A/G Ratio 08/23/2023 1.3  g/dL Final    BUN/Creatinine Ratio 08/23/2023 10.5  7.0 - 25.0 Final    Anion Gap 08/23/2023 11.2  5.0 - 15.0 mmol/L Final    eGFR 08/23/2023 108.3  >60.0 mL/min/1.73 Final    Magnesium 08/23/2023 2.1  1.6 - 2.6 mg/dL Final    HS Troponin T 08/23/2023 <6  <10 ng/L Final    TSH 08/23/2023 1.510  0.270 - 4.200 uIU/mL Final    Extra Tube 08/23/2023 Hold for add-ons.   Final    Auto resulted.    Extra Tube 08/23/2023 hold for add-on   Final    Auto resulted    Extra Tube 08/23/2023 Hold for add-ons.   Final    Auto resulted.    Extra Tube 08/23/2023 Hold for add-ons.   Final    Auto resulted    WBC 08/23/2023 16.99 (H)  3.40 - 10.80 10*3/mm3 Final    RBC 08/23/2023 4.96  3.77 - 5.28 10*6/mm3 Final    Hemoglobin 08/23/2023 13.6  12.0 - 15.9 g/dL Final    Hematocrit 08/23/2023 41.0  34.0 - 46.6 % Final    MCV 08/23/2023 82.7  79.0 - 97.0 fL Final    MCH 08/23/2023 27.4  26.6 - 33.0 pg Final    MCHC 08/23/2023 33.2  31.5 - 35.7 g/dL Final    RDW 08/23/2023 13.8  12.3 - 15.4 % Final    RDW-SD 08/23/2023 41.2  37.0 - 54.0 fl Final    MPV 08/23/2023 10.4  6.0 - 12.0 fL Final    Platelets 08/23/2023 393  140 - 450 10*3/mm3 Final    Neutrophil % 08/23/2023 61.8  42.7 - 76.0 % Final    Lymphocyte % 08/23/2023 27.8  19.6 - 45.3 % Final    Monocyte % 08/23/2023 7.7  5.0 - 12.0 % Final    Eosinophil % 08/23/2023 1.6  0.3 - 6.2 % Final    Basophil % 08/23/2023 0.6  0.0 - 1.5 % Final    Immature Grans % 08/23/2023 0.5  0.0 - 0.5 % Final    Neutrophils, Absolute 08/23/2023 10.49 (H)  1.70 - 7.00 10*3/mm3 Final    Lymphocytes, Absolute 08/23/2023 4.73 (H)  0.70 - 3.10 10*3/mm3  Final    Monocytes, Absolute 08/23/2023 1.30 (H)  0.10 - 0.90 10*3/mm3 Final    Eosinophils, Absolute 08/23/2023 0.28  0.00 - 0.40 10*3/mm3 Final    Basophils, Absolute 08/23/2023 0.11  0.00 - 0.20 10*3/mm3 Final    Immature Grans, Absolute 08/23/2023 0.08 (H)  0.00 - 0.05 10*3/mm3 Final    nRBC 08/23/2023 0.0  0.0 - 0.2 /100 WBC Final    Color, UA 08/23/2023 Yellow  Yellow, Straw Final    Appearance, UA 08/23/2023 Clear  Clear Final    pH, UA 08/23/2023 7.5  5.0 - 8.0 Final    Specific Gravity, UA 08/23/2023 1.020  1.005 - 1.030 Final    Glucose, UA 08/23/2023 Negative  Negative Final    Ketones, UA 08/23/2023 Negative  Negative Final    Bilirubin, UA 08/23/2023 Negative  Negative Final    Blood, UA 08/23/2023 Negative  Negative Final    Protein, UA 08/23/2023 Negative  Negative Final    Leuk Esterase, UA 08/23/2023 Negative  Negative Final    Nitrite, UA 08/23/2023 Negative  Negative Final    Urobilinogen, UA 08/23/2023 2.0 E.U./dL (A)  0.2 - 1.0 E.U./dL Final    Free T4 08/23/2023 1.10  0.93 - 1.70 ng/dL Final   Admission on 05/28/2023, Discharged on 05/28/2023   Component Date Value Ref Range Status    QT Interval 05/28/2023 390  ms Final    Glucose 05/28/2023 98  65 - 99 mg/dL Final    BUN 05/28/2023 8  6 - 20 mg/dL Final    Creatinine 05/28/2023 0.70  0.57 - 1.00 mg/dL Final    Sodium 05/28/2023 129 (L)  136 - 145 mmol/L Final    Potassium 05/28/2023 3.8  3.5 - 5.2 mmol/L Final    Chloride 05/28/2023 95 (L)  98 - 107 mmol/L Final    CO2 05/28/2023 24.2  22.0 - 29.0 mmol/L Final    Calcium 05/28/2023 9.0  8.6 - 10.5 mg/dL Final    Total Protein 05/28/2023 7.0  6.0 - 8.5 g/dL Final    Albumin 05/28/2023 3.9  3.5 - 5.2 g/dL Final    ALT (SGPT) 05/28/2023 16  1 - 33 U/L Final    AST (SGOT) 05/28/2023 13  1 - 32 U/L Final    Alkaline Phosphatase 05/28/2023 97  39 - 117 U/L Final    Total Bilirubin 05/28/2023 0.2  0.0 - 1.2 mg/dL Final    Globulin 05/28/2023 3.1  gm/dL Final    A/G Ratio 05/28/2023 1.3  g/dL Final     BUN/Creatinine Ratio 05/28/2023 11.4  7.0 - 25.0 Final    Anion Gap 05/28/2023 9.8  5.0 - 15.0 mmol/L Final    eGFR 05/28/2023 120.2  >60.0 mL/min/1.73 Final    Lipase 05/28/2023 23  13 - 60 U/L Final    HS Troponin T 05/28/2023 <6  <10 ng/L Final    Color, UA 05/28/2023 Yellow  Yellow, Straw Final    Appearance, UA 05/28/2023 Clear  Clear Final    pH, UA 05/28/2023 7.5  5.0 - 8.0 Final    Specific Gravity, UA 05/28/2023 <=1.005  1.005 - 1.030 Final    Glucose, UA 05/28/2023 Negative  Negative Final    Ketones, UA 05/28/2023 Negative  Negative Final    Bilirubin, UA 05/28/2023 Negative  Negative Final    Blood, UA 05/28/2023 Negative  Negative Final    Protein, UA 05/28/2023 Negative  Negative Final    Leuk Esterase, UA 05/28/2023 Negative  Negative Final    Nitrite, UA 05/28/2023 Negative  Negative Final    Urobilinogen, UA 05/28/2023 0.2 E.U./dL  0.2 - 1.0 E.U./dL Final    Extra Tube 05/28/2023 Hold for add-ons.   Final    Auto resulted.    Extra Tube 05/28/2023 hold for add-on   Final    Auto resulted    Extra Tube 05/28/2023 Hold for add-ons.   Final    Auto resulted.    Extra Tube 05/28/2023 Hold for add-ons.   Final    Auto resulted    WBC 05/28/2023 17.81 (H)  3.40 - 10.80 10*3/mm3 Final    RBC 05/28/2023 4.79  3.77 - 5.28 10*6/mm3 Final    Hemoglobin 05/28/2023 13.1  12.0 - 15.9 g/dL Final    Hematocrit 05/28/2023 39.6  34.0 - 46.6 % Final    MCV 05/28/2023 82.7  79.0 - 97.0 fL Final    MCH 05/28/2023 27.3  26.6 - 33.0 pg Final    MCHC 05/28/2023 33.1  31.5 - 35.7 g/dL Final    RDW 05/28/2023 13.2  12.3 - 15.4 % Final    RDW-SD 05/28/2023 39.7  37.0 - 54.0 fl Final    MPV 05/28/2023 9.9  6.0 - 12.0 fL Final    Platelets 05/28/2023 381  140 - 450 10*3/mm3 Final    Neutrophil % 05/28/2023 69.7  42.7 - 76.0 % Final    Lymphocyte % 05/28/2023 20.6  19.6 - 45.3 % Final    Monocyte % 05/28/2023 6.5  5.0 - 12.0 % Final    Eosinophil % 05/28/2023 2.2  0.3 - 6.2 % Final    Basophil % 05/28/2023 0.4  0.0 - 1.5 %  Final    Immature Grans % 05/28/2023 0.6 (H)  0.0 - 0.5 % Final    Neutrophils, Absolute 05/28/2023 12.41 (H)  1.70 - 7.00 10*3/mm3 Final    Lymphocytes, Absolute 05/28/2023 3.67 (H)  0.70 - 3.10 10*3/mm3 Final    Monocytes, Absolute 05/28/2023 1.16 (H)  0.10 - 0.90 10*3/mm3 Final    Eosinophils, Absolute 05/28/2023 0.39  0.00 - 0.40 10*3/mm3 Final    Basophils, Absolute 05/28/2023 0.08  0.00 - 0.20 10*3/mm3 Final    Immature Grans, Absolute 05/28/2023 0.10 (H)  0.00 - 0.05 10*3/mm3 Final    nRBC 05/28/2023 0.0  0.0 - 0.2 /100 WBC Final       EKG Results:  No orders to display       Imaging Results:  CT Abdomen Pelvis With Contrast    Result Date: 5/28/2023    1. No acute intra-abdominal or intrapelvic process. 2. Hepatic steatosis. 3. Ancillary findings as described above.     SUZY COLEMAN MD       Electronically Signed and Approved By: SUZY COLEMAN MD on 5/28/2023 at 13:21             MRI Breast Bilateral With & Without Contrast    Result Date: 3/29/2023  No MR evidence of malignancy in either breast.  No cause for her unilateral right nipple discharge is identified.  Further management should be based on clinical findings and suspicion.  Annual screening mammography beginning age 40 is recommended.  BIRADS: DIAGNOSTIC CATEGORY 1--NEGATIVE.   RECOMMENDATION(S): CLINICAL EVALUATION.    PLEASE NOTE:  A NORMAL MRI DOES NOT EXCLUDE THE POSSIBILITY OF BREAST CANCER.  A CLINICALLY SUSPICIOUS PALPABLE LUMP SHOULD BE BIOPSIED.      RAJI MORSE DO       Electronically Signed and Approved By: RAJI MORSE DO on 3/29/2023 at 18:04               Assessment & Plan   Diagnoses and all orders for this visit:    1. Major depressive disorder, recurrent episode, moderate (Primary)    2. Generalized anxiety disorder    3. Insomnia due to mental disorder    4. Post traumatic stress disorder (PTSD)    5. Medication monitoring encounter  -     Urine Drug Screen - Urine, Clean Catch; Future      Continue lamotrigine to target  depression. Continue amitriptyline to target depression and anxiety. Patient requesting lorazepam for anxiety, as previously had benefit. Will start lorazepam (14 tabs per month) to target anxiety. Psychotherapy for PTSD.     Visit Diagnoses:    ICD-10-CM ICD-9-CM   1. Major depressive disorder, recurrent episode, moderate  F33.1 296.32   2. Generalized anxiety disorder  F41.1 300.02   3. Insomnia due to mental disorder  F51.05 300.9     327.02   4. Post traumatic stress disorder (PTSD)  F43.10 309.81   5. Medication monitoring encounter  Z51.81 V58.83       PLAN:  Safety: No acute safety concerns  Therapy: Declines  Risk Assessment: Risk of self-harm acutely is moderate.  Risk factors include anxiety disorder, mood disorder, and recent psychosocial stressors (pandemic). Protective factors include no family history, denies access to guns/weapons, no present SI, no history of suicide attempts or self-harm in the past, minimal AODA, healthcare seeking, future orientation, willingness to engage in care.  Risk of self-harm chronically is also moderate, but could be further elevated in the event of treatment noncompliance and/or AODA.  Meds: Continue lamotrigine 50mg po bid to target mood. Risks, benefits, alternatives discussed with patient including rash, rebound depressive or manic symptoms if prompt discontinuation, GI upset, agitation, sedation/falls risk.  After discussion of these risks and benefits, patient voiced understanding and agreed to proceed. Continue amitriptyline 10 mg po qhs to target depression and anxiety. Risks, benefits, alternatives discussed with patient including sedation, dizziness, falls, GI upset, constipation, urinary retention, dry mouth.  After discussion of these risks and benefits, the patient voiced understanding and agreed to proceed. Start lorazepam 0.5mg po qday prn anxiety (14 tabs per month). Risks, benefits, alternatives discussed with patient including GI upset, sedation,  dizziness, respiratory depression, falls risk.  Use caution when operating vessel, vehicle, or machine. After discussion of these risks and benefits, the patient voiced understanding and agreed to proceed. Deborah ordered. UDS ordered.  Labs:  UDS  Follow up: 4 weeks    Patient screened positive for depression based on a PHQ-9 score of 9 on 10/3/2023. Follow-up recommendations include: Suicide Risk Assessment performed.      TREATMENT PLAN/GOALS: Continue supportive psychotherapy efforts and medications as indicated. Treatment and medication options discussed during today's visit. Patient acknowledged and verbally consented to continue with current treatment plan and was educated on the importance of compliance with treatment and follow-up appointments.    MEDICATION ISSUES:  DEBORAH reviewed as expected.  Discussed medication options and treatment plan of prescribed medication as well as the risks, benefits, and side effects including potential falls, possible impaired driving and metabolic adversities among others. Patient is agreeable to call the office with any worsening of symptoms or onset of side effects. Patient is agreeable to call 911 or go to the nearest ER should he/she begin having SI/HI. No medication side effects or related complaints today.     MEDS ORDERED DURING VISIT:  No orders of the defined types were placed in this encounter.      Return in about 4 weeks (around 10/31/2023) for Next scheduled follow up.         This document has been electronically signed by MINI Poon  October 3, 2023 12:32 EDT    Dictated Utilizing Dragon Dictation: Part of this note may be an electronic transcription/translation of spoken language to printed text using the Dragon Dictation System.

## 2023-10-03 NOTE — PSYCHOTHERAPY NOTE
Supportive psychotherapy with goal to strengthen defenses, promote problems solving, restore adaptive functioning and provide symptom relief. Stressors: Family Related.  Coping skills utilized: Positive Thoughts. Current goal: Identify triggers. Provided a safe, confidential environment to facilitate the development of a positive therapeutic relationship and encourage open, honest communication. Assisted patient in identifying risk factors which would indicate the need for higher level of care including thoughts to harm self or others and/or self-harming behavior and encouraged patient to contact this office, call 911, or present to the nearest emergency room should any of these events occur. Patient given education on medication side effects, diagnosis/illness and relapse symptoms.  Plan to continue supportive psychotherapy in next appointment to provide symptom relief. At least 20 minutes of coping skill utilization recommended per day. 17 minutes of supportive psychotherapy. 4 weeks    Diagnoses: as above  Symptoms: as above  Functional status: good  Mental Status Exam: as above     Treatment plan: medication management and supportive psychotherapy  Prognosis: good  Progress: Anxiety s/sx

## 2023-10-04 ENCOUNTER — LAB (OUTPATIENT)
Dept: LAB | Facility: HOSPITAL | Age: 28
End: 2023-10-04
Payer: COMMERCIAL

## 2023-10-04 ENCOUNTER — HOSPITAL ENCOUNTER (OUTPATIENT)
Dept: CT IMAGING | Facility: HOSPITAL | Age: 28
Discharge: HOME | End: 2023-10-04
Payer: COMMERCIAL

## 2023-10-04 DIAGNOSIS — J84.10 CALCIFIED GRANULOMA OF LUNG: ICD-10-CM

## 2023-10-04 PROCEDURE — 25510000001 IOPAMIDOL PER 1 ML: Performed by: NURSE PRACTITIONER

## 2023-10-04 PROCEDURE — 80307 DRUG TEST PRSMV CHEM ANLYZR: CPT | Performed by: NURSE PRACTITIONER

## 2023-10-04 PROCEDURE — 71260 CT THORAX DX C+: CPT

## 2023-10-04 RX ADMIN — IOPAMIDOL 100 ML: 755 INJECTION, SOLUTION INTRAVENOUS at 09:49

## 2023-10-05 DIAGNOSIS — F41.1 GENERALIZED ANXIETY DISORDER: Primary | ICD-10-CM

## 2023-10-05 RX ORDER — LORAZEPAM 0.5 MG/1
0.5 TABLET ORAL DAILY PRN
Qty: 14 TABLET | Refills: 0 | Status: SHIPPED | OUTPATIENT
Start: 2023-10-05

## 2023-10-10 ENCOUNTER — OFFICE VISIT (OUTPATIENT)
Dept: CARDIOLOGY | Facility: CLINIC | Age: 28
End: 2023-10-10
Payer: COMMERCIAL

## 2023-10-10 VITALS
WEIGHT: 180.2 LBS | HEIGHT: 64 IN | SYSTOLIC BLOOD PRESSURE: 131 MMHG | BODY MASS INDEX: 30.77 KG/M2 | HEART RATE: 84 BPM | DIASTOLIC BLOOD PRESSURE: 83 MMHG

## 2023-10-10 DIAGNOSIS — R00.2 PALPITATIONS: Primary | ICD-10-CM

## 2023-10-10 DIAGNOSIS — R55 POSTURAL DIZZINESS WITH PRESYNCOPE: ICD-10-CM

## 2023-10-10 DIAGNOSIS — F17.200 SMOKER: ICD-10-CM

## 2023-10-10 DIAGNOSIS — R42 POSTURAL DIZZINESS WITH PRESYNCOPE: ICD-10-CM

## 2023-10-10 PROCEDURE — 1159F MED LIST DOCD IN RCRD: CPT

## 2023-10-10 PROCEDURE — 99214 OFFICE O/P EST MOD 30 MIN: CPT

## 2023-10-10 PROCEDURE — 1160F RVW MEDS BY RX/DR IN RCRD: CPT

## 2023-10-10 RX ORDER — FLUDROCORTISONE ACETATE 0.1 MG/1
0.1 TABLET ORAL DAILY
Qty: 30 TABLET | Refills: 3 | Status: SHIPPED | OUTPATIENT
Start: 2023-10-10

## 2023-10-10 NOTE — PROGRESS NOTES
Chief Complaint  Palpitations and Follow-up (4 week F/U- holter results.  Pt stated she is still getting dizziness and actually passed out. Pt normally feels hot and flushed and feels like she is going to pass out but she actually did once. Still having chest palpitations and feels like her heart is beating really fast. )    Subjective        History of Present Illness  Palmira Carbajal presents to [facility] for follow up.   Patient is a 28-year-old female with no significant past medical history who presents for follow-up on palpitations and presyncope.  She had a recent syncopal episode where she was standing in her kitchen counter and eating when she bent over to pick something up and became hot, flushed, blacked out.  She states was this is the first time that she is ever passed out.  She continues to have episodes of sporadic palpitations.  She states she does drink a lot of water and has very rare caffeine.  She does use an electronic cigarette device.  She denies any chest pain or shortness of breath.  She has no edema or orthopnea.    Past Medical History:   Diagnosis Date    Acid reflux     Anxiety     Chronic pain disorder     Depression     Eczema     Fatty liver     Intractable migraine without aura and without status migrainosus 2023    Kidney stone     HX OF    Maltracking of right patella     Migraines     Panic disorder     PONV (postoperative nausea and vomiting)     GOOD RESULTS WITH SCOPALAMINE    PTSD (post-traumatic stress disorder)     WAKES UP FROM ANESTHESIA WITH PANIC ATTACK    Seasonal allergic rhinitis 2022    Self-injurious behavior     as a teenager    Suicide attempt 2007       ALLERGY  Allergies   Allergen Reactions    Cephalexin Diarrhea    Cephalosporins GI Intolerance    Cymbalta [Duloxetine Hcl] Other (See Comments)     Jittery and insomnia    Penicillins Rash        Past Surgical History:   Procedure Laterality Date     SECTION       COLONOSCOPY N/A 10/11/2022    Procedure: COLONOSCOPY;  Surgeon: Dyan Griffin MD;  Location: [facility] ENDOSCOPY;  Service: Gastroenterology;  Laterality: N/A;  HEMORRHOIDS    CYSTOSCOPY      CYSTOSCOPY URETEROSCOPY Left 2023    Procedure: CYSTOSCOPY URETEROSCOPY RETROGRADE PYELOGRAM HOLMIUM LASER STENT INSERTION, left;  Surgeon: Melisa Garcia MD;  Location: [facility] MAIN OR;  Service: Urology;  Laterality: Left;    ENDOSCOPY N/A 2023    Procedure: ESOPHAGOGASTRODUODENOSCOPY WITH BIOPSIES;  Surgeon: Dyan Griffin MD;  Location: [facility] ENDOSCOPY;  Service: Gastroenterology;  Laterality: N/A;  ESOPHAGITIS, GASTRITIS    HYSTERECTOMY      KIDNEY STONE SURGERY      unspecified    KNEE ARTHROSCOPY Right 2022    Procedure: KNEE ARTHROSCOPY WITH A LATERAL RELEASE AND CHONDROPLASTY;  Surgeon: Marco A Pitts MD;  [place] [facility] [place] [facility]  Service: Orthopedics;  Laterality: Right;    WISDOM TOOTH EXTRACTION          Social History     Socioeconomic History    Marital status:      Spouse name: TRISTAN    Number of children: 3   Tobacco Use    Smoking status: Former     Packs/day: 0.50     Years: 15.00     Additional pack years: 0.00     Total pack years: 7.50     Types: Cigarettes     Quit date: 2022     Years since quittin.6    Smokeless tobacco: Never   Vaping Use    Vaping Use: Every day    Substances: Nicotine, Flavoring    Devices: Disposable   Substance and Sexual Activity    Alcohol use: Not Currently    Drug use: Not Currently     Types: Marijuana    Sexual activity: Defer       Family History   Problem Relation Age of Onset    Arthritis Mother     Restless legs syndrome Mother     Thyroid disease Father     Colon polyps Father     Diabetes Father     Drug abuse Maternal Uncle     Alcohol abuse Maternal Uncle     Cancer Maternal Grandmother     Sleep apnea Son     Malig Hyperthermia Neg Hx         Current Outpatient Medications on File Prior to Visit   Medication  "Sig    amitriptyline (ELAVIL) 10 MG tablet Take 1 tablet by mouth Every Night.    clindamycin-benzoyl peroxide (BenzaClin) 1-5 % gel Apply 1 application  topically to the appropriate area as directed Every Night.    dicyclomine (BENTYL) 20 MG tablet Take 1 tablet by mouth Every 6 (Six) Hours As Needed (abdominal cramping).    esomeprazole (nexIUM) 40 MG capsule Take 1 capsule by mouth Every Morning Before Breakfast.    lamoTRIgine (LaMICtal) 100 MG tablet Take 0.5 tablets by mouth 2 (Two) Times a Day for 90 days.    LORazepam (Ativan) 0.5 MG tablet Take 1 tablet by mouth Daily As Needed for Anxiety.    polyethylene glycol (MIRALAX) 17 g packet Take 17 g by mouth Daily.    Rimegepant Sulfate (Nurtec) 75 MG tablet dispersible tablet Take 1 tablet by mouth Daily As Needed (migraine).    sodium chloride 0.9 % solution 100 mL with Eptinezumab-jjmr 100 MG/ML solution 100 mg Infuse 100 mg into a venous catheter Every 3 (Three) Months.    vitamin D3 (Vitamin D) 125 MCG (5000 UT) capsule capsule Take 1 capsule by mouth Daily.     No current facility-administered medications on file prior to visit.       Objective   Vitals:    10/10/23 0827   BP: 131/83   Pulse: 84   Weight: 81.7 kg (180 lb 3.2 oz)   Height: 162.6 cm (64.02\")       Physical Exam  Constitutional:       General: She is awake. She is not in acute distress.     Appearance: Normal appearance.   HENT:      Head: Normocephalic.      Nose: Nose normal. No congestion.   Eyes:      Extraocular Movements: Extraocular movements intact.      Conjunctiva/sclera: Conjunctivae normal.      Pupils: Pupils are equal, round, and reactive to light.   Neck:      Thyroid: No thyromegaly.      Vascular: No JVD.   Cardiovascular:      Rate and Rhythm: Normal rate and regular rhythm.      Chest Wall: PMI is not displaced.      Pulses: Normal pulses.      Heart sounds: Normal heart sounds, S1 normal and S2 normal. No murmur heard.     No friction rub. No gallop. No S3 or S4 sounds. " "  Pulmonary:      Effort: Pulmonary effort is normal.      Breath sounds: Normal breath sounds. No wheezing, rhonchi or rales.   Abdominal:      General: Bowel sounds are normal.      Palpations: Abdomen is soft.      Tenderness: There is no abdominal tenderness.   Musculoskeletal:         General: Injury: ..      Cervical back: No tenderness.      Right lower leg: No edema.      Left lower leg: No edema.   Lymphadenopathy:      Cervical: No cervical adenopathy.   Skin:     General: Skin is warm and dry.      Capillary Refill: Capillary refill takes less than 2 seconds.      Coloration: Skin is not cyanotic.      Findings: No petechiae or rash.      Nails: There is no clubbing.   Neurological:      Mental Status: She is alert.   Psychiatric:         Mood and Affect: Mood normal.         Behavior: Behavior is cooperative.           Result Review     The following data was reviewed by MINI Browning on 10/10/23.    No results found for: \"PROBNP\"  CMP          3/7/2023    12:24 5/28/2023    10:33 8/23/2023    16:34   CMP   Glucose 83  98  108    BUN 7  8  8    Creatinine 0.80  0.70  0.76    EGFR 102.4  120.2  108.3    Sodium 139  129  137    Potassium 4.4  3.8  3.8    Chloride 102  95  104    Calcium 9.3  9.0  9.0    Total Protein  7.0  7.2    Albumin  3.9  4.1    Globulin  3.1  3.1    Total Bilirubin  0.2  <0.2    Alkaline Phosphatase  97  91    AST (SGOT)  13  11    ALT (SGPT)  16  13    Albumin/Globulin Ratio  1.3  1.3    BUN/Creatinine Ratio 8.8  11.4  10.5    Anion Gap 11.7  9.8  11.2      CBC w/diff          5/28/2023    10:33 8/23/2023    16:34 9/7/2023    11:22   CBC w/Diff   WBC 17.81  16.99  15.13    RBC 4.79  4.96  5.23    Hemoglobin 13.1  13.6  14.1    Hematocrit 39.6  41.0  43.5    MCV 82.7  82.7  83.2    MCH 27.3  27.4  27.0    MCHC 33.1  33.2  32.4    RDW 13.2  13.8  12.9    Platelets 381  393  426    Neutrophil Rel % 69.7  61.8  67.8    Immature Granulocyte Rel % 0.6  0.5  0.5    Lymphocyte " Rel % 20.6  27.8  24.9    Monocyte Rel % 6.5  7.7  5.0    Eosinophil Rel % 2.2  1.6  1.1    Basophil Rel % 0.4  0.6  0.7       Lipid Panel          9/7/2023    11:22   Lipid Panel   Total Cholesterol 167    Triglycerides 82    HDL Cholesterol 56    VLDL Cholesterol 15    LDL Cholesterol  96    LDL/HDL Ratio 1.69                 Procedures    Assessment & Plan  Diagnoses and all orders for this visit:    1. Palpitations (Primary)    2. Postural dizziness with presyncope    3. Smoker    Other orders  -     fludrocortisone 0.1 MG tablet; Take 1 tablet by mouth Daily.  Dispense: 30 tablet; Refill: 3        Patient continues to have episodes of palpitations.  Recent Holter monitor demonstrated occasional PACs but no significant arrhythmias.  Patient was reassured.  2.  Patient had a recent syncopal episode.  She continues with frequent episodes of dizziness and presyncope.  She will be started on Florinef 0.1 mg daily.  She was encouraged to significantly increase her water intake, liberate her salt intake, practice regular exercise with core strengthening exercises and quit her e-cigarette.            Palmira LOZANO Raven  reports that she quit smoking about 19 months ago. Her smoking use included cigarettes. She has a 7.50 pack-year smoking history. She has never used smokeless tobacco.. I have educated her on the risk of diseases from using tobacco products such as cancer, COPD, and heart disease.  She currently uses e-cigarette.    I advised her to quit and she is willing to quit. We have discussed the following method/s for tobacco cessation:  Counseling.  Together we have set a quit date for 1 month from today.  She will follow up with me in  12  week or sooner to check on her progress.    I spent 4 minutes counseling the patient.         Discussed treatment options including nicotine replacement therapy and Buproprion.    Follow Up   Return in about 3 months (around 1/10/2024) for With Dr. Nj.    Patient was  given instructions and counseling regarding her condition or for health maintenance advice. Please see specific information pulled into the AVS if appropriate.     Kelin Toribio, APRN  10/10/23  08:47 EDT    Dictated Utilizing Dragon Dictation

## 2023-10-24 ENCOUNTER — TELEPHONE (OUTPATIENT)
Dept: CARDIOLOGY | Facility: CLINIC | Age: 28
End: 2023-10-24
Payer: COMMERCIAL

## 2023-10-24 NOTE — TELEPHONE ENCOUNTER
Pt called. She has been taking fludrocortisone for 2 weeks now. No change in symptoms. Still dizzy and almost passing out at times. Does the medication take longer than 2 weeks to work or does she need a stronger dose? Please advise.

## 2023-10-24 NOTE — TELEPHONE ENCOUNTER
ROSA patient. Encouraged patient to continue to stay hydrated, wear compression socks and monitor her bp daily. Patient verbalize understanding, will call the office next week if things do not improve. Patient verbalized appreciation.

## 2023-10-24 NOTE — TELEPHONE ENCOUNTER
I would recommend that she give that a little bit longer.  If her symptoms persist can consider increasing fludrocortisone.

## 2023-10-30 ENCOUNTER — TELEMEDICINE (OUTPATIENT)
Dept: PSYCHIATRY | Facility: CLINIC | Age: 28
End: 2023-10-30
Payer: COMMERCIAL

## 2023-10-30 DIAGNOSIS — F51.05 INSOMNIA DUE TO MENTAL DISORDER: ICD-10-CM

## 2023-10-30 DIAGNOSIS — F33.1 MAJOR DEPRESSIVE DISORDER, RECURRENT EPISODE, MODERATE: Primary | ICD-10-CM

## 2023-10-30 DIAGNOSIS — F41.1 GENERALIZED ANXIETY DISORDER: ICD-10-CM

## 2023-10-30 DIAGNOSIS — F43.10 POST TRAUMATIC STRESS DISORDER (PTSD): ICD-10-CM

## 2023-10-30 PROCEDURE — 99214 OFFICE O/P EST MOD 30 MIN: CPT | Performed by: NURSE PRACTITIONER

## 2023-10-30 PROCEDURE — 1159F MED LIST DOCD IN RCRD: CPT | Performed by: NURSE PRACTITIONER

## 2023-10-30 PROCEDURE — 1160F RVW MEDS BY RX/DR IN RCRD: CPT | Performed by: NURSE PRACTITIONER

## 2023-10-30 NOTE — PROGRESS NOTES
"This provider is located at the [facility] (through [facility]), [address], [place], [address] using a secure [employer] Video Visit through [employer]. Patient is being seen remotely via telehealth at their home address in [place], and stated they are in a secure environment for this session. The patient's condition being diagnosed/treated is appropriate for telemedicine. The provider identified himself as well as his credentials.   The patient consent to be seen remotely, and when consent is given they understand that the consent allows for patient identifiable information to be sent to a third party as needed.   They may refuse to be seen remotely at any time. The electronic data is encrypted and password protected, and the patient  has been advised of the potential risks to privacy not withstanding such measures.    You have chosen to receive care through a telehealth visit.  Do you consent to use a video/audio connection for your medical care today? Yes    Patient identifiers utilized: Name and date of birth.    Patient verbally confirmed consent for today's encounter- yes    Subjective   Palmira Carbajal is a 28 y.o. female who presents today for follow-up appointment.     Chief Complaint:  Depression, Anxiety    History of Present Illness:     Depression/mood  \"Up and down\"  Increase in stress  \"A lot of stuff going on\"  Anxiety  Has been high  Excessive worries   Trouble relaxing  Working on positive coping skills  PTSD: endorses insomnia  Sleep disturbance: y  Low energy: n  Substance use: denies   Medication compliant  Side effects: denies  Refills needed    Prior Psychiatric Medications:  Zoloft, paxil, prozac, lexapro, desvenlafaxine, effexor, cymbalta, quetiapine, trazodone, hydroxyzine, ambien, lorazepam, buspar, abilify    The following portions of the patient's history were reviewed and updated as appropriate: allergies, current medications, past family " history, past medical history, past social history, past surgical history and problem list.    Allergy:   Allergies   Allergen Reactions    Cephalexin Diarrhea    Cephalosporins GI Intolerance    Cymbalta [Duloxetine Hcl] Other (See Comments)     Jittery and insomnia    Penicillins Rash        Current Medications:   Current Outpatient Medications   Medication Sig Dispense Refill    amitriptyline (ELAVIL) 10 MG tablet Take 1 tablet by mouth Every Night. 30 tablet 2    clindamycin-benzoyl peroxide (BenzaClin) 1-5 % gel Apply 1 application  topically to the appropriate area as directed Every Night. 35 g 0    dicyclomine (BENTYL) 20 MG tablet Take 1 tablet by mouth Every 6 (Six) Hours As Needed (abdominal cramping). 36 tablet 0    esomeprazole (nexIUM) 40 MG capsule Take 1 capsule by mouth Every Morning Before Breakfast. 30 capsule 3    fludrocortisone 0.1 MG tablet Take 1 tablet by mouth Daily. 30 tablet 3    lamoTRIgine (LaMICtal) 100 MG tablet Take 0.5 tablets by mouth 2 (Two) Times a Day for 90 days. 30 tablet 2    LORazepam (Ativan) 0.5 MG tablet Take 1 tablet by mouth Daily As Needed for Anxiety. 14 tablet 0    polyethylene glycol (MIRALAX) 17 g packet Take 17 g by mouth Daily. 5 packet 0    Rimegepant Sulfate (Nurtec) 75 MG tablet dispersible tablet Take 1 tablet by mouth Daily As Needed (migraine). 8 tablet 5    sodium chloride 0.9 % solution 100 mL with Eptinezumab-jjmr 100 MG/ML solution 100 mg Infuse 100 mg into a venous catheter Every 3 (Three) Months.      vitamin D3 (Vitamin D) 125 MCG (5000 UT) capsule capsule Take 1 capsule by mouth Daily. 90 capsule 1     No current facility-administered medications for this visit.       Mental Status Exam:   Hygiene:   good  Cooperation:  Cooperative  Eye Contact:  Good  Psychomotor Behavior:  Appropriate  Affect:  Full range  Mood: normal  Hopelessness: Denies  Speech:  Normal  Thought Process:  Linear  Thought Content:  Normal  Suicidal:  None  Homicidal:   None  Hallucinations:  None  Delusion:  None  Memory:  Intact  Orientation:  Person, Place, Time, and Situation  Reliability:  good  Insight:  Good  Judgement:  Good  Impulse Control:  Good  Physical/Medical Issues:  No      Physical Exam:   not currently breastfeeding. There is no height or weight on file to calculate BMI.   Due to the remote nature of this encounter (virtual encounter), vitals were unable to be obtained.  Weight change: n    PHQ-9 Depression Screening  Little interest or pleasure in doing things? (P) 1-->several days   Feeling down, depressed, or hopeless? (P) 1-->several days   Trouble falling or staying asleep, or sleeping too much? (P) 3-->nearly every day   Feeling tired or having little energy? (P) 2-->more than half the days   Poor appetite or overeating? (P) 1-->several days   Feeling bad about yourself - or that you are a failure or have let yourself or your family down? (P) 0-->not at all   Trouble concentrating on things, such as reading the newspaper or watching television? (P) 0-->not at all   Moving or speaking so slowly that other people could have noticed? Or the opposite - being so fidgety or restless that you have been moving around a lot more than usual? (P) 0-->not at all   Thoughts that you would be better off dead, or of hurting yourself in some way? (P) 0-->not at all   PHQ-9 Total Score (P) 8   If you checked off any problems, how difficult have these problems made it for you to do your work, take care of things at home, or get along with other people? (P) somewhat difficult     PHQ-9 Total Score: (P) 8    Feeling nervous, anxious or on edge: (P) Nearly every day  Not being able to stop or control worrying: (P) Nearly every day  Worrying too much about different things: (P) Nearly every day  Trouble Relaxing: (P) Nearly every day  Being so restless that it is hard to sit still: (P) Not at all  Feeling afraid as if something awful might happen: (P) Not at all  Becoming easily  annoyed or irritable: (P) Several days  STACY 7 Total Score: (P) 13  If you checked any problems, how difficult have these problems made it for you to do your work, take care of things at home, or get along with other people: (P) Somewhat difficult    Previous available Provider notes and records reviewed by this APRN at today's encounter.     Visit Diagnoses:    ICD-10-CM ICD-9-CM   1. Major depressive disorder, recurrent episode, moderate  F33.1 296.32   2. Generalized anxiety disorder  F41.1 300.02   3. Insomnia due to mental disorder  F51.05 300.9     327.02   4. Post traumatic stress disorder (PTSD)  F43.10 309.81       TREATMENT PLAN: Continue supportive psychotherapy efforts and medications as indicated.  Medication and treatment options, both pharmacological and non-pharmacological treatment options, discussed during today's visit, including any off label use of medication. Patient acknowledged and verbally consented with current treatment plan and was educated on the importance of compliance with treatment and follow-up appointments.      - Continue lamotrigine 50mg po bid to target depression  - Continue amitriptyline 10mg po qhs to target depression, anxiety and PTSD symptoms  - Will stop lorazepam, as not helping    Labs: None ordered at this time  Therapy: Referral for psychotherapy    MEDICATION ISSUES:  Discussed treatment plan and medication options of prescribed medication as well as the risks, benefits, any black box warnings, and side effects including potential falls, possible impaired driving, and metabolic adversities among others, including any off label use of medication. Patient is agreeable to call the office with any worsening of symptoms or onset of side effects, or if any concerns or questions arise.  The contact information for the office is made available to the patient. Patient is agreeable to call 911 or go to the nearest ER should they begin having any SI/HI, or if any urgent concerns  arise. No medication side effects or related complaints today. DEBORAH reviewed as expected.    RISK ASSESSMENT:  Risk of self-harm acutely is moderate.  Risk factors include anxiety disorder, mood disorder, and recent psychosocial stressors (pandemic). Protective factors include no family history, denies access to guns/weapons, no present SI, no history of suicide attempts or self-harm in the past, minimal AODA, healthcare seeking, future orientation, willingness to engage in care.  Risk of self-harm chronically is also moderate, but could be further elevated in the event of treatment noncompliance and/or AODA.    VERBAL INFORMED CONSENT FOR MEDICATION:  The patient was educated that their proposed/prescribed psychotropic medication(s) has potential risks, side effects, adverse effects, and black box warnings; and these have been discussed with the patient.  The patient has been informed that their treatment and medication dosage is to be individualized, and may even be above or below the recommended range/dosage due to patient individualization and response, but medication is prescribed using a shared decision making approach, and no medication or dosage will be prescribed without the patient's verbal consent.  The reason for the use of the medication including any off label use and alternative modes of treatment other than or in addition to medication has been considered and discussed, the probable consequences of not receiving the proposed treatment have been discussed, and any treatment side effects, black box warnings, and cautions associated with treatment have been discussed with the patient.  The patient is allowed ample time to openly discuss and ask questions regarding the proposed medication(s) and treatment plan and the patient verbalizes understanding the reasons for the use of the medication, its potential risks and benefits, other alternative treatment(s), and the probable consequences that may occur  if the proposed medication is not given.  The patient has been given ample time to ask questions and study the information and find the information to be specific, accurate, and complete.  The patient gives verbal consent for the medication(s) proposed/prescribed, they verbalized understanding that they can refuse and withdraw consent at any time with the assistance of this APRN, and the patient has verbally confirmed that they are aware, and are willing, to take the prescribed medication and follow the treatment plan with the known possible risks, side effect, black box warnings, and any potential medication interactions, and the patient reports they will be worse off without this medication and treatment plan.  The patient is advised to contact this APRN/this office if any questions or concerns arise at any time (at [phone]), or call 911/go to the closest emergency department if needed or outside of office hours.      [facility] No Show Policy:  We understand unexpected circumstances arise; however, anytime you miss your appointment we are unable to provide you appropriate care.  In addition, each appointment missed could have been used to provide care for others.  We ask that you call at least 24 hours in advance to cancel or reschedule an appointment.  We would like to take this opportunity to remind you of our policy stating patients who miss THREE or more appointments without cancelling or rescheduling 24 hours in advance of the appointment may be subject to cancellation of any further visits with our clinic and recommendation to seek in-person services/visits.    Please call [phone] to reschedule your appointment. If there are reasons that make it difficult for you to keep the appointments, please call and let us know how we can help.  Please understand that medication prescribing will not continue without seeing your provider.      [facility]'s No  Show Policy reviewed with patient at today's visit. Patient verbalized understanding of this policy. Discussed with patient that in the event that there are three or more no show visits, it will be recommended that they pursue in-person services/visits as noncompliance with telehealth visits indicates that patient is not an appropriate candidate for telemedicine and would likely be more appropriate for in-person services/visits. Patient verbalizes understanding and is agreeable to this.    MEDS ORDERED DURING VISIT:  No orders of the defined types were placed in this encounter.      Return in about 4 weeks (around 11/27/2023) for Next scheduled follow up.         Progress toward goal: Not at goal    Functional Status: No impairment    Prognosis: Good with Ongoing Treatment     This document has been electronically signed by MINI Poon  October 30, 2023 12:33 EDT      Please note that portions of this note were completed with a voice recognition program.

## 2023-10-31 ENCOUNTER — OFFICE VISIT (OUTPATIENT)
Dept: GASTROENTEROLOGY | Facility: CLINIC | Age: 28
End: 2023-10-31
Payer: COMMERCIAL

## 2023-10-31 ENCOUNTER — LAB (OUTPATIENT)
Dept: LAB | Facility: HOSPITAL | Age: 28
End: 2023-10-31
Payer: COMMERCIAL

## 2023-10-31 VITALS
BODY MASS INDEX: 30.29 KG/M2 | HEART RATE: 90 BPM | WEIGHT: 177.4 LBS | SYSTOLIC BLOOD PRESSURE: 112 MMHG | DIASTOLIC BLOOD PRESSURE: 61 MMHG | HEIGHT: 64 IN

## 2023-10-31 DIAGNOSIS — K26.9 DUODENAL ULCER: ICD-10-CM

## 2023-10-31 DIAGNOSIS — K76.0 FATTY LIVER: ICD-10-CM

## 2023-10-31 DIAGNOSIS — R10.30 LOWER ABDOMINAL PAIN: ICD-10-CM

## 2023-10-31 DIAGNOSIS — K21.00 GASTROESOPHAGEAL REFLUX DISEASE WITH ESOPHAGITIS WITHOUT HEMORRHAGE: ICD-10-CM

## 2023-10-31 DIAGNOSIS — K76.0 FATTY LIVER: Primary | ICD-10-CM

## 2023-10-31 LAB
ALPHA1 GLOB MFR UR ELPH: 156 MG/DL (ref 90–200)
CERULOPLASMIN SERPL-MCNC: 26 MG/DL (ref 19–39)
HAV IGM SERPL QL IA: NORMAL
HBV CORE IGM SERPL QL IA: NORMAL
HBV SURFACE AG SERPL QL IA: NORMAL
HCV AB SER DONR QL: NORMAL
INR PPP: 1.03 (ref 0.86–1.15)
IRON 24H UR-MRATE: 63 MCG/DL (ref 37–145)
IRON SATN MFR SERPL: 17 % (ref 20–50)
PROTHROMBIN TIME: 13.6 SECONDS (ref 11.8–14.9)
TIBC SERPL-MCNC: 371 MCG/DL (ref 298–536)
TRANSFERRIN SERPL-MCNC: 249 MG/DL (ref 200–360)

## 2023-10-31 PROCEDURE — 82784 ASSAY IGA/IGD/IGG/IGM EACH: CPT

## 2023-10-31 PROCEDURE — 83540 ASSAY OF IRON: CPT

## 2023-10-31 PROCEDURE — 99214 OFFICE O/P EST MOD 30 MIN: CPT | Performed by: NURSE PRACTITIONER

## 2023-10-31 PROCEDURE — 36415 COLL VENOUS BLD VENIPUNCTURE: CPT

## 2023-10-31 PROCEDURE — 1159F MED LIST DOCD IN RCRD: CPT | Performed by: NURSE PRACTITIONER

## 2023-10-31 PROCEDURE — 1160F RVW MEDS BY RX/DR IN RCRD: CPT | Performed by: NURSE PRACTITIONER

## 2023-10-31 PROCEDURE — 82390 ASSAY OF CERULOPLASMIN: CPT | Performed by: NURSE PRACTITIONER

## 2023-10-31 PROCEDURE — 86381 MITOCHONDRIAL ANTIBODY EACH: CPT | Performed by: NURSE PRACTITIONER

## 2023-10-31 PROCEDURE — 82103 ALPHA-1-ANTITRYPSIN TOTAL: CPT | Performed by: NURSE PRACTITIONER

## 2023-10-31 PROCEDURE — 86038 ANTINUCLEAR ANTIBODIES: CPT | Performed by: NURSE PRACTITIONER

## 2023-10-31 PROCEDURE — 84466 ASSAY OF TRANSFERRIN: CPT

## 2023-10-31 PROCEDURE — 80074 ACUTE HEPATITIS PANEL: CPT | Performed by: NURSE PRACTITIONER

## 2023-10-31 PROCEDURE — 86334 IMMUNOFIX E-PHORESIS SERUM: CPT

## 2023-10-31 PROCEDURE — 86015 ACTIN ANTIBODY EACH: CPT | Performed by: NURSE PRACTITIONER

## 2023-10-31 PROCEDURE — 85610 PROTHROMBIN TIME: CPT

## 2023-10-31 RX ORDER — DICYCLOMINE HCL 20 MG
20 TABLET ORAL EVERY 6 HOURS PRN
Qty: 60 TABLET | Refills: 3 | Status: SHIPPED | OUTPATIENT
Start: 2023-10-31

## 2023-10-31 RX ORDER — ESOMEPRAZOLE MAGNESIUM 40 MG/1
40 CAPSULE, DELAYED RELEASE ORAL
Qty: 90 CAPSULE | Refills: 3 | Status: SHIPPED | OUTPATIENT
Start: 2023-10-31

## 2023-10-31 NOTE — PROGRESS NOTES
Chief Complaint   Heartburn and Fatty Liver    History of Present Illness       Palmira Carbajal is a 28 y.o. female who presents to [facility] for follow-up for GERD. She was last seen in the office by me on 7/5/23.     Underwent colonoscopy with Dr. Griffin on 10/11/2022.  Colonoscopy was normal except for some internal and external nonbleeding hemorrhoids.  Recall in 5 years.     Significant GI FH colon cancer with father.      While in the ER this last time she had CT scan of the abd/pelvis done that showed:  IMPRESSION:                 1. No acute intra-abdominal or intrapelvic process.  2. Hepatic steatosis.  3. Ancillary findings as described above.     Most recent LFTs were normal. Never had liver serology done. Never diagnosed with liver disease in the past.      She underwent EGD with Dr. Griffin on 9/8/2023.  EGD showed duodenal erosion with gastritis.  LA grade a reflux esophagitis.  Path positive for reflux esophagitis.  Mild nonspecific changes in the duodenum.  Recommend celiac testing.    Celiac testing came back normal.    She does feel like she is doing well with nexium 40 mg everyday. Denies any breakthrough reflux at this time. Bowels moving well right now. No longer having any N&V. Will use miralax PRN. Hasn't had to use it in a while. Will still use bentyl PRN.   Results       Result Review :       CMP          3/7/2023    12:24 5/28/2023    10:33 8/23/2023    16:34   CMP   Glucose 83  98  108    BUN 7  8  8    Creatinine 0.80  0.70  0.76    EGFR 102.4  120.2  108.3    Sodium 139  129  137    Potassium 4.4  3.8  3.8    Chloride 102  95  104    Calcium 9.3  9.0  9.0    Total Protein  7.0  7.2    Albumin  3.9  4.1    Globulin  3.1  3.1    Total Bilirubin  0.2  <0.2    Alkaline Phosphatase  97  91    AST (SGOT)  13  11    ALT (SGPT)  16  13    Albumin/Globulin Ratio  1.3  1.3    BUN/Creatinine Ratio 8.8  11.4  10.5    Anion Gap 11.7  9.8  11.2      CBC           2023    10:33 2023    16:34 2023    11:22   CBC   WBC 17.81  16.99  15.13    RBC 4.79  4.96  5.23    Hemoglobin 13.1  13.6  14.1    Hematocrit 39.6  41.0  43.5    MCV 82.7  82.7  83.2    MCH 27.3  27.4  27.0    MCHC 33.1  33.2  32.4    RDW 13.2  13.8  12.9    Platelets 381  393  426      Lipid Panel          2023    11:22   Lipid Panel   Total Cholesterol 167    Triglycerides 82    HDL Cholesterol 56    VLDL Cholesterol 15    LDL Cholesterol  96    LDL/HDL Ratio 1.69      TSH          2023    16:34   TSH   TSH 1.510        Lipase   Lipase   Date Value Ref Range Status   2023 23 13 - 60 U/L Final     Amylase   Amylase   Date Value Ref Range Status   2020 36 30 - 110 U/L Final               Past Medical History       Past Medical History:   Diagnosis Date    Acid reflux     Anxiety     Chronic pain disorder     Depression     Eczema     Fatty liver     Intractable migraine without aura and without status migrainosus 2023    Kidney stone     HX OF    Maltracking of right patella     Migraines     Panic disorder     PONV (postoperative nausea and vomiting)     GOOD RESULTS WITH SCOPALAMINE    PTSD (post-traumatic stress disorder)     WAKES UP FROM ANESTHESIA WITH PANIC ATTACK    Seasonal allergic rhinitis 2022    Self-injurious behavior     as a teenager    Suicide attempt        Past Surgical History:   Procedure Laterality Date     SECTION      COLONOSCOPY N/A 10/11/2022    Procedure: COLONOSCOPY;  Surgeon: Dyan Griffin MD;  Location: [facility] ENDOSCOPY;  Service: Gastroenterology;  Laterality: N/A;  HEMORRHOIDS    CYSTOSCOPY      CYSTOSCOPY URETEROSCOPY Left 2023    Procedure: CYSTOSCOPY URETEROSCOPY RETROGRADE PYELOGRAM HOLMIUM LASER STENT INSERTION, left;  Surgeon: Melisa Garcia MD;  Location: [facility] MAIN OR;  Service: Urology;  Laterality: Left;    ENDOSCOPY N/A 2023    Procedure: ESOPHAGOGASTRODUODENOSCOPY WITH BIOPSIES;  Surgeon:  Dyan Griffin MD;  Location: [facility] ENDOSCOPY;  Service: Gastroenterology;  Laterality: N/A;  ESOPHAGITIS, GASTRITIS    HYSTERECTOMY      KIDNEY STONE SURGERY      unspecified    KNEE ARTHROSCOPY Right 7/11/2022    Procedure: KNEE ARTHROSCOPY WITH A LATERAL RELEASE AND CHONDROPLASTY;  Surgeon: Marco A Pitts MD;  Location: [facility];  Service: Orthopedics;  Laterality: Right;    WISDOM TOOTH EXTRACTION           Current Outpatient Medications:     amitriptyline (ELAVIL) 10 MG tablet, Take 1 tablet by mouth Every Night., Disp: 30 tablet, Rfl: 2    clindamycin-benzoyl peroxide (BenzaClin) 1-5 % gel, Apply 1 application  topically to the appropriate area as directed Every Night., Disp: 35 g, Rfl: 0    dicyclomine (BENTYL) 20 MG tablet, Take 1 tablet by mouth Every 6 (Six) Hours As Needed (abdominal cramping)., Disp: 60 tablet, Rfl: 3    esomeprazole (nexIUM) 40 MG capsule, Take 1 capsule by mouth Every Morning Before Breakfast., Disp: 90 capsule, Rfl: 3    fludrocortisone 0.1 MG tablet, Take 1 tablet by mouth Daily., Disp: 30 tablet, Rfl: 3    lamoTRIgine (LaMICtal) 100 MG tablet, Take 0.5 tablets by mouth 2 (Two) Times a Day for 90 days., Disp: 30 tablet, Rfl: 2    LORazepam (Ativan) 0.5 MG tablet, Take 1 tablet by mouth Daily As Needed for Anxiety., Disp: 14 tablet, Rfl: 0    polyethylene glycol (MIRALAX) 17 g packet, Take 17 g by mouth Daily., Disp: 5 packet, Rfl: 0    Rimegepant Sulfate (Nurtec) 75 MG tablet dispersible tablet, Take 1 tablet by mouth Daily As Needed (migraine)., Disp: 8 tablet, Rfl: 5    sodium chloride 0.9 % solution 100 mL with Eptinezumab-jjmr 100 MG/ML solution 100 mg, Infuse 100 mg into a venous catheter Every 3 (Three) Months., Disp: , Rfl:     vitamin D3 (Vitamin D) 125 MCG (5000 UT) capsule capsule, Take 1 capsule by mouth Daily., Disp: 90 capsule, Rfl: 1     Allergies   Allergen Reactions    Cephalexin Diarrhea    Cephalosporins GI Intolerance    Cymbalta [Duloxetine  "Hcl] Other (See Comments)     Jittery and insomnia    Penicillins Rash       Family History   Problem Relation Age of Onset    Arthritis Mother     Restless legs syndrome Mother     Thyroid disease Father     Colon polyps Father     Diabetes Father     Drug abuse Maternal Uncle     Alcohol abuse Maternal Uncle     Cancer Maternal Grandmother     Sleep apnea Son     Malig Hyperthermia Neg Hx         Social History     Social History Narrative    Not on file       Objective       Review of Systems   Constitutional:  Negative for appetite change, fatigue, fever, unexpected weight gain and unexpected weight loss.   HENT:  Negative for trouble swallowing.    Respiratory:  Negative for cough, choking, chest tightness, shortness of breath, wheezing and stridor.    Cardiovascular:  Negative for chest pain, palpitations and leg swelling.   Gastrointestinal:  Negative for abdominal distention, abdominal pain, anal bleeding, blood in stool, constipation, diarrhea, nausea, rectal pain, vomiting, GERD and indigestion.        Vital Signs:   /61 (BP Location: Left arm, Patient Position: Sitting, Cuff Size: Adult)   Pulse 90   Ht 162.6 cm (64\")   Wt 80.5 kg (177 lb 6.4 oz)   BMI 30.45 kg/m²       Physical Exam  Constitutional:       General: She is not in acute distress.     Appearance: She is well-developed. She is not ill-appearing.   HENT:      Head: Normocephalic.   Eyes:      Pupils: Pupils are equal, round, and reactive to light.   Cardiovascular:      Rate and Rhythm: Normal rate and regular rhythm.      Heart sounds: Normal heart sounds.   Pulmonary:      Effort: Pulmonary effort is normal.      Breath sounds: Normal breath sounds.   Abdominal:      General: Bowel sounds are normal. There is no distension.      Palpations: Abdomen is soft. There is no mass.      Tenderness: There is no abdominal tenderness. There is no guarding or rebound.      Hernia: No hernia is present.   Musculoskeletal:         General: " Normal range of motion.   Skin:     General: Skin is warm and dry.   Neurological:      Mental Status: She is alert and oriented to person, place, and time.   Psychiatric:         Speech: Speech normal.         Behavior: Behavior normal.         Judgment: Judgment normal.           Assessment & Plan          Assessment and Plan    Diagnoses and all orders for this visit:    1. Fatty liver (Primary)  -     Liver Elastography; Future  -     Protime-INR; Future  -     Hepatitis Panel, Acute  -     LUDA  -     Alpha - 1 - Antitrypsin  -     Anti-Smooth Muscle Antibody Titer  -     Ceruloplasmin  -     Immunofixation, Serum; Future  -     Iron Profile; Future  -     Mitochondrial Antibodies, M2    2. Gastroesophageal reflux disease with esophagitis without hemorrhage  -     esomeprazole (nexIUM) 40 MG capsule; Take 1 capsule by mouth Every Morning Before Breakfast.  Dispense: 90 capsule; Refill: 3    3. Duodenal ulcer    4. Lower abdominal pain  -     dicyclomine (BENTYL) 20 MG tablet; Take 1 tablet by mouth Every 6 (Six) Hours As Needed (abdominal cramping).  Dispense: 60 tablet; Refill: 3    Reviewed EGD results with her today.  GERD definitely seems better on Nexium 40 mg daily.  Continue to precautions.  Bowels moving well currently.  Continue bentyl as needed.  Most recent LFTs were normal.  We will check liver serology and FibroScan for further evaluation.  Overall she seems to be doing well.  Patient to call the office with any issues.  Patient to follow-up with me in 6 months.  Patient is agreeable to the plan.            Follow Up       Follow Up   Return in about 6 months (around 4/30/2024) for FATTY LIVER.  Patient was given instructions and counseling regarding her condition or for health maintenance advice. Please see specific information pulled into the AVS if appropriate.

## 2023-11-02 LAB
ANA SER QL: NEGATIVE
IGA SERPL-MCNC: 187 MG/DL (ref 87–352)
IGG SERPL-MCNC: 941 MG/DL (ref 586–1602)
IGM SERPL-MCNC: 93 MG/DL (ref 26–217)
MITOCHONDRIA M2 IGG SER-ACNC: <20 UNITS (ref 0–20)
PROT PATTERN SERPL IFE-IMP: NORMAL
SMA IGG SER-ACNC: 4 UNITS (ref 0–19)

## 2023-11-03 ENCOUNTER — TELEPHONE (OUTPATIENT)
Dept: GASTROENTEROLOGY | Facility: CLINIC | Age: 28
End: 2023-11-03
Payer: COMMERCIAL

## 2023-11-03 NOTE — TELEPHONE ENCOUNTER
----- Message from MINI Bridges sent at 11/2/2023  3:47 PM EDT -----  Liver serology looks good.  Thanks

## 2023-11-08 ENCOUNTER — PRIOR AUTHORIZATION (OUTPATIENT)
Dept: NEUROLOGY | Facility: CLINIC | Age: 28
End: 2023-11-08
Payer: COMMERCIAL

## 2023-11-08 NOTE — TELEPHONE ENCOUNTER
Nurtec PA due for renewal 11/14/23 - submitted through [facility]

## 2023-11-10 ENCOUNTER — PRIOR AUTHORIZATION (OUTPATIENT)
Dept: NEUROLOGY | Facility: CLINIC | Age: 28
End: 2023-11-10
Payer: COMMERCIAL

## 2023-11-16 ENCOUNTER — TELEMEDICINE (OUTPATIENT)
Dept: PSYCHIATRY | Facility: CLINIC | Age: 28
End: 2023-11-16
Payer: COMMERCIAL

## 2023-11-16 DIAGNOSIS — F43.10 POST TRAUMATIC STRESS DISORDER (PTSD): ICD-10-CM

## 2023-11-16 DIAGNOSIS — F41.1 GENERALIZED ANXIETY DISORDER: ICD-10-CM
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
## 2023-11-27 ENCOUNTER — TELEMEDICINE (OUTPATIENT)
Dept: PSYCHIATRY | Facility: CLINIC | Age: 28
End: 2023-11-27
Payer: COMMERCIAL

## 2023-11-27 DIAGNOSIS — F41.1 GENERALIZED ANXIETY DISORDER: ICD-10-CM

## 2023-11-27 DIAGNOSIS — F33.1 MAJOR DEPRESSIVE DISORDER, RECURRENT EPISODE, MODERATE: Primary | ICD-10-CM

## 2023-11-27 DIAGNOSIS — F43.10 POST TRAUMATIC STRESS DISORDER (PTSD): ICD-10-CM

## 2023-11-27 PROCEDURE — 1160F RVW MEDS BY RX/DR IN RCRD: CPT | Performed by: NURSE PRACTITIONER

## 2023-11-27 PROCEDURE — 99214 OFFICE O/P EST MOD 30 MIN: CPT | Performed by: NURSE PRACTITIONER

## 2023-11-27 PROCEDURE — 1159F MED LIST DOCD IN RCRD: CPT | Performed by: NURSE PRACTITIONER

## 2023-11-27 RX ORDER — OLANZAPINE 2.5 MG/1
2.5 TABLET, FILM COATED ORAL NIGHTLY
Qty: 30 TABLET | Refills: 0 | Status: SHIPPED | OUTPATIENT
Start: 2023-11-27

## 2023-11-27 RX ORDER — LAMOTRIGINE 100 MG/1
50 TABLET ORAL 2 TIMES DAILY
Qty: 30 TABLET | Refills: 2 | Status: SHIPPED | OUTPATIENT
Start: 2023-11-27 | End: 2024-02-25

## 2023-11-27 RX ORDER — AMITRIPTYLINE HYDROCHLORIDE 10 MG/1
10 TABLET, FILM COATED ORAL NIGHTLY
Qty: 30 TABLET | Refills: 2 | Status: SHIPPED | OUTPATIENT
Start: 2023-11-27

## 2023-11-27 NOTE — PROGRESS NOTES
"This provider is located at the [facility] (through [facility]), [address], [facility], [address] using a secure [employer] Video Visit through [employer]. Patient is being seen remotely via telehealth at their home address in [place], and stated they are in a secure environment for this session. The patient's condition being diagnosed/treated is appropriate for telemedicine. The provider identified himself as well as his credentials.   The patient consent to be seen remotely, and when consent is given they understand that the consent allows for patient identifiable information to be sent to a third party as needed.   They may refuse to be seen remotely at any time. The electronic data is encrypted and password protected, and the patient  has been advised of the potential risks to privacy not withstanding such measures.    You have chosen to receive care through a telehealth visit.  Do you consent to use a video/audio connection for your medical care today? Yes    Patient identifiers utilized: Name and date of birth.    Patient verbally confirmed consent for today's encounter- yes    Subjective   Palmira Carbajal is a 28 y.o. female who presents today for follow-up appointment.     Chief Complaint:  Depression, anxiety    History of Present Illness:     Depression/mood  Has been low a times  \"Stressful time right now\"- holidays  Anxiety  Has been high   Excessive worries   Working on positive coping skills  PTSD: endorses insomnia  Sleep disturbance: y  Low energy: y  Substance use: n  Medication compliant  Side effects: denies  Refills needed    Prior Psychiatric Medications:  Zoloft, paxil, prozac, lexapro, desvenlafaxine, effexor, cymbalta, quetiapine, trazodone, hydroxyzine, ambien, lorazepam, buspar, abilify     The following portions of the patient's history were reviewed and updated as appropriate: allergies, current medications, past family history, past medical " history, past social history, past surgical history and problem list.    Allergy:   Allergies   Allergen Reactions    Cephalexin Diarrhea    Cephalosporins GI Intolerance    Cymbalta [Duloxetine Hcl] Other (See Comments)     Jittery and insomnia    Penicillins Rash        Current Medications:   Current Outpatient Medications   Medication Sig Dispense Refill    amitriptyline (ELAVIL) 10 MG tablet Take 1 tablet by mouth Every Night. 30 tablet 2    lamoTRIgine (LaMICtal) 100 MG tablet Take 0.5 tablets by mouth 2 (Two) Times a Day for 90 days. 30 tablet 2    clindamycin-benzoyl peroxide (BenzaClin) 1-5 % gel Apply 1 application  topically to the appropriate area as directed Every Night. 35 g 0    dicyclomine (BENTYL) 20 MG tablet Take 1 tablet by mouth Every 6 (Six) Hours As Needed (abdominal cramping). 60 tablet 3    esomeprazole (nexIUM) 40 MG capsule Take 1 capsule by mouth Every Morning Before Breakfast. 90 capsule 3    fludrocortisone 0.1 MG tablet Take 1 tablet by mouth Daily. 30 tablet 3    LORazepam (Ativan) 0.5 MG tablet Take 1 tablet by mouth Daily As Needed for Anxiety. 14 tablet 0    OLANZapine (ZyPREXA) 2.5 MG tablet Take 1 tablet by mouth Every Night. 30 tablet 0    polyethylene glycol (MIRALAX) 17 g packet Take 17 g by mouth Daily. 5 packet 0    Rimegepant Sulfate (Nurtec) 75 MG tablet dispersible tablet Take 1 tablet by mouth Daily As Needed (migraine). 8 tablet 5    sodium chloride 0.9 % solution 100 mL with Eptinezumab-jjmr 100 MG/ML solution 100 mg Infuse 100 mg into a venous catheter Every 3 (Three) Months.      vitamin D3 (Vitamin D) 125 MCG (5000 UT) capsule capsule Take 1 capsule by mouth Daily. 90 capsule 1     No current facility-administered medications for this visit.       Mental Status Exam:   Hygiene:   good  Cooperation:  Cooperative  Eye Contact:  Good  Psychomotor Behavior:  Appropriate  Affect:  Full range  Mood: normal  Hopelessness: Denies  Speech:  Normal  Thought Process:  Goal  directed  Thought Content:  Normal  Suicidal:  None  Homicidal:  None  Hallucinations:  None  Delusion:  None  Memory:  Intact  Orientation:  Person, Place, Time, and Situation  Reliability:  good  Insight:  Good  Judgement:  Good  Impulse Control:  Good  Physical/Medical Issues:  No      Physical Exam:   not currently breastfeeding. There is no height or weight on file to calculate BMI.   Due to the remote nature of this encounter (virtual encounter), vitals were unable to be obtained.  Weight change: n    PHQ-9 Depression Screening  Little interest or pleasure in doing things? (P) 0-->not at all   Feeling down, depressed, or hopeless? (P) 1-->several days   Trouble falling or staying asleep, or sleeping too much? (P) 3-->nearly every day   Feeling tired or having little energy? (P) 1-->several days   Poor appetite or overeating? (P) 1-->several days   Feeling bad about yourself - or that you are a failure or have let yourself or your family down? (P) 1-->several days   Trouble concentrating on things, such as reading the newspaper or watching television? (P) 0-->not at all   Moving or speaking so slowly that other people could have noticed? Or the opposite - being so fidgety or restless that you have been moving around a lot more than usual? (P) 0-->not at all   Thoughts that you would be better off dead, or of hurting yourself in some way? (P) 0-->not at all   PHQ-9 Total Score (P) 7   If you checked off any problems, how difficult have these problems made it for you to do your work, take care of things at home, or get along with other people? (P) not difficult at all     PHQ-9 Total Score: (P) 7    Feeling nervous, anxious or on edge: (P) More than half the days  Not being able to stop or control worrying: (P) Nearly every day  Worrying too much about different things: (P) Nearly every day  Trouble Relaxing: (P) Nearly every day  Being so restless that it is hard to sit still: (P) Not at all  Feeling afraid as  if something awful might happen: (P) More than half the days  Becoming easily annoyed or irritable: (P) Several days  STACY 7 Total Score: (P) 14  If you checked any problems, how difficult have these problems made it for you to do your work, take care of things at home, or get along with other people: (P) Very difficult    Previous available Provider notes and records reviewed by this APRN at today's encounter.     Visit Diagnoses:    ICD-10-CM ICD-9-CM   1. Major depressive disorder, recurrent episode, moderate  F33.1 296.32   2. Generalized anxiety disorder  F41.1 300.02   3. Post traumatic stress disorder (PTSD)  F43.10 309.81       TREATMENT PLAN: Continue supportive psychotherapy efforts and medications.  Medication and treatment options, both pharmacological and non-pharmacological treatment options, discussed during today's visit, including any off label use of medication. Patient acknowledged and verbally consented with current treatment plan and was educated on the importance of compliance with treatment and follow-up appointments.      - Continue lamotrigine 50mg po bid to target depression  - Continue amitriptyline 10mg po qhs to target depression, anxiety and PTSD symptoms  - Start olanzapine 2.5mg po qhs to target depression    Labs: None ordered at this time  Therapy: Psychotherapy referral     MEDICATION ISSUES:  Discussed treatment plan and medication options of prescribed medication as well as the risks, benefits, any black box warnings, and side effects including potential falls, possible impaired driving, and metabolic adversities among others, including any off label use of medication. Patient is agreeable to call the office with any worsening of symptoms or onset of side effects, or if any concerns or questions arise.  The contact information for the office is made available to the patient. Patient is agreeable to call 911 or go to the nearest ER should they begin having any SI/HI, or if any  urgent concerns arise. No medication side effects or related complaints today. DEBORAH reviewed as expected.    RISK ASSESSMENT:  Risk of self-harm acutely is moderate.  Risk factors include anxiety disorder, mood disorder, and recent psychosocial stressors (pandemic). Protective factors include no family history, denies access to guns/weapons, no present SI, no history of suicide attempts or self-harm in the past, minimal AODA, healthcare seeking, future orientation, willingness to engage in care.  Risk of self-harm chronically is also moderate, but could be further elevated in the event of treatment noncompliance and/or AODA.    VERBAL INFORMED CONSENT FOR MEDICATION:  The patient was educated that their proposed/prescribed psychotropic medication(s) has potential risks, side effects, adverse effects, and black box warnings; and these have been discussed with the patient.  The patient has been informed that their treatment and medication dosage is to be individualized, and may even be above or below the recommended range/dosage due to patient individualization and response, but medication is prescribed using a shared decision making approach, and no medication or dosage will be prescribed without the patient's verbal consent.  The reason for the use of the medication including any off label use and alternative modes of treatment other than or in addition to medication has been considered and discussed, the probable consequences of not receiving the proposed treatment have been discussed, and any treatment side effects, black box warnings, and cautions associated with treatment have been discussed with the patient.  The patient is allowed ample time to openly discuss and ask questions regarding the proposed medication(s) and treatment plan and the patient verbalizes understanding the reasons for the use of the medication, its potential risks and benefits, other alternative treatment(s), and the probable consequences  that may occur if the proposed medication is not given.  The patient has been given ample time to ask questions and study the information and find the information to be specific, accurate, and complete.  The patient gives verbal consent for the medication(s) proposed/prescribed, they verbalized understanding that they can refuse and withdraw consent at any time with the assistance of this APRN, and the patient has verbally confirmed that they are aware, and are willing, to take the prescribed medication and follow the treatment plan with the known possible risks, side effect, black box warnings, and any potential medication interactions, and the patient reports they will be worse off without this medication and treatment plan.  The patient is advised to contact this APRN/this office if any questions or concerns arise at any time (at [phone]), or call 911/go to the closest emergency department if needed or outside of office hours.      [facility] No Show Policy:  We understand unexpected circumstances arise; however, anytime you miss your appointment we are unable to provide you appropriate care.  In addition, each appointment missed could have been used to provide care for others.  We ask that you call at least 24 hours in advance to cancel or reschedule an appointment.  We would like to take this opportunity to remind you of our policy stating patients who miss THREE or more appointments without cancelling or rescheduling 24 hours in advance of the appointment may be subject to cancellation of any further visits with our clinic and recommendation to seek in-person services/visits.    Please call [phone] to reschedule your appointment. If there are reasons that make it difficult for you to keep the appointments, please call and let us know how we can help.  Please understand that medication prescribing will not continue without seeing your provider.      [facility]  Clinic's No Show Policy reviewed with patient at today's visit. Patient verbalized understanding of this policy. Discussed with patient that in the event that there are three or more no show visits, it will be recommended that they pursue in-person services/visits as noncompliance with telehealth visits indicates that patient is not an appropriate candidate for telemedicine and would likely be more appropriate for in-person services/visits. Patient verbalizes understanding and is agreeable to this.    MEDS ORDERED DURING VISIT:  New Medications Ordered This Visit   Medications    OLANZapine (ZyPREXA) 2.5 MG tablet     Sig: Take 1 tablet by mouth Every Night.     Dispense:  30 tablet     Refill:  0    amitriptyline (ELAVIL) 10 MG tablet     Sig: Take 1 tablet by mouth Every Night.     Dispense:  30 tablet     Refill:  2    lamoTRIgine (LaMICtal) 100 MG tablet     Sig: Take 0.5 tablets by mouth 2 (Two) Times a Day for 90 days.     Dispense:  30 tablet     Refill:  2       Return in about 4 weeks (around 12/25/2023) for Next scheduled follow up.         Progress toward goal: Not at goal    Functional Status: No impairment    Prognosis: Good with Ongoing Treatment     This document has been electronically signed by MINI Poon  November 27, 2023 12:55 EST      Please note that portions of this note were completed with a voice recognition program.

## 2023-12-04 ENCOUNTER — TELEMEDICINE (OUTPATIENT)
Dept: PSYCHIATRY | Facility: CLINIC | Age: 28
End: 2023-12-04
Payer: COMMERCIAL

## 2023-12-04 DIAGNOSIS — F33.1 MAJOR DEPRESSIVE DISORDER, RECURRENT EPISODE, MODERATE: ICD-10-CM

## 2023-12-04 DIAGNOSIS — F43.10 POST TRAUMATIC STRESS DISORDER (PTSD): ICD-10-CM

## 2023-12-04 DIAGNOSIS — F41.1 GENERALIZED ANXIETY DISORDER: ICD-10-CM

## 2023-12-04 PROCEDURE — 90837 PSYTX W PT 60 MINUTES: CPT

## 2023-12-04 NOTE — PROGRESS NOTES
Date: 2023  Time In: 10:00 EST  Time out: 10:57EST    This provider is located at [facility], [address], [place], [place], [facility], using a secure MyChart Video Visit through [employer]. Patient is being seen remotely via telehealth at their home address is located in [place]. Patient stated they are in a secure environment for this session. The patient's condition being diagnosed and treated is appropriate for telemedicine. The provider identified themself as well as their credentials. The patient, or  patient's legal guardian consent to be seen remotely, and when consent is given they understand that the consent allows for patient identifiable information to be sent to a third party as needed. They may refuse to be seen remotely at any time. The electronic data is encrypted and password protected, and the patient's or  legal guardian has been advised of the potential risks to privacy not withstanding such measures.   PT Identifiers used: Name and .    You have chosen to receive care through a telehealth visit.  Do you consent to use a video/audio connection for your medical care today? Yes    Subjective   Palmira Carbajal is a 28 y.o. female who presents today for follow up    Chief Complaint:   Chief Complaint   Patient presents with    Depression    Anxiety    PTSD            Clinical Maneuvering/Intervention: Clinician utilized strengths based approach during session and CBT.        Assisted patient in processing above session content; acknowledged and normalized patient’s thoughts, feelings, and concerns.  Rationalized patient thought process regarding concerns presented at session.  Discussed triggers associated with patient's  anxiety , depression , and PTSD Also discussed coping skills for patient to implement such as grounding , 4:6 breathing , and mindfulness     Allowed patient to freely discuss issues without interruption or judgment. Provided safe, confidential  environment to facilitate the development of positive therapeutic relationship and encourage open, honest communication. Assisted patient in identifying risk factors which would indicate the need for higher level of care including thoughts to harm self or others and/or self-harming behavior and encouraged patient to contact this office, call 911, or present to the nearest emergency room should any of these events occur. Discussed crisis intervention services and means to access. Patient adamantly and convincingly denies current suicidal or homicidal ideation or perceptual disturbance.    Assessment: Patient appears to have experienced an increased in anxiety since last session due to holiday and sick pet. Patient reported implementing some new coping skills into daily life-helpful. Patient appears motivated to continue to learn new coping skills to aid with mental health.    Patient appears to maintain relative stability as compared to their baseline.  However, patient continues to struggle with   Chief Complaint   Patient presents with    Depression    Anxiety    PTSD    which continues to cause impairment in important areas of functioning.  A result, they can be reasonably expected to continue to benefit from treatment and would likely be at increased risk for decompensation otherwise.        Mental Status Exam:   Hygiene:   good  Cooperation:  Cooperative  Eye Contact:  Good  Psychomotor Behavior:  Appropriate  Affect:  Appropriate  Mood: normal  Speech:  Normal  Thought Process:  Goal directed  Thought Content:  Normal  Suicidal:  None  Homicidal:  None  Hallucinations:  None  Delusion:  None  Memory:  Intact  Orientation:  Person, Place, and Time  Reliability:  good  Insight:  Good  Judgement:  Good  Impulse Control:  Good  Physical/Medical Issues:  No        Patient's Support Network Includes:   and children    Functional Status: No impairment    Progress toward goal: Not at goal    Prognosis: Fair with  Ongoing Treatment         Plan:     Patient will continue in individual outpatient therapy with focus on improved functioning and coping skills, maintaining stability, and avoiding decompensation and the need for higher level of care.    Patient will adhere to medication regimen as prescribed and report any side effects. Patient will contact this office, call 911 or present to the nearest emergency room should suicidal or homicidal ideations occur. Provide Cognitive Behavioral Therapy and Solution Focused Therapy to improve functioning, maintain stability, and avoid decompensation and the need for higher level of care.     Clinician and patient will process trauma as patient is comfortable, continue to identify triggers and coping skills.    Return in about 2 weeks (around 12/18/2023).      VISIT DIAGNOSIS:    Diagnosis Plan   1. Major depressive disorder, recurrent episode, moderate        2. Generalized anxiety disorder        3. Post traumatic stress disorder (PTSD)         10:00 EST         This document has been electronically signed by Abeba Montano LCSW  December 4, 2023      Part of this note may be an electronic transcription/translation of spoken language to printed text using the Dragon Dictation System.

## 2023-12-11 ENCOUNTER — TELEMEDICINE (OUTPATIENT)
Dept: PSYCHIATRY | Facility: CLINIC | Age: 28
End: 2023-12-11
Payer: COMMERCIAL

## 2023-12-11 DIAGNOSIS — F33.1 MAJOR DEPRESSIVE DISORDER, RECURRENT EPISODE, MODERATE: Primary | ICD-10-CM

## 2023-12-11 DIAGNOSIS — F41.1 GENERALIZED ANXIETY DISORDER: ICD-10-CM

## 2023-12-11 DIAGNOSIS — F43.10 POST TRAUMATIC STRESS DISORDER (PTSD): ICD-10-CM

## 2023-12-11 PROCEDURE — 1160F RVW MEDS BY RX/DR IN RCRD: CPT | Performed by: NURSE PRACTITIONER

## 2023-12-11 PROCEDURE — 99214 OFFICE O/P EST MOD 30 MIN: CPT | Performed by: NURSE PRACTITIONER

## 2023-12-11 PROCEDURE — 1159F MED LIST DOCD IN RCRD: CPT | Performed by: NURSE PRACTITIONER

## 2023-12-11 RX ORDER — LAMOTRIGINE 150 MG/1
150 TABLET ORAL DAILY
Qty: 30 TABLET | Refills: 2 | Status: SHIPPED | OUTPATIENT
Start: 2023-12-11 | End: 2024-03-10

## 2023-12-11 RX ORDER — OLANZAPINE 2.5 MG/1
2.5 TABLET, FILM COATED ORAL NIGHTLY
Qty: 30 TABLET | Refills: 2 | Status: SHIPPED | OUTPATIENT
Start: 2023-12-11

## 2023-12-11 NOTE — PROGRESS NOTES
"This provider is located at the [facility] (through [facility]), [address], [facility], [address] using a secure [employer] Video Visit through [employer]. Patient is being seen remotely via telehealth at their home address in [place], and stated they are in a secure environment for this session. The patient's condition being diagnosed/treated is appropriate for telemedicine. The provider identified himself as well as his credentials.   The patient consent to be seen remotely, and when consent is given they understand that the consent allows for patient identifiable information to be sent to a third party as needed.   They may refuse to be seen remotely at any time. The electronic data is encrypted and password protected, and the patient  has been advised of the potential risks to privacy not withstanding such measures.    You have chosen to receive care through a telehealth visit.  Do you consent to use a video/audio connection for your medical care today? Yes    Patient identifiers utilized: Name and date of birth.    Patient verbally confirmed consent for today's encounter- yes    Subjective   Palmira Carbajal is a 28 y.o. female who presents today for follow-up appointment.     Chief Complaint:  Depression, anxiety    History of Present Illness:     Depression/mood  \"Stressful few weeks\"  Dog was sick and was costly to pay for vet care   Holidays   Going to see on Christmas   Anxiety  Has been high   Excessive worries   Working on positive coping skills  PTSD: endorses insomnia  Sleep disturbance: y  Low energy: n  Substance use: n  Medication compliant  Side effects: denies  Refills needed    Prior Psychiatric Medications:  Zoloft, paxil, prozac, lexapro, desvenlafaxine, effexor, cymbalta, quetiapine, trazodone, hydroxyzine, ambien, lorazepam, buspar, abilify        The following portions of the patient's history were reviewed and updated as appropriate: allergies, " current medications, past family history, past medical history, past social history, past surgical history and problem list.    Allergy:   Allergies   Allergen Reactions    Cephalexin Diarrhea    Cephalosporins GI Intolerance    Cymbalta [Duloxetine Hcl] Other (See Comments)     Jittery and insomnia    Penicillins Rash        Current Medications:   Current Outpatient Medications   Medication Sig Dispense Refill    lamoTRIgine (LaMICtal) 150 MG tablet Take 1 tablet by mouth Daily for 90 days. 30 tablet 2    OLANZapine (ZyPREXA) 2.5 MG tablet Take 1 tablet by mouth Every Night. 30 tablet 2    amitriptyline (ELAVIL) 10 MG tablet Take 1 tablet by mouth Every Night. 30 tablet 2    clindamycin-benzoyl peroxide (BenzaClin) 1-5 % gel Apply 1 application  topically to the appropriate area as directed Every Night. 35 g 0    dicyclomine (BENTYL) 20 MG tablet Take 1 tablet by mouth Every 6 (Six) Hours As Needed (abdominal cramping). 60 tablet 3    esomeprazole (nexIUM) 40 MG capsule Take 1 capsule by mouth Every Morning Before Breakfast. 90 capsule 3    fludrocortisone 0.1 MG tablet Take 1 tablet by mouth Daily. 30 tablet 3    LORazepam (Ativan) 0.5 MG tablet Take 1 tablet by mouth Daily As Needed for Anxiety. 14 tablet 0    polyethylene glycol (MIRALAX) 17 g packet Take 17 g by mouth Daily. 5 packet 0    Rimegepant Sulfate (Nurtec) 75 MG tablet dispersible tablet Take 1 tablet by mouth Daily As Needed (migraine). 8 tablet 5    sodium chloride 0.9 % solution 100 mL with Eptinezumab-jjmr 100 MG/ML solution 100 mg Infuse 100 mg into a venous catheter Every 3 (Three) Months.      vitamin D3 (Vitamin D) 125 MCG (5000 UT) capsule capsule Take 1 capsule by mouth Daily. 90 capsule 1     No current facility-administered medications for this visit.       Mental Status Exam:   Hygiene:   good  Cooperation:  Cooperative  Eye Contact:  Good  Psychomotor Behavior:  Appropriate  Affect:  Full range  Mood: normal  Hopelessness:  Denies  Speech:  Normal  Thought Process:  Goal directed  Thought Content:  Normal  Suicidal:  None  Homicidal:  None  Hallucinations:  None  Delusion:  None  Memory:  Intact  Orientation:  Grossly intact  Reliability:  good  Insight:  Good  Judgement:  Good  Impulse Control:  Good  Physical/Medical Issues:  No      Physical Exam:   not currently breastfeeding. There is no height or weight on file to calculate BMI.   Due to the remote nature of this encounter (virtual encounter), vitals were unable to be obtained.  Weight change: n    PHQ-9 Depression Screening  Little interest or pleasure in doing things? (P) 0-->not at all   Feeling down, depressed, or hopeless? (P) 0-->not at all   Trouble falling or staying asleep, or sleeping too much? (P) 3-->nearly every day   Feeling tired or having little energy? (P) 1-->several days   Poor appetite or overeating? (P) 0-->not at all   Feeling bad about yourself - or that you are a failure or have let yourself or your family down? (P) 0-->not at all   Trouble concentrating on things, such as reading the newspaper or watching television? (P) 0-->not at all   Moving or speaking so slowly that other people could have noticed? Or the opposite - being so fidgety or restless that you have been moving around a lot more than usual? (P) 0-->not at all   Thoughts that you would be better off dead, or of hurting yourself in some way? (P) 0-->not at all   PHQ-9 Total Score (P) 4   If you checked off any problems, how difficult have these problems made it for you to do your work, take care of things at home, or get along with other people? (P) not difficult at all     PHQ-9 Total Score: (P) 4    Feeling nervous, anxious or on edge: (P) More than half the days  Not being able to stop or control worrying: (P) Nearly every day  Worrying too much about different things: (P) Nearly every day  Trouble Relaxing: (P) Nearly every day  Being so restless that it is hard to sit still: (P) Not at  all  Feeling afraid as if something awful might happen: (P) More than half the days  Becoming easily annoyed or irritable: (P) Several days  STACY 7 Total Score: (P) 14  If you checked any problems, how difficult have these problems made it for you to do your work, take care of things at home, or get along with other people: (P) Very difficult    Previous available Provider notes and records reviewed by this APRN at today's encounter.     Visit Diagnoses:    ICD-10-CM ICD-9-CM   1. Major depressive disorder, recurrent episode, moderate  F33.1 296.32   2. Generalized anxiety disorder  F41.1 300.02   3. Post traumatic stress disorder (PTSD)  F43.10 309.81       TREATMENT PLAN: Continue supportive psychotherapy efforts and medications.  Medication and treatment options, both pharmacological and non-pharmacological treatment options, discussed during today's visit, including any off label use of medication. Patient acknowledged and verbally consented with current treatment plan and was educated on the importance of compliance with treatment and follow-up appointments.      - Increase lamotrigine 100mg to 150mg po qhs to target depression  - Continue amitritpyline 10mg po qhs to target depression, anxiety and PTSD symptoms  - Continue olanzapine 2.5mg po qhs to target depression and anxiety    Labs: None ordered at this time  Therapy: Defers    MEDICATION ISSUES:  Discussed treatment plan and medication options of prescribed medication as well as the risks, benefits, any black box warnings, and side effects including potential falls, possible impaired driving, and metabolic adversities among others, including any off label use of medication. Patient is agreeable to call the office with any worsening of symptoms or onset of side effects, or if any concerns or questions arise.  The contact information for the office is made available to the patient. Patient is agreeable to call 911 or go to the nearest ER should they begin  having any SI/HI, or if any urgent concerns arise. No medication side effects or related complaints today. DEBORAH reviewed as expected.    RISK ASSESSMENT:  Risk of self-harm acutely is moderate.  Risk factors include anxiety disorder, mood disorder, and recent psychosocial stressors (pandemic). Protective factors include no family history, denies access to guns/weapons, no present SI, no history of suicide attempts or self-harm in the past, minimal AODA, healthcare seeking, future orientation, willingness to engage in care.  Risk of self-harm chronically is also moderate, but could be further elevated in the event of treatment noncompliance and/or AODA.    VERBAL INFORMED CONSENT FOR MEDICATION:  The patient was educated that their proposed/prescribed psychotropic medication(s) has potential risks, side effects, adverse effects, and black box warnings; and these have been discussed with the patient.  The patient has been informed that their treatment and medication dosage is to be individualized, and may even be above or below the recommended range/dosage due to patient individualization and response, but medication is prescribed using a shared decision making approach, and no medication or dosage will be prescribed without the patient's verbal consent.  The reason for the use of the medication including any off label use and alternative modes of treatment other than or in addition to medication has been considered and discussed, the probable consequences of not receiving the proposed treatment have been discussed, and any treatment side effects, black box warnings, and cautions associated with treatment have been discussed with the patient.  The patient is allowed ample time to openly discuss and ask questions regarding the proposed medication(s) and treatment plan and the patient verbalizes understanding the reasons for the use of the medication, its potential risks and benefits, other alternative treatment(s),  and the probable consequences that may occur if the proposed medication is not given.  The patient has been given ample time to ask questions and study the information and find the information to be specific, accurate, and complete.  The patient gives verbal consent for the medication(s) proposed/prescribed, they verbalized understanding that they can refuse and withdraw consent at any time with the assistance of this APRN, and the patient has verbally confirmed that they are aware, and are willing, to take the prescribed medication and follow the treatment plan with the known possible risks, side effect, black box warnings, and any potential medication interactions, and the patient reports they will be worse off without this medication and treatment plan.  The patient is advised to contact this APRN/this office if any questions or concerns arise at any time (at [phone]), or call 911/go to the closest emergency department if needed or outside of office hours.      [facility] No Show Policy:  We understand unexpected circumstances arise; however, anytime you miss your appointment we are unable to provide you appropriate care.  In addition, each appointment missed could have been used to provide care for others.  We ask that you call at least 24 hours in advance to cancel or reschedule an appointment.  We would like to take this opportunity to remind you of our policy stating patients who miss THREE or more appointments without cancelling or rescheduling 24 hours in advance of the appointment may be subject to cancellation of any further visits with our clinic and recommendation to seek in-person services/visits.    Please call [phone] to reschedule your appointment. If there are reasons that make it difficult for you to keep the appointments, please call and let us know how we can help.  Please understand that medication prescribing will not continue without seeing your provider.       [facility]'s No Show Policy reviewed with patient at today's visit. Patient verbalized understanding of this policy. Discussed with patient that in the event that there are three or more no show visits, it will be recommended that they pursue in-person services/visits as noncompliance with telehealth visits indicates that patient is not an appropriate candidate for telemedicine and would likely be more appropriate for in-person services/visits. Patient verbalizes understanding and is agreeable to this.    MEDS ORDERED DURING VISIT:  New Medications Ordered This Visit   Medications    lamoTRIgine (LaMICtal) 150 MG tablet     Sig: Take 1 tablet by mouth Daily for 90 days.     Dispense:  30 tablet     Refill:  2    OLANZapine (ZyPREXA) 2.5 MG tablet     Sig: Take 1 tablet by mouth Every Night.     Dispense:  30 tablet     Refill:  2       Return in about 4 weeks (around 1/8/2024) for Next scheduled follow up.         Progress toward goal: Not at goal    Functional Status: No impairment    Prognosis: Good with Ongoing Treatment     This document has been electronically signed by MINI Poon  December 11, 2023 13:41 EST      Please note that portions of this note were completed with a voice recognition program.

## 2023-12-28 NOTE — PROGRESS NOTES
"Chief Complaint  Vitamin D Deficiency and Obesity    Subjective          Palmira Carbajal, 28 y.o. female presents to [facility]  History of Present Illness   For 3-month follow-up.  She is complaining of difficulty losing weight.     Vitamin D deficiency: She is taking vitamin D 5000 units as prescribed and needs a refill.    Depression/insomnia and general body aches: She is following with psychiatry Cassius Cantrell.  She states that this regimen is helping her.    She is complaining of difficulty losing weight.  She states she is trying to follow a strict diet and exercise but she is still having issues losing weight.  She previously had some heart palpitations and issues. She states all of her results were negative.     She has had a leukocytosis.  She is following with hematology.      Acne: she was referred to dermatology.  I have started her on doxycycline.  She states that it is causing her diarrhea.     Tobacco Use: Medium Risk (12/29/2023)    Patient History     Smoking Tobacco Use: Former     Smokeless Tobacco Use: Never     Passive Exposure: Not on file      Objective   Vital Signs:   /71 (BP Location: Left arm, Patient Position: Sitting, Cuff Size: Adult)   Pulse 97   Temp 98.1 °F (36.7 °C)   Ht 162.6 cm (64\")   Wt 81.4 kg (179 lb 8 oz)   SpO2 100%   BMI 30.81 kg/m²       Current Outpatient Medications:     amitriptyline (ELAVIL) 10 MG tablet, Take 1 tablet by mouth Every Night., Disp: 30 tablet, Rfl: 2    clindamycin (CLEOCIN T) 1 % external solution, Apply 1 application  topically to the appropriate area as directed Every Night., Disp: , Rfl:     dicyclomine (BENTYL) 20 MG tablet, Take 1 tablet by mouth Every 6 (Six) Hours As Needed (abdominal cramping)., Disp: 60 tablet, Rfl: 3    doxycycline (VIBRAMYICN) 100 MG tablet, Take 1 tablet by mouth 2 (Two) Times a Day., Disp: , Rfl:     esomeprazole (nexIUM) 40 MG capsule, Take 1 capsule by mouth Every Morning " Before Breakfast., Disp: 90 capsule, Rfl: 3    fludrocortisone 0.1 MG tablet, Take 1 tablet by mouth Daily., Disp: 30 tablet, Rfl: 3    lamoTRIgine (LaMICtal) 150 MG tablet, Take 1 tablet by mouth Daily for 90 days., Disp: 30 tablet, Rfl: 2    LORazepam (Ativan) 0.5 MG tablet, Take 1 tablet by mouth Daily As Needed for Anxiety., Disp: 14 tablet, Rfl: 0    OLANZapine (ZyPREXA) 2.5 MG tablet, Take 1 tablet by mouth Every Night., Disp: 30 tablet, Rfl: 2    polyethylene glycol (MIRALAX) 17 g packet, Take 17 g by mouth Daily., Disp: 5 packet, Rfl: 0    Retin-A 0.025 % gel, Apply  topically to the appropriate area as directed Every Night., Disp: , Rfl:     Rimegepant Sulfate (Nurtec) 75 MG tablet dispersible tablet, Take 1 tablet by mouth Daily As Needed (migraine)., Disp: 8 tablet, Rfl: 5    sodium chloride 0.9 % solution 100 mL with Eptinezumab-jjmr 100 MG/ML solution 100 mg, Infuse 100 mg into a venous catheter Every 3 (Three) Months., Disp: , Rfl:     vitamin D3 (Vitamin D) 125 MCG (5000 UT) capsule capsule, Take 1 capsule by mouth Daily., Disp: 90 capsule, Rfl: 1    phentermine 30 MG capsule, Take 1 capsule by mouth Every Morning., Disp: 30 capsule, Rfl: 0   Past Medical History:   Diagnosis Date    Acid reflux     Anxiety     Chronic pain disorder     Depression     Eczema     Fatty liver     Intractable migraine without aura and without status migrainosus 02/09/2023    Kidney stone     HX OF    Maltracking of right patella     Migraines     Panic disorder     PONV (postoperative nausea and vomiting)     GOOD RESULTS WITH SCOPALAMINE    PTSD (post-traumatic stress disorder)     WAKES UP FROM ANESTHESIA WITH PANIC ATTACK    Seasonal allergic rhinitis 05/31/2022    Self-injurious behavior     as a teenager    Suicide attempt 2007      Physical Exam  Vitals reviewed.   Constitutional:       Appearance: Normal appearance. She is well-developed.   Neck:      Thyroid: No thyroid mass, thyromegaly or thyroid tenderness.    Cardiovascular:      Rate and Rhythm: Normal rate and regular rhythm.      Heart sounds: No murmur heard.     No friction rub. No gallop.   Pulmonary:      Effort: Pulmonary effort is normal.      Breath sounds: Normal breath sounds. No wheezing or rhonchi.   Lymphadenopathy:      Cervical: No cervical adenopathy.   Skin:     General: Skin is warm and dry.   Neurological:      Mental Status: She is alert and oriented to person, place, and time.      Cranial Nerves: No cranial nerve deficit.   Psychiatric:         Mood and Affect: Mood and affect normal.         Behavior: Behavior normal.         Thought Content: Thought content normal. Thought content does not include homicidal or suicidal ideation.         Judgment: Judgment normal.        Result Review :   {The following data was reviewed by Jaja Castillo, MINI    XR Chest 1 View    Result Date: 8/23/2023    1. No acute process       YUKI BOYLE MD       Electronically Signed and Approved By: YUKI BOYLE MD on 8/23/2023 at 17:03               Common Labs   Common labs          5/28/2023    10:33 8/23/2023    16:34 9/7/2023    11:22   Common Labs   Glucose 98  108     BUN 8  8     Creatinine 0.70  0.76     Sodium 129  137     Potassium 3.8  3.8     Chloride 95  104     Calcium 9.0  9.0     Albumin 3.9  4.1     Total Bilirubin 0.2  <0.2     Alkaline Phosphatase 97  91     AST (SGOT) 13  11     ALT (SGPT) 16  13     WBC 17.81  16.99  15.13    Hemoglobin 13.1  13.6  14.1    Hematocrit 39.6  41.0  43.5    Platelets 381  393  426    Total Cholesterol   167    Triglycerides   82    HDL Cholesterol   56    LDL Cholesterol    96    Hemoglobin A1C   5.40      VITD   Lab Results   Component Value Date    CKTO27PM 24.0 (L) 09/07/2023     UDS   Amphetamine Screen, Urine   Date Value Ref Range Status   12/29/2023 Negative Negative Final     AMP INTERNAL CONTROL   Date Value Ref Range Status   12/29/2023 Passed Passed Final     Barbiturates Screen, Urine   Date  Value Ref Range Status   12/29/2023 Negative Negative Final     Buprenorphine, Screen, Urine   Date Value Ref Range Status   12/29/2023 Negative Negative Final     BUPRENORPHINE INTERNAL CONTROL   Date Value Ref Range Status   12/29/2023 Passed Passed Final     Benzodiazepine Screen, Urine   Date Value Ref Range Status   12/29/2023 Negative Negative Final     BENZODIAZEPINE INTERNAL CONTROL   Date Value Ref Range Status   12/29/2023 Passed Passed Final     Cocaine Screen, Urine   Date Value Ref Range Status   12/29/2023 Negative Negative Final     COCAINE INTERNAL CONTROL   Date Value Ref Range Status   12/29/2023 Passed Passed Final     MDMA (ECSTASY)   Date Value Ref Range Status   12/29/2023 Negative Negative Final     MDMA (ECSTASY) INTERNAL CONTROL   Date Value Ref Range Status   12/29/2023 Passed Passed Final     Methamphetamine, Ur   Date Value Ref Range Status   12/29/2023 Negative Negative Final     METHAMPHETAMINE INTERNAL CONTROL   Date Value Ref Range Status   12/29/2023 Passed Passed Final     Methadone Screen, Urine   Date Value Ref Range Status   12/29/2023 Negative Negative Final     OPIATES INTERNAL CONTROL   Date Value Ref Range Status   12/29/2023 Passed Passed Final     Oxycodone Screen, Urine   Date Value Ref Range Status   12/29/2023 Negative Negative Final     OXYCODONE INTERNAL CONTROL   Date Value Ref Range Status   12/29/2023 Passed Passed Final     PHENCYCLIDINE INTERNAL CONTROL   Date Value Ref Range Status   12/29/2023 Passed Passed Final     THC, Screen, Urine   Date Value Ref Range Status   12/29/2023 Negative Negative Final     THC INTERNAL CONTROL   Date Value Ref Range Status   12/29/2023 Passed Passed Final     Lot Number   Date Value Ref Range Status   12/29/2023 [id number]  Final     Expiration Date   Date Value Ref Range Status   12/29/2023 9/14/24  Final            Assessment and Plan    Diagnoses and all orders for this visit:    1. Class 1 obesity with body mass index (BMI) of  30.0 to 30.9 in adult, unspecified obesity type, unspecified whether serious comorbidity present (Primary)  Assessment & Plan:  Patient's (Body mass index is 30.81 kg/m².) indicates that they are obese (BMI >30) with health conditions that include none . Weight is unchanged. BMI  is above average; BMI management plan is completed. We discussed low calorie, low carb based diet program, portion control, increasing exercise, pharmacologic options including phentermine, and Information on healthy weight added to patient's after visit summary.  Discussed with patient phentermine for weight loss if short-term and not intended for long-term weight loss therapy.  Discussed will need to continue to follow a low calorie diet and exercise routinely.  Discussed that phentermine will be given on a month to month basis, and that a weight loss of at least 4 pounds needs to be seen each month to continue phentermine.  Discussed will need to take a break from phentermine every 3 months for at least 1 month.  Discussed potential to regain weight after stopping phentermine, and patient will need to continue diet and exercise.  Discussed that phentermine is a controlled medicine and will need to be guarded as such, discussed not to share medication with others.  Discussed potential side effects to include but not limited to heart dysrhythmias, chest pain, elevated blood pressure, and/or anxiety.  Instructed to stop medicine if any adverse side effects and to notify me as soon as possible.  [place] reviewed and is consistent.  Urine drug screen in office today is negative which is consistent.  Narcotic contract discussed and signed today.  We also discussed that phentermine can cause constipation which may help her with the side effects of doxycycline causing diarrhea.    Orders:  -     phentermine 30 MG capsule; Take 1 capsule by mouth Every Morning.  Dispense: 30 capsule; Refill: 0    2. Vitamin D deficiency  Assessment &  Plan:  Currently stable, will continue vitamin D 5000 units once daily.  We will plan to recheck vitamin D in about 4 to 5 months when she gets her labs for hematology.    Orders:  -     vitamin D3 (Vitamin D) 125 MCG (5000 UT) capsule capsule; Take 1 capsule by mouth Daily.  Dispense: 90 capsule; Refill: 1  -     Vitamin D,25-Hydroxy; Future    3. Medication monitoring encounter  -     POC Urine Drug Screen [facility]        Follow Up   Return in about 1 month (around 1/29/2024) for Next scheduled follow up wt loss mgmt.  Patient was given instructions and counseling regarding her condition or for health maintenance advice. Please see specific information pulled into the AVS if appropriate.     Parts of this note are electronic transcriptions/translations of spoken language to printed text using the Dragon Dictation system.      Jaja Castillo, MINI  12/18/2023

## 2023-12-29 ENCOUNTER — OFFICE VISIT (OUTPATIENT)
Dept: FAMILY MEDICINE CLINIC | Facility: CLINIC | Age: 28
End: 2023-12-29
Payer: COMMERCIAL

## 2023-12-29 VITALS
WEIGHT: 179.5 LBS | HEART RATE: 97 BPM | SYSTOLIC BLOOD PRESSURE: 125 MMHG | OXYGEN SATURATION: 100 % | TEMPERATURE: 98.1 F | BODY MASS INDEX: 30.64 KG/M2 | HEIGHT: 64 IN | DIASTOLIC BLOOD PRESSURE: 71 MMHG

## 2023-12-29 DIAGNOSIS — Z51.81 MEDICATION MONITORING ENCOUNTER: ICD-10-CM

## 2023-12-29 DIAGNOSIS — E55.9 VITAMIN D DEFICIENCY: ICD-10-CM

## 2023-12-29 DIAGNOSIS — E66.9 CLASS 1 OBESITY WITH BODY MASS INDEX (BMI) OF 30.0 TO 30.9 IN ADULT, UNSPECIFIED OBESITY TYPE, UNSPECIFIED WHETHER SERIOUS COMORBIDITY PRESENT: Primary | ICD-10-CM

## 2023-12-29 RX ORDER — PHENTERMINE HYDROCHLORIDE 30 MG/1
30 CAPSULE ORAL EVERY MORNING
Qty: 30 CAPSULE | Refills: 0 | Status: SHIPPED | OUTPATIENT
Start: 2023-12-29

## 2023-12-30 NOTE — ASSESSMENT & PLAN NOTE
Patient's (Body mass index is 30.81 kg/m².) indicates that they are obese (BMI >30) with health conditions that include none . Weight is unchanged. BMI  is above average; BMI management plan is completed. We discussed low calorie, low carb based diet program, portion control, increasing exercise, pharmacologic options including phentermine, and Information on healthy weight added to patient's after visit summary.  Discussed with patient phentermine for weight loss if short-term and not intended for long-term weight loss therapy.  Discussed will need to continue to follow a low calorie diet and exercise routinely.  Discussed that phentermine will be given on a month to month basis, and that a weight loss of at least 4 pounds needs to be seen each month to continue phentermine.  Discussed will need to take a break from phentermine every 3 months for at least 1 month.  Discussed potential to regain weight after stopping phentermine, and patient will need to continue diet and exercise.  Discussed that phentermine is a controlled medicine and will need to be guarded as such, discussed not to share medication with others.  Discussed potential side effects to include but not limited to heart dysrhythmias, chest pain, elevated blood pressure, and/or anxiety.  Instructed to stop medicine if any adverse side effects and to notify me as soon as possible.  Kenyon reviewed and is consistent.  Urine drug screen in office today is negative which is consistent.  Narcotic contract discussed and signed today.  We also discussed that phentermine can cause constipation which may help her with the side effects of doxycycline causing diarrhea.

## 2023-12-30 NOTE — ASSESSMENT & PLAN NOTE
Currently stable, will continue vitamin D 5000 units once daily.  We will plan to recheck vitamin D in about 4 to 5 months when she gets her labs for hematology.

## 2024-01-04 ENCOUNTER — TELEMEDICINE (OUTPATIENT)
Dept: PSYCHIATRY | Facility: CLINIC | Age: 29
End: 2024-01-04
Payer: COMMERCIAL

## 2024-01-04 DIAGNOSIS — F41.1 GENERALIZED ANXIETY DISORDER: ICD-10-CM

## 2024-01-04 DIAGNOSIS — F43.10 POST TRAUMATIC STRESS DISORDER (PTSD): ICD-10-CM

## 2024-01-04 DIAGNOSIS — F33.1 MAJOR DEPRESSIVE DISORDER, RECURRENT EPISODE, MODERATE: Primary | ICD-10-CM

## 2024-01-04 NOTE — PROGRESS NOTES
This provider is located at the [facility] (through [facility] [place] [facility]), [address] [place] [address], [place], [address] using a secure MyChart Video Visit through [employer]. Patient is being seen remotely via telehealth at their home address in [place], and stated they are in a secure environment for this session. The patient's condition being diagnosed/treated is appropriate for telemedicine. The provider identified himself as well as his credentials.   The patient consent to be seen remotely, and when consent is given they understand that the consent allows for patient identifiable information to be sent to a third party as needed.   They may refuse to be seen remotely at any time. The electronic data is encrypted and password protected, and the patient  has been advised of the potential risks to privacy not withstanding such measures.    You have chosen to receive care through a telehealth visit.  Do you consent to use a video/audio connection for your medical care today? Yes    Patient identifiers utilized: Name and date of birth.    Patient verbally confirmed consent for today's encounter- yes    Subjective   Palmira Carbajal is a 28 y.o. female who presents today for follow-up appointment.     Chief Complaint:  Depression, anxiety, PTSD    History of Present Illness:     Patient reports an improvement in mood over the past month. Enjoyed spending the holidays with family. Endorses feeling down and low interest at times. Sleeping better. Energy has been better during the day. Anxiety has been high at times. Endorses excessive worries and trouble relaxing some days. Doing well in terms of PTSD. Sleeping better. Denies nightmares.     Prior Psychiatric Medications:  Zoloft, paxil, prozac, lexapro, desvenlafaxine, effexor, cymbalta, quetiapine, trazodone, hydroxyzine, ambien, lorazepam, buspar, abilify       The following portions of the patient's history were reviewed and updated as  appropriate: allergies, current medications, past family history, past medical history, past social history, past surgical history and problem list.    Allergy:   Allergies   Allergen Reactions    Cephalexin Diarrhea    Cephalosporins GI Intolerance    Cymbalta [Duloxetine Hcl] Other (See Comments)     Jittery and insomnia    Penicillins Rash        Current Medications:   Current Outpatient Medications   Medication Sig Dispense Refill    amitriptyline (ELAVIL) 10 MG tablet Take 1 tablet by mouth Every Night. 30 tablet 2    clindamycin (CLEOCIN T) 1 % external solution Apply 1 application  topically to the appropriate area as directed Every Night.      dicyclomine (BENTYL) 20 MG tablet Take 1 tablet by mouth Every 6 (Six) Hours As Needed (abdominal cramping). 60 tablet 3    doxycycline (VIBRAMYICN) 100 MG tablet Take 1 tablet by mouth 2 (Two) Times a Day.      esomeprazole (nexIUM) 40 MG capsule Take 1 capsule by mouth Every Morning Before Breakfast. 90 capsule 3    fludrocortisone 0.1 MG tablet Take 1 tablet by mouth Daily. 30 tablet 3    lamoTRIgine (LaMICtal) 150 MG tablet Take 1 tablet by mouth Daily for 90 days. 30 tablet 2    OLANZapine (ZyPREXA) 2.5 MG tablet Take 1 tablet by mouth Every Night. 30 tablet 2    phentermine 30 MG capsule Take 1 capsule by mouth Every Morning. 30 capsule 0    polyethylene glycol (MIRALAX) 17 g packet Take 17 g by mouth Daily. 5 packet 0    Retin-A 0.025 % gel Apply  topically to the appropriate area as directed Every Night.      Rimegepant Sulfate (Nurtec) 75 MG tablet dispersible tablet Take 1 tablet by mouth Daily As Needed (migraine). 8 tablet 5    sodium chloride 0.9 % solution 100 mL with Eptinezumab-jjmr 100 MG/ML solution 100 mg Infuse 100 mg into a venous catheter Every 3 (Three) Months.      vitamin D3 (Vitamin D) 125 MCG (5000 UT) capsule capsule Take 1 capsule by mouth Daily. 90 capsule 1     No current facility-administered medications for this visit.       Mental  Status Exam:   Hygiene:   good  Cooperation:  Cooperative  Eye Contact:  Good  Psychomotor Behavior:  Appropriate  Affect:  Full range  Mood: normal  Hopelessness: Denies  Speech:  Normal  Thought Process:  Goal directed  Thought Content:  Normal  Suicidal:  None  Homicidal:  None  Hallucinations:  None  Delusion:  None  Memory:  Intact  Orientation:  Grossly intact  Reliability:  good  Insight:  Good  Judgement:  Good  Impulse Control:  Good  Physical/Medical Issues:  No      Physical Exam:   not currently breastfeeding. There is no height or weight on file to calculate BMI.   Due to the remote nature of this encounter (virtual encounter), vitals were unable to be obtained.  Weight change: n    PHQ-9 Depression Screening  Little interest or pleasure in doing things? (P) 1-->several days   Feeling down, depressed, or hopeless? (P) 1-->several days   Trouble falling or staying asleep, or sleeping too much? (P) 2-->more than half the days   Feeling tired or having little energy? (P) 1-->several days   Poor appetite or overeating? (P) 1-->several days   Feeling bad about yourself - or that you are a failure or have let yourself or your family down? (P) 0-->not at all   Trouble concentrating on things, such as reading the newspaper or watching television? (P) 0-->not at all   Moving or speaking so slowly that other people could have noticed? Or the opposite - being so fidgety or restless that you have been moving around a lot more than usual? (P) 0-->not at all   Thoughts that you would be better off dead, or of hurting yourself in some way? (P) 0-->not at all   PHQ-9 Total Score (P) 6   If you checked off any problems, how difficult have these problems made it for you to do your work, take care of things at home, or get along with other people? (P) somewhat difficult     PHQ-9 Total Score: (P) 6    Feeling nervous, anxious or on edge: (P) Several days  Not being able to stop or control worrying: (P) More than half the  days  Worrying too much about different things: (P) Nearly every day  Trouble Relaxing: (P) Several days  Being so restless that it is hard to sit still: (P) Not at all  Feeling afraid as if something awful might happen: (P) Not at all  Becoming easily annoyed or irritable: (P) Not at all  STACY 7 Total Score: (P) 7  If you checked any problems, how difficult have these problems made it for you to do your work, take care of things at home, or get along with other people: (P) Somewhat difficult    Previous available Provider notes and records reviewed by this APRN at today's encounter.     Visit Diagnoses:    ICD-10-CM ICD-9-CM   1. Major depressive disorder, recurrent episode, moderate  F33.1 296.32   2. Generalized anxiety disorder  F41.1 300.02   3. Post traumatic stress disorder (PTSD)  F43.10 309.81       TREATMENT PLAN: Continue supportive psychotherapy efforts and medications.  Medication and treatment options, both pharmacological and non-pharmacological treatment options, discussed during today's visit, including any off label use of medication. Patient acknowledged and verbally consented with current treatment plan and was educated on the importance of compliance with treatment and follow-up appointments.      - Continue lamotrigine 150mg po qhs to target depression  - Continue amitriptyline 10mg po qhs to target depression, anxiety and PTSD  - Continue olanzapine 2.5mg po qhs to target depression and anxiety    Labs: None ordered at this time  Therapy: Defers    MEDICATION ISSUES:  Discussed treatment plan and medication options of prescribed medication as well as the risks, benefits, any black box warnings, and side effects including potential falls, possible impaired driving, and metabolic adversities among others, including any off label use of medication. Patient is agreeable to call the office with any worsening of symptoms or onset of side effects, or if any concerns or questions arise.  The contact  information for the office is made available to the patient. Patient is agreeable to call 911 or go to the nearest ER should they begin having any SI/HI, or if any urgent concerns arise. No medication side effects or related complaints today. DEBORAH reviewed as expected.    RISK ASSESSMENT:  Risk of self-harm acutely is moderate.  Risk factors include anxiety disorder, mood disorder, and recent psychosocial stressors (pandemic). Protective factors include no family history, denies access to guns/weapons, no present SI, no history of suicide attempts or self-harm in the past, minimal AODA, healthcare seeking, future orientation, willingness to engage in care.  Risk of self-harm chronically is also moderate, but could be further elevated in the event of treatment noncompliance and/or AODA.    VERBAL INFORMED CONSENT FOR MEDICATION:  The patient was educated that their proposed/prescribed psychotropic medication(s) has potential risks, side effects, adverse effects, and black box warnings; and these have been discussed with the patient.  The patient has been informed that their treatment and medication dosage is to be individualized, and may even be above or below the recommended range/dosage due to patient individualization and response, but medication is prescribed using a shared decision making approach, and no medication or dosage will be prescribed without the patient's verbal consent.  The reason for the use of the medication including any off label use and alternative modes of treatment other than or in addition to medication has been considered and discussed, the probable consequences of not receiving the proposed treatment have been discussed, and any treatment side effects, black box warnings, and cautions associated with treatment have been discussed with the patient.  The patient is allowed ample time to openly discuss and ask questions regarding the proposed medication(s) and treatment plan and the patient  verbalizes understanding the reasons for the use of the medication, its potential risks and benefits, other alternative treatment(s), and the probable consequences that may occur if the proposed medication is not given.  The patient has been given ample time to ask questions and study the information and find the information to be specific, accurate, and complete.  The patient gives verbal consent for the medication(s) proposed/prescribed, they verbalized understanding that they can refuse and withdraw consent at any time with the assistance of this APRN, and the patient has verbally confirmed that they are aware, and are willing, to take the prescribed medication and follow the treatment plan with the known possible risks, side effect, black box warnings, and any potential medication interactions, and the patient reports they will be worse off without this medication and treatment plan.  The patient is advised to contact this APRN/this office if any questions or concerns arise at any time (at [phone]), or call 911/go to the closest emergency department if needed or outside of office hours.      [facility] No Show Policy:  We understand unexpected circumstances arise; however, anytime you miss your appointment we are unable to provide you appropriate care.  In addition, each appointment missed could have been used to provide care for others.  We ask that you call at least 24 hours in advance to cancel or reschedule an appointment.  We would like to take this opportunity to remind you of our policy stating patients who miss THREE or more appointments without cancelling or rescheduling 24 hours in advance of the appointment may be subject to cancellation of any further visits with our clinic and recommendation to seek in-person services/visits.    Please call [phone] to reschedule your appointment. If there are reasons that make it difficult for you to keep the appointments, please  call and let us know how we can help.  Please understand that medication prescribing will not continue without seeing your provider.      [facility]'s No Show Policy reviewed with patient at today's visit. Patient verbalized understanding of this policy. Discussed with patient that in the event that there are three or more no show visits, it will be recommended that they pursue in-person services/visits as noncompliance with telehealth visits indicates that patient is not an appropriate candidate for telemedicine and would likely be more appropriate for in-person services/visits. Patient verbalizes understanding and is agreeable to this.    MEDS ORDERED DURING VISIT:  No orders of the defined types were placed in this encounter.      Return in about 4 weeks (around 2/1/2024) for Next scheduled follow up.         Progress toward goal: Not at goal    Functional Status: No impairment    Prognosis: Good with Ongoing Treatment     This document has been electronically signed by MINI Poon  January 4, 2024 13:26 EST      Please note that portions of this note were completed with a voice recognition program.

## 2024-01-11 ENCOUNTER — OFFICE VISIT (OUTPATIENT)
Dept: CARDIOLOGY | Facility: CLINIC | Age: 29
End: 2024-01-11
Payer: COMMERCIAL

## 2024-01-11 VITALS
SYSTOLIC BLOOD PRESSURE: 112 MMHG | WEIGHT: 181 LBS | DIASTOLIC BLOOD PRESSURE: 74 MMHG | BODY MASS INDEX: 30.9 KG/M2 | HEIGHT: 64 IN | HEART RATE: 88 BPM

## 2024-01-11 DIAGNOSIS — R00.2 PALPITATIONS: ICD-10-CM

## 2024-01-11 DIAGNOSIS — R55 SYNCOPE, UNSPECIFIED SYNCOPE TYPE: Primary | ICD-10-CM

## 2024-01-11 PROCEDURE — 99214 OFFICE O/P EST MOD 30 MIN: CPT | Performed by: INTERNAL MEDICINE

## 2024-01-11 NOTE — PROGRESS NOTES
Chief Complaint  Palpitations    Subjective        Palmira Carbajal presents to [facility] CARDIOLOGY  History of present illness:    Patient states she is about 75% better with the Florinef.  She states she really cannot tell if she misses a dose.  She states it is overall manageable at this time.  The palpitations are not nearly as often and the lightheadedness is much better.  She is drinking lots of water and using liberal salt.  She notes no swelling.  She is active.      Past Medical History:   Diagnosis Date    Acid reflux     Anxiety     Chronic pain disorder     Depression     Eczema     Fatty liver     Intractable migraine without aura and without status migrainosus 2023    Kidney stone     HX OF    Maltracking of right patella     Migraines     Panic disorder     PONV (postoperative nausea and vomiting)     GOOD RESULTS WITH SCOPALAMINE    PTSD (post-traumatic stress disorder)     WAKES UP FROM ANESTHESIA WITH PANIC ATTACK    Seasonal allergic rhinitis 2022    Self-injurious behavior     as a teenager    Suicide attempt          Past Surgical History:   Procedure Laterality Date     SECTION      COLONOSCOPY N/A 10/11/2022    Procedure: COLONOSCOPY;  Surgeon: Dyan Griffin MD;  Location: [facility] ENDOSCOPY;  Service: Gastroenterology;  Laterality: N/A;  HEMORRHOIDS    CYSTOSCOPY      CYSTOSCOPY URETEROSCOPY Left 2023    Procedure: CYSTOSCOPY URETEROSCOPY RETROGRADE PYELOGRAM HOLMIUM LASER STENT INSERTION, left;  Surgeon: Melisa Garcia MD;  Location: [facility] MAIN OR;  Service: Urology;  Laterality: Left;    ENDOSCOPY N/A 2023    Procedure: ESOPHAGOGASTRODUODENOSCOPY WITH BIOPSIES;  Surgeon: Dyan Griffin MD;  Location: [facility] ENDOSCOPY;  Service: Gastroenterology;  Laterality: N/A;  ESOPHAGITIS, GASTRITIS    HYSTERECTOMY      KIDNEY STONE SURGERY      unspecified    KNEE ARTHROSCOPY Right 2022    Procedure: KNEE ARTHROSCOPY WITH  A LATERAL RELEASE AND CHONDROPLASTY;  Surgeon: Marco A Pitts MD;  [facility];  Service: Orthopedics;  Laterality: Right;    WISDOM TOOTH EXTRACTION            Social History     Socioeconomic History    Marital status:      Spouse name: TRISTAN    Number of children: 3   Tobacco Use    Smoking status: Former     Packs/day: 0.50     Years: 15.00     Additional pack years: 0.00     Total pack years: 7.50     Types: Cigarettes     Quit date: 2022     Years since quittin.9    Smokeless tobacco: Never   Vaping Use    Vaping Use: Every day    Substances: Nicotine, Flavoring    Devices: Disposable   Substance and Sexual Activity    Alcohol use: Not Currently    Drug use: Not Currently     Types: Marijuana    Sexual activity: Defer         Family History   Problem Relation Age of Onset    Arthritis Mother     Restless legs syndrome Mother     Thyroid disease Father     Colon polyps Father     Diabetes Father     Drug abuse Maternal Uncle     Alcohol abuse Maternal Uncle     Cancer Maternal Grandmother     Sleep apnea Son     Malig Hyperthermia Neg Hx           Allergies   Allergen Reactions    Cephalexin Diarrhea    Cephalosporins GI Intolerance    Cymbalta [Duloxetine Hcl] Other (See Comments)     Jittery and insomnia    Penicillins Rash            Current Outpatient Medications:     amitriptyline (ELAVIL) 10 MG tablet, Take 1 tablet by mouth Every Night., Disp: 30 tablet, Rfl: 2    clindamycin (CLEOCIN T) 1 % external solution, Apply 1 application  topically to the appropriate area as directed Every Night., Disp: , Rfl:     dicyclomine (BENTYL) 20 MG tablet, Take 1 tablet by mouth Every 6 (Six) Hours As Needed (abdominal cramping)., Disp: 60 tablet, Rfl: 3    doxycycline (VIBRAMYICN) 100 MG tablet, Take 1 tablet by mouth 2 (Two) Times a Day., Disp: , Rfl:     esomeprazole (nexIUM) 40 MG capsule, Take 1 capsule by mouth Every Morning Before Breakfast., Disp: 90 capsule, Rfl: 3     "fludrocortisone 0.1 MG tablet, Take 1 tablet by mouth Daily., Disp: 30 tablet, Rfl: 3    lamoTRIgine (LaMICtal) 150 MG tablet, Take 1 tablet by mouth Daily for 90 days., Disp: 30 tablet, Rfl: 2    OLANZapine (ZyPREXA) 2.5 MG tablet, Take 1 tablet by mouth Every Night., Disp: 30 tablet, Rfl: 2    phentermine 30 MG capsule, Take 1 capsule by mouth Every Morning., Disp: 30 capsule, Rfl: 0    polyethylene glycol (MIRALAX) 17 g packet, Take 17 g by mouth Daily., Disp: 5 packet, Rfl: 0    Retin-A 0.025 % gel, Apply  topically to the appropriate area as directed Every Night., Disp: , Rfl:     Rimegepant Sulfate (Nurtec) 75 MG tablet dispersible tablet, Take 1 tablet by mouth Daily As Needed (migraine)., Disp: 8 tablet, Rfl: 5    sodium chloride 0.9 % solution 100 mL with Eptinezumab-jjmr 100 MG/ML solution 100 mg, Infuse 100 mg into a venous catheter Every 3 (Three) Months., Disp: , Rfl:     vitamin D3 (Vitamin D) 125 MCG (5000 UT) capsule capsule, Take 1 capsule by mouth Daily., Disp: 90 capsule, Rfl: 1      ROS:  Cardiac review of systems positive for rare palpitation and lightheadedness.    Objective     /74   Pulse 88   Ht 162.6 cm (64\")   Wt 82.1 kg (181 lb)   BMI 31.07 kg/m²       General Appearance:   well developed  well nourished  HENT:   oropharynx moist  lips not cyanotic  Respiratory:  no respiratory distress  normal breath sounds  no rales  Cardiovascular:  no jugular venous distention  regular rhythm  S1 normal, S2 normal  no S3, no S4   no murmur  no rub, no thrill  No carotid bruit  pedal pulses normal  lower extremity edema: none    Musculoskeletal:  no clubbing of fingers.   normocephalic, head atraumatic  Skin:   warm, dry  Psychiatric:  judgement and insight appropriate  normal mood and affect    ECHO:    STRESS:    CATH:  No results found for this or any previous visit.    BMP:     Glucose   Date Value Ref Range Status   08/23/2023 108 (H) 65 - 99 mg/dL Final     BUN   Date Value Ref Range " Status   08/23/2023 8 6 - 20 mg/dL Final     Creatinine   Date Value Ref Range Status   08/23/2023 0.76 0.57 - 1.00 mg/dL Final     Sodium   Date Value Ref Range Status   08/23/2023 137 136 - 145 mmol/L Final     Potassium   Date Value Ref Range Status   08/23/2023 3.8 3.5 - 5.2 mmol/L Final     Chloride   Date Value Ref Range Status   08/23/2023 104 98 - 107 mmol/L Final     CO2   Date Value Ref Range Status   08/23/2023 21.8 (L) 22.0 - 29.0 mmol/L Final     Calcium   Date Value Ref Range Status   08/23/2023 9.0 8.6 - 10.5 mg/dL Final     BUN/Creatinine Ratio   Date Value Ref Range Status   08/23/2023 10.5 7.0 - 25.0 Final     Anion Gap   Date Value Ref Range Status   08/23/2023 11.2 5.0 - 15.0 mmol/L Final     eGFR   Date Value Ref Range Status   08/23/2023 108.3 >60.0 mL/min/1.73 Final     LIPIDS:  Total Cholesterol   Date Value Ref Range Status   09/07/2023 167 0 - 200 mg/dL Final     Triglycerides   Date Value Ref Range Status   09/07/2023 82 0 - 150 mg/dL Final     HDL Cholesterol   Date Value Ref Range Status   09/07/2023 56 40 - 60 mg/dL Final     LDL Cholesterol    Date Value Ref Range Status   09/07/2023 96 0 - 100 mg/dL Final     VLDL Cholesterol   Date Value Ref Range Status   09/07/2023 15 5 - 40 mg/dL Final     LDL/HDL Ratio   Date Value Ref Range Status   09/07/2023 1.69  Final         Procedures             ASSESSMENT:  Diagnoses and all orders for this visit:    1. Syncope, unspecified syncope type (Primary)    2. Palpitations  -     Basic Metabolic Panel; Future         PLAN:    1.  Patient appears overall to be doing well on the fludrocortisone.  I am going to check a BMP.  She feels like her symptoms are manageable and we will hold off on going up on this dose at this time.  She will call us if her symptoms get worse.  2.  If her symptoms get worse hide probably first try going up on the Florinef.  If this did not control it I would consider stopping the Florinef and doing a tilt table.  If it  showed evidence of POTS at that time would consider trying the combination of propranolol and midodrine.  3.  We will see back in 3 months but again she will call us if symptoms get worse.      No follow-ups on file.     Patient was given instructions and counseling regarding her condition or for health maintenance advice. Please see specific information pulled into the AVS if appropriate.         Dusty Nj MD   1/11/2024  09:01 EST

## 2024-01-29 ENCOUNTER — OFFICE VISIT (OUTPATIENT)
Dept: FAMILY MEDICINE CLINIC | Facility: CLINIC | Age: 29
End: 2024-01-29
Payer: COMMERCIAL

## 2024-01-29 VITALS
BODY MASS INDEX: 29.57 KG/M2 | HEIGHT: 64 IN | SYSTOLIC BLOOD PRESSURE: 110 MMHG | OXYGEN SATURATION: 99 % | HEART RATE: 100 BPM | TEMPERATURE: 98.5 F | WEIGHT: 173.2 LBS | DIASTOLIC BLOOD PRESSURE: 64 MMHG

## 2024-01-29 DIAGNOSIS — R00.2 PALPITATIONS: ICD-10-CM

## 2024-01-29 DIAGNOSIS — E55.9 VITAMIN D DEFICIENCY: ICD-10-CM

## 2024-01-29 DIAGNOSIS — G43.019 INTRACTABLE MIGRAINE WITHOUT AURA AND WITHOUT STATUS MIGRAINOSUS: ICD-10-CM

## 2024-01-29 DIAGNOSIS — E66.3 OVERWEIGHT (BMI 25.0-29.9): ICD-10-CM

## 2024-01-29 DIAGNOSIS — Z00.00 ANNUAL PHYSICAL EXAM: Primary | ICD-10-CM

## 2024-01-29 DIAGNOSIS — D72.829 LEUKOCYTOSIS, UNSPECIFIED TYPE: ICD-10-CM

## 2024-01-29 DIAGNOSIS — F17.290 VAPING NICOTINE DEPENDENCE, TOBACCO PRODUCT: ICD-10-CM

## 2024-01-29 LAB
25(OH)D3 SERPL-MCNC: 62.3 NG/ML (ref 30–100)
ANION GAP SERPL CALCULATED.3IONS-SCNC: 13.6 MMOL/L (ref 5–15)
BASOPHILS # BLD AUTO: 0.1 10*3/MM3 (ref 0–0.2)
BASOPHILS NFR BLD AUTO: 0.7 % (ref 0–1.5)
BUN SERPL-MCNC: 11 MG/DL (ref 6–20)
BUN/CREAT SERPL: 15.9 (ref 7–25)
CALCIUM SPEC-SCNC: 9.6 MG/DL (ref 8.6–10.5)
CHLORIDE SERPL-SCNC: 102 MMOL/L (ref 98–107)
CO2 SERPL-SCNC: 21.4 MMOL/L (ref 22–29)
CREAT SERPL-MCNC: 0.69 MG/DL (ref 0.57–1)
DEPRECATED RDW RBC AUTO: 40 FL (ref 37–54)
EGFRCR SERPLBLD CKD-EPI 2021: 119.9 ML/MIN/1.73
EOSINOPHIL # BLD AUTO: 0.24 10*3/MM3 (ref 0–0.4)
EOSINOPHIL NFR BLD AUTO: 1.8 % (ref 0.3–6.2)
ERYTHROCYTE [DISTWIDTH] IN BLOOD BY AUTOMATED COUNT: 13.1 % (ref 12.3–15.4)
GLUCOSE SERPL-MCNC: 72 MG/DL (ref 65–99)
HCT VFR BLD AUTO: 42.3 % (ref 34–46.6)
HGB BLD-MCNC: 13.7 G/DL (ref 12–15.9)
IMM GRANULOCYTES # BLD AUTO: 0.06 10*3/MM3 (ref 0–0.05)
IMM GRANULOCYTES NFR BLD AUTO: 0.4 % (ref 0–0.5)
LYMPHOCYTES # BLD AUTO: 3.35 10*3/MM3 (ref 0.7–3.1)
LYMPHOCYTES NFR BLD AUTO: 24.8 % (ref 19.6–45.3)
MCH RBC QN AUTO: 27.1 PG (ref 26.6–33)
MCHC RBC AUTO-ENTMCNC: 32.4 G/DL (ref 31.5–35.7)
MCV RBC AUTO: 83.6 FL (ref 79–97)
MONOCYTES # BLD AUTO: 0.93 10*3/MM3 (ref 0.1–0.9)
MONOCYTES NFR BLD AUTO: 6.9 % (ref 5–12)
NEUTROPHILS NFR BLD AUTO: 65.4 % (ref 42.7–76)
NEUTROPHILS NFR BLD AUTO: 8.81 10*3/MM3 (ref 1.7–7)
NRBC BLD AUTO-RTO: 0 /100 WBC (ref 0–0.2)
PLATELET # BLD AUTO: 411 10*3/MM3 (ref 140–450)
PMV BLD AUTO: 11.1 FL (ref 6–12)
POTASSIUM SERPL-SCNC: 4.3 MMOL/L (ref 3.5–5.2)
RBC # BLD AUTO: 5.06 10*6/MM3 (ref 3.77–5.28)
SODIUM SERPL-SCNC: 137 MMOL/L (ref 136–145)
WBC NRBC COR # BLD AUTO: 13.49 10*3/MM3 (ref 3.4–10.8)

## 2024-01-29 PROCEDURE — 1160F RVW MEDS BY RX/DR IN RCRD: CPT | Performed by: NURSE PRACTITIONER

## 2024-01-29 PROCEDURE — 2014F MENTAL STATUS ASSESS: CPT | Performed by: NURSE PRACTITIONER

## 2024-01-29 PROCEDURE — 99395 PREV VISIT EST AGE 18-39: CPT | Performed by: NURSE PRACTITIONER

## 2024-01-29 PROCEDURE — 1159F MED LIST DOCD IN RCRD: CPT | Performed by: NURSE PRACTITIONER

## 2024-01-29 PROCEDURE — 82306 VITAMIN D 25 HYDROXY: CPT | Performed by: NURSE PRACTITIONER

## 2024-01-29 PROCEDURE — 36415 COLL VENOUS BLD VENIPUNCTURE: CPT | Performed by: NURSE PRACTITIONER

## 2024-01-29 PROCEDURE — 80048 BASIC METABOLIC PNL TOTAL CA: CPT | Performed by: INTERNAL MEDICINE

## 2024-01-29 PROCEDURE — 85025 COMPLETE CBC W/AUTO DIFF WBC: CPT | Performed by: NURSE PRACTITIONER

## 2024-01-29 RX ORDER — PHENTERMINE HYDROCHLORIDE 37.5 MG/1
37.5 CAPSULE ORAL EVERY MORNING
Qty: 30 CAPSULE | Refills: 0 | Status: SHIPPED | OUTPATIENT
Start: 2024-01-29

## 2024-01-29 NOTE — PROGRESS NOTES
Chief Complaint  Vitamin D Deficiency, Obesity, and Annual Exam    Subjective          Palmira Carbajal, 28 y.o. female presents to [facility]  History of Present Illness   for follow up on weight loss mgmt. I started her on phentermine last month. She has lost 6 lb.   She is tolerating the med, no side effects.  She states she feels that the medicine has worn off and not working as well near the end of the month.    She is due for an annual physical.    Discussed and reviewed   Counseling to Prevent Tobacco Use, she is vaping. Palmira Carbajal  reports that she quit smoking about 1 years ago. Her smoking use included cigarettes. She has a 7.50 pack-year smoking history. She has never used smokeless tobacco.. I have educated her on the risk of diseases from using tobacco products such as cancer, COPD, and heart disease.     I advised her to quit and she is not willing to quit.    I spent 3  minutes counseling the patient.    Screening Mammography, advised annually starting at age 40.  Screening Pap Tests, advised every 2 years, follows with GYN.   Routine dental and eye exams recommended.   [facility] IMMUNIZATIONS: Influenza and COVID19 declined.    She has chronic migraines.  She has been following with neurology.  She states that they have changed her to an IV infusion which she does not like and would like to go back on Emgality.  She does take Nurtec as needed.  I advised her she will need to follow-up with neurology.     Tobacco Use: Medium Risk (1/29/2024)    Patient History     Smoking Tobacco Use: Former     Smokeless Tobacco Use: Never     Passive Exposure: Not on file      E-cigarette/Vaping    E-cigarette/Vaping Use Current Every Day User     Comments INST PER ANESTHESIA PROTCOL      E-cigarette/Vaping Substances    Nicotine Yes     THC No     CBD No     Flavoring Yes      E-cigarette/Vaping Devices    Disposable Yes     Pre-filled or Refillable Cartridge No     Refillable  "Tank No     Pre-filled Pod No           Objective   Vital Signs:   /64 (BP Location: Left arm, Patient Position: Sitting, Cuff Size: Adult)   Pulse 100   Temp 98.5 °F (36.9 °C)   Ht 162.6 cm (64\")   Wt 78.6 kg (173 lb 3.2 oz)   SpO2 99%   BMI 29.73 kg/m²       Current Outpatient Medications:     amitriptyline (ELAVIL) 10 MG tablet, Take 1 tablet by mouth Every Night., Disp: 30 tablet, Rfl: 2    clindamycin (CLEOCIN T) 1 % external solution, Apply 1 application  topically to the appropriate area as directed Every Night., Disp: , Rfl:     dicyclomine (BENTYL) 20 MG tablet, Take 1 tablet by mouth Every 6 (Six) Hours As Needed (abdominal cramping)., Disp: 60 tablet, Rfl: 3    doxycycline (VIBRAMYICN) 100 MG tablet, Take 1 tablet by mouth 2 (Two) Times a Day., Disp: , Rfl:     esomeprazole (nexIUM) 40 MG capsule, Take 1 capsule by mouth Every Morning Before Breakfast., Disp: 90 capsule, Rfl: 3    fludrocortisone 0.1 MG tablet, Take 1 tablet by mouth Daily., Disp: 30 tablet, Rfl: 3    lamoTRIgine (LaMICtal) 150 MG tablet, Take 1 tablet by mouth Daily for 90 days., Disp: 30 tablet, Rfl: 2    OLANZapine (ZyPREXA) 2.5 MG tablet, Take 1 tablet by mouth Every Night., Disp: 30 tablet, Rfl: 2    phentermine 37.5 MG capsule, Take 1 capsule by mouth Every Morning., Disp: 30 capsule, Rfl: 0    polyethylene glycol (MIRALAX) 17 g packet, Take 17 g by mouth Daily., Disp: 5 packet, Rfl: 0    Retin-A 0.025 % gel, Apply  topically to the appropriate area as directed Every Night., Disp: , Rfl:     Rimegepant Sulfate (Nurtec) 75 MG tablet dispersible tablet, Take 1 tablet by mouth Daily As Needed (migraine)., Disp: 8 tablet, Rfl: 5    sodium chloride 0.9 % solution 100 mL with Eptinezumab-jjmr 100 MG/ML solution 100 mg, Infuse 100 mg into a venous catheter Every 3 (Three) Months., Disp: , Rfl:     vitamin D3 (Vitamin D) 125 MCG (5000 UT) capsule capsule, Take 1 capsule by mouth Daily., Disp: 90 capsule, Rfl: 1   Past Medical " History:   Diagnosis Date    Acid reflux     Anxiety     Chronic pain disorder     Depression     Eczema     Fatty liver     Intractable migraine without aura and without status migrainosus 02/09/2023    Kidney stone     HX OF    Maltracking of right patella     Migraines     Panic disorder     PONV (postoperative nausea and vomiting)     GOOD RESULTS WITH SCOPALAMINE    PTSD (post-traumatic stress disorder)     WAKES UP FROM ANESTHESIA WITH PANIC ATTACK    Seasonal allergic rhinitis 05/31/2022    Self-injurious behavior     as a teenager    Suicide attempt 2007      Physical Exam  Vitals reviewed.   Constitutional:       Appearance: Normal appearance. She is well-developed and overweight.   Neck:      Thyroid: No thyroid mass, thyromegaly or thyroid tenderness.   Cardiovascular:      Rate and Rhythm: Normal rate and regular rhythm.      Heart sounds: No murmur heard.     No friction rub. No gallop.   Pulmonary:      Effort: Pulmonary effort is normal.      Breath sounds: Normal breath sounds. No wheezing or rhonchi.   Lymphadenopathy:      Cervical: No cervical adenopathy.   Skin:     General: Skin is warm and dry.   Neurological:      Mental Status: She is alert and oriented to person, place, and time.      Cranial Nerves: No cranial nerve deficit.   Psychiatric:         Mood and Affect: Mood and affect normal.         Behavior: Behavior normal.         Thought Content: Thought content normal. Thought content does not include homicidal or suicidal ideation.         Judgment: Judgment normal.        Result Review :   {The following data was reviewed by MINI Keating    Common Labs   Common labs          5/28/2023    10:33 8/23/2023    16:34 9/7/2023    11:22   Common Labs   Glucose 98  108     BUN 8  8     Creatinine 0.70  0.76     Sodium 129  137     Potassium 3.8  3.8     Chloride 95  104     Calcium 9.0  9.0     Albumin 3.9  4.1     Total Bilirubin 0.2  <0.2     Alkaline Phosphatase 97  91     AST  (SGOT) 13  11     ALT (SGPT) 16  13     WBC 17.81  16.99  15.13    Hemoglobin 13.1  13.6  14.1    Hematocrit 39.6  41.0  43.5    Platelets 381  393  426    Total Cholesterol   167    Triglycerides   82    HDL Cholesterol   56    LDL Cholesterol    96    Hemoglobin A1C   5.40      TSH   TSH          8/23/2023    16:34   TSH   TSH 1.510      VITD   Lab Results   Component Value Date    MZXL31GP 24.0 (L) 09/07/2023     FREET4   Lab Results   Component Value Date    FREET4 1.10 08/23/2023            Assessment and Plan    Diagnoses and all orders for this visit:    1. Annual physical exam (Primary)  Comments:  Health maintenance information discussed and provided, see AVS.    2. Overweight (BMI 25.0-29.9)  Assessment & Plan:  Patient's (Body mass index is 29.73 kg/m².) indicates that they are overweight with health conditions that include none . Weight is improving with treatment. BMI is is above average; BMI management plan is completed. We discussed portion control, increasing exercise, and pharmacologic options including continuing phentermine.  I will increase dose to 37.5 mg daily.  Kenyon reviewed and is consistent.  She will follow-up with me in 1 month .     Orders:  -     phentermine 37.5 MG capsule; Take 1 capsule by mouth Every Morning.  Dispense: 30 capsule; Refill: 0    3. Intractable migraine without aura and without status migrainosus  Assessment & Plan:  I advised her to call and follow-up with neurology regarding her migraine headache treatments.        4. Leukocytosis, unspecified type  Comments:  We will recheck her CBC today.  She has follow-up with hematology.  Orders:  -     CBC Auto Differential    5. Vaping nicotine dependence, tobacco product  Assessment & Plan:  Tobacco use is newly identified.  Handouts on vaping provided, see AVS.  Tobacco use will be reassessed at the next regular appointment.      6. Vitamin D deficiency  Assessment & Plan:  We will recheck her vitamin D level today with  her labs.    Orders:  -     Vitamin D,25-Hydroxy    Cardiology had ordered a BMP due to previous heart palpitations.    Follow Up   Return in about 4 weeks (around 2/26/2024) for Recheck weight loss mgmt.  Patient was given instructions and counseling regarding her condition or for health maintenance advice. Please see specific information pulled into the AVS if appropriate.     Parts of this note are electronic transcriptions/translations of spoken language to printed text using the Dragon Dictation system.      Jaja Castillo, MINI  01/29/2024

## 2024-01-29 NOTE — ASSESSMENT & PLAN NOTE
Patient's (Body mass index is 29.73 kg/m².) indicates that they are overweight with health conditions that include none . Weight is improving with treatment. BMI is is above average; BMI management plan is completed. We discussed portion control, increasing exercise, and pharmacologic options including continuing phentermine.  I will increase dose to 37.5 mg daily.  Kenyon reviewed and is consistent.  She will follow-up with me in 1 month .

## 2024-01-29 NOTE — ASSESSMENT & PLAN NOTE
Tobacco use is newly identified.  Handouts on vaping provided, see AVS.  Tobacco use will be reassessed at the next regular appointment.

## 2024-02-05 ENCOUNTER — PRIOR AUTHORIZATION (OUTPATIENT)
Dept: PSYCHIATRY | Facility: CLINIC | Age: 29
End: 2024-02-05

## 2024-02-05 ENCOUNTER — TELEMEDICINE (OUTPATIENT)
Dept: PSYCHIATRY | Facility: CLINIC | Age: 29
End: 2024-02-05
Payer: COMMERCIAL

## 2024-02-05 DIAGNOSIS — F33.1 MAJOR DEPRESSIVE DISORDER, RECURRENT EPISODE, MODERATE: Primary | ICD-10-CM

## 2024-02-05 DIAGNOSIS — F41.1 GENERALIZED ANXIETY DISORDER: ICD-10-CM

## 2024-02-05 DIAGNOSIS — F43.10 POST TRAUMATIC STRESS DISORDER (PTSD): ICD-10-CM

## 2024-02-05 PROCEDURE — 99214 OFFICE O/P EST MOD 30 MIN: CPT | Performed by: NURSE PRACTITIONER

## 2024-02-05 PROCEDURE — 1159F MED LIST DOCD IN RCRD: CPT | Performed by: NURSE PRACTITIONER

## 2024-02-05 PROCEDURE — 1160F RVW MEDS BY RX/DR IN RCRD: CPT | Performed by: NURSE PRACTITIONER

## 2024-02-05 RX ORDER — OLANZAPINE 5 MG/1
5 TABLET ORAL NIGHTLY
Qty: 30 TABLET | Refills: 2 | Status: SHIPPED | OUTPATIENT
Start: 2024-02-05

## 2024-02-05 NOTE — PROGRESS NOTES
This provider is located at the [facility] (through [facility]), [address], [place], [address] using a secure [employer] Video Visit through [employer]. Patient is being seen remotely via telehealth at their home address in [place], and stated they are in a secure environment for this session. The patient's condition being diagnosed/treated is appropriate for telemedicine. The provider identified himself as well as his credentials.   The patient consent to be seen remotely, and when consent is given they understand that the consent allows for patient identifiable information to be sent to a third party as needed.   They may refuse to be seen remotely at any time. The electronic data is encrypted and password protected, and the patient  has been advised of the potential risks to privacy not withstanding such measures.    You have chosen to receive care through a telehealth visit.  Do you consent to use a video/audio connection for your medical care today? Yes    Patient identifiers utilized: Name and date of birth.    Patient verbally confirmed consent for today's encounter- yes    Subjective   Palmira Carbajal is a 28 y.o. female who presents today for follow-up appointment.     Chief Complaint:  Depression, anxiety, PTSD    History of Present Illness:     Patient reports she has been feeling down the past couple weeks. Has had increase in stress due to finances. The weather has made it difficult for her  to work. Endorses low interest. Has had trouble sleeping. Energy has been low. Anxiety has been high. Endorses excessive worries. Has had trouble relaxing. PTSD- has had trouble sleeping. Denies nightmares.     Prior Psychiatric Medications:  Zoloft, paxil, prozac, lexapro, desvenlafaxine, effexor, cymbalta, quetiapine, trazodone, hydroxyzine, ambien, lorazepam, buspar, abilify       The following portions of the patient's history were reviewed and updated as  appropriate: allergies, current medications, past family history, past medical history, past social history, past surgical history and problem list.    Allergy:   Allergies   Allergen Reactions    Cephalexin Diarrhea    Cephalosporins GI Intolerance    Cymbalta [Duloxetine Hcl] Other (See Comments)     Jittery and insomnia    Penicillins Rash        Current Medications:   Current Outpatient Medications   Medication Sig Dispense Refill    OLANZapine (ZyPREXA) 5 MG tablet Take 1 tablet by mouth Every Night. 30 tablet 2    amitriptyline (ELAVIL) 10 MG tablet Take 1 tablet by mouth Every Night. 30 tablet 2    clindamycin (CLEOCIN T) 1 % external solution Apply 1 application  topically to the appropriate area as directed Every Night.      dicyclomine (BENTYL) 20 MG tablet Take 1 tablet by mouth Every 6 (Six) Hours As Needed (abdominal cramping). 60 tablet 3    doxycycline (VIBRAMYICN) 100 MG tablet Take 1 tablet by mouth 2 (Two) Times a Day.      esomeprazole (nexIUM) 40 MG capsule Take 1 capsule by mouth Every Morning Before Breakfast. 90 capsule 3    fludrocortisone 0.1 MG tablet Take 1 tablet by mouth Daily. 30 tablet 3    lamoTRIgine (LaMICtal) 150 MG tablet Take 1 tablet by mouth Daily for 90 days. 30 tablet 2    phentermine 37.5 MG capsule Take 1 capsule by mouth Every Morning. 30 capsule 0    polyethylene glycol (MIRALAX) 17 g packet Take 17 g by mouth Daily. 5 packet 0    Retin-A 0.025 % gel Apply  topically to the appropriate area as directed Every Night.      Rimegepant Sulfate (Nurtec) 75 MG tablet dispersible tablet Take 1 tablet by mouth Daily As Needed (migraine). 8 tablet 5    sodium chloride 0.9 % solution 100 mL with Eptinezumab-jjmr 100 MG/ML solution 100 mg Infuse 100 mg into a venous catheter Every 3 (Three) Months.      vitamin D3 (Vitamin D) 125 MCG (5000 UT) capsule capsule Take 1 capsule by mouth Daily. 90 capsule 1     No current facility-administered medications for this visit.       Mental  Status Exam:   Hygiene:   good  Cooperation:  Cooperative  Eye Contact:  Good  Psychomotor Behavior:  Appropriate  Affect:  Full range  Mood: normal  Hopelessness: Denies  Speech:  Normal  Thought Process:  Goal directed  Thought Content:  Normal  Suicidal:  None  Homicidal:  None  Hallucinations:  None  Delusion:  None  Memory:  Intact  Orientation:  Grossly intact  Reliability:  good  Insight:  Good  Judgement:  Good  Impulse Control:  Good  Physical/Medical Issues:  No      Physical Exam:   not currently breastfeeding. There is no height or weight on file to calculate BMI.   Due to the remote nature of this encounter (virtual encounter), vitals were unable to be obtained.  Weight change: n    PHQ-9 Depression Screening  Little interest or pleasure in doing things? (P) 1-->several days   Feeling down, depressed, or hopeless? (P) 2-->more than half the days   Trouble falling or staying asleep, or sleeping too much? (P) 2-->more than half the days   Feeling tired or having little energy? (P) 1-->several days   Poor appetite or overeating? (P) 0-->not at all   Feeling bad about yourself - or that you are a failure or have let yourself or your family down? (P) 1-->several days   Trouble concentrating on things, such as reading the newspaper or watching television? (P) 0-->not at all   Moving or speaking so slowly that other people could have noticed? Or the opposite - being so fidgety or restless that you have been moving around a lot more than usual? (P) 0-->not at all   Thoughts that you would be better off dead, or of hurting yourself in some way? (P) 0-->not at all   PHQ-9 Total Score (P) 7   If you checked off any problems, how difficult have these problems made it for you to do your work, take care of things at home, or get along with other people? (P) somewhat difficult     PHQ-9 Total Score: (P) 7    Feeling nervous, anxious or on edge: (P) Nearly every day  Not being able to stop or control worrying: (P)  Nearly every day  Worrying too much about different things: (P) Nearly every day  Trouble Relaxing: (P) Nearly every day  Being so restless that it is hard to sit still: (P) Several days  Feeling afraid as if something awful might happen: (P) More than half the days  Becoming easily annoyed or irritable: (P) Several days  STACY 7 Total Score: (P) 16  If you checked any problems, how difficult have these problems made it for you to do your work, take care of things at home, or get along with other people: (P) Very difficult    Previous available Provider notes and records reviewed by this APRN at today's encounter.     Visit Diagnoses:    ICD-10-CM ICD-9-CM   1. Major depressive disorder, recurrent episode, moderate  F33.1 296.32   2. Generalized anxiety disorder  F41.1 300.02   3. Post traumatic stress disorder (PTSD)  F43.10 309.81       TREATMENT PLAN: Continue supportive psychotherapy efforts and medications.  Medication and treatment options, both pharmacological and non-pharmacological treatment options, discussed during today's visit, including any off label use of medication. Patient acknowledged and verbally consented with current treatment plan and was educated on the importance of compliance with treatment and follow-up appointments.      - Continue lamotrigine 150mg po qhs to target depression  - Continue amitriptyline 10mg po qhs to target depression, anxiety and PTSD- did not tolerate higher doses previously  - Increase olanzapine 2.5mg to 5mg po qhs to target depression and anxiety    Labs: None ordered at this time  Therapy: Defers    MEDICATION ISSUES:  Discussed treatment plan and medication options of prescribed medication as well as the risks, benefits, any black box warnings, and side effects including potential falls, possible impaired driving, and metabolic adversities among others, including any off label use of medication. Patient is agreeable to call the office with any worsening of symptoms  or onset of side effects, or if any concerns or questions arise.  The contact information for the office is made available to the patient. Patient is agreeable to call 911 or go to the nearest ER should they begin having any SI/HI, or if any urgent concerns arise. No medication side effects or related complaints today. DEBORAH reviewed as expected.    RISK ASSESSMENT:  Risk of self-harm acutely is moderate.  Risk factors include anxiety disorder, mood disorder, and recent psychosocial stressors (pandemic). Protective factors include no family history, denies access to guns/weapons, no present SI, no history of suicide attempts or self-harm in the past, minimal AODA, healthcare seeking, future orientation, willingness to engage in care.  Risk of self-harm chronically is also moderate, but could be further elevated in the event of treatment noncompliance and/or AODA.    VERBAL INFORMED CONSENT FOR MEDICATION:  The patient was educated that their proposed/prescribed psychotropic medication(s) has potential risks, side effects, adverse effects, and black box warnings; and these have been discussed with the patient.  The patient has been informed that their treatment and medication dosage is to be individualized, and may even be above or below the recommended range/dosage due to patient individualization and response, but medication is prescribed using a shared decision making approach, and no medication or dosage will be prescribed without the patient's verbal consent.  The reason for the use of the medication including any off label use and alternative modes of treatment other than or in addition to medication has been considered and discussed, the probable consequences of not receiving the proposed treatment have been discussed, and any treatment side effects, black box warnings, and cautions associated with treatment have been discussed with the patient.  The patient is allowed ample time to openly discuss and ask  questions regarding the proposed medication(s) and treatment plan and the patient verbalizes understanding the reasons for the use of the medication, its potential risks and benefits, other alternative treatment(s), and the probable consequences that may occur if the proposed medication is not given.  The patient has been given ample time to ask questions and study the information and find the information to be specific, accurate, and complete.  The patient gives verbal consent for the medication(s) proposed/prescribed, they verbalized understanding that they can refuse and withdraw consent at any time with the assistance of this APRN, and the patient has verbally confirmed that they are aware, and are willing, to take the prescribed medication and follow the treatment plan with the known possible risks, side effect, black box warnings, and any potential medication interactions, and the patient reports they will be worse off without this medication and treatment plan.  The patient is advised to contact this APRN/this office if any questions or concerns arise at any time (at [phone]), or call 911/go to the closest emergency department if needed or outside of office hours.      [facility] No Show Policy:  We understand unexpected circumstances arise; however, anytime you miss your appointment we are unable to provide you appropriate care.  In addition, each appointment missed could have been used to provide care for others.  We ask that you call at least 24 hours in advance to cancel or reschedule an appointment.  We would like to take this opportunity to remind you of our policy stating patients who miss THREE or more appointments without cancelling or rescheduling 24 hours in advance of the appointment may be subject to cancellation of any further visits with our clinic and recommendation to seek in-person services/visits.    Please call [phone] to reschedule your appointment. If  there are reasons that make it difficult for you to keep the appointments, please call and let us know how we can help.  Please understand that medication prescribing will not continue without seeing your provider.      [facility]'s No Show Policy reviewed with patient at today's visit. Patient verbalized understanding of this policy. Discussed with patient that in the event that there are three or more no show visits, it will be recommended that they pursue in-person services/visits as noncompliance with telehealth visits indicates that patient is not an appropriate candidate for telemedicine and would likely be more appropriate for in-person services/visits. Patient verbalizes understanding and is agreeable to this.    MEDS ORDERED DURING VISIT:  New Medications Ordered This Visit   Medications    OLANZapine (ZyPREXA) 5 MG tablet     Sig: Take 1 tablet by mouth Every Night.     Dispense:  30 tablet     Refill:  2     Please d/c olanzapine 2.5mg rx       Return in about 4 weeks (around 3/4/2024) for Next scheduled follow up.         Progress toward goal: Not at goal    Functional Status: No impairment    Prognosis: Good with Ongoing Treatment     This document has been electronically signed by MINI Poon  February 5, 2024 13:39 EST      Please note that portions of this note were completed with a voice recognition program.

## 2024-02-19 ENCOUNTER — TELEPHONE (OUTPATIENT)
Dept: OBSTETRICS AND GYNECOLOGY | Facility: CLINIC | Age: 29
End: 2024-02-19
Payer: COMMERCIAL

## 2024-02-26 ENCOUNTER — TELEMEDICINE (OUTPATIENT)
Dept: PSYCHIATRY | Facility: CLINIC | Age: 29
End: 2024-02-26
Payer: COMMERCIAL

## 2024-02-26 DIAGNOSIS — F41.1 GENERALIZED ANXIETY DISORDER: ICD-10-CM

## 2024-02-26 DIAGNOSIS — F33.1 MAJOR DEPRESSIVE DISORDER, RECURRENT EPISODE, MODERATE: Primary | ICD-10-CM

## 2024-02-26 DIAGNOSIS — F43.10 POST TRAUMATIC STRESS DISORDER (PTSD): ICD-10-CM

## 2024-02-26 PROCEDURE — 99214 OFFICE O/P EST MOD 30 MIN: CPT | Performed by: NURSE PRACTITIONER

## 2024-02-26 PROCEDURE — 1160F RVW MEDS BY RX/DR IN RCRD: CPT | Performed by: NURSE PRACTITIONER

## 2024-02-26 PROCEDURE — 1159F MED LIST DOCD IN RCRD: CPT | Performed by: NURSE PRACTITIONER

## 2024-02-26 NOTE — LETTER
February 26, 2024     Palmira Carbajal    Patient: Palmira Carbajal   YOB: 1995   Date of Visit: 2/26/2024     To Whom it May Concern:     Palmira Carbajal is a patient of mine and has been under my care since 12/8/2022. She is interested in receiving Accutane from your office related to skin condition. She is aware of the risks and benefits of the medication, and we will be following along with patient to monitor any changes she has in terms of mood with the medication.     If you have questions, please do not hesitate to call me. I look forward to following Palmira along with you.         Sincerely,        MINI Poon        CC: No Recipients

## 2024-02-26 NOTE — PROGRESS NOTES
This provider is located at the [facility] (through [facility]), [address], [place], [address] using a secure [employer] Video Visit through [employer]. Patient is being seen remotely via telehealth at their home address in [place], and stated they are in a secure environment for this session. The patient's condition being diagnosed/treated is appropriate for telemedicine. The provider identified himself as well as his credentials.   The patient consent to be seen remotely, and when consent is given they understand that the consent allows for patient identifiable information to be sent to a third party as needed.   They may refuse to be seen remotely at any time. The electronic data is encrypted and password protected, and the patient  has been advised of the potential risks to privacy not withstanding such measures.    You have chosen to receive care through a telehealth visit.  Do you consent to use a video/audio connection for your medical care today? Yes    Patient identifiers utilized: Name and date of birth.    Patient verbally confirmed consent for today's encounter- yes    Subjective   Palmira Carbajal is a 28 y.o. female who presents today for follow-up appointment.     Chief Complaint:  Depression, anxiety, PTSD    History of Present Illness:     Patient reports feeling down over the past couple weeks, as her Father passed away. Patient states depression was better up until that time. Endorses low interest. Has had trouble sleeping. Energy levels low. Anxiety has been high at times, with the passing of her Father. Endorses excessive worries. Trouble relaxing at times. PTSD- trouble sleeping. Denies nightmares.     Prior Psychiatric Medications:  Zoloft, paxil, prozac, lexapro, desvenlafaxine, effexor, cymbalta, quetiapine, trazodone, hydroxyzine, ambien, lorazepam, buspar, abilify      The following portions of the patient's history were reviewed and updated as  appropriate: allergies, current medications, past family history, past medical history, past social history, past surgical history and problem list.    Allergy:   Allergies   Allergen Reactions    Cephalexin Diarrhea    Cephalosporins GI Intolerance    Cymbalta [Duloxetine Hcl] Other (See Comments)     Jittery and insomnia    Penicillins Rash        Current Medications:   Current Outpatient Medications   Medication Sig Dispense Refill    amitriptyline (ELAVIL) 10 MG tablet Take 1 tablet by mouth Every Night. 30 tablet 2    clindamycin (CLEOCIN T) 1 % external solution Apply 1 application  topically to the appropriate area as directed Every Night.      dicyclomine (BENTYL) 20 MG tablet Take 1 tablet by mouth Every 6 (Six) Hours As Needed (abdominal cramping). 60 tablet 3    doxycycline (VIBRAMYICN) 100 MG tablet Take 1 tablet by mouth 2 (Two) Times a Day.      esomeprazole (nexIUM) 40 MG capsule Take 1 capsule by mouth Every Morning Before Breakfast. 90 capsule 3    fludrocortisone 0.1 MG tablet Take 1 tablet by mouth Daily. 30 tablet 3    lamoTRIgine (LaMICtal) 150 MG tablet Take 1 tablet by mouth Daily for 90 days. 30 tablet 2    OLANZapine (ZyPREXA) 5 MG tablet Take 1 tablet by mouth Every Night. 30 tablet 2    phentermine 37.5 MG capsule Take 1 capsule by mouth Every Morning. 30 capsule 0    polyethylene glycol (MIRALAX) 17 g packet Take 17 g by mouth Daily. 5 packet 0    Retin-A 0.025 % gel Apply  topically to the appropriate area as directed Every Night.      Rimegepant Sulfate (Nurtec) 75 MG tablet dispersible tablet Take 1 tablet by mouth Daily As Needed (migraine). 8 tablet 5    sodium chloride 0.9 % solution 100 mL with Eptinezumab-jjmr 100 MG/ML solution 100 mg Infuse 100 mg into a venous catheter Every 3 (Three) Months.      vitamin D3 (Vitamin D) 125 MCG (5000 UT) capsule capsule Take 1 capsule by mouth Daily. 90 capsule 1     No current facility-administered medications for this visit.       Mental  Status Exam:   Hygiene:   good  Cooperation:  Cooperative  Eye Contact:  Good  Psychomotor Behavior:  Appropriate  Affect:  Full range  Mood: normal  Hopelessness: Denies  Speech:  Normal  Thought Process:  Goal directed  Thought Content:  Normal  Suicidal:  None  Homicidal:  None  Hallucinations:  None  Delusion:  None  Memory:  Intact  Orientation:  Grossly intact  Reliability:  good  Insight:  Good  Judgement:  Good  Impulse Control:  Good  Physical/Medical Issues:  No      Physical Exam:   not currently breastfeeding. There is no height or weight on file to calculate BMI.   Due to the remote nature of this encounter (virtual encounter), vitals were unable to be obtained.  Weight change: n    PHQ-9 Depression Screening  Little interest or pleasure in doing things? (P) 2-->more than half the days   Feeling down, depressed, or hopeless? (P) 2-->more than half the days   Trouble falling or staying asleep, or sleeping too much? (P) 3-->nearly every day   Feeling tired or having little energy? (P) 3-->nearly every day   Poor appetite or overeating? (P) 0-->not at all   Feeling bad about yourself - or that you are a failure or have let yourself or your family down? (P) 0-->not at all   Trouble concentrating on things, such as reading the newspaper or watching television? (P) 1-->several days   Moving or speaking so slowly that other people could have noticed? Or the opposite - being so fidgety or restless that you have been moving around a lot more than usual? (P) 1-->several days   Thoughts that you would be better off dead, or of hurting yourself in some way? (P) 0-->not at all   PHQ-9 Total Score (P) 12   If you checked off any problems, how difficult have these problems made it for you to do your work, take care of things at home, or get along with other people? (P) very difficult     PHQ-9 Total Score: (P) 12    Feeling nervous, anxious or on edge: (P) Nearly every day  Not being able to stop or control worrying:  (P) Nearly every day  Worrying too much about different things: (P) Nearly every day  Trouble Relaxing: (P) Nearly every day  Being so restless that it is hard to sit still: (P) Several days  Feeling afraid as if something awful might happen: (P) More than half the days  Becoming easily annoyed or irritable: (P) Not at all  STACY 7 Total Score: (P) 15  If you checked any problems, how difficult have these problems made it for you to do your work, take care of things at home, or get along with other people: (P) Very difficult    Previous available Provider notes and records reviewed by this APRN at today's encounter.     Visit Diagnoses:    ICD-10-CM ICD-9-CM   1. Major depressive disorder, recurrent episode, moderate  F33.1 296.32   2. Generalized anxiety disorder  F41.1 300.02   3. Post traumatic stress disorder (PTSD)  F43.10 309.81       TREATMENT PLAN: Continue supportive psychotherapy efforts and medications.  Medication and treatment options, both pharmacological and non-pharmacological treatment options, discussed during today's visit, including any off label use of medication. Patient acknowledged and verbally consented with current treatment plan and was educated on the importance of compliance with treatment and follow-up appointments.      - Continue lamotrigine 150mg po qhs to target depression  - Continue amitritpyline 10mg po qhs to target depression, anxiety and PTSD  - Continue olanzapine 5mg po qhs to target depression    Labs: None ordered at this time  Therapy: Defers    MEDICATION ISSUES:  Discussed treatment plan and medication options of prescribed medication as well as the risks, benefits, any black box warnings, and side effects including potential falls, possible impaired driving, and metabolic adversities among others, including any off label use of medication. Patient is agreeable to call the office with any worsening of symptoms or onset of side effects, or if any concerns or questions  arise.  The contact information for the office is made available to the patient. Patient is agreeable to call 911 or go to the nearest ER should they begin having any SI/HI, or if any urgent concerns arise. No medication side effects or related complaints today. DEBORAH reviewed as expected.    RISK ASSESSMENT:  Risk of self-harm acutely is moderate.  Risk factors include anxiety disorder, mood disorder, and recent psychosocial stressors (pandemic). Protective factors include no family history, denies access to guns/weapons, no present SI, no history of suicide attempts or self-harm in the past, minimal AODA, healthcare seeking, future orientation, willingness to engage in care.  Risk of self-harm chronically is also moderate, but could be further elevated in the event of treatment noncompliance and/or AODA.    VERBAL INFORMED CONSENT FOR MEDICATION:  The patient was educated that their proposed/prescribed psychotropic medication(s) has potential risks, side effects, adverse effects, and black box warnings; and these have been discussed with the patient.  The patient has been informed that their treatment and medication dosage is to be individualized, and may even be above or below the recommended range/dosage due to patient individualization and response, but medication is prescribed using a shared decision making approach, and no medication or dosage will be prescribed without the patient's verbal consent.  The reason for the use of the medication including any off label use and alternative modes of treatment other than or in addition to medication has been considered and discussed, the probable consequences of not receiving the proposed treatment have been discussed, and any treatment side effects, black box warnings, and cautions associated with treatment have been discussed with the patient.  The patient is allowed ample time to openly discuss and ask questions regarding the proposed medication(s) and treatment  plan and the patient verbalizes understanding the reasons for the use of the medication, its potential risks and benefits, other alternative treatment(s), and the probable consequences that may occur if the proposed medication is not given.  The patient has been given ample time to ask questions and study the information and find the information to be specific, accurate, and complete.  The patient gives verbal consent for the medication(s) proposed/prescribed, they verbalized understanding that they can refuse and withdraw consent at any time with the assistance of this APRN, and the patient has verbally confirmed that they are aware, and are willing, to take the prescribed medication and follow the treatment plan with the known possible risks, side effect, black box warnings, and any potential medication interactions, and the patient reports they will be worse off without this medication and treatment plan.  The patient is advised to contact this APRN/this office if any questions or concerns arise at any time (at [phone]), or call 911/go to the closest emergency department if needed or outside of office hours.      [facility] No Show Policy:  We understand unexpected circumstances arise; however, anytime you miss your appointment we are unable to provide you appropriate care.  In addition, each appointment missed could have been used to provide care for others.  We ask that you call at least 24 hours in advance to cancel or reschedule an appointment.  We would like to take this opportunity to remind you of our policy stating patients who miss THREE or more appointments without cancelling or rescheduling 24 hours in advance of the appointment may be subject to cancellation of any further visits with our clinic and recommendation to seek in-person services/visits.    Please call [phone] to reschedule your appointment. If there are reasons that make it difficult for you to keep the  appointments, please call and let us know how we can help.  Please understand that medication prescribing will not continue without seeing your provider.      [facility] No Show Policy reviewed with patient at today's visit. Patient verbalized understanding of this policy. Discussed with patient that in the event that there are three or more no show visits, it will be recommended that they pursue in-person services/visits as noncompliance with telehealth visits indicates that patient is not an appropriate candidate for telemedicine and would likely be more appropriate for in-person services/visits. Patient verbalizes understanding and is agreeable to this.    MEDS ORDERED DURING VISIT:  No orders of the defined types were placed in this encounter.      Return in about 4 weeks (around 3/25/2024) for Next scheduled follow up.         Progress toward goal: Not at goal    Functional Status: No impairment    Prognosis: Good with Ongoing Treatment     This document has been electronically signed by MINI Poon  February 26, 2024 13:57 EST      Please note that portions of this note were completed with a voice recognition program.

## 2024-02-29 ENCOUNTER — TELEPHONE (OUTPATIENT)
Dept: OTHER | Facility: HOSPITAL | Age: 29
End: 2024-02-29
Payer: COMMERCIAL

## 2024-02-29 ENCOUNTER — OFFICE VISIT (OUTPATIENT)
Dept: FAMILY MEDICINE CLINIC | Facility: CLINIC | Age: 29
End: 2024-02-29
Payer: COMMERCIAL

## 2024-02-29 VITALS
WEIGHT: 171.1 LBS | OXYGEN SATURATION: 99 % | SYSTOLIC BLOOD PRESSURE: 122 MMHG | TEMPERATURE: 98.3 F | BODY MASS INDEX: 29.21 KG/M2 | HEIGHT: 64 IN | DIASTOLIC BLOOD PRESSURE: 78 MMHG | HEART RATE: 90 BPM

## 2024-02-29 DIAGNOSIS — E66.3 OVERWEIGHT (BMI 25.0-29.9): ICD-10-CM

## 2024-02-29 RX ORDER — PHENTERMINE HYDROCHLORIDE 37.5 MG/1
37.5 CAPSULE ORAL EVERY MORNING
Qty: 30 CAPSULE | Refills: 0 | Status: SHIPPED | OUTPATIENT
Start: 2024-02-29

## 2024-02-29 NOTE — PROGRESS NOTES
"Chief Complaint  Obesity (Follow-up on weight loss management)    Subjective          Palmira Carbajal, 28 y.o. female presents to [facility]  History of Present Illness   for follow up on weight loss mgmt. I increased phentermine dose to 37.5 mg last month. She has lost 2 lb. She has missed some doses of phentermine due to elevated stress. Her father passed away.    She is tolerating the med, no side effects.  She denies any chest pain, heart palpitations or other side effects.  She has had episodes of increased heart rates that usually come back down.  She has been evaluated by cardiology with Holter monitor and all was unremarkable.  She states she does not do any caffeine which helps.  She states she is tolerating the medication and would like to continue if possible.    She has thyromegaly without any hypothyroidism or Hashimoto's.  She has seen endocrinology and they are monitoring her.       Tobacco Use: Medium Risk (2/29/2024)    Patient History     Smoking Tobacco Use: Former     Smokeless Tobacco Use: Never     Passive Exposure: Not on file      Objective   Vital Signs:   /78   Pulse 90   Temp 98.3 °F (36.8 °C)   Ht 162.6 cm (64\")   Wt 77.6 kg (171 lb 1.6 oz)   SpO2 99%   BMI 29.37 kg/m²       Current Outpatient Medications:     amitriptyline (ELAVIL) 10 MG tablet, Take 1 tablet by mouth Every Night., Disp: 30 tablet, Rfl: 2    dicyclomine (BENTYL) 20 MG tablet, Take 1 tablet by mouth Every 6 (Six) Hours As Needed (abdominal cramping)., Disp: 60 tablet, Rfl: 3    esomeprazole (nexIUM) 40 MG capsule, Take 1 capsule by mouth Every Morning Before Breakfast., Disp: 90 capsule, Rfl: 3    fludrocortisone 0.1 MG tablet, Take 1 tablet by mouth Daily., Disp: 30 tablet, Rfl: 3    lamoTRIgine (LaMICtal) 150 MG tablet, Take 1 tablet by mouth Daily for 90 days., Disp: 30 tablet, Rfl: 2    OLANZapine (ZyPREXA) 5 MG tablet, Take 1 tablet by mouth Every Night., Disp: 30 " tablet, Rfl: 2    phentermine 37.5 MG capsule, Take 1 capsule by mouth Every Morning., Disp: 30 capsule, Rfl: 0    polyethylene glycol (MIRALAX) 17 g packet, Take 17 g by mouth Daily., Disp: 5 packet, Rfl: 0    Rimegepant Sulfate (Nurtec) 75 MG tablet dispersible tablet, Take 1 tablet by mouth Daily As Needed (migraine)., Disp: 8 tablet, Rfl: 5    sodium chloride 0.9 % solution 100 mL with Eptinezumab-jjmr 100 MG/ML solution 100 mg, Infuse 100 mg into a venous catheter Every 3 (Three) Months., Disp: , Rfl:     vitamin D3 (Vitamin D) 125 MCG (5000 UT) capsule capsule, Take 1 capsule by mouth Daily., Disp: 90 capsule, Rfl: 1   Past Medical History:   Diagnosis Date    Acid reflux     Anxiety     Chronic pain disorder     Depression     Eczema     Fatty liver     Intractable migraine without aura and without status migrainosus 02/09/2023    Kidney stone     HX OF    Maltracking of right patella     Migraines     Panic disorder     PONV (postoperative nausea and vomiting)     GOOD RESULTS WITH SCOPALAMINE    PTSD (post-traumatic stress disorder)     WAKES UP FROM ANESTHESIA WITH PANIC ATTACK    Seasonal allergic rhinitis 05/31/2022    Self-injurious behavior     as a teenager    Suicide attempt 2007      Physical Exam  Vitals reviewed.   Constitutional:       Appearance: Normal appearance. She is well-developed and overweight.   Neck:      Thyroid: Thyromegaly present. No thyroid mass or thyroid tenderness.   Cardiovascular:      Rate and Rhythm: Normal rate and regular rhythm.      Heart sounds: No murmur heard.     No friction rub. No gallop.   Pulmonary:      Effort: Pulmonary effort is normal.      Breath sounds: Normal breath sounds. No wheezing or rhonchi.   Lymphadenopathy:      Cervical: No cervical adenopathy.   Skin:     General: Skin is warm and dry.   Neurological:      Mental Status: She is alert and oriented to person, place, and time.      Cranial Nerves: No cranial nerve deficit.   Psychiatric:          Mood and Affect: Mood and affect normal.         Behavior: Behavior normal.         Thought Content: Thought content normal. Thought content does not include homicidal or suicidal ideation.         Judgment: Judgment normal.        Result Review :   {The following data was reviewed by MINI Keating      Common Labs   Common labs          8/23/2023    16:34 9/7/2023    11:22 1/29/2024    11:02   Common Labs   Glucose 108   72    BUN 8   11    Creatinine 0.76   0.69    Sodium 137   137    Potassium 3.8   4.3    Chloride 104   102    Calcium 9.0   9.6    Albumin 4.1      Total Bilirubin <0.2      Alkaline Phosphatase 91      AST (SGOT) 11      ALT (SGPT) 13      WBC 16.99  15.13  13.49    Hemoglobin 13.6  14.1  13.7    Hematocrit 41.0  43.5  42.3    Platelets 393  426  411    Total Cholesterol  167     Triglycerides  82     HDL Cholesterol  56     LDL Cholesterol   96     Hemoglobin A1C  5.40       TSH   TSH          8/23/2023    16:34   TSH   TSH 1.510      FREET4   Lab Results   Component Value Date    FREET4 1.10 08/23/2023         Component  Ref Range & Units 2 yr ago   Thyroid Peroxidase Antibody  0 - 34 IU/mL <8                 Assessment and Plan    Diagnoses and all orders for this visit:    1. Overweight (BMI 25.0-29.9)  Assessment & Plan:  Patient's (Body mass index is 29.37 kg/m².) indicates that they are overweight with health conditions that include GERD . Weight is improving with treatment. BMI is is above average; BMI management plan is completed. We discussed portion control, increasing exercise, pharmacologic options including we will continue phentermine for 1 more month and then take a break for 1 month, and Information on healthy weight added to patient's after visit summary.  Advised to make sure to continue her diet and exercise during the month that she is off of phentermine.  She will follow-up with me in 2 months.    Orders:  -     phentermine 37.5 MG capsule; Take 1 capsule by  mouth Every Morning.  Dispense: 30 capsule; Refill: 0        Follow Up   Return in about 2 months (around 4/29/2024) for Next scheduled follow up weight loss mgmt.  Patient was given instructions and counseling regarding her condition or for health maintenance advice. Please see specific information pulled into the AVS if appropriate.     Parts of this note are electronic transcriptions/translations of spoken language to printed text using the Dragon Dictation system.      Jaja Castillo, MINI  02/27/2024

## 2024-02-29 NOTE — TELEPHONE ENCOUNTER
Left voice message to remind patient of her Fibro Scan appointment on 3/1/24@11:15. Nothing to eat 3 hours prior to appointment.

## 2024-02-29 NOTE — ASSESSMENT & PLAN NOTE
Patient's (Body mass index is 29.37 kg/m².) indicates that they are overweight with health conditions that include GERD . Weight is improving with treatment. BMI is is above average; BMI management plan is completed. We discussed portion control, increasing exercise, pharmacologic options including we will continue phentermine for 1 more month and then take a break for 1 month, and Information on healthy weight added to patient's after visit summary.  Advised to make sure to continue her diet and exercise during the month that she is off of phentermine.  She will follow-up with me in 2 months.

## 2024-03-01 ENCOUNTER — PROCEDURE VISIT (OUTPATIENT)
Dept: OTHER | Facility: HOSPITAL | Age: 29
End: 2024-03-01
Payer: COMMERCIAL

## 2024-03-01 DIAGNOSIS — K76.0 FATTY LIVER: ICD-10-CM

## 2024-03-01 PROCEDURE — 91200 LIVER ELASTOGRAPHY: CPT | Performed by: NURSE PRACTITIONER

## 2024-03-04 ENCOUNTER — PATIENT ROUNDING (BHMG ONLY) (OUTPATIENT)
Dept: FAMILY MEDICINE CLINIC | Facility: CLINIC | Age: 29
End: 2024-03-04
Payer: COMMERCIAL

## 2024-03-05 NOTE — PROGRESS NOTES
Liver Elastography    Performed by: Jaja Dunn APRN  Authorized by: Kayla Brody APRN  Ordering Provider: Kayla Brody APRN    Probe:  M+  Procedure Details:  Procedure: After providing an oral and written explanation of the Fibroscan vibration controlled transient elastography (VTCE) test procedure to the patient. The patient was placed in supine position with right arm in maximum abduction to allow optimal exposure of right lateral abdomen. Patient was briefly assessed, identifying terminus of the xyphoid process and locating an ideal transient elastography testing site, midline and lateral to this point. Patient was instructed to breathe normally and remain stationary during the test process. Pre-measurement data confirmed the transient elastography probe was centered over the liver parenchyma. A series of ten 50 Hz mechanical pulses were applied with controlled application pressure to induce a mechanical shear wave in the liver tissue. For each measurement, the shear wave propagation speed was detected, displayed and converted to its equivalent liver stiffness value in kilopascals. Skin to liver capsules distance and shear wave characteristics were monitored during the entire examination to assure quality data. Median liver stiffness measurement and interquartile range were calculated and displayed in real time. Acquired measurement data was stored and submitted to the provider for review and interpretation. Patient tolerated the procedure well and was discharged without incident.   Clinical Information:     NPO 3 Hours or More: Yes      Actively Drinking: No    Findings:     Median Liver Stiffness Score:  8.7    Interquartile Range (IQR) to Median Ratio:  24    Trisha Stiffness Consistent with:  F2    Current Scan Considered Reliab: Yes      Median Controlled Attenuation Parameter (dB/m):  228    IQR:  11    CAP SCORE:  Normal/mild liver fat

## 2024-03-06 ENCOUNTER — TELEPHONE (OUTPATIENT)
Dept: GASTROENTEROLOGY | Facility: CLINIC | Age: 29
End: 2024-03-06
Payer: COMMERCIAL

## 2024-03-06 NOTE — TELEPHONE ENCOUNTER
----- Message from MINI Bridges sent at 3/5/2024  4:35 PM EST -----  FibroScan showed mild fatty liver disease.  Fibrosis F2.  Will discuss further at next visit.

## 2024-03-12 ENCOUNTER — PATIENT MESSAGE (OUTPATIENT)
Dept: NEUROLOGY | Facility: CLINIC | Age: 29
End: 2024-03-12
Payer: COMMERCIAL

## 2024-03-18 ENCOUNTER — TELEPHONE (OUTPATIENT)
Dept: NEUROLOGY | Facility: CLINIC | Age: 29
End: 2024-03-18
Payer: COMMERCIAL

## 2024-03-18 RX ORDER — FLUDROCORTISONE ACETATE 0.1 MG/1
TABLET ORAL DAILY
Qty: 30 TABLET | Refills: 2 | Status: SHIPPED | OUTPATIENT
Start: 2024-03-18

## 2024-03-18 NOTE — TELEPHONE ENCOUNTER
Caller: Palmira Carbajal    Relationship: Self    Best call back number: [phone]    What was the call regarding: PT OPTED TO JUST CANCEL HER F/U APPT W/ MINI VELOZ ON 3/28/24 AS OPPOSED TO RESCHEDULING. SHE STATES SHE HAS TOO MUCH TROUBLE MANAGING APPTS FOR NEURO AND HER VYEPTI INFUSION. PT STATES SHE WISHES TO FOREGO THE VYEPTI INFUSIONS AND THAT HER PCP HAS AGREED TO TAKE OVER THE EMGALITY AND NURTEC RXS, BUT WOULD NEED NEURO OFFICE TO REACH OUT TO HER PCP OFFICE TO STATE THAT MINI VELOZ AGREES WITH HER RESTARTING THE EMGALITY & NURTEC FOR HER MIGRAINES.    Do you require a callback: YES    Is it okay if the provider responds through [facility]?: YES    PLEASE REVIEW AND ADVISE.

## 2024-03-25 ENCOUNTER — TELEMEDICINE (OUTPATIENT)
Dept: PSYCHIATRY | Facility: CLINIC | Age: 29
End: 2024-03-25
Payer: COMMERCIAL

## 2024-03-25 DIAGNOSIS — F41.1 GENERALIZED ANXIETY DISORDER: Primary | ICD-10-CM

## 2024-03-25 DIAGNOSIS — F43.10 POST TRAUMATIC STRESS DISORDER (PTSD): ICD-10-CM

## 2024-03-25 DIAGNOSIS — F33.1 MAJOR DEPRESSIVE DISORDER, RECURRENT EPISODE, MODERATE: ICD-10-CM

## 2024-03-25 RX ORDER — OLANZAPINE 5 MG/1
7.5 TABLET ORAL NIGHTLY
Qty: 45 TABLET | Refills: 2 | Status: SHIPPED | OUTPATIENT
Start: 2024-03-25

## 2024-03-25 RX ORDER — AMITRIPTYLINE HYDROCHLORIDE 10 MG/1
10 TABLET, FILM COATED ORAL NIGHTLY
Qty: 30 TABLET | Refills: 2 | Status: SHIPPED | OUTPATIENT
Start: 2024-03-25

## 2024-03-25 RX ORDER — LAMOTRIGINE 150 MG/1
150 TABLET ORAL DAILY
Qty: 30 TABLET | Refills: 2 | Status: SHIPPED | OUTPATIENT
Start: 2024-03-25 | End: 2024-06-23

## 2024-03-25 NOTE — PROGRESS NOTES
"This provider is located at the [facility] (through [facility]), [address] [facility], [place], [address] using a secure [employer] Video Visit through [employer]. Patient is being seen remotely via telehealth at their home address in [place], and stated they are in a secure environment for this session. The patient's condition being diagnosed/treated is appropriate for telemedicine. The provider identified himself as well as his credentials.   The patient consent to be seen remotely, and when consent is given they understand that the consent allows for patient identifiable information to be sent to a third party as needed.   They may refuse to be seen remotely at any time. The electronic data is encrypted and password protected, and the patient  has been advised of the potential risks to privacy not withstanding such measures.    You have chosen to receive care through a telehealth visit.  Do you consent to use a video/audio connection for your medical care today? Yes    Patient identifiers utilized: Name and date of birth.    Patient verbally confirmed consent for today's encounter- yes    Subjective   Palmira Carbajal is a 28 y.o. female who presents today for follow-up appointment.     Chief Complaint:  Depression, anxiety, PTSD    History of Present Illness:     Patient reports feeling down over the past month. Stress related to the passing of her father, including \"trying to take care\" of mother. Daughter has also been dealing with mental health issues. Uncle passed away last week. Has had low interest. Trouble sleeping. Energy has been low. Anxiety has been high due to situational stress. Endorses excessive worries. Trouble relaxing. PTSD- trouble sleeping. Denies nightmares.    Prior Psychiatric Medications:  Zoloft, paxil, prozac, lexapro, desvenlafaxine, effexor, cymbalta, quetiapine, trazodone, hydroxyzine, ambien, lorazepam, buspar, abilify       The following portions of " the patient's history were reviewed and updated as appropriate: allergies, current medications, past family history, past medical history, past social history, past surgical history and problem list.    Allergy:   Allergies   Allergen Reactions    Cephalexin Diarrhea    Cephalosporins GI Intolerance    Cymbalta [Duloxetine Hcl] Other (See Comments)     Jittery and insomnia    Penicillins Rash        Current Medications:   Current Outpatient Medications   Medication Sig Dispense Refill    amitriptyline (ELAVIL) 10 MG tablet Take 1 tablet by mouth Every Night. 30 tablet 2    lamoTRIgine (LaMICtal) 150 MG tablet Take 1 tablet by mouth Daily for 90 days. 30 tablet 2    OLANZapine (ZyPREXA) 5 MG tablet Take 1.5 tablets by mouth Every Night. 45 tablet 2    dicyclomine (BENTYL) 20 MG tablet Take 1 tablet by mouth Every 6 (Six) Hours As Needed (abdominal cramping). 60 tablet 3    esomeprazole (nexIUM) 40 MG capsule Take 1 capsule by mouth Every Morning Before Breakfast. 90 capsule 3    fludrocortisone 0.1 MG tablet TAKE 1 TABLET BY MOUTH ONCE DAILY 30 tablet 2    phentermine 37.5 MG capsule Take 1 capsule by mouth Every Morning. 30 capsule 0    polyethylene glycol (MIRALAX) 17 g packet Take 17 g by mouth Daily. 5 packet 0    Rimegepant Sulfate (Nurtec) 75 MG tablet dispersible tablet Take 1 tablet by mouth Daily As Needed (migraine). 8 tablet 5    sodium chloride 0.9 % solution 100 mL with Eptinezumab-jjmr 100 MG/ML solution 100 mg Infuse 100 mg into a venous catheter Every 3 (Three) Months.      vitamin D3 (Vitamin D) 125 MCG (5000 UT) capsule capsule Take 1 capsule by mouth Daily. 90 capsule 1     No current facility-administered medications for this visit.       Mental Status Exam:   Hygiene:   good  Cooperation:  Cooperative  Eye Contact:  Good  Psychomotor Behavior:  Appropriate  Affect:  Full range  Mood: sad  Hopelessness: Denies  Speech:  Normal  Thought Process:  Goal directed  Thought Content:  Normal  Suicidal:   None  Homicidal:  None  Hallucinations:  None  Delusion:  None  Memory:  Intact  Orientation:  Grossly intact  Reliability:  good  Insight:  Good  Judgement:  Good  Impulse Control:  Good  Physical/Medical Issues:  No      Physical Exam:   not currently breastfeeding. There is no height or weight on file to calculate BMI.   Due to the remote nature of this encounter (virtual encounter), vitals were unable to be obtained.  Weight change: n    PHQ-9 Depression Screening  Little interest or pleasure in doing things? (P) 2-->more than half the days   Feeling down, depressed, or hopeless? (P) 2-->more than half the days   Trouble falling or staying asleep, or sleeping too much? (P) 3-->nearly every day   Feeling tired or having little energy? (P) 2-->more than half the days   Poor appetite or overeating? (P) 1-->several days   Feeling bad about yourself - or that you are a failure or have let yourself or your family down? (P) 1-->several days   Trouble concentrating on things, such as reading the newspaper or watching television? (P) 1-->several days   Moving or speaking so slowly that other people could have noticed? Or the opposite - being so fidgety or restless that you have been moving around a lot more than usual? (P) 0-->not at all   Thoughts that you would be better off dead, or of hurting yourself in some way? (P) 0-->not at all   PHQ-9 Total Score (P) 12   If you checked off any problems, how difficult have these problems made it for you to do your work, take care of things at home, or get along with other people? (P) very difficult     PHQ-9 Total Score: (P) 12    Feeling nervous, anxious or on edge: (P) Nearly every day  Not being able to stop or control worrying: (P) Nearly every day  Worrying too much about different things: (P) Nearly every day  Trouble Relaxing: (P) Nearly every day  Being so restless that it is hard to sit still: (P) More than half the days  Feeling afraid as if something awful might  happen: (P) More than half the days  Becoming easily annoyed or irritable: (P) Several days  STACY 7 Total Score: (P) 17  If you checked any problems, how difficult have these problems made it for you to do your work, take care of things at home, or get along with other people: (P) Extremely difficult    Previous available Provider notes and records reviewed by this APRN at today's encounter.     Visit Diagnoses:    ICD-10-CM ICD-9-CM   1. Generalized anxiety disorder  F41.1 300.02   2. Major depressive disorder, recurrent episode, moderate  F33.1 296.32   3. Post traumatic stress disorder (PTSD)  F43.10 309.81       TREATMENT PLAN: Continue supportive psychotherapy efforts and medications.  Medication and treatment options, both pharmacological and non-pharmacological treatment options, discussed during today's visit, including any off label use of medication. Patient acknowledged and verbally consented with current treatment plan and was educated on the importance of compliance with treatment and follow-up appointments.      - Continue lamotrigine 150mg po qhs to target depression  - Continue amitriptyline 10mg po qhs to target depression, anxiety, PTSD  - Increase olanzapine 5mg to 7.5mg po qhs to target depression  - Supportive psychotherapy- problem solving (family related stress)    Labs: None ordered at this time  Therapy: Defers    MEDICATION ISSUES:  Discussed treatment plan and medication options of prescribed medication as well as the risks, benefits, any black box warnings, and side effects including potential falls, possible impaired driving, and metabolic adversities among others, including any off label use of medication. Patient is agreeable to call the office with any worsening of symptoms or onset of side effects, or if any concerns or questions arise.  The contact information for the office is made available to the patient. Patient is agreeable to call 911 or go to the nearest ER should they begin  having any SI/HI, or if any urgent concerns arise. No medication side effects or related complaints today. DEBORAH reviewed as expected.    RISK ASSESSMENT:  Risk of self-harm acutely is moderate.  Risk factors include anxiety disorder, mood disorder, and recent psychosocial stressors (pandemic). Protective factors include no family history, denies access to guns/weapons, no present SI, no history of suicide attempts or self-harm in the past, minimal AODA, healthcare seeking, future orientation, willingness to engage in care.  Risk of self-harm chronically is also moderate, but could be further elevated in the event of treatment noncompliance and/or AODA.    VERBAL INFORMED CONSENT FOR MEDICATION:  The patient was educated that their proposed/prescribed psychotropic medication(s) has potential risks, side effects, adverse effects, and black box warnings; and these have been discussed with the patient.  The patient has been informed that their treatment and medication dosage is to be individualized, and may even be above or below the recommended range/dosage due to patient individualization and response, but medication is prescribed using a shared decision making approach, and no medication or dosage will be prescribed without the patient's verbal consent.  The reason for the use of the medication including any off label use and alternative modes of treatment other than or in addition to medication has been considered and discussed, the probable consequences of not receiving the proposed treatment have been discussed, and any treatment side effects, black box warnings, and cautions associated with treatment have been discussed with the patient.  The patient is allowed ample time to openly discuss and ask questions regarding the proposed medication(s) and treatment plan and the patient verbalizes understanding the reasons for the use of the medication, its potential risks and benefits, other alternative treatment(s),  and the probable consequences that may occur if the proposed medication is not given.  The patient has been given ample time to ask questions and study the information and find the information to be specific, accurate, and complete.  The patient gives verbal consent for the medication(s) proposed/prescribed, they verbalized understanding that they can refuse and withdraw consent at any time with the assistance of this APRN, and the patient has verbally confirmed that they are aware, and are willing, to take the prescribed medication and follow the treatment plan with the known possible risks, side effect, black box warnings, and any potential medication interactions, and the patient reports they will be worse off without this medication and treatment plan.  The patient is advised to contact this APRN/this office if any questions or concerns arise at any time (at [phone]), or call 911/go to the closest emergency department if needed or outside of office hours.      [facility] No Show Policy:  We understand unexpected circumstances arise; however, anytime you miss your appointment we are unable to provide you appropriate care.  In addition, each appointment missed could have been used to provide care for others.  We ask that you call at least 24 hours in advance to cancel or reschedule an appointment.  We would like to take this opportunity to remind you of our policy stating patients who miss THREE or more appointments without cancelling or rescheduling 24 hours in advance of the appointment may be subject to cancellation of any further visits with our clinic and recommendation to seek in-person services/visits.    Please call [phone] to reschedule your appointment. If there are reasons that make it difficult for you to keep the appointments, please call and let us know how we can help.  Please understand that medication prescribing will not continue without seeing your provider.       [facility]'s No Show Policy reviewed with patient at today's visit. Patient verbalized understanding of this policy. Discussed with patient that in the event that there are three or more no show visits, it will be recommended that they pursue in-person services/visits as noncompliance with telehealth visits indicates that patient is not an appropriate candidate for telemedicine and would likely be more appropriate for in-person services/visits. Patient verbalizes understanding and is agreeable to this.    MEDS ORDERED DURING VISIT:  New Medications Ordered This Visit   Medications    lamoTRIgine (LaMICtal) 150 MG tablet     Sig: Take 1 tablet by mouth Daily for 90 days.     Dispense:  30 tablet     Refill:  2    OLANZapine (ZyPREXA) 5 MG tablet     Sig: Take 1.5 tablets by mouth Every Night.     Dispense:  45 tablet     Refill:  2     Please d/c olanzapine 5mg rx    amitriptyline (ELAVIL) 10 MG tablet     Sig: Take 1 tablet by mouth Every Night.     Dispense:  30 tablet     Refill:  2       Return in about 4 weeks (around 4/22/2024) for Next scheduled follow up.         Progress toward goal: Not at goal    Functional Status: No impairment    Prognosis: Good with Ongoing Treatment     This document has been electronically signed by MINI Poon  March 25, 2024 14:02 EDT      Please note that portions of this note were completed with a voice recognition program.

## 2024-03-27 ENCOUNTER — TELEPHONE (OUTPATIENT)
Dept: PSYCHIATRY | Facility: CLINIC | Age: 29
End: 2024-03-27
Payer: COMMERCIAL

## 2024-03-27 DIAGNOSIS — F41.1 GENERALIZED ANXIETY DISORDER: Primary | ICD-10-CM

## 2024-03-27 RX ORDER — LORAZEPAM 1 MG/1
1 TABLET ORAL DAILY PRN
Qty: 7 TABLET | Refills: 0 | Status: SHIPPED | OUTPATIENT
Start: 2024-03-27

## 2024-03-27 NOTE — TELEPHONE ENCOUNTER
Patient states she is going through a lot right now and her anxiety is through the roof.  Patient would like to know if provider can send her in something short term for it.

## 2024-03-27 NOTE — TELEPHONE ENCOUNTER
Spoke with patient. Daughter has to stay longer at [facility] due to suicidal thoughts, which has been extremely hard on patient. Patient requesting lorazepam, as helped previously for anxiety. Will give 7 day supply of lorazepam 1mg po qday prn anxiety. Risks, benefits, alternatives discussed with patient including GI upset, sedation, dizziness, respiratory depression, falls risk.  Use caution when operating vessel, vehicle, or machine. After discussion of these risks and benefits, the patient voiced understanding and agreed to proceed.

## 2024-04-10 ENCOUNTER — OFFICE VISIT (OUTPATIENT)
Dept: ONCOLOGY | Facility: HOSPITAL | Age: 29
End: 2024-04-10
Payer: COMMERCIAL

## 2024-04-10 ENCOUNTER — LAB (OUTPATIENT)
Dept: ONCOLOGY | Facility: HOSPITAL | Age: 29
End: 2024-04-10
Payer: COMMERCIAL

## 2024-04-10 VITALS
HEART RATE: 89 BPM | WEIGHT: 170.64 LBS | SYSTOLIC BLOOD PRESSURE: 110 MMHG | BODY MASS INDEX: 29.29 KG/M2 | DIASTOLIC BLOOD PRESSURE: 65 MMHG | TEMPERATURE: 97.3 F | OXYGEN SATURATION: 99 % | RESPIRATION RATE: 18 BRPM

## 2024-04-10 DIAGNOSIS — D72.829 LEUKOCYTOSIS, UNSPECIFIED TYPE: Primary | ICD-10-CM

## 2024-04-10 DIAGNOSIS — Z86.2 H/O LEUKOCYTOSIS: Primary | ICD-10-CM

## 2024-04-10 LAB
BASOPHILS # BLD AUTO: 0.05 10*3/MM3 (ref 0–0.2)
BASOPHILS NFR BLD AUTO: 0.4 % (ref 0–1.5)
DEPRECATED RDW RBC AUTO: 41.8 FL (ref 37–54)
EOSINOPHIL # BLD AUTO: 0 10*3/MM3 (ref 0–0.4)
EOSINOPHIL NFR BLD AUTO: 0 % (ref 0.3–6.2)
ERYTHROCYTE [DISTWIDTH] IN BLOOD BY AUTOMATED COUNT: 13.6 % (ref 12.3–15.4)
HCT VFR BLD AUTO: 39.7 % (ref 34–46.6)
HGB BLD-MCNC: 12.9 G/DL (ref 12–15.9)
IMM GRANULOCYTES # BLD AUTO: 0.04 10*3/MM3 (ref 0–0.05)
IMM GRANULOCYTES NFR BLD AUTO: 0.4 % (ref 0–0.5)
LYMPHOCYTES # BLD AUTO: 2.75 10*3/MM3 (ref 0.7–3.1)
LYMPHOCYTES NFR BLD AUTO: 24.2 % (ref 19.6–45.3)
MCH RBC QN AUTO: 27.1 PG (ref 26.6–33)
MCHC RBC AUTO-ENTMCNC: 32.5 G/DL (ref 31.5–35.7)
MCV RBC AUTO: 83.4 FL (ref 79–97)
MONOCYTES # BLD AUTO: 0.87 10*3/MM3 (ref 0.1–0.9)
MONOCYTES NFR BLD AUTO: 7.6 % (ref 5–12)
NEUTROPHILS NFR BLD AUTO: 67.4 % (ref 42.7–76)
NEUTROPHILS NFR BLD AUTO: 7.67 10*3/MM3 (ref 1.7–7)
PLATELET # BLD AUTO: 380 10*3/MM3 (ref 140–450)
PMV BLD AUTO: 10 FL (ref 6–12)
RBC # BLD AUTO: 4.76 10*6/MM3 (ref 3.77–5.28)
WBC NRBC COR # BLD AUTO: 11.38 10*3/MM3 (ref 3.4–10.8)

## 2024-04-10 PROCEDURE — G0463 HOSPITAL OUTPT CLINIC VISIT: HCPCS | Performed by: NURSE PRACTITIONER

## 2024-04-10 PROCEDURE — 36415 COLL VENOUS BLD VENIPUNCTURE: CPT

## 2024-04-10 PROCEDURE — 85025 COMPLETE CBC W/AUTO DIFF WBC: CPT

## 2024-04-10 RX ORDER — ISOTRETINOIN 30 MG/1
30 CAPSULE ORAL 2 TIMES DAILY
COMMUNITY

## 2024-04-10 RX ORDER — ISOTRETINOIN 30 MG/1
CAPSULE, LIQUID FILLED ORAL
COMMUNITY
Start: 2024-04-09

## 2024-04-10 NOTE — PROGRESS NOTES
Chief Complaint/Reason for Referral:  History of leukocytosis    Jaja Castillo, A*  Jaja Castillo, APRN        Subjective    History of Present Illness  Ms. Palmira Carbajal presents for 1 year follow up for leukocytosis. Previously, seen by Dr. Wiggins in consult. BCR-abl, flow cytometry, LUDA, RF, celiac, hepatitis testing were all negative.     Chronic leukocytosis that waxes and wanes the range of 11.38 to as high as 17.81 since 2/1/2023 and likely longer. Denies any fevers, chills or lymphadenopathy. Weight is stable. She feels well otherwise, other than ongoing fatigue. Denies any alcohol use, tobacco abuse. She does reports she snores occasionally. She does know she has fatty liver.       Oncology/Hematology History    No history exists.       Review of Systems   Constitutional:  Positive for fatigue. Negative for appetite change, diaphoresis, fever, unexpected weight gain and unexpected weight loss.   HENT:  Negative for hearing loss, sore throat and voice change.    Eyes:  Negative for blurred vision, double vision, pain, redness and visual disturbance.   Respiratory:  Negative for cough, shortness of breath and wheezing.    Cardiovascular:  Negative for chest pain, palpitations and leg swelling.   Endocrine: Negative for cold intolerance, heat intolerance, polydipsia and polyuria.   Genitourinary:  Negative for decreased urine volume, difficulty urinating, frequency and urinary incontinence.   Musculoskeletal:  Negative for arthralgias, back pain, joint swelling and myalgias.   Skin:  Negative for color change, rash, skin lesions and wound.   Neurological:  Negative for dizziness, seizures, numbness and headache.   Hematological:  Negative for adenopathy. Does not bruise/bleed easily.   Psychiatric/Behavioral:  Negative for depressed mood. The patient is not nervous/anxious.    All other systems reviewed and are negative.      Current Outpatient Medications on File Prior to Visit   Medication  Sig Dispense Refill    amitriptyline (ELAVIL) 10 MG tablet Take 1 tablet by mouth Every Night. 30 tablet 2    Claravis 30 MG capsule       dicyclomine (BENTYL) 20 MG tablet Take 1 tablet by mouth Every 6 (Six) Hours As Needed (abdominal cramping). 60 tablet 3    esomeprazole (nexIUM) 40 MG capsule Take 1 capsule by mouth Every Morning Before Breakfast. 90 capsule 3    fludrocortisone 0.1 MG tablet TAKE 1 TABLET BY MOUTH ONCE DAILY 30 tablet 2    ISOtretinoin (Accutane) 30 MG capsule Take 1 capsule by mouth 2 (Two) Times a Day.      lamoTRIgine (LaMICtal) 150 MG tablet Take 1 tablet by mouth Daily for 90 days. 30 tablet 2    LORazepam (ATIVAN) 1 MG tablet Take 1 tablet by mouth Daily As Needed for Anxiety. 7 tablet 0    OLANZapine (ZyPREXA) 5 MG tablet Take 1.5 tablets by mouth Every Night. 45 tablet 2    phentermine 37.5 MG capsule Take 1 capsule by mouth Every Morning. 30 capsule 0    polyethylene glycol (MIRALAX) 17 g packet Take 17 g by mouth Daily. 5 packet 0    Rimegepant Sulfate (Nurtec) 75 MG tablet dispersible tablet Take 1 tablet by mouth Daily As Needed (migraine). 8 tablet 5    sodium chloride 0.9 % solution 100 mL with Eptinezumab-jjmr 100 MG/ML solution 100 mg Infuse 100 mg into a venous catheter Every 3 (Three) Months.      vitamin D3 (Vitamin D) 125 MCG (5000 UT) capsule capsule Take 1 capsule by mouth Daily. 90 capsule 1     No current facility-administered medications on file prior to visit.       Allergies   Allergen Reactions    Cephalexin Diarrhea    Cephalosporins GI Intolerance    Cymbalta [Duloxetine Hcl] Other (See Comments)     Jittery and insomnia    Penicillins Rash     Past Medical History:   Diagnosis Date    Acid reflux     Anxiety     Chronic pain disorder     Depression     Eczema     Fatty liver     Intractable migraine without aura and without status migrainosus 02/09/2023    Kidney stone     HX OF    Maltracking of right patella     Migraines     Panic disorder     PONV  (postoperative nausea and vomiting)     GOOD RESULTS WITH SCOPALAMINE    PTSD (post-traumatic stress disorder)     WAKES UP FROM ANESTHESIA WITH PANIC ATTACK    Seasonal allergic rhinitis 2022    Self-injurious behavior     as a teenager    Suicide attempt      Past Surgical History:   Procedure Laterality Date     SECTION      COLONOSCOPY N/A 10/11/2022    Procedure: COLONOSCOPY;  Surgeon: Dyan Griffin MD;  Location: [facility] ENDOSCOPY;  Service: Gastroenterology;  Laterality: N/A;  HEMORRHOIDS    CYSTOSCOPY      CYSTOSCOPY URETEROSCOPY Left 2023    Procedure: CYSTOSCOPY URETEROSCOPY RETROGRADE PYELOGRAM HOLMIUM LASER STENT INSERTION, left;  Surgeon: Melisa Garcia MD;  Location: [facility] MAIN OR;  Service: Urology;  Laterality: Left;    ENDOSCOPY N/A 2023    Procedure: ESOPHAGOGASTRODUODENOSCOPY WITH BIOPSIES;  Surgeon: Dyan Griffin MD;  Location: [facility] ENDOSCOPY;  Service: Gastroenterology;  Laterality: N/A;  ESOPHAGITIS, GASTRITIS    HYSTERECTOMY      KIDNEY STONE SURGERY      unspecified    KNEE ARTHROSCOPY Right 2022    Procedure: KNEE ARTHROSCOPY WITH A LATERAL RELEASE AND CHONDROPLASTY;  Surgeon: Marco A Pitts MD;  Location: [facility] OR [facility];  Service: Orthopedics;  Laterality: Right;    WISDOM TOOTH EXTRACTION       Social History     Socioeconomic History    Marital status:      Spouse name: TRISTAN    Number of children: 3   Tobacco Use    Smoking status: Former     Current packs/day: 0.00     Average packs/day: 0.5 packs/day for 15.0 years (7.5 ttl pk-yrs)     Types: Cigarettes     Start date: 2007     Quit date: 2022     Years since quittin.1    Smokeless tobacco: Never   Vaping Use    Vaping status: Every Day    Substances: Nicotine, Flavoring    Devices: Disposable   Substance and Sexual Activity    Alcohol use: Not Currently    Drug use: Not Currently     Types: Marijuana    Sexual activity: Defer     Family History    Problem Relation Age of Onset    Arthritis Mother     Restless legs syndrome Mother     Thyroid disease Father     Colon polyps Father     Diabetes Father     Drug abuse Maternal Uncle     Alcohol abuse Maternal Uncle     Cancer Maternal Grandmother     Sleep apnea Son     Malig Hyperthermia Neg Hx      Immunization History   Administered Date(s) Administered    COVID-19 (MODERNA) 1st,2nd,3rd Dose Monovalent 04/12/2021, 05/03/2021    Tdap 01/04/2019       Tobacco Use: Medium Risk (4/10/2024)    Patient History     Smoking Tobacco Use: Former     Smokeless Tobacco Use: Never     Passive Exposure: Not on file       Objective     Physical Exam  Vitals and nursing note reviewed.   Constitutional:       Appearance: Normal appearance.   HENT:      Head: Normocephalic.      Nose: Nose normal.      Mouth/Throat:      Mouth: Mucous membranes are moist.   Eyes:      Pupils: Pupils are equal, round, and reactive to light.   Cardiovascular:      Rate and Rhythm: Normal rate and regular rhythm.      Pulses: Normal pulses.      Heart sounds: Normal heart sounds.   Pulmonary:      Effort: Pulmonary effort is normal. No respiratory distress.      Breath sounds: Normal breath sounds. No wheezing, rhonchi or rales.   Abdominal:      General: Bowel sounds are normal.      Palpations: Abdomen is soft.   Musculoskeletal:         General: Normal range of motion.      Cervical back: Normal range of motion and neck supple.   Skin:     General: Skin is warm and dry.      Capillary Refill: Capillary refill takes less than 2 seconds.   Neurological:      General: No focal deficit present.      Mental Status: She is alert and oriented to person, place, and time.   Psychiatric:         Mood and Affect: Mood normal.         Behavior: Behavior normal.         Thought Content: Thought content normal.         Judgment: Judgment normal.         Vitals:    04/10/24 0920   BP: 110/65   Pulse: 89   Resp: 18   Temp: 97.3 °F (36.3 °C)   TempSrc:  "Temporal   SpO2: 99%   Weight: 77.4 kg (170 lb 10.2 oz)   PainSc: 0-No pain       Wt Readings from Last 3 Encounters:   04/10/24 77.4 kg (170 lb 10.2 oz)   02/29/24 77.6 kg (171 lb 1.6 oz)   01/29/24 78.6 kg (173 lb 3.2 oz)      ECOG score: 0         ECOG: (0) Fully Active - Able to Carry On All Pre-disease Performance Without Restriction  Fall Risk Assessment was completed, and patient is at low risk for falls.  PHQ-9 Total Score:         The patient is  experiencing fatigue. Fatigue score: 4    PT/OT Functional Screening: PT fx screen : No needs identified  Speech Functional Screening: Speech fx screen : No needs identified  Rehab to be ordered: Rehab to be ordered : No needs identified        Result Review :  The following data was reviewed by: MINI Arrington on 04/10/2024:  Lab Results   Component Value Date    HGB 12.9 04/10/2024    HCT 39.7 04/10/2024    MCV 83.4 04/10/2024     04/10/2024    WBC 11.38 (H) 04/10/2024    NEUTROABS 7.67 (H) 04/10/2024    LYMPHSABS 2.75 04/10/2024    MONOSABS 0.87 04/10/2024    EOSABS 0.00 04/10/2024    BASOSABS 0.05 04/10/2024     Lab Results   Component Value Date    GLUCOSE 72 01/29/2024    BUN 11 01/29/2024    CREATININE 0.69 01/29/2024     01/29/2024    K 4.3 01/29/2024     01/29/2024    CO2 21.4 (L) 01/29/2024    CALCIUM 9.6 01/29/2024    PROTEINTOT 7.2 08/23/2023    ALBUMIN 4.1 08/23/2023    BILITOT <0.2 08/23/2023    ALKPHOS 91 08/23/2023    AST 11 08/23/2023    ALT 13 08/23/2023     Lab Results   Component Value Date     03/14/2023    NOPJXQLW70 457 11/23/2022     Lab Results   Component Value Date    IRON 63 10/31/2023    LABIRON 17 (L) 10/31/2023    TRANSFERRIN 249 10/31/2023    TIBC 371 10/31/2023     Lab Results   Component Value Date     03/14/2023    [id number] 457 11/23/2022     No results found for: \"PSA\", \"CEA\", \"AFP\", \"\", \"\"    No Images in the past 120 days found..         Assessment and Plan:  Diagnoses " and all orders for this visit:    1. Leukocytosis, unspecified type (Primary)  -     CBC & Differential; Future    Chronic leukocytosis. Testing was negative for flow cytometry, BCR-abl, celiac, RA, LUDA, hepatitis. Likely benign etiology. May be secondary to history of the fatty liver. We discussed lifestyle changes in diet and exercise.     CBC today is elevated at 11.38 but less than what is what a year ago at 13.49. Denies any fevers, chills, cough or weight loss or lymphadenopathy.     Recheck in 1 year with CBC.     I spent 20 minutes caring for Palmira on this date of service. This time includes time spent by me in the following activities:preparing for the visit, reviewing tests, counseling and educating the patient/family/caregiver, ordering medications, tests, or procedures, documenting information in the medical record, and independently interpreting results and communicating that information with the patient/family/caregiver    Patient Follow Up: 1 year with NP or MD.     Patient was given instructions and counseling regarding her condition or for health maintenance advice. Please see specific information pulled into the AVS if appropriate.     Court Neff, APRN    4/10/2024    .tob

## 2024-04-11 ENCOUNTER — OFFICE VISIT (OUTPATIENT)
Dept: CARDIOLOGY | Facility: CLINIC | Age: 29
End: 2024-04-11
Payer: COMMERCIAL

## 2024-04-11 VITALS
WEIGHT: 175 LBS | DIASTOLIC BLOOD PRESSURE: 76 MMHG | SYSTOLIC BLOOD PRESSURE: 109 MMHG | BODY MASS INDEX: 29.88 KG/M2 | HEIGHT: 64 IN

## 2024-04-11 DIAGNOSIS — R55 SYNCOPE, UNSPECIFIED SYNCOPE TYPE: Primary | ICD-10-CM

## 2024-04-11 DIAGNOSIS — R00.2 PALPITATIONS: ICD-10-CM

## 2024-04-11 NOTE — PROGRESS NOTES
Chief Complaint  Palpitations    Subjective        History of Present Illness  Palmira Carbajal presents to [facility] CARDIOLOGY for follow up.  Patient is a 28-year-old female with past medical history outlined below who presents for follow-up on syncope and palpitations.  She reports she is doing much better since fludrocortisone was started.  Her episodes are not near as frequent.  She still occasionally has palpitations with dizziness but the intensity has decreased as well as the frequency.  She denies any chest pain or discomfort or dyspnea.      Past Medical History:   Diagnosis Date    Acid reflux     Anxiety     Chronic pain disorder     Depression     Eczema     Fatty liver     Intractable migraine without aura and without status migrainosus 2023    Kidney stone     HX OF    Maltracking of right patella     Migraines     Panic disorder     PONV (postoperative nausea and vomiting)     GOOD RESULTS WITH SCOPALAMINE    PTSD (post-traumatic stress disorder)     WAKES UP FROM ANESTHESIA WITH PANIC ATTACK    Seasonal allergic rhinitis 2022    Self-injurious behavior     as a teenager    Suicide attempt        ALLERGY  Allergies   Allergen Reactions    Cephalexin Diarrhea    Cephalosporins GI Intolerance    Cymbalta [Duloxetine Hcl] Other (See Comments)     Jittery and insomnia    Penicillins Rash        Past Surgical History:   Procedure Laterality Date     SECTION      COLONOSCOPY N/A 10/11/2022    Procedure: COLONOSCOPY;  Surgeon: Dyan Griffin MD;  Location: [facility] ENDOSCOPY;  Service: Gastroenterology;  Laterality: N/A;  HEMORRHOIDS    CYSTOSCOPY      CYSTOSCOPY URETEROSCOPY Left 2023    Procedure: CYSTOSCOPY URETEROSCOPY RETROGRADE PYELOGRAM HOLMIUM LASER STENT INSERTION, left;  Surgeon: Melisa Garcia MD;  Location: [facility] MAIN OR;  Service: Urology;  Laterality: Left;    ENDOSCOPY N/A 2023    Procedure: ESOPHAGOGASTRODUODENOSCOPY WITH  BIOPSIES;  Surgeon: Dyan Griffin MD;  Location: [facility] ENDOSCOPY;  Service: Gastroenterology;  Laterality: N/A;  ESOPHAGITIS, GASTRITIS    HYSTERECTOMY      KIDNEY STONE SURGERY      unspecified    KNEE ARTHROSCOPY Right 2022    Procedure: KNEE ARTHROSCOPY WITH A LATERAL RELEASE AND CHONDROPLASTY;  Surgeon: Marco A Pitts MD;  Location: [facility] OR [facility];  Service: Orthopedics;  Laterality: Right;    WISDOM TOOTH EXTRACTION          Social History     Socioeconomic History    Marital status:      Spouse name: TRISTAN    Number of children: 3   Tobacco Use    Smoking status: Former     Current packs/day: 0.00     Average packs/day: 0.5 packs/day for 15.0 years (7.5 ttl pk-yrs)     Types: Cigarettes     Start date: 2007     Quit date: 2022     Years since quittin.1    Smokeless tobacco: Never   Vaping Use    Vaping status: Every Day    Substances: Nicotine, Flavoring    Devices: Disposable   Substance and Sexual Activity    Alcohol use: Not Currently    Drug use: Not Currently     Types: Marijuana    Sexual activity: Defer       Family History   Problem Relation Age of Onset    Arthritis Mother     Restless legs syndrome Mother     Thyroid disease Father     Colon polyps Father     Diabetes Father     Drug abuse Maternal Uncle     Alcohol abuse Maternal Uncle     Cancer Maternal Grandmother     Sleep apnea Son     Malig Hyperthermia Neg Hx         Current Outpatient Medications on File Prior to Visit   Medication Sig    amitriptyline (ELAVIL) 10 MG tablet Take 1 tablet by mouth Every Night.    Claravis 30 MG capsule     dicyclomine (BENTYL) 20 MG tablet Take 1 tablet by mouth Every 6 (Six) Hours As Needed (abdominal cramping).    esomeprazole (nexIUM) 40 MG capsule Take 1 capsule by mouth Every Morning Before Breakfast.    fludrocortisone 0.1 MG tablet TAKE 1 TABLET BY MOUTH ONCE DAILY    ISOtretinoin (Accutane) 30 MG capsule Take 1 capsule by mouth 2 (Two) Times a Day.     "lamoTRIgine (LaMICtal) 150 MG tablet Take 1 tablet by mouth Daily for 90 days.    LORazepam (ATIVAN) 1 MG tablet Take 1 tablet by mouth Daily As Needed for Anxiety.    OLANZapine (ZyPREXA) 5 MG tablet Take 1.5 tablets by mouth Every Night.    phentermine 37.5 MG capsule Take 1 capsule by mouth Every Morning.    polyethylene glycol (MIRALAX) 17 g packet Take 17 g by mouth Daily.    Rimegepant Sulfate (Nurtec) 75 MG tablet dispersible tablet Take 1 tablet by mouth Daily As Needed (migraine).    sodium chloride 0.9 % solution 100 mL with Eptinezumab-jjmr 100 MG/ML solution 100 mg Infuse 100 mg into a venous catheter Every 3 (Three) Months.    vitamin D3 (Vitamin D) 125 MCG (5000 UT) capsule capsule Take 1 capsule by mouth Daily.     No current facility-administered medications on file prior to visit.       Objective   Vitals:    04/11/24 0908   BP: 109/76   Weight: 79.4 kg (175 lb)   Height: 162.6 cm (64\")       Physical Exam  Constitutional:       General: She is awake. She is not in acute distress.     Appearance: Normal appearance.   HENT:      Head: Normocephalic.      Nose: Nose normal. No congestion.   Eyes:      Extraocular Movements: Extraocular movements intact.      Conjunctiva/sclera: Conjunctivae normal.      Pupils: Pupils are equal, round, and reactive to light.   Neck:      Thyroid: No thyromegaly.      Vascular: No JVD.   Cardiovascular:      Rate and Rhythm: Normal rate and regular rhythm.      Chest Wall: PMI is not displaced.      Pulses: Normal pulses.      Heart sounds: Normal heart sounds, S1 normal and S2 normal. No murmur heard.     No friction rub. No gallop. No S3 or S4 sounds.   Pulmonary:      Effort: Pulmonary effort is normal.      Breath sounds: Normal breath sounds. No wheezing, rhonchi or rales.   Abdominal:      General: Bowel sounds are normal.      Palpations: Abdomen is soft.      Tenderness: There is no abdominal tenderness.   Musculoskeletal:      Cervical back: No tenderness.     "  Right lower leg: No edema.      Left lower leg: No edema.   Lymphadenopathy:      Cervical: No cervical adenopathy.   Skin:     General: Skin is warm and dry.      Capillary Refill: Capillary refill takes less than 2 seconds.      Coloration: Skin is not cyanotic.      Findings: No petechiae or rash.      Nails: There is no clubbing.   Neurological:      Mental Status: She is alert.   Psychiatric:         Mood and Affect: Mood normal.         Behavior: Behavior is cooperative.           Result Review     The following data was reviewed by MINI Browning on 04/11/24.      CMP          5/28/2023    10:33 8/23/2023    16:34 1/29/2024    11:02   CMP   Glucose 98  108  72    BUN 8  8  11    Creatinine 0.70  0.76  0.69    EGFR 120.2  108.3  119.9    Sodium 129  137  137    Potassium 3.8  3.8  4.3    Chloride 95  104  102    Calcium 9.0  9.0  9.6    Total Protein 7.0  7.2     Albumin 3.9  4.1     Globulin 3.1  3.1     Total Bilirubin 0.2  <0.2     Alkaline Phosphatase 97  91     AST (SGOT) 13  11     ALT (SGPT) 16  13     Albumin/Globulin Ratio 1.3  1.3     BUN/Creatinine Ratio 11.4  10.5  15.9    Anion Gap 9.8  11.2  13.6      CBC w/diff          9/7/2023    11:22 1/29/2024    11:02 4/10/2024    08:55   CBC w/Diff   WBC 15.13  13.49  11.38    RBC 5.23  5.06  4.76    Hemoglobin 14.1  13.7  12.9    Hematocrit 43.5  42.3  39.7    MCV 83.2  83.6  83.4    MCH 27.0  27.1  27.1    MCHC 32.4  32.4  32.5    RDW 12.9  13.1  13.6    Platelets 426  411  380    Neutrophil Rel % 67.8  65.4  67.4    Immature Granulocyte Rel % 0.5  0.4  0.4    Lymphocyte Rel % 24.9  24.8  24.2    Monocyte Rel % 5.0  6.9  7.6    Eosinophil Rel % 1.1  1.8  0.0    Basophil Rel % 0.7  0.7  0.4       Lipid Panel          9/7/2023    11:22   Lipid Panel   Total Cholesterol 167    Triglycerides 82    HDL Cholesterol 56    VLDL Cholesterol 15    LDL Cholesterol  96    LDL/HDL Ratio 1.69            === Results for orders placed in visit on 09/12/23  ===    CARDIAC EVENT MONITOR    - Interpretation Summary -    Patient was monitored for 6 days, 23 hours and 29 minutes.    Lowest heart rate was 59 bpm, average heart rate was 103 bpm, and maximum heart rate was 163 bpm.    Rare PVC, couplet.  Rare PAC, couplet and triplet.    239 patient activated events.  Most of these corresponded to normal sinus rhythm and sinus tachycardia.  Palpitations corresponded sometimes to PACs and sometimes to normal sinus rhythm    No significant abnormal heart rhythms noted,          Procedures    Assessment & Plan  Diagnoses and all orders for this visit:    1. Syncope, unspecified syncope type (Primary)    2. Palpitations      1.  Continue the fludrocortisone.  Patient's symptoms have improved.  Recent BMP was reviewed and unremarkable.  If symptoms worsen can consider increasing fludrocortisone or tilt table testing.  2.  Well-controlled currently.  Continue current medical therapy and clinical monitoring.    The medical services provided during this encounter are part of ongoing care related to this patient's single serious condition or complex condition.          Follow Up   Return in about 6 months (around 10/11/2024) for With Dr. Nj.    Patient was given instructions and counseling regarding her condition or for health maintenance advice. Please see specific information pulled into the AVS if appropriate.     Kelin Toribio, APRN  04/11/24  09:37 EDT    Dictated Utilizing Dragon Dictation

## 2024-04-25 ENCOUNTER — OFFICE VISIT (OUTPATIENT)
Dept: FAMILY MEDICINE CLINIC | Facility: CLINIC | Age: 29
End: 2024-04-25
Payer: COMMERCIAL

## 2024-04-25 VITALS
BODY MASS INDEX: 29.06 KG/M2 | HEIGHT: 64 IN | DIASTOLIC BLOOD PRESSURE: 79 MMHG | WEIGHT: 170.2 LBS | SYSTOLIC BLOOD PRESSURE: 117 MMHG | OXYGEN SATURATION: 99 % | HEART RATE: 85 BPM | TEMPERATURE: 98 F

## 2024-04-25 DIAGNOSIS — M76.61 ACHILLES TENDINITIS OF BOTH LOWER EXTREMITIES: ICD-10-CM

## 2024-04-25 DIAGNOSIS — M76.62 ACHILLES TENDINITIS OF BOTH LOWER EXTREMITIES: ICD-10-CM

## 2024-04-25 DIAGNOSIS — E66.3 OVERWEIGHT (BMI 25.0-29.9): ICD-10-CM

## 2024-04-25 DIAGNOSIS — G43.019 INTRACTABLE MIGRAINE WITHOUT AURA AND WITHOUT STATUS MIGRAINOSUS: Primary | ICD-10-CM

## 2024-04-25 PROCEDURE — 1159F MED LIST DOCD IN RCRD: CPT | Performed by: NURSE PRACTITIONER

## 2024-04-25 PROCEDURE — 1160F RVW MEDS BY RX/DR IN RCRD: CPT | Performed by: NURSE PRACTITIONER

## 2024-04-25 PROCEDURE — 99214 OFFICE O/P EST MOD 30 MIN: CPT | Performed by: NURSE PRACTITIONER

## 2024-04-25 RX ORDER — RIMEGEPANT SULFATE 75 MG/75MG
75 TABLET, ORALLY DISINTEGRATING ORAL DAILY PRN
Qty: 8 TABLET | Refills: 5 | Status: SHIPPED | OUTPATIENT
Start: 2024-04-25

## 2024-04-25 RX ORDER — IBUPROFEN 600 MG/1
600 TABLET ORAL EVERY 6 HOURS PRN
Qty: 120 TABLET | Refills: 0 | Status: SHIPPED | OUTPATIENT
Start: 2024-04-25

## 2024-04-25 RX ORDER — PHENTERMINE HYDROCHLORIDE 37.5 MG/1
37.5 CAPSULE ORAL EVERY MORNING
Qty: 30 CAPSULE | Refills: 0 | Status: SHIPPED | OUTPATIENT
Start: 2024-04-25

## 2024-04-25 RX ORDER — GALCANEZUMAB 120 MG/ML
240 INJECTION, SOLUTION SUBCUTANEOUS
Qty: 1.12 ML | Refills: 5 | Status: SHIPPED | OUTPATIENT
Start: 2024-04-25

## 2024-04-25 NOTE — ASSESSMENT & PLAN NOTE
Patient's (Body mass index is 29.21 kg/m².) indicates that they are overweight with health conditions that include hypertension . Weight is unchanged. BMI is is above average; BMI management plan is completed. We discussed portion control, increasing exercise, pharmacologic options including resuming phentermine, and Information on healthy weight added to patient's after visit summary.

## 2024-04-25 NOTE — ASSESSMENT & PLAN NOTE
Headaches are worsening.    Plan:  Resume taking the following medication/s;  Emgality once monthly for prevention and Nurtec daily as needed.     Discussed medication dosage, use, side effects, and goals of treatment in detail.    Discussed monitoring symptoms and use of quick-relief medications and maintenance medication.    General Treatment Goals:   symptom prevention  minimize work absence  minimizing limitation in activity  prevention of exacerbations  decrease use of ER/inpatient care  minimization of adverse effects of treatment    Followup in 4 weeks

## 2024-04-25 NOTE — PROGRESS NOTES
"Chief Complaint  Weight Loss, Migraine, and Ankle Pain    Subjective            Palmira Carbajal is a 28 y.o. female who presents to [facility]   History of Present Illness  Follow-up on weight loss management.  Body mass index is 29.21 kg/m².  She has been on phentermine but took a break last month.  She has regained her weight from previous month.    She is complaining of bilateral posterior ankle.  She states that it hurts when she walks.  She states it started about 3 weeks ago.  She states she has been trying to use ice, ibuprofen and compression without any good relief.    She has chronic migraine headaches.  She states that she used to be on Emgality injections once a month and did well.  She states she also was on Nurtec as needed for breakthrough migraines which helped.  She states that the neurology office canceled her appointment and then was not going to reschedule her for 6 months.  She is wanting to know if I can prescribe her her medications.      Tobacco Use: Medium Risk (4/25/2024)    Patient History    • Smoking Tobacco Use: Former    • Smokeless Tobacco Use: Never    • Passive Exposure: Not on file      E-cigarette/Vaping   • E-cigarette/Vaping Use Current Every Day User    • Comments INST PER ANESTHESIA PROTCOL      E-cigarette/Vaping Substances   • Nicotine Yes    • THC No    • CBD No    • Flavoring Yes      E-cigarette/Vaping Devices   • Disposable Yes    • Pre-filled or Refillable Cartridge No    • Refillable Tank No    • Pre-filled Pod No        Alcohol Use: Not on file         Objective   Vital Signs:   Vitals:    04/25/24 1027   BP: 117/79   BP Location: Left arm   Patient Position: Sitting   Cuff Size: Adult   Pulse: 85   Temp: 98 °F (36.7 °C)   SpO2: 99%   Weight: 77.2 kg (170 lb 3.2 oz)   Height: 162.6 cm (64\")     Body mass index is 29.21 kg/m².    Wt Readings from Last 3 Encounters:   04/25/24 77.2 kg (170 lb 3.2 oz)   04/11/24 79.4 kg (175 lb) " "  04/10/24 77.4 kg (170 lb 10.2 oz)     BP Readings from Last 3 Encounters:   04/25/24 117/79   04/11/24 109/76   04/10/24 110/65       Health Maintenance   Topic Date Due   • Pneumococcal Vaccine 0-64 (1 of 2 - PCV) 09/07/2024 (Originally 6/22/2001)   • COVID-19 Vaccine (3 - 2023-24 season) 09/23/2024 (Originally 9/1/2023)   • INFLUENZA VACCINE  08/01/2024   • ANNUAL PHYSICAL  01/29/2025   • BMI FOLLOWUP  04/25/2025   • COLORECTAL CANCER SCREENING  10/11/2027   • TDAP/TD VACCINES (2 - Td or Tdap) 01/04/2029   • HEPATITIS C SCREENING  Completed       /79 (BP Location: Left arm, Patient Position: Sitting, Cuff Size: Adult)   Pulse 85   Temp 98 °F (36.7 °C)   Ht 162.6 cm (64\")   Wt 77.2 kg (170 lb 3.2 oz)   SpO2 99%   BMI 29.21 kg/m²       Current Outpatient Medications:   •  amitriptyline (ELAVIL) 10 MG tablet, Take 1 tablet by mouth Every Night., Disp: 30 tablet, Rfl: 2  •  dicyclomine (BENTYL) 20 MG tablet, Take 1 tablet by mouth Every 6 (Six) Hours As Needed (abdominal cramping)., Disp: 60 tablet, Rfl: 3  •  esomeprazole (nexIUM) 40 MG capsule, Take 1 capsule by mouth Every Morning Before Breakfast., Disp: 90 capsule, Rfl: 3  •  fludrocortisone 0.1 MG tablet, TAKE 1 TABLET BY MOUTH ONCE DAILY, Disp: 30 tablet, Rfl: 2  •  ISOtretinoin (Accutane) 30 MG capsule, Take 1 capsule by mouth 2 (Two) Times a Day., Disp: , Rfl:   •  lamoTRIgine (LaMICtal) 150 MG tablet, Take 1 tablet by mouth Daily for 90 days., Disp: 30 tablet, Rfl: 2  •  LORazepam (ATIVAN) 1 MG tablet, Take 1 tablet by mouth Daily As Needed for Anxiety., Disp: 7 tablet, Rfl: 0  •  OLANZapine (ZyPREXA) 5 MG tablet, Take 1.5 tablets by mouth Every Night., Disp: 45 tablet, Rfl: 2  •  phentermine 37.5 MG capsule, Take 1 capsule by mouth Every Morning., Disp: 30 capsule, Rfl: 0  •  polyethylene glycol (MIRALAX) 17 g packet, Take 17 g by mouth Daily., Disp: 5 packet, Rfl: 0  •  Rimegepant Sulfate (Nurtec) 75 MG tablet dispersible tablet, Take 1 " tablet by mouth Daily As Needed (migraine). Indications: Migraine Headache, Disp: 8 tablet, Rfl: 5  •  vitamin D3 (Vitamin D) 125 MCG (5000 UT) capsule capsule, Take 1 capsule by mouth Daily., Disp: 90 capsule, Rfl: 1  •  galcanezumab-gnlm (Emgality) 120 MG/ML auto-injector pen, Inject 2 mL under the skin into the appropriate area as directed Every 30 (Thirty) Days. Indications: Migraine Headache, Disp: 1.12 mL, Rfl: 5  •  ibuprofen (ADVIL,MOTRIN) 600 MG tablet, Take 1 tablet by mouth Every 6 (Six) Hours As Needed for Moderate Pain. Indications: heel pain, Disp: 120 tablet, Rfl: 0   Past Medical History:   Diagnosis Date   • Acid reflux    • Anxiety    • Chronic pain disorder    • Depression    • Eczema    • Fatty liver    • Intractable migraine without aura and without status migrainosus 02/09/2023   • Kidney stone     HX OF   • Maltracking of right patella    • Migraines    • Panic disorder    • PONV (postoperative nausea and vomiting)     GOOD RESULTS WITH SCOPALAMINE   • PTSD (post-traumatic stress disorder)     WAKES UP FROM ANESTHESIA WITH PANIC ATTACK   • Seasonal allergic rhinitis 05/31/2022   • Self-injurious behavior     as a teenager   • Suicide attempt 2007        Physical Exam  Vitals reviewed.   Constitutional:       Appearance: Normal appearance. She is well-developed and overweight.   Neck:      Thyroid: No thyroid mass, thyromegaly or thyroid tenderness.   Cardiovascular:      Rate and Rhythm: Normal rate and regular rhythm.      Heart sounds: No murmur heard.     No friction rub. No gallop.   Pulmonary:      Effort: Pulmonary effort is normal.      Breath sounds: Normal breath sounds. No wheezing or rhonchi.   Musculoskeletal:      Right ankle: No swelling or deformity. No tenderness.      Left ankle: No swelling or deformity. No tenderness.   Lymphadenopathy:      Cervical: No cervical adenopathy.   Skin:     General: Skin is warm and dry.   Neurological:      Mental Status: She is alert and  oriented to person, place, and time.      Cranial Nerves: No cranial nerve deficit.   Psychiatric:         Mood and Affect: Mood and affect normal.         Behavior: Behavior normal.         Thought Content: Thought content normal. Thought content does not include homicidal or suicidal ideation.         Judgment: Judgment normal.        Result Review :     The following data was reviewed by: MINI Keating on 04/25/2024:  CMP   CMP          5/28/2023    10:33 8/23/2023    16:34 1/29/2024    11:02   CMP   Glucose 98  108  72    BUN 8  8  11    Creatinine 0.70  0.76  0.69    EGFR 120.2  108.3  119.9    Sodium 129  137  137    Potassium 3.8  3.8  4.3    Chloride 95  104  102    Calcium 9.0  9.0  9.6    Total Protein 7.0  7.2     Albumin 3.9  4.1     Globulin 3.1  3.1     Total Bilirubin 0.2  <0.2     Alkaline Phosphatase 97  91     AST (SGOT) 13  11     ALT (SGPT) 16  13     Albumin/Globulin Ratio 1.3  1.3     BUN/Creatinine Ratio 11.4  10.5  15.9    Anion Gap 9.8  11.2  13.6      CBC   CBC          9/7/2023    11:22 1/29/2024    11:02 4/10/2024    08:55   CBC   WBC 15.13  13.49  11.38    RBC 5.23  5.06  4.76    Hemoglobin 14.1  13.7  12.9    Hematocrit 43.5  42.3  39.7    MCV 83.2  83.6  83.4    MCH 27.0  27.1  27.1    MCHC 32.4  32.4  32.5    RDW 12.9  13.1  13.6    Platelets 426  411  380      LIPID   Lipid Panel          9/7/2023    11:22   Lipid Panel   Total Cholesterol 167    Triglycerides 82    HDL Cholesterol 56    VLDL Cholesterol 15    LDL Cholesterol  96    LDL/HDL Ratio 1.69      TSH   TSH          8/23/2023    16:34   TSH   TSH 1.510      [id number]   A1C Last 3 Results          9/7/2023    11:22   HGBA1C Last 3 Results   Hemoglobin A1C 5.40      VITD   Lab Results   Component Value Date    ZXFF43NR 62.3 01/29/2024     Assessment & Plan  Intractable migraine without aura and without status migrainosus  Headaches are worsening.    Plan:  Resume taking the following medication/s;  Emgality once  monthly for prevention and Nurtec daily as needed.     Discussed medication dosage, use, side effects, and goals of treatment in detail.    Discussed monitoring symptoms and use of quick-relief medications and maintenance medication.    General Treatment Goals:   symptom prevention  minimize work absence  minimizing limitation in activity  prevention of exacerbations  decrease use of ER/inpatient care  minimization of adverse effects of treatment    Followup in 4 weeks  Overweight (BMI 25.0-29.9)  Patient's (Body mass index is 29.21 kg/m².) indicates that they are overweight with health conditions that include hypertension . Weight is unchanged. BMI is is above average; BMI management plan is completed. We discussed portion control, increasing exercise, pharmacologic options including resuming phentermine, and Information on healthy weight added to patient's after visit summary.   Achilles tendinitis of both lower extremities  I will have her get x-rays of her heels.  I will start her on ibuprofen 600 mg every 6 hours as needed.  Advised to continue ice and compression.    Orders Placed This Encounter   Procedures   • XR Calcaneus 2+ View Bilateral     New Medications Ordered This Visit   Medications   • phentermine 37.5 MG capsule     Sig: Take 1 capsule by mouth Every Morning.     Dispense:  30 capsule     Refill:  0     Pt will pay out of pocket   • Rimegepant Sulfate (Nurtec) 75 MG tablet dispersible tablet     Sig: Take 1 tablet by mouth Daily As Needed (migraine). Indications: Migraine Headache     Dispense:  8 tablet     Refill:  5   • galcanezumab-gnlm (Emgality) 120 MG/ML auto-injector pen     Sig: Inject 2 mL under the skin into the appropriate area as directed Every 30 (Thirty) Days. Indications: Migraine Headache     Dispense:  1.12 mL     Refill:  5   • ibuprofen (ADVIL,MOTRIN) 600 MG tablet     Sig: Take 1 tablet by mouth Every 6 (Six) Hours As Needed for Moderate Pain. Indications: heel pain      Dispense:  120 tablet     Refill:  0             Diagnosis Plan   1. Intractable migraine without aura and without status migrainosus  Rimegepant Sulfate (Nurtec) 75 MG tablet dispersible tablet    galcanezumab-gnlm (Emgality) 120 MG/ML auto-injector pen      2. Overweight (BMI 25.0-29.9)  phentermine 37.5 MG capsule      3. Achilles tendinitis of both lower extremities  XR Calcaneus 2+ View Bilateral    ibuprofen (ADVIL,MOTRIN) 600 MG tablet            FOLLOW UP  Return in about 4 weeks (around 5/23/2024) for Next scheduled follow up wt loss mgmt, migraines, heel pain.  Patient was given instructions and counseling regarding her condition or for health maintenance advice. Please see specific information pulled into the AVS if appropriate.       CURRENT & DISCONTINUED MEDICATIONS  Current Outpatient Medications   Medication Instructions   • amitriptyline (ELAVIL) 10 mg, Oral, Nightly   • dicyclomine (BENTYL) 20 mg, Oral, Every 6 Hours PRN   • Emgality 240 mg, Subcutaneous, Every 30 Days   • esomeprazole (NEXIUM) 40 mg, Oral, Every Morning Before Breakfast   • fludrocortisone 0.1 MG tablet Oral, Daily   • ibuprofen (ADVIL,MOTRIN) 600 mg, Oral, Every 6 Hours PRN   • ISOtretinoin (ACCUTANE) 30 mg, Oral, 2 Times Daily   • lamoTRIgine (LAMICTAL) 150 mg, Oral, Daily   • LORazepam (ATIVAN) 1 mg, Oral, Daily PRN   • Nurtec 75 mg, Oral, Daily PRN   • OLANZapine (ZYPREXA) 7.5 mg, Oral, Nightly   • phentermine 37.5 mg, Oral, Every Morning   • polyethylene glycol (MIRALAX) 17 g, Oral, Daily   • vitamin D3 5,000 Units, Oral, Daily       Medications Discontinued During This Encounter   Medication Reason   • sodium chloride 0.9 % solution 100 mL with Eptinezumab-jjmr 100 MG/ML solution 100 mg *Therapy completed   • Rimegepant Sulfate (Nurtec) 75 MG tablet dispersible tablet Reorder   • phentermine 37.5 MG capsule Reorder   • Claravis 30 MG capsule Discontinued by another clinician        Parts of this note are electronic  transcriptions/translations of spoken language to printed text using the Dragon Dictation system.    MINI Keating  04/25/24  20:58 EDT

## 2024-04-29 ENCOUNTER — TELEMEDICINE (OUTPATIENT)
Dept: PSYCHIATRY | Facility: CLINIC | Age: 29
End: 2024-04-29
Payer: COMMERCIAL

## 2024-04-29 DIAGNOSIS — F43.10 POST TRAUMATIC STRESS DISORDER (PTSD): ICD-10-CM

## 2024-04-29 DIAGNOSIS — F41.1 GENERALIZED ANXIETY DISORDER: Primary | ICD-10-CM

## 2024-04-29 DIAGNOSIS — F33.1 MAJOR DEPRESSIVE DISORDER, RECURRENT EPISODE, MODERATE: ICD-10-CM

## 2024-04-29 PROCEDURE — 1160F RVW MEDS BY RX/DR IN RCRD: CPT | Performed by: NURSE PRACTITIONER

## 2024-04-29 PROCEDURE — 99214 OFFICE O/P EST MOD 30 MIN: CPT | Performed by: NURSE PRACTITIONER

## 2024-04-29 PROCEDURE — 1159F MED LIST DOCD IN RCRD: CPT | Performed by: NURSE PRACTITIONER

## 2024-04-29 RX ORDER — OLANZAPINE 10 MG/1
10 TABLET ORAL NIGHTLY
Qty: 30 TABLET | Refills: 2 | Status: SHIPPED | OUTPATIENT
Start: 2024-04-29

## 2024-04-29 NOTE — PROGRESS NOTES
This provider is located at the [facility] (through [facility]), [address], [facility], [address] using a secure MyChart Video Visit through [employer]. Patient is being seen remotely via telehealth at their home address in [place], and stated they are in a secure environment for this session. The patient's condition being diagnosed/treated is appropriate for telemedicine. The provider identified himself as well as his credentials.   The patient consent to be seen remotely, and when consent is given they understand that the consent allows for patient identifiable information to be sent to a third party as needed.   They may refuse to be seen remotely at any time. The electronic data is encrypted and password protected, and the patient  has been advised of the potential risks to privacy not withstanding such measures.    You have chosen to receive care through a telehealth visit.  Do you consent to use a video/audio connection for your medical care today? Yes    Patient identifiers utilized: Name and date of birth.    Patient verbally confirmed consent for today's encounter- yes    Subjective   Palmira Carbajal is a 28 y.o. female who presents today for follow-up appointment.     Chief Complaint:  Depression, anxiety, PTSD    History of Present Illness:     Patient reports feeling down at times over the past month. Increase in stress related to family stressors. Endorses low interest. Sleeping a little better. Energy levels low. Anxiety has been high at times due to situational stressors. Endorses excessive worries. Trouble relaxing at times. PTSD- sleeping better. Denies nightmares.     Prior Psychiatric Medications:  Zoloft, paxil, prozac, lexapro, desvenlafaxine, effexor, cymbalta, quetiapine, trazodone, hydroxyzine, ambien, lorazepam, buspar, abilify       The following portions of the patient's history were reviewed and updated as appropriate: allergies, current  medications, past family history, past medical history, past social history, past surgical history and problem list.    Allergy:   Allergies   Allergen Reactions    Cephalexin Diarrhea    Cephalosporins GI Intolerance    Cymbalta [Duloxetine Hcl] Other (See Comments)     Jittery and insomnia    Penicillins Rash        Current Medications:   Current Outpatient Medications   Medication Sig Dispense Refill    OLANZapine (ZyPREXA) 10 MG tablet Take 1 tablet by mouth Every Night. 30 tablet 2    amitriptyline (ELAVIL) 10 MG tablet Take 1 tablet by mouth Every Night. 30 tablet 2    dicyclomine (BENTYL) 20 MG tablet Take 1 tablet by mouth Every 6 (Six) Hours As Needed (abdominal cramping). 60 tablet 3    esomeprazole (nexIUM) 40 MG capsule Take 1 capsule by mouth Every Morning Before Breakfast. 90 capsule 3    fludrocortisone 0.1 MG tablet TAKE 1 TABLET BY MOUTH ONCE DAILY 30 tablet 2    galcanezumab-gnlm (Emgality) 120 MG/ML auto-injector pen Inject 2 mL under the skin into the appropriate area as directed Every 30 (Thirty) Days. Indications: Migraine Headache 1.12 mL 5    ibuprofen (ADVIL,MOTRIN) 600 MG tablet Take 1 tablet by mouth Every 6 (Six) Hours As Needed for Moderate Pain. Indications: heel pain 120 tablet 0    ISOtretinoin (Accutane) 30 MG capsule Take 1 capsule by mouth 2 (Two) Times a Day.      lamoTRIgine (LaMICtal) 150 MG tablet Take 1 tablet by mouth Daily for 90 days. 30 tablet 2    phentermine 37.5 MG capsule Take 1 capsule by mouth Every Morning. 30 capsule 0    polyethylene glycol (MIRALAX) 17 g packet Take 17 g by mouth Daily. 5 packet 0    Rimegepant Sulfate (Nurtec) 75 MG tablet dispersible tablet Take 1 tablet by mouth Daily As Needed (migraine). Indications: Migraine Headache 8 tablet 5    vitamin D3 (Vitamin D) 125 MCG (5000 UT) capsule capsule Take 1 capsule by mouth Daily. 90 capsule 1     No current facility-administered medications for this visit.       Mental Status Exam:   Hygiene:    good  Cooperation:  Cooperative  Eye Contact:  Good  Psychomotor Behavior:  Appropriate  Affect:  Full range  Mood: normal  Hopelessness: Denies  Speech:  Normal  Thought Process:  Goal directed  Thought Content:  Normal  Suicidal:  None  Homicidal:  None  Hallucinations:  None  Delusion:  None  Memory:  Intact  Orientation:  Grossly intact  Reliability:  good  Insight:  Good  Judgement:  Good  Impulse Control:  Good  Physical/Medical Issues:  No      Physical Exam:   not currently breastfeeding. There is no height or weight on file to calculate BMI.   Due to the remote nature of this encounter (virtual encounter), vitals were unable to be obtained.  Weight change: n    PHQ-9 Depression Screening  Little interest or pleasure in doing things? (P) 1-->several days   Feeling down, depressed, or hopeless? (P) 1-->several days   Trouble falling or staying asleep, or sleeping too much? (P) 1-->several days   Feeling tired or having little energy? (P) 2-->more than half the days   Poor appetite or overeating? (P) 0-->not at all   Feeling bad about yourself - or that you are a failure or have let yourself or your family down? (P) 0-->not at all   Trouble concentrating on things, such as reading the newspaper or watching television? (P) 1-->several days   Moving or speaking so slowly that other people could have noticed? Or the opposite - being so fidgety or restless that you have been moving around a lot more than usual? (P) 0-->not at all   Thoughts that you would be better off dead, or of hurting yourself in some way? (P) 0-->not at all   PHQ-9 Total Score (P) 6   If you checked off any problems, how difficult have these problems made it for you to do your work, take care of things at home, or get along with other people? (P) somewhat difficult     PHQ-9 Total Score: (P) 6    Feeling nervous, anxious or on edge: (P) More than half the days  Not being able to stop or control worrying: (P) Nearly every day  Worrying too  much about different things: (P) Nearly every day  Trouble Relaxing: (P) Nearly every day  Being so restless that it is hard to sit still: (P) Not at all  Feeling afraid as if something awful might happen: (P) Several days  Becoming easily annoyed or irritable: (P) Several days  STACY 7 Total Score: (P) 13  If you checked any problems, how difficult have these problems made it for you to do your work, take care of things at home, or get along with other people: (P) Very difficult    Previous available Provider notes and records reviewed by this APRN at today's encounter.     Visit Diagnoses:    ICD-10-CM ICD-9-CM   1. Generalized anxiety disorder  F41.1 300.02   2. Major depressive disorder, recurrent episode, moderate  F33.1 296.32   3. Post traumatic stress disorder (PTSD)  F43.10 309.81       TREATMENT PLAN: Continue supportive psychotherapy efforts and medications.  Medication and treatment options, both pharmacological and non-pharmacological treatment options, discussed during today's visit, including any off label use of medication. Patient acknowledged and verbally consented with current treatment plan and was educated on the importance of compliance with treatment and follow-up appointments.      - Continue lamotrigine 150mg po qhs to target depression  - Continue amitriptyline 10mg po qhs to target depression, anxiety and PTSD  - Increase olanzapine 7.5mg to 10mg po qhs to target depression    Labs: None ordered at this time  Therapy: Defers    MEDICATION ISSUES:  Discussed treatment plan and medication options of prescribed medication as well as the risks, benefits, any black box warnings, and side effects including potential falls, possible impaired driving, and metabolic adversities among others, including any off label use of medication. Patient is agreeable to call the office with any worsening of symptoms or onset of side effects, or if any concerns or questions arise.  The contact information for the  office is made available to the patient. Patient is agreeable to call 911 or go to the nearest ER should they begin having any SI/HI, or if any urgent concerns arise. No medication side effects or related complaints today. DEBORAH reviewed as expected.    RISK ASSESSMENT:  Risk of self-harm acutely is moderate.  Risk factors include anxiety disorder, mood disorder, and recent psychosocial stressors (pandemic). Protective factors include no family history, denies access to guns/weapons, no present SI, no history of suicide attempts or self-harm in the past, minimal AODA, healthcare seeking, future orientation, willingness to engage in care.  Risk of self-harm chronically is also moderate, but could be further elevated in the event of treatment noncompliance and/or AODA.    VERBAL INFORMED CONSENT FOR MEDICATION:  The patient was educated that their proposed/prescribed psychotropic medication(s) has potential risks, side effects, adverse effects, and black box warnings; and these have been discussed with the patient.  The patient has been informed that their treatment and medication dosage is to be individualized, and may even be above or below the recommended range/dosage due to patient individualization and response, but medication is prescribed using a shared decision making approach, and no medication or dosage will be prescribed without the patient's verbal consent.  The reason for the use of the medication including any off label use and alternative modes of treatment other than or in addition to medication has been considered and discussed, the probable consequences of not receiving the proposed treatment have been discussed, and any treatment side effects, black box warnings, and cautions associated with treatment have been discussed with the patient.  The patient is allowed ample time to openly discuss and ask questions regarding the proposed medication(s) and treatment plan and the patient verbalizes  understanding the reasons for the use of the medication, its potential risks and benefits, other alternative treatment(s), and the probable consequences that may occur if the proposed medication is not given.  The patient has been given ample time to ask questions and study the information and find the information to be specific, accurate, and complete.  The patient gives verbal consent for the medication(s) proposed/prescribed, they verbalized understanding that they can refuse and withdraw consent at any time with the assistance of this APRN, and the patient has verbally confirmed that they are aware, and are willing, to take the prescribed medication and follow the treatment plan with the known possible risks, side effect, black box warnings, and any potential medication interactions, and the patient reports they will be worse off without this medication and treatment plan.  The patient is advised to contact this APRN/this office if any questions or concerns arise at any time (at [phone]), or call 911/go to the closest emergency department if needed or outside of office hours.      [facility] No Show Policy:  We understand unexpected circumstances arise; however, anytime you miss your appointment we are unable to provide you appropriate care.  In addition, each appointment missed could have been used to provide care for others.  We ask that you call at least 24 hours in advance to cancel or reschedule an appointment.  We would like to take this opportunity to remind you of our policy stating patients who miss THREE or more appointments without cancelling or rescheduling 24 hours in advance of the appointment may be subject to cancellation of any further visits with our clinic and recommendation to seek in-person services/visits.    Please call [phone] to reschedule your appointment. If there are reasons that make it difficult for you to keep the appointments, please call and let  us know how we can help.  Please understand that medication prescribing will not continue without seeing your provider.      [facility]'s No Show Policy reviewed with patient at today's visit. Patient verbalized understanding of this policy. Discussed with patient that in the event that there are three or more no show visits, it will be recommended that they pursue in-person services/visits as noncompliance with telehealth visits indicates that patient is not an appropriate candidate for telemedicine and would likely be more appropriate for in-person services/visits. Patient verbalizes understanding and is agreeable to this.    MEDS ORDERED DURING VISIT:  New Medications Ordered This Visit   Medications    OLANZapine (ZyPREXA) 10 MG tablet     Sig: Take 1 tablet by mouth Every Night.     Dispense:  30 tablet     Refill:  2       Patient informed this provider is leaving the practice. Patient would like to continue care with [facility]. Patient informed to call our office to schedule appointment with new provider. Recommended follow up in 4 weeks or sooner if needed.          Progress toward goal: Not at goal    Functional Status: No impairment    Prognosis: Good with Ongoing Treatment     This document has been electronically signed by MINI Poon  April 29, 2024 13:28 EDT      Please note that portions of this note were completed with a voice recognition program.

## 2024-04-30 ENCOUNTER — HOSPITAL ENCOUNTER (OUTPATIENT)
Dept: GENERAL RADIOLOGY | Facility: HOSPITAL | Age: 29
Discharge: HOME | End: 2024-04-30
Admitting: NURSE PRACTITIONER
Payer: COMMERCIAL

## 2024-04-30 ENCOUNTER — TELEPHONE (OUTPATIENT)
Dept: PSYCHIATRY | Facility: CLINIC | Age: 29
End: 2024-04-30
Payer: COMMERCIAL

## 2024-04-30 ENCOUNTER — TELEPHONE (OUTPATIENT)
Dept: FAMILY MEDICINE CLINIC | Facility: CLINIC | Age: 29
End: 2024-04-30

## 2024-04-30 ENCOUNTER — ANCILLARY ORDERS (OUTPATIENT)
Dept: FAMILY MEDICINE CLINIC | Facility: CLINIC | Age: 29
End: 2024-04-30
Payer: COMMERCIAL

## 2024-04-30 DIAGNOSIS — G43.019 INTRACTABLE MIGRAINE WITHOUT AURA AND WITHOUT STATUS MIGRAINOSUS: Primary | ICD-10-CM

## 2024-04-30 DIAGNOSIS — M79.671 HEEL PAIN, BILATERAL: ICD-10-CM

## 2024-04-30 DIAGNOSIS — M76.62 ACHILLES TENDINITIS OF BOTH LOWER EXTREMITIES: ICD-10-CM

## 2024-04-30 DIAGNOSIS — E66.3 OVERWEIGHT (BMI 25.0-29.9): ICD-10-CM

## 2024-04-30 DIAGNOSIS — M79.672 HEEL PAIN, BILATERAL: ICD-10-CM

## 2024-04-30 DIAGNOSIS — M76.61 ACHILLES TENDINITIS OF BOTH LOWER EXTREMITIES: ICD-10-CM

## 2024-04-30 PROCEDURE — 73650 X-RAY EXAM OF HEEL: CPT

## 2024-04-30 NOTE — TELEPHONE ENCOUNTER
Patient made follow up appt with new provider, read her the Controlled Substance Script.  Patient understood.

## 2024-04-30 NOTE — TELEPHONE ENCOUNTER
Caller: Palmira Carbajal    Relationship: Self    Best call back number: [phone]       What was the call regarding:     THE PATIENT SAID THE PHARMACY ADVISED A PA IS NEEDED FOR EMGALITY.

## 2024-05-01 ENCOUNTER — OFFICE VISIT (OUTPATIENT)
Dept: GASTROENTEROLOGY | Facility: CLINIC | Age: 29
End: 2024-05-01
Payer: COMMERCIAL

## 2024-05-01 ENCOUNTER — TELEPHONE (OUTPATIENT)
Dept: GASTROENTEROLOGY | Facility: CLINIC | Age: 29
End: 2024-05-01
Payer: COMMERCIAL

## 2024-05-01 VITALS
SYSTOLIC BLOOD PRESSURE: 108 MMHG | BODY MASS INDEX: 30.77 KG/M2 | HEIGHT: 64 IN | DIASTOLIC BLOOD PRESSURE: 72 MMHG | HEART RATE: 92 BPM | WEIGHT: 180.2 LBS

## 2024-05-01 DIAGNOSIS — R10.30 LOWER ABDOMINAL PAIN: ICD-10-CM

## 2024-05-01 DIAGNOSIS — K21.9 GASTROESOPHAGEAL REFLUX DISEASE WITHOUT ESOPHAGITIS: ICD-10-CM

## 2024-05-01 DIAGNOSIS — K26.9 DUODENAL ULCER: ICD-10-CM

## 2024-05-01 DIAGNOSIS — K75.81 NASH (NONALCOHOLIC STEATOHEPATITIS): Primary | ICD-10-CM

## 2024-05-01 PROCEDURE — 99214 OFFICE O/P EST MOD 30 MIN: CPT | Performed by: NURSE PRACTITIONER

## 2024-05-01 PROCEDURE — 1160F RVW MEDS BY RX/DR IN RCRD: CPT | Performed by: NURSE PRACTITIONER

## 2024-05-01 PROCEDURE — 1159F MED LIST DOCD IN RCRD: CPT | Performed by: NURSE PRACTITIONER

## 2024-05-01 RX ORDER — RESMETIROM 80 MG/1
80 TABLET, COATED ORAL DAILY
Qty: 90 TABLET | Refills: 3 | Status: SHIPPED | OUTPATIENT
Start: 2024-05-01

## 2024-05-01 RX ORDER — FAMOTIDINE 20 MG/1
20 TABLET, FILM COATED ORAL NIGHTLY PRN
Qty: 90 TABLET | Refills: 1 | Status: SHIPPED | OUTPATIENT
Start: 2024-05-01

## 2024-05-01 NOTE — PROGRESS NOTES
Chief Complaint   Fatty Liver     History of Present Illness       Palmira Carbajal is a 28 y.o. female who presents to [facility] for follow-up for fatty liver disease.  She was last seen in the office by me on 10/31/2023.    Underwent colonoscopy with Dr. Griffin on 10/11/2022.  Colonoscopy was normal except for some internal and external nonbleeding hemorrhoids.  Recall in 5 years.     Significant GI FH colon cancer with father.      While in the ER this last time she had CT scan of the abd/pelvis done that showed:  IMPRESSION:                 1. No acute intra-abdominal or intrapelvic process.  2. Hepatic steatosis.  3. Ancillary findings as described above.     She underwent EGD with Dr. Griffin on 9/8/2023.  EGD showed duodenal erosion with gastritis.  LA grade a reflux esophagitis.  Path positive for reflux esophagitis.  Mild nonspecific changes in the duodenum.  Recommend celiac testing.     Celiac testing came back normal.       FibroScan showed mild fatty liver disease.  F2 fibrosis.  Liver serology normal. She is on phentermine per PCP and has lost 10 lbs. Most recent LFTs were normal. Bowels moving well right now. Still on NEXIUM in the morning. Having some breakthrough reflux at night.      Results       Result Review :       CMP          5/28/2023    10:33 8/23/2023    16:34 1/29/2024    11:02   CMP   Glucose 98  108  72    BUN 8  8  11    Creatinine 0.70  0.76  0.69    EGFR 120.2  108.3  119.9    Sodium 129  137  137    Potassium 3.8  3.8  4.3    Chloride 95  104  102    Calcium 9.0  9.0  9.6    Total Protein 7.0  7.2     Albumin 3.9  4.1     Globulin 3.1  3.1     Total Bilirubin 0.2  <0.2     Alkaline Phosphatase 97  91     AST (SGOT) 13  11     ALT (SGPT) 16  13     Albumin/Globulin Ratio 1.3  1.3     BUN/Creatinine Ratio 11.4  10.5  15.9    Anion Gap 9.8  11.2  13.6      CBC          9/7/2023    11:22 1/29/2024    11:02 4/10/2024    08:55   CBC   WBC 15.13   "13.49  11.38    RBC 5.23  5.06  4.76    Hemoglobin 14.1  13.7  12.9    Hematocrit 43.5  42.3  39.7    MCV 83.2  83.6  83.4    MCH 27.0  27.1  27.1    MCHC 32.4  32.4  32.5    RDW 12.9  13.1  13.6    Platelets 426  411  380      Lipid Panel          9/7/2023    11:22   Lipid Panel   Total Cholesterol 167    Triglycerides 82    HDL Cholesterol 56    VLDL Cholesterol 15    LDL Cholesterol  96    LDL/HDL Ratio 1.69      TSH          8/23/2023    16:34   TSH   TSH 1.510        Lipase   Lipase   Date Value Ref Range Status   05/28/2023 23 13 - 60 U/L Final     Amylase   Amylase   Date Value Ref Range Status   09/14/2020 36 30 - 110 U/L Final     Iron Profile   Iron   Date Value Ref Range Status   10/31/2023 63 37 - 145 mcg/dL Final     TIBC   Date Value Ref Range Status   10/31/2023 371 298 - 536 mcg/dL Final     Iron Saturation (TSAT)   Date Value Ref Range Status   10/31/2023 17 (L) 20 - 50 % Final     Transferrin   Date Value Ref Range Status   10/31/2023 249 200 - 360 mg/dL Final     Ferritin No results found for: \"FERRITIN\"  Liver Workup   ALPHA -1 ANTITRYPSIN   Date Value Ref Range Status   10/31/2023 156 90 - 200 mg/dL Final     dsDNA   Date Value Ref Range Status   09/08/2022 Negative Negative Final     Expanded ALISON Screen   Date Value Ref Range Status   09/08/2022 Negative Negative Final     Smooth Muscle Ab   Date Value Ref Range Status   10/31/2023 4 0 - 19 Units Final     Comment:                      Negative                     0 - 19                   Weak positive               20 - 30                   Moderate to strong positive     >30   Actin Antibodies are found in 52-85% of patients with   autoimmune hepatitis or chronic active hepatitis and   in 22% of patients with primary biliary cirrhosis.     Ceruloplasmin   Date Value Ref Range Status   10/31/2023 26 19 - 39 mg/dL Final     Immunofixation Result, Serum   Date Value Ref Range Status   10/31/2023 Comment  Final     Comment:     No monoclonality " detected.     IgG   Date Value Ref Range Status   10/31/2023 941 586 - 1602 mg/dL Final     IgA   Date Value Ref Range Status   10/31/2023 187 87 - 352 mg/dL Final     IgM   Date Value Ref Range Status   10/31/2023 93 26 - 217 mg/dL Final     Iron   Date Value Ref Range Status   10/31/2023 63 37 - 145 mcg/dL Final     TIBC   Date Value Ref Range Status   10/31/2023 371 298 - 536 mcg/dL Final     Iron Saturation (TSAT)   Date Value Ref Range Status   10/31/2023 17 (L) 20 - 50 % Final     Transferrin   Date Value Ref Range Status   10/31/2023 249 200 - 360 mg/dL Final     Mitochondrial Ab   Date Value Ref Range Status   10/31/2023 <20.0 0.0 - 20.0 Units Final     Comment:                                     Negative    0.0 - 20.0                                  Equivocal  20.1 - 24.9                                  Positive         >24.9  Mitochondrial (M2) Antibodies are found in 90-96% of  patients with primary biliary cirrhosis.     Protime   Date Value Ref Range Status   10/31/2023 13.6 11.8 - 14.9 Seconds Final     INR   Date Value Ref Range Status   10/31/2023 1.03 0.86 - 1.15 Final               Past Medical History       Past Medical History:   Diagnosis Date    Acid reflux     Anxiety     Chronic pain disorder     Depression     Eczema     Fatty liver     Intractable migraine without aura and without status migrainosus 2023    Kidney stone     HX OF    Maltracking of right patella     Migraines     Panic disorder     PONV (postoperative nausea and vomiting)     GOOD RESULTS WITH SCOPALAMINE    PTSD (post-traumatic stress disorder)     WAKES UP FROM ANESTHESIA WITH PANIC ATTACK    Seasonal allergic rhinitis 2022    Self-injurious behavior     as a teenager    Suicide attempt        Past Surgical History:   Procedure Laterality Date     SECTION      COLONOSCOPY N/A 10/11/2022    Procedure: COLONOSCOPY;  Surgeon: Dyan Griffin MD;  Location: [facility] ENDOSCOPY;  Service:  Gastroenterology;  Laterality: N/A;  HEMORRHOIDS    CYSTOSCOPY      CYSTOSCOPY URETEROSCOPY Left 1/30/2023    Procedure: CYSTOSCOPY URETEROSCOPY RETROGRADE PYELOGRAM HOLMIUM LASER STENT INSERTION, left;  Surgeon: Melisa Garcia MD;  Location: [facility] MAIN OR;  Service: Urology;  Laterality: Left;    ENDOSCOPY N/A 9/8/2023    Procedure: ESOPHAGOGASTRODUODENOSCOPY WITH BIOPSIES;  Surgeon: Dyan Griffin MD;  Location: [facility] ENDOSCOPY;  Service: Gastroenterology;  Laterality: N/A;  ESOPHAGITIS, GASTRITIS    HYSTERECTOMY      KIDNEY STONE SURGERY      unspecified    KNEE ARTHROSCOPY Right 7/11/2022    Procedure: KNEE ARTHROSCOPY WITH A LATERAL RELEASE AND CHONDROPLASTY;  Surgeon: Marco A Pitts MD;  Location: [facility];  Service: Orthopedics;  Laterality: Right;    WISDOM TOOTH EXTRACTION           Current Outpatient Medications:     amitriptyline (ELAVIL) 10 MG tablet, Take 1 tablet by mouth Every Night., Disp: 30 tablet, Rfl: 2    dicyclomine (BENTYL) 20 MG tablet, Take 1 tablet by mouth Every 6 (Six) Hours As Needed (abdominal cramping)., Disp: 60 tablet, Rfl: 3    esomeprazole (nexIUM) 40 MG capsule, Take 1 capsule by mouth Every Morning Before Breakfast., Disp: 90 capsule, Rfl: 3    fludrocortisone 0.1 MG tablet, TAKE 1 TABLET BY MOUTH ONCE DAILY, Disp: 30 tablet, Rfl: 2    galcanezumab-gnlm (Emgality) 120 MG/ML auto-injector pen, Inject 2 mL under the skin into the appropriate area as directed Every 30 (Thirty) Days. Indications: Migraine Headache, Disp: 1.12 mL, Rfl: 5    ibuprofen (ADVIL,MOTRIN) 600 MG tablet, Take 1 tablet by mouth Every 6 (Six) Hours As Needed for Moderate Pain. Indications: heel pain, Disp: 120 tablet, Rfl: 0    ISOtretinoin (Accutane) 30 MG capsule, Take 1 capsule by mouth 2 (Two) Times a Day., Disp: , Rfl:     lamoTRIgine (LaMICtal) 150 MG tablet, Take 1 tablet by mouth Daily for 90 days., Disp: 30 tablet, Rfl: 2    OLANZapine (ZyPREXA) 10 MG tablet, Take 1 tablet by  mouth Every Night., Disp: 30 tablet, Rfl: 2    phentermine 37.5 MG capsule, Take 1 capsule by mouth Every Morning., Disp: 30 capsule, Rfl: 0    polyethylene glycol (MIRALAX) 17 g packet, Take 17 g by mouth Daily., Disp: 5 packet, Rfl: 0    Rimegepant Sulfate (Nurtec) 75 MG tablet dispersible tablet, Take 1 tablet by mouth Daily As Needed (migraine). Indications: Migraine Headache, Disp: 8 tablet, Rfl: 5    vitamin D3 (Vitamin D) 125 MCG (5000 UT) capsule capsule, Take 1 capsule by mouth Daily., Disp: 90 capsule, Rfl: 1    famotidine (PEPCID) 20 MG tablet, Take 1 tablet by mouth At Night As Needed for Heartburn., Disp: 90 tablet, Rfl: 1    Resmetirom (Rezdiffra) 80 MG tablet, Take 1 tablet by mouth Daily., Disp: 90 tablet, Rfl: 3     Allergies   Allergen Reactions    Cephalexin Diarrhea    Cephalosporins GI Intolerance    Cymbalta [Duloxetine Hcl] Other (See Comments)     Jittery and insomnia    Penicillins Rash       Family History   Problem Relation Age of Onset    Arthritis Mother     Restless legs syndrome Mother     Thyroid disease Father     Colon polyps Father     Diabetes Father     Drug abuse Maternal Uncle     Alcohol abuse Maternal Uncle     Cancer Maternal Grandmother     Sleep apnea Son     Malig Hyperthermia Neg Hx         Social History     Social History Narrative    Not on file       Objective       Review of Systems   Constitutional:  Negative for appetite change, fatigue, fever, unexpected weight gain and unexpected weight loss.   HENT:  Negative for trouble swallowing.    Respiratory:  Negative for cough, choking, chest tightness, shortness of breath, wheezing and stridor.    Cardiovascular:  Negative for chest pain, palpitations and leg swelling.   Gastrointestinal:  Negative for abdominal distention, abdominal pain, anal bleeding, blood in stool, constipation, diarrhea, nausea, rectal pain, vomiting, GERD and indigestion.        Vital Signs:   /72 (BP Location: Right arm, Patient Position:  "Sitting, Cuff Size: Adult)   Pulse 92   Ht 162.6 cm (64\")   Wt 81.7 kg (180 lb 3.2 oz)   BMI 30.93 kg/m²       Physical Exam  Constitutional:       General: She is not in acute distress.     Appearance: She is well-developed. She is not ill-appearing.   HENT:      Head: Normocephalic.   Eyes:      Pupils: Pupils are equal, round, and reactive to light.   Cardiovascular:      Rate and Rhythm: Normal rate and regular rhythm.      Heart sounds: Normal heart sounds.   Pulmonary:      Effort: Pulmonary effort is normal.      Breath sounds: Normal breath sounds.   Abdominal:      General: Bowel sounds are normal. There is no distension.      Palpations: Abdomen is soft. There is no mass.      Tenderness: There is no abdominal tenderness. There is no guarding or rebound.      Hernia: No hernia is present.   Musculoskeletal:         General: Normal range of motion.   Skin:     General: Skin is warm and dry.   Neurological:      Mental Status: She is alert and oriented to person, place, and time.   Psychiatric:         Speech: Speech normal.         Behavior: Behavior normal.         Judgment: Judgment normal.           Assessment & Plan          Assessment and Plan    Diagnoses and all orders for this visit:    1. BRADY (nonalcoholic steatohepatitis) (Primary)  -     Resmetirom (Rezdiffra) 80 MG tablet; Take 1 tablet by mouth Daily.  Dispense: 90 tablet; Refill: 3    2. Gastroesophageal reflux disease without esophagitis  -     famotidine (PEPCID) 20 MG tablet; Take 1 tablet by mouth At Night As Needed for Heartburn.  Dispense: 90 tablet; Refill: 1    3. Duodenal ulcer    4. Lower abdominal pain    Reviewed most recent lab and imaging results with her today.  Liver serology was normal.  Most recent LFTs were normal.  FibroScan did show fatty liver disease with fibrosis F2.  We did discuss starting Rezdiffra for fatty liver disease.  We talked about the pros and cons and potential side effects.  She is agreeable to start " the new medication.  Continue to work on diet and exercise.  Bowels moving well currently.  Reflux is well-controlled during the day but still having some issues at night.  Will add Pepcid at night for breakthrough reflux.  Continue GERD precautions.  Patient to call the office with any issues.  Patient to follow-up with me in 3 months.  At that time we will recheck labs since starting the new medication.  Patient is agreeable to the plan.            Follow Up       Follow Up   Return in about 3 months (around 8/1/2024) for FATTY LIVER, GERD.  Patient was given instructions and counseling regarding her condition or for health maintenance advice. Please see specific information pulled into the AVS if appropriate.

## 2024-05-07 ENCOUNTER — TELEPHONE (OUTPATIENT)
Dept: FAMILY MEDICINE CLINIC | Facility: CLINIC | Age: 29
End: 2024-05-07

## 2024-05-07 NOTE — TELEPHONE ENCOUNTER
Caller: Palmira Carbajal    Relationship: Self    Best call back number: [phone]     What is the best time to reach you: ANYTIME    Who are you requesting to speak with (clinical staff, provider,  specific staff member): CLINICAL    What was the call regarding:   PATIENT STATES SHE SAW GI DOCTOR LAST WEEK FOR FATTY LIVER AND STAGE II FIBROSIS OF THE LIVER AND WAS CALLED THIS MORNING WITH AN UPDATE.    GI TRIED TO GET REZDIFFRA APPROVED AND INSURANCE WILL NOT APPROVE IT UNTIL PATIENT HAS TRIED AND FAILED THE FOLLOWING MEDICATION .   METFORMIN  VICTOZA    PER PATIENT GI STATED PCP WOULD NEED TO PRESCRIBE THESE MEDICATIONS FIRST.    PATIENT DOES HAS AN UPCOMING APPOINTMENT SCHEDULED WITH PROVIDER SUKHWINDER ON 5.23.2024, BUT IF PATIENT NEEDS TO COME IN SOONER TO DISCUSS PLEASE LET HER KNOW.     IF MEDICATION CAN BE FILLED PLEASE SEND TO [facility] [place] [id number] [address] [facility] [phone] [facility] [phone] FX     Is it okay if the provider responds through MyChart: NO, CALL PREFERRED FOR THIS SITUATION.

## 2024-05-07 NOTE — TELEPHONE ENCOUNTER
I cannot prescribe those medicines off label.  She will need to follow-up with gastroenterology and asked them what to do next.

## 2024-05-28 ENCOUNTER — OFFICE VISIT (OUTPATIENT)
Dept: FAMILY MEDICINE CLINIC | Facility: CLINIC | Age: 29
End: 2024-05-28
Payer: COMMERCIAL

## 2024-05-28 VITALS
WEIGHT: 161.1 LBS | HEART RATE: 80 BPM | OXYGEN SATURATION: 97 % | HEIGHT: 64 IN | BODY MASS INDEX: 27.5 KG/M2 | DIASTOLIC BLOOD PRESSURE: 65 MMHG | SYSTOLIC BLOOD PRESSURE: 107 MMHG

## 2024-05-28 DIAGNOSIS — M76.62 ACHILLES TENDINITIS OF BOTH LOWER EXTREMITIES: ICD-10-CM

## 2024-05-28 DIAGNOSIS — E66.3 OVERWEIGHT (BMI 25.0-29.9): Primary | ICD-10-CM

## 2024-05-28 DIAGNOSIS — K75.81 NASH (NONALCOHOLIC STEATOHEPATITIS): ICD-10-CM

## 2024-05-28 DIAGNOSIS — G43.019 INTRACTABLE MIGRAINE WITHOUT AURA AND WITHOUT STATUS MIGRAINOSUS: ICD-10-CM

## 2024-05-28 DIAGNOSIS — M76.61 ACHILLES TENDINITIS OF BOTH LOWER EXTREMITIES: ICD-10-CM

## 2024-05-28 PROCEDURE — 1160F RVW MEDS BY RX/DR IN RCRD: CPT | Performed by: NURSE PRACTITIONER

## 2024-05-28 PROCEDURE — 1126F AMNT PAIN NOTED NONE PRSNT: CPT | Performed by: NURSE PRACTITIONER

## 2024-05-28 PROCEDURE — 1159F MED LIST DOCD IN RCRD: CPT | Performed by: NURSE PRACTITIONER

## 2024-05-28 PROCEDURE — 99214 OFFICE O/P EST MOD 30 MIN: CPT | Performed by: NURSE PRACTITIONER

## 2024-05-28 RX ORDER — PHENTERMINE HYDROCHLORIDE 37.5 MG/1
37.5 CAPSULE ORAL EVERY MORNING
Qty: 30 CAPSULE | Refills: 0 | Status: SHIPPED | OUTPATIENT
Start: 2024-05-28

## 2024-05-28 NOTE — ASSESSMENT & PLAN NOTE
Patient's (Body mass index is 27.65 kg/m².) indicates that they are overweight with health conditions that include BRADY. Weight is improving with treatment. BMI is is above average; BMI management plan is completed. We discussed portion control, increasing exercise, pharmacologic options including continuing phentermine, and Information on healthy weight added to patient's after visit summary.

## 2024-05-28 NOTE — ASSESSMENT & PLAN NOTE
Headaches are improving with treatment.    Plan:  Continue same medication/s without change.     Discussed medication dosage, use, side effects, and goals of treatment in detail.    Discussed monitoring symptoms and use of quick-relief medications and maintenance medication.    General Treatment Goals:   symptom prevention  minimize work absence  minimizing limitation in activity  prevention of exacerbations  decrease use of ER/inpatient care  minimization of adverse effects of treatment    Followup at the next regular appointment

## 2024-05-28 NOTE — ASSESSMENT & PLAN NOTE
I did reach out to [employer] who states that they are working on the appeal to get Rezdiffra approved.  She also stated that she did not feel patient should be on metformin or Victoza for this.

## 2024-05-28 NOTE — PROGRESS NOTES
"Chief Complaint  Migraine, Weight Check (Weight loss management), and Foot Pain (Bilateral heel pain)    Subjective            Palmira Carbajal is a 28 y.o. female who presents to [facility]   History of Present Illness  4-week follow-up on weight loss management, migraines and heel pain.    Weight loss management: I did restart her on phentermine last month.  She has lost 7 pounds since last visit.    Migraine headaches: She is on Nurtec as needed and Emgality for prevention.    Bilateral heel pain/Achilles tendinitis: I started her on ibuprofen 600 mg every 6 hours as needed. She is still having pain in the achilles tendon area bilaterally. She states it is worse in the morning and at night. She states the pain waxes and wanes during the daytime.     BRADY: GI wanted to start her on Rezdiffra but her insurance would not cover it.  Her insurance wants her to try metformin and or Victoza.    Tobacco Use: Medium Risk (5/28/2024)    Patient History     Smoking Tobacco Use: Former     Smokeless Tobacco Use: Never     Passive Exposure: Not on file      E-cigarette/Vaping    E-cigarette/Vaping Use Current Every Day User     Comments INST PER ANESTHESIA PROTCOL      E-cigarette/Vaping Substances    Nicotine Yes     THC No     CBD No     Flavoring Yes      E-cigarette/Vaping Devices    Disposable Yes     Pre-filled or Refillable Cartridge No     Refillable Tank No     Pre-filled Pod No        Alcohol Use: Not on file         Objective   Vital Signs:   Vitals:    05/28/24 1057   BP: 107/65   Pulse: 80   SpO2: 97%   Weight: 73.1 kg (161 lb 1.6 oz)   Height: 162.6 cm (64\")     Body mass index is 27.65 kg/m².    Wt Readings from Last 3 Encounters:   05/28/24 73.1 kg (161 lb 1.6 oz)   05/01/24 81.7 kg (180 lb 3.2 oz)   04/25/24 77.2 kg (170 lb 3.2 oz)     BP Readings from Last 3 Encounters:   05/28/24 107/65   05/01/24 108/72   04/25/24 117/79       Health Maintenance   Topic Date Due    " "Pneumococcal Vaccine 0-64 (1 of 2 - PCV) 09/07/2024 (Originally 6/22/2001)    COVID-19 Vaccine (3 - 2023-24 season) 09/23/2024 (Originally 9/1/2023)    INFLUENZA VACCINE  08/01/2024    ANNUAL PHYSICAL  01/29/2025    BMI FOLLOWUP  04/25/2025    COLORECTAL CANCER SCREENING  10/11/2027    TDAP/TD VACCINES (2 - Td or Tdap) 01/04/2029    HEPATITIS C SCREENING  Completed       /65   Pulse 80   Ht 162.6 cm (64\")   Wt 73.1 kg (161 lb 1.6 oz)   SpO2 97%   BMI 27.65 kg/m²       Current Outpatient Medications:     amitriptyline (ELAVIL) 10 MG tablet, Take 1 tablet by mouth Every Night., Disp: 30 tablet, Rfl: 2    dicyclomine (BENTYL) 20 MG tablet, Take 1 tablet by mouth Every 6 (Six) Hours As Needed (abdominal cramping)., Disp: 60 tablet, Rfl: 3    esomeprazole (nexIUM) 40 MG capsule, Take 1 capsule by mouth Every Morning Before Breakfast., Disp: 90 capsule, Rfl: 3    famotidine (PEPCID) 20 MG tablet, Take 1 tablet by mouth At Night As Needed for Heartburn., Disp: 90 tablet, Rfl: 1    fludrocortisone 0.1 MG tablet, TAKE 1 TABLET BY MOUTH ONCE DAILY, Disp: 30 tablet, Rfl: 2    galcanezumab-gnlm (Emgality) 120 MG/ML auto-injector pen, Inject 2 mL under the skin into the appropriate area as directed Every 30 (Thirty) Days. Indications: Migraine Headache, Disp: 1.12 mL, Rfl: 5    ibuprofen (ADVIL,MOTRIN) 600 MG tablet, Take 1 tablet by mouth Every 6 (Six) Hours As Needed for Moderate Pain. Indications: heel pain, Disp: 120 tablet, Rfl: 0    lamoTRIgine (LaMICtal) 150 MG tablet, Take 1 tablet by mouth Daily for 90 days., Disp: 30 tablet, Rfl: 2    OLANZapine (ZyPREXA) 10 MG tablet, Take 1 tablet by mouth Every Night., Disp: 30 tablet, Rfl: 2    phentermine 37.5 MG capsule, Take 1 capsule by mouth Every Morning., Disp: 30 capsule, Rfl: 0    polyethylene glycol (MIRALAX) 17 g packet, Take 17 g by mouth Daily., Disp: 5 packet, Rfl: 0    Rimegepant Sulfate (Nurtec) 75 MG tablet dispersible tablet, Take 1 tablet by mouth " Daily As Needed (migraine). Indications: Migraine Headache, Disp: 8 tablet, Rfl: 5    vitamin D3 (Vitamin D) 125 MCG (5000 UT) capsule capsule, Take 1 capsule by mouth Daily., Disp: 90 capsule, Rfl: 1    Resmetirom (Rezdiffra) 80 MG tablet, Take 1 tablet by mouth Daily., Disp: 90 tablet, Rfl: 3   Past Medical History:   Diagnosis Date    Acid reflux     Anxiety     Chronic pain disorder     Depression     Eczema     Fatty liver     Intractable migraine without aura and without status migrainosus 02/09/2023    Kidney stone     HX OF    Maltracking of right patella     Migraines     Panic disorder     PONV (postoperative nausea and vomiting)     GOOD RESULTS WITH SCOPALAMINE    PTSD (post-traumatic stress disorder)     WAKES UP FROM ANESTHESIA WITH PANIC ATTACK    Seasonal allergic rhinitis 05/31/2022    Self-injurious behavior     as a teenager    Suicide attempt 2007        Physical Exam  Vitals reviewed.   Constitutional:       Appearance: Normal appearance. She is well-developed and overweight.   Neck:      Thyroid: No thyroid mass, thyromegaly or thyroid tenderness.   Cardiovascular:      Rate and Rhythm: Normal rate and regular rhythm.      Heart sounds: No murmur heard.     No friction rub. No gallop.   Pulmonary:      Effort: Pulmonary effort is normal.      Breath sounds: Normal breath sounds. No wheezing or rhonchi.   Lymphadenopathy:      Cervical: No cervical adenopathy.   Skin:     General: Skin is warm and dry.   Neurological:      Mental Status: She is alert and oriented to person, place, and time.      Cranial Nerves: No cranial nerve deficit.   Psychiatric:         Mood and Affect: Mood and affect normal.         Behavior: Behavior normal.         Thought Content: Thought content normal. Thought content does not include homicidal or suicidal ideation.         Judgment: Judgment normal.          Result Review :    The following data was reviewed by: MINI Keating on 05/28/2024:  Common Labs    Common labs          9/7/2023    11:22 1/29/2024    11:02 4/10/2024    08:55   Common Labs   Glucose  72     BUN  11     Creatinine  0.69     Sodium  137     Potassium  4.3     Chloride  102     Calcium  9.6     WBC 15.13  13.49  11.38    Hemoglobin 14.1  13.7  12.9    Hematocrit 43.5  42.3  39.7    Platelets 426  411  380    Total Cholesterol 167      Triglycerides 82      HDL Cholesterol 56      LDL Cholesterol  96      Hemoglobin A1C 5.40          XR Calcaneus 2+ View Left    Result Date: 4/30/2024  Impression: Normal exam.          Electronically Signed By-JULIA SMALLS MD On:4/30/2024 9:34 PM      XR Calcaneus 2+ View Right    Result Date: 4/30/2024  Impression: Normal exam.          Electronically Signed By-JULIA SMALLS MD On:4/30/2024 9:34 PM    Assessment & Plan  Overweight (BMI 25.0-29.9)  Patient's (Body mass index is 27.65 kg/m².) indicates that they are overweight with health conditions that include BRADY. Weight is improving with treatment. BMI is is above average; BMI management plan is completed. We discussed portion control, increasing exercise, pharmacologic options including continuing phentermine, and Information on healthy weight added to patient's after visit summary.   Achilles tendinitis of both lower extremities  She is not having any improvement in her pain.  I will refer her to podiatry.  Intractable migraine without aura and without status migrainosus  Headaches are improving with treatment.    Plan:  Continue same medication/s without change.     Discussed medication dosage, use, side effects, and goals of treatment in detail.    Discussed monitoring symptoms and use of quick-relief medications and maintenance medication.    General Treatment Goals:   symptom prevention  minimize work absence  minimizing limitation in activity  prevention of exacerbations  decrease use of ER/inpatient care  minimization of adverse effects of treatment    Followup at the next regular appointment  BRADY  (nonalcoholic steatohepatitis)  I did reach out to MINI Ibrahim who states that they are working on the appeal to get Rezdiffra approved.  She also stated that she did not feel patient should be on metformin or Victoza for this.    Orders Placed This Encounter   Procedures    Ambulatory Referral to Podiatry     New Medications Ordered This Visit   Medications    phentermine 37.5 MG capsule     Sig: Take 1 capsule by mouth Every Morning.     Dispense:  30 capsule     Refill:  0     Pt will pay out of pocket           Diagnosis Plan   1. Overweight (BMI 25.0-29.9)  phentermine 37.5 MG capsule      2. Achilles tendinitis of both lower extremities  Ambulatory Referral to Podiatry      3. Intractable migraine without aura and without status migrainosus              FOLLOW UP  Return in about 4 weeks (around 6/25/2024) for Recheck weight mgmt.  Patient was given instructions and counseling regarding her condition or for health maintenance advice. Please see specific information pulled into the AVS if appropriate.       CURRENT & DISCONTINUED MEDICATIONS  Current Outpatient Medications   Medication Instructions    amitriptyline (ELAVIL) 10 mg, Oral, Nightly    dicyclomine (BENTYL) 20 mg, Oral, Every 6 Hours PRN    Emgality 240 mg, Subcutaneous, Every 30 Days    esomeprazole (NEXIUM) 40 mg, Oral, Every Morning Before Breakfast    famotidine (PEPCID) 20 mg, Oral, Nightly PRN    fludrocortisone 0.1 MG tablet Oral, Daily    ibuprofen (ADVIL,MOTRIN) 600 mg, Oral, Every 6 Hours PRN    lamoTRIgine (LAMICTAL) 150 mg, Oral, Daily    Nurtec 75 mg, Oral, Daily PRN    OLANZapine (ZYPREXA) 10 mg, Oral, Nightly    phentermine 37.5 mg, Oral, Every Morning    polyethylene glycol (MIRALAX) 17 g, Oral, Daily    Rezdiffra 80 mg, Oral, Daily    vitamin D3 5,000 Units, Oral, Daily       Medications Discontinued During This Encounter   Medication Reason    phentermine 37.5 MG capsule Reorder    ISOtretinoin (Accutane) 30 MG capsule  Discontinued by another clinician        Parts of this note are electronic transcriptions/translations of spoken language to printed text using the Dragon Dictation system.    Jaja Castillo, MINI  05/28/24  12:46 EDT

## 2024-05-29 DIAGNOSIS — F33.1 MAJOR DEPRESSIVE DISORDER, RECURRENT EPISODE, MODERATE: ICD-10-CM

## 2024-05-29 RX ORDER — OLANZAPINE 10 MG/1
10 TABLET ORAL NIGHTLY
Qty: 30 TABLET | Refills: 2 | Status: SHIPPED | OUTPATIENT
Start: 2024-05-29

## 2024-05-29 RX ORDER — AMITRIPTYLINE HYDROCHLORIDE 10 MG/1
10 TABLET, FILM COATED ORAL NIGHTLY
Qty: 30 TABLET | Refills: 2 | Status: SHIPPED | OUTPATIENT
Start: 2024-05-29

## 2024-05-29 RX ORDER — LAMOTRIGINE 150 MG/1
150 TABLET ORAL DAILY
Qty: 30 TABLET | Refills: 2 | Status: SHIPPED | OUTPATIENT
Start: 2024-05-29 | End: 2024-08-27

## 2024-05-29 NOTE — TELEPHONE ENCOUNTER
Patient called stating she missed her appointment with Echo Cardenas today.  Patient is rescheduled for first available on 06/19/2024.  Patient will need refills before then.  Please advise.

## 2024-06-19 ENCOUNTER — TELEMEDICINE (OUTPATIENT)
Dept: PSYCHIATRY | Facility: CLINIC | Age: 29
End: 2024-06-19
Payer: COMMERCIAL

## 2024-06-19 ENCOUNTER — PRIOR AUTHORIZATION (OUTPATIENT)
Dept: PSYCHIATRY | Facility: CLINIC | Age: 29
End: 2024-06-19

## 2024-06-19 DIAGNOSIS — F43.10 POST TRAUMATIC STRESS DISORDER (PTSD): Chronic | ICD-10-CM

## 2024-06-19 DIAGNOSIS — F33.0 MILD EPISODE OF RECURRENT MAJOR DEPRESSIVE DISORDER: Primary | Chronic | ICD-10-CM

## 2024-06-19 DIAGNOSIS — F41.1 GENERALIZED ANXIETY DISORDER: Chronic | ICD-10-CM

## 2024-06-19 DIAGNOSIS — G47.9 SLEEPING DIFFICULTIES: ICD-10-CM

## 2024-06-19 PROCEDURE — 1159F MED LIST DOCD IN RCRD: CPT | Performed by: NURSE PRACTITIONER

## 2024-06-19 PROCEDURE — 1160F RVW MEDS BY RX/DR IN RCRD: CPT | Performed by: NURSE PRACTITIONER

## 2024-06-19 PROCEDURE — 90792 PSYCH DIAG EVAL W/MED SRVCS: CPT | Performed by: NURSE PRACTITIONER

## 2024-06-19 RX ORDER — OLANZAPINE 7.5 MG/1
7.5 TABLET, FILM COATED ORAL NIGHTLY
Qty: 30 TABLET | Refills: 0 | Status: SHIPPED | OUTPATIENT
Start: 2024-06-19

## 2024-06-19 RX ORDER — LAMOTRIGINE 150 MG/1
150 TABLET ORAL DAILY
Qty: 30 TABLET | Refills: 0 | Status: SHIPPED | OUTPATIENT
Start: 2024-06-19

## 2024-06-19 RX ORDER — AMITRIPTYLINE HYDROCHLORIDE 10 MG/1
10 TABLET, FILM COATED ORAL NIGHTLY PRN
Qty: 30 TABLET | Refills: 0 | Status: SHIPPED | OUTPATIENT
Start: 2024-06-19

## 2024-06-19 NOTE — PROGRESS NOTES
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 01/29/2024 32.4  31.5 - 35.7 g/dL Final    RDW 01/29/2024 13.1  12.3 - 15.4 % Final    RDW-SD 01/29/2024 40.0  37.0 - 54.0 fl Final    MPV 01/29/2024 11.1  6.0 - 12.0 fL Final    Platelets 01/29/2024 411  140 - 450 10*3/mm3 Final    Neutrophil % 01/29/2024 65.4  42.7 - 76.0 % Final    Lymphocyte % 01/29/2024 24.8  19.6 - 45.3 % Final    Monocyte % 01/29/2024 6.9  5.0 - 12.0 % Final    Eosinophil % 01/29/2024 1.8  0.3 - 6.2 % Final    Basophil % 01/29/2024 0.7  0.0 - 1.5 % Final    Immature Grans % 01/29/2024 0.4  0.0 - 0.5 % Final    Neutrophils, Absolute 01/29/2024 8.81 (H)  1.70 - 7.00 10*3/mm3 Final    Lymphocytes, Absolute 01/29/2024 3.35 (H)  0.70 - 3.10 10*3/mm3 Final    Monocytes, Absolute 01/29/2024 0.93 (H)  0.10 - 0.90 10*3/mm3 Final    Eosinophils, Absolute 01/29/2024 0.24  0.00 - 0.40 10*3/mm3 Final    Basophils, Absolute 01/29/2024 0.10  0.00 - 0.20 10*3/mm3 Final    Immature Grans, Absolute 01/29/2024 0.06 (H)  0.00 - 0.05 10*3/mm3 Final    nRBC 01/29/2024 0.0  0.0 - 0.2 /100 WBC Final    25 Hydroxy, Vitamin D 01/29/2024 62.3  30.0 - 100.0 ng/ml Final    Glucose 01/29/2024 72  65 - 99 mg/dL Final    BUN 01/29/2024 11  6 - 20 mg/dL Final    Creatinine 01/29/2024 0.69  0.57 - 1.00 mg/dL Final     Sodium 01/29/2024 137  136 - 145 mmol/L Final    Potassium 01/29/2024 4.3  3.5 - 5.2 mmol/L Final    Chloride 01/29/2024 102  98 - 107 mmol/L Final    CO2 01/29/2024 21.4 (L)  22.0 - 29.0 mmol/L Final    Calcium 01/29/2024 9.6  8.6 - 10.5 mg/dL Final    BUN/Creatinine Ratio 01/29/2024 15.9  7.0 - 25.0 Final    Anion Gap 01/29/2024 13.6  5.0 - 15.0 mmol/L Final    eGFR 01/29/2024 119.9  >60.0 mL/min/1.73 Final   Office Visit on 12/29/2023   Component Date Value Ref Range Status    Amphetamine Screen, Urine 12/29/2023 Negative  Negative Final    AMP INTERNAL CONTROL 12/29/2023 Passed  Passed Final    Barbiturates Screen, Urine 12/29/2023 Negative  Negative Final    BARBITURATE INTERNAL CONTROL 12/29/2023 Passed  Passed Final    Buprenorphine, Screen, Urine 12/29/2023 Negative  Negative Final    BUPRENORPHINE INTERNAL CONTROL 12/29/2023 Passed  Passed Final    Benzodiazepine Screen, Urine 12/29/2023 Negative  Negative Final    BENZODIAZEPINE INTERNAL CONTROL 12/29/2023 Passed  Passed Final    Cocaine Screen, Urine 12/29/2023 Negative  Negative Final    COCAINE INTERNAL CONTROL 12/29/2023 Passed  Passed Final    MDMA (ECSTASY) 12/29/2023 Negative  Negative Final    MDMA (ECSTASY) INTERNAL CONTROL 12/29/2023 Passed  Passed Final    Methamphetamine, Ur 12/29/2023 Negative  Negative Final    METHAMPHETAMINE INTERNAL CONTROL 12/29/2023 Passed  Passed Final    Methadone Screen, Urine 12/29/2023 Negative  Negative Final    METHADONE INTERNAL CONTROL 12/29/2023 Passed  Passed Final    Opiate Screen 12/29/2023 Negative  Negative Final    OPIATES INTERNAL CONTROL 12/29/2023 Passed  Passed Final    Oxycodone Screen, Urine 12/29/2023 Negative  Negative Final    OXYCODONE INTERNAL CONTROL 12/29/2023 Passed  Passed Final    Phencyclidine (PCP), Urine 12/29/2023 Negative  Negative Final    PHENCYCLIDINE INTERNAL CONTROL 12/29/2023 Passed  Passed Final    THC, Screen, Urine 12/29/2023 Negative  Negative Final    THC INTERNAL CONTROL  12/29/2023 Passed  Passed Final    Lot Number 12/29/2023 [id number]   Final    Expiration Date 12/29/2023 9/14/24   Final   Lab on 10/31/2023   Component Date Value Ref Range Status    Protime 10/31/2023 13.6  11.8 - 14.9 Seconds Final    INR 10/31/2023 1.03  0.86 - 1.15 Final    Immunofixation Result, Serum 10/31/2023 Comment   Final    No monoclonality detected.    IgG 10/31/2023 941  586 - 1602 mg/dL Final    IgA 10/31/2023 187  87 - 352 mg/dL Final    IgM 10/31/2023 93  26 - 217 mg/dL Final    Iron 10/31/2023 63  37 - 145 mcg/dL Final    Iron Saturation (TSAT) 10/31/2023 17 (L)  20 - 50 % Final    Transferrin 10/31/2023 249  200 - 360 mg/dL Final    TIBC 10/31/2023 371  298 - 536 mcg/dL Final   Office Visit on 10/31/2023   Component Date Value Ref Range Status    Hepatitis B Surface Ag 10/31/2023 Non-Reactive  Non-Reactive Final    Hep A IgM 10/31/2023 Non-Reactive  Non-Reactive Final    Hep B C IgM 10/31/2023 Non-Reactive  Non-Reactive Final    Hepatitis C Ab 10/31/2023 Non-Reactive  Non-Reactive Final    LUDA Direct 10/31/2023 Negative  Negative Final    ALPHA -1 ANTITRYPSIN 10/31/2023 156  90 - 200 mg/dL Final    Smooth Muscle Ab 10/31/2023 4  0 - 19 Units Final                     Negative                     0 - 19                   Weak positive               20 - 30                   Moderate to strong positive     >30   Actin Antibodies are found in 52-85% of patients with   autoimmune hepatitis or chronic active hepatitis and   in 22% of patients with primary biliary cirrhosis.    Ceruloplasmin 10/31/2023 26  19 - 39 mg/dL Final    Mitochondrial Ab 10/31/2023 <20.0  0.0 - 20.0 Units Final                                    Negative    0.0 - 20.0                                  Equivocal  20.1 - 24.9                                  Positive         >24.9  Mitochondrial (M2) Antibodies are found in 90-96% of  patients with primary biliary cirrhosis.   Results Encounter on 10/03/2023   Component Date Value  Ref Range Status    Amphetamine, Urine Qual 10/04/2023 Negative  Mbuwnu=6869 ng/mL Final    Barbiturates Screen, Urine 10/04/2023 Negative  Pqycuk=676 ng/mL Final    Benzodiazepine Screen, Urine 10/04/2023 Negative  Vyinrr=951 ng/mL Final    THC Screen, Urine 10/04/2023 Negative  Cutoff=20 ng/mL Final    Cocaine Screen, Urine 10/04/2023 Negative  Qxejwr=040 ng/mL Final    Opiate Screen, Urine 10/04/2023 Negative  Nqnahq=372 ng/mL Final    Opiate test includes Codeine, Morphine, Hydromorphone, Hydrocodone.    Oxycodone/Oxymorphone, Urine 10/04/2023 Negative  Prditi=680 ng/mL Final    Test includes Oxycodone and Oxymorphone    Phencyclidine (PCP), Urine 10/04/2023 Negative  Cutoff=25 ng/mL Final    Methadone Screen, Urine 10/04/2023 Negative  Yswopu=491 ng/mL Final    Propoxyphene Screen 10/04/2023 Negative  Yfvmkt=683 ng/mL Final    Creatinine, Urine 10/04/2023 59.1  20.0 - 300.0 mg/dL Final    pH, UA 10/04/2023 6.9  4.5 - 8.9 Final    Please note 10/04/2023 Comment   Final    This assay provides a preliminary unconfirmed analytical test  result that may be suitable for clinical management of patients  in certain situations. Drug-test results should be interpreted  in the context of clinical information. Patient metabolic  variables, specific drug chemistry, and specimen  characteristics can affect test outcome. Technical consultation  is available if a test result is inconsistent with an expected  outcome. ([email or web address] or call toll-free  [phone])   Lab on 09/18/2023   Component Date Value Ref Range Status    Gliadin Deamidated Peptide Ab, IgA 09/18/2023 6  0 - 19 units Final                       Negative                   0 - 19                     Weak Positive             20 - 30                     Moderate to Strong Positive   >30    Tissue Transglutaminase IgA 09/18/2023 <2  0 - 3 U/mL Final                                  Negative        0 -  3                                Weak  Positive   4 - 10                                Positive           >10   Tissue Transglutaminase (tTG) has been identified   as the endomysial antigen.  Studies have demonstr-   ated that endomysial IgA antibodies have over 99%   specificity for gluten sensitive enteropathy.    IgA 09/18/2023 191  87 - 352 mg/dL Final   Admission on 09/08/2023, Discharged on 09/08/2023   Component Date Value Ref Range Status    Case Report 09/08/2023    Final                    Value:Surgical Pathology Report                         Case: [id number]                                  Authorizing Provider:  Dyan Griffin MD Collected:           09/08/2023 02:15 PM          Ordering Location:     [facility] Received:            09/11/2023 08:32 AM                                 SUITES                                                                       Pathologist:           Amelia Jean Baptiste MD                                                     Specimens:   1) - Small Intestine, Duodenum, DUODENUM BIOPSIES                                                   2) - Gastric, Antrum, GASTRIC ANTRUM BIOPSIES                                                       3) - GE Junction, GE JUNCTION BIOPSIES                                                     Clinical Information 09/08/2023    Final                    Value:This result contains rich text formatting which cannot be displayed here.    Final Diagnosis 09/08/2023    Final                    Value:This result contains rich text formatting which cannot be displayed here.    Gross Description 09/08/2023    Final                    Value:This result contains rich text formatting which cannot be displayed here.    Microscopic Description 09/08/2023    Final                    Value:This result contains rich text formatting which cannot be displayed here.   Office Visit on 09/07/2023   Component Date Value Ref Range Status    Total Cholesterol 09/07/2023 167  0  - 200 mg/dL Final    Triglycerides 09/07/2023 82  0 - 150 mg/dL Final    HDL Cholesterol 09/07/2023 56  40 - 60 mg/dL Final    LDL Cholesterol  09/07/2023 96  0 - 100 mg/dL Final    VLDL Cholesterol 09/07/2023 15  5 - 40 mg/dL Final    LDL/HDL Ratio 09/07/2023 1.69   Final    Hemoglobin A1C 09/07/2023 5.40  4.80 - 5.60 % Final    25 Hydroxy, Vitamin D 09/07/2023 24.0 (L)  30.0 - 100.0 ng/ml Final    WBC 09/07/2023 15.13 (H)  3.40 - 10.80 10*3/mm3 Final    RBC 09/07/2023 5.23  3.77 - 5.28 10*6/mm3 Final    Hemoglobin 09/07/2023 14.1  12.0 - 15.9 g/dL Final    Hematocrit 09/07/2023 43.5  34.0 - 46.6 % Final    MCV 09/07/2023 83.2  79.0 - 97.0 fL Final    MCH 09/07/2023 27.0  26.6 - 33.0 pg Final    MCHC 09/07/2023 32.4  31.5 - 35.7 g/dL Final    RDW 09/07/2023 12.9  12.3 - 15.4 % Final    RDW-SD 09/07/2023 38.8  37.0 - 54.0 fl Final    MPV 09/07/2023 11.5  6.0 - 12.0 fL Final    Platelets 09/07/2023 426  140 - 450 10*3/mm3 Final    Neutrophil % 09/07/2023 67.8  42.7 - 76.0 % Final    Lymphocyte % 09/07/2023 24.9  19.6 - 45.3 % Final    Monocyte % 09/07/2023 5.0  5.0 - 12.0 % Final    Eosinophil % 09/07/2023 1.1  0.3 - 6.2 % Final    Basophil % 09/07/2023 0.7  0.0 - 1.5 % Final    Immature Grans % 09/07/2023 0.5  0.0 - 0.5 % Final    Neutrophils, Absolute 09/07/2023 10.26 (H)  1.70 - 7.00 10*3/mm3 Final    Lymphocytes, Absolute 09/07/2023 3.77 (H)  0.70 - 3.10 10*3/mm3 Final    Monocytes, Absolute 09/07/2023 0.76  0.10 - 0.90 10*3/mm3 Final    Eosinophils, Absolute 09/07/2023 0.17  0.00 - 0.40 10*3/mm3 Final    Basophils, Absolute 09/07/2023 0.10  0.00 - 0.20 10*3/mm3 Final    Immature Grans, Absolute 09/07/2023 0.07 (H)  0.00 - 0.05 10*3/mm3 Final    nRBC 09/07/2023 0.0  0.0 - 0.2 /100 WBC Final    Insulin, Free 09/07/2023 14  uU/mL Final    Reference Range:  Pubertal Children and  Adults (fasting): 0 - 17    Insulin 09/07/2023 14  uU/mL Final    Non-Diabetic:  In the absence of insulin-binding antibodies,  the free  and total insulin assays are equivalent. However,  this assay is intended for use in diabetics with insulin  autoantibody present. Measurement is performed on  acid-treated samples and, therefore, the sensitivity and  absolute values by this method may differ from our direct  insulin ICMA.  Insulin Dependent Diabetic Patients:  Free Insulin levels  vary depending on the capacity and affinity of circulating  insulin-binding antibodies and the dose of insulin given to  the patient.  Total insulin levels represent free insulin  and antibody bound insulin fractions.  This test was developed and its performance characteristics  determined by [facility]. It has not been cleared or approved  by the Food and Drug Administration.   Admission on 08/23/2023, Discharged on 08/23/2023   Component Date Value Ref Range Status    QT Interval 08/23/2023 303  ms Final    Glucose 08/23/2023 108 (H)  65 - 99 mg/dL Final    BUN 08/23/2023 8  6 - 20 mg/dL Final    Creatinine 08/23/2023 0.76  0.57 - 1.00 mg/dL Final    Sodium 08/23/2023 137  136 - 145 mmol/L Final    Potassium 08/23/2023 3.8  3.5 - 5.2 mmol/L Final    Chloride 08/23/2023 104  98 - 107 mmol/L Final    CO2 08/23/2023 21.8 (L)  22.0 - 29.0 mmol/L Final    Calcium 08/23/2023 9.0  8.6 - 10.5 mg/dL Final    Total Protein 08/23/2023 7.2  6.0 - 8.5 g/dL Final    Albumin 08/23/2023 4.1  3.5 - 5.2 g/dL Final    ALT (SGPT) 08/23/2023 13  1 - 33 U/L Final    AST (SGOT) 08/23/2023 11  1 - 32 U/L Final    Alkaline Phosphatase 08/23/2023 91  39 - 117 U/L Final    Total Bilirubin 08/23/2023 <0.2  0.0 - 1.2 mg/dL Final    Globulin 08/23/2023 3.1  gm/dL Final    A/G Ratio 08/23/2023 1.3  g/dL Final    BUN/Creatinine Ratio 08/23/2023 10.5  7.0 - 25.0 Final    Anion Gap 08/23/2023 11.2  5.0 - 15.0 mmol/L Final    eGFR 08/23/2023 108.3  >60.0 mL/min/1.73 Final    Magnesium 08/23/2023 2.1  1.6 - 2.6 mg/dL Final    HS Troponin T 08/23/2023 <6  <10 ng/L Final    TSH 08/23/2023 1.510  0.270 - 4.200  uIU/mL Final    Extra Tube 08/23/2023 Hold for add-ons.   Final    Auto resulted.    Extra Tube 08/23/2023 hold for add-on   Final    Auto resulted    Extra Tube 08/23/2023 Hold for add-ons.   Final    Auto resulted.    Extra Tube 08/23/2023 Hold for add-ons.   Final    Auto resulted    WBC 08/23/2023 16.99 (H)  3.40 - 10.80 10*3/mm3 Final    RBC 08/23/2023 4.96  3.77 - 5.28 10*6/mm3 Final    Hemoglobin 08/23/2023 13.6  12.0 - 15.9 g/dL Final    Hematocrit 08/23/2023 41.0  34.0 - 46.6 % Final    MCV 08/23/2023 82.7  79.0 - 97.0 fL Final    MCH 08/23/2023 27.4  26.6 - 33.0 pg Final    MCHC 08/23/2023 33.2  31.5 - 35.7 g/dL Final    RDW 08/23/2023 13.8  12.3 - 15.4 % Final    RDW-SD 08/23/2023 41.2  37.0 - 54.0 fl Final    MPV 08/23/2023 10.4  6.0 - 12.0 fL Final    Platelets 08/23/2023 393  140 - 450 10*3/mm3 Final    Neutrophil % 08/23/2023 61.8  42.7 - 76.0 % Final    Lymphocyte % 08/23/2023 27.8  19.6 - 45.3 % Final    Monocyte % 08/23/2023 7.7  5.0 - 12.0 % Final    Eosinophil % 08/23/2023 1.6  0.3 - 6.2 % Final    Basophil % 08/23/2023 0.6  0.0 - 1.5 % Final    Immature Grans % 08/23/2023 0.5  0.0 - 0.5 % Final    Neutrophils, Absolute 08/23/2023 10.49 (H)  1.70 - 7.00 10*3/mm3 Final    Lymphocytes, Absolute 08/23/2023 4.73 (H)  0.70 - 3.10 10*3/mm3 Final    Monocytes, Absolute 08/23/2023 1.30 (H)  0.10 - 0.90 10*3/mm3 Final    Eosinophils, Absolute 08/23/2023 0.28  0.00 - 0.40 10*3/mm3 Final    Basophils, Absolute 08/23/2023 0.11  0.00 - 0.20 10*3/mm3 Final    Immature Grans, Absolute 08/23/2023 0.08 (H)  0.00 - 0.05 10*3/mm3 Final    nRBC 08/23/2023 0.0  0.0 - 0.2 /100 WBC Final    Color, UA 08/23/2023 Yellow  Yellow, Straw Final    Appearance, UA 08/23/2023 Clear  Clear Final    pH, UA 08/23/2023 7.5  5.0 - 8.0 Final    Specific Gravity, UA 08/23/2023 1.020  1.005 - 1.030 Final    Glucose, UA 08/23/2023 Negative  Negative Final    Ketones, UA 08/23/2023 Negative  Negative Final    Bilirubin, UA 08/23/2023  Negative  Negative Final    Blood, UA 08/23/2023 Negative  Negative Final    Protein, UA 08/23/2023 Negative  Negative Final    Leuk Esterase, UA 08/23/2023 Negative  Negative Final    Nitrite, UA 08/23/2023 Negative  Negative Final    Urobilinogen, UA 08/23/2023 2.0 E.U./dL (A)  0.2 - 1.0 E.U./dL Final    Free T4 08/23/2023 1.10  0.93 - 1.70 ng/dL Final   There may be more visits with results that are not included.           Assessment & Plan   Problems Addressed this Visit          Mental Health    Generalized anxiety disorder    Relevant Medications    amitriptyline (ELAVIL) 10 MG tablet    OLANZapine (ZyPREXA) 7.5 MG tablet    Vortioxetine HBr (Trintellix) 5 MG tablet tablet     Other Visit Diagnoses       Mild episode of recurrent major depressive disorder  (Chronic)   -  Primary    R/O BPAD/BPD    Relevant Medications    amitriptyline (ELAVIL) 10 MG tablet    lamoTRIgine (LaMICtal) 150 MG tablet    OLANZapine (ZyPREXA) 7.5 MG tablet    Vortioxetine HBr (Trintellix) 5 MG tablet tablet    Post traumatic stress disorder (PTSD)  (Chronic)       Relevant Medications    amitriptyline (ELAVIL) 10 MG tablet    OLANZapine (ZyPREXA) 7.5 MG tablet    Vortioxetine HBr (Trintellix) 5 MG tablet tablet    Sleeping difficulties        Relevant Medications    amitriptyline (ELAVIL) 10 MG tablet          Diagnoses         Codes Comments    Mild episode of recurrent major depressive disorder    -  Primary ICD-10-CM: F33.0  ICD-9-CM: 296.31 R/O BPAD/BPD    Generalized anxiety disorder     ICD-10-CM: F41.1  ICD-9-CM: 300.02     Post traumatic stress disorder (PTSD)     ICD-10-CM: F43.10  ICD-9-CM: 309.81     Sleeping difficulties     ICD-10-CM: G47.9  ICD-9-CM: 780.50             Visit Diagnoses:    ICD-10-CM ICD-9-CM   1. Mild episode of recurrent major depressive disorder  F33.0 296.31   2. Generalized anxiety disorder  F41.1 300.02   3. Post traumatic stress disorder (PTSD)  F43.10 309.81   4. Sleeping difficulties  G47.9  780.50          GOALS:  Short Term Goals: Patient will be compliant with medication, and patient will have no significant medication related side effects.  Patient will be engaged in psychotherapy as indicated.  Patient will report subjective improvement of symptoms.  Long term goals: To stabilize mood and treat/improve subjective symptoms, the patient will stay out of the hospital, the patient will be at an optimal level of functioning, and the patient will take all medications as prescribed.  The patient verbalized understanding and agreement with goals that were mutually set.      TREATMENT PLAN: Begin supportive psychotherapy efforts and take medications as indicated.  Medication and treatment options, both pharmacological and non-pharmacological treatment options, discussed during today's visit, including any off label use of medication. Patient acknowledged and verbally consented with current treatment plan and was educated on the importance of compliance with treatment and follow-up appointments.  The patient has declined a referral to begin psychotherapy, but has reported she will reach out to this APRN/office if she changes her mind.    -Continue amitriptyline 10 mg by mouth once nightly to assist with sleeping difficulties and also can assist with mood.  -Continue lamotrigine 150 mg by mouth once daily for mood.  -Begin Trintellix 5 mg by mouth once daily for mood.  -Decrease olanzapine to 7.5 mg by mouth once nightly for mood, the patient reports this was also prescribed for sleep.      MEDICATION ISSUES:  Discussed medication options and treatment plan of prescribed medication, any off label use of medication, as well as the risks, benefits, any black box warnings including increased suicidality, and side effects including but not limited to potential falls, dizziness, possible impaired driving, GI side effects (change in appetite, abdominal discomfort, nausea, vomiting, diarrhea, and/or constipation),  dry mouth, somnolence, sedation, insomnia, activation, agitation, irritation, tremors, abnormal muscle movements or disorders, tardive dyskinesia, akathisia, asthenia, headache, sweating, possible bruising or rare bleeding, electrolyte and/or fluid abnormalities, change in blood pressure/heart rate/and or heart rhythm, hypotension, sexual dysfunction, rare impulse control problems, rare seizures, rare neuroleptic malignant syndrome, increased risk of death and cerebrovascular events, change in blood glucose and increased risk for diabetes, change in triglycerides and cholesterol and increased risk for dyslipidemia,  weight gain, weight gain that can become problematic to health, skin conditions and reactions, and metabolic adversities among others. Patient and/or guardian are agreeable to call the office with any worsening of symptoms or onset of side effects, or if any concerns or questions arise.  The contact information for the office is made available to the patient and/or guardian. Patient and/or guardian are agreeable to call 911 or go to the nearest ER should they begin having any SI/HI, or if any urgent concerns arise.    Due to the nature of virtual visits and inability to monitor vital signs and weight with virtual visits, the patient has been encouraged to monitor their vital signs and weight regularly either through self-monitoring via home device(s) or with their Primary Care Provider, and the patient has been instructed to notify this APRN of any abnormalities or significant changes from baseline.     This APRN has discussed the benefits and risks of taking/continuing Lamictal (Lamotrigine).  The side effects of Lamictal can include a benign rash, blurred or double vision, dizziness, ataxia, sedation, headache, tremor, insomnia, poor coordination, fatigue,  nausea, vomiting, dyspepsia, rhinitis, infection, pharyngitis, asthenia, a rare but serious rash, rare multi-organ failure associated with  Bull-Avelino Syndrome, toxic epidermal necrolysis, drug hypersensitivity syndrome, rare blood dyscrasias, rare aseptic meningitis, rare sudden unexplained deaths in people with epilepsy, withdrawal seizures upon abrupt withdrawal, and rare activation of suicidal ideation and behavior (suicidality).  This APRN has discussed that a very slow dose titration when starting, or changing doses, of Lamictal may reduce the incidence of skin rash and other side effects.  The dosage should not be titrated upwards or increased faster than recommended due to the possibility of the discussed side effects and risk of development of a skin rash (which can become life threatening).    This APRN has also discussed that if the patient stops taking the Lamictal for 3-5 days or longer, it will be necessary to restart the drug with an initial dose titration, as rashes have been reported on reexposure.  If the patient and Provider decide to stop the Lamictal, the patient will follow the directions of this APRN/this office as a guided taper over about two weeks is appropriate due to the risk of relapse in bipolar disorder with those with a mood or bipolar disorder, the risk of seizures in those with epilepsy, and discontinuation symptoms upon rapid discontinuation of Lamictal.    The patient verbalizes understanding of benefits and risks as discussed, the patient/guardian feels the benefits outweigh the risks and is agreeable to continue/take Lamictal as discussed.  The patient is advised should any side effects or rash develops they are to stop the Lamictal immediately and contact this APRN/this office or go to the emergency department immediately.  The patient verbalizes understanding and agreement with treatment plan in their own words.      VERBAL INFORMED CONSENT FOR MEDICATION:  The patient was educated that their proposed/prescribed psychotropic medication(s) has potential risks, side effects, adverse effects, and black box  warnings; and these have been discussed with the patient.  The patient has been informed that their treatment and medication dosage is to be individualized, and may even be above or below the recommended range/dosage due to patient individualization and response, but medication is prescribed using a shared decision making approach, and no medication or dosage will be prescribed without the patient's verbal consent.  The reason for the use of the medication including any off label use and alternative modes of treatment other than or in addition to medication has been considered and discussed, the probable consequences of not receiving the proposed treatment have been discussed, and any treatment side effects, black box warnings, and cautions associated with treatment have been discussed with the patient.  The patient is allowed ample time to openly discuss and ask questions regarding the proposed medication(s) and treatment plan and the patient verbalizes understanding the reasons for the use of the medication, its potential risks and benefits, other alternative treatment(s), and the probable consequences that may occur if the proposed medication is not given.  The patient has been given ample time to ask questions and study the information and find the information to be specific, accurate, and complete.  The patient gives verbal consent for the medication(s) proposed/prescribed, they verbalized understanding that they can refuse and withdraw consent at any time with the assistance of this APRN, and the patient has verbally confirmed that they are aware, and are willing, to take the prescribed medication and follow the treatment plan with the known possible risks, side effect, black box warnings, and any potential medication interactions, and the patient reports they will be worse off without this medication and treatment plan.  The patient is advised to contact this APRN/this office if any questions or concerns  arise at any time (at [phone]), or call 911/go to the closest emergency department if needed or outside of office hours.      SUICIDE RISK ASSESSMENT AND SAFETY PLAN: Unalterable demographics and a history of mental health intervention indicate this patient is in a high risk category compared to the general population. At present, the patient denies active SI/HI, intentions, or plans at this time and agrees to seek immediate care should such thoughts develop. The patient verbalizes understanding of how to access emergency care if needed and agrees to do so. Consideration of suicide risk and protective factors such as history, current presentation, individual strengths and weaknesses, psychosocial and environmental stressors and variables, psychiatric illness and symptoms, medical conditions and pain, took place in this interview. Based on those considerations, the patient is determined: within individual baseline and presenting no imminent risk for suicide or homicide. Other recommendations: The patient does not meet the criteria for inpatient admission and is not a safety risk to self or others at today's visit. Inpatient treatment offers no significant advantages over outpatient treatment for this patient at today's visit.  The patient was given ample time for questions and fully participated in treatment planning.  The patient was encouraged to call the clinic with any questions or concerns.  The patient was informed of access to emergency care. If patient were to develop any significant symptomatology, suicidal ideation, homicidal ideation, any concerns, or feel unsafe at any time they are to call the clinic and if unable to get immediate assistance should immediately call 911 or go to the nearest emergency room.  Patient contracted verbally for the following: If you are experiencing an emotional crisis or have thoughts of harming yourself or others, please go to your nearest local emergency room or call  911. Will continue to re-assess medication response and side effects frequently to establish efficacy and ensure safety. Risks, any black box warnings, side effects, off label usage, and benefits of medication and treatment discussed with patient, along with potential adverse side effects of current and/or newly prescribed medication, alternative treatment options, and OTC medications.  Patient verbalized understanding of potential risks, any off label use of medication, any black box warnings, and any side effects in their own words. The patient verbalized understanding and agreed to comply with the safety plan discussed in their own words.  Patient given the number to the office. Number also discussed of the 24- hour suicide hotline.           MEDS ORDERED DURING VISIT:  New Medications Ordered This Visit   Medications    amitriptyline (ELAVIL) 10 MG tablet     Sig: Take 1 tablet by mouth At Night As Needed for Sleep.     Dispense:  30 tablet     Refill:  0    lamoTRIgine (LaMICtal) 150 MG tablet     Sig: Take 1 tablet by mouth Daily.     Dispense:  30 tablet     Refill:  0    OLANZapine (ZyPREXA) 7.5 MG tablet     Sig: Take 1 tablet by mouth Every Night.     Dispense:  30 tablet     Refill:  0     Decrease in dosage.    Vortioxetine HBr (Trintellix) 5 MG tablet tablet     Sig: Take 1 tablet by mouth Daily With Breakfast.     Dispense:  30 tablet     Refill:  0       Return in about 4 weeks (around 7/17/2024), or if symptoms worsen or fail to improve, for Next scheduled follow up and Recheck.         Progress toward goal: Not at goal    Functional Status: Moderate impairment     Prognosis: Good with Ongoing Treatment             This document has been electronically signed by MINI Conte  June 19, 2024 15:57 EDT      Please note that portions of this note were completed with a voice recognition program.

## 2024-06-19 NOTE — TREATMENT PLAN
Multi-Disciplinary Problems (from Behavioral Health Treatment Plan)      Active Problems       Problem: Anxiety  Start Date: 06/19/24      Problem Details: The patient self-scales this problem as a 7 with 10 being the worst.          Goal Priority Start Date Expected End Date End Date    Patient will develop and implement behavioral and cognitive strategies to reduce anxiety and irrational fears. -- 06/19/24 -- --    Goal Details: Progress toward goal:  Not appropriate to rate progress toward goal since this is the initial treatment plan.          Goal Intervention Frequency Start Date End Date    Help patient explore past emotional issues in relation to present anxiety. PRN 06/19/24 --    Intervention Details: Duration of treatment until until remission of symptoms.          Goal Intervention Frequency Start Date End Date    Help patient develop an awareness of their cognitive and physical responses to anxiety. PRN 06/19/24 --    Intervention Details: Duration of treatment until until remission of symptoms.                  Problem: Depression  Start Date: 06/19/24      Problem Details: The patient self-scales this problem as a 2-3 with 10 being the worst.          Goal Priority Start Date Expected End Date End Date    Patient will demonstrate the ability to initiate new constructive life skills outside of sessions on a consistent basis. -- 06/19/24 -- --    Goal Details: Progress toward goal:  Not appropriate to rate progress toward goal since this is the initial treatment plan.          Goal Intervention Frequency Start Date End Date    Assist patient in setting attainable activities of daily living goals. PRN 06/19/24 --      Goal Intervention Frequency Start Date End Date    Provide education about depression PRN 06/19/24 --    Intervention Details: Duration of treatment until until remission of symptoms.          Goal Intervention Frequency Start Date End Date    Assist patient in developing healthy coping  strategies. PRN 06/19/24 --    Intervention Details: Duration of treatment until until remission of symptoms.

## 2024-06-23 NOTE — PROGRESS NOTES
"Chief Complaint  Weight Check and Abdominal Pain (Epigastric radiating to RUQ)    Subjective            Palmira Carbajal is a 29 y.o. female who presents to [facility]   History of Present Illness  4-week follow-up on weight loss management, migraines and heel pain.    Weight loss management: she is on phentermine last month.  She has lost 7 pounds since last visit.    She has had 2 episodes of epigastric pain that radiates to RUQ, along with N/V.  She states she had a pain last night so bad that she was going to go to the emergency room but she states the pain finally stopped.  She states that she had a [employer] food that day.    Tobacco Use: Medium Risk (6/24/2024)    Patient History     Smoking Tobacco Use: Former     Smokeless Tobacco Use: Never     Passive Exposure: Not on file      E-cigarette/Vaping    E-cigarette/Vaping Use Current Every Day User     Comments INST PER ANESTHESIA PROTCOL      E-cigarette/Vaping Substances    Nicotine Yes     THC No     CBD No     Flavoring Yes      E-cigarette/Vaping Devices    Disposable Yes     Pre-filled or Refillable Cartridge No     Refillable Tank No     Pre-filled Pod No        Alcohol Use: Not on file         Objective   Vital Signs:   Vitals:    06/24/24 1050   BP: 116/64   BP Location: Left arm   Patient Position: Sitting   Cuff Size: Adult   Pulse: 66   Temp: 98 °F (36.7 °C)   SpO2: 100%   Weight: 69.9 kg (154 lb 1.6 oz)   Height: 162.6 cm (64\")     Body mass index is 26.45 kg/m².    Wt Readings from Last 3 Encounters:   06/24/24 69.9 kg (154 lb 1.6 oz)   05/28/24 73.1 kg (161 lb 1.6 oz)   05/01/24 81.7 kg (180 lb 3.2 oz)     BP Readings from Last 3 Encounters:   06/24/24 116/64   05/28/24 107/65   05/01/24 108/72       Health Maintenance   Topic Date Due    Pneumococcal Vaccine 0-64 (1 of 2 - PCV) 09/07/2024 (Originally 6/22/2001)    COVID-19 Vaccine (3 - 2023-24 season) 09/23/2024 (Originally 9/1/2023)    INFLUENZA VACCINE " " 08/01/2024    ANNUAL PHYSICAL  01/29/2025    BMI FOLLOWUP  05/28/2025    COLORECTAL CANCER SCREENING  10/11/2027    TDAP/TD VACCINES (2 - Td or Tdap) 01/04/2029    HEPATITIS C SCREENING  Completed       /64 (BP Location: Left arm, Patient Position: Sitting, Cuff Size: Adult)   Pulse 66   Temp 98 °F (36.7 °C)   Ht 162.6 cm (64\")   Wt 69.9 kg (154 lb 1.6 oz)   SpO2 100%   BMI 26.45 kg/m²       Current Outpatient Medications:     amitriptyline (ELAVIL) 10 MG tablet, Take 1 tablet by mouth At Night As Needed for Sleep., Disp: 30 tablet, Rfl: 0    dicyclomine (BENTYL) 20 MG tablet, Take 1 tablet by mouth Every 6 (Six) Hours As Needed (abdominal cramping)., Disp: 60 tablet, Rfl: 3    esomeprazole (nexIUM) 40 MG capsule, Take 1 capsule by mouth Every Morning Before Breakfast., Disp: 90 capsule, Rfl: 3    famotidine (PEPCID) 20 MG tablet, Take 1 tablet by mouth At Night As Needed for Heartburn., Disp: 90 tablet, Rfl: 1    fludrocortisone 0.1 MG tablet, Take 1 tablet by mouth Daily., Disp: 90 tablet, Rfl: 1    galcanezumab-gnlm (Emgality) 120 MG/ML auto-injector pen, Inject 2 mL under the skin into the appropriate area as directed Every 30 (Thirty) Days. Indications: Migraine Headache, Disp: 1.12 mL, Rfl: 5    ibuprofen (ADVIL,MOTRIN) 600 MG tablet, Take 1 tablet by mouth Every 6 (Six) Hours As Needed for Moderate Pain. Indications: heel pain, Disp: 120 tablet, Rfl: 0    lamoTRIgine (LaMICtal) 150 MG tablet, Take 1 tablet by mouth Daily., Disp: 30 tablet, Rfl: 0    OLANZapine (ZyPREXA) 7.5 MG tablet, Take 1 tablet by mouth Every Night., Disp: 30 tablet, Rfl: 0    phentermine 37.5 MG capsule, Take 1 capsule by mouth Every Morning., Disp: 30 capsule, Rfl: 0    polyethylene glycol (MIRALAX) 17 g packet, Take 17 g by mouth Daily., Disp: 5 packet, Rfl: 0    Resmetirom (Rezdiffra) 80 MG tablet, Take 1 tablet by mouth Daily., Disp: 90 tablet, Rfl: 3    Rimegepant Sulfate (Nurtec) 75 MG tablet dispersible tablet, Take 1 " tablet by mouth Daily As Needed (migraine). Indications: Migraine Headache, Disp: 8 tablet, Rfl: 5    vitamin D3 (Vitamin D) 125 MCG (5000 UT) capsule capsule, Take 1 capsule by mouth Daily., Disp: 90 capsule, Rfl: 1    Vortioxetine HBr (Trintellix) 5 MG tablet tablet, Take 1 tablet by mouth Daily With Breakfast., Disp: 30 tablet, Rfl: 0    ondansetron ODT (ZOFRAN-ODT) 4 MG disintegrating tablet, Place 1 tablet on the tongue Every 8 (Eight) Hours As Needed for Nausea or Vomiting., Disp: 15 tablet, Rfl: 0   Past Medical History:   Diagnosis Date    Acid reflux     Anxiety     Chronic pain disorder     Depression     Eczema     Fatty liver     Intractable migraine without aura and without status migrainosus 02/09/2023    Kidney stone     HX OF    Maltracking of right patella     Migraines     Panic disorder     PONV (postoperative nausea and vomiting)     GOOD RESULTS WITH SCOPALAMINE    PTSD (post-traumatic stress disorder)     WAKES UP FROM ANESTHESIA WITH PANIC ATTACK    Seasonal allergic rhinitis 05/31/2022    Self-injurious behavior     as a teenager    Suicide attempt 2007        Physical Exam  Vitals reviewed.   Constitutional:       Appearance: Normal appearance. She is well-developed.   Neck:      Thyroid: No thyroid mass, thyromegaly or thyroid tenderness.   Cardiovascular:      Rate and Rhythm: Normal rate and regular rhythm.      Heart sounds: No murmur heard.     No friction rub. No gallop.   Pulmonary:      Effort: Pulmonary effort is normal.      Breath sounds: Normal breath sounds. No wheezing or rhonchi.   Abdominal:      Palpations: Abdomen is soft.      Tenderness: There is abdominal tenderness in the right upper quadrant and epigastric area.   Lymphadenopathy:      Cervical: No cervical adenopathy.   Skin:     General: Skin is warm and dry.   Neurological:      Mental Status: She is alert and oriented to person, place, and time.      Cranial Nerves: No cranial nerve deficit.   Psychiatric:          Mood and Affect: Mood and affect normal.         Behavior: Behavior normal.         Thought Content: Thought content normal. Thought content does not include homicidal or suicidal ideation.         Judgment: Judgment normal.          Result Review :    The following data was reviewed by: MINI Keating on 06/24/2024:  CMP   CMP          8/23/2023    16:34 1/29/2024    11:02   CMP   Glucose 108  72    BUN 8  11    Creatinine 0.76  0.69    EGFR 108.3  119.9    Sodium 137  137    Potassium 3.8  4.3    Chloride 104  102    Calcium 9.0  9.6    Total Protein 7.2     Albumin 4.1     Globulin 3.1     Total Bilirubin <0.2     Alkaline Phosphatase 91     AST (SGOT) 11     ALT (SGPT) 13     Albumin/Globulin Ratio 1.3     BUN/Creatinine Ratio 10.5  15.9    Anion Gap 11.2  13.6      CBC   CBC          9/7/2023    11:22 1/29/2024    11:02 4/10/2024    08:55   CBC   WBC 15.13  13.49  11.38    RBC 5.23  5.06  4.76    Hemoglobin 14.1  13.7  12.9    Hematocrit 43.5  42.3  39.7    MCV 83.2  83.6  83.4    MCH 27.0  27.1  27.1    MCHC 32.4  32.4  32.5    RDW 12.9  13.1  13.6    Platelets 426  411  380      Assessment & Plan  Overweight (BMI 25.0-29.9)  Patient's (Body mass index is 26.45 kg/m².) indicates that they are overweight with health conditions that include none . Weight is improving with treatment. BMI is is above average; BMI management plan is completed. We discussed portion control, increasing exercise, and pharmacologic options including continuing phentermine .   Epigastric pain  She is having newly identified epigastric pain that radiates to the right upper quadrant.  I will order a gallbladder ultrasound.  Nausea and vomiting, unspecified vomiting type  Zofran as needed for nausea and vomiting.    Orders Placed This Encounter   Procedures    US Gallbladder     New Medications Ordered This Visit   Medications    phentermine 37.5 MG capsule     Sig: Take 1 capsule by mouth Every Morning.     Dispense:  30  capsule     Refill:  0     Pt will pay out of pocket    ondansetron ODT (ZOFRAN-ODT) 4 MG disintegrating tablet     Sig: Place 1 tablet on the tongue Every 8 (Eight) Hours As Needed for Nausea or Vomiting.     Dispense:  15 tablet     Refill:  0        Diagnosis Plan   1. Overweight (BMI 25.0-29.9)  phentermine 37.5 MG capsule      2. Epigastric pain  US Gallbladder      3. Nausea and vomiting, unspecified vomiting type  US Gallbladder    ondansetron ODT (ZOFRAN-ODT) 4 MG disintegrating tablet            FOLLOW UP  Return in about 4 weeks (around 7/22/2024) for Recheck weight loss mgmt.  Patient was given instructions and counseling regarding her condition or for health maintenance advice. Please see specific information pulled into the AVS if appropriate.       CURRENT & DISCONTINUED MEDICATIONS  Current Outpatient Medications   Medication Instructions    amitriptyline (ELAVIL) 10 mg, Oral, Nightly PRN    dicyclomine (BENTYL) 20 mg, Oral, Every 6 Hours PRN    Emgality 240 mg, Subcutaneous, Every 30 Days    esomeprazole (NEXIUM) 40 mg, Oral, Every Morning Before Breakfast    famotidine (PEPCID) 20 mg, Oral, Nightly PRN    fludrocortisone 0.1 mg, Oral, Daily    ibuprofen (ADVIL,MOTRIN) 600 mg, Oral, Every 6 Hours PRN    lamoTRIgine (LAMICTAL) 150 mg, Oral, Daily    Nurtec 75 mg, Oral, Daily PRN    OLANZapine (ZYPREXA) 7.5 mg, Oral, Nightly    ondansetron ODT (ZOFRAN-ODT) 4 mg, Translingual, Every 8 Hours PRN    phentermine 37.5 mg, Oral, Every Morning    polyethylene glycol (MIRALAX) 17 g, Oral, Daily    Rezdiffra 80 mg, Oral, Daily    vitamin D3 5,000 Units, Oral, Daily    Vortioxetine HBr (TRINTELLIX) 5 mg, Oral, Daily With Breakfast       Medications Discontinued During This Encounter   Medication Reason    phentermine 37.5 MG capsule Reorder        Parts of this note are electronic transcriptions/translations of spoken language to printed text using the Dragon Dictation system.    Jaja Castillo,  MINI  06/24/24  11:09 EDT

## 2024-06-24 ENCOUNTER — TELEPHONE (OUTPATIENT)
Dept: CARDIOLOGY | Facility: CLINIC | Age: 29
End: 2024-06-24
Payer: COMMERCIAL

## 2024-06-24 ENCOUNTER — OFFICE VISIT (OUTPATIENT)
Dept: FAMILY MEDICINE CLINIC | Facility: CLINIC | Age: 29
End: 2024-06-24
Payer: COMMERCIAL

## 2024-06-24 VITALS
OXYGEN SATURATION: 100 % | HEIGHT: 64 IN | SYSTOLIC BLOOD PRESSURE: 116 MMHG | HEART RATE: 66 BPM | TEMPERATURE: 98 F | WEIGHT: 154.1 LBS | BODY MASS INDEX: 26.31 KG/M2 | DIASTOLIC BLOOD PRESSURE: 64 MMHG

## 2024-06-24 DIAGNOSIS — E66.3 OVERWEIGHT (BMI 25.0-29.9): Primary | ICD-10-CM

## 2024-06-24 DIAGNOSIS — R10.13 EPIGASTRIC PAIN: ICD-10-CM

## 2024-06-24 DIAGNOSIS — R11.2 NAUSEA AND VOMITING, UNSPECIFIED VOMITING TYPE: ICD-10-CM

## 2024-06-24 PROCEDURE — 1159F MED LIST DOCD IN RCRD: CPT | Performed by: NURSE PRACTITIONER

## 2024-06-24 PROCEDURE — 99214 OFFICE O/P EST MOD 30 MIN: CPT | Performed by: NURSE PRACTITIONER

## 2024-06-24 PROCEDURE — 1160F RVW MEDS BY RX/DR IN RCRD: CPT | Performed by: NURSE PRACTITIONER

## 2024-06-24 PROCEDURE — 1126F AMNT PAIN NOTED NONE PRSNT: CPT | Performed by: NURSE PRACTITIONER

## 2024-06-24 RX ORDER — PHENTERMINE HYDROCHLORIDE 37.5 MG/1
37.5 CAPSULE ORAL EVERY MORNING
Qty: 30 CAPSULE | Refills: 0 | Status: SHIPPED | OUTPATIENT
Start: 2024-06-24

## 2024-06-24 RX ORDER — FLUDROCORTISONE ACETATE 0.1 MG/1
0.1 TABLET ORAL DAILY
Qty: 90 TABLET | Refills: 1 | Status: SHIPPED | OUTPATIENT
Start: 2024-06-24

## 2024-06-24 RX ORDER — ONDANSETRON 4 MG/1
4 TABLET, ORALLY DISINTEGRATING ORAL EVERY 8 HOURS PRN
Qty: 15 TABLET | Refills: 0 | Status: SHIPPED | OUTPATIENT
Start: 2024-06-24

## 2024-06-24 NOTE — ASSESSMENT & PLAN NOTE
She is having newly identified epigastric pain that radiates to the right upper quadrant.  I will order a gallbladder ultrasound.

## 2024-06-24 NOTE — ASSESSMENT & PLAN NOTE
Patient's (Body mass index is 26.45 kg/m².) indicates that they are overweight with health conditions that include none . Weight is improving with treatment. BMI is is above average; BMI management plan is completed. We discussed portion control, increasing exercise, and pharmacologic options including continuing phentermine.

## 2024-06-24 NOTE — TELEPHONE ENCOUNTER
Rx Refill Note  Requested Prescriptions     Pending Prescriptions Disp Refills    fludrocortisone 0.1 MG tablet 90 tablet 1     Sig: Take 1 tablet by mouth Daily.        LAST OFFICE VISIT:  4/11/2024 Medication matches last office note    NEXT OFFICE VISIT:  10/8/2024                           Does this refill request meet protocol details for MA to approve:   [x] Yes   [] No

## 2024-07-05 ENCOUNTER — PATIENT ROUNDING (BHMG ONLY) (OUTPATIENT)
Dept: FAMILY MEDICINE CLINIC | Facility: CLINIC | Age: 29
End: 2024-07-05
Payer: COMMERCIAL

## 2024-07-10 ENCOUNTER — HOSPITAL ENCOUNTER (OUTPATIENT)
Dept: ULTRASOUND IMAGING | Facility: HOSPITAL | Age: 29
Discharge: HOME | End: 2024-07-10
Admitting: NURSE PRACTITIONER
Payer: COMMERCIAL

## 2024-07-10 DIAGNOSIS — R11.2 NAUSEA AND VOMITING, UNSPECIFIED VOMITING TYPE: ICD-10-CM

## 2024-07-10 DIAGNOSIS — R10.13 EPIGASTRIC PAIN: ICD-10-CM

## 2024-07-10 PROCEDURE — 76705 ECHO EXAM OF ABDOMEN: CPT

## 2024-07-13 ENCOUNTER — HOSPITAL ENCOUNTER (EMERGENCY)
Facility: HOSPITAL | Age: 29
Discharge: HOME | End: 2024-07-14
Attending: EMERGENCY MEDICINE
Payer: COMMERCIAL

## 2024-07-13 ENCOUNTER — APPOINTMENT (OUTPATIENT)
Dept: ULTRASOUND IMAGING | Facility: HOSPITAL | Age: 29
End: 2024-07-13
Payer: COMMERCIAL

## 2024-07-13 DIAGNOSIS — R10.10 PAIN OF UPPER ABDOMEN: ICD-10-CM

## 2024-07-13 DIAGNOSIS — K80.50 BILIARY COLIC: Primary | ICD-10-CM

## 2024-07-13 DIAGNOSIS — K80.20 CALCULUS OF GALLBLADDER WITHOUT CHOLECYSTITIS WITHOUT OBSTRUCTION: ICD-10-CM

## 2024-07-13 LAB
ALBUMIN SERPL-MCNC: 4.2 G/DL (ref 3.5–5.2)
ALBUMIN/GLOB SERPL: 1.5 G/DL
ALP SERPL-CCNC: 82 U/L (ref 39–117)
ALT SERPL W P-5'-P-CCNC: 10 U/L (ref 1–33)
ANION GAP SERPL CALCULATED.3IONS-SCNC: 15.8 MMOL/L (ref 5–15)
ANISOCYTOSIS BLD QL: NORMAL
AST SERPL-CCNC: 11 U/L (ref 1–32)
BASOPHILS # BLD AUTO: 0.09 10*3/MM3 (ref 0–0.2)
BASOPHILS NFR BLD AUTO: 0.6 % (ref 0–1.5)
BILIRUB SERPL-MCNC: <0.2 MG/DL (ref 0–1.2)
BILIRUB UR QL STRIP: NEGATIVE
BUN SERPL-MCNC: 9 MG/DL (ref 6–20)
BUN/CREAT SERPL: 12.7 (ref 7–25)
CALCIUM SPEC-SCNC: 9 MG/DL (ref 8.6–10.5)
CHLORIDE SERPL-SCNC: 105 MMOL/L (ref 98–107)
CLARITY UR: ABNORMAL
CO2 SERPL-SCNC: 20.2 MMOL/L (ref 22–29)
COLOR UR: YELLOW
CREAT SERPL-MCNC: 0.71 MG/DL (ref 0.57–1)
DEPRECATED RDW RBC AUTO: 39 FL (ref 37–54)
EGFRCR SERPLBLD CKD-EPI 2021: 116.7 ML/MIN/1.73
EOSINOPHIL # BLD AUTO: 0.16 10*3/MM3 (ref 0–0.4)
EOSINOPHIL NFR BLD AUTO: 1.1 % (ref 0.3–6.2)
ERYTHROCYTE [DISTWIDTH] IN BLOOD BY AUTOMATED COUNT: 13.1 % (ref 12.3–15.4)
GLOBULIN UR ELPH-MCNC: 2.8 GM/DL
GLUCOSE SERPL-MCNC: 111 MG/DL (ref 65–99)
GLUCOSE UR STRIP-MCNC: NEGATIVE MG/DL
HCT VFR BLD AUTO: 39.7 % (ref 34–46.6)
HGB BLD-MCNC: 13.3 G/DL (ref 12–15.9)
HGB UR QL STRIP.AUTO: NEGATIVE
HOLD SPECIMEN: NORMAL
HOLD SPECIMEN: NORMAL
IMM GRANULOCYTES # BLD AUTO: 0.05 10*3/MM3 (ref 0–0.05)
IMM GRANULOCYTES NFR BLD AUTO: 0.4 % (ref 0–0.5)
KETONES UR QL STRIP: ABNORMAL
LARGE PLATELETS: NORMAL
LEUKOCYTE ESTERASE UR QL STRIP.AUTO: NEGATIVE
LIPASE SERPL-CCNC: 37 U/L (ref 13–60)
LYMPHOCYTES # BLD AUTO: 5.23 10*3/MM3 (ref 0.7–3.1)
LYMPHOCYTES NFR BLD AUTO: 37.3 % (ref 19.6–45.3)
MCH RBC QN AUTO: 27.4 PG (ref 26.6–33)
MCHC RBC AUTO-ENTMCNC: 33.5 G/DL (ref 31.5–35.7)
MCV RBC AUTO: 81.9 FL (ref 79–97)
MONOCYTES # BLD AUTO: 0.87 10*3/MM3 (ref 0.1–0.9)
MONOCYTES NFR BLD AUTO: 6.2 % (ref 5–12)
NEUTROPHILS NFR BLD AUTO: 54.4 % (ref 42.7–76)
NEUTROPHILS NFR BLD AUTO: 7.61 10*3/MM3 (ref 1.7–7)
NITRITE UR QL STRIP: NEGATIVE
NRBC BLD AUTO-RTO: 0 /100 WBC (ref 0–0.2)
PH UR STRIP.AUTO: >=9 [PH] (ref 5–8)
PLATELET # BLD AUTO: 461 10*3/MM3 (ref 140–450)
PMV BLD AUTO: 10.3 FL (ref 6–12)
POIKILOCYTOSIS BLD QL SMEAR: NORMAL
POTASSIUM SERPL-SCNC: 3.7 MMOL/L (ref 3.5–5.2)
PROT SERPL-MCNC: 7 G/DL (ref 6–8.5)
PROT UR QL STRIP: ABNORMAL
RBC # BLD AUTO: 4.85 10*6/MM3 (ref 3.77–5.28)
SODIUM SERPL-SCNC: 141 MMOL/L (ref 136–145)
SP GR UR STRIP: 1.02 (ref 1–1.03)
UROBILINOGEN UR QL STRIP: ABNORMAL
WBC MORPH BLD: NORMAL
WBC NRBC COR # BLD AUTO: 14.01 10*3/MM3 (ref 3.4–10.8)
WHOLE BLOOD HOLD COAG: NORMAL
WHOLE BLOOD HOLD SPECIMEN: NORMAL

## 2024-07-13 PROCEDURE — 76705 ECHO EXAM OF ABDOMEN: CPT

## 2024-07-13 PROCEDURE — 80053 COMPREHEN METABOLIC PANEL: CPT

## 2024-07-13 PROCEDURE — 99284 EMERGENCY DEPT VISIT MOD MDM: CPT

## 2024-07-13 PROCEDURE — 81003 URINALYSIS AUTO W/O SCOPE: CPT

## 2024-07-13 PROCEDURE — 36415 COLL VENOUS BLD VENIPUNCTURE: CPT

## 2024-07-13 PROCEDURE — 85025 COMPLETE CBC W/AUTO DIFF WBC: CPT

## 2024-07-13 PROCEDURE — 85007 BL SMEAR W/DIFF WBC COUNT: CPT

## 2024-07-13 PROCEDURE — 83690 ASSAY OF LIPASE: CPT

## 2024-07-13 RX ORDER — SODIUM CHLORIDE 0.9 % (FLUSH) 0.9 %
10 SYRINGE (ML) INJECTION AS NEEDED
Status: DISCONTINUED | OUTPATIENT
Start: 2024-07-13 | End: 2024-07-14 | Stop reason: HOSPADM

## 2024-07-14 ENCOUNTER — HOSPITAL ENCOUNTER (EMERGENCY)
Facility: HOSPITAL | Age: 29
Discharge: HOME | End: 2024-07-14
Attending: EMERGENCY MEDICINE | Admitting: EMERGENCY MEDICINE
Payer: COMMERCIAL

## 2024-07-14 VITALS
BODY MASS INDEX: 26.12 KG/M2 | OXYGEN SATURATION: 100 % | HEIGHT: 64 IN | HEART RATE: 81 BPM | TEMPERATURE: 98.5 F | RESPIRATION RATE: 20 BRPM | DIASTOLIC BLOOD PRESSURE: 67 MMHG | SYSTOLIC BLOOD PRESSURE: 112 MMHG | WEIGHT: 153 LBS

## 2024-07-14 VITALS
RESPIRATION RATE: 18 BRPM | BODY MASS INDEX: 26.16 KG/M2 | SYSTOLIC BLOOD PRESSURE: 115 MMHG | WEIGHT: 153.22 LBS | TEMPERATURE: 98.2 F | OXYGEN SATURATION: 92 % | HEART RATE: 58 BPM | DIASTOLIC BLOOD PRESSURE: 73 MMHG | HEIGHT: 64 IN

## 2024-07-14 DIAGNOSIS — K80.80 BILIARY CALCULUS OF OTHER SITE WITHOUT OBSTRUCTION: Primary | ICD-10-CM

## 2024-07-14 LAB
ALBUMIN SERPL-MCNC: 4 G/DL (ref 3.5–5.2)
ALBUMIN/GLOB SERPL: 1.5 G/DL
ALP SERPL-CCNC: 83 U/L (ref 39–117)
ALT SERPL W P-5'-P-CCNC: 9 U/L (ref 1–33)
ANION GAP SERPL CALCULATED.3IONS-SCNC: 13.5 MMOL/L (ref 5–15)
AST SERPL-CCNC: 11 U/L (ref 1–32)
BASOPHILS # BLD AUTO: 0.09 10*3/MM3 (ref 0–0.2)
BASOPHILS NFR BLD AUTO: 0.8 % (ref 0–1.5)
BILIRUB SERPL-MCNC: <0.2 MG/DL (ref 0–1.2)
BILIRUB UR QL STRIP: NEGATIVE
BUN SERPL-MCNC: 7 MG/DL (ref 6–20)
BUN/CREAT SERPL: 10 (ref 7–25)
CALCIUM SPEC-SCNC: 8.8 MG/DL (ref 8.6–10.5)
CHLORIDE SERPL-SCNC: 105 MMOL/L (ref 98–107)
CLARITY UR: CLEAR
CO2 SERPL-SCNC: 22.5 MMOL/L (ref 22–29)
COLOR UR: YELLOW
CREAT SERPL-MCNC: 0.7 MG/DL (ref 0.57–1)
DEPRECATED RDW RBC AUTO: 39.8 FL (ref 37–54)
EGFRCR SERPLBLD CKD-EPI 2021: 118.7 ML/MIN/1.73
EOSINOPHIL # BLD AUTO: 0.18 10*3/MM3 (ref 0–0.4)
EOSINOPHIL NFR BLD AUTO: 1.7 % (ref 0.3–6.2)
ERYTHROCYTE [DISTWIDTH] IN BLOOD BY AUTOMATED COUNT: 13.3 % (ref 12.3–15.4)
GLOBULIN UR ELPH-MCNC: 2.6 GM/DL
GLUCOSE SERPL-MCNC: 82 MG/DL (ref 65–99)
GLUCOSE UR STRIP-MCNC: NEGATIVE MG/DL
HCT VFR BLD AUTO: 37.7 % (ref 34–46.6)
HGB BLD-MCNC: 12.7 G/DL (ref 12–15.9)
HGB UR QL STRIP.AUTO: NEGATIVE
HOLD SPECIMEN: NORMAL
HOLD SPECIMEN: NORMAL
IMM GRANULOCYTES # BLD AUTO: 0.03 10*3/MM3 (ref 0–0.05)
IMM GRANULOCYTES NFR BLD AUTO: 0.3 % (ref 0–0.5)
KETONES UR QL STRIP: NEGATIVE
LEUKOCYTE ESTERASE UR QL STRIP.AUTO: NEGATIVE
LIPASE SERPL-CCNC: 33 U/L (ref 13–60)
LYMPHOCYTES # BLD AUTO: 4.58 10*3/MM3 (ref 0.7–3.1)
LYMPHOCYTES NFR BLD AUTO: 43.2 % (ref 19.6–45.3)
MCH RBC QN AUTO: 27.7 PG (ref 26.6–33)
MCHC RBC AUTO-ENTMCNC: 33.7 G/DL (ref 31.5–35.7)
MCV RBC AUTO: 82.3 FL (ref 79–97)
MONOCYTES # BLD AUTO: 0.68 10*3/MM3 (ref 0.1–0.9)
MONOCYTES NFR BLD AUTO: 6.4 % (ref 5–12)
NEUTROPHILS NFR BLD AUTO: 47.6 % (ref 42.7–76)
NEUTROPHILS NFR BLD AUTO: 5.04 10*3/MM3 (ref 1.7–7)
NITRITE UR QL STRIP: NEGATIVE
NRBC BLD AUTO-RTO: 0 /100 WBC (ref 0–0.2)
PH UR STRIP.AUTO: 8 [PH] (ref 5–8)
PLAT MORPH BLD: NORMAL
PLATELET # BLD AUTO: 456 10*3/MM3 (ref 140–450)
PMV BLD AUTO: 10.1 FL (ref 6–12)
POTASSIUM SERPL-SCNC: 3.7 MMOL/L (ref 3.5–5.2)
PROT SERPL-MCNC: 6.6 G/DL (ref 6–8.5)
PROT UR QL STRIP: NEGATIVE
RBC # BLD AUTO: 4.58 10*6/MM3 (ref 3.77–5.28)
RBC MORPH BLD: NORMAL
SODIUM SERPL-SCNC: 141 MMOL/L (ref 136–145)
SP GR UR STRIP: <=1.005 (ref 1–1.03)
UROBILINOGEN UR QL STRIP: NORMAL
WBC MORPH BLD: NORMAL
WBC NRBC COR # BLD AUTO: 10.6 10*3/MM3 (ref 3.4–10.8)
WHOLE BLOOD HOLD COAG: NORMAL
WHOLE BLOOD HOLD SPECIMEN: NORMAL

## 2024-07-14 PROCEDURE — 96372 THER/PROPH/DIAG INJ SC/IM: CPT

## 2024-07-14 PROCEDURE — 63710000001 ONDANSETRON ODT 4 MG TABLET DISPERSIBLE: Performed by: EMERGENCY MEDICINE

## 2024-07-14 PROCEDURE — 81003 URINALYSIS AUTO W/O SCOPE: CPT

## 2024-07-14 PROCEDURE — 25010000002 HYDROMORPHONE 1 MG/ML SOLUTION: Performed by: EMERGENCY MEDICINE

## 2024-07-14 PROCEDURE — 25810000003 SODIUM CHLORIDE 0.9 % SOLUTION: Performed by: EMERGENCY MEDICINE

## 2024-07-14 PROCEDURE — 25010000002 MORPHINE PER 10 MG: Performed by: EMERGENCY MEDICINE

## 2024-07-14 PROCEDURE — 83690 ASSAY OF LIPASE: CPT | Performed by: EMERGENCY MEDICINE

## 2024-07-14 PROCEDURE — 85007 BL SMEAR W/DIFF WBC COUNT: CPT | Performed by: EMERGENCY MEDICINE

## 2024-07-14 PROCEDURE — 80053 COMPREHEN METABOLIC PANEL: CPT | Performed by: EMERGENCY MEDICINE

## 2024-07-14 PROCEDURE — 36415 COLL VENOUS BLD VENIPUNCTURE: CPT

## 2024-07-14 PROCEDURE — 99283 EMERGENCY DEPT VISIT LOW MDM: CPT

## 2024-07-14 PROCEDURE — 96374 THER/PROPH/DIAG INJ IV PUSH: CPT

## 2024-07-14 PROCEDURE — 85025 COMPLETE CBC W/AUTO DIFF WBC: CPT | Performed by: EMERGENCY MEDICINE

## 2024-07-14 RX ORDER — ONDANSETRON 4 MG/1
4 TABLET, ORALLY DISINTEGRATING ORAL ONCE
Status: COMPLETED | OUTPATIENT
Start: 2024-07-14 | End: 2024-07-14

## 2024-07-14 RX ORDER — SODIUM CHLORIDE 0.9 % (FLUSH) 0.9 %
10 SYRINGE (ML) INJECTION AS NEEDED
Status: DISCONTINUED | OUTPATIENT
Start: 2024-07-14 | End: 2024-07-15 | Stop reason: HOSPADM

## 2024-07-14 RX ORDER — ONDANSETRON 4 MG/1
4 TABLET, ORALLY DISINTEGRATING ORAL EVERY 8 HOURS PRN
Qty: 12 TABLET | Refills: 0 | Status: SHIPPED | OUTPATIENT
Start: 2024-07-14

## 2024-07-14 RX ORDER — HYDROCODONE BITARTRATE AND ACETAMINOPHEN 5; 325 MG/1; MG/1
1 TABLET ORAL EVERY 6 HOURS PRN
Qty: 15 TABLET | Refills: 0 | Status: SHIPPED | OUTPATIENT
Start: 2024-07-14

## 2024-07-14 RX ADMIN — SODIUM CHLORIDE 1000 ML: 9 INJECTION, SOLUTION INTRAVENOUS at 00:22

## 2024-07-14 RX ADMIN — ONDANSETRON 4 MG: 4 TABLET, ORALLY DISINTEGRATING ORAL at 00:22

## 2024-07-14 RX ADMIN — MORPHINE SULFATE 4 MG: 4 INJECTION, SOLUTION INTRAMUSCULAR; INTRAVENOUS at 22:03

## 2024-07-14 RX ADMIN — ONDANSETRON 4 MG: 4 TABLET, ORALLY DISINTEGRATING ORAL at 22:06

## 2024-07-14 RX ADMIN — HYDROMORPHONE HYDROCHLORIDE 1 MG: 1 INJECTION, SOLUTION INTRAMUSCULAR; INTRAVENOUS; SUBCUTANEOUS at 00:22

## 2024-07-14 NOTE — DISCHARGE INSTRUCTIONS
Start your diet with clear liquids and advance slowly.  Follow a low-fat diet with no fried foods take medications as directed.  Return for worsening symptoms.  Follow-up with Dr. Benitez by calling tomorrow for an earlier appointment

## 2024-07-14 NOTE — ED PROVIDER NOTES
Time: 10:29 PM EDT  Date of encounter:  2024  Independent Historian/Clinical History and Information was obtained by:   Patient    History is limited by: N/A    Chief Complaint   Patient presents with    Abdominal Pain         History of Present Illness:  Patient is a 29 y.o. year old female who presents to the emergency department for evaluation of RUQ pain starting earlier tonight.  And radiates around to the back.  Denies diarrhea and vomiting or fever. Denies urinary symptoms  The patient had recent outpatient US that revealed cholelithiasis    Patient Care Team  Primary Care Provider: Jaja Castillo APRN    Past Medical History:     Allergies   Allergen Reactions    Cephalexin Diarrhea    Cephalosporins GI Intolerance    Cymbalta [Duloxetine Hcl] Other (See Comments)     Jittery and insomnia    Penicillins Rash     Past Medical History:   Diagnosis Date    Acid reflux     Anxiety     Chronic pain disorder     Depression     Eczema     Fatty liver     Intractable migraine without aura and without status migrainosus 2023    Kidney stone     HX OF    Maltracking of right patella     Migraines     Panic disorder     PONV (postoperative nausea and vomiting)     GOOD RESULTS WITH SCOPALAMINE    PTSD (post-traumatic stress disorder)     WAKES UP FROM ANESTHESIA WITH PANIC ATTACK    Seasonal allergic rhinitis 2022    Self-injurious behavior     as a teenager    Suicide attempt      Past Surgical History:   Procedure Laterality Date     SECTION      COLONOSCOPY N/A 10/11/2022    Procedure: COLONOSCOPY;  Surgeon: Dyan Griffin MD;  Location: [facility] ENDOSCOPY;  Service: Gastroenterology;  Laterality: N/A;  HEMORRHOIDS    CYSTOSCOPY      CYSTOSCOPY URETEROSCOPY Left 2023    Procedure: CYSTOSCOPY URETEROSCOPY RETROGRADE PYELOGRAM HOLMIUM LASER STENT INSERTION, left;  Surgeon: Melisa Garcia MD;  Location: [facility] MAIN OR;  Service: Urology;  Laterality: Left;     ENDOSCOPY N/A 9/8/2023    Procedure: ESOPHAGOGASTRODUODENOSCOPY WITH BIOPSIES;  Surgeon: Dyan Griffin MD;  Location: [facility] ENDOSCOPY;  Service: Gastroenterology;  Laterality: N/A;  ESOPHAGITIS, GASTRITIS    HYSTERECTOMY      KIDNEY STONE SURGERY      unspecified    KNEE ARTHROSCOPY Right 7/11/2022    Procedure: KNEE ARTHROSCOPY WITH A LATERAL RELEASE AND CHONDROPLASTY;  Surgeon: Marco A Pitts MD;  [facility] [place] [facility];  Service: Orthopedics;  Laterality: Right;    WISDOM TOOTH EXTRACTION       Family History   Problem Relation Age of Onset    Arthritis Mother     Restless legs syndrome Mother     Thyroid disease Father     Colon polyps Father     Diabetes Father     Drug abuse Maternal Uncle     Alcohol abuse Maternal Uncle     Cancer Maternal Grandmother     Sleep apnea Son     Malig Hyperthermia Neg Hx        Home Medications:  Prior to Admission medications    Medication Sig Start Date End Date Taking? Authorizing Provider   amitriptyline (ELAVIL) 10 MG tablet Take 1 tablet by mouth At Night As Needed for Sleep. 6/19/24   Echo Cardenas APRN   dicyclomine (BENTYL) 20 MG tablet Take 1 tablet by mouth Every 6 (Six) Hours As Needed (abdominal cramping). 10/31/23   Kayla Brody APRN   esomeprazole (nexIUM) 40 MG capsule Take 1 capsule by mouth Every Morning Before Breakfast. 10/31/23   Kayla Brody APRN   famotidine (PEPCID) 20 MG tablet Take 1 tablet by mouth At Night As Needed for Heartburn. 5/1/24   Kayla Brody APRN   fludrocortisone 0.1 MG tablet Take 1 tablet by mouth Daily. 6/24/24   Kelin Toribio APRN   galcanezumab-gnlm (Emgality) 120 MG/ML auto-injector pen Inject 2 mL under the skin into the appropriate area as directed Every 30 (Thirty) Days. Indications: Migraine Headache 4/25/24   Jaja Castillo APRN   ibuprofen (ADVIL,MOTRIN) 600 MG tablet Take 1 tablet by mouth Every 6 (Six) Hours As Needed for Moderate Pain. Indications:  heel pain 24   Jaja Castillo APRN   lamoTRIgine (LaMICtal) 150 MG tablet Take 1 tablet by mouth Daily. 24   Echo Cardenas APRN   OLANZapine (ZyPREXA) 7.5 MG tablet Take 1 tablet by mouth Every Night. 24   Echo Cardenas APRN   ondansetron ODT (ZOFRAN-ODT) 4 MG disintegrating tablet Place 1 tablet on the tongue Every 8 (Eight) Hours As Needed for Nausea or Vomiting. 24   Jaja Castillo APRN   phentermine 37.5 MG capsule Take 1 capsule by mouth Every Morning. 24   Jaja Castillo APRN   polyethylene glycol (MIRALAX) 17 g packet Take 17 g by mouth Daily. 23   Kayla Brody APRN   Resmetirom (Rezdiffra) 80 MG tablet Take 1 tablet by mouth Daily. 24   Kayla Brody APRN   Rimegepant Sulfate (Nurtec) 75 MG tablet dispersible tablet Take 1 tablet by mouth Daily As Needed (migraine). Indications: Migraine Headache 24   Jaja Castillo APRN   vitamin D3 (Vitamin D) 125 MCG (5000 UT) capsule capsule Take 1 capsule by mouth Daily. 23   Jaja Castillo APRN   Vortioxetine HBr (Trintellix) 5 MG tablet tablet Take 1 tablet by mouth Daily With Breakfast. 24   Echo Cardenas APRN        Social History:   Social History     Tobacco Use    Smoking status: Former     Current packs/day: 0.00     Average packs/day: 0.5 packs/day for 15.0 years (7.5 ttl pk-yrs)     Types: Cigarettes     Start date: 2007     Quit date: 2022     Years since quittin.4    Smokeless tobacco: Never   Vaping Use    Vaping status: Every Day    Substances: Nicotine, Flavoring    Devices: Disposable   Substance Use Topics    Alcohol use: Not Currently    Drug use: Not Currently     Types: Marijuana     Comment: Marijuana use in teenage years         Review of Systems:  Review of Systems   Gastrointestinal:  Positive for abdominal pain and nausea. Negative for vomiting.        Physical Exam:  /73 (BP Location: Left arm,  "Patient Position: Sitting)   Pulse 58   Temp 98.2 °F (36.8 °C) (Oral)   Resp 18   Ht 162.6 cm (64.02\")   Wt 69.5 kg (153 lb 3.5 oz)   SpO2 92%   BMI 26.29 kg/m²         Physical Exam  Vitals and nursing note reviewed.   Constitutional:       General: She is not in acute distress.     Appearance: Normal appearance. She is not toxic-appearing.   HENT:      Head: Normocephalic and atraumatic.      Mouth/Throat:      Mouth: Mucous membranes are moist.   Eyes:      General: No scleral icterus.     Pupils: Pupils are equal, round, and reactive to light.   Cardiovascular:      Rate and Rhythm: Normal rate and regular rhythm.      Pulses: Normal pulses.      Heart sounds: Normal heart sounds.   Pulmonary:      Effort: Pulmonary effort is normal. No respiratory distress.      Breath sounds: Normal breath sounds.   Abdominal:      General: Abdomen is flat. There is no distension.      Palpations: Abdomen is soft.      Tenderness: There is no abdominal tenderness in the right upper quadrant and epigastric area. There is no guarding or rebound.   Musculoskeletal:         General: Normal range of motion.      Cervical back: Normal range of motion and neck supple.   Skin:     General: Skin is warm and dry.   Neurological:      General: No focal deficit present.      Mental Status: She is alert and oriented to person, place, and time. Mental status is at baseline.   Psychiatric:         Mood and Affect: Mood normal.         Behavior: Behavior normal.                      Procedures:  Procedures      Medical Decision Making:      Comorbidities that affect care:    chlelithiasis    External Notes reviewed:    Previous Clinic Note: PCP note and US report as outpatient      The following orders were placed and all results were independently analyzed by me:  Orders Placed This Encounter   Procedures    US Gallbladder    [place] Draw    Comprehensive Metabolic Panel    Lipase    Urinalysis With Microscopic If Indicated (No " Culture) - Urine, Clean Catch    CBC Auto Differential    Scan Slide    Undress & Gown    CBC & Differential    Green Top (Gel)    Lavender Top    Gold Top - SST    Light Blue Top       Medications Given in the Emergency Department:  Medications   sodium chloride 0.9 % bolus 1,000 mL (0 mL Intravenous Stopped 7/14/24 0132)   HYDROmorphone (DILAUDID) injection 1 mg (1 mg Intravenous Given 7/14/24 0022)   ondansetron ODT (ZOFRAN-ODT) disintegrating tablet 4 mg (4 mg Oral Given 7/14/24 0022)        ED Course:    The patient was initially evaluated in the triage area where orders were placed. The patient was later dispositioned by Cesario Gonzalez DO.      The patient was advised to stay for completion of workup which includes but is not limited to communication of labs and radiological results, reassessment and plan. The patient was advised that leaving prior to disposition by a provider could result in critical findings that are not communicated to the patient.     ED Course as of 07/15/24 0119   Sat Jul 13, 202413, 2024 2231 --- PROVIDER IN TRIAGE NOTE ---    The patient was evaluated by Luiza elizabeth in triage. Orders were placed and the patient is currently awaiting disposition.    [AJ]      ED Course User Index  [AJ] Luiza Gerber PA-C       Labs:    Lab Results (last 24 hours)       Procedure Component Value Units Date/Time    CBC & Differential [[id number]]  (Abnormal) Collected: 07/14/24 2102    Specimen: Blood Updated: 07/14/24 2127    Narrative:      The following orders were created for panel order CBC & Differential.  Procedure                               Abnormality         Status                     ---------                               -----------         ------                     CBC Auto Differential[[id number]]        Abnormal            Final result               Scan Slide[[id number]]                   Normal              Final result                 Please view results for these tests  on the individual orders.    Comprehensive Metabolic Panel [[id number]] Collected: 07/14/24 2102    Specimen: Blood Updated: 07/14/24 2128     Glucose 82 mg/dL      BUN 7 mg/dL      Creatinine 0.70 mg/dL      Sodium 141 mmol/L      Potassium 3.7 mmol/L      Chloride 105 mmol/L      CO2 22.5 mmol/L      Calcium 8.8 mg/dL      Total Protein 6.6 g/dL      Albumin 4.0 g/dL      ALT (SGPT) 9 U/L      AST (SGOT) 11 U/L      Alkaline Phosphatase 83 U/L      Total Bilirubin <0.2 mg/dL      Globulin 2.6 gm/dL      A/G Ratio 1.5 g/dL      BUN/Creatinine Ratio 10.0     Anion Gap 13.5 mmol/L      eGFR 118.7 mL/min/1.73     Narrative:      GFR Normal >60  Chronic Kidney Disease <60  Kidney Failure <15      Lipase [[id number]]  (Normal) Collected: 07/14/24 2102    Specimen: Blood Updated: 07/14/24 2128     Lipase 33 U/L     Urinalysis With Microscopic If Indicated (No Culture) - Urine, Clean Catch [[id number]]  (Normal) Collected: 07/14/24 2102    Specimen: Urine, Clean Catch Updated: 07/14/24 2110     Color, UA Yellow     Appearance, UA Clear     pH, UA 8.0     Specific Gravity, UA <=1.005     Glucose, UA Negative     Ketones, UA Negative     Bilirubin, UA Negative     Blood, UA Negative     Protein, UA Negative     Leuk Esterase, UA Negative     Nitrite, UA Negative     Urobilinogen, UA 0.2 E.U./dL    Narrative:      Urine microscopic not indicated.    CBC Auto Differential [[id number]]  (Abnormal) Collected: 07/14/24 2102    Specimen: Blood Updated: 07/14/24 2127     WBC 10.60 10*3/mm3      RBC 4.58 10*6/mm3      Hemoglobin 12.7 g/dL      Hematocrit 37.7 %      MCV 82.3 fL      MCH 27.7 pg      MCHC 33.7 g/dL      RDW 13.3 %      RDW-SD 39.8 fl      MPV 10.1 fL      Platelets 456 10*3/mm3      Neutrophil % 47.6 %      Lymphocyte % 43.2 %      Monocyte % 6.4 %      Eosinophil % 1.7 %      Basophil % 0.8 %      Immature Grans % 0.3 %      Neutrophils, Absolute 5.04 10*3/mm3      Lymphocytes, Absolute 4.58 10*3/mm3      Monocytes,  Absolute 0.68 10*3/mm3      Eosinophils, Absolute 0.18 10*3/mm3      Basophils, Absolute 0.09 10*3/mm3      Immature Grans, Absolute 0.03 10*3/mm3      nRBC 0.0 /100 WBC     Scan Slide [[id number]]  (Normal) Collected: 07/14/24 2102    Specimen: Blood Updated: 07/14/24 2127     RBC Morphology Normal     WBC Morphology Normal     Platelet Morphology Normal             Imaging:    US Gallbladder    Result Date: 7/13/2024  Narrative: US GALLBLADDER-  Date of Exam: 7/13/2024 10:43 PM  Indication: RUQ abdominal pain tenderness.  Comparisons: 7/10/2024; 10/4/2023; 5/28/2023; 8/26/2022.  TECHNIQUE: A limited abdominal ultrasound examination of the right upper quadrant was performed, tailored in order to evaluate the gallbladder and the biliary tree. Two-dimensional grayscale images as well as color and spectral Doppler analysis are provided for review.  FINDINGS: Again demonstrated are gallstones and a contracted gallbladder. No biliary ductal dilatation is seen. The common bile duct measures 3.9 mm in diameter. There may be mild diffuse hepatic steatosis. No hepatomegaly. The liver measures 16.8 cm in craniocaudal dimension. The right kidney measures 12.8 x 4.3 x 5 cm. The right renal volume is not provided. No hydronephrosis. The pancreas is not well seen, obscured by overlying bowel gas. Its visualized portions are unremarkable. Doppler interrogation of the hepatic vasculature reveals patent vessels with normal blood flow direction. A negative sonographic Luz's sign was reported. No ascites. The left kidney, spleen, inferior vena cava (IVC), and abdominal aorta were not evaluated.      Impression: Gallstones are present without sonographic evidence of acute cholecystitis. No biliary ductal dilatation.    Please note that portions of this note were completed with a voice recognition program.       Electronically Signed By-Ruben Campa MD On:7/13/2024 11:22 PM      US Gallbladder    Result Date: 7/11/2024  Narrative:  US GALLBLADDER INDICATION: Epigastric and right upper quadrant pain with nausea and vomiting. COMPARISON: CT abdomen pelvis 5/28/2023 FINDINGS: PANCREAS: Visualized portions of the pancreas are within normal limits. LIVER: The echogenicity and echotexture of the hepatic parenchyma is within normal limits.  No hepatic mass. The intrahepatic bile ducts are normal in caliber. The common duct is normal in size at the armen hepatis measuring 2 mm. GALLBLADDER: Gallstones are present. No gallbladder wall thickening or adjacent fluid. Negative sonographic Luz's sign. RIGHT KIDNEY: The right kidney measures 12.3 cm. Renal cortical thickness and echogenicity are normal. No hydronephrosis. OTHER: No ascites. Main portal vein is patent with a normal hepatopetal direction of flow.     Impression: 1. Cholelithiasis without evidence of acute cholecystitis or biliary obstruction. 2. Otherwise negative. Electronically Signed: Russell Lkue MD  7/11/2024 2:05 AM EDT  Workstation ID: [id number]       No Radiology Exams Resulted Within Past 24 Hours      Differential Diagnosis and Discussion:      Abdominal Pain: Based on the patient's signs and symptoms, I considered abdominal aortic aneurysm, small bowel obstruction, pancreatitis, acute cholecystitis, acute appendecitis, peptic ulcer disease, gastritis, colitis, endocrine disorders, irritable bowel syndrome and other differential diagnosis an etiology of the patient's abdominal pain.    All labs were reviewed and interpreted by me.  All X-rays impressions were independently interpreted by me.    MDM     Amount and/or Complexity of Data Reviewed  Clinical lab tests: reviewed  Tests in the radiology section of CPT®: reviewed  Decide to obtain previous medical records or to obtain history from someone other than the patient: yes                 Patient Care Considerations:    CT ABDOMEN AND PELVIS: I considered ordering a CT scan of the abdomen and pelvis however US has already been  performed      Consultants/Shared Management Plan:    Consultant: I have discussed the case with Dr Hickman who states if the patient is improved, she can be discharged home and follow up with Dr Benitez as planned.    Social Determinants of Health:    Patient has presented with family members who are responsible, reliable and will ensure follow up care.      Disposition and Care Coordination:    Discharged: I considered escalation of care by admitting this patient to the hospital, however The patient is now asymptomatic and symptoms are resolved.  Exam is now normal.    I have explained discharge medications and the need for follow up with the patient/caretakers. This was also printed in the discharge instructions. Patient was discharged with the following medications and follow up:      Medication List        New Prescriptions      HYDROcodone-acetaminophen 5-325 MG per tablet  Commonly known as: NORCO  Take 1 tablet by mouth Every 6 (Six) Hours As Needed (Pain).            Changed      * ondansetron ODT 4 MG disintegrating tablet  Commonly known as: ZOFRAN-ODT  Place 1 tablet on the tongue Every 8 (Eight) Hours As Needed for Nausea or Vomiting.  What changed: Another medication with the same name was added. Make sure you understand how and when to take each.     * ondansetron ODT 4 MG disintegrating tablet  Commonly known as: ZOFRAN-ODT  Place 1 tablet on the tongue Every 8 (Eight) Hours As Needed for Vomiting or Nausea.  What changed: You were already taking a medication with the same name, and this prescription was added. Make sure you understand how and when to take each.           * This list has 2 medication(s) that are the same as other medications prescribed for you. Read the directions carefully, and ask your doctor or other care provider to review them with you.                   Where to Get Your Medications        These medications were sent to [facility]/pharmacy #[id number] - [place], [place] - [address] - [phone]  - [phone] FX  [address] [facility] [address]      Hours: 24-hours Phone: [phone]   HYDROcodone-acetaminophen 5-325 MG per tablet  ondansetron ODT 4 MG disintegrating tablet      Marian Benitez MD  [address] [facility] [id number] [phone]    In 1 day  Call tomorrow for an earlier appointment       Final diagnoses:   Biliary colic   Pain of upper abdomen   Calculus of gallbladder without cholecystitis without obstruction        ED Disposition       ED Disposition   Discharge    Condition   Stable    Comment   --               This medical record created using voice recognition software.             Cesario Gonzalez, DO  07/15/24 0119

## 2024-07-15 DIAGNOSIS — K75.81 NASH (NONALCOHOLIC STEATOHEPATITIS): ICD-10-CM

## 2024-07-15 RX ORDER — RESMETIROM 80 MG/1
80 TABLET, COATED ORAL DAILY
Qty: 90 TABLET | Refills: 3 | Status: SHIPPED | OUTPATIENT
Start: 2024-07-15

## 2024-07-15 NOTE — ED PROVIDER NOTES
Time: 10:01 PM EDT  Date of encounter:  2024  Independent Historian/Clinical History and Information was obtained by:   Patient    History is limited by: N/A    Chief Complaint: Abdominal pain      History of Present Illness:  Patient is a 29 y.o. year old female who presents to the emergency department for evaluation of right upper quadrant abdominal pain that began 3 weeks ago.  Patient states she has had 2 ultrasounds in the last 3 weeks including 1 last night which showed gallstones.  Patient states she has a follow-up appointment for consultation with Dr. Benitez on the .  Patient denies nausea/vomiting.  Patient denies fever.  Patient states she took a hydrocodone at home just prior to arrival as she was prescribed hydrocodone within the last week for the pain.  Patient denies urinary symptoms.  Patient denies chest pain shortness of breath.    HPI    Patient Care Team  Primary Care Provider: Jaja Castillo APRN    Past Medical History:     Allergies   Allergen Reactions    Cephalexin Diarrhea    Cephalosporins GI Intolerance    Cymbalta [Duloxetine Hcl] Other (See Comments)     Jittery and insomnia    Penicillins Rash     Past Medical History:   Diagnosis Date    Acid reflux     Anxiety     Chronic pain disorder     Depression     Eczema     Fatty liver     Intractable migraine without aura and without status migrainosus 2023    Kidney stone     HX OF    Maltracking of right patella     Migraines     Panic disorder     PONV (postoperative nausea and vomiting)     GOOD RESULTS WITH SCOPALAMINE    PTSD (post-traumatic stress disorder)     WAKES UP FROM ANESTHESIA WITH PANIC ATTACK    Seasonal allergic rhinitis 2022    Self-injurious behavior     as a teenager    Suicide attempt      Past Surgical History:   Procedure Laterality Date     SECTION      COLONOSCOPY N/A 10/11/2022    Procedure: COLONOSCOPY;  Surgeon: Dyan Griffin MD;  Location: [facility] ENDOSCOPY;   Service: Gastroenterology;  Laterality: N/A;  HEMORRHOIDS    CYSTOSCOPY      CYSTOSCOPY URETEROSCOPY Left 1/30/2023    Procedure: CYSTOSCOPY URETEROSCOPY RETROGRADE PYELOGRAM HOLMIUM LASER STENT INSERTION, left;  Surgeon: Melisa Garcia MD;  Location: [facility] MAIN OR;  Service: Urology;  Laterality: Left;    ENDOSCOPY N/A 9/8/2023    Procedure: ESOPHAGOGASTRODUODENOSCOPY WITH BIOPSIES;  Surgeon: Dyan Griffin MD;  Location: [facility] ENDOSCOPY;  Service: Gastroenterology;  Laterality: N/A;  ESOPHAGITIS, GASTRITIS    HYSTERECTOMY      KIDNEY STONE SURGERY      unspecified    KNEE ARTHROSCOPY Right 7/11/2022    Procedure: KNEE ARTHROSCOPY WITH A LATERAL RELEASE AND CHONDROPLASTY;  Surgeon: Marco A Pitts MD;  Location: [facility] OR [facility];  Service: Orthopedics;  Laterality: Right;    WISDOM TOOTH EXTRACTION       Family History   Problem Relation Age of Onset    Arthritis Mother     Restless legs syndrome Mother     Thyroid disease Father     Colon polyps Father     Diabetes Father     Drug abuse Maternal Uncle     Alcohol abuse Maternal Uncle     Cancer Maternal Grandmother     Sleep apnea Son     Malig Hyperthermia Neg Hx        Home Medications:  Prior to Admission medications    Medication Sig Start Date End Date Taking? Authorizing Provider   amitriptyline (ELAVIL) 10 MG tablet Take 1 tablet by mouth At Night As Needed for Sleep. 6/19/24   Echo Cardenas APRN   dicyclomine (BENTYL) 20 MG tablet Take 1 tablet by mouth Every 6 (Six) Hours As Needed (abdominal cramping). 10/31/23   Kayla Brody APRN   esomeprazole (nexIUM) 40 MG capsule Take 1 capsule by mouth Every Morning Before Breakfast. 10/31/23   Kayla Brody APRN   famotidine (PEPCID) 20 MG tablet Take 1 tablet by mouth At Night As Needed for Heartburn. 5/1/24   Kayla Brody APRN   fludrocortisone 0.1 MG tablet Take 1 tablet by mouth Daily. 6/24/24   Kelin Toribio APRN   galcanezumab-gnemelia (Emgality)  120 MG/ML auto-injector pen Inject 2 mL under the skin into the appropriate area as directed Every 30 (Thirty) Days. Indications: Migraine Headache 4/25/24   Jaja Castillo APRN   HYDROcodone-acetaminophen (NORCO) 5-325 MG per tablet Take 1 tablet by mouth Every 6 (Six) Hours As Needed (Pain). 7/14/24   Cesario Gonzalez,    ibuprofen (ADVIL,MOTRIN) 600 MG tablet Take 1 tablet by mouth Every 6 (Six) Hours As Needed for Moderate Pain. Indications: heel pain 4/25/24   Jaja Castillo APRN   lamoTRIgine (LaMICtal) 150 MG tablet Take 1 tablet by mouth Daily. 6/19/24   Echo Cardenas APRN   OLANZapine (ZyPREXA) 7.5 MG tablet Take 1 tablet by mouth Every Night. 6/19/24   Echo Cardenas APRN   ondansetron ODT (ZOFRAN-ODT) 4 MG disintegrating tablet Place 1 tablet on the tongue Every 8 (Eight) Hours As Needed for Nausea or Vomiting. 6/24/24   Jaja Castillo APRN   ondansetron ODT (ZOFRAN-ODT) 4 MG disintegrating tablet Place 1 tablet on the tongue Every 8 (Eight) Hours As Needed for Vomiting or Nausea. 7/14/24   Cesario Gonzalez,    phentermine 37.5 MG capsule Take 1 capsule by mouth Every Morning. 6/24/24   Jaja Castillo APRN   polyethylene glycol (MIRALAX) 17 g packet Take 17 g by mouth Daily. 4/5/23   Kayla Brody APRN   Resmetirom (Rezdiffra) 80 MG tablet Take 1 tablet by mouth Daily. 5/1/24   Kayla Brody APRN   Rimegepant Sulfate (Nurtec) 75 MG tablet dispersible tablet Take 1 tablet by mouth Daily As Needed (migraine). Indications: Migraine Headache 4/25/24   Jaja Castillo APRN   vitamin D3 (Vitamin D) 125 MCG (5000 UT) capsule capsule Take 1 capsule by mouth Daily. 12/29/23   Jaja Castillo APRN   Vortioxetine HBr (Trintellix) 5 MG tablet tablet Take 1 tablet by mouth Daily With Breakfast. 6/19/24   Echo Cardenas APRN        Social History:   Social History     Tobacco Use    Smoking status: Former     Current packs/day:  "0.00     Average packs/day: 0.5 packs/day for 15.0 years (7.5 ttl pk-yrs)     Types: Cigarettes     Start date: 2007     Quit date: 2022     Years since quittin.4    Smokeless tobacco: Never   Vaping Use    Vaping status: Every Day    Substances: Nicotine, Flavoring    Devices: Disposable   Substance Use Topics    Alcohol use: Not Currently    Drug use: Not Currently     Types: Marijuana     Comment: Marijuana use in teenage years         Review of Systems:  Review of Systems   Constitutional:  Negative for chills and fever.   HENT:  Negative for congestion, rhinorrhea and sore throat.    Eyes:  Negative for pain and visual disturbance.   Respiratory:  Negative for apnea, cough, chest tightness and shortness of breath.    Cardiovascular:  Negative for chest pain and palpitations.   Gastrointestinal:  Positive for abdominal pain. Negative for diarrhea, nausea and vomiting.   Genitourinary:  Negative for difficulty urinating and dysuria.   Musculoskeletal:  Negative for joint swelling and myalgias.   Skin:  Negative for color change.   Neurological:  Negative for seizures and headaches.   Psychiatric/Behavioral: Negative.     All other systems reviewed and are negative.       Physical Exam:  /67   Pulse 81   Temp 98.5 °F (36.9 °C) (Oral)   Resp 20   Ht 162.6 cm (64\")   Wt 69.4 kg (153 lb)   SpO2 100%   BMI 26.26 kg/m²     Physical Exam  Vitals and nursing note reviewed.   Constitutional:       General: She is not in acute distress.     Appearance: Normal appearance. She is not toxic-appearing.   HENT:      Head: Normocephalic and atraumatic.      Jaw: There is normal jaw occlusion.   Eyes:      General: Lids are normal.      Extraocular Movements: Extraocular movements intact.      Conjunctiva/sclera: Conjunctivae normal.      Pupils: Pupils are equal, round, and reactive to light.   Cardiovascular:      Rate and Rhythm: Normal rate and regular rhythm.      Pulses: Normal pulses.      Heart " sounds: Normal heart sounds.   Pulmonary:      Effort: Pulmonary effort is normal. No respiratory distress.      Breath sounds: Normal breath sounds. No wheezing or rhonchi.   Abdominal:      General: Abdomen is flat.      Palpations: Abdomen is soft.      Tenderness: There is abdominal tenderness in the right upper quadrant. There is no guarding or rebound.   Musculoskeletal:         General: Normal range of motion.      Cervical back: Normal range of motion and neck supple.      Right lower leg: No edema.      Left lower leg: No edema.   Skin:     General: Skin is warm and dry.   Neurological:      Mental Status: She is alert and oriented to person, place, and time. Mental status is at baseline.   Psychiatric:         Mood and Affect: Mood normal.                  Procedures:  Procedures      Medical Decision Making:      Comorbidities that affect care:    None    External Notes reviewed:          The following orders were placed and all results were independently analyzed by me:  Orders Placed This Encounter   Procedures    [place] Draw    Comprehensive Metabolic Panel    Lipase    Urinalysis With Microscopic If Indicated (No Culture) - Urine, Clean Catch    CBC Auto Differential    Scan Slide    Ambulatory Referral to General Surgery    NPO Diet NPO Type: Strict NPO    Undress & Gown    IP Consult to General Surgery    Insert Peripheral IV    CBC & Differential    Green Top (Gel)    Lavender Top    Gold Top - SST    Light Blue Top       Medications Given in the Emergency Department:  Medications   sodium chloride 0.9 % flush 10 mL (has no administration in time range)   ondansetron ODT (ZOFRAN-ODT) disintegrating tablet 4 mg (has no administration in time range)   morphine injection 4 mg (4 mg Intramuscular Given 7/14/24 2203)        ED Course:         Labs:    Lab Results (last 24 hours)       Procedure Component Value Units Date/Time    CBC & Differential [[id number]]  (Abnormal) Collected: 07/14/24 2102     Specimen: Blood Updated: 07/14/24 2127    Narrative:      The following orders were created for panel order CBC & Differential.  Procedure                               Abnormality         Status                     ---------                               -----------         ------                     CBC Auto Differential[[id number]]        Abnormal            Final result               Scan Slide[[id number]]                   Normal              Final result                 Please view results for these tests on the individual orders.    Comprehensive Metabolic Panel [[id number]] Collected: 07/14/24 2102    Specimen: Blood Updated: 07/14/24 2128     Glucose 82 mg/dL      BUN 7 mg/dL      Creatinine 0.70 mg/dL      Sodium 141 mmol/L      Potassium 3.7 mmol/L      Chloride 105 mmol/L      CO2 22.5 mmol/L      Calcium 8.8 mg/dL      Total Protein 6.6 g/dL      Albumin 4.0 g/dL      ALT (SGPT) 9 U/L      AST (SGOT) 11 U/L      Alkaline Phosphatase 83 U/L      Total Bilirubin <0.2 mg/dL      Globulin 2.6 gm/dL      A/G Ratio 1.5 g/dL      BUN/Creatinine Ratio 10.0     Anion Gap 13.5 mmol/L      eGFR 118.7 mL/min/1.73     Narrative:      GFR Normal >60  Chronic Kidney Disease <60  Kidney Failure <15      Lipase [[id number]]  (Normal) Collected: 07/14/24 2102    Specimen: Blood Updated: 07/14/24 2128     Lipase 33 U/L     Urinalysis With Microscopic If Indicated (No Culture) - Urine, Clean Catch [[id number]]  (Normal) Collected: 07/14/24 2102    Specimen: Urine, Clean Catch Updated: 07/14/24 2110     Color, UA Yellow     Appearance, UA Clear     pH, UA 8.0     Specific Gravity, UA <=1.005     Glucose, UA Negative     Ketones, UA Negative     Bilirubin, UA Negative     Blood, UA Negative     Protein, UA Negative     Leuk Esterase, UA Negative     Nitrite, UA Negative     Urobilinogen, UA 0.2 E.U./dL    Narrative:      Urine microscopic not indicated.    CBC Auto Differential [[id number]]  (Abnormal) Collected: 07/14/24 2102     Specimen: Blood Updated: 07/14/24 2127     WBC 10.60 10*3/mm3      RBC 4.58 10*6/mm3      Hemoglobin 12.7 g/dL      Hematocrit 37.7 %      MCV 82.3 fL      MCH 27.7 pg      MCHC 33.7 g/dL      RDW 13.3 %      RDW-SD 39.8 fl      MPV 10.1 fL      Platelets 456 10*3/mm3      Neutrophil % 47.6 %      Lymphocyte % 43.2 %      Monocyte % 6.4 %      Eosinophil % 1.7 %      Basophil % 0.8 %      Immature Grans % 0.3 %      Neutrophils, Absolute 5.04 10*3/mm3      Lymphocytes, Absolute 4.58 10*3/mm3      Monocytes, Absolute 0.68 10*3/mm3      Eosinophils, Absolute 0.18 10*3/mm3      Basophils, Absolute 0.09 10*3/mm3      Immature Grans, Absolute 0.03 10*3/mm3      nRBC 0.0 /100 WBC     Scan Slide [[id number]]  (Normal) Collected: 07/14/24 2102    Specimen: Blood Updated: 07/14/24 2127     RBC Morphology Normal     WBC Morphology Normal     Platelet Morphology Normal             Imaging:    US Gallbladder    Result Date: 7/13/2024  US GALLBLADDER-  Date of Exam: 7/13/2024 10:43 PM  Indication: RUQ abdominal pain tenderness.  Comparisons: 7/10/2024; 10/4/2023; 5/28/2023; 8/26/2022.  TECHNIQUE: A limited abdominal ultrasound examination of the right upper quadrant was performed, tailored in order to evaluate the gallbladder and the biliary tree. Two-dimensional grayscale images as well as color and spectral Doppler analysis are provided for review.  FINDINGS: Again demonstrated are gallstones and a contracted gallbladder. No biliary ductal dilatation is seen. The common bile duct measures 3.9 mm in diameter. There may be mild diffuse hepatic steatosis. No hepatomegaly. The liver measures 16.8 cm in craniocaudal dimension. The right kidney measures 12.8 x 4.3 x 5 cm. The right renal volume is not provided. No hydronephrosis. The pancreas is not well seen, obscured by overlying bowel gas. Its visualized portions are unremarkable. Doppler interrogation of the hepatic vasculature reveals patent vessels with normal blood flow  direction. A negative sonographic Luz's sign was reported. No ascites. The left kidney, spleen, inferior vena cava (IVC), and abdominal aorta were not evaluated.      Gallstones are present without sonographic evidence of acute cholecystitis. No biliary ductal dilatation.    Please note that portions of this note were completed with a voice recognition program.       Electronically Signed By-Ruben Campa MD On:7/13/2024 11:22 PM         Differential Diagnosis and Discussion:    Abdominal Pain: Based on the patient's signs and symptoms, I considered abdominal aortic aneurysm, small bowel obstruction, pancreatitis, acute cholecystitis, acute appendecitis, peptic ulcer disease, gastritis, colitis, endocrine disorders, irritable bowel syndrome and other differential diagnosis an etiology of the patient's abdominal pain.    All labs were reviewed and interpreted by me.    MDM     Amount and/or Complexity of Data Reviewed  Clinical lab tests: reviewed  Decide to obtain previous medical records or to obtain history from someone other than the patient: yes       CBC shows no evidence of leukocytosis.  The patient´s CMP that was reviewed and interpretted by me shows no abnormalities of critical concern. Of note, the patient´s sodium and potassium are acceptable. The patient´s liver enzymes are unremarkable. The patient´s renal function (creatinine) is preserved. The patient has a normal anion gap.  Urinalysis shows no evidence of UTI.  I reviewed patient's ultrasound from yesterday and updated lab work and called general surgeon Dr. Hickman who states to call the office in the morning and he will take the gallbladder out this week.  I instructed patient to return to ED if she develops any new or worsening symptoms.  Patient is afebrile and resting comfortably at this time and states she understands and agrees with plan of care.          Patient Care Considerations:          Consultants/Shared Management Plan:    I spoke  with general surgeon Dr. Hickman who states to call the office in the morning and he will take the gallbladder out this week.     Social Determinants of Health:          Disposition and Care Coordination:    Discharged: The patient is suitable and stable for discharge with no need for consideration of admission.    I have explained the patient´s condition, diagnoses and treatment plan based on the information available to me at this time. I have answered questions and addressed any concerns. The patient has a good  understanding of the patient´s diagnosis, condition, and treatment plan as can be expected at this point. The vital signs have been stable. The patient´s condition is stable and appropriate for discharge from the emergency department.      The patient will pursue further outpatient evaluation with the primary care physician or other designated or consulting physician as outlined in the discharge instructions. They are agreeable to this plan of care and follow-up instructions have been explained in detail. The patient has received these instructions in written format and has expressed an understanding of the discharge instructions. The patient is aware that any significant change in condition or worsening of symptoms should prompt an immediate return to this or the closest emergency department or call to 911.  I have explained discharge medications and the need for follow up with the patient/caretakers. This was also printed in the discharge instructions. Patient was discharged with the following medications and follow up:      Medication List      No changes were made to your prescriptions during this visit.      Cisco Hickman MD  [address] [place], [place] [id number] [phone]    Call in 1 day  To schedule appointment       Final diagnoses:   Biliary calculus of other site without obstruction        ED Disposition       ED Disposition   Discharge    Condition   Stable    Comment   --                This medical record created using voice recognition software.             Dusty Chi PA-C  07/14/24 [id number]

## 2024-07-17 ENCOUNTER — ANESTHESIA EVENT (OUTPATIENT)
Dept: PERIOP | Facility: HOSPITAL | Age: 29
End: 2024-07-17
Payer: COMMERCIAL

## 2024-07-17 ENCOUNTER — OFFICE VISIT (OUTPATIENT)
Dept: SURGERY | Facility: CLINIC | Age: 29
End: 2024-07-17
Payer: COMMERCIAL

## 2024-07-17 VITALS
WEIGHT: 151 LBS | BODY MASS INDEX: 25.78 KG/M2 | HEART RATE: 93 BPM | HEIGHT: 64 IN | SYSTOLIC BLOOD PRESSURE: 113 MMHG | DIASTOLIC BLOOD PRESSURE: 66 MMHG

## 2024-07-17 DIAGNOSIS — K80.20 SYMPTOMATIC CHOLELITHIASIS: Primary | ICD-10-CM

## 2024-07-17 PROCEDURE — 1160F RVW MEDS BY RX/DR IN RCRD: CPT | Performed by: STUDENT IN AN ORGANIZED HEALTH CARE EDUCATION/TRAINING PROGRAM

## 2024-07-17 PROCEDURE — 1159F MED LIST DOCD IN RCRD: CPT | Performed by: STUDENT IN AN ORGANIZED HEALTH CARE EDUCATION/TRAINING PROGRAM

## 2024-07-17 PROCEDURE — 99213 OFFICE O/P EST LOW 20 MIN: CPT | Performed by: STUDENT IN AN ORGANIZED HEALTH CARE EDUCATION/TRAINING PROGRAM

## 2024-07-17 RX ORDER — ONDANSETRON 2 MG/ML
4 INJECTION INTRAMUSCULAR; INTRAVENOUS EVERY 6 HOURS PRN
Status: CANCELLED | OUTPATIENT
Start: 2024-07-17

## 2024-07-17 RX ORDER — SODIUM CHLORIDE 0.9 % (FLUSH) 0.9 %
10 SYRINGE (ML) INJECTION AS NEEDED
Status: CANCELLED | OUTPATIENT
Start: 2024-07-17

## 2024-07-17 RX ORDER — SODIUM CHLORIDE, SODIUM LACTATE, POTASSIUM CHLORIDE, CALCIUM CHLORIDE 600; 310; 30; 20 MG/100ML; MG/100ML; MG/100ML; MG/100ML
70 INJECTION, SOLUTION INTRAVENOUS CONTINUOUS
Status: CANCELLED | OUTPATIENT
Start: 2024-07-17

## 2024-07-17 RX ORDER — SODIUM CHLORIDE 9 MG/ML
40 INJECTION, SOLUTION INTRAVENOUS AS NEEDED
Status: CANCELLED | OUTPATIENT
Start: 2024-07-17

## 2024-07-17 RX ORDER — SODIUM CHLORIDE 0.9 % (FLUSH) 0.9 %
10 SYRINGE (ML) INJECTION EVERY 12 HOURS SCHEDULED
Status: CANCELLED | OUTPATIENT
Start: 2024-07-17

## 2024-07-17 NOTE — PRE-PROCEDURE INSTRUCTIONS
ATIENT INSTRUCTED TO BE:    - NOTHING TO EAT AFTER MIDNIGHT OR CHEW, EXCEPT CAN HAVE CLEAR LIQUIDS 2 HOURS PRIOR TO SURGERY ARRIVAL TIME , NO MORE THAN 8 OZ. (NOTHING RED)     - TO HOLD ALL VITAMINS, SUPPLEMENTS, NSAIDS FOR ONE WEEK PRIOR TO THEIR SURGICAL PROCEDURE    - DO NOT TAKE ______PHENTERMINE LAST DOSE 7/12/24________________ 7 DAYS PRIOR TO PROCEDURE PER ANESTHESIA RECOMMENDATIONS/INSTRUCTIONS     - INSTRUCTED PT TO USE SURGICAL SOAP 1 TIME THE NIGHT PRIOR TO SURGERY ___________ OR THE AM OF SURGERY _____________   USE THE SOAP FROM NECK TO TOES, AVOID THEIR FACE, HAIR, AND PRIVATE PARTS. IF USE THE SOAP THE NIGHT PRIOR TO SURGERY, CHANGE BED LINENS AND NO PETS IN THE BED.     INSTRUCTED NO LOTIONS, JEWELRY, PIERCINGS,  NAIL POLISH, OR DEODORANT DAY OF SURGERY    - IF DIABETIC, CHECK BLOOD GLUCOSE IF LESS THAN 70 OR HAVING SYMPTOMS CALL THE PREOP AREA FOR INSTRUCTIONS ON AM OF SURGERY ( [phone])    -INSTRUCTED TO TAKE THE FOLLOWING MEDICATIONS THE DAY OF SURGERY WITH SIPS OF WATER: NEXIUM, FLUDROCORTISONE, TRINTELLIX, NORCO PRN          - DO NOT BRING ANY MEDICATIONS WITH YOU TO THE HOSPITAL THE DAY OF SURGERY, EXCEPT IF USE INHALERS. BRING INHALERS DAY OF SURGERY       - BRING CPAP OR BIPAP TO THE HOSPITAL ONLY IF YOU ARE SPENDING THE NIGHT  NA  - DO NOT SMOKE OR VAPE 24 HOURS PRIOR TO PROCEDURE PER ANESTHESIA REQUEST !!!!    -MAKE SURE YOU HAVE A RIDE HOME OR SOMEONE TO STAY WITH YOU THE DAY OF THE PROCEDURE AFTER YOU GO HOME     - FOLLOW ANY OTHER INSTRUCTIONS GIVEN TO YOU BY YOUR SURGEON'S OFFICE.     - DAY OF SURGERY ____________,  COME TO [facility]/ [facility], [facility] FLOOR. CHECK IN AT THE DESK FOR REGISTRATION/SURGERY    - YOU WILL RECEIVE A PHONE CALL THE DAY PRIOR TO SURGERY BETWEEN 1PM AND 4 PM WITH ARRIVAL TIME, IF YOUR SURGERY IS ON A MONDAY YOU WILL RECEIVE A CALL THE FRIDAY PRIOR TO SURGERY DATE    - BRING CASH OR CREDIT CARD FOR COPAYMENT OF MEDICATIONS AFTER SURGERY IF YOU USE THE  HOSPITAL PHARMACY ([facility] TO BED)    - PREADMISSION TESTING NURSE WILBUR ARMSTRONG [phone] IF HAVE ANY QUESTIONS     -PATIENT PROVIDED THE NUMBER FOR PREOP SURGICAL DEPT IF HAD QUESTIONS AFTER HOURS PRIOR TO SURGERY ( [phone]).  INFORMED PT IF NO ANSWER, LEAVE A MESSAGE AND SOMEONE WILL RETURN THEIR CALL       PATIENT VERBALIZED UNDERSTANDING

## 2024-07-17 NOTE — H&P (VIEW-ONLY)
Patient Name:  Palmira Carbajal  YOB: 1995  [id number]    Referring Provider: Dusty Chi PA-C    Patient Care Team:  Jaja Castillo APRN as PCP - General (Nurse Practitioner)  Virginia Santana APRN (Inactive) as Nurse Practitioner (Otolaryngology)  Melisa Garcia MD as Consulting Physician (Urology)  Marian Benitez MD as Consulting Physician (General Surgery)  Kayla Brody APRN as Nurse Practitioner (Nurse Practitioner)  Echo Cardenas APRN as Nurse Practitioner (Nurse Practitioner)      Chief Complaint  gallbladder (RUQ PAIN, UPPER BACK PAIN, NAUSEA AND VOMITING X LAST NIGHT.)    Subjective     Palmira Carbajal is a 29 y.o. female who presents to [facility]    History of Present Illness  29-year-old female who presents today as a new referral for symptomatic cholelithiasis.  For the past month, patient has had right upper quadrant postprandial abdominal pain several times per week.  Over the weekend she presented to the ED twice for similar pain but her workup was not suggestive of cholecystitis.  She denies any current nausea, vomiting, fevers, chills.  She denies any changes to her urine or stool color.  Her abdominal surgical history includes a hysterectomy,  section, and a diagnostic laparoscopy.      History     Past Medical History:   Diagnosis Date    Acid reflux     Anxiety     Chronic pain disorder     Depression     Eczema     Fatty liver     Intractable migraine without aura and without status migrainosus 2023    Kidney stone     HX OF    Maltracking of right patella     Migraines     Panic disorder     PONV (postoperative nausea and vomiting)     GOOD RESULTS WITH SCOPALAMINE    PTSD (post-traumatic stress disorder)     WAKES UP FROM ANESTHESIA WITH PANIC ATTACK    Seasonal allergic rhinitis 2022    Self-injurious behavior     as a teenager    Suicide attempt        Past Surgical History:    Procedure Laterality Date     SECTION      COLONOSCOPY N/A 10/11/2022    Procedure: COLONOSCOPY;  Surgeon: Dyan Griffin MD;  Location: [facility] ENDOSCOPY;  Service: Gastroenterology;  Laterality: N/A;  HEMORRHOIDS    CYSTOSCOPY      CYSTOSCOPY URETEROSCOPY Left 2023    Procedure: CYSTOSCOPY URETEROSCOPY RETROGRADE PYELOGRAM HOLMIUM LASER STENT INSERTION, left;  Surgeon: Melisa Garcia MD;  Location: [facility] MAIN OR;  Service: Urology;  Laterality: Left;    ENDOSCOPY N/A 2023    Procedure: ESOPHAGOGASTRODUODENOSCOPY WITH BIOPSIES;  Surgeon: Dyan Griffin MD;  Location: [facility] ENDOSCOPY;  Service: Gastroenterology;  Laterality: N/A;  ESOPHAGITIS, GASTRITIS    HYSTERECTOMY      KIDNEY STONE SURGERY      unspecified    KNEE ARTHROSCOPY Right 2022    Procedure: KNEE ARTHROSCOPY WITH A LATERAL RELEASE AND CHONDROPLASTY;  Surgeon: Marco A Pitts MD;  Location: [facility] OR OSC;  Service: Orthopedics;  Laterality: Right;    WISDOM TOOTH EXTRACTION         Family History   Problem Relation Age of Onset    Arthritis Mother     Restless legs syndrome Mother     Thyroid disease Father     Colon polyps Father     Diabetes Father     Drug abuse Maternal Uncle     Alcohol abuse Maternal Uncle     Cancer Maternal Grandmother     Sleep apnea Son     Malig Hyperthermia Neg Hx        Social History     Tobacco Use    Smoking status: Former     Current packs/day: 0.00     Average packs/day: 0.5 packs/day for 15.0 years (7.5 ttl pk-yrs)     Types: Cigarettes     Start date: 2007     Quit date: 2022     Years since quittin.4    Smokeless tobacco: Never   Vaping Use    Vaping status: Every Day    Substances: Nicotine, Flavoring    Devices: Disposable   Substance Use Topics    Alcohol use: Not Currently    Drug use: Not Currently     Types: Marijuana     Comment: Marijuana use in teenage years       Allergies   Allergen Reactions    Cephalexin Diarrhea    Cephalosporins GI  Intolerance    Cymbalta [Duloxetine Hcl] Other (See Comments)     Jittery and insomnia    Penicillins Rash       Prior to Admission medications    Medication Sig Start Date End Date Taking? Authorizing Provider   amitriptyline (ELAVIL) 10 MG tablet Take 1 tablet by mouth At Night As Needed for Sleep. 6/19/24  Yes Echo Cardenas APRN   dicyclomine (BENTYL) 20 MG tablet Take 1 tablet by mouth Every 6 (Six) Hours As Needed (abdominal cramping). 10/31/23  Yes Kayla Brody APRN   esomeprazole (nexIUM) 40 MG capsule Take 1 capsule by mouth Every Morning Before Breakfast. 10/31/23  Yes Kayla Brody APRN   famotidine (PEPCID) 20 MG tablet Take 1 tablet by mouth At Night As Needed for Heartburn. 5/1/24  Yes Kayla Brody APRN   fludrocortisone 0.1 MG tablet Take 1 tablet by mouth Daily. 6/24/24  Yes Kelin Toribio APRN   galcanezumab-gnlm (Emgality) 120 MG/ML auto-injector pen Inject 2 mL under the skin into the appropriate area as directed Every 30 (Thirty) Days. Indications: Migraine Headache 4/25/24  Yes Jaja Castillo APRN   HYDROcodone-acetaminophen (NORCO) 5-325 MG per tablet Take 1 tablet by mouth Every 6 (Six) Hours As Needed (Pain). 7/14/24  Yes Cesario Gonzalez,    ibuprofen (ADVIL,MOTRIN) 600 MG tablet Take 1 tablet by mouth Every 6 (Six) Hours As Needed for Moderate Pain. Indications: heel pain 4/25/24  Yes Jaja Castillo APRN   lamoTRIgine (LaMICtal) 150 MG tablet Take 1 tablet by mouth Daily. 6/19/24  Yes Echo Cardenas APRN   OLANZapine (ZyPREXA) 7.5 MG tablet Take 1 tablet by mouth Every Night. 6/19/24  Yes Echo Cardenas APRN   ondansetron ODT (ZOFRAN-ODT) 4 MG disintegrating tablet Place 1 tablet on the tongue Every 8 (Eight) Hours As Needed for Nausea or Vomiting. 6/24/24  Yes Jaja Castillo APRN   phentermine 37.5 MG capsule Take 1 capsule by mouth Every Morning. 6/24/24  Yes Jaja Castillo APRN   polyethylene  "glycol (MIRALAX) 17 g packet Take 17 g by mouth Daily. 4/5/23  Yes Kayla Brody APRN   Resmetirom (Rezdiffra) 80 MG tablet Take 1 tablet by mouth Daily. 7/15/24  Yes Kayla Brody APRN   Rimegepant Sulfate (Nurtec) 75 MG tablet dispersible tablet Take 1 tablet by mouth Daily As Needed (migraine). Indications: Migraine Headache 4/25/24  Yes Jaja Castillo APRN   vitamin D3 (Vitamin D) 125 MCG (5000 UT) capsule capsule Take 1 capsule by mouth Daily. 12/29/23  Yes Jaja Castillo APRN   Vortioxetine HBr (Trintellix) 5 MG tablet tablet Take 1 tablet by mouth Daily With Breakfast. 6/19/24  Yes Echo Cardenas APRN   ondansetron ODT (ZOFRAN-ODT) 4 MG disintegrating tablet Place 1 tablet on the tongue Every 8 (Eight) Hours As Needed for Vomiting or Nausea. 7/14/24   Cesario Gonzalez, DO       Objective    Objective              Vital Signs:   /66 (BP Location: Left arm, Patient Position: Sitting, Cuff Size: Adult)   Pulse 93   Ht 162.6 cm (64\")   Wt 68.5 kg (151 lb)   BMI 25.92 kg/m²       Physical Exam  Constitutional:       Appearance: Normal appearance.   HENT:      Head: Normocephalic and atraumatic.      Mouth/Throat:      Mouth: Mucous membranes are moist.      Pharynx: Oropharynx is clear.   Cardiovascular:      Rate and Rhythm: Normal rate and regular rhythm.   Pulmonary:      Effort: Pulmonary effort is normal. No respiratory distress.   Abdominal:      General: There is no distension.      Palpations: Abdomen is soft.      Tenderness: There is no abdominal tenderness.   Musculoskeletal:         General: No swelling. Normal range of motion.      Cervical back: Normal range of motion and neck supple.   Skin:     General: Skin is warm and dry.   Neurological:      General: No focal deficit present.      Mental Status: She is alert and oriented to person, place, and time.   Psychiatric:         Mood and Affect: Mood normal.         Behavior: Behavior normal.           "      Assessment / Plan      Diagnoses and all orders for this visit:    1. Symptomatic cholelithiasis (Primary)  -     Case Request; Standing  -     sodium chloride 0.9 % flush 10 mL  -     sodium chloride 0.9 % flush 10 mL  -     sodium chloride 0.9 % infusion 40 mL  -     lactated ringers infusion  -     ceFAZolin (ANCEF) 2,000 mg in sodium chloride 0.9 % 100 mL IVPB  -     ondansetron (ZOFRAN) injection 4 mg  -     Case Request    Other orders  -     Follow Anesthesia Guidelines / Protocol; Future  -     Follow Anesthesia Guidelines / Protocol; Standing  -     Verify NPO Status; Standing  -     Obtain Informed Consent; Standing  -     Verify / Perform Chlorhexidine Skin Prep; Standing  -     Provide NPO Instructions to Patient; Future  -     Chlorhexidine Skin Prep; Future  -     Insert Peripheral IV; Standing  -     Saline Lock & Maintain IV Access; Standing  -     Place Sequential Compression Device; Standing  -     Maintain Sequential Compression Device; Standing    29-year-old female with symptomatic cholelithiasis.  Patient has classic postprandial biliary colic pain.  All labs and imaging from recent ED visits reviewed.  Recommend elective laparoscopic possible open cholecystectomy and any other indicated procedure.  Risks/benefits/alternatives of the procedure were explained to the patient and she was agreeable to proceed.    Follow Up   Return for Post-op.      Patient was given instructions and counseling regarding her condition or for health maintenance advice. Please see specific information pulled into the AVS if appropriate.     Electronically signed by Cisco Hickman MD, 07/17/24, 2:44 PM EDT.

## 2024-07-17 NOTE — PROGRESS NOTES
Patient Name:  Palmira Carbajal  YOB: 1995  [id number]    Referring Provider: Dsuty Chi PA-C    Patient Care Team:  Jaja Castillo APRN as PCP - General (Nurse Practitioner)  Virginia Santana APRN (Inactive) as Nurse Practitioner (Otolaryngology)  Melisa Garcia MD as Consulting Physician (Urology)  Marian Benitez MD as Consulting Physician (General Surgery)  Kayla Brody APRN as Nurse Practitioner (Nurse Practitioner)  Echo Cardenas APRN as Nurse Practitioner (Nurse Practitioner)      Chief Complaint  gallbladder (RUQ PAIN, UPPER BACK PAIN, NAUSEA AND VOMITING X LAST NIGHT.)    Subjective     Palmira Carbajal is a 29 y.o. female who presents to [facility] GENER[facility] SUR[facility]ERY    History of Present Illness  29-year-old female who presents today as a new referral for symptomatic cholelithiasis.  For the past month, patient has had right upper quadrant postprandial abdominal pain several times per week.  Over the weekend she presented to the ED twice for similar pain but her workup was not suggestive of cholecystitis.  She denies any current nausea, vomiting, fevers, chills.  She denies any changes to her urine or stool color.  Her abdominal surgical history includes a hysterectomy,  section, and a diagnostic laparoscopy.      History     Past Medical History:   Diagnosis Date    Acid reflux     Anxiety     Chronic pain disorder     Depression     Eczema     Fatty liver     Intractable migraine without aura and without status migrainosus 2023    Kidney stone     HX OF    Maltracking of right patella     Migraines     Panic disorder     PONV (postoperative nausea and vomiting)     GOOD RESULTS WITH SCOPALAMINE    PTSD (post-traumatic stress disorder)     WAKES UP FROM ANESTHESIA WITH PANIC ATTACK    Seasonal allergic rhinitis 2022    Self-injurious behavior     as a teenager    Suicide attempt        Past Surgical History:    Procedure Laterality Date     SECTION      COLONOSCOPY N/A 10/11/2022    Procedure: COLONOSCOPY;  Surgeon: Dyan Griffin MD;  Location: [facility] ENDOSCOPY;  Service: Gastroenterology;  Laterality: N/A;  HEMORRHOIDS    CYSTOSCOPY      CYSTOSCOPY URETEROSCOPY Left 2023    Procedure: CYSTOSCOPY URETEROSCOPY RETROGRADE PYELOGRAM HOLMIUM LASER STENT INSERTION, left;  Surgeon: Melisa Garcia MD;  Location: [facility] MAIN OR;  Service: Urology;  Laterality: Left;    ENDOSCOPY N/A 2023    Procedure: ESOPHAGOGASTRODUODENOSCOPY WITH BIOPSIES;  Surgeon: Dyna Griffin MD;  Location: [facility] ENDOSCOPY;  Service: Gastroenterology;  Laterality: N/A;  ESOPHAGITIS, GASTRITIS    HYSTERECTOMY      KIDNEY STONE SURGERY      unspecified    KNEE ARTHROSCOPY Right 2022    Procedure: KNEE ARTHROSCOPY WITH A LATERAL RELEASE AND CHONDROPLASTY;  Surgeon: Marco A Pitts MD;  Location: [facility] OR OSC;  Service: Orthopedics;  Laterality: Right;    WISDOM TOOTH EXTRACTION         Family History   Problem Relation Age of Onset    Arthritis Mother     Restless legs syndrome Mother     Thyroid disease Father     Colon polyps Father     Diabetes Father     Drug abuse Maternal Uncle     Alcohol abuse Maternal Uncle     Cancer Maternal Grandmother     Sleep apnea Son     Malig Hyperthermia Neg Hx        Social History     Tobacco Use    Smoking status: Former     Current packs/day: 0.00     Average packs/day: 0.5 packs/day for 15.0 years (7.5 ttl pk-yrs)     Types: Cigarettes     Start date: 2007     Quit date: 2022     Years since quittin.4    Smokeless tobacco: Never   Vaping Use    Vaping status: Every Day    Substances: Nicotine, Flavoring    Devices: Disposable   Substance Use Topics    Alcohol use: Not Currently    Drug use: Not Currently     Types: Marijuana     Comment: Marijuana use in teenage years       Allergies   Allergen Reactions    Cephalexin Diarrhea    Cephalosporins GI  Intolerance    Cymbalta [Duloxetine Hcl] Other (See Comments)     Jittery and insomnia    Penicillins Rash       Prior to Admission medications    Medication Sig Start Date End Date Taking? Authorizing Provider   amitriptyline (ELAVIL) 10 MG tablet Take 1 tablet by mouth At Night As Needed for Sleep. 6/19/24  Yes Echo Cardenas APRN   dicyclomine (BENTYL) 20 MG tablet Take 1 tablet by mouth Every 6 (Six) Hours As Needed (abdominal cramping). 10/31/23  Yes Kayla Brody APRN   esomeprazole (nexIUM) 40 MG capsule Take 1 capsule by mouth Every Morning Before Breakfast. 10/31/23  Yes Kayla Brody APRN   famotidine (PEPCID) 20 MG tablet Take 1 tablet by mouth At Night As Needed for Heartburn. 5/1/24  Yes Kayla Brody APRN   fludrocortisone 0.1 MG tablet Take 1 tablet by mouth Daily. 6/24/24  Yes Kelin Toribio APRN   galcanezumab-gnlm (Emgality) 120 MG/ML auto-injector pen Inject 2 mL under the skin into the appropriate area as directed Every 30 (Thirty) Days. Indications: Migraine Headache 4/25/24  Yes Jaja Castillo APRN   HYDROcodone-acetaminophen (NORCO) 5-325 MG per tablet Take 1 tablet by mouth Every 6 (Six) Hours As Needed (Pain). 7/14/24  Yes Cesario Gonzalez,    ibuprofen (ADVIL,MOTRIN) 600 MG tablet Take 1 tablet by mouth Every 6 (Six) Hours As Needed for Moderate Pain. Indications: heel pain 4/25/24  Yes Jaja Castillo APRN   lamoTRIgine (LaMICtal) 150 MG tablet Take 1 tablet by mouth Daily. 6/19/24  Yes Echo Cardenas APRN   OLANZapine (ZyPREXA) 7.5 MG tablet Take 1 tablet by mouth Every Night. 6/19/24  Yes Echo Cardenas APRN   ondansetron ODT (ZOFRAN-ODT) 4 MG disintegrating tablet Place 1 tablet on the tongue Every 8 (Eight) Hours As Needed for Nausea or Vomiting. 6/24/24  Yes Jaja Castillo APRN   phentermine 37.5 MG capsule Take 1 capsule by mouth Every Morning. 6/24/24  Yes Jaja Castillo APRN   polyethylene  "glycol (MIRALAX) 17 g packet Take 17 g by mouth Daily. 4/5/23  Yes Kayla Brody APRN   Resmetirom (Rezdiffra) 80 MG tablet Take 1 tablet by mouth Daily. 7/15/24  Yes Kayla Brody APRN   Rimegepant Sulfate (Nurtec) 75 MG tablet dispersible tablet Take 1 tablet by mouth Daily As Needed (migraine). Indications: Migraine Headache 4/25/24  Yes Jaja Castillo APRN   vitamin D3 (Vitamin D) 125 MCG (5000 UT) capsule capsule Take 1 capsule by mouth Daily. 12/29/23  Yes Jaja Castillo APRN   Vortioxetine HBr (Trintellix) 5 MG tablet tablet Take 1 tablet by mouth Daily With Breakfast. 6/19/24  Yes Echo Cardenas APRN   ondansetron ODT (ZOFRAN-ODT) 4 MG disintegrating tablet Place 1 tablet on the tongue Every 8 (Eight) Hours As Needed for Vomiting or Nausea. 7/14/24   Cesario Gonzalez, DO       Objective    Objective              Vital Signs:   /66 (BP Location: Left arm, Patient Position: Sitting, Cuff Size: Adult)   Pulse 93   Ht 162.6 cm (64\")   Wt 68.5 kg (151 lb)   BMI 25.92 kg/m²       Physical Exam  Constitutional:       Appearance: Normal appearance.   HENT:      Head: Normocephalic and atraumatic.      Mouth/Throat:      Mouth: Mucous membranes are moist.      Pharynx: Oropharynx is clear.   Cardiovascular:      Rate and Rhythm: Normal rate and regular rhythm.   Pulmonary:      Effort: Pulmonary effort is normal. No respiratory distress.   Abdominal:      General: There is no distension.      Palpations: Abdomen is soft.      Tenderness: There is no abdominal tenderness.   Musculoskeletal:         General: No swelling. Normal range of motion.      Cervical back: Normal range of motion and neck supple.   Skin:     General: Skin is warm and dry.   Neurological:      General: No focal deficit present.      Mental Status: She is alert and oriented to person, place, and time.   Psychiatric:         Mood and Affect: Mood normal.         Behavior: Behavior normal.           "      Assessment / Plan      Diagnoses and all orders for this visit:    1. Symptomatic cholelithiasis (Primary)  -     Case Request; Standing  -     sodium chloride 0.9 % flush 10 mL  -     sodium chloride 0.9 % flush 10 mL  -     sodium chloride 0.9 % infusion 40 mL  -     lactated ringers infusion  -     ceFAZolin (ANCEF) 2,000 mg in sodium chloride 0.9 % 100 mL IVPB  -     ondansetron (ZOFRAN) injection 4 mg  -     Case Request    Other orders  -     Follow Anesthesia Guidelines / Protocol; Future  -     Follow Anesthesia Guidelines / Protocol; Standing  -     Verify NPO Status; Standing  -     Obtain Informed Consent; Standing  -     Verify / Perform Chlorhexidine Skin Prep; Standing  -     Provide NPO Instructions to Patient; Future  -     Chlorhexidine Skin Prep; Future  -     Insert Peripheral IV; Standing  -     Saline Lock & Maintain IV Access; Standing  -     Place Sequential Compression Device; Standing  -     Maintain Sequential Compression Device; Standing    29-year-old female with symptomatic cholelithiasis.  Patient has classic postprandial biliary colic pain.  All labs and imaging from recent ED visits reviewed.  Recommend elective laparoscopic possible open cholecystectomy and any other indicated procedure.  Risks/benefits/alternatives of the procedure were explained to the patient and she was agreeable to proceed.    Follow Up   Return for Post-op.      Patient was given instructions and counseling regarding her condition or for health maintenance advice. Please see specific information pulled into the AVS if appropriate.     Electronically signed by Cisco Hickman MD, 07/17/24, 2:44 PM EDT.

## 2024-07-18 ENCOUNTER — HOSPITAL ENCOUNTER (OUTPATIENT)
Facility: HOSPITAL | Age: 29
Setting detail: HOSPITAL OUTPATIENT SURGERY
Discharge: HOME | End: 2024-07-18
Attending: STUDENT IN AN ORGANIZED HEALTH CARE EDUCATION/TRAINING PROGRAM | Admitting: STUDENT IN AN ORGANIZED HEALTH CARE EDUCATION/TRAINING PROGRAM
Payer: COMMERCIAL

## 2024-07-18 ENCOUNTER — ANESTHESIA (OUTPATIENT)
Dept: PERIOP | Facility: HOSPITAL | Age: 29
End: 2024-07-18
Payer: COMMERCIAL

## 2024-07-18 VITALS
DIASTOLIC BLOOD PRESSURE: 70 MMHG | RESPIRATION RATE: 16 BRPM | WEIGHT: 153.66 LBS | HEART RATE: 49 BPM | TEMPERATURE: 96.9 F | BODY MASS INDEX: 26.23 KG/M2 | OXYGEN SATURATION: 100 % | SYSTOLIC BLOOD PRESSURE: 105 MMHG | HEIGHT: 64 IN

## 2024-07-18 DIAGNOSIS — K80.20 SYMPTOMATIC CHOLELITHIASIS: ICD-10-CM

## 2024-07-18 PROCEDURE — 25010000002 FENTANYL CITRATE (PF) 50 MCG/ML SOLUTION: Performed by: NURSE ANESTHETIST, CERTIFIED REGISTERED

## 2024-07-18 PROCEDURE — 25010000002 CEFAZOLIN PER 500 MG: Performed by: STUDENT IN AN ORGANIZED HEALTH CARE EDUCATION/TRAINING PROGRAM

## 2024-07-18 PROCEDURE — 25010000002 SUGAMMADEX 200 MG/2ML SOLUTION: Performed by: NURSE ANESTHETIST, CERTIFIED REGISTERED

## 2024-07-18 PROCEDURE — 25010000002 DEXAMETHASONE PER 1 MG: Performed by: NURSE ANESTHETIST, CERTIFIED REGISTERED

## 2024-07-18 PROCEDURE — 25010000002 MIDAZOLAM PER 1MG: Performed by: ANESTHESIOLOGY

## 2024-07-18 PROCEDURE — 47563 LAPARO CHOLECYSTECTOMY/GRAPH: CPT | Performed by: STUDENT IN AN ORGANIZED HEALTH CARE EDUCATION/TRAINING PROGRAM

## 2024-07-18 PROCEDURE — 25810000003 LACTATED RINGERS PER 1000 ML: Performed by: ANESTHESIOLOGY

## 2024-07-18 PROCEDURE — 25010000002 ONDANSETRON PER 1 MG: Performed by: NURSE ANESTHETIST, CERTIFIED REGISTERED

## 2024-07-18 PROCEDURE — 25010000002 PROPOFOL 10 MG/ML EMULSION: Performed by: NURSE ANESTHETIST, CERTIFIED REGISTERED

## 2024-07-18 PROCEDURE — 88304 TISSUE EXAM BY PATHOLOGIST: CPT | Performed by: STUDENT IN AN ORGANIZED HEALTH CARE EDUCATION/TRAINING PROGRAM

## 2024-07-18 PROCEDURE — C1889 IMPLANT/INSERT DEVICE, NOC: HCPCS | Performed by: STUDENT IN AN ORGANIZED HEALTH CARE EDUCATION/TRAINING PROGRAM

## 2024-07-18 PROCEDURE — 25010000002 MEPERIDINE PER 100 MG: Performed by: NURSE ANESTHETIST, CERTIFIED REGISTERED

## 2024-07-18 DEVICE — LIGACLIP 10-M/L, 10MM ENDOSCOPIC ROTATING MULTIPLE CLIP APPLIERS
Type: IMPLANTABLE DEVICE | Site: ABDOMEN | Status: FUNCTIONAL
Brand: LIGACLIP

## 2024-07-18 RX ORDER — DEXMEDETOMIDINE HYDROCHLORIDE 100 UG/ML
INJECTION, SOLUTION INTRAVENOUS AS NEEDED
Status: DISCONTINUED | OUTPATIENT
Start: 2024-07-18 | End: 2024-07-18 | Stop reason: SURG

## 2024-07-18 RX ORDER — PROMETHAZINE HYDROCHLORIDE 12.5 MG/1
25 TABLET ORAL ONCE AS NEEDED
Status: DISCONTINUED | OUTPATIENT
Start: 2024-07-18 | End: 2024-07-18 | Stop reason: HOSPADM

## 2024-07-18 RX ORDER — PROMETHAZINE HYDROCHLORIDE 25 MG/1
25 SUPPOSITORY RECTAL ONCE AS NEEDED
Status: DISCONTINUED | OUTPATIENT
Start: 2024-07-18 | End: 2024-07-18 | Stop reason: HOSPADM

## 2024-07-18 RX ORDER — MEPERIDINE HYDROCHLORIDE 25 MG/ML
12.5 INJECTION INTRAMUSCULAR; INTRAVENOUS; SUBCUTANEOUS
Status: DISCONTINUED | OUTPATIENT
Start: 2024-07-18 | End: 2024-07-18 | Stop reason: HOSPADM

## 2024-07-18 RX ORDER — SODIUM CHLORIDE, SODIUM LACTATE, POTASSIUM CHLORIDE, CALCIUM CHLORIDE 600; 310; 30; 20 MG/100ML; MG/100ML; MG/100ML; MG/100ML
70 INJECTION, SOLUTION INTRAVENOUS CONTINUOUS
Status: DISCONTINUED | OUTPATIENT
Start: 2024-07-18 | End: 2024-07-18 | Stop reason: HOSPADM

## 2024-07-18 RX ORDER — ACETAMINOPHEN 500 MG
1000 TABLET ORAL ONCE
Status: COMPLETED | OUTPATIENT
Start: 2024-07-18 | End: 2024-07-18

## 2024-07-18 RX ORDER — SCOLOPAMINE TRANSDERMAL SYSTEM 1 MG/1
1 PATCH, EXTENDED RELEASE TRANSDERMAL ONCE
Status: DISCONTINUED | OUTPATIENT
Start: 2024-07-18 | End: 2024-07-18 | Stop reason: HOSPADM

## 2024-07-18 RX ORDER — MAGNESIUM HYDROXIDE 1200 MG/15ML
LIQUID ORAL AS NEEDED
Status: DISCONTINUED | OUTPATIENT
Start: 2024-07-18 | End: 2024-07-18 | Stop reason: HOSPADM

## 2024-07-18 RX ORDER — HYDROCODONE BITARTRATE AND ACETAMINOPHEN 5; 325 MG/1; MG/1
1 TABLET ORAL EVERY 6 HOURS PRN
Qty: 15 TABLET | Refills: 0 | Status: SHIPPED | OUTPATIENT
Start: 2024-07-18

## 2024-07-18 RX ORDER — SODIUM CHLORIDE, SODIUM LACTATE, POTASSIUM CHLORIDE, CALCIUM CHLORIDE 600; 310; 30; 20 MG/100ML; MG/100ML; MG/100ML; MG/100ML
9 INJECTION, SOLUTION INTRAVENOUS CONTINUOUS PRN
Status: DISCONTINUED | OUTPATIENT
Start: 2024-07-18 | End: 2024-07-18 | Stop reason: HOSPADM

## 2024-07-18 RX ORDER — OXYCODONE HYDROCHLORIDE 5 MG/1
5 TABLET ORAL
Status: COMPLETED | OUTPATIENT
Start: 2024-07-18 | End: 2024-07-18

## 2024-07-18 RX ORDER — ONDANSETRON 2 MG/ML
4 INJECTION INTRAMUSCULAR; INTRAVENOUS ONCE AS NEEDED
Status: DISCONTINUED | OUTPATIENT
Start: 2024-07-18 | End: 2024-07-18 | Stop reason: HOSPADM

## 2024-07-18 RX ORDER — ONDANSETRON 4 MG/1
4 TABLET, ORALLY DISINTEGRATING ORAL ONCE AS NEEDED
Status: DISCONTINUED | OUTPATIENT
Start: 2024-07-18 | End: 2024-07-18 | Stop reason: HOSPADM

## 2024-07-18 RX ORDER — PROPOFOL 10 MG/ML
VIAL (ML) INTRAVENOUS AS NEEDED
Status: DISCONTINUED | OUTPATIENT
Start: 2024-07-18 | End: 2024-07-18 | Stop reason: SURG

## 2024-07-18 RX ORDER — LIDOCAINE HYDROCHLORIDE AND EPINEPHRINE 10; 10 MG/ML; UG/ML
INJECTION, SOLUTION INFILTRATION; PERINEURAL AS NEEDED
Status: DISCONTINUED | OUTPATIENT
Start: 2024-07-18 | End: 2024-07-18 | Stop reason: HOSPADM

## 2024-07-18 RX ORDER — ONDANSETRON 2 MG/ML
4 INJECTION INTRAMUSCULAR; INTRAVENOUS EVERY 6 HOURS PRN
Status: DISCONTINUED | OUTPATIENT
Start: 2024-07-18 | End: 2024-07-18 | Stop reason: HOSPADM

## 2024-07-18 RX ORDER — SODIUM CHLORIDE 9 MG/ML
40 INJECTION, SOLUTION INTRAVENOUS AS NEEDED
Status: DISCONTINUED | OUTPATIENT
Start: 2024-07-18 | End: 2024-07-18 | Stop reason: HOSPADM

## 2024-07-18 RX ORDER — FENTANYL CITRATE 50 UG/ML
INJECTION, SOLUTION INTRAMUSCULAR; INTRAVENOUS AS NEEDED
Status: DISCONTINUED | OUTPATIENT
Start: 2024-07-18 | End: 2024-07-18 | Stop reason: SURG

## 2024-07-18 RX ORDER — DEXAMETHASONE SODIUM PHOSPHATE 4 MG/ML
INJECTION, SOLUTION INTRA-ARTICULAR; INTRALESIONAL; INTRAMUSCULAR; INTRAVENOUS; SOFT TISSUE AS NEEDED
Status: DISCONTINUED | OUTPATIENT
Start: 2024-07-18 | End: 2024-07-18 | Stop reason: SURG

## 2024-07-18 RX ORDER — SODIUM CHLORIDE 0.9 % (FLUSH) 0.9 %
10 SYRINGE (ML) INJECTION AS NEEDED
Status: DISCONTINUED | OUTPATIENT
Start: 2024-07-18 | End: 2024-07-18 | Stop reason: HOSPADM

## 2024-07-18 RX ORDER — ROCURONIUM BROMIDE 10 MG/ML
INJECTION, SOLUTION INTRAVENOUS AS NEEDED
Status: DISCONTINUED | OUTPATIENT
Start: 2024-07-18 | End: 2024-07-18 | Stop reason: SURG

## 2024-07-18 RX ORDER — LIDOCAINE HYDROCHLORIDE 20 MG/ML
INJECTION, SOLUTION EPIDURAL; INFILTRATION; INTRACAUDAL; PERINEURAL AS NEEDED
Status: DISCONTINUED | OUTPATIENT
Start: 2024-07-18 | End: 2024-07-18 | Stop reason: SURG

## 2024-07-18 RX ORDER — ONDANSETRON 2 MG/ML
INJECTION INTRAMUSCULAR; INTRAVENOUS AS NEEDED
Status: DISCONTINUED | OUTPATIENT
Start: 2024-07-18 | End: 2024-07-18 | Stop reason: SURG

## 2024-07-18 RX ORDER — MIDAZOLAM HYDROCHLORIDE 2 MG/2ML
2 INJECTION, SOLUTION INTRAMUSCULAR; INTRAVENOUS ONCE
Status: COMPLETED | OUTPATIENT
Start: 2024-07-18 | End: 2024-07-18

## 2024-07-18 RX ORDER — SODIUM CHLORIDE 0.9 % (FLUSH) 0.9 %
10 SYRINGE (ML) INJECTION EVERY 12 HOURS SCHEDULED
Status: DISCONTINUED | OUTPATIENT
Start: 2024-07-18 | End: 2024-07-18 | Stop reason: HOSPADM

## 2024-07-18 RX ADMIN — FENTANYL CITRATE 50 MCG: 50 INJECTION, SOLUTION INTRAMUSCULAR; INTRAVENOUS at 12:46

## 2024-07-18 RX ADMIN — ROCURONIUM BROMIDE 40 MG: 10 INJECTION, SOLUTION INTRAVENOUS at 12:25

## 2024-07-18 RX ADMIN — SODIUM CHLORIDE, POTASSIUM CHLORIDE, SODIUM LACTATE AND CALCIUM CHLORIDE 9 ML/HR: 600; 310; 30; 20 INJECTION, SOLUTION INTRAVENOUS at 13:39

## 2024-07-18 RX ADMIN — SUGAMMADEX 200 MG: 100 INJECTION, SOLUTION INTRAVENOUS at 13:10

## 2024-07-18 RX ADMIN — DEXAMETHASONE SODIUM PHOSPHATE 4 MG: 4 INJECTION, SOLUTION INTRAMUSCULAR; INTRAVENOUS at 12:39

## 2024-07-18 RX ADMIN — DEXMEDETOMIDINE HYDROCHLORIDE 10 MCG: 100 INJECTION, SOLUTION, CONCENTRATE INTRAVENOUS at 12:59

## 2024-07-18 RX ADMIN — OXYCODONE 5 MG: 5 TABLET ORAL at 14:00

## 2024-07-18 RX ADMIN — DEXMEDETOMIDINE HYDROCHLORIDE 10 MCG: 100 INJECTION, SOLUTION, CONCENTRATE INTRAVENOUS at 13:03

## 2024-07-18 RX ADMIN — MEPERIDINE HYDROCHLORIDE 12.5 MG: 25 INJECTION INTRAMUSCULAR; INTRAVENOUS; SUBCUTANEOUS at 13:39

## 2024-07-18 RX ADMIN — PROPOFOL 200 MG: 10 INJECTION, EMULSION INTRAVENOUS at 12:25

## 2024-07-18 RX ADMIN — ONDANSETRON 4 MG: 2 INJECTION INTRAMUSCULAR; INTRAVENOUS at 13:32

## 2024-07-18 RX ADMIN — OXYCODONE 5 MG: 5 TABLET ORAL at 13:40

## 2024-07-18 RX ADMIN — DEXMEDETOMIDINE HYDROCHLORIDE 10 MCG: 100 INJECTION, SOLUTION, CONCENTRATE INTRAVENOUS at 12:56

## 2024-07-18 RX ADMIN — SCOPALAMINE 1 PATCH: 1 PATCH, EXTENDED RELEASE TRANSDERMAL at 11:11

## 2024-07-18 RX ADMIN — ACETAMINOPHEN 1000 MG: 500 TABLET ORAL at 11:12

## 2024-07-18 RX ADMIN — MIDAZOLAM HYDROCHLORIDE 2 MG: 1 INJECTION, SOLUTION INTRAMUSCULAR; INTRAVENOUS at 12:01

## 2024-07-18 RX ADMIN — LIDOCAINE HYDROCHLORIDE 80 MG: 20 INJECTION, SOLUTION INTRAVENOUS at 12:25

## 2024-07-18 RX ADMIN — SODIUM CHLORIDE 2000 MG: 9 INJECTION, SOLUTION INTRAVENOUS at 12:31

## 2024-07-18 RX ADMIN — ONDANSETRON HYDROCHLORIDE 4 MG: 2 SOLUTION INTRAMUSCULAR; INTRAVENOUS at 12:39

## 2024-07-18 RX ADMIN — SODIUM CHLORIDE, POTASSIUM CHLORIDE, SODIUM LACTATE AND CALCIUM CHLORIDE 9 ML/HR: 600; 310; 30; 20 INJECTION, SOLUTION INTRAVENOUS at 11:13

## 2024-07-18 RX ADMIN — FENTANYL CITRATE 50 MCG: 50 INJECTION, SOLUTION INTRAMUSCULAR; INTRAVENOUS at 12:25

## 2024-07-18 RX ADMIN — DEXMEDETOMIDINE HYDROCHLORIDE 5 MCG: 100 INJECTION, SOLUTION, CONCENTRATE INTRAVENOUS at 12:51

## 2024-07-18 NOTE — DISCHARGE INSTRUCTIONS
DISCHARGE INSTRUCTIONS LAPAROSCOPIC CHOLECYSTECTOMY/APPENDECTOMY  (GALL BLADDER)      For your surgery you had:  General anesthesia (you may have a sore throat for the first 24 hours)  IV sedation  Local anesthesia  Monitored anesthesia care  You received a medicated patch for nausea prevention today (behind your ear). It is recommended that you remove it 24-48 hours post-operatively. It must be removed within 72 hours.  You received an anesthesia medication today that can cause hormonal forms of birth control to be ineffective. You should use a different form of birth control (to prevent pregnancy) for 7 days.   You may experience dizziness, drowsiness, or light-headedness for several hours following surgery.  Do not stay alone tonight.  Limit your activity for 24 hours.  Resume your diet slowly.  Follow whatever special dietary instructions you may have been given by your doctor.  You should not drive, operate machinery, drink alcohol, or sign legally binding documents for 24 hours or while you are taking pain medication.  Last dose of pain medication was given at: Tylenol at 1111am  OXYCODONE AT 130PM  maystart pain meds after 530PM  today .    NOTIFY YOUR DOCTOR IF YOU EXPERIENCE ANY OF THE FOLLOWING:  Temperature greater than 101 degrees Fahrenheit  Shaking Chills  Redness or excessive drainage from incision  Nausea, vomiting and/or pain that is not controlled by prescribed medications  Increase in bleeding or bleeding that is excessive  Unable to urinate in 6 hours after surgery  If unable to reach your doctor, please go to the closest Emergency Room You may remove Band-Aid/dressing  48 hrs  .  You may shower 48hrs  .  Apply an ice pack 24-48 hours.  You may experience gas discomfort 24-48 hours after discharge, especially in chest and shoulders.  Changing position frequently may alleviate this discomfort.  If you have excessive pain, swelling, redness, drainage or other problems, notify your physician.  If  Pt calling and requesting refill on her Trulicity. She did not know dosage.  She would like it sent to [facility]# [phone] unable to urinate in 6 to 8 hours after surgery or urinating frequently in small amounts, notify your doctor or go to the nearest Emergency Room.  Medications per physician instructions as indicated on Discharge Medication Information Sheet.  You should see Dr. Hickman  for follow-up care  on  .  Phone number: [phone]     SPECIAL INSTRUCTIONS:                        I have read and received the above instructions.     Patient/Responsible Party's Signature Date/Time     RN Signature Date/Time

## 2024-07-18 NOTE — ANESTHESIA POSTPROCEDURE EVALUATION
Patient: Palmira Carbajal    Procedure Summary       Date: 07/18/24 Room / Location: [facility] OR  / [facility] OR [facility]    Anesthesia Start: 1221 Anesthesia Stop: 1320    Procedure: CHOLECYSTECTOMY LAPAROSCOPIC (Abdomen) Diagnosis:       Symptomatic cholelithiasis      (Symptomatic cholelithiasis [K80.20])    Surgeons: Cisco Hickman MD Provider: Chriss Davis MD    Anesthesia Type: general ASA Status: 2            Anesthesia Type: general    Vitals  Vitals Value Taken Time   /68 07/18/24 1426   Temp 36.3 °C (97.3 °F) 07/18/24 1317   Pulse 44 07/18/24 1427   Resp 15 07/18/24 1325   SpO2 99 % 07/18/24 1413   Vitals shown include unfiled device data.        Post Anesthesia Care and Evaluation    Patient location during evaluation: bedside  Patient participation: complete - patient participated  Level of consciousness: awake  Pain management: adequate    Airway patency: patent  PONV Status: none  Cardiovascular status: acceptable and stable  Respiratory status: acceptable  Hydration status: acceptable

## 2024-07-18 NOTE — OP NOTE
CHOLECYSTECTOMY LAPAROSCOPIC  Procedure Report    Patient Name:  Palmira Carbajal  YOB: 1995    Date of Surgery:  7/18/2024     Indications:  Symptomatic cholelithiasis     Pre-op Diagnosis:   Symptomatic cholelithiasis [K80.20]       Post-Op Diagnosis Codes:     * Symptomatic cholelithiasis [K80.20]    Procedure/CPT® Codes:      Procedure(s):  Laparoscopic cholecystectomy        Staff:  Surgeon(s):  Cisco Hickman MD    Assistant: Ya Aguilar RN CSA    Anesthesia: General    Estimated Blood Loss: 10 mL    Implants:    Implant Name Type Inv. Item Serial No.  Lot No. LRB No. Used Action   CLIPAPPLR M/ ENDO LIGACLIP ROT 10MM MD/LG - [id number] Implant CLIPAPPLR M/ ENDO LIGACLIP ROT 10MM MD/JEANETTE  [employer]  DIV OF J [employer] J [id number] N/A 1 Implanted       Specimen:          Specimens       ID Source Type Tests Collected By Collected At Frozen?    A Gallbladder Tissue TISSUE PATHOLOGY EXAM   Cisco Hickman MD 7/18/24 1254 No    Description: gallbladder and contents                Findings: Cholelithiasis    Complications: None apparent at the time of surgery    Description of Procedure: Informed consent was obtained before the patient was brought to the operating suite and placed in the supine position.  General endotracheal anesthesia was induced and maintained throughout the procedure.  The patient's abdomen was prepped and draped in standard sterile fashion before a timeout procedure was performed, verifying the correct patient, procedure, and other pertinent information.    Using a #15 blade scalpel an incision was made in the umbilicus and the abdomen was entered using Aquilino technique.  12 mm balloon trocar was inserted and the gas applied to achieve adequate pneumoperitoneum of 15 mmHg.  Laparoscope was inserted into the abdomen confirmed a safe entrance.  Under direct visualization three additional trocars were placed: two 5 mm trocars in the right costal margin and one 11  mm trocar in the epigastric region.  Fundus of the gallbladder was grasped and retracted over the liver to expose the infundibulum.  Omental and peritoneal attachments were serially taken down using blunt dissection.  Blunt dissection was also used to expose cystic artery and cystic duct to achieve a critical view of safety.  Cystic duct and artery were both clipped x3 before being transected with laparoscopic scissors.  Exam confirmed clips were placed across the entire lumen of the artery and the duct; there was no leakage of bile from the divided duct.  Hook electrocautery was used taking the gallbladder off the bed of the liver without rupture.  Specimen was collected in an Endo Catch bag and removed from the abdomen at the end of the procedure.  Hemostasis along the liver bed was verified.  Trocars were then removed under direct visualization.  Specimen was passed off the table for analysis by pathology.  Umbilical fascia was closed with a 0 PDS in figure-of-eight fashion.  All skin incisions were closed with 4-0 subcuticular Monocryl.  Incisions were cleaned and dried before application of Exofin over top to conclude the procedure.    Patient tolerated the procedure well and there were no immediate complications.  All counts were correct x2 at the end of procedure.    Disposition: Stable in PACU        The surgical first assist listed above assisted with all needed aspects of the procedure.    Electronically signed by Cisco Hickman MD, 07/18/24, 1:11 PM EDT.

## 2024-07-18 NOTE — ANESTHESIA PREPROCEDURE EVALUATION
Anesthesia Evaluation     Patient summary reviewed and Nursing notes reviewed   history of anesthetic complications:  PONV  NPO Solid Status: > 8 hours  NPO Liquid Status: > 2 hours           Airway   Mallampati: I  TM distance: >3 FB  Neck ROM: full  No difficulty expected  Dental      Pulmonary - normal exam    breath sounds clear to auscultation  (+) a smoker Current, vape,  Cardiovascular - normal exam  Exercise tolerance: good (4-7 METS)    Rhythm: regular  Rate: normal    (+) hypertension, hyperlipidemia      Neuro/Psych  (+) headaches  GI/Hepatic/Renal/Endo    (+) GERD well controlled, liver disease fatty liver disease    Musculoskeletal (-) negative ROS    Abdominal    Substance History - negative use     OB/GYN negative ob/gyn ROS         Other - negative ROS       ROS/Med Hx Other: >4METS. HX POTS,GERD. LAST DOSE PHENTERMINE 7/12/24. PT TO ED X2 WITH SYMPTOMS. SURGEON DECLARING URGENT. KT    Pt off phentermine for 6 days              Anesthesia Plan    ASA 2     general     (Patient understands anesthesia not responsible for dental damage.  Discussed risks of proceeding without waiting 7 days for phentermine.  Pt voices understanding and wishes to proceed)  intravenous induction     Anesthetic plan, risks, benefits, and alternatives have been provided, discussed and informed consent has been obtained with: patient.    Use of blood products discussed with patient .    Plan discussed with CRNA.    CODE STATUS:

## 2024-07-22 ENCOUNTER — TELEMEDICINE (OUTPATIENT)
Dept: PSYCHIATRY | Facility: CLINIC | Age: 29
End: 2024-07-22
Payer: COMMERCIAL

## 2024-07-22 ENCOUNTER — PRIOR AUTHORIZATION (OUTPATIENT)
Dept: PSYCHIATRY | Facility: CLINIC | Age: 29
End: 2024-07-22

## 2024-07-22 DIAGNOSIS — G47.9 SLEEPING DIFFICULTIES: ICD-10-CM

## 2024-07-22 DIAGNOSIS — F41.1 GENERALIZED ANXIETY DISORDER: Chronic | ICD-10-CM

## 2024-07-22 DIAGNOSIS — F33.0 MILD EPISODE OF RECURRENT MAJOR DEPRESSIVE DISORDER: Primary | Chronic | ICD-10-CM

## 2024-07-22 PROCEDURE — 99214 OFFICE O/P EST MOD 30 MIN: CPT | Performed by: NURSE PRACTITIONER

## 2024-07-22 PROCEDURE — 1160F RVW MEDS BY RX/DR IN RCRD: CPT | Performed by: NURSE PRACTITIONER

## 2024-07-22 PROCEDURE — 1159F MED LIST DOCD IN RCRD: CPT | Performed by: NURSE PRACTITIONER

## 2024-07-22 RX ORDER — AMITRIPTYLINE HYDROCHLORIDE 10 MG/1
10 TABLET, FILM COATED ORAL NIGHTLY PRN
Qty: 30 TABLET | Refills: 0 | Status: SHIPPED | OUTPATIENT
Start: 2024-07-22

## 2024-07-22 RX ORDER — LAMOTRIGINE 150 MG/1
150 TABLET ORAL DAILY
Qty: 30 TABLET | Refills: 0 | Status: SHIPPED | OUTPATIENT
Start: 2024-07-22

## 2024-07-22 RX ORDER — OLANZAPINE 2.5 MG/1
TABLET, FILM COATED ORAL
Qty: 15 TABLET | Refills: 0 | Status: SHIPPED | OUTPATIENT
Start: 2024-07-22 | End: 2024-08-01

## 2024-07-22 NOTE — PROGRESS NOTES
This provider is located at the [facility] (through [facility]), [address], [facility], [address] using a secure [employer] Video Visit through [employer]. Patient is being seen remotely via telehealth at their home address in [place], and stated they are in a secure environment for this session. The patient's condition being diagnosed/treated is appropriate for telemedicine. The provider identified herself as well as her credentials.   The patient, and/or patients guardian, consent to be seen remotely, and when consent is given they understand that the consent allows for patient identifiable information to be sent to a third party as needed.   They may refuse to be seen remotely at any time. The electronic data is encrypted and password protected, and the patient and/or guardian has been advised of the potential risks to privacy not withstanding such measures.    You have chosen to receive care through a telehealth visit.  Do you consent to use a video/audio connection for your medical care today? Yes    Patient identifiers utilized: Name and date of birth.    Patient verbally confirmed consent for today's encounter  07/22/2024 .    The patient does verbally confirm they are being seen today while physically located in the [facility].  This provider/this APRN is licensed in the [facility] where the patient is located/being seen.     Subjective   Palmira Carbajal is a 29 y.o. female who presents today for follow up    Chief Complaint: Medication management follow-up: Anxiety, depression, and sleeping difficulties    Accompanied by: The patient is interviewed alone at today's encounter    History of Present Illness:   -Mood reported as: No change from recent baseline and still with heightened anxiety  -Patient rates symptoms of depression at a 2-3/10 on a 0-10 scale, with 10 being the worst.  -Patient rates symptoms of anxiety at a 7/10 on a 0-10 scale, with 10  being the worst.    -Appetite reported as: Fair  -Sleep reported as: Decreased, but no change from recent baseline.  The patient reports since recently having her gallbladder out the pain is interfering with sleep as well.    -Changes in medications or new medical problems/concerns since last visit: The patient reports having to have her gallbladder out last week more urgently than she had anticipated  -Reported medication compliance: The patient reports compliance with their current psychotropic medication regimen.    -Reported medication side effects or concerns: Denies any.  No symptoms of EPS or TD reported.    -Auditory or visual hallucinations: Denies  -Behaviors different from patient baseline, or any reckless, impulsive, or risky behaviors: Denies  -Symptoms of karl or psychosis: Denies  -Self-injurious behavior: Denies  -SI/HI: The patient adamantly denies any suicidal or homicidal ideations, plans, or intent at the time of this encounter and is convincing.    -Using a shared decision-making approach the patient reports she would like to continue trying to come off of Zyprexa, but would also like to increase her Trintellix dosage to see if it would provide better coverage for her reported depressive and anxious symptoms.    -The patient does verbally contract for safety at today's encounter and is in verbal agreement with the safety/crisis plan. The patient reports in their own words that they will reach out to this APRN/office prior to next scheduled appointment if there is any worsening of mood, any new psychiatric symptoms, any medication side effects or concerns, any concern for safety to self or others, any suicidal or homicidal ideations plans or intent, or any concerns, or they will call 911, call or text the suicide and crisis lifeline at [phone], or go to the closest emergency department.       Patient Health Questionnaire-9 (PHQ-9) (Depression Screening Tool)  Little interest or pleasure in doing  things? (P) 1-->several days   Feeling down, depressed, or hopeless? (P) 0-->not at all   Trouble falling or staying asleep, or sleeping too much? (P) 2-->more than half the days   Feeling tired or having little energy? (P) 2-->more than half the days   Poor appetite or overeating? (P) 0-->not at all   Feeling bad about yourself - or that you are a failure or have let yourself or your family down? (P) 0-->not at all   Trouble concentrating on things, such as reading the newspaper or watching television? (P) 0-->not at all   Moving or speaking so slowly that other people could have noticed? Or the opposite - being so fidgety or restless that you have been moving around a lot more than usual? (P) 0-->not at all   Thoughts that you would be better off dead, or of hurting yourself in some way? (P) 0-->not at all   PHQ-9 Total Score (P) 5   If you checked off any problems, how difficult have these problems made it for you to do your work, take care of things at home, or get along with other people? (P) somewhat difficult     PHQ-9 Total Score: (P) 5      Generalized Anxiety Disorder 7-Item (STACY-7) Screening Tool  Feeling nervous, anxious or on edge: (P) More than half the days  Not being able to stop or control worrying: (P) Nearly every day  Worrying too much about different things: (P) Nearly every day  Trouble Relaxing: (P) More than half the days  Being so restless that it is hard to sit still: (P) Not at all  Feeling afraid as if something awful might happen: (P) More than half the days  Becoming easily annoyed or irritable: (P) Several days  STACY 7 Total Score: (P) 13  If you checked any problems, how difficult have these problems made it for you to do your work, take care of things at home, or get along with other people: (P) Very difficult      All Known Prior Psychiatric Medications and Responses if Known:  -Zoloft - does not remember response, possible lack of efficacy  -Paxil - does not remember response,  possible lack of efficacy  -Prozac - does not remember response, possible lack of efficacy  -Lexapro - does not remember response, possible lack of efficacy  -Desvenlafaxine - does not remember response, possible lack of efficacy  -Effexor - does not remember response, possible lack of efficacy  -Cymbalta - caused jitteriness and insomnia  -Viibryd - reports she cannot remember why she could not take this medication, but feels there was some kind of side effect  -Quetiapine - does not remember response, possible lack of efficacy  -Abilify - does not remember response, possible lack of efficacy  -Trazodone - does not remember response, possible lack of efficacy  -Hydroxyzine - does not remember response, possible lack of efficacy  -Ambien - does not remember response, possible lack of efficacy  -Lorazepam - does not remember response, possible lack of efficacy  -Buspar - does not remember response, possible lack of efficacy  -The patient reports some of the medications that she cannot remember the response to listed above included side effects such as restless leg, nightmares, and heart palpitations, but she cannot remember which medications caused these side effects  -Olanzapine - patient reports possible partial efficacy, if any efficacy, unsure  -Lamictal - patient reports effective for depression  -Amitriptyline - patient reports effective  -Trintellix    History of Seizures or TBI:  The patient denies any    Substance Use History:  The patient reports being a former cigarette user, she denies any smokeless tobacco use, she reports current electronic cigarette/vape use, she denies any current alcohol use but has tried occasional alcohol in the past, and the patient reports using marijuana in her teenage years, but not as an adult.  The patient denies any other recreational or illicit substance use/misuse/abuse.    Patient's Support Network Includes:   and mother    Last Menstrual Period:  Hysterectomy -  partial.        The following portions of the patient's history were reviewed and updated as appropriate: allergies, current medications, past family history, past medical history, past social history, past surgical history and problem list.          Past Medical History:  Past Medical History:   Diagnosis Date    Acid reflux     Anxiety     Chronic pain disorder     NO PAIN CLINIC, PCP/PSYCH CONT W/AMITRIPTYLINE    Depression     Eczema     Fatty liver     Gallstones     Intractable migraine without aura and without status migrainosus 2023    Kidney stone     HX OF    Maltracking of right patella     Migraines     Orthostatic hypotension     WORKED UP FOR POTS, CONT W/MEDS, MONNIN. RX FLORINEF    Panic disorder     PONV (postoperative nausea and vomiting)     GOOD RESULTS WITH SCOPALAMINE    PTSD (post-traumatic stress disorder)     WAKES UP FROM ANESTHESIA WITH PANIC ATTACK    Seasonal allergic rhinitis 2022    Self-injurious behavior     as a teenager    Suicide attempt     NO ADULT ISSUES       Social History:  Social History     Socioeconomic History    Marital status:      Spouse name: TRISTAN    Number of children: 3   Tobacco Use    Smoking status: Former     Current packs/day: 0.00     Average packs/day: 0.5 packs/day for 15.0 years (7.5 ttl pk-yrs)     Types: Cigarettes     Start date: 2007     Quit date: 2022     Years since quittin.4    Smokeless tobacco: Never   Vaping Use    Vaping status: Every Day    Substances: Nicotine, Flavoring    Devices: Disposable   Substance and Sexual Activity    Alcohol use: Not Currently    Drug use: Not Currently     Types: Marijuana     Comment: Marijuana use in teenage years    Sexual activity: Defer     Partners: Male     Birth control/protection: Hysterectomy       Family History:  Family History   Problem Relation Age of Onset    Arthritis Mother     Restless legs syndrome Mother     Thyroid disease Father     Colon polyps Father      Diabetes Father     Drug abuse Maternal Uncle     Alcohol abuse Maternal Uncle     Cancer Maternal Grandmother     Sleep apnea Son     Malana Hyperthermia Neg Hx        Past Surgical History:  Past Surgical History:   Procedure Laterality Date     SECTION      CHOLECYSTECTOMY N/A 2024    Procedure: CHOLECYSTECTOMY LAPAROSCOPIC;  Surgeon: Cisco Hickman MD;  [place] [facility] [place] [facility] [place]  Service: General;  Laterality: N/A;    COLONOSCOPY N/A 10/11/2022    Procedure: COLONOSCOPY;  Surgeon: Dyan Griffin MD;  Location: [facility] ENDOSCOPY;  Service: Gastroenterology;  Laterality: N/A;  HEMORRHOIDS    CYSTOSCOPY      CYSTOSCOPY URETEROSCOPY Left 2023    Procedure: CYSTOSCOPY URETEROSCOPY RETROGRADE PYELOGRAM HOLMIUM LASER STENT INSERTION, left;  Surgeon: Melisa Garcia MD;  Location: [facility] MAIN OR;  Service: Urology;  Laterality: Left;    ENDOSCOPY N/A 2023    Procedure: ESOPHAGOGASTRODUODENOSCOPY WITH BIOPSIES;  Surgeon: Dyan Griffin MD;  Location: [facility] ENDOSCOPY;  Service: Gastroenterology;  Laterality: N/A;  ESOPHAGITIS, GASTRITIS    HYSTERECTOMY      KIDNEY STONE SURGERY      unspecified    KNEE ARTHROSCOPY Right 2022    Procedure: KNEE ARTHROSCOPY WITH A LATERAL RELEASE AND CHONDROPLASTY;  Surgeon: Marco A Pitts MD;  [place] [facility] [place] [facility] [place]  Service: Orthopedics;  Laterality: Right;    WISDOM TOOTH EXTRACTION         Problem List:  Patient Active Problem List   Diagnosis    Maltracking of right patella    Impingement syndrome involving patellar fat pad of right knee    Chondromalacia    Patellar malalignment syndrome of right knee    Binocular vision disorder with diplopia    Generalized anxiety disorder    Acid reflux    PTSD (post-traumatic stress disorder)    Kidney stone on left side    Seasonal allergic rhinitis    Burn of finger and thumb of right hand, second degree    Hemorrhoids    Right hand pain    Rectal bleeding    Anal or rectal pain     Decreased appetite    Aftercare following surgery of right knee arthroscopic chondroplasty and lateral release, 7/11/2022    Generalized body aches    Epigastric pain    Female hirsutism    Right ovarian cyst    Moderate episode of recurrent major depressive disorder    Primary insomnia    Vitamin D deficiency    Intractable migraine without aura and without status migrainosus    Calcified granuloma of lung    Nipple discharge    Nausea and vomiting    Overweight (BMI 25.0-29.9)    Acne vulgaris    Vaping nicotine dependence, tobacco product    Achilles tendinitis of both lower extremities    BRADY (nonalcoholic steatohepatitis)    Symptomatic cholelithiasis       Allergy:   Allergies   Allergen Reactions    Cymbalta [Duloxetine Hcl] Mental Status Change     Jittery and insomnia    Penicillins Rash        Current Medications:   Current Outpatient Medications   Medication Sig Dispense Refill    amitriptyline (ELAVIL) 10 MG tablet Take 1 tablet by mouth At Night As Needed for Sleep. 30 tablet 0    lamoTRIgine (LaMICtal) 150 MG tablet Take 1 tablet by mouth Daily. 30 tablet 0    OLANZapine (ZyPREXA) 2.5 MG tablet Take 2 tablets by mouth Every Night for 5 days, THEN 1 tablet Every Night for 5 days. Then completely stop taking. 15 tablet 0    Vortioxetine HBr (Trintellix) 10 MG tablet tablet Take 1 tablet by mouth Daily With Breakfast. 30 tablet 0    dicyclomine (BENTYL) 20 MG tablet Take 1 tablet by mouth Every 6 (Six) Hours As Needed (abdominal cramping). 60 tablet 3    esomeprazole (nexIUM) 40 MG capsule Take 1 capsule by mouth Every Morning Before Breakfast. 90 capsule 3    famotidine (PEPCID) 20 MG tablet Take 1 tablet by mouth At Night As Needed for Heartburn. (Patient taking differently: Take 1 tablet by mouth Every Night.) 90 tablet 1    fludrocortisone 0.1 MG tablet Take 1 tablet by mouth Daily. 90 tablet 1    galcanezumab-gnlm (Emgality) 120 MG/ML auto-injector pen Inject 2 mL under the skin into the appropriate  area as directed Every 30 (Thirty) Days. Indications: Migraine Headache 1.12 mL 5    HYDROcodone-acetaminophen (NORCO) 5-325 MG per tablet Take 1 tablet by mouth Every 6 (Six) Hours As Needed (Pain). 15 tablet 0    ibuprofen (ADVIL,MOTRIN) 600 MG tablet Take 1 tablet by mouth Every 6 (Six) Hours As Needed for Moderate Pain. Indications: heel pain 120 tablet 0    ondansetron ODT (ZOFRAN-ODT) 4 MG disintegrating tablet Place 1 tablet on the tongue Every 8 (Eight) Hours As Needed for Vomiting or Nausea. 12 tablet 0    phentermine 37.5 MG capsule Take 1 capsule by mouth Every Morning. 30 capsule 0    polyethylene glycol (MIRALAX) 17 g packet Take 17 g by mouth Daily. 5 packet 0    Resmetirom (Rezdiffra) 80 MG tablet Take 1 tablet by mouth Daily. (Patient not taking: Reported on 7/17/2024) 90 tablet 3    Rimegepant Sulfate (Nurtec) 75 MG tablet dispersible tablet Take 1 tablet by mouth Daily As Needed (migraine). Indications: Migraine Headache 8 tablet 5    vitamin D3 (Vitamin D) 125 MCG (5000 UT) capsule capsule Take 1 capsule by mouth Daily. 90 capsule 1     No current facility-administered medications for this visit.           Review of Symptoms:    Review of Systems   Psychiatric/Behavioral:  Positive for decreased concentration, sleep disturbance, depressed mood and stress. Negative for agitation, behavioral problems, dysphoric mood, hallucinations, self-injury, suicidal ideas and negative for hyperactivity. The patient is nervous/anxious.            Physical Exam:   not currently breastfeeding. There is no height or weight on file to calculate BMI.   Due to the remote nature of this encounter (virtual encounter), vitals were unable to be obtained.  Height stated at 64 inches.  Weight stated at 151-161 pounds.        Physical Exam  Neurological:      Mental Status: She is alert and oriented to person, place, and time.   Psychiatric:         Attention and Perception: Attention normal.         Mood and Affect: Affect  normal. Mood is anxious.         Speech: Speech normal.         Behavior: Behavior normal. Behavior is cooperative.         Thought Content: Thought content is not paranoid or delusional. Thought content does not include homicidal or suicidal ideation. Thought content does not include homicidal or suicidal plan.         Cognition and Memory: Cognition and memory normal.         Judgment: Judgment normal.           Mental Status Exam:   Hygiene:   good  Cooperation:  Cooperative  Eye Contact:  Good  Psychomotor Behavior:  Appropriate  Affect:  Appropriate  Mood: anxious  Hopelessness: Denies  Speech:  Normal  Thought Process:  Goal directed and Linear  Thought Content:  Mood congruent  Suicidal:  None  Homicidal:  None  Hallucinations:  None  Delusion:  None  Memory:  Intact  Orientation:  Person, Place, Time, and Situation  Reliability:  good  Insight:  Good  Judgement:  Good  Impulse Control:  Good  Physical/Medical Issues:  No            Lab Results:   Admission on 07/18/2024, Discharged on 07/18/2024   Component Date Value Ref Range Status    Case Report 07/18/2024    Final                    Value:Surgical Pathology Report                         Case: [id number]                                  Authorizing Provider:  Cisco Hickman MD        Collected:           07/18/2024 12:54 PM          Ordering Location:     [facility]  Received:            07/19/2024 07:01 AM                                 OR                                                                           Pathologist:           Sue Frankel DO                                                       Specimen:    Gallbladder, gallbladder and contents                                                      Clinical Information 07/18/2024    Final                    Value:This result contains rich text formatting which cannot be displayed here.    Final Diagnosis 07/18/2024    Final                    Value:This result contains  rich text formatting which cannot be displayed here.    Gross Description 07/18/2024    Final                    Value:This result contains rich text formatting which cannot be displayed here.    Microscopic Description 07/18/2024    Final                    Value:This result contains rich text formatting which cannot be displayed here.   Admission on 07/14/2024, Discharged on 07/14/2024   Component Date Value Ref Range Status    Glucose 07/14/2024 82  65 - 99 mg/dL Final    BUN 07/14/2024 7  6 - 20 mg/dL Final    Creatinine 07/14/2024 0.70  0.57 - 1.00 mg/dL Final    Sodium 07/14/2024 141  136 - 145 mmol/L Final    Potassium 07/14/2024 3.7  3.5 - 5.2 mmol/L Final    Chloride 07/14/2024 105  98 - 107 mmol/L Final    CO2 07/14/2024 22.5  22.0 - 29.0 mmol/L Final    Calcium 07/14/2024 8.8  8.6 - 10.5 mg/dL Final    Total Protein 07/14/2024 6.6  6.0 - 8.5 g/dL Final    Albumin 07/14/2024 4.0  3.5 - 5.2 g/dL Final    ALT (SGPT) 07/14/2024 9  1 - 33 U/L Final    AST (SGOT) 07/14/2024 11  1 - 32 U/L Final    Alkaline Phosphatase 07/14/2024 83  39 - 117 U/L Final    Total Bilirubin 07/14/2024 <0.2  0.0 - 1.2 mg/dL Final    Globulin 07/14/2024 2.6  gm/dL Final    A/G Ratio 07/14/2024 1.5  g/dL Final    BUN/Creatinine Ratio 07/14/2024 10.0  7.0 - 25.0 Final    Anion Gap 07/14/2024 13.5  5.0 - 15.0 mmol/L Final    eGFR 07/14/2024 118.7  >60.0 mL/min/1.73 Final    Lipase 07/14/2024 33  13 - 60 U/L Final    Color, UA 07/14/2024 Yellow  Yellow, Straw Final    Appearance, UA 07/14/2024 Clear  Clear Final    pH, UA 07/14/2024 8.0  5.0 - 8.0 Final    Specific Gravity, UA 07/14/2024 <=1.005  1.005 - 1.030 Final    Glucose, UA 07/14/2024 Negative  Negative Final    Ketones, UA 07/14/2024 Negative  Negative Final    Bilirubin, UA 07/14/2024 Negative  Negative Final    Blood, UA 07/14/2024 Negative  Negative Final    Protein, UA 07/14/2024 Negative  Negative Final    Leuk Esterase, UA 07/14/2024 Negative  Negative Final    Nitrite, UA  07/14/2024 Negative  Negative Final    Urobilinogen, UA 07/14/2024 0.2 E.U./dL  0.2 - 1.0 E.U./dL Final    Extra Tube 07/14/2024 Hold for add-ons.   Final    Auto resulted.    Extra Tube 07/14/2024 hold for add-on   Final    Auto resulted    Extra Tube 07/14/2024 Hold for add-ons.   Final    Auto resulted.    Extra Tube 07/14/2024 Hold for add-ons.   Final    Auto resulted    WBC 07/14/2024 10.60  3.40 - 10.80 10*3/mm3 Final    RBC 07/14/2024 4.58  3.77 - 5.28 10*6/mm3 Final    Hemoglobin 07/14/2024 12.7  12.0 - 15.9 g/dL Final    Hematocrit 07/14/2024 37.7  34.0 - 46.6 % Final    MCV 07/14/2024 82.3  79.0 - 97.0 fL Final    MCH 07/14/2024 27.7  26.6 - 33.0 pg Final    MCHC 07/14/2024 33.7  31.5 - 35.7 g/dL Final    RDW 07/14/2024 13.3  12.3 - 15.4 % Final    RDW-SD 07/14/2024 39.8  37.0 - 54.0 fl Final    MPV 07/14/2024 10.1  6.0 - 12.0 fL Final    Platelets 07/14/2024 456 (H)  140 - 450 10*3/mm3 Final    Neutrophil % 07/14/2024 47.6  42.7 - 76.0 % Final    Lymphocyte % 07/14/2024 43.2  19.6 - 45.3 % Final    Monocyte % 07/14/2024 6.4  5.0 - 12.0 % Final    Eosinophil % 07/14/2024 1.7  0.3 - 6.2 % Final    Basophil % 07/14/2024 0.8  0.0 - 1.5 % Final    Immature Grans % 07/14/2024 0.3  0.0 - 0.5 % Final    Neutrophils, Absolute 07/14/2024 5.04  1.70 - 7.00 10*3/mm3 Final    Lymphocytes, Absolute 07/14/2024 4.58 (H)  0.70 - 3.10 10*3/mm3 Final    Monocytes, Absolute 07/14/2024 0.68  0.10 - 0.90 10*3/mm3 Final    Eosinophils, Absolute 07/14/2024 0.18  0.00 - 0.40 10*3/mm3 Final    Basophils, Absolute 07/14/2024 0.09  0.00 - 0.20 10*3/mm3 Final    Immature Grans, Absolute 07/14/2024 0.03  0.00 - 0.05 10*3/mm3 Final    nRBC 07/14/2024 0.0  0.0 - 0.2 /100 WBC Final    RBC Morphology 07/14/2024 Normal  Normal Final    WBC Morphology 07/14/2024 Normal  Normal Final    Platelet Morphology 07/14/2024 Normal  Normal Final   Admission on 07/13/2024, Discharged on 07/14/2024   Component Date Value Ref Range Status    Glucose  07/13/2024 111 (H)  65 - 99 mg/dL Final    BUN 07/13/2024 9  6 - 20 mg/dL Final    Creatinine 07/13/2024 0.71  0.57 - 1.00 mg/dL Final    Sodium 07/13/2024 141  136 - 145 mmol/L Final    Potassium 07/13/2024 3.7  3.5 - 5.2 mmol/L Final    Chloride 07/13/2024 105  98 - 107 mmol/L Final    CO2 07/13/2024 20.2 (L)  22.0 - 29.0 mmol/L Final    Calcium 07/13/2024 9.0  8.6 - 10.5 mg/dL Final    Total Protein 07/13/2024 7.0  6.0 - 8.5 g/dL Final    Albumin 07/13/2024 4.2  3.5 - 5.2 g/dL Final    ALT (SGPT) 07/13/2024 10  1 - 33 U/L Final    AST (SGOT) 07/13/2024 11  1 - 32 U/L Final    Alkaline Phosphatase 07/13/2024 82  39 - 117 U/L Final    Total Bilirubin 07/13/2024 <0.2  0.0 - 1.2 mg/dL Final    Globulin 07/13/2024 2.8  gm/dL Final    A/G Ratio 07/13/2024 1.5  g/dL Final    BUN/Creatinine Ratio 07/13/2024 12.7  7.0 - 25.0 Final    Anion Gap 07/13/2024 15.8 (H)  5.0 - 15.0 mmol/L Final    eGFR 07/13/2024 116.7  >60.0 mL/min/1.73 Final    Lipase 07/13/2024 37  13 - 60 U/L Final    Color, UA 07/13/2024 Yellow  Yellow, Straw Final    Appearance, UA 07/13/2024 Turbid (A)  Clear Final    pH, UA 07/13/2024 >=9.0 (H)  5.0 - 8.0 Final    Specific [place], UA 07/13/2024 1.021  1.005 - 1.030 Final    Glucose, UA 07/13/2024 Negative  Negative Final    Ketones, UA 07/13/2024 15 mg/dL (1+) (A)  Negative Final    Bilirubin, UA 07/13/2024 Negative  Negative Final    Blood, UA 07/13/2024 Negative  Negative Final    Protein, UA 07/13/2024 Trace (A)  Negative Final    Leuk Esterase, UA 07/13/2024 Negative  Negative Final    Nitrite, UA 07/13/2024 Negative  Negative Final    Urobilinogen, UA 07/13/2024 1.0 E.U./dL  0.2 - 1.0 E.U./dL Final    Extra Tube 07/13/2024 Hold for add-ons.   Final    Auto resulted.    Extra Tube 07/13/2024 hold for add-on   Final    Auto resulted    Extra Tube 07/13/2024 Hold for add-ons.   Final    Auto resulted.    Extra Tube 07/13/2024 Hold for add-ons.   Final    Auto resulted    WBC 07/13/2024 14.01 (H)  3.40  - 10.80 10*3/mm3 Final    RBC 07/13/2024 4.85  3.77 - 5.28 10*6/mm3 Final    Hemoglobin 07/13/2024 13.3  12.0 - 15.9 g/dL Final    Hematocrit 07/13/2024 39.7  34.0 - 46.6 % Final    MCV 07/13/2024 81.9  79.0 - 97.0 fL Final    MCH 07/13/2024 27.4  26.6 - 33.0 pg Final    MCHC 07/13/2024 33.5  31.5 - 35.7 g/dL Final    RDW 07/13/2024 13.1  12.3 - 15.4 % Final    RDW-SD 07/13/2024 39.0  37.0 - 54.0 fl Final    MPV 07/13/2024 10.3  6.0 - 12.0 fL Final    Platelets 07/13/2024 461 (H)  140 - 450 10*3/mm3 Final    Neutrophil % 07/13/2024 54.4  42.7 - 76.0 % Final    Lymphocyte % 07/13/2024 37.3  19.6 - 45.3 % Final    Monocyte % 07/13/2024 6.2  5.0 - 12.0 % Final    Eosinophil % 07/13/2024 1.1  0.3 - 6.2 % Final    Basophil % 07/13/2024 0.6  0.0 - 1.5 % Final    Immature Grans % 07/13/2024 0.4  0.0 - 0.5 % Final    Neutrophils, Absolute 07/13/2024 7.61 (H)  1.70 - 7.00 10*3/mm3 Final    Lymphocytes, Absolute 07/13/2024 5.23 (H)  0.70 - 3.10 10*3/mm3 Final    Monocytes, Absolute 07/13/2024 0.87  0.10 - 0.90 10*3/mm3 Final    Eosinophils, Absolute 07/13/2024 0.16  0.00 - 0.40 10*3/mm3 Final    Basophils, Absolute 07/13/2024 0.09  0.00 - 0.20 10*3/mm3 Final    Immature Grans, Absolute 07/13/2024 0.05  0.00 - 0.05 10*3/mm3 Final    nRBC 07/13/2024 0.0  0.0 - 0.2 /100 WBC Final    Anisocytosis 07/13/2024 Slight/1+  None Seen Final    Poikilocytes 07/13/2024 Slight/1+  None Seen Final    WBC Morphology 07/13/2024 Normal  Normal Final    Large Platelets 07/13/2024 Slight/1+  None Seen Final   Lab on 04/10/2024   Component Date Value Ref Range Status    WBC 04/10/2024 11.38 (H)  3.40 - 10.80 10*3/mm3 Final    RBC 04/10/2024 4.76  3.77 - 5.28 10*6/mm3 Final    Hemoglobin 04/10/2024 12.9  12.0 - 15.9 g/dL Final    Hematocrit 04/10/2024 39.7  34.0 - 46.6 % Final    MCV 04/10/2024 83.4  79.0 - 97.0 fL Final    MCH 04/10/2024 27.1  26.6 - 33.0 pg Final    MCHC 04/10/2024 32.5  31.5 - 35.7 g/dL Final    RDW 04/10/2024 13.6  12.3 - 15.4  % Final    RDW-SD 04/10/2024 41.8  37.0 - 54.0 fl Final    MPV 04/10/2024 10.0  6.0 - 12.0 fL Final    Platelets 04/10/2024 380  140 - 450 10*3/mm3 Final    Neutrophil % 04/10/2024 67.4  42.7 - 76.0 % Final    Lymphocyte % 04/10/2024 24.2  19.6 - 45.3 % Final    Monocyte % 04/10/2024 7.6  5.0 - 12.0 % Final    Eosinophil % 04/10/2024 0.0 (L)  0.3 - 6.2 % Final    Basophil % 04/10/2024 0.4  0.0 - 1.5 % Final    Immature Grans % 04/10/2024 0.4  0.0 - 0.5 % Final    Neutrophils, Absolute 04/10/2024 7.67 (H)  1.70 - 7.00 10*3/mm3 Final    Lymphocytes, Absolute 04/10/2024 2.75  0.70 - 3.10 10*3/mm3 Final    Monocytes, Absolute 04/10/2024 0.87  0.10 - 0.90 10*3/mm3 Final    Eosinophils, Absolute 04/10/2024 0.00  0.00 - 0.40 10*3/mm3 Final    Basophils, Absolute 04/10/2024 0.05  0.00 - 0.20 10*3/mm3 Final    Immature Grans, Absolute 04/10/2024 0.04  0.00 - 0.05 10*3/mm3 Final   Office Visit on 01/29/2024   Component Date Value Ref Range Status    WBC 01/29/2024 13.49 (H)  3.40 - 10.80 10*3/mm3 Final    RBC 01/29/2024 5.06  3.77 - 5.28 10*6/mm3 Final    Hemoglobin 01/29/2024 13.7  12.0 - 15.9 g/dL Final    Hematocrit 01/29/2024 42.3  34.0 - 46.6 % Final    MCV 01/29/2024 83.6  79.0 - 97.0 fL Final    MCH 01/29/2024 27.1  26.6 - 33.0 pg Final    MCHC 01/29/2024 32.4  31.5 - 35.7 g/dL Final    RDW 01/29/2024 13.1  12.3 - 15.4 % Final    RDW-SD 01/29/2024 40.0  37.0 - 54.0 fl Final    MPV 01/29/2024 11.1  6.0 - 12.0 fL Final    Platelets 01/29/2024 411  140 - 450 10*3/mm3 Final    Neutrophil % 01/29/2024 65.4  42.7 - 76.0 % Final    Lymphocyte % 01/29/2024 24.8  19.6 - 45.3 % Final    Monocyte % 01/29/2024 6.9  5.0 - 12.0 % Final    Eosinophil % 01/29/2024 1.8  0.3 - 6.2 % Final    Basophil % 01/29/2024 0.7  0.0 - 1.5 % Final    Immature Grans % 01/29/2024 0.4  0.0 - 0.5 % Final    Neutrophils, Absolute 01/29/2024 8.81 (H)  1.70 - 7.00 10*3/mm3 Final    Lymphocytes, Absolute 01/29/2024 3.35 (H)  0.70 - 3.10 10*3/mm3 Final     Monocytes, Absolute 01/29/2024 0.93 (H)  0.10 - 0.90 10*3/mm3 Final    Eosinophils, Absolute 01/29/2024 0.24  0.00 - 0.40 10*3/mm3 Final    Basophils, Absolute 01/29/2024 0.10  0.00 - 0.20 10*3/mm3 Final    Immature Grans, Absolute 01/29/2024 0.06 (H)  0.00 - 0.05 10*3/mm3 Final    nRBC 01/29/2024 0.0  0.0 - 0.2 /100 WBC Final    25 Hydroxy, Vitamin D 01/29/2024 62.3  30.0 - 100.0 ng/ml Final    Glucose 01/29/2024 72  65 - 99 mg/dL Final    BUN 01/29/2024 11  6 - 20 mg/dL Final    Creatinine 01/29/2024 0.69  0.57 - 1.00 mg/dL Final    Sodium 01/29/2024 137  136 - 145 mmol/L Final    Potassium 01/29/2024 4.3  3.5 - 5.2 mmol/L Final    Chloride 01/29/2024 102  98 - 107 mmol/L Final    CO2 01/29/2024 21.4 (L)  22.0 - 29.0 mmol/L Final    Calcium 01/29/2024 9.6  8.6 - 10.5 mg/dL Final    BUN/Creatinine Ratio 01/29/2024 15.9  7.0 - 25.0 Final    Anion Gap 01/29/2024 13.6  5.0 - 15.0 mmol/L Final    eGFR 01/29/2024 119.9  >60.0 mL/min/1.73 Final   Office Visit on 12/29/2023   Component Date Value Ref Range Status    Amphetamine Screen, Urine 12/29/2023 Negative  Negative Final    AMP INTERNAL CONTROL 12/29/2023 Passed  Passed Final    Barbiturates Screen, Urine 12/29/2023 Negative  Negative Final    BARBITURATE INTERNAL CONTROL 12/29/2023 Passed  Passed Final    Buprenorphine, Screen, Urine 12/29/2023 Negative  Negative Final    BUPRENORPHINE INTERNAL CONTROL 12/29/2023 Passed  Passed Final    Benzodiazepine Screen, Urine 12/29/2023 Negative  Negative Final    BENZODIAZEPINE INTERNAL CONTROL 12/29/2023 Passed  Passed Final    Cocaine Screen, Urine 12/29/2023 Negative  Negative Final    COCAINE INTERNAL CONTROL 12/29/2023 Passed  Passed Final    MDMA (ECSTASY) 12/29/2023 Negative  Negative Final    MDMA (ECSTASY) INTERNAL CONTROL 12/29/2023 Passed  Passed Final    Methamphetamine, Ur 12/29/2023 Negative  Negative Final    METHAMPHETAMINE INTERNAL CONTROL 12/29/2023 Passed  Passed Final    Methadone Screen, Urine 12/29/2023  Negative  Negative Final    METHADONE INTERNAL CONTROL 12/29/2023 Passed  Passed Final    Opiate Screen 12/29/2023 Negative  Negative Final    OPIATES INTERNAL CONTROL 12/29/2023 Passed  Passed Final    Oxycodone Screen, Urine 12/29/2023 Negative  Negative Final    OXYCODONE INTERNAL CONTROL 12/29/2023 Passed  Passed Final    Phencyclidine (PCP), Urine 12/29/2023 Negative  Negative Final    PHENCYCLIDINE INTERNAL CONTROL 12/29/2023 Passed  Passed Final    THC, Screen, Urine 12/29/2023 Negative  Negative Final    THC INTERNAL CONTROL 12/29/2023 Passed  Passed Final    Lot Number 12/29/2023 [id number]   Final    Expiration Date 12/29/2023 9/14/24   Final   Lab on 10/31/2023   Component Date Value Ref Range Status    Protime 10/31/2023 13.6  11.8 - 14.9 Seconds Final    INR 10/31/2023 1.03  0.86 - 1.15 Final    Immunofixation Result, Serum 10/31/2023 Comment   Final    No monoclonality detected.    IgG 10/31/2023 941  586 - 1602 mg/dL Final    IgA 10/31/2023 187  87 - 352 mg/dL Final    IgM 10/31/2023 93  26 - 217 mg/dL Final    Iron 10/31/2023 63  37 - 145 mcg/dL Final    Iron Saturation (TSAT) 10/31/2023 17 (L)  20 - 50 % Final    Transferrin 10/31/2023 249  200 - 360 mg/dL Final    TIBC 10/31/2023 371  298 - 536 mcg/dL Final   Office Visit on 10/31/2023   Component Date Value Ref Range Status    Hepatitis B Surface Ag 10/31/2023 Non-Reactive  Non-Reactive Final    Hep A IgM 10/31/2023 Non-Reactive  Non-Reactive Final    Hep B C IgM 10/31/2023 Non-Reactive  Non-Reactive Final    Hepatitis C Ab 10/31/2023 Non-Reactive  Non-Reactive Final    LUDA Direct 10/31/2023 Negative  Negative Final    ALPHA -1 ANTITRYPSIN 10/31/2023 156  90 - 200 mg/dL Final    Smooth Muscle Ab 10/31/2023 4  0 - 19 Units Final                     Negative                     0 - 19                   Weak positive               20 - 30                   Moderate to strong positive     >30   Actin Antibodies are found in 52-85% of patients with    autoimmune hepatitis or chronic active hepatitis and   in 22% of patients with primary biliary cirrhosis.    Ceruloplasmin 10/31/2023 26  19 - 39 mg/dL Final    Mitochondrial Ab 10/31/2023 <20.0  0.0 - 20.0 Units Final                                    Negative    0.0 - 20.0                                  Equivocal  20.1 - 24.9                                  Positive         >24.9  Mitochondrial (M2) Antibodies are found in 90-96% of  patients with primary biliary cirrhosis.   Results Encounter on 10/03/2023   Component Date Value Ref Range Status    Amphetamine, Urine Qual 10/04/2023 Negative  Gacrbc=1367 ng/mL Final    Barbiturates Screen, Urine 10/04/2023 Negative  Vmedek=075 ng/mL Final    Benzodiazepine Screen, Urine 10/04/2023 Negative  Fqpurp=832 ng/mL Final    THC Screen, Urine 10/04/2023 Negative  Cutoff=20 ng/mL Final    Cocaine Screen, Urine 10/04/2023 Negative  Qqxtiz=556 ng/mL Final    Opiate Screen, Urine 10/04/2023 Negative  Rnhuwu=957 ng/mL Final    Opiate test includes Codeine, Morphine, Hydromorphone, Hydrocodone.    Oxycodone/Oxymorphone, Urine 10/04/2023 Negative  Ydkawx=960 ng/mL Final    Test includes Oxycodone and Oxymorphone    Phencyclidine (PCP), Urine 10/04/2023 Negative  Cutoff=25 ng/mL Final    Methadone Screen, Urine 10/04/2023 Negative  Jynjww=164 ng/mL Final    Propoxyphene Screen 10/04/2023 Negative  Ctewto=530 ng/mL Final    Creatinine, Urine 10/04/2023 59.1  20.0 - 300.0 mg/dL Final    pH, UA 10/04/2023 6.9  4.5 - 8.9 Final    Please note 10/04/2023 Comment   Final    This assay provides a preliminary unconfirmed analytical test  result that may be suitable for clinical management of patients  in certain situations. Drug-test results should be interpreted  in the context of clinical information. Patient metabolic  variables, specific drug chemistry, and specimen  characteristics can affect test outcome. Technical consultation  is available if a test result is inconsistent with  an expected  outcome. (email-[email or web address] or call toll-free  [phone])   Lab on 09/18/2023   Component Date Value Ref Range Status    Gliadin Deamidated Peptide Ab, IgA 09/18/2023 6  0 - 19 units Final                       Negative                   0 - 19                     Weak Positive             20 - 30                     Moderate to Strong Positive   >30    Tissue Transglutaminase IgA 09/18/2023 <2  0 - 3 U/mL Final                                  Negative        0 -  3                                Weak Positive   4 - 10                                Positive           >10   Tissue Transglutaminase (tTG) has been identified   as the endomysial antigen.  Studies have demonstr-   ated that endomysial IgA antibodies have over 99%   specificity for gluten sensitive enteropathy.    IgA 09/18/2023 191  87 - 352 mg/dL Final   There may be more visits with results that are not included.           Assessment & Plan   Problems Addressed this Visit          Mental Health    Generalized anxiety disorder    Relevant Medications    Vortioxetine HBr (Trintellix) 10 MG tablet tablet    amitriptyline (ELAVIL) 10 MG tablet    OLANZapine (ZyPREXA) 2.5 MG tablet     Other Visit Diagnoses       Mild episode of recurrent major depressive disorder  (Chronic)   -  Primary    R/O BPAD/BPD    Relevant Medications    Vortioxetine HBr (Trintellix) 10 MG tablet tablet    amitriptyline (ELAVIL) 10 MG tablet    OLANZapine (ZyPREXA) 2.5 MG tablet    lamoTRIgine (LaMICtal) 150 MG tablet    Sleeping difficulties        Relevant Medications    amitriptyline (ELAVIL) 10 MG tablet          Diagnoses         Codes Comments    Mild episode of recurrent major depressive disorder    -  Primary ICD-10-CM: F33.0  ICD-9-CM: 296.31 R/O BPAD/BPD    Generalized anxiety disorder     ICD-10-CM: F41.1  ICD-9-CM: 300.02     Sleeping difficulties     ICD-10-CM: G47.9  ICD-9-CM: 780.50             Visit Diagnoses:    ICD-10-CM ICD-9-CM    1. Mild episode of recurrent major depressive disorder  F33.0 296.31   2. Generalized anxiety disorder  F41.1 300.02   3. Sleeping difficulties  G47.9 780.50            GOALS:  Short Term Goals: Patient will be compliant with medication, and patient will have no significant medication related side effects.  Patient will be engaged in psychotherapy as indicated.  Patient will report subjective improvement of symptoms.  Long term goals: To stabilize mood and treat/improve subjective symptoms, the patient will stay out of the hospital, the patient will be at an optimal level of functioning, and the patient will take all medications as prescribed.  The patient verbalized understanding and agreement with goals that were mutually set.      TREATMENT PLAN: Begin supportive psychotherapy efforts and take medications as indicated.  Medication and treatment options, both pharmacological and non-pharmacological treatment options, discussed during today's visit, including any off label use of medication. Patient acknowledged and verbally consented with current treatment plan and was educated on the importance of compliance with treatment and follow-up appointments.  The patient has declined a referral to begin psychotherapy, but has reported she will reach out to this APRN/office if she changes her mind.    -Continue amitriptyline 10 mg by mouth once nightly to assist with sleeping difficulties and also can assist with mood.  -Continue lamotrigine 150 mg by mouth once daily for mood.  -Increase Trintellix to 10 mg by mouth once daily for mood.  -Taper and decrease olanzapine to 5 mg by mouth once nightly for 5 nights, then decrease olanzapine to 2.5 mg by mouth once nightly for 5 nights, then completely stop taking olanzapine.      MEDICATION ISSUES:  Discussed medication options and treatment plan of prescribed medication, any off label use of medication, as well as the risks, benefits, any black box warnings including  increased suicidality, and side effects including but not limited to potential falls, dizziness, possible impaired driving, GI side effects (change in appetite, abdominal discomfort, nausea, vomiting, diarrhea, and/or constipation), dry mouth, somnolence, sedation, insomnia, activation, agitation, irritation, tremors, abnormal muscle movements or disorders, tardive dyskinesia, akathisia, asthenia, headache, sweating, possible bruising or rare bleeding, electrolyte and/or fluid abnormalities, change in blood pressure/heart rate/and or heart rhythm, hypotension, sexual dysfunction, rare impulse control problems, rare seizures, rare neuroleptic malignant syndrome, increased risk of death and cerebrovascular events, change in blood glucose and increased risk for diabetes, change in triglycerides and cholesterol and increased risk for dyslipidemia,  weight gain, weight gain that can become problematic to health, skin conditions and reactions, and metabolic adversities among others. Patient and/or guardian are agreeable to call the office with any worsening of symptoms or onset of side effects, or if any concerns or questions arise.  The contact information for the office is made available to the patient and/or guardian. Patient and/or guardian are agreeable to call 911 or go to the nearest ER should they begin having any SI/HI, or if any urgent concerns arise.    Due to the nature of virtual visits and inability to monitor vital signs and weight with virtual visits, the patient has been encouraged to monitor their vital signs and weight regularly either through self-monitoring via home device(s) or with their Primary Care Provider, and the patient has been instructed to notify this APRN of any abnormalities or significant changes from baseline.     This APRN has discussed the benefits and risks of taking/continuing Lamictal (Lamotrigine).  The side effects of Lamictal can include a benign rash, blurred or double vision,  dizziness, ataxia, sedation, headache, tremor, insomnia, poor coordination, fatigue,  nausea, vomiting, dyspepsia, rhinitis, infection, pharyngitis, asthenia, a rare but serious rash, rare multi-organ failure associated with Bull-Avelino Syndrome, toxic epidermal necrolysis, drug hypersensitivity syndrome, rare blood dyscrasias, rare aseptic meningitis, rare sudden unexplained deaths in people with epilepsy, withdrawal seizures upon abrupt withdrawal, and rare activation of suicidal ideation and behavior (suicidality).  This APRN has discussed that a very slow dose titration when starting, or changing doses, of Lamictal may reduce the incidence of skin rash and other side effects.  The dosage should not be titrated upwards or increased faster than recommended due to the possibility of the discussed side effects and risk of development of a skin rash (which can become life threatening).    This APRN has also discussed that if the patient stops taking the Lamictal for 3-5 days or longer, it will be necessary to restart the drug with an initial dose titration, as rashes have been reported on reexposure.  If the patient and Provider decide to stop the Lamictal, the patient will follow the directions of this APRN/this office as a guided taper over about two weeks is appropriate due to the risk of relapse in bipolar disorder with those with a mood or bipolar disorder, the risk of seizures in those with epilepsy, and discontinuation symptoms upon rapid discontinuation of Lamictal.    The patient verbalizes understanding of benefits and risks as discussed, the patient/guardian feels the benefits outweigh the risks and is agreeable to continue/take Lamictal as discussed.  The patient is advised should any side effects or rash develops they are to stop the Lamictal immediately and contact this APRN/this office or go to the emergency department immediately.  The patient verbalizes understanding and agreement with treatment  plan in their own words.      VERBAL INFORMED CONSENT FOR MEDICATION:  The patient was educated that their proposed/prescribed psychotropic medication(s) has potential risks, side effects, adverse effects, and black box warnings; and these have been discussed with the patient.  The patient has been informed that their treatment and medication dosage is to be individualized, and may even be above or below the recommended range/dosage due to patient individualization and response, but medication is prescribed using a shared decision making approach, and no medication or dosage will be prescribed without the patient's verbal consent.  The reason for the use of the medication including any off label use and alternative modes of treatment other than or in addition to medication has been considered and discussed, the probable consequences of not receiving the proposed treatment have been discussed, and any treatment side effects, black box warnings, and cautions associated with treatment have been discussed with the patient.  The patient is allowed ample time to openly discuss and ask questions regarding the proposed medication(s) and treatment plan and the patient verbalizes understanding the reasons for the use of the medication, its potential risks and benefits, other alternative treatment(s), and the probable consequences that may occur if the proposed medication is not given.  The patient has been given ample time to ask questions and study the information and find the information to be specific, accurate, and complete.  The patient gives verbal consent for the medication(s) proposed/prescribed, they verbalized understanding that they can refuse and withdraw consent at any time with the assistance of this APRN, and the patient has verbally confirmed that they are aware, and are willing, to take the prescribed medication and follow the treatment plan with the known possible risks, side effect, black box warnings, and  any potential medication interactions, and the patient reports they will be worse off without this medication and treatment plan.  The patient is advised to contact this APRN/this office if any questions or concerns arise at any time (at [phone]), or call 911/go to the closest emergency department if needed or outside of office hours.      SUICIDE RISK ASSESSMENT AND SAFETY PLAN: Unalterable demographics and a history of mental health intervention indicate this patient is in a high risk category compared to the general population. At present, the patient denies active SI/HI, intentions, or plans at this time and agrees to seek immediate care should such thoughts develop. The patient verbalizes understanding of how to access emergency care if needed and agrees to do so. Consideration of suicide risk and protective factors such as history, current presentation, individual strengths and weaknesses, psychosocial and environmental stressors and variables, psychiatric illness and symptoms, medical conditions and pain, took place in this interview. Based on those considerations, the patient is determined: within individual baseline and presenting no imminent risk for suicide or homicide. Other recommendations: The patient does not meet the criteria for inpatient admission and is not a safety risk to self or others at today's visit. Inpatient treatment offers no significant advantages over outpatient treatment for this patient at today's visit.  The patient was given ample time for questions and fully participated in treatment planning.  The patient was encouraged to call the clinic with any questions or concerns.  The patient was informed of access to emergency care. If patient were to develop any significant symptomatology, suicidal ideation, homicidal ideation, any concerns, or feel unsafe at any time they are to call the clinic and if unable to get immediate assistance should immediately call 911 or go to the  nearest emergency room.  Patient contracted verbally for the following: If you are experiencing an emotional crisis or have thoughts of harming yourself or others, please go to your nearest local emergency room or call 911. Will continue to re-assess medication response and side effects frequently to establish efficacy and ensure safety. Risks, any black box warnings, side effects, off label usage, and benefits of medication and treatment discussed with patient, along with potential adverse side effects of current and/or newly prescribed medication, alternative treatment options, and OTC medications.  Patient verbalized understanding of potential risks, any off label use of medication, any black box warnings, and any side effects in their own words. The patient verbalized understanding and agreed to comply with the safety plan discussed in their own words.  Patient given the number to the office. Number also discussed of the 24- hour suicide hotline.           MEDS ORDERED DURING VISIT:  New Medications Ordered This Visit   Medications    Vortioxetine HBr (Trintellix) 10 MG tablet tablet     Sig: Take 1 tablet by mouth Daily With Breakfast.     Dispense:  30 tablet     Refill:  0    amitriptyline (ELAVIL) 10 MG tablet     Sig: Take 1 tablet by mouth At Night As Needed for Sleep.     Dispense:  30 tablet     Refill:  0    OLANZapine (ZyPREXA) 2.5 MG tablet     Sig: Take 2 tablets by mouth Every Night for 5 days, THEN 1 tablet Every Night for 5 days. Then completely stop taking.     Dispense:  15 tablet     Refill:  0     Decrease in dosage.    lamoTRIgine (LaMICtal) 150 MG tablet     Sig: Take 1 tablet by mouth Daily.     Dispense:  30 tablet     Refill:  0       Return in about 4 weeks (around 8/19/2024), or if symptoms worsen or fail to improve, for Next scheduled follow up and Recheck.         Progress toward goal: Not at goal    Functional Status: Moderate impairment     Prognosis: Good with Ongoing Treatment              This document has been electronically signed by MINI Conte  July 22, 2024 14:55 EDT    Some of the data in this electronic note has been brought forward from a previous encounter, any necessary changes have been made, it has been reviewed by this APRN, and it is accurate.    Please note that portions of this note were completed with a voice recognition program.

## 2024-08-01 ENCOUNTER — OFFICE VISIT (OUTPATIENT)
Dept: GASTROENTEROLOGY | Facility: CLINIC | Age: 29
End: 2024-08-01
Payer: COMMERCIAL

## 2024-08-01 VITALS
WEIGHT: 152.2 LBS | HEIGHT: 64 IN | HEART RATE: 68 BPM | SYSTOLIC BLOOD PRESSURE: 108 MMHG | DIASTOLIC BLOOD PRESSURE: 68 MMHG | BODY MASS INDEX: 25.99 KG/M2

## 2024-08-01 DIAGNOSIS — K26.9 DUODENAL ULCER: ICD-10-CM

## 2024-08-01 DIAGNOSIS — K75.81 NASH (NONALCOHOLIC STEATOHEPATITIS): Primary | ICD-10-CM

## 2024-08-01 DIAGNOSIS — Z90.49 S/P CHOLECYSTECTOMY: ICD-10-CM

## 2024-08-01 DIAGNOSIS — K21.9 GASTROESOPHAGEAL REFLUX DISEASE WITHOUT ESOPHAGITIS: ICD-10-CM

## 2024-08-01 NOTE — PROGRESS NOTES
Chief Complaint   Heartburn and fatty liver    History of Present Illness       Palmira Carbajal is a 29 y.o. female who presents to [facility] for follow-up for fatty liver.  She was last seen in the office by me on 5/1/2024.    Underwent colonoscopy with Dr. Griffin on 10/11/2022.  Colonoscopy was normal except for some internal and external nonbleeding hemorrhoids.  Recall in 5 years.     Significant GI FH colon cancer with father.      While in the ER this last time she had CT scan of the abd/pelvis done that showed:  IMPRESSION:                 1. No acute intra-abdominal or intrapelvic process.  2. Hepatic steatosis.  3. Ancillary findings as described above.     She underwent EGD with Dr. Griffin on 9/8/2023.  EGD showed duodenal erosion with gastritis.  LA grade a reflux esophagitis.  Path positive for reflux esophagitis.  Mild nonspecific changes in the duodenum.  Recommend celiac testing.     Celiac testing came back normal.        FibroScan showed mild fatty liver disease.  F2 fibrosis.  Liver serology normal.      She had her gallbladder removed two weeks ago by Dr. Hickman (general surgeon). She has f/u with him tomorrow. Doing well. Bowels are loose but not terrible. Reflux is well controlled with NEXIUM. Doing well since surgery. Has not started REZDIFFRA yet. Just got it last week. Is wanting to recover fully from surgery before starting a new med. Most recent LFTs were normal on 7/14/24.      Results       Result Review :       CMP          1/29/2024    11:02 7/13/2024    21:20 7/14/2024    21:02   CMP   Glucose 72  111  82    BUN 11  9  7    Creatinine 0.69  0.71  0.70    EGFR 119.9  116.7  118.7    Sodium 137  141  141    Potassium 4.3  3.7  3.7    Chloride 102  105  105    Calcium 9.6  9.0  8.8    Total Protein  7.0  6.6    Albumin  4.2  4.0    Globulin  2.8  2.6    Total Bilirubin  <0.2  <0.2    Alkaline Phosphatase  82  83    AST (SGOT)  11  11    ALT (SGPT)  " 10  9    Albumin/Globulin Ratio  1.5  1.5    BUN/Creatinine Ratio 15.9  12.7  10.0    Anion Gap 13.6  15.8  13.5      CBC          4/10/2024    08:55 7/13/2024    21:20 7/14/2024    21:02   CBC   WBC 11.38  14.01  10.60    RBC 4.76  4.85  4.58    Hemoglobin 12.9  13.3  12.7    Hematocrit 39.7  39.7  37.7    MCV 83.4  81.9  82.3    MCH 27.1  27.4  27.7    MCHC 32.5  33.5  33.7    RDW 13.6  13.1  13.3    Platelets 380  461  456      CBC w/diff          4/10/2024    08:55 7/13/2024    21:20 7/14/2024    21:02   CBC w/Diff   WBC 11.38  14.01  10.60    RBC 4.76  4.85  4.58    Hemoglobin 12.9  13.3  12.7    Hematocrit 39.7  39.7  37.7    MCV 83.4  81.9  82.3    MCH 27.1  27.4  27.7    MCHC 32.5  33.5  33.7    RDW 13.6  13.1  13.3    Platelets 380  461  456    Neutrophil Rel % 67.4  54.4  47.6    Immature Granulocyte Rel % 0.4  0.4  0.3    Lymphocyte Rel % 24.2  37.3  43.2    Monocyte Rel % 7.6  6.2  6.4    Eosinophil Rel % 0.0  1.1  1.7    Basophil Rel % 0.4  0.6  0.8      Lipid Panel          9/7/2023    11:22   Lipid Panel   Total Cholesterol 167    Triglycerides 82    HDL Cholesterol 56    VLDL Cholesterol 15    LDL Cholesterol  96    LDL/HDL Ratio 1.69      TSH          8/23/2023    16:34   TSH   TSH 1.510        Lipase   Lipase   Date Value Ref Range Status   07/14/2024 33 13 - 60 U/L Final     Amylase   Amylase   Date Value Ref Range Status   09/14/2020 36 30 - 110 U/L Final     Iron Profile   Iron   Date Value Ref Range Status   10/31/2023 63 37 - 145 mcg/dL Final     TIBC   Date Value Ref Range Status   10/31/2023 371 298 - 536 mcg/dL Final     Iron Saturation (TSAT)   Date Value Ref Range Status   10/31/2023 17 (L) 20 - 50 % Final     Transferrin   Date Value Ref Range Status   10/31/2023 249 200 - 360 mg/dL Final     Ferritin No results found for: \"FERRITIN\"  Liver Workup   ALPHA -1 ANTITRYPSIN   Date Value Ref Range Status   10/31/2023 156 90 - 200 mg/dL Final     dsDNA   Date Value Ref Range Status "   09/08/2022 Negative Negative Final     Expanded ALISON Screen   Date Value Ref Range Status   09/08/2022 Negative Negative Final     Smooth Muscle Ab   Date Value Ref Range Status   10/31/2023 4 0 - 19 Units Final     Comment:                      Negative                     0 - 19                   Weak positive               20 - 30                   Moderate to strong positive     >30   Actin Antibodies are found in 52-85% of patients with   autoimmune hepatitis or chronic active hepatitis and   in 22% of patients with primary biliary cirrhosis.     Ceruloplasmin   Date Value Ref Range Status   10/31/2023 26 19 - 39 mg/dL Final     Immunofixation Result, Serum   Date Value Ref Range Status   10/31/2023 Comment  Final     Comment:     No monoclonality detected.     IgG   Date Value Ref Range Status   10/31/2023 941 586 - 1602 mg/dL Final     IgA   Date Value Ref Range Status   10/31/2023 187 87 - 352 mg/dL Final     IgM   Date Value Ref Range Status   10/31/2023 93 26 - 217 mg/dL Final     Iron   Date Value Ref Range Status   10/31/2023 63 37 - 145 mcg/dL Final     TIBC   Date Value Ref Range Status   10/31/2023 371 298 - 536 mcg/dL Final     Iron Saturation (TSAT)   Date Value Ref Range Status   10/31/2023 17 (L) 20 - 50 % Final     Transferrin   Date Value Ref Range Status   10/31/2023 249 200 - 360 mg/dL Final     Mitochondrial Ab   Date Value Ref Range Status   10/31/2023 <20.0 0.0 - 20.0 Units Final     Comment:                                     Negative    0.0 - 20.0                                  Equivocal  20.1 - 24.9                                  Positive         >24.9  Mitochondrial (M2) Antibodies are found in 90-96% of  patients with primary biliary cirrhosis.     Protime   Date Value Ref Range Status   10/31/2023 13.6 11.8 - 14.9 Seconds Final     INR   Date Value Ref Range Status   10/31/2023 1.03 0.86 - 1.15 Final               Past Medical History       Past Medical History:   Diagnosis Date     Acid reflux     Anxiety     Chronic pain disorder     NO PAIN CLINIC, PCP/PSYCH CONT W/AMITRIPTYLINE    Depression     Eczema     Fatty liver     Gallstones     Intractable migraine without aura and without status migrainosus 2023    Kidney stone     HX OF    Maltracking of right patella     Migraines     Orthostatic hypotension     WORKED UP FOR POTS, CONT W/MEDS, MONNIN. RX FLORINEF    Panic disorder     PONV (postoperative nausea and vomiting)     GOOD RESULTS WITH SCOPALAMINE    PTSD (post-traumatic stress disorder)     WAKES UP FROM ANESTHESIA WITH PANIC ATTACK    Seasonal allergic rhinitis 2022    Self-injurious behavior     as a teenager    Suicide attempt     NO ADULT ISSUES       Past Surgical History:   Procedure Laterality Date     SECTION      CHOLECYSTECTOMY N/A 2024    Procedure: CHOLECYSTECTOMY LAPAROSCOPIC;  Surgeon: Cisco Hickman MD;  Location: [facility] OR OSC;  Service: General;  Laterality: N/A;    COLONOSCOPY N/A 10/11/2022    Procedure: COLONOSCOPY;  Surgeon: Dyan Griffin MD;  Location: [facility] ENDOSCOPY;  Service: Gastroenterology;  Laterality: N/A;  HEMORRHOIDS    CYSTOSCOPY      CYSTOSCOPY URETEROSCOPY Left 2023    Procedure: CYSTOSCOPY URETEROSCOPY RETROGRADE PYELOGRAM HOLMIUM LASER STENT INSERTION, left;  Surgeon: Melisa Garcia MD;  Location: [facility] MAIN OR;  Service: Urology;  Laterality: Left;    ENDOSCOPY N/A 2023    Procedure: ESOPHAGOGASTRODUODENOSCOPY WITH BIOPSIES;  Surgeon: Dyan Griffin MD;  Location: [facility] ENDOSCOPY;  Service: Gastroenterology;  Laterality: N/A;  ESOPHAGITIS, GASTRITIS    HYSTERECTOMY      KIDNEY STONE SURGERY      unspecified    KNEE ARTHROSCOPY Right 2022    Procedure: KNEE ARTHROSCOPY WITH A LATERAL RELEASE AND CHONDROPLASTY;  Surgeon: Marco A Pitts MD;  Location: [facility] OR OSC;  Service: Orthopedics;  Laterality: Right;    WISDOM TOOTH EXTRACTION           Current Outpatient  Medications:     amitriptyline (ELAVIL) 10 MG tablet, Take 1 tablet by mouth At Night As Needed for Sleep., Disp: 30 tablet, Rfl: 0    dicyclomine (BENTYL) 20 MG tablet, Take 1 tablet by mouth Every 6 (Six) Hours As Needed (abdominal cramping)., Disp: 60 tablet, Rfl: 3    esomeprazole (nexIUM) 40 MG capsule, Take 1 capsule by mouth Every Morning Before Breakfast., Disp: 90 capsule, Rfl: 3    famotidine (PEPCID) 20 MG tablet, Take 1 tablet by mouth At Night As Needed for Heartburn. (Patient taking differently: Take 1 tablet by mouth Every Night.), Disp: 90 tablet, Rfl: 1    fludrocortisone 0.1 MG tablet, Take 1 tablet by mouth Daily., Disp: 90 tablet, Rfl: 1    galcanezumab-gnlm (Emgality) 120 MG/ML auto-injector pen, Inject 2 mL under the skin into the appropriate area as directed Every 30 (Thirty) Days. Indications: Migraine Headache, Disp: 1.12 mL, Rfl: 5    ibuprofen (ADVIL,MOTRIN) 600 MG tablet, Take 1 tablet by mouth Every 6 (Six) Hours As Needed for Moderate Pain. Indications: heel pain, Disp: 120 tablet, Rfl: 0    lamoTRIgine (LaMICtal) 150 MG tablet, Take 1 tablet by mouth Daily., Disp: 30 tablet, Rfl: 0    OLANZapine (ZyPREXA) 2.5 MG tablet, Take 2 tablets by mouth Every Night for 5 days, THEN 1 tablet Every Night for 5 days. Then completely stop taking., Disp: 15 tablet, Rfl: 0    phentermine 37.5 MG capsule, Take 1 capsule by mouth Every Morning., Disp: 30 capsule, Rfl: 0    polyethylene glycol (MIRALAX) 17 g packet, Take 17 g by mouth Daily., Disp: 5 packet, Rfl: 0    Resmetirom (Rezdiffra) 80 MG tablet, Take 1 tablet by mouth Daily., Disp: 90 tablet, Rfl: 3    Rimegepant Sulfate (Nurtec) 75 MG tablet dispersible tablet, Take 1 tablet by mouth Daily As Needed (migraine). Indications: Migraine Headache, Disp: 8 tablet, Rfl: 5    vitamin D3 (Vitamin D) 125 MCG (5000 UT) capsule capsule, Take 1 capsule by mouth Daily., Disp: 90 capsule, Rfl: 1    Vortioxetine HBr (Trintellix) 10 MG tablet tablet, Take 1  "tablet by mouth Daily With Breakfast., Disp: 30 tablet, Rfl: 0    HYDROcodone-acetaminophen (NORCO) 5-325 MG per tablet, Take 1 tablet by mouth Every 6 (Six) Hours As Needed (Pain). (Patient not taking: Reported on 8/1/2024), Disp: 15 tablet, Rfl: 0    ondansetron ODT (ZOFRAN-ODT) 4 MG disintegrating tablet, Place 1 tablet on the tongue Every 8 (Eight) Hours As Needed for Vomiting or Nausea. (Patient not taking: Reported on 8/1/2024), Disp: 12 tablet, Rfl: 0     Allergies   Allergen Reactions    Cymbalta [Duloxetine Hcl] Mental Status Change     Jittery and insomnia    Penicillins Rash       Family History   Problem Relation Age of Onset    Arthritis Mother     Restless legs syndrome Mother     Thyroid disease Father     Colon polyps Father     Diabetes Father     Drug abuse Maternal Uncle     Alcohol abuse Maternal Uncle     Cancer Maternal Grandmother     Sleep apnea Son     Malig Hyperthermia Neg Hx         Social History     Social History Narrative    Not on file       Objective       Review of Systems   Constitutional:  Negative for appetite change, fatigue, fever, unexpected weight gain and unexpected weight loss.   HENT:  Negative for trouble swallowing.    Respiratory:  Negative for cough, choking, chest tightness, shortness of breath, wheezing and stridor.    Cardiovascular:  Negative for chest pain, palpitations and leg swelling.   Gastrointestinal:  Positive for diarrhea. Negative for abdominal distention, abdominal pain, anal bleeding, blood in stool, constipation, nausea, rectal pain, vomiting, GERD and indigestion.        Vital Signs:   /68 (BP Location: Left arm, Patient Position: Sitting, Cuff Size: Adult)   Pulse 68   Ht 162.6 cm (64.02\")   Wt 69 kg (152 lb 3.2 oz)   BMI 26.11 kg/m²       Physical Exam  Constitutional:       General: She is not in acute distress.     Appearance: She is well-developed. She is not ill-appearing.   HENT:      Head: Normocephalic.   Eyes:      Pupils: Pupils " are equal, round, and reactive to light.   Cardiovascular:      Rate and Rhythm: Normal rate and regular rhythm.      Heart sounds: Normal heart sounds.   Pulmonary:      Effort: Pulmonary effort is normal.      Breath sounds: Normal breath sounds.   Abdominal:      General: Bowel sounds are normal. There is no distension.      Palpations: Abdomen is soft. There is no mass.      Tenderness: There is no abdominal tenderness. There is no guarding or rebound.      Hernia: No hernia is present.   Musculoskeletal:         General: Normal range of motion.   Skin:     General: Skin is warm and dry.   Neurological:      Mental Status: She is alert and oriented to person, place, and time.   Psychiatric:         Speech: Speech normal.         Behavior: Behavior normal.         Judgment: Judgment normal.           Assessment & Plan          Assessment and Plan    Diagnoses and all orders for this visit:    1. BRADY (nonalcoholic steatohepatitis) (Primary)    2. Gastroesophageal reflux disease without esophagitis    3. Duodenal ulcer    4. S/P cholecystectomy    Reviewed most recent lab and imaging results with her today.  LFTs were normal.  She has finally received the REZDIFFRA but has not started it yet.  Having diarrhea now postcholecystectomy.  She is still trying to figure out her diet and what seems to make it worse.  Feels like the diarrhea is manageable at this time.  GERD seems well-controlled on Nexium.  Continue GERD precautions.  Patient to call the office with any issues.  Patient to follow-up with me in 6 months.  Patient is agreeable to the plan.            Follow Up       Follow Up   Return in about 6 months (around 2/1/2025) for FATTY LIVER, GERD.  Patient was given instructions and counseling regarding her condition or for health maintenance advice. Please see specific information pulled into the AVS if appropriate.

## 2024-08-02 ENCOUNTER — TELEPHONE (OUTPATIENT)
Dept: SURGERY | Facility: CLINIC | Age: 29
End: 2024-08-02

## 2024-08-02 NOTE — TELEPHONE ENCOUNTER
[facility] staff attempted to follow warm transfer process and was unsuccessful     Caller: Palmira Carbajal A    Relationship to patient: Self    Best call back number:  [phone]    Patient is needing: PATIENT IS CALLING BACK, RECEIVED VM THAT SHE NEEDS TO RESCHEDULE TODAY'S (8/2 AT 145P) POST OP  . SHE WOULD NEED AN APPT IN THE MORNING NEXT WEEK IF POSS. THERE WAS ONLY PM AVAILABILITY FOR [facility]. PLEASE ADVISE

## 2024-08-07 ENCOUNTER — OFFICE VISIT (OUTPATIENT)
Dept: SURGERY | Facility: CLINIC | Age: 29
End: 2024-08-07
Payer: COMMERCIAL

## 2024-08-07 VITALS
DIASTOLIC BLOOD PRESSURE: 71 MMHG | SYSTOLIC BLOOD PRESSURE: 105 MMHG | BODY MASS INDEX: 25.81 KG/M2 | HEART RATE: 80 BPM | HEIGHT: 64 IN | WEIGHT: 151.2 LBS

## 2024-08-07 DIAGNOSIS — Z98.890 POST-OPERATIVE STATE: Primary | ICD-10-CM

## 2024-08-07 NOTE — PROGRESS NOTES
"Chief Complaint  Post-op Follow-up (LAPCHOLE. PATIENT STATES ONE OF THE INCISION SITES ARE OPEN. PATIENT STATES SHE HAD RUQ AND UPPER BACK PAIN X 1 WEEK AGO.)    Subjective    Subjective     Palmira Carbajal is a 29 y.o. female who presents to [facility] GENERAL SURGERY    History of Present Illness  29-year-old female status post laparoscopic cholecystectomy approximately 2 weeks ago.  Postoperatively she had an episode of right upper quadrant pain in the middle of the night that lasted approximately 1 hour roughly 1 week ago.  She has had no pain in over 1 week.  She is tolerating a regular diet and denies any issues with fatty/greasy foods.      Personal History     Social History     Tobacco Use    Smoking status: Former     Current packs/day: 0.00     Average packs/day: 0.5 packs/day for 15.0 years (7.5 ttl pk-yrs)     Types: Cigarettes     Start date: 2007     Quit date: 2022     Years since quittin.4    Smokeless tobacco: Never   Vaping Use    Vaping status: Every Day    Substances: Nicotine, Flavoring    Devices: Disposable   Substance Use Topics    Alcohol use: Not Currently    Drug use: Not Currently     Types: Marijuana     Comment: Marijuana use in teenage years     Allergies   Allergen Reactions    Cymbalta [Duloxetine Hcl] Mental Status Change     Jittery and insomnia    Penicillins Rash       Objective    Objective       Vital Signs:   /71 (BP Location: Left arm, Patient Position: Sitting, Cuff Size: Adult)   Pulse 80   Ht 162.6 cm (64.02\")   Wt 68.6 kg (151 lb 3.2 oz)   BMI 25.94 kg/m²       Physical Exam  Constitutional:       Appearance: Normal appearance.   HENT:      Head: Normocephalic and atraumatic.      Mouth/Throat:      Mouth: Mucous membranes are moist.      Pharynx: Oropharynx is clear.   Cardiovascular:      Rate and Rhythm: Normal rate and regular rhythm.   Pulmonary:      Effort: Pulmonary effort is normal. No respiratory distress. "   Abdominal:      General: There is no distension.      Palpations: Abdomen is soft.      Tenderness: There is no abdominal tenderness.      Comments: Laparoscopic incisions x 4 all well-healed   Musculoskeletal:         General: No swelling. Normal range of motion.      Cervical back: Normal range of motion and neck supple.   Skin:     General: Skin is warm and dry.   Neurological:      General: No focal deficit present.      Mental Status: She is alert and oriented to person, place, and time.   Psychiatric:         Mood and Affect: Mood normal.         Behavior: Behavior normal.                  Assessment / Plan      Diagnoses and all orders for this visit:    1. Post-operative state (Primary)    29-year-old female status post laparoscopic cholecystectomy.  Doing well postoperatively at this time.  Pathology reviewed.  If she continues to have right upper quadrant pain may need imaging with MRCP.  Educated on lifting restrictions moving forward and she voiced understanding.  She may otherwise follow-up with me again in clinic as needed.    Follow Up   Return if symptoms worsen or fail to improve.      Patient was given instructions and counseling regarding her condition or for health maintenance advice. Please see specific information pulled into the AVS if appropriate.     Electronically signed by Cisco Hickman MD, 08/07/24, 1:15 PM EDT.

## 2024-08-08 ENCOUNTER — OFFICE VISIT (OUTPATIENT)
Dept: FAMILY MEDICINE CLINIC | Facility: CLINIC | Age: 29
End: 2024-08-08
Payer: COMMERCIAL

## 2024-08-08 VITALS
SYSTOLIC BLOOD PRESSURE: 114 MMHG | HEART RATE: 85 BPM | WEIGHT: 151.3 LBS | BODY MASS INDEX: 25.83 KG/M2 | DIASTOLIC BLOOD PRESSURE: 77 MMHG | TEMPERATURE: 97.2 F | OXYGEN SATURATION: 99 % | HEIGHT: 64 IN

## 2024-08-08 DIAGNOSIS — Z88.0 ALLERGY STATUS TO PENICILLIN: ICD-10-CM

## 2024-08-08 DIAGNOSIS — E66.3 OVERWEIGHT (BMI 25.0-29.9): Primary | ICD-10-CM

## 2024-08-08 DIAGNOSIS — L68.0 FEMALE HIRSUTISM: ICD-10-CM

## 2024-08-08 DIAGNOSIS — G43.019 INTRACTABLE MIGRAINE WITHOUT AURA AND WITHOUT STATUS MIGRAINOSUS: ICD-10-CM

## 2024-08-08 DIAGNOSIS — E55.9 VITAMIN D DEFICIENCY: ICD-10-CM

## 2024-08-08 LAB — 25(OH)D3 SERPL-MCNC: 37.6 NG/ML (ref 30–100)

## 2024-08-08 PROCEDURE — 1160F RVW MEDS BY RX/DR IN RCRD: CPT | Performed by: NURSE PRACTITIONER

## 2024-08-08 PROCEDURE — 86003 ALLG SPEC IGE CRUDE XTRC EA: CPT | Performed by: NURSE PRACTITIONER

## 2024-08-08 PROCEDURE — 1159F MED LIST DOCD IN RCRD: CPT | Performed by: NURSE PRACTITIONER

## 2024-08-08 PROCEDURE — 82306 VITAMIN D 25 HYDROXY: CPT | Performed by: NURSE PRACTITIONER

## 2024-08-08 PROCEDURE — 99214 OFFICE O/P EST MOD 30 MIN: CPT | Performed by: NURSE PRACTITIONER

## 2024-08-08 PROCEDURE — 1126F AMNT PAIN NOTED NONE PRSNT: CPT | Performed by: NURSE PRACTITIONER

## 2024-08-08 RX ORDER — SPIRONOLACTONE 50 MG/1
50 TABLET, FILM COATED ORAL DAILY
Qty: 30 TABLET | Refills: 3 | Status: SHIPPED | OUTPATIENT
Start: 2024-08-08

## 2024-08-08 NOTE — ASSESSMENT & PLAN NOTE
Headaches are improving with treatment.    Plan:  Continue same medication/s without change.     Discussed medication dosage, use, side effects, and goals of treatment in detail.    Discussed monitoring symptoms and use of quick-relief medications and maintenance medication.    General Treatment Goals:   symptom prevention  minimize work absence  minimizing limitation in activity  prevention of exacerbations  decrease use of ER/inpatient care  minimization of adverse effects of treatment    Followup in 3 months

## 2024-08-08 NOTE — PROGRESS NOTES
"Chief Complaint  Obesity (Weight loss management), Migraine (Follow-up), Vitamin D Deficiency, and Hirsutism    Subjective            Palmira Carbajal is a 29 y.o. female who presents to [facility]   History of Present Illness  4-week follow-up on weight loss management.  She has not lost any weight since last visit, but she has been off/on phentermine for the past 2 months due to having her gallbladder removed.    Migraines: she states the Emgality and Nurtec are working good.     She states that her mother wants her to have a blood test to see if she is actually allergic to penicillin.    Vitamin D deficiency: She is taking vitamin D 5000 units once weekly.    She has abnormal hair on her neck and she is wanting to try spironolactone again.      Tobacco Use: Medium Risk (8/8/2024)    Patient History     Smoking Tobacco Use: Former     Smokeless Tobacco Use: Never     Passive Exposure: Not on file      E-cigarette/Vaping    E-cigarette/Vaping Use Current Every Day User     Comments INST PER ANESTHESIA PROTCOL      E-cigarette/Vaping Substances    Nicotine Yes     THC No     CBD No     Flavoring Yes      E-cigarette/Vaping Devices    Disposable Yes     Pre-filled or Refillable Cartridge No     Refillable Tank No     Pre-filled Pod No        Alcohol Use: Not on file         Objective   Vital Signs:   Vitals:    08/08/24 1039   BP: 114/77   BP Location: Left arm   Patient Position: Sitting   Cuff Size: Adult   Pulse: 85   Temp: 97.2 °F (36.2 °C)   SpO2: 99%   Weight: 68.6 kg (151 lb 4.8 oz)   Height: 162.6 cm (64.02\")     Body mass index is 25.95 kg/m².    Wt Readings from Last 3 Encounters:   08/08/24 68.6 kg (151 lb 4.8 oz)   08/07/24 68.6 kg (151 lb 3.2 oz)   08/01/24 69 kg (152 lb 3.2 oz)     BP Readings from Last 3 Encounters:   08/08/24 114/77   08/07/24 105/71   08/01/24 108/68       Health Maintenance   Topic Date Due    INFLUENZA VACCINE  08/01/2024    Pneumococcal Vaccine " "0-64 (1 of 2 - PCV) 09/07/2024 (Originally 6/22/2001)    COVID-19 Vaccine (3 - 2023-24 season) 09/23/2024 (Originally 9/1/2023)    ANNUAL PHYSICAL  01/29/2025    BMI FOLLOWUP  08/08/2025    COLORECTAL CANCER SCREENING  10/11/2027    TDAP/TD VACCINES (2 - Td or Tdap) 01/04/2029    HEPATITIS C SCREENING  Completed       /77 (BP Location: Left arm, Patient Position: Sitting, Cuff Size: Adult)   Pulse 85   Temp 97.2 °F (36.2 °C)   Ht 162.6 cm (64.02\")   Wt 68.6 kg (151 lb 4.8 oz)   SpO2 99%   BMI 25.95 kg/m²       Current Outpatient Medications:     amitriptyline (ELAVIL) 10 MG tablet, Take 1 tablet by mouth At Night As Needed for Sleep., Disp: 30 tablet, Rfl: 0    dicyclomine (BENTYL) 20 MG tablet, Take 1 tablet by mouth Every 6 (Six) Hours As Needed (abdominal cramping)., Disp: 60 tablet, Rfl: 3    esomeprazole (nexIUM) 40 MG capsule, Take 1 capsule by mouth Every Morning Before Breakfast., Disp: 90 capsule, Rfl: 3    famotidine (PEPCID) 20 MG tablet, Take 1 tablet by mouth At Night As Needed for Heartburn. (Patient taking differently: Take 1 tablet by mouth Every Night.), Disp: 90 tablet, Rfl: 1    fludrocortisone 0.1 MG tablet, Take 1 tablet by mouth Daily., Disp: 90 tablet, Rfl: 1    galcanezumab-gnlm (Emgality) 120 MG/ML auto-injector pen, Inject 2 mL under the skin into the appropriate area as directed Every 30 (Thirty) Days. Indications: Migraine Headache, Disp: 1.12 mL, Rfl: 5    lamoTRIgine (LaMICtal) 150 MG tablet, Take 1 tablet by mouth Daily., Disp: 30 tablet, Rfl: 0    phentermine 37.5 MG capsule, Take 1 capsule by mouth Every Morning., Disp: 30 capsule, Rfl: 0    polyethylene glycol (MIRALAX) 17 g packet, Take 17 g by mouth Daily., Disp: 5 packet, Rfl: 0    Resmetirom (Rezdiffra) 80 MG tablet, Take 1 tablet by mouth Daily., Disp: 90 tablet, Rfl: 3    Rimegepant Sulfate (Nurtec) 75 MG tablet dispersible tablet, Take 1 tablet by mouth Daily As Needed (migraine). Indications: Migraine Headache, " Disp: 8 tablet, Rfl: 5    vitamin D3 (Vitamin D) 125 MCG (5000 UT) capsule capsule, Take 1 capsule by mouth Daily., Disp: 90 capsule, Rfl: 1    Vortioxetine HBr (Trintellix) 10 MG tablet tablet, Take 1 tablet by mouth Daily With Breakfast., Disp: 30 tablet, Rfl: 0    spironolactone (Aldactone) 50 MG tablet, Take 1 tablet by mouth Daily. Indications: Abnormal Growth of Body or Facial Hair, Disp: 30 tablet, Rfl: 3   Past Medical History:   Diagnosis Date    Acid reflux     Anxiety     Chronic pain disorder     NO PAIN CLINIC, PCP/PSYCH CONT W/AMITRIPTYLINE    Depression     Eczema     Fatty liver     Gallstones     Intractable migraine without aura and without status migrainosus 02/09/2023    Kidney stone     HX OF    Maltracking of right patella     Migraines     Orthostatic hypotension     WORKED UP FOR POTS, CONT W/MEDS, MONNIN. RX FLORINEF    Panic disorder     PONV (postoperative nausea and vomiting)     GOOD RESULTS WITH SCOPALAMINE    PTSD (post-traumatic stress disorder)     WAKES UP FROM ANESTHESIA WITH PANIC ATTACK    Seasonal allergic rhinitis 05/31/2022    Self-injurious behavior     as a teenager    Suicide attempt 2007    NO ADULT ISSUES        Physical Exam  Vitals reviewed.   Constitutional:       Appearance: Normal appearance. She is well-developed and overweight.   Neck:      Thyroid: No thyroid mass, thyromegaly or thyroid tenderness.   Cardiovascular:      Rate and Rhythm: Normal rate and regular rhythm.      Heart sounds: No murmur heard.     No friction rub. No gallop.   Pulmonary:      Effort: Pulmonary effort is normal.      Breath sounds: Normal breath sounds. No wheezing or rhonchi.   Lymphadenopathy:      Cervical: No cervical adenopathy.   Skin:     General: Skin is warm and dry.   Neurological:      Mental Status: She is alert and oriented to person, place, and time.      Cranial Nerves: No cranial nerve deficit.   Psychiatric:         Mood and Affect: Mood and affect normal.          Behavior: Behavior normal.         Thought Content: Thought content normal. Thought content does not include homicidal or suicidal ideation.         Judgment: Judgment normal.          Result Review :    The following data was reviewed by: MINI Keating on 08/08/2024:  Common Labs   Common labs          4/10/2024    08:55 7/13/2024    21:20 7/14/2024    21:02   Common Labs   Glucose  111  82    BUN  9  7    Creatinine  0.71  0.70    Sodium  141  141    Potassium  3.7  3.7    Chloride  105  105    Calcium  9.0  8.8    Albumin  4.2  4.0    Total Bilirubin  <0.2  <0.2    Alkaline Phosphatase  82  83    AST (SGOT)  11  11    ALT (SGPT)  10  9    WBC 11.38  14.01  10.60    Hemoglobin 12.9  13.3  12.7    Hematocrit 39.7  39.7  37.7    Platelets 380  461  456      VITD   Lab Results   Component Value Date    RIMM47OZ 62.3 01/29/2024        US Gallbladder    Result Date: 7/13/2024  Gallstones are present without sonographic evidence of acute cholecystitis. No biliary ductal dilatation.    Please note that portions of this note were completed with a voice recognition program.       Electronically Signed By-Ruben Campa MD On:7/13/2024 11:22 PM      US Gallbladder    Result Date: 7/11/2024  1. Cholelithiasis without evidence of acute cholecystitis or biliary obstruction. 2. Otherwise negative. Electronically Signed: Russell Luke MD  7/11/2024 2:05 AM EDT  Workstation ID: [id number]    XR Calcaneus 2+ View Left    Result Date: 4/30/2024  Impression: Normal exam.          Electronically Signed By-JULIA SMALLS MD On:4/30/2024 9:34 PM      XR Calcaneus 2+ View Right    Result Date: 4/30/2024  Impression: Normal exam.          Electronically Signed By-JULIA SMALLS MD On:4/30/2024 9:34 PM      Assessment & Plan  Overweight (BMI 25.0-29.9)  Patient's (Body mass index is 25.95 kg/m².) indicates that they are overweight with health conditions that include GERD . Weight is  stable . BMI is above average; BMI management  plan is completed. We discussed portion control, increasing exercise, and Information on healthy weight added to patient's after visit summary.  We discussed since her weight is near normal that she does not need phentermine now.  Recommend that she work on lifestyle changes by eating low-carb/portion control and exercise.  Advised that she can follow-up with me if she starts regaining weight.  Intractable migraine without aura and without status migrainosus  Headaches are improving with treatment.    Plan:  Continue same medication/s without change.     Discussed medication dosage, use, side effects, and goals of treatment in detail.    Discussed monitoring symptoms and use of quick-relief medications and maintenance medication.    General Treatment Goals:   symptom prevention  minimize work absence  minimizing limitation in activity  prevention of exacerbations  decrease use of ER/inpatient care  minimization of adverse effects of treatment    Followup in 3 months    Female hirsutism  She has chin hairs that are very bothersome to her.  I will start her on spironolactone 50 mg daily.  She will follow-up in 3 months.  Vitamin D deficiency  I will check her vitamin D level today to see what dose she needs to be taking a vitamin D.  Allergy status to penicillin  I will order penicillin G IgE per patient request but we did discuss that it is not known how reliable this test is.  We discussed that she may need to do the skin allergy test.    Orders Placed This Encounter   Procedures    Penicillin G IgE    Vitamin D,25-Hydroxy     New Medications Ordered This Visit   Medications    spironolactone (Aldactone) 50 MG tablet     Sig: Take 1 tablet by mouth Daily. Indications: Abnormal Growth of Body or Facial Hair     Dispense:  30 tablet     Refill:  3           Diagnosis Plan   1. Overweight (BMI 25.0-29.9)        2. Intractable migraine without aura and without status migrainosus        3. Female hirsutism   spironolactone (Aldactone) 50 MG tablet      4. Vitamin D deficiency  Vitamin D,25-Hydroxy      5. Allergy status to penicillin  Penicillin G IgE            FOLLOW UP  Return in about 3 months (around 11/8/2024) for 30 min apt for complex pt.  Patient was given instructions and counseling regarding her condition or for health maintenance advice. Please see specific information pulled into the AVS if appropriate.       CURRENT & DISCONTINUED MEDICATIONS  Current Outpatient Medications   Medication Instructions    amitriptyline (ELAVIL) 10 mg, Oral, Nightly PRN    dicyclomine (BENTYL) 20 mg, Oral, Every 6 Hours PRN    Emgality 240 mg, Subcutaneous, Every 30 Days    esomeprazole (NEXIUM) 40 mg, Oral, Every Morning Before Breakfast    famotidine (PEPCID) 20 mg, Oral, Nightly PRN    fludrocortisone 0.1 mg, Oral, Daily    lamoTRIgine (LAMICTAL) 150 mg, Oral, Daily    Nurtec 75 mg, Oral, Daily PRN    phentermine 37.5 mg, Oral, Every Morning    polyethylene glycol (MIRALAX) 17 g, Oral, Daily    Rezdiffra 80 mg, Oral, Daily    spironolactone (ALDACTONE) 50 mg, Oral, Daily    vitamin D3 5,000 Units, Oral, Daily    Vortioxetine HBr (TRINTELLIX) 10 mg, Oral, Daily With Breakfast       Medications Discontinued During This Encounter   Medication Reason    HYDROcodone-acetaminophen (NORCO) 5-325 MG per tablet *Therapy completed    ibuprofen (ADVIL,MOTRIN) 600 MG tablet *Therapy completed    OLANZapine (ZyPREXA) 2.5 MG tablet Discontinued by another clinician    ondansetron ODT (ZOFRAN-ODT) 4 MG disintegrating tablet *Therapy completed        Parts of this note are electronic transcriptions/translations of spoken language to printed text using the Dragon Dictation system.    Jaja Castillo, APRN  08/08/24  12:58 EDT

## 2024-08-08 NOTE — ASSESSMENT & PLAN NOTE
She has chin hairs that are very bothersome to her.  I will start her on spironolactone 50 mg daily.  She will follow-up in 3 months.

## 2024-08-08 NOTE — ASSESSMENT & PLAN NOTE
Patient's (Body mass index is 25.95 kg/m².) indicates that they are overweight with health conditions that include GERD . Weight is stable. BMI is above average; BMI management plan is completed. We discussed portion control, increasing exercise, and Information on healthy weight added to patient's after visit summary.  We discussed since her weight is near normal that she does not need phentermine now.  Recommend that she work on lifestyle changes by eating low-carb/portion control and exercise.  Advised that she can follow-up with me if she starts regaining weight.

## 2024-08-12 RX ORDER — HYDROCORTISONE ACETATE PRAMOXINE HCL 2.5; 1 G/100G; G/100G
CREAM TOPICAL 3 TIMES DAILY
Qty: 1 EACH | Refills: 3 | Status: SHIPPED | OUTPATIENT
Start: 2024-08-12

## 2024-08-17 LAB
CONV CLASS DESCRIPTION: NORMAL
PENICILLIN G IGE QN: <0.1 KU/L

## 2024-08-20 ENCOUNTER — TELEMEDICINE (OUTPATIENT)
Dept: PSYCHIATRY | Facility: CLINIC | Age: 29
End: 2024-08-20
Payer: COMMERCIAL

## 2024-08-20 DIAGNOSIS — F33.0 MILD EPISODE OF RECURRENT MAJOR DEPRESSIVE DISORDER: Primary | Chronic | ICD-10-CM

## 2024-08-20 DIAGNOSIS — G47.9 SLEEPING DIFFICULTIES: ICD-10-CM

## 2024-08-20 DIAGNOSIS — F41.1 GENERALIZED ANXIETY DISORDER: Chronic | ICD-10-CM

## 2024-08-20 PROCEDURE — 1159F MED LIST DOCD IN RCRD: CPT | Performed by: NURSE PRACTITIONER

## 2024-08-20 PROCEDURE — 1160F RVW MEDS BY RX/DR IN RCRD: CPT | Performed by: NURSE PRACTITIONER

## 2024-08-20 PROCEDURE — 99214 OFFICE O/P EST MOD 30 MIN: CPT | Performed by: NURSE PRACTITIONER

## 2024-08-20 RX ORDER — AMITRIPTYLINE HYDROCHLORIDE 10 MG/1
10 TABLET, FILM COATED ORAL NIGHTLY PRN
Qty: 30 TABLET | Refills: 0 | Status: SHIPPED | OUTPATIENT
Start: 2024-08-20

## 2024-08-20 RX ORDER — LAMOTRIGINE 150 MG/1
150 TABLET ORAL DAILY
Qty: 30 TABLET | Refills: 0 | Status: SHIPPED | OUTPATIENT
Start: 2024-08-20

## 2024-08-20 RX ORDER — BUSPIRONE HYDROCHLORIDE 5 MG/1
5 TABLET ORAL 2 TIMES DAILY PRN
Qty: 60 TABLET | Refills: 0 | Status: SHIPPED | OUTPATIENT
Start: 2024-08-20

## 2024-08-20 NOTE — PROGRESS NOTES
This provider is located at the [facility] (through [facility]), [address], [facility], [address] using a secure [employer] Video Visit through [employer]. Patient is being seen remotely via telehealth at their home address in [place], and stated they are in a secure environment for this session. The patient's condition being diagnosed/treated is appropriate for telemedicine. The provider identified herself as well as her credentials.   The patient, and/or patients guardian, consent to be seen remotely, and when consent is given they understand that the consent allows for patient identifiable information to be sent to a third party as needed.   They may refuse to be seen remotely at any time. The electronic data is encrypted and password protected, and the patient and/or guardian has been advised of the potential risks to privacy not withstanding such measures.    You have chosen to receive care through a telehealth visit.  Do you consent to use a video/audio connection for your medical care today? Yes    Patient identifiers utilized: Name and date of birth.    Patient verbally confirmed consent for today's encounter  08/20/2024 .    The patient does verbally confirm they are being seen today while physically located in the [facility].  This provider/this APRN is licensed in the [facility] where the patient is located/being seen.     Subjective   Palmira Carbajal is a 29 y.o. female who presents today for follow up    Chief Complaint: Medication management follow-up: Anxiety, depression, and sleeping difficulties follow-up    Accompanied by: The patient is interviewed alone at today's encounter    History of Present Illness:   -Since last encounter with this APRN/Office: The patient reports she has not had any side effects or worsening of mood with tapering off of olanzapine and increasing her Trintellix dosage.  -Mood reported as: Remaining stable, somewhat  "improved, but still with anxiety symptoms  -Patient rates symptoms of depression at a 2-3/10 on a 0-10 scale, with 10 being the worst.  Patient reported symptoms of depression include lack of motivation and feeling like she is a \"funk\" or fog at times not wanting to do anything or go anywhere.  -Patient rates symptoms of anxiety at a 5/10 on a 0-10 scale, with 10 being the worst.  Patient reported symptoms of anxiety include excessive worry and \"thought vomit\", and heart racing at times.  The patient reports when she starts feeling anxious she feels like her wheels are spinning, and she cannot slow her anxiety down, but she does not feel like this all of the time.    -Appetite reported as: Good  -Sleep reported as: \"Pretty good\", but reports she wishes she could fall asleep faster than she does, although she does report this is better than what it used to be.  The patient reports it used to take her 1 to 2 hours to fall asleep, but now only takes about 30 to 45 minutes to fall asleep at bedtime.  The patient reports she does get up a couple of times each night to use the restroom.  -Changes in medications or new medical problems/concerns since last visit: Denies any  -Reported medication compliance: The patient reports compliance with their current psychotropic medication regimen.    -Reported medication side effects or concerns: Denies any, denies any rashes    -Auditory or visual hallucinations: Denies any  -Behaviors different from patient baseline, or any reckless, impulsive, or risky behaviors: Denies  -Symptoms of karl or psychosis: Denies  -Self-injurious behavior: Denies  -SI/HI: The patient adamantly denies any suicidal or homicidal ideations, plans, or intent at the time of this encounter and is convincing.    -Using a shared decision-making approach the patient reports she likes the results of her current treatment regimen, and does not want to make any changes in her current medications or doses, but " would like to try something on an as needed basis for anxiety for as needed use in the moment when her anxiety seems to be spiraling.  Using a shared decision-making approach the patient reports she would like to try BuSpar as needed.  This APRN and the patient did discuss the efficacy of BuSpar, and overall improved efficacy of reported psychiatric symptoms/mood and anxiety with scheduled use, but the patient would like to begin by taking as needed BuSpar for as needed use at this time.    -The patient does verbally contract for safety at today's encounter and is in verbal agreement with the safety/crisis plan. The patient reports in their own words that they will reach out to this APRN/office prior to next scheduled appointment if there is any worsening of mood, any new psychiatric symptoms, any medication side effects or concerns, any concern for safety to self or others, any suicidal or homicidal ideations plans or intent, or any concerns, or they will call 911, call or text the suicide and crisis lifeline at [phone], or go to the closest emergency department.         The patient declined/did not complete the virtual PHQ-9 and STACY-7 for today's encounter.        All Known Prior Psychiatric Medications and Responses if Known:  -Zoloft - does not remember response, possible lack of efficacy  -Paxil - does not remember response, possible lack of efficacy  -Prozac - does not remember response, possible lack of efficacy  -Lexapro - does not remember response, possible lack of efficacy  -Desvenlafaxine - does not remember response, possible lack of efficacy  -Effexor - does not remember response, possible lack of efficacy  -Cymbalta - caused jitteriness and insomnia  -Viibryd - reports she cannot remember why she could not take this medication, but feels there was some kind of side effect  -Quetiapine - does not remember response, possible lack of efficacy  -Abilify - does not remember response, possible lack of  efficacy  -Trazodone - does not remember response, possible lack of efficacy  -Hydroxyzine - does not remember response, possible lack of efficacy  -Ambien - does not remember response, possible lack of efficacy  -Lorazepam - does not remember response, possible lack of efficacy  -Buspar - does not remember response, possible lack of efficacy  -Propranolol - the patient reports lack of efficacy and decreased heart rate  -The patient reports some of the medications that she cannot remember the response to listed above included side effects such as restless leg, nightmares, and heart palpitations, but she cannot remember which medications caused these side effects  -Olanzapine - patient reports possible partial efficacy, if any efficacy, unsure  -Lamictal - patient reports effective for depression  -Amitriptyline - patient reports effective  -Trintellix - patient reports effective    History of Seizures or TBI:  The patient denies any    Substance Use History:  The patient reports being a former cigarette user, she denies any smokeless tobacco use, she reports current electronic cigarette/vape use, she denies any current alcohol use but has tried occasional alcohol in the past, and the patient reports using marijuana in her teenage years, but not as an adult.  The patient denies any other recreational or illicit substance use/misuse/abuse.    Patient's Support Network Includes:   and mother    Last Menstrual Period:  Hysterectomy - partial.        The following portions of the patient's history were reviewed and updated as appropriate: allergies, current medications, past family history, past medical history, past social history, past surgical history and problem list.          Past Medical History:  Past Medical History:   Diagnosis Date    Acid reflux     Anxiety     Chronic pain disorder     NO PAIN CLINIC, PCP/PSYCH CONT W/AMITRIPTYLINE    Depression     Eczema     Fatty liver     Gallstones     Intractable  migraine without aura and without status migrainosus 2023    Kidney stone     HX OF    Maltracking of right patella     Migraines     Orthostatic hypotension     WORKED UP FOR POTS, CONT W/MEDS, MONNIN. RX FLORINEF    Panic disorder     PONV (postoperative nausea and vomiting)     GOOD RESULTS WITH SCOPALAMINE    PTSD (post-traumatic stress disorder)     WAKES UP FROM ANESTHESIA WITH PANIC ATTACK    Seasonal allergic rhinitis 2022    Self-injurious behavior     as a teenager    Suicide attempt     NO ADULT ISSUES       Social History:  Social History     Socioeconomic History    Marital status:      Spouse name: TRISTAN    Number of children: 3   Tobacco Use    Smoking status: Former     Current packs/day: 0.00     Average packs/day: 0.5 packs/day for 15.0 years (7.5 ttl pk-yrs)     Types: Cigarettes     Start date: 2007     Quit date: 2022     Years since quittin.5    Smokeless tobacco: Never   Vaping Use    Vaping status: Every Day    Substances: Nicotine, Flavoring    Devices: Disposable   Substance and Sexual Activity    Alcohol use: Not Currently    Drug use: Not Currently     Types: Marijuana     Comment: Marijuana use in teenage years    Sexual activity: Defer     Partners: Male     Birth control/protection: Hysterectomy       Family History:  Family History   Problem Relation Age of Onset    Arthritis Mother     Restless legs syndrome Mother     Thyroid disease Father     Colon polyps Father     Diabetes Father     Drug abuse Maternal Uncle     Alcohol abuse Maternal Uncle     Cancer Maternal Grandmother     Sleep apnea Son     Malig Hyperthermia Neg Hx        Past Surgical History:  Past Surgical History:   Procedure Laterality Date     SECTION      CHOLECYSTECTOMY N/A 2024    Procedure: CHOLECYSTECTOMY LAPAROSCOPIC;  Surgeon: Cisco Hickman MD;  Location: [facility] OR [facility];  Service: General;  Laterality: N/A;    COLONOSCOPY N/A 10/11/2022    Procedure:  COLONOSCOPY;  Surgeon: Dyan Griffin MD;  Location: [facility] ENDOSCOPY;  Service: Gastroenterology;  Laterality: N/A;  HEMORRHOIDS    CYSTOSCOPY      CYSTOSCOPY URETEROSCOPY Left 1/30/2023    Procedure: CYSTOSCOPY URETEROSCOPY RETROGRADE PYELOGRAM HOLMIUM LASER STENT INSERTION, left;  Surgeon: Melisa Garcia MD;  Location: [facility] MAIN OR;  Service: Urology;  Laterality: Left;    ENDOSCOPY N/A 9/8/2023    Procedure: ESOPHAGOGASTRODUODENOSCOPY WITH BIOPSIES;  Surgeon: Dyan Griffin MD;  Location: [facility] ENDOSCOPY;  Service: Gastroenterology;  Laterality: N/A;  ESOPHAGITIS, GASTRITIS    HYSTERECTOMY      KIDNEY STONE SURGERY      unspecified    KNEE ARTHROSCOPY Right 7/11/2022    Procedure: KNEE ARTHROSCOPY WITH A LATERAL RELEASE AND CHONDROPLASTY;  Surgeon: Marco A Pitts MD;  Location: [facility] OR [facility];  Service: Orthopedics;  Laterality: Right;    WISDOM TOOTH EXTRACTION         Problem List:  Patient Active Problem List   Diagnosis    Maltracking of right patella    Impingement syndrome involving patellar fat pad of right knee    Chondromalacia    Patellar malalignment syndrome of right knee    Binocular vision disorder with diplopia    Generalized anxiety disorder    Acid reflux    PTSD (post-traumatic stress disorder)    Kidney stone on left side    Seasonal allergic rhinitis    Burn of finger and thumb of right hand, second degree    Hemorrhoids    Right hand pain    Rectal bleeding    Anal or rectal pain    Decreased appetite    Aftercare following surgery of right knee arthroscopic chondroplasty and lateral release, 7/11/2022    Generalized body aches    Epigastric pain    Female hirsutism    Right ovarian cyst    Moderate episode of recurrent major depressive disorder    Primary insomnia    Vitamin D deficiency    Intractable migraine without aura and without status migrainosus    Calcified granuloma of lung    Nipple discharge    Nausea and vomiting    Overweight (BMI 25.0-29.9)     Acne vulgaris    Vaping nicotine dependence, tobacco product    Achilles tendinitis of both lower extremities    BRADY (nonalcoholic steatohepatitis)    Symptomatic cholelithiasis       Allergy:   Allergies   Allergen Reactions    Cymbalta [Duloxetine Hcl] Mental Status Change     Jittery and insomnia    Penicillins Rash        Current Medications:   Current Outpatient Medications   Medication Sig Dispense Refill    amitriptyline (ELAVIL) 10 MG tablet Take 1 tablet by mouth At Night As Needed for Sleep. 30 tablet 0    lamoTRIgine (LaMICtal) 150 MG tablet Take 1 tablet by mouth Daily. 30 tablet 0    Vortioxetine HBr (Trintellix) 10 MG tablet tablet Take 1 tablet by mouth Daily With Breakfast. 30 tablet 0    busPIRone (BUSPAR) 5 MG tablet Take 1 tablet by mouth 2 (Two) Times a Day As Needed (Anxiety). 60 tablet 0    dicyclomine (BENTYL) 20 MG tablet Take 1 tablet by mouth Every 6 (Six) Hours As Needed (abdominal cramping). 60 tablet 3    esomeprazole (nexIUM) 40 MG capsule Take 1 capsule by mouth Every Morning Before Breakfast. 90 capsule 3    famotidine (PEPCID) 20 MG tablet Take 1 tablet by mouth At Night As Needed for Heartburn. (Patient taking differently: Take 1 tablet by mouth Every Night.) 90 tablet 1    fludrocortisone 0.1 MG tablet Take 1 tablet by mouth Daily. 90 tablet 1    galcanezumab-gnlm (Emgality) 120 MG/ML auto-injector pen Inject 2 mL under the skin into the appropriate area as directed Every 30 (Thirty) Days. Indications: Migraine Headache 1.12 mL 5    Hydrocort-Pramoxine, Perianal, (ANALPRAM-HC) 2.5-1 % rectal cream Insert  into the rectum 3 (Three) Times a Day. 1 each 3    phentermine 37.5 MG capsule Take 1 capsule by mouth Every Morning. 30 capsule 0    polyethylene glycol (MIRALAX) 17 g packet Take 17 g by mouth Daily. 5 packet 0    Resmetirom (Rezdiffra) 80 MG tablet Take 1 tablet by mouth Daily. 90 tablet 3    Rimegepant Sulfate (Nurtec) 75 MG tablet dispersible tablet Take 1 tablet by mouth  Daily As Needed (migraine). Indications: Migraine Headache 8 tablet 5    spironolactone (Aldactone) 50 MG tablet Take 1 tablet by mouth Daily. Indications: Abnormal Growth of Body or Facial Hair 30 tablet 3    vitamin D3 (Vitamin D) 125 MCG (5000 UT) capsule capsule Take 1 capsule by mouth Daily. 90 capsule 1     No current facility-administered medications for this visit.           Review of Symptoms:    Review of Systems   Psychiatric/Behavioral:  Positive for decreased concentration, sleep disturbance, depressed mood and stress. Negative for agitation, behavioral problems, hallucinations, self-injury, suicidal ideas and negative for hyperactivity. The patient is nervous/anxious.          Physical Exam:   not currently breastfeeding. There is no height or weight on file to calculate BMI.   Due to the remote nature of this encounter (virtual encounter), vitals were unable to be obtained.  Height stated at 64 inches.  Weight stated at 151 pounds.        Physical Exam  Neurological:      Mental Status: She is alert and oriented to person, place, and time.   Psychiatric:         Attention and Perception: Attention normal.         Mood and Affect: Affect normal. Mood is anxious.         Speech: Speech normal.         Behavior: Behavior normal. Behavior is cooperative.         Thought Content: Thought content is not paranoid or delusional. Thought content does not include homicidal or suicidal ideation. Thought content does not include homicidal or suicidal plan.         Cognition and Memory: Cognition and memory normal.         Judgment: Judgment normal.         Mental Status Exam:   Hygiene:   good  Cooperation:  Cooperative  Eye Contact:  Good  Psychomotor Behavior:  Appropriate  Affect:  Appropriate  Mood: anxious  Hopelessness: Denies  Speech:  Normal  Thought Process:  Goal directed and Linear  Thought Content:  Mood congruent  Suicidal:  None  Homicidal:  None  Hallucinations:  None  Delusion:  None  Memory:   Intact  Orientation:  Person, Place, Time, and Situation  Reliability:  good  Insight:  Good  Judgement:  Good  Impulse Control:  Good  Physical/Medical Issues:  No            Lab Results:   Office Visit on 08/08/2024   Component Date Value Ref Range Status    Penicillin G Allergen IgE 08/08/2024 <0.10  Class 0 kU/L Final    Class Description 08/08/2024 Comment   Final        Levels of Specific IgE       Class  Description of Class      ---------------------------  -----  --------------------                     < 0.10         0         Negative             0.10 -    0.31         0/I       Equivocal/Low             0.32 -    0.55         I         Low             0.56 -    1.40         II        Moderate             1.41 -    3.90         III       High             3.91 -   19.00         IV        Very High            19.01 -  100.00         V         Very High                    >100.00         VI        Very High    25 Hydroxy, Vitamin D 08/08/2024 37.6  30.0 - 100.0 ng/ml Final   Admission on 07/18/2024, Discharged on 07/18/2024   Component Date Value Ref Range Status    Case Report 07/18/2024    Final                    Value:Surgical Pathology Report                         Case: [id number]                                  Authorizing Provider:  Cisco Hickman MD        Collected:           07/18/2024 12:54 PM          Ordering Location:     [facility]  Received:            07/19/2024 07:01 AM                                 OR                                                                           Pathologist:           Sue Frankel DO                                                       Specimen:    Gallbladder, gallbladder and contents                                                      Clinical Information 07/18/2024    Final                    Value:This result contains rich text formatting which cannot be displayed here.    Final Diagnosis 07/18/2024    Final                     Value:This result contains rich text formatting which cannot be displayed here.    Gross Description 07/18/2024    Final                    Value:This result contains rich text formatting which cannot be displayed here.    Microscopic Description 07/18/2024    Final                    Value:This result contains rich text formatting which cannot be displayed here.   Admission on 07/14/2024, Discharged on 07/14/2024   Component Date Value Ref Range Status    Glucose 07/14/2024 82  65 - 99 mg/dL Final    BUN 07/14/2024 7  6 - 20 mg/dL Final    Creatinine 07/14/2024 0.70  0.57 - 1.00 mg/dL Final    Sodium 07/14/2024 141  136 - 145 mmol/L Final    Potassium 07/14/2024 3.7  3.5 - 5.2 mmol/L Final    Chloride 07/14/2024 105  98 - 107 mmol/L Final    CO2 07/14/2024 22.5  22.0 - 29.0 mmol/L Final    Calcium 07/14/2024 8.8  8.6 - 10.5 mg/dL Final    Total Protein 07/14/2024 6.6  6.0 - 8.5 g/dL Final    Albumin 07/14/2024 4.0  3.5 - 5.2 g/dL Final    ALT (SGPT) 07/14/2024 9  1 - 33 U/L Final    AST (SGOT) 07/14/2024 11  1 - 32 U/L Final    Alkaline Phosphatase 07/14/2024 83  39 - 117 U/L Final    Total Bilirubin 07/14/2024 <0.2  0.0 - 1.2 mg/dL Final    Globulin 07/14/2024 2.6  gm/dL Final    A/G Ratio 07/14/2024 1.5  g/dL Final    BUN/Creatinine Ratio 07/14/2024 10.0  7.0 - 25.0 Final    Anion Gap 07/14/2024 13.5  5.0 - 15.0 mmol/L Final    eGFR 07/14/2024 118.7  >60.0 mL/min/1.73 Final    Lipase 07/14/2024 33  13 - 60 U/L Final    Color, UA 07/14/2024 Yellow  Yellow, Straw Final    Appearance, UA 07/14/2024 Clear  Clear Final    pH, UA 07/14/2024 8.0  5.0 - 8.0 Final    Specific Gravity, UA 07/14/2024 <=1.005  1.005 - 1.030 Final    Glucose, UA 07/14/2024 Negative  Negative Final    Ketones, UA 07/14/2024 Negative  Negative Final    Bilirubin, UA 07/14/2024 Negative  Negative Final    Blood, UA 07/14/2024 Negative  Negative Final    Protein, UA 07/14/2024 Negative  Negative Final    Leuk Esterase, UA 07/14/2024 Negative   Negative Final    Nitrite, UA 07/14/2024 Negative  Negative Final    Urobilinogen, UA 07/14/2024 0.2 E.U./dL  0.2 - 1.0 E.U./dL Final    Extra Tube 07/14/2024 Hold for add-ons.   Final    Auto resulted.    Extra Tube 07/14/2024 hold for add-on   Final    Auto resulted    Extra Tube 07/14/2024 Hold for add-ons.   Final    Auto resulted.    Extra Tube 07/14/2024 Hold for add-ons.   Final    Auto resulted    WBC 07/14/2024 10.60  3.40 - 10.80 10*3/mm3 Final    RBC 07/14/2024 4.58  3.77 - 5.28 10*6/mm3 Final    Hemoglobin 07/14/2024 12.7  12.0 - 15.9 g/dL Final    Hematocrit 07/14/2024 37.7  34.0 - 46.6 % Final    MCV 07/14/2024 82.3  79.0 - 97.0 fL Final    MCH 07/14/2024 27.7  26.6 - 33.0 pg Final    MCHC 07/14/2024 33.7  31.5 - 35.7 g/dL Final    RDW 07/14/2024 13.3  12.3 - 15.4 % Final    RDW-SD 07/14/2024 39.8  37.0 - 54.0 fl Final    MPV 07/14/2024 10.1  6.0 - 12.0 fL Final    Platelets 07/14/2024 456 (H)  140 - 450 10*3/mm3 Final    Neutrophil % 07/14/2024 47.6  42.7 - 76.0 % Final    Lymphocyte % 07/14/2024 43.2  19.6 - 45.3 % Final    Monocyte % 07/14/2024 6.4  5.0 - 12.0 % Final    Eosinophil % 07/14/2024 1.7  0.3 - 6.2 % Final    Basophil % 07/14/2024 0.8  0.0 - 1.5 % Final    Immature Grans % 07/14/2024 0.3  0.0 - 0.5 % Final    Neutrophils, Absolute 07/14/2024 5.04  1.70 - 7.00 10*3/mm3 Final    Lymphocytes, Absolute 07/14/2024 4.58 (H)  0.70 - 3.10 10*3/mm3 Final    Monocytes, Absolute 07/14/2024 0.68  0.10 - 0.90 10*3/mm3 Final    Eosinophils, Absolute 07/14/2024 0.18  0.00 - 0.40 10*3/mm3 Final    Basophils, Absolute 07/14/2024 0.09  0.00 - 0.20 10*3/mm3 Final    Immature Grans, Absolute 07/14/2024 0.03  0.00 - 0.05 10*3/mm3 Final    nRBC 07/14/2024 0.0  0.0 - 0.2 /100 WBC Final    RBC Morphology 07/14/2024 Normal  Normal Final    WBC Morphology 07/14/2024 Normal  Normal Final    Platelet Morphology 07/14/2024 Normal  Normal Final   Admission on 07/13/2024, Discharged on 07/14/2024   Component Date Value  Ref Range Status    Glucose 07/13/2024 111 (H)  65 - 99 mg/dL Final    BUN 07/13/2024 9  6 - 20 mg/dL Final    Creatinine 07/13/2024 0.71  0.57 - 1.00 mg/dL Final    Sodium 07/13/2024 141  136 - 145 mmol/L Final    Potassium 07/13/2024 3.7  3.5 - 5.2 mmol/L Final    Chloride 07/13/2024 105  98 - 107 mmol/L Final    CO2 07/13/2024 20.2 (L)  22.0 - 29.0 mmol/L Final    Calcium 07/13/2024 9.0  8.6 - 10.5 mg/dL Final    Total Protein 07/13/2024 7.0  6.0 - 8.5 g/dL Final    Albumin 07/13/2024 4.2  3.5 - 5.2 g/dL Final    ALT (SGPT) 07/13/2024 10  1 - 33 U/L Final    AST (SGOT) 07/13/2024 11  1 - 32 U/L Final    Alkaline Phosphatase 07/13/2024 82  39 - 117 U/L Final    Total Bilirubin 07/13/2024 <0.2  0.0 - 1.2 mg/dL Final    Globulin 07/13/2024 2.8  gm/dL Final    A/G Ratio 07/13/2024 1.5  g/dL Final    BUN/Creatinine Ratio 07/13/2024 12.7  7.0 - 25.0 Final    Anion Gap 07/13/2024 15.8 (H)  5.0 - 15.0 mmol/L Final    eGFR 07/13/2024 116.7  >60.0 mL/min/1.73 Final    Lipase 07/13/2024 37  13 - 60 U/L Final    Color, UA 07/13/2024 Yellow  Yellow, Straw Final    Appearance, UA 07/13/2024 Turbid (A)  Clear Final    pH, UA 07/13/2024 >=9.0 (H)  5.0 - 8.0 Final    Specific [place], UA 07/13/2024 1.021  1.005 - 1.030 Final    Glucose, UA 07/13/2024 Negative  Negative Final    Ketones, UA 07/13/2024 15 mg/dL (1+) (A)  Negative Final    Bilirubin, UA 07/13/2024 Negative  Negative Final    Blood, UA 07/13/2024 Negative  Negative Final    Protein, UA 07/13/2024 Trace (A)  Negative Final    Leuk Esterase, UA 07/13/2024 Negative  Negative Final    Nitrite, UA 07/13/2024 Negative  Negative Final    Urobilinogen, UA 07/13/2024 1.0 E.U./dL  0.2 - 1.0 E.U./dL Final    Extra Tube 07/13/2024 Hold for add-ons.   Final    Auto resulted.    Extra Tube 07/13/2024 hold for add-on   Final    Auto resulted    Extra Tube 07/13/2024 Hold for add-ons.   Final    Auto resulted.    Extra Tube 07/13/2024 Hold for add-ons.   Final    Auto resulted     WBC 07/13/2024 14.01 (H)  3.40 - 10.80 10*3/mm3 Final    RBC 07/13/2024 4.85  3.77 - 5.28 10*6/mm3 Final    Hemoglobin 07/13/2024 13.3  12.0 - 15.9 g/dL Final    Hematocrit 07/13/2024 39.7  34.0 - 46.6 % Final    MCV 07/13/2024 81.9  79.0 - 97.0 fL Final    MCH 07/13/2024 27.4  26.6 - 33.0 pg Final    MCHC 07/13/2024 33.5  31.5 - 35.7 g/dL Final    RDW 07/13/2024 13.1  12.3 - 15.4 % Final    RDW-SD 07/13/2024 39.0  37.0 - 54.0 fl Final    MPV 07/13/2024 10.3  6.0 - 12.0 fL Final    Platelets 07/13/2024 461 (H)  140 - 450 10*3/mm3 Final    Neutrophil % 07/13/2024 54.4  42.7 - 76.0 % Final    Lymphocyte % 07/13/2024 37.3  19.6 - 45.3 % Final    Monocyte % 07/13/2024 6.2  5.0 - 12.0 % Final    Eosinophil % 07/13/2024 1.1  0.3 - 6.2 % Final    Basophil % 07/13/2024 0.6  0.0 - 1.5 % Final    Immature Grans % 07/13/2024 0.4  0.0 - 0.5 % Final    Neutrophils, Absolute 07/13/2024 7.61 (H)  1.70 - 7.00 10*3/mm3 Final    Lymphocytes, Absolute 07/13/2024 5.23 (H)  0.70 - 3.10 10*3/mm3 Final    Monocytes, Absolute 07/13/2024 0.87  0.10 - 0.90 10*3/mm3 Final    Eosinophils, Absolute 07/13/2024 0.16  0.00 - 0.40 10*3/mm3 Final    Basophils, Absolute 07/13/2024 0.09  0.00 - 0.20 10*3/mm3 Final    Immature Grans, Absolute 07/13/2024 0.05  0.00 - 0.05 10*3/mm3 Final    nRBC 07/13/2024 0.0  0.0 - 0.2 /100 WBC Final    Anisocytosis 07/13/2024 Slight/1+  None Seen Final    Poikilocytes 07/13/2024 Slight/1+  None Seen Final    WBC Morphology 07/13/2024 Normal  Normal Final    Large Platelets 07/13/2024 Slight/1+  None Seen Final   Lab on 04/10/2024   Component Date Value Ref Range Status    WBC 04/10/2024 11.38 (H)  3.40 - 10.80 10*3/mm3 Final    RBC 04/10/2024 4.76  3.77 - 5.28 10*6/mm3 Final    Hemoglobin 04/10/2024 12.9  12.0 - 15.9 g/dL Final    Hematocrit 04/10/2024 39.7  34.0 - 46.6 % Final    MCV 04/10/2024 83.4  79.0 - 97.0 fL Final    MCH 04/10/2024 27.1  26.6 - 33.0 pg Final    MCHC 04/10/2024 32.5  31.5 - 35.7 g/dL Final     RDW 04/10/2024 13.6  12.3 - 15.4 % Final    RDW-SD 04/10/2024 41.8  37.0 - 54.0 fl Final    MPV 04/10/2024 10.0  6.0 - 12.0 fL Final    Platelets 04/10/2024 380  140 - 450 10*3/mm3 Final    Neutrophil % 04/10/2024 67.4  42.7 - 76.0 % Final    Lymphocyte % 04/10/2024 24.2  19.6 - 45.3 % Final    Monocyte % 04/10/2024 7.6  5.0 - 12.0 % Final    Eosinophil % 04/10/2024 0.0 (L)  0.3 - 6.2 % Final    Basophil % 04/10/2024 0.4  0.0 - 1.5 % Final    Immature Grans % 04/10/2024 0.4  0.0 - 0.5 % Final    Neutrophils, Absolute 04/10/2024 7.67 (H)  1.70 - 7.00 10*3/mm3 Final    Lymphocytes, Absolute 04/10/2024 2.75  0.70 - 3.10 10*3/mm3 Final    Monocytes, Absolute 04/10/2024 0.87  0.10 - 0.90 10*3/mm3 Final    Eosinophils, Absolute 04/10/2024 0.00  0.00 - 0.40 10*3/mm3 Final    Basophils, Absolute 04/10/2024 0.05  0.00 - 0.20 10*3/mm3 Final    Immature Grans, Absolute 04/10/2024 0.04  0.00 - 0.05 10*3/mm3 Final   Office Visit on 01/29/2024   Component Date Value Ref Range Status    WBC 01/29/2024 13.49 (H)  3.40 - 10.80 10*3/mm3 Final    RBC 01/29/2024 5.06  3.77 - 5.28 10*6/mm3 Final    Hemoglobin 01/29/2024 13.7  12.0 - 15.9 g/dL Final    Hematocrit 01/29/2024 42.3  34.0 - 46.6 % Final    MCV 01/29/2024 83.6  79.0 - 97.0 fL Final    MCH 01/29/2024 27.1  26.6 - 33.0 pg Final    MCHC 01/29/2024 32.4  31.5 - 35.7 g/dL Final    RDW 01/29/2024 13.1  12.3 - 15.4 % Final    RDW-SD 01/29/2024 40.0  37.0 - 54.0 fl Final    MPV 01/29/2024 11.1  6.0 - 12.0 fL Final    Platelets 01/29/2024 411  140 - 450 10*3/mm3 Final    Neutrophil % 01/29/2024 65.4  42.7 - 76.0 % Final    Lymphocyte % 01/29/2024 24.8  19.6 - 45.3 % Final    Monocyte % 01/29/2024 6.9  5.0 - 12.0 % Final    Eosinophil % 01/29/2024 1.8  0.3 - 6.2 % Final    Basophil % 01/29/2024 0.7  0.0 - 1.5 % Final    Immature Grans % 01/29/2024 0.4  0.0 - 0.5 % Final    Neutrophils, Absolute 01/29/2024 8.81 (H)  1.70 - 7.00 10*3/mm3 Final    Lymphocytes, Absolute 01/29/2024 3.35 (H)   0.70 - 3.10 10*3/mm3 Final    Monocytes, Absolute 01/29/2024 0.93 (H)  0.10 - 0.90 10*3/mm3 Final    Eosinophils, Absolute 01/29/2024 0.24  0.00 - 0.40 10*3/mm3 Final    Basophils, Absolute 01/29/2024 0.10  0.00 - 0.20 10*3/mm3 Final    Immature Grans, Absolute 01/29/2024 0.06 (H)  0.00 - 0.05 10*3/mm3 Final    nRBC 01/29/2024 0.0  0.0 - 0.2 /100 WBC Final    25 Hydroxy, Vitamin D 01/29/2024 62.3  30.0 - 100.0 ng/ml Final    Glucose 01/29/2024 72  65 - 99 mg/dL Final    BUN 01/29/2024 11  6 - 20 mg/dL Final    Creatinine 01/29/2024 0.69  0.57 - 1.00 mg/dL Final    Sodium 01/29/2024 137  136 - 145 mmol/L Final    Potassium 01/29/2024 4.3  3.5 - 5.2 mmol/L Final    Chloride 01/29/2024 102  98 - 107 mmol/L Final    CO2 01/29/2024 21.4 (L)  22.0 - 29.0 mmol/L Final    Calcium 01/29/2024 9.6  8.6 - 10.5 mg/dL Final    BUN/Creatinine Ratio 01/29/2024 15.9  7.0 - 25.0 Final    Anion Gap 01/29/2024 13.6  5.0 - 15.0 mmol/L Final    eGFR 01/29/2024 119.9  >60.0 mL/min/1.73 Final   Office Visit on 12/29/2023   Component Date Value Ref Range Status    Amphetamine Screen, Urine 12/29/2023 Negative  Negative Final    AMP INTERNAL CONTROL 12/29/2023 Passed  Passed Final    Barbiturates Screen, Urine 12/29/2023 Negative  Negative Final    BARBITURATE INTERNAL CONTROL 12/29/2023 Passed  Passed Final    Buprenorphine, Screen, Urine 12/29/2023 Negative  Negative Final    BUPRENORPHINE INTERNAL CONTROL 12/29/2023 Passed  Passed Final    Benzodiazepine Screen, Urine 12/29/2023 Negative  Negative Final    BENZODIAZEPINE INTERNAL CONTROL 12/29/2023 Passed  Passed Final    Cocaine Screen, Urine 12/29/2023 Negative  Negative Final    COCAINE INTERNAL CONTROL 12/29/2023 Passed  Passed Final    MDMA (ECSTASY) 12/29/2023 Negative  Negative Final    MDMA (ECSTASY) INTERNAL CONTROL 12/29/2023 Passed  Passed Final    Methamphetamine, Ur 12/29/2023 Negative  Negative Final    METHAMPHETAMINE INTERNAL CONTROL 12/29/2023 Passed  Passed Final     Methadone Screen, Urine 12/29/2023 Negative  Negative Final    METHADONE INTERNAL CONTROL 12/29/2023 Passed  Passed Final    Opiate Screen 12/29/2023 Negative  Negative Final    OPIATES INTERNAL CONTROL 12/29/2023 Passed  Passed Final    Oxycodone Screen, Urine 12/29/2023 Negative  Negative Final    OXYCODONE INTERNAL CONTROL 12/29/2023 Passed  Passed Final    Phencyclidine (PCP), Urine 12/29/2023 Negative  Negative Final    PHENCYCLIDINE INTERNAL CONTROL 12/29/2023 Passed  Passed Final    THC, Screen, Urine 12/29/2023 Negative  Negative Final    THC INTERNAL CONTROL 12/29/2023 Passed  Passed Final    Lot Number 12/29/2023 [id number]   Final    Expiration Date 12/29/2023 9/14/24   Final   Lab on 10/31/2023   Component Date Value Ref Range Status    Protime 10/31/2023 13.6  11.8 - 14.9 Seconds Final    INR 10/31/2023 1.03  0.86 - 1.15 Final    Immunofixation Result, Serum 10/31/2023 Comment   Final    No monoclonality detected.    IgG 10/31/2023 941  586 - 1602 mg/dL Final    IgA 10/31/2023 187  87 - 352 mg/dL Final    IgM 10/31/2023 93  26 - 217 mg/dL Final    Iron 10/31/2023 63  37 - 145 mcg/dL Final    Iron Saturation (TSAT) 10/31/2023 17 (L)  20 - 50 % Final    Transferrin 10/31/2023 249  200 - 360 mg/dL Final    TIBC 10/31/2023 371  298 - 536 mcg/dL Final   Office Visit on 10/31/2023   Component Date Value Ref Range Status    Hepatitis B Surface Ag 10/31/2023 Non-Reactive  Non-Reactive Final    Hep A IgM 10/31/2023 Non-Reactive  Non-Reactive Final    Hep B C IgM 10/31/2023 Non-Reactive  Non-Reactive Final    Hepatitis C Ab 10/31/2023 Non-Reactive  Non-Reactive Final    LUDA Direct 10/31/2023 Negative  Negative Final    ALPHA -1 ANTITRYPSIN 10/31/2023 156  90 - 200 mg/dL Final    Smooth Muscle Ab 10/31/2023 4  0 - 19 Units Final                     Negative                     0 - 19                   Weak positive               20 - 30                   Moderate to strong positive     >30   Actin Antibodies are  found in 52-85% of patients with   autoimmune hepatitis or chronic active hepatitis and   in 22% of patients with primary biliary cirrhosis.    Ceruloplasmin 10/31/2023 26  19 - 39 mg/dL Final    Mitochondrial Ab 10/31/2023 <20.0  0.0 - 20.0 Units Final                                    Negative    0.0 - 20.0                                  Equivocal  20.1 - 24.9                                  Positive         >24.9  Mitochondrial (M2) Antibodies are found in 90-96% of  patients with primary biliary cirrhosis.   Results Encounter on 10/03/2023   Component Date Value Ref Range Status    Amphetamine, Urine Qual 10/04/2023 Negative  Ycbpad=6931 ng/mL Final    Barbiturates Screen, Urine 10/04/2023 Negative  Vpjcmh=607 ng/mL Final    Benzodiazepine Screen, Urine 10/04/2023 Negative  Nullgp=900 ng/mL Final    THC Screen, Urine 10/04/2023 Negative  Cutoff=20 ng/mL Final    Cocaine Screen, Urine 10/04/2023 Negative  Wkgmca=102 ng/mL Final    Opiate Screen, Urine 10/04/2023 Negative  Biknau=067 ng/mL Final    Opiate test includes Codeine, Morphine, Hydromorphone, Hydrocodone.    Oxycodone/Oxymorphone, Urine 10/04/2023 Negative  Qxxauy=454 ng/mL Final    Test includes Oxycodone and Oxymorphone    Phencyclidine (PCP), Urine 10/04/2023 Negative  Cutoff=25 ng/mL Final    Methadone Screen, Urine 10/04/2023 Negative  Ochaxu=871 ng/mL Final    Propoxyphene Screen 10/04/2023 Negative  Bjlkof=080 ng/mL Final    Creatinine, Urine 10/04/2023 59.1  20.0 - 300.0 mg/dL Final    pH, UA 10/04/2023 6.9  4.5 - 8.9 Final    Please note 10/04/2023 Comment   Final    This assay provides a preliminary unconfirmed analytical test  result that may be suitable for clinical management of patients  in certain situations. Drug-test results should be interpreted  in the context of clinical information. Patient metabolic  variables, specific drug chemistry, and specimen  characteristics can affect test outcome. Technical consultation  is available if  a test result is inconsistent with an expected  outcome. (email-[email or web address] or call toll-free  [phone])   There may be more visits with results that are not included.           Assessment & Plan   Problems Addressed this Visit          Mental Health    Generalized anxiety disorder    Relevant Medications    Vortioxetine HBr (Trintellix) 10 MG tablet tablet    amitriptyline (ELAVIL) 10 MG tablet    busPIRone (BUSPAR) 5 MG tablet     Other Visit Diagnoses       Mild episode of recurrent major depressive disorder  (Chronic)   -  Primary    R/O BPAD/BPD    Relevant Medications    Vortioxetine HBr (Trintellix) 10 MG tablet tablet    lamoTRIgine (LaMICtal) 150 MG tablet    amitriptyline (ELAVIL) 10 MG tablet    busPIRone (BUSPAR) 5 MG tablet    Sleeping difficulties        Relevant Medications    amitriptyline (ELAVIL) 10 MG tablet          Diagnoses         Codes Comments    Mild episode of recurrent major depressive disorder    -  Primary ICD-10-CM: F33.0  ICD-9-CM: 296.31 R/O BPAD/BPD    Generalized anxiety disorder     ICD-10-CM: F41.1  ICD-9-CM: 300.02     Sleeping difficulties     ICD-10-CM: G47.9  ICD-9-CM: 780.50             Visit Diagnoses:    ICD-10-CM ICD-9-CM   1. Mild episode of recurrent major depressive disorder  F33.0 296.31   2. Generalized anxiety disorder  F41.1 300.02   3. Sleeping difficulties  G47.9 780.50           GOALS:  Short Term Goals: Patient will be compliant with medication, and patient will have no significant medication related side effects.  Patient will be engaged in psychotherapy as indicated.  Patient will report subjective improvement of symptoms.  Long term goals: To stabilize mood and treat/improve subjective symptoms, the patient will stay out of the hospital, the patient will be at an optimal level of functioning, and the patient will take all medications as prescribed.  The patient verbalized understanding and agreement with goals that were mutually  set.      TREATMENT PLAN: Begin supportive psychotherapy efforts and take medications as indicated.  Medication and treatment options, both pharmacological and non-pharmacological treatment options, discussed during today's visit, including any off label use of medication. Patient acknowledged and verbally consented with current treatment plan and was educated on the importance of compliance with treatment and follow-up appointments.  The patient has declined a referral to begin psychotherapy, but has reported she will reach out to this APRN/office if she changes her mind.    -Continue amitriptyline 10 mg by mouth once nightly to assist with sleeping difficulties and also can assist with mood.  -Continue lamotrigine 150 mg by mouth once daily for mood.  -Continue Trintellix 10 mg by mouth once daily with food for mood.  -Begin Buspar 5 mg by mouth twice daily as needed for anxiety/mood.      MEDICATION ISSUES:  Discussed medication options and treatment plan of prescribed medication, any off label use of medication, as well as the risks, benefits, any black box warnings including increased suicidality, and side effects including but not limited to potential falls, dizziness, possible impaired driving, GI side effects (change in appetite, abdominal discomfort, nausea, vomiting, diarrhea, and/or constipation), dry mouth, somnolence, sedation, insomnia, activation, agitation, irritation, tremors, abnormal muscle movements or disorders, tardive dyskinesia, akathisia, asthenia, headache, sweating, possible bruising or rare bleeding, electrolyte and/or fluid abnormalities, change in blood pressure/heart rate/and or heart rhythm, hypotension, sexual dysfunction, rare impulse control problems, rare seizures, rare neuroleptic malignant syndrome, increased risk of death and cerebrovascular events, change in blood glucose and increased risk for diabetes, change in triglycerides and cholesterol and increased risk for  dyslipidemia,  weight gain, weight gain that can become problematic to health, skin conditions and reactions, and metabolic adversities among others. Patient and/or guardian are agreeable to call the office with any worsening of symptoms or onset of side effects, or if any concerns or questions arise.  The contact information for the office is made available to the patient and/or guardian. Patient and/or guardian are agreeable to call 911 or go to the nearest ER should they begin having any SI/HI, or if any urgent concerns arise.    Due to the nature of virtual visits and inability to monitor vital signs and weight with virtual visits, the patient has been encouraged to monitor their vital signs and weight regularly either through self-monitoring via home device(s) or with their Primary Care Provider, and the patient has been instructed to notify this APRN of any abnormalities or significant changes from baseline.     This APRN has discussed the benefits and risks of taking/continuing Lamictal (Lamotrigine).  The side effects of Lamictal can include a benign rash, blurred or double vision, dizziness, ataxia, sedation, headache, tremor, insomnia, poor coordination, fatigue,  nausea, vomiting, dyspepsia, rhinitis, infection, pharyngitis, asthenia, a rare but serious rash, rare multi-organ failure associated with Bull-Avelino Syndrome, toxic epidermal necrolysis, drug hypersensitivity syndrome, rare blood dyscrasias, rare aseptic meningitis, rare sudden unexplained deaths in people with epilepsy, withdrawal seizures upon abrupt withdrawal, and rare activation of suicidal ideation and behavior (suicidality).  This APRN has discussed that a very slow dose titration when starting, or changing doses, of Lamictal may reduce the incidence of skin rash and other side effects.  The dosage should not be titrated upwards or increased faster than recommended due to the possibility of the discussed side effects and risk of  development of a skin rash (which can become life threatening).    This APRN has also discussed that if the patient stops taking the Lamictal for 3-5 days or longer, it will be necessary to restart the drug with an initial dose titration, as rashes have been reported on reexposure.  If the patient and Provider decide to stop the Lamictal, the patient will follow the directions of this APRN/this office as a guided taper over about two weeks is appropriate due to the risk of relapse in bipolar disorder with those with a mood or bipolar disorder, the risk of seizures in those with epilepsy, and discontinuation symptoms upon rapid discontinuation of Lamictal.    The patient verbalizes understanding of benefits and risks as discussed, the patient/guardian feels the benefits outweigh the risks and is agreeable to continue/take Lamictal as discussed.  The patient is advised should any side effects or rash develops they are to stop the Lamictal immediately and contact this APRN/this office or go to the emergency department immediately.  The patient verbalizes understanding and agreement with treatment plan in their own words.      VERBAL INFORMED CONSENT FOR MEDICATION:  The patient was educated that their proposed/prescribed psychotropic medication(s) has potential risks, side effects, adverse effects, and black box warnings; and these have been discussed with the patient.  The patient has been informed that their treatment and medication dosage is to be individualized, and may even be above or below the recommended range/dosage due to patient individualization and response, but medication is prescribed using a shared decision making approach, and no medication or dosage will be prescribed without the patient's verbal consent.  The reason for the use of the medication including any off label use and alternative modes of treatment other than or in addition to medication has been considered and discussed, the probable  consequences of not receiving the proposed treatment have been discussed, and any treatment side effects, black box warnings, and cautions associated with treatment have been discussed with the patient.  The patient is allowed ample time to openly discuss and ask questions regarding the proposed medication(s) and treatment plan and the patient verbalizes understanding the reasons for the use of the medication, its potential risks and benefits, other alternative treatment(s), and the probable consequences that may occur if the proposed medication is not given.  The patient has been given ample time to ask questions and study the information and find the information to be specific, accurate, and complete.  The patient gives verbal consent for the medication(s) proposed/prescribed, they verbalized understanding that they can refuse and withdraw consent at any time with the assistance of this APRN, and the patient has verbally confirmed that they are aware, and are willing, to take the prescribed medication and follow the treatment plan with the known possible risks, side effect, black box warnings, and any potential medication interactions, and the patient reports they will be worse off without this medication and treatment plan.  The patient is advised to contact this APRN/this office if any questions or concerns arise at any time (at [phone]), or call 911/go to the closest emergency department if needed or outside of office hours.      SUICIDE RISK ASSESSMENT AND SAFETY PLAN: Unalterable demographics and a history of mental health intervention indicate this patient is in a high risk category compared to the general population. At present, the patient denies active SI/HI, intentions, or plans at this time and agrees to seek immediate care should such thoughts develop. The patient verbalizes understanding of how to access emergency care if needed and agrees to do so. Consideration of suicide risk and protective  factors such as history, current presentation, individual strengths and weaknesses, psychosocial and environmental stressors and variables, psychiatric illness and symptoms, medical conditions and pain, took place in this interview. Based on those considerations, the patient is determined: within individual baseline and presenting no imminent risk for suicide or homicide. Other recommendations: The patient does not meet the criteria for inpatient admission and is not a safety risk to self or others at today's visit. Inpatient treatment offers no significant advantages over outpatient treatment for this patient at today's visit.  The patient was given ample time for questions and fully participated in treatment planning.  The patient was encouraged to call the clinic with any questions or concerns.  The patient was informed of access to emergency care. If patient were to develop any significant symptomatology, suicidal ideation, homicidal ideation, any concerns, or feel unsafe at any time they are to call the clinic and if unable to get immediate assistance should immediately call 911 or go to the nearest emergency room.  Patient contracted verbally for the following: If you are experiencing an emotional crisis or have thoughts of harming yourself or others, please go to your nearest local emergency room or call 911. Will continue to re-assess medication response and side effects frequently to establish efficacy and ensure safety. Risks, any black box warnings, side effects, off label usage, and benefits of medication and treatment discussed with patient, along with potential adverse side effects of current and/or newly prescribed medication, alternative treatment options, and OTC medications.  Patient verbalized understanding of potential risks, any off label use of medication, any black box warnings, and any side effects in their own words. The patient verbalized understanding and agreed to comply with the safety  plan discussed in their own words.  Patient given the number to the office. Number also discussed of the 24- hour suicide hotline.           MEDS ORDERED DURING VISIT:  New Medications Ordered This Visit   Medications    Vortioxetine HBr (Trintellix) 10 MG tablet tablet     Sig: Take 1 tablet by mouth Daily With Breakfast.     Dispense:  30 tablet     Refill:  0    lamoTRIgine (LaMICtal) 150 MG tablet     Sig: Take 1 tablet by mouth Daily.     Dispense:  30 tablet     Refill:  0    amitriptyline (ELAVIL) 10 MG tablet     Sig: Take 1 tablet by mouth At Night As Needed for Sleep.     Dispense:  30 tablet     Refill:  0    busPIRone (BUSPAR) 5 MG tablet     Sig: Take 1 tablet by mouth 2 (Two) Times a Day As Needed (Anxiety).     Dispense:  60 tablet     Refill:  0       Return in about 4 weeks (around 9/17/2024), or if symptoms worsen or fail to improve, for Next scheduled follow up and Recheck.         Progress toward goal: Not at goal    Functional Status: Mild impairment     Prognosis: Good with Ongoing Treatment             This document has been electronically signed by MINI Conte  August 20, 2024 11:59 EDT    Some of the data in this electronic note has been brought forward from a previous encounter, any necessary changes have been made, it has been reviewed by this APRN, and it is accurate.    Please note that portions of this note were completed with a voice recognition program.

## 2024-08-23 ENCOUNTER — TELEPHONE (OUTPATIENT)
Dept: SURGERY | Facility: CLINIC | Age: 29
End: 2024-08-23
Payer: COMMERCIAL

## 2024-08-23 DIAGNOSIS — R10.9 ABDOMINAL PAIN, UNSPECIFIED ABDOMINAL LOCATION: Primary | ICD-10-CM

## 2024-08-23 NOTE — TELEPHONE ENCOUNTER
"PATIENT CALLED AND SHE HAD A LAP RACHELLE ON 07/18/24.    SHE SAID THAT YESTERDAY, SHE NOTICED A BULGE, WHICH IS ABOUT 1\" TO THE SIDE OF HER BELLY BUTTON.  IT IS NOT ON HER SURGERY INCISION.  SHE SAID IT BURNS WHEN SHE STRAINS TO GO TO THE BATHROOM, COUGH OR WHEN SHE JUST GOES TO STAND.  SHE IS ASSUMING IT IS A HERNIA.    DOES SHE NEED TO SEE DR. SCHWARZ ABOUT THIS OR DOES SHE NEED TO SEE A DIFFERENT KIND OF DOCTOR?  "

## 2024-08-23 NOTE — TELEPHONE ENCOUNTER
Spoke with the patient and relayed Dr. Hickman's message. Patient was advised to call our office once she has been scheduled for this scan to get scheduled to see Dr. Hickman. Patient v/u

## 2024-09-04 ENCOUNTER — HOSPITAL ENCOUNTER (OUTPATIENT)
Dept: CT IMAGING | Facility: HOSPITAL | Age: 29
Discharge: HOME | End: 2024-09-04
Admitting: STUDENT IN AN ORGANIZED HEALTH CARE EDUCATION/TRAINING PROGRAM
Payer: COMMERCIAL

## 2024-09-04 DIAGNOSIS — R10.9 ABDOMINAL PAIN, UNSPECIFIED ABDOMINAL LOCATION: ICD-10-CM

## 2024-09-04 PROCEDURE — 74176 CT ABD & PELVIS W/O CONTRAST: CPT

## 2024-09-09 ENCOUNTER — OFFICE VISIT (OUTPATIENT)
Dept: SURGERY | Facility: CLINIC | Age: 29
End: 2024-09-09
Payer: COMMERCIAL

## 2024-09-09 VITALS
BODY MASS INDEX: 26.22 KG/M2 | HEIGHT: 64 IN | WEIGHT: 153.6 LBS | DIASTOLIC BLOOD PRESSURE: 70 MMHG | SYSTOLIC BLOOD PRESSURE: 125 MMHG | HEART RATE: 72 BPM

## 2024-09-09 DIAGNOSIS — R10.33 PERIUMBILICAL ABDOMINAL PAIN: Primary | ICD-10-CM

## 2024-09-09 PROCEDURE — 1160F RVW MEDS BY RX/DR IN RCRD: CPT | Performed by: STUDENT IN AN ORGANIZED HEALTH CARE EDUCATION/TRAINING PROGRAM

## 2024-09-09 PROCEDURE — 1159F MED LIST DOCD IN RCRD: CPT | Performed by: STUDENT IN AN ORGANIZED HEALTH CARE EDUCATION/TRAINING PROGRAM

## 2024-09-09 PROCEDURE — 99213 OFFICE O/P EST LOW 20 MIN: CPT | Performed by: STUDENT IN AN ORGANIZED HEALTH CARE EDUCATION/TRAINING PROGRAM

## 2024-09-09 NOTE — PROGRESS NOTES
"Chief Complaint  Follow-up (CT SCAN)    Subjective    Subjective     Palmira Carbajal is a 29 y.o. female who presents to [facility] GENERAL SURGERY    History of Present Illness  29-year-old female who presents today for follow-up of a CT abdomen pelvis for periumbilical abdominal pain.  Patient previously known to me for a laparoscopic cholecystectomy approximately 2 months ago.  Patient continues to endorse some right sided periumbilical abdominal pain.  She says she notices a bulge in the area that causes a burning sensation.  She denies any nausea, vomiting, fevers, chills.  Follow-up      Personal History     Social History     Tobacco Use    Smoking status: Former     Current packs/day: 0.00     Average packs/day: 0.5 packs/day for 15.0 years (7.5 ttl pk-yrs)     Types: Cigarettes     Start date: 2007     Quit date: 2022     Years since quittin.5    Smokeless tobacco: Never   Vaping Use    Vaping status: Every Day    Substances: Nicotine, Flavoring    Devices: Disposable   Substance Use Topics    Alcohol use: Not Currently    Drug use: Not Currently     Types: Marijuana     Comment: Marijuana use in teenage years     Allergies   Allergen Reactions    Cymbalta [Duloxetine Hcl] Mental Status Change     Jittery and insomnia    Penicillins Rash       Objective    Objective       Vital Signs:   /70 (BP Location: Right arm, Patient Position: Sitting, Cuff Size: Adult)   Pulse 72   Ht 162.6 cm (64.02\")   Wt 69.7 kg (153 lb 9.6 oz)   BMI 26.35 kg/m²       Physical Exam  Constitutional:       Appearance: Normal appearance.   HENT:      Head: Normocephalic and atraumatic.      Mouth/Throat:      Mouth: Mucous membranes are moist.      Pharynx: Oropharynx is clear.   Cardiovascular:      Rate and Rhythm: Normal rate and regular rhythm.   Pulmonary:      Effort: Pulmonary effort is normal. No respiratory distress.   Abdominal:      Comments: Soft, nontender, nondistended, no " incisional hernia at the umbilical incision   Musculoskeletal:         General: No swelling. Normal range of motion.      Cervical back: Normal range of motion and neck supple.   Skin:     General: Skin is warm and dry.   Neurological:      General: No focal deficit present.      Mental Status: She is alert and oriented to person, place, and time.   Psychiatric:         Mood and Affect: Mood normal.         Behavior: Behavior normal.                  Assessment / Plan      Diagnoses and all orders for this visit:    1. Periumbilical abdominal pain (Primary)    29-year-old female with periumbilical abdominal pain after laparoscopic cholecystectomy.  CT abdomen pelvis with no suspicious findings including no incisional hernia.  No hernia on clinical exam today.  Do not recommend operative intervention based on patient's symptoms.  She is at increased risk for developing a hernia from her incision in the future and has been instructed to call the office if her symptoms change.    Follow Up   Return if symptoms worsen or fail to improve.      Patient was given instructions and counseling regarding her condition or for health maintenance advice. Please see specific information pulled into the AVS if appropriate.     Electronically signed by Cisco Hickman MD, 09/09/24, 9:33 AM EDT.

## 2024-09-19 ENCOUNTER — TELEMEDICINE (OUTPATIENT)
Dept: PSYCHIATRY | Facility: CLINIC | Age: 29
End: 2024-09-19
Payer: COMMERCIAL

## 2024-09-19 DIAGNOSIS — G47.9 SLEEPING DIFFICULTIES: ICD-10-CM

## 2024-09-19 DIAGNOSIS — F41.1 GENERALIZED ANXIETY DISORDER: Chronic | ICD-10-CM

## 2024-09-19 DIAGNOSIS — F33.0 MILD EPISODE OF RECURRENT MAJOR DEPRESSIVE DISORDER: Primary | Chronic | ICD-10-CM

## 2024-09-19 RX ORDER — AMITRIPTYLINE HYDROCHLORIDE 10 MG/1
10 TABLET ORAL NIGHTLY PRN
Qty: 30 TABLET | Refills: 0 | Status: SHIPPED | OUTPATIENT
Start: 2024-09-19

## 2024-09-19 RX ORDER — BUSPIRONE HYDROCHLORIDE 10 MG/1
10 TABLET ORAL 2 TIMES DAILY
Qty: 60 TABLET | Refills: 0 | Status: SHIPPED | OUTPATIENT
Start: 2024-09-19

## 2024-09-19 RX ORDER — LAMOTRIGINE 150 MG/1
150 TABLET ORAL DAILY
Qty: 30 TABLET | Refills: 0 | Status: SHIPPED | OUTPATIENT
Start: 2024-09-19

## 2024-10-08 ENCOUNTER — OFFICE VISIT (OUTPATIENT)
Dept: CARDIOLOGY | Facility: CLINIC | Age: 29
End: 2024-10-08
Payer: COMMERCIAL

## 2024-10-08 VITALS
WEIGHT: 157.6 LBS | HEART RATE: 98 BPM | BODY MASS INDEX: 26.91 KG/M2 | DIASTOLIC BLOOD PRESSURE: 74 MMHG | SYSTOLIC BLOOD PRESSURE: 109 MMHG | HEIGHT: 64 IN

## 2024-10-08 DIAGNOSIS — R55 POSTURAL DIZZINESS WITH PRESYNCOPE: ICD-10-CM

## 2024-10-08 DIAGNOSIS — R00.2 PALPITATIONS: Primary | ICD-10-CM

## 2024-10-08 DIAGNOSIS — R42 POSTURAL DIZZINESS WITH PRESYNCOPE: ICD-10-CM

## 2024-10-08 PROCEDURE — 99214 OFFICE O/P EST MOD 30 MIN: CPT

## 2024-10-08 PROCEDURE — 1160F RVW MEDS BY RX/DR IN RCRD: CPT

## 2024-10-08 PROCEDURE — 1159F MED LIST DOCD IN RCRD: CPT

## 2024-10-08 RX ORDER — FLUDROCORTISONE ACETATE 0.1 MG/1
0.1 TABLET ORAL 2 TIMES DAILY
Qty: 90 TABLET | Refills: 1 | Status: SHIPPED | OUTPATIENT
Start: 2024-10-08

## 2024-10-08 NOTE — PROGRESS NOTES
Chief Complaint  Syncope, unspecified syncope type and Follow-up (6 MO F/U. )    Subjective        History of Present Illness  Palmira Carbajal presents to [facility] CARDIOLOGY for follow up.   Patient is a 29-year-old female with past medical history outlined below who presents for follow-up on syncope and palpitations.  She was feeling much better after starting fludrocortisone but reports her symptoms have started to resurface.  There are days when they are really limiting her.  She has palpitations, dizziness, presyncope.  She denies chest pain or discomfort, dyspnea, edema.    Past Medical History:   Diagnosis Date    Acid reflux     Anxiety     Chronic pain disorder     NO PAIN CLINIC, PCP/PSYCH CONT W/AMITRIPTYLINE    Depression     Eczema     Fatty liver     Gallstones     Intractable migraine without aura and without status migrainosus 2023    Kidney stone     HX OF    Maltracking of right patella     Migraines     Orthostatic hypotension     WORKED UP FOR POTS, CONT W/MEDS, MONNIN. RX FLORINEF    Panic disorder     PONV (postoperative nausea and vomiting)     GOOD RESULTS WITH SCOPALAMINE    PTSD (post-traumatic stress disorder)     WAKES UP FROM ANESTHESIA WITH PANIC ATTACK    Seasonal allergic rhinitis 2022    Self-injurious behavior     as a teenager    Suicide attempt     NO ADULT ISSUES       ALLERGY  Allergies   Allergen Reactions    Cymbalta [Duloxetine Hcl] Mental Status Change     Jittery and insomnia    Penicillins Rash        Past Surgical History:   Procedure Laterality Date     SECTION      CHOLECYSTECTOMY N/A 2024    Procedure: CHOLECYSTECTOMY LAPAROSCOPIC;  Surgeon: Cisco Hickman MD;  Location: [facility] OR [facility];  Service: General;  Laterality: N/A;    COLONOSCOPY N/A 10/11/2022    Procedure: COLONOSCOPY;  Surgeon: Dyan Griffin MD;  Location: [facility] ENDOSCOPY;  Service: Gastroenterology;  Laterality: N/A;  HEMORRHOIDS    CYSTOSCOPY       CYSTOSCOPY URETEROSCOPY Left 2023    Procedure: CYSTOSCOPY URETEROSCOPY RETROGRADE PYELOGRAM HOLMIUM LASER STENT INSERTION, left;  Surgeon: Melisa Garcia MD;  Location: [facility] MAIN OR;  Service: Urology;  Laterality: Left;    ENDOSCOPY N/A 2023    Procedure: ESOPHAGOGASTRODUODENOSCOPY WITH BIOPSIES;  Surgeon: Dyan Griffin MD;  Location: [facility] ENDOSCOPY;  Service: Gastroenterology;  Laterality: N/A;  ESOPHAGITIS, GASTRITIS    HYSTERECTOMY      KIDNEY STONE SURGERY      unspecified    KNEE ARTHROSCOPY Right 2022    Procedure: KNEE ARTHROSCOPY WITH A LATERAL RELEASE AND CHONDROPLASTY;  Surgeon: Marco A Pitts MD;  Location: [facility] [place] [facility];  Service: Orthopedics;  Laterality: Right;    WISDOM TOOTH EXTRACTION          Social History     Socioeconomic History    Marital status:      Spouse name: TRISTAN    Number of children: 3   Tobacco Use    Smoking status: Former     Current packs/day: 0.00     Average packs/day: 0.5 packs/day for 15.0 years (7.5 ttl pk-yrs)     Types: Cigarettes     Start date: 2007     Quit date: 2022     Years since quittin.6    Smokeless tobacco: Never   Vaping Use    Vaping status: Every Day    Substances: Nicotine, Flavoring    Devices: Disposable   Substance and Sexual Activity    Alcohol use: Not Currently    Drug use: Not Currently     Types: Marijuana     Comment: Marijuana use in teenage years    Sexual activity: Defer     Partners: Male     Birth control/protection: Hysterectomy       Family History   Problem Relation Age of Onset    Arthritis Mother     Restless legs syndrome Mother     Thyroid disease Father     Colon polyps Father     Diabetes Father     Drug abuse Maternal Uncle     Alcohol abuse Maternal Uncle     Cancer Maternal Grandmother     Sleep apnea Son     Malig Hyperthermia Neg Hx         Current Outpatient Medications on File Prior to Visit   Medication Sig    amitriptyline (ELAVIL) 10 MG tablet Take 1 tablet by  "mouth At Night As Needed for Sleep.    busPIRone (BUSPAR) 10 MG tablet Take 1 tablet by mouth 2 (Two) Times a Day.    dicyclomine (BENTYL) 20 MG tablet Take 1 tablet by mouth Every 6 (Six) Hours As Needed (abdominal cramping).    esomeprazole (nexIUM) 40 MG capsule Take 1 capsule by mouth Every Morning Before Breakfast.    famotidine (PEPCID) 20 MG tablet Take 1 tablet by mouth At Night As Needed for Heartburn. (Patient taking differently: Take 1 tablet by mouth Every Night.)    galcanezumab-gnlm (Emgality) 120 MG/ML auto-injector pen Inject 2 mL under the skin into the appropriate area as directed Every 30 (Thirty) Days. Indications: Migraine Headache    Hydrocort-Pramoxine, Perianal, (ANALPRAM-HC) 2.5-1 % rectal cream Insert  into the rectum 3 (Three) Times a Day.    lamoTRIgine (LaMICtal) 150 MG tablet Take 1 tablet by mouth Daily.    polyethylene glycol (MIRALAX) 17 g packet Take 17 g by mouth Daily.    Resmetirom (Rezdiffra) 80 MG tablet Take 1 tablet by mouth Daily.    Rimegepant Sulfate (Nurtec) 75 MG tablet dispersible tablet Take 1 tablet by mouth Daily As Needed (migraine). Indications: Migraine Headache    spironolactone (Aldactone) 50 MG tablet Take 1 tablet by mouth Daily. Indications: Abnormal Growth of Body or Facial Hair    vitamin D3 (Vitamin D) 125 MCG (5000 UT) capsule capsule Take 1 capsule by mouth Daily.    Vortioxetine HBr (Trintellix) 10 MG tablet tablet Take 1 tablet by mouth Daily With Breakfast.    [DISCONTINUED] fludrocortisone 0.1 MG tablet Take 1 tablet by mouth Daily.     No current facility-administered medications on file prior to visit.       Objective   Vitals:    10/08/24 1010   BP: 109/74   Pulse: 98   Weight: 71.5 kg (157 lb 9.6 oz)   Height: 162.6 cm (64.02\")       Physical Exam  Constitutional:       General: She is awake. She is not in acute distress.     Appearance: Normal appearance.   HENT:      Head: Normocephalic.      Nose: Nose normal. No congestion.   Eyes:      " Extraocular Movements: Extraocular movements intact.      Conjunctiva/sclera: Conjunctivae normal.      Pupils: Pupils are equal, round, and reactive to light.   Neck:      Thyroid: No thyromegaly.      Vascular: No JVD.   Cardiovascular:      Rate and Rhythm: Normal rate and regular rhythm.      Chest Wall: PMI is not displaced.      Pulses: Normal pulses.      Heart sounds: Normal heart sounds, S1 normal and S2 normal. No murmur heard.     No friction rub. No gallop. No S3 or S4 sounds.   Pulmonary:      Effort: Pulmonary effort is normal.      Breath sounds: Normal breath sounds. No wheezing, rhonchi or rales.   Abdominal:      General: Bowel sounds are normal.      Palpations: Abdomen is soft.      Tenderness: There is no abdominal tenderness.   Musculoskeletal:      Cervical back: No tenderness.      Right lower leg: No edema.      Left lower leg: No edema.   Lymphadenopathy:      Cervical: No cervical adenopathy.   Skin:     General: Skin is warm and dry.      Capillary Refill: Capillary refill takes less than 2 seconds.      Coloration: Skin is not cyanotic.      Findings: No petechiae or rash.      Nails: There is no clubbing.   Neurological:      Mental Status: She is alert.   Psychiatric:         Mood and Affect: Mood normal.         Behavior: Behavior is cooperative.           Result Review     The following data was reviewed by MINI Browning on 10/08/24.      CMP          1/29/2024    11:02 7/13/2024    21:20 7/14/2024    21:02   CMP   Glucose 72  111  82    BUN 11  9  7    Creatinine 0.69  0.71  0.70    EGFR 119.9  116.7  118.7    Sodium 137  141  141    Potassium 4.3  3.7  3.7    Chloride 102  105  105    Calcium 9.6  9.0  8.8    Total Protein  7.0  6.6    Albumin  4.2  4.0    Globulin  2.8  2.6    Total Bilirubin  <0.2  <0.2    Alkaline Phosphatase  82  83    AST (SGOT)  11  11    ALT (SGPT)  10  9    Albumin/Globulin Ratio  1.5  1.5    BUN/Creatinine Ratio 15.9  12.7  10.0    Anion Gap  13.6  15.8  13.5      CBC w/diff          4/10/2024    08:55 7/13/2024    21:20 7/14/2024    21:02   CBC w/Diff   WBC 11.38  14.01  10.60    RBC 4.76  4.85  4.58    Hemoglobin 12.9  13.3  12.7    Hematocrit 39.7  39.7  37.7    MCV 83.4  81.9  82.3    MCH 27.1  27.4  27.7    MCHC 32.5  33.5  33.7    RDW 13.6  13.1  13.3    Platelets 380  461  456    Neutrophil Rel % 67.4  54.4  47.6    Immature Granulocyte Rel % 0.4  0.4  0.3    Lymphocyte Rel % 24.2  37.3  43.2    Monocyte Rel % 7.6  6.2  6.4    Eosinophil Rel % 0.0  1.1  1.7    Basophil Rel % 0.4  0.6  0.8               === Results for orders placed in visit on 09/12/23 ===    CARDIAC EVENT MONITOR    - Interpretation Summary -    Patient was monitored for 6 days, 23 hours and 29 minutes.    Lowest heart rate was 59 bpm, average heart rate was 103 bpm, and maximum heart rate was 163 bpm.    Rare PVC, couplet.  Rare PAC, couplet and triplet.    239 patient activated events.  Most of these corresponded to normal sinus rhythm and sinus tachycardia.  Palpitations corresponded sometimes to PACs and sometimes to normal sinus rhythm    No significant abnormal heart rhythms noted,          Procedures    Assessment & Plan  Diagnoses and all orders for this visit:    1. Palpitations (Primary)    2. Postural dizziness with presyncope    Other orders  -     fludrocortisone 0.1 MG tablet; Take 1 tablet by mouth 2 (Two) Times a Day.  Dispense: 90 tablet; Refill: 1        1.  Patient has palpitations that are self-limiting.  Will continue to monitor.  Previous Holter monitor was unremarkable.  2.  For postural dizziness has returned and is lifestyle limiting.  Increase fludrocortisone to 0.2 mg daily.  Continue to increase water and salt intake.  Core strengthening exercises are recommended.  Compression  stockings are also encouraged.      Follow Up   Return in about 3 months (around 1/8/2025) for With Dr. Nj.    Patient was given instructions and counseling regarding her  condition or for health maintenance advice. Please see specific information pulled into the AVS if appropriate.     Kelin Toribio, MINI  10/08/24  10:24 EDT    Dictated Utilizing Dragon Dictation

## 2024-10-17 ENCOUNTER — TELEMEDICINE (OUTPATIENT)
Dept: PSYCHIATRY | Facility: CLINIC | Age: 29
End: 2024-10-17
Payer: COMMERCIAL

## 2024-10-17 DIAGNOSIS — F41.1 GENERALIZED ANXIETY DISORDER: Chronic | ICD-10-CM

## 2024-10-17 DIAGNOSIS — G47.9 SLEEPING DIFFICULTIES: ICD-10-CM

## 2024-10-17 DIAGNOSIS — F33.0 MILD EPISODE OF RECURRENT MAJOR DEPRESSIVE DISORDER: Primary | Chronic | ICD-10-CM

## 2024-10-17 RX ORDER — LAMOTRIGINE 150 MG/1
150 TABLET ORAL DAILY
Qty: 30 TABLET | Refills: 0 | Status: SHIPPED | OUTPATIENT
Start: 2024-10-17

## 2024-10-17 RX ORDER — AMITRIPTYLINE HYDROCHLORIDE 10 MG/1
10 TABLET ORAL NIGHTLY PRN
Qty: 30 TABLET | Refills: 0 | Status: SHIPPED | OUTPATIENT
Start: 2024-10-17

## 2024-10-17 RX ORDER — BUPROPION HYDROCHLORIDE 100 MG/1
100 TABLET, EXTENDED RELEASE ORAL EVERY MORNING
Qty: 30 TABLET | Refills: 0 | Status: SHIPPED | OUTPATIENT
Start: 2024-10-17

## 2024-10-17 NOTE — PROGRESS NOTES
"This provider is located at the [facility] (through [facility]), [address], [facility], [address] using a secure [employer] Video Visit through [employer]. Patient is being seen remotely via telehealth at their home address in [place], and stated they are in a secure environment for this session. The patient's condition being diagnosed/treated is appropriate for telemedicine. The provider identified herself as well as her credentials.   The patient, and/or patients guardian, consent to be seen remotely, and when consent is given they understand that the consent allows for patient identifiable information to be sent to a third party as needed.   They may refuse to be seen remotely at any time. The electronic data is encrypted and password protected, and the patient and/or guardian has been advised of the potential risks to privacy not withstanding such measures.    You have chosen to receive care through a telehealth visit.  Do you consent to use a video/audio connection for your medical care today? Yes    Patient identifiers utilized: Name and date of birth.    Patient verbally confirmed consent for today's encounter  10/17/2024 .    The patient does verbally confirm they are being seen today while physically located in the [facility].  This provider/this APRN is licensed in the [facility] where the patient is located/being seen.     Subjective   Palmira Carbajal is a 29 y.o. female who presents today for follow up    Chief Complaint: Medication management follow-up: Anxiety, depression, and sleeping difficulties follow-up    Accompanied by: The patient is interviewed alone at today's encounter    History of Present Illness:   -Mood reported as: \"I'm okay\"  -Patient rates symptoms of depression at a 2/10 on a 0-10 scale, with 10 being the worst.  -Patient rates symptoms of anxiety at a 6-7/10 on a 0-10 scale, with 10 being the worst.  The patient reports the " "anxiety symptoms for her have been racing thoughts, feeling like something bad is going to happen, a feeling of impending doom, sweating, and sensation of racing heart.  The patient reports the physical symptoms of anxiety, such as the sensation of racing heart and sweating, happen approximately once per week.  -Appetite reported as: Good  -Sleep reported as: Fair, she reports with the recent increase in Buspar she felt like it was easier to fall asleep at night, but feels the Buspar also gave her \"crazy vivid dreams\" that caused her to wake up more through the night.  -Changes in medications or new medical problems/concerns since last visit: Denies any  -Reported medication compliance: The patient reports compliance with her current psychotropic medication regimen.   -Reported medication side effects or concerns: The patient reports feeling like BuSpar made her feel dizzy and lightheaded, and caused her to wake up through the night with vivid dreams.  The patient reports she does not feel the side effects of BuSpar have been tolerable, and reports feeling the side effects of BuSpar outweigh the benefits of BuSpar.  The patient also reports feeling that Trintellix has increased appetite and caused weight gain.  -Auditory or visual hallucinations: Denies any  -Behaviors different from patient baseline, or any reckless, impulsive, or risky behaviors: Denies  -Symptoms of karl or psychosis: Denies  -Self-injurious behavior: Denies  -SI/HI: The patient adamantly denies any suicidal or homicidal ideations, plans, or intent at the time of this encounter and is convincing.    -Using a shared decision-making approach the patient reports she does want to make some kind of adjustment in her current treatment regimen at today's encounter.  The patient reports she not want to increase her Trintellix dosage at today's encounter.  The patient reports feeling that she has gained weight since being on Trintellix, and does not want " to risk any further weight gain with any higher doses of Trintellix, and reports she feels that Trintellix has been beneficial overall and does not want to make any adjustments in it at this time.  The patient reports she does not want to change Trintellix to something else at this time because she feels Trintellix has been beneficial overall.  The patient reports she would like to try adding Wellbutrin to her current treatment regimen to help with continued worsened mood that has not controlled on her current regimen.  The patient reports she does not want to continue taking BuSpar at this time due to reported side effects.  -The patient does verbally contract for safety at today's encounter and is in verbal agreement with the safety/crisis plan. The patient reports in her own words that she will reach out to this APRN/office prior to next scheduled appointment if there is any worsening of mood, any new psychiatric symptoms, any medication side effects or concerns, any concern for safety to self or others, any suicidal or homicidal ideations plans or intent, or any concerns, or she will call 911, call or text the suicide and crisis lifeline at [phone], or go to the closest emergency department.      Patient Health Questionnaire-9 (PHQ-9) (Depression Screening Tool)  Little interest or pleasure in doing things? (Patient-Rptd) Not at all   Feeling down, depressed, or hopeless? (Patient-Rptd) Not at all   PHQ-2 Total Score (Patient-Rptd) 0   Trouble falling or staying asleep, or sleeping too much? (Patient-Rptd) Several days   Feeling tired or having little energy? (Patient-Rptd) Several days   Poor appetite or overeating? (Patient-Rptd) Not at all   Feeling bad about yourself - or that you are a failure or have let yourself or your family down? (Patient-Rptd) Several days   Trouble concentrating on things, such as reading the newspaper or watching television? (Patient-Rptd) Not at all   Moving or speaking so slowly that  other people could have noticed? Or the opposite - being so fidgety or restless that you have been moving around a lot more than usual? (Patient-Rptd) Not at all   Thoughts that you would be better off dead, or of hurting yourself in some way? (Patient-Rptd) Not at all   PHQ-9 Total Score (Patient-Rptd) 3   If you checked off any problems, how difficult have these problems made it for you to do your work, take care of things at home, or get along with other people? (Patient-Rptd) Not difficult at all         PHQ-9 Total Score: (Patient-Rptd) 3       Generalized Anxiety Disorder 7-Item (STACY-7) Screening Tool  Feeling nervous, anxious or on edge: (Patient-Rptd) Several days  Not being able to stop or control worrying: (Patient-Rptd) More than half the days  Worrying too much about different things: (Patient-Rptd) Several days  Trouble Relaxing: (Patient-Rptd) Several days  Being so restless that it is hard to sit still: (Patient-Rptd) Not at all  Feeling afraid as if something awful might happen: (Patient-Rptd) Several days  Becoming easily annoyed or irritable: (Patient-Rptd) Not at all  STACY 7 Total Score: (Patient-Rptd) 6  If you checked any problems, how difficult have these problems made it for you to do your work, take care of things at home, or get along with other people: (Patient-Rptd) Somewhat difficult      All Known Prior Psychiatric Medications and Responses if Known:  -Zoloft - does not remember response, possible lack of efficacy  -Paxil - does not remember response, possible lack of efficacy  -Prozac - does not remember response, possible lack of efficacy  -Lexapro - does not remember response, possible lack of efficacy  -Celexa - does not remember response, possible lack of efficacy  -Desvenlafaxine - does not remember response, possible lack of efficacy  -Effexor - does not remember response, possible lack of efficacy  -Cymbalta - caused jitteriness and insomnia  -Viibryd - reports she cannot remember  why she could not take this medication, but feels there was some kind of side effect  -Quetiapine - does not remember response, possible lack of efficacy  -Abilify - does not remember response, possible lack of efficacy  -Trazodone - does not remember response, possible lack of efficacy  -Hydroxyzine - does not remember response, possible lack of efficacy  -Ambien - does not remember response, possible lack of efficacy  -Lorazepam - does not remember response, possible lack of efficacy  -Buspar - reported lack of efficacy, and also caused dizziness and vivid dreams  -Propranolol - the patient reports lack of efficacy and decreased heart rate  -The patient reports some of the medications that she cannot remember the response to listed above included side effects such as restless leg, nightmares, and heart palpitations, but she cannot remember which medications caused these side effects  -Olanzapine - patient reports possible partial efficacy, if any efficacy, unsure  -Lamictal - patient reports effective for depression  -Amitriptyline - patient reports effective  -Trintellix - patient reports effective    History of Seizures or TBI:  The patient denies any    Substance Use History:  The patient reports being a former cigarette user, she denies any smokeless tobacco use, she reports current electronic cigarette/vape use, she denies any current alcohol use but has tried occasional alcohol in the past, and the patient reports using marijuana in her teenage years, but not as an adult.  The patient denies any other recreational or illicit substance use/misuse/abuse.    Patient's Support Network Includes:   and mother    Last Menstrual Period:  Hysterectomy - partial.        The following portions of the patient's history were reviewed and updated as appropriate: allergies, current medications, past family history, past medical history, past social history, past surgical history and problem list.          Past  Medical History:  Past Medical History:   Diagnosis Date    Acid reflux     Anxiety     Chronic pain disorder     NO PAIN CLINIC, PCP/PSYCH CONT W/AMITRIPTYLINE    Depression     Eczema     Fatty liver     Gallstones     Intractable migraine without aura and without status migrainosus 2023    Kidney stone     HX OF    Maltracking of right patella     Migraines     Orthostatic hypotension     WORKED UP FOR POTS, CONT W/MEDS, MONNIN. RX FLORINEF    Panic disorder     PONV (postoperative nausea and vomiting)     GOOD RESULTS WITH SCOPALAMINE    PTSD (post-traumatic stress disorder)     WAKES UP FROM ANESTHESIA WITH PANIC ATTACK    Seasonal allergic rhinitis 2022    Self-injurious behavior     as a teenager    Suicide attempt     NO ADULT ISSUES       Social History:  Social History     Socioeconomic History    Marital status:      Spouse name: TRISTAN    Number of children: 3   Tobacco Use    Smoking status: Former     Current packs/day: 0.00     Average packs/day: 0.5 packs/day for 15.0 years (7.5 ttl pk-yrs)     Types: Cigarettes     Start date: 2007     Quit date: 2022     Years since quittin.6    Smokeless tobacco: Never   Vaping Use    Vaping status: Every Day    Substances: Nicotine, Flavoring    Devices: Disposable   Substance and Sexual Activity    Alcohol use: Not Currently    Drug use: Not Currently     Types: Marijuana     Comment: Marijuana use in teenage years    Sexual activity: Defer     Partners: Male     Birth control/protection: Hysterectomy       Family History:  Family History   Problem Relation Age of Onset    Arthritis Mother     Restless legs syndrome Mother     Thyroid disease Father     Colon polyps Father     Diabetes Father     Drug abuse Maternal Uncle     Alcohol abuse Maternal Uncle     Cancer Maternal Grandmother     Sleep apnea Son     Malig Hyperthermia Neg Hx        Past Surgical History:  Past Surgical History:   Procedure Laterality Date      SECTION      CHOLECYSTECTOMY N/A 7/18/2024    Procedure: CHOLECYSTECTOMY LAPAROSCOPIC;  Surgeon: Cisco Hickman MD;  Location: [facility];  Service: General;  Laterality: N/A;    COLONOSCOPY N/A 10/11/2022    Procedure: COLONOSCOPY;  Surgeon: Dyan Griffin MD;  Location: [facility];  Service: Gastroenterology;  Laterality: N/A;  HEMORRHOIDS    CYSTOSCOPY      CYSTOSCOPY URETEROSCOPY Left 1/30/2023    Procedure: CYSTOSCOPY URETEROSCOPY RETROGRADE PYELOGRAM HOLMIUM LASER STENT INSERTION, left;  Surgeon: Melisa Garcia MD;  Location: [facility] MA[facility] OR;  Service: Urology;  Laterality: Left;    ENDOSCOPY N/A 9/8/2023    Procedure: ESOPHAGOGASTRODUODENOSCOPY WITH BIOPSIES;  Surgeon: Dyan Griffin MD;  Location: [facility];  Service: Gastroenterology;  Laterality: N/A;  ESOPHAGITIS, GASTRITIS    HYSTERECTOMY      KIDNEY STONE SURGERY      unspecified    KNEE ARTHROSCOPY Right 7/11/2022    Procedure: KNEE ARTHROSCOPY WITH A LATERAL RELEASE AND CHONDROPLASTY;  Surgeon: Marco A Pitts MD;  [place] [facility] [place] [facility]  Service: Orthopedics;  Laterality: Right;    WISDOM TOOTH EXTRACTION         Problem List:  Patient Active Problem List   Diagnosis    Maltracking of right patella    Impingement syndrome involving patellar fat pad of right knee    Chondromalacia    Patellar malalignment syndrome of right knee    Binocular vision disorder with diplopia    Generalized anxiety disorder    Acid reflux    PTSD (post-traumatic stress disorder)    Kidney stone on left side    Seasonal allergic rhinitis    Burn of finger and thumb of right hand, second degree    Hemorrhoids    Right hand pain    Rectal bleeding    Anal or rectal pain    Decreased appetite    Aftercare following surgery of right knee arthroscopic chondroplasty and lateral release, 7/11/2022    Generalized body aches    Epigastric pain    Female hirsutism    Right ovarian cyst    Moderate episode of recurrent major depressive  disorder    Primary insomnia    Vitamin D deficiency    Intractable migraine without aura and without status migrainosus    Calcified granuloma of lung    Nipple discharge    Nausea and vomiting    Overweight (BMI 25.0-29.9)    Acne vulgaris    Vaping nicotine dependence, tobacco product    Achilles tendinitis of both lower extremities    BRADY (nonalcoholic steatohepatitis)    Symptomatic cholelithiasis       Allergy:   Allergies   Allergen Reactions    Cymbalta [Duloxetine Hcl] Mental Status Change     Jittery and insomnia    Penicillins Rash        Current Medications:   Current Outpatient Medications   Medication Sig Dispense Refill    amitriptyline (ELAVIL) 10 MG tablet Take 1 tablet by mouth At Night As Needed for Sleep. 30 tablet 0    lamoTRIgine (LaMICtal) 150 MG tablet Take 1 tablet by mouth Daily. 30 tablet 0    Vortioxetine HBr (Trintellix) 10 MG tablet tablet Take 1 tablet by mouth Daily With Breakfast. 30 tablet 0    buPROPion SR (Wellbutrin SR) 100 MG 12 hr tablet Take 1 tablet by mouth Every Morning. 30 tablet 0    dicyclomine (BENTYL) 20 MG tablet Take 1 tablet by mouth Every 6 (Six) Hours As Needed (abdominal cramping). 60 tablet 3    esomeprazole (nexIUM) 40 MG capsule Take 1 capsule by mouth Every Morning Before Breakfast. 90 capsule 3    famotidine (PEPCID) 20 MG tablet Take 1 tablet by mouth At Night As Needed for Heartburn. (Patient taking differently: Take 1 tablet by mouth Every Night.) 90 tablet 1    fludrocortisone 0.1 MG tablet Take 1 tablet by mouth 2 (Two) Times a Day. 90 tablet 1    galcanezumab-gnlm (Emgality) 120 MG/ML auto-injector pen Inject 2 mL under the skin into the appropriate area as directed Every 30 (Thirty) Days. Indications: Migraine Headache 1.12 mL 5    Hydrocort-Pramoxine, Perianal, (ANALPRAM-HC) 2.5-1 % rectal cream Insert  into the rectum 3 (Three) Times a Day. 1 each 3    polyethylene glycol (MIRALAX) 17 g packet Take 17 g by mouth Daily. 5 packet 0    Resmetirom  (Rezdiffra) 80 MG tablet Take 1 tablet by mouth Daily. 90 tablet 3    Rimegepant Sulfate (Nurtec) 75 MG tablet dispersible tablet Take 1 tablet by mouth Daily As Needed (migraine). Indications: Migraine Headache 8 tablet 5    spironolactone (Aldactone) 50 MG tablet Take 1 tablet by mouth Daily. Indications: Abnormal Growth of Body or Facial Hair 30 tablet 3    vitamin D3 (Vitamin D) 125 MCG (5000 UT) capsule capsule Take 1 capsule by mouth Daily. 90 capsule 1     No current facility-administered medications for this visit.           Review of Symptoms:    Review of Systems   Psychiatric/Behavioral:  Positive for sleep disturbance, depressed mood and stress. Negative for agitation, behavioral problems, hallucinations, self-injury, suicidal ideas and negative for hyperactivity. The patient is nervous/anxious.          Physical Exam:   not currently breastfeeding. There is no height or weight on file to calculate BMI.   Due to the remote nature of this encounter (virtual encounter), vitals were unable to be obtained.  Height stated at 64 inches.  Weight stated at 157 pounds.        Physical Exam  Neurological:      Mental Status: She is alert and oriented to person, place, and time.   Psychiatric:         Attention and Perception: Attention normal.         Mood and Affect: Mood and affect normal.         Speech: Speech normal.         Behavior: Behavior normal. Behavior is cooperative.         Thought Content: Thought content normal. Thought content is not paranoid or delusional. Thought content does not include homicidal or suicidal ideation. Thought content does not include homicidal or suicidal plan.         Cognition and Memory: Cognition and memory normal.         Judgment: Judgment normal.         Mental Status Exam:   Hygiene:   good  Cooperation:  Cooperative  Eye Contact:  Good  Psychomotor Behavior:  Appropriate  Affect:  Appropriate  Mood: normal  Hopelessness: Denies  Speech:  Normal  Thought Process:  Goal  directed and Linear  Thought Content:  Mood congruent  Suicidal:  None  Homicidal:  None  Hallucinations:  None  Delusion:  None  Memory:  Intact  Orientation:  Person, Place, Time, and Situation  Reliability:  good  Insight:  Good  Judgement:  Good  Impulse Control:  Good  Physical/Medical Issues:  No            BP Readings from Last 3 Encounters:   10/08/24 109/74   09/09/24 125/70   08/08/24 114/77       Pulse Readings from Last 3 Encounters:   10/08/24 98   09/09/24 72   08/08/24 85       Wt Readings from Last 3 Encounters:   10/08/24 71.5 kg (157 lb 9.6 oz)   09/09/24 69.7 kg (153 lb 9.6 oz)   08/08/24 68.6 kg (151 lb 4.8 oz)       Lab Results:   Office Visit on 08/08/2024   Component Date Value Ref Range Status    Penicillin G Allergen IgE 08/08/2024 <0.10  Class 0 kU/L Final    Class Description 08/08/2024 Comment   Final        Levels of Specific IgE       Class  Description of Class      ---------------------------  -----  --------------------                     < 0.10         0         Negative             0.10 -    0.31         0/I       Equivocal/Low             0.32 -    0.55         I         Low             0.56 -    1.40         II        Moderate             1.41 -    3.90         III       High             3.91 -   19.00         IV        Very High            19.01 -  100.00         V         Very High                    >100.00         VI        Very High    25 Hydroxy, Vitamin D 08/08/2024 37.6  30.0 - 100.0 ng/ml Final   Admission on 07/18/2024, Discharged on 07/18/2024   Component Date Value Ref Range Status    Case Report 07/18/2024    Final                    Value:Surgical Pathology Report                         Case: [id number]                                  Authorizing Provider:  Cisco Hickman MD        Collected:           07/18/2024 12:54 PM          Ordering Location:     [facility]  Received:            07/19/2024 07:01 AM                                 OR                                                                            Pathologist:           Sue Frankel DO                                                       Specimen:    Gallbladder, gallbladder and contents                                                      Clinical Information 07/18/2024    Final                    Value:This result contains rich text formatting which cannot be displayed here.    Final Diagnosis 07/18/2024    Final                    Value:This result contains rich text formatting which cannot be displayed here.    Gross Description 07/18/2024    Final                    Value:This result contains rich text formatting which cannot be displayed here.    Microscopic Description 07/18/2024    Final                    Value:This result contains rich text formatting which cannot be displayed here.   Admission on 07/14/2024, Discharged on 07/14/2024   Component Date Value Ref Range Status    Glucose 07/14/2024 82  65 - 99 mg/dL Final    BUN 07/14/2024 7  6 - 20 mg/dL Final    Creatinine 07/14/2024 0.70  0.57 - 1.00 mg/dL Final    Sodium 07/14/2024 141  136 - 145 mmol/L Final    Potassium 07/14/2024 3.7  3.5 - 5.2 mmol/L Final    Chloride 07/14/2024 105  98 - 107 mmol/L Final    CO2 07/14/2024 22.5  22.0 - 29.0 mmol/L Final    Calcium 07/14/2024 8.8  8.6 - 10.5 mg/dL Final    Total Protein 07/14/2024 6.6  6.0 - 8.5 g/dL Final    Albumin 07/14/2024 4.0  3.5 - 5.2 g/dL Final    ALT (SGPT) 07/14/2024 9  1 - 33 U/L Final    AST (SGOT) 07/14/2024 11  1 - 32 U/L Final    Alkaline Phosphatase 07/14/2024 83  39 - 117 U/L Final    Total Bilirubin 07/14/2024 <0.2  0.0 - 1.2 mg/dL Final    Globulin 07/14/2024 2.6  gm/dL Final    A/G Ratio 07/14/2024 1.5  g/dL Final    BUN/Creatinine Ratio 07/14/2024 10.0  7.0 - 25.0 Final    Anion Gap 07/14/2024 13.5  5.0 - 15.0 mmol/L Final    eGFR 07/14/2024 118.7  >60.0 mL/min/1.73 Final    Lipase 07/14/2024 33  13 - 60 U/L Final    Color, UA 07/14/2024 Yellow   Yellow, Straw Final    Appearance, UA 07/14/2024 Clear  Clear Final    pH, UA 07/14/2024 8.0  5.0 - 8.0 Final    Specific Gravity, UA 07/14/2024 <=1.005  1.005 - 1.030 Final    Glucose, UA 07/14/2024 Negative  Negative Final    Ketones, UA 07/14/2024 Negative  Negative Final    Bilirubin, UA 07/14/2024 Negative  Negative Final    Blood, UA 07/14/2024 Negative  Negative Final    Protein, UA 07/14/2024 Negative  Negative Final    Leuk Esterase, UA 07/14/2024 Negative  Negative Final    Nitrite, UA 07/14/2024 Negative  Negative Final    Urobilinogen, UA 07/14/2024 0.2 E.U./dL  0.2 - 1.0 E.U./dL Final    Extra Tube 07/14/2024 Hold for add-ons.   Final    Auto resulted.    Extra Tube 07/14/2024 hold for add-on   Final    Auto resulted    Extra Tube 07/14/2024 Hold for add-ons.   Final    Auto resulted.    Extra Tube 07/14/2024 Hold for add-ons.   Final    Auto resulted    WBC 07/14/2024 10.60  3.40 - 10.80 10*3/mm3 Final    RBC 07/14/2024 4.58  3.77 - 5.28 10*6/mm3 Final    Hemoglobin 07/14/2024 12.7  12.0 - 15.9 g/dL Final    Hematocrit 07/14/2024 37.7  34.0 - 46.6 % Final    MCV 07/14/2024 82.3  79.0 - 97.0 fL Final    MCH 07/14/2024 27.7  26.6 - 33.0 pg Final    MCHC 07/14/2024 33.7  31.5 - 35.7 g/dL Final    RDW 07/14/2024 13.3  12.3 - 15.4 % Final    RDW-SD 07/14/2024 39.8  37.0 - 54.0 fl Final    MPV 07/14/2024 10.1  6.0 - 12.0 fL Final    Platelets 07/14/2024 456 (H)  140 - 450 10*3/mm3 Final    Neutrophil % 07/14/2024 47.6  42.7 - 76.0 % Final    Lymphocyte % 07/14/2024 43.2  19.6 - 45.3 % Final    Monocyte % 07/14/2024 6.4  5.0 - 12.0 % Final    Eosinophil % 07/14/2024 1.7  0.3 - 6.2 % Final    Basophil % 07/14/2024 0.8  0.0 - 1.5 % Final    Immature Grans % 07/14/2024 0.3  0.0 - 0.5 % Final    Neutrophils, Absolute 07/14/2024 5.04  1.70 - 7.00 10*3/mm3 Final    Lymphocytes, Absolute 07/14/2024 4.58 (H)  0.70 - 3.10 10*3/mm3 Final    Monocytes, Absolute 07/14/2024 0.68  0.10 - 0.90 10*3/mm3 Final    Eosinophils,  Absolute 07/14/2024 0.18  0.00 - 0.40 10*3/mm3 Final    Basophils, Absolute 07/14/2024 0.09  0.00 - 0.20 10*3/mm3 Final    Immature Grans, Absolute 07/14/2024 0.03  0.00 - 0.05 10*3/mm3 Final    nRBC 07/14/2024 0.0  0.0 - 0.2 /100 WBC Final    RBC Morphology 07/14/2024 Normal  Normal Final    WBC Morphology 07/14/2024 Normal  Normal Final    Platelet Morphology 07/14/2024 Normal  Normal Final   Admission on 07/13/2024, Discharged on 07/14/2024   Component Date Value Ref Range Status    Glucose 07/13/2024 111 (H)  65 - 99 mg/dL Final    BUN 07/13/2024 9  6 - 20 mg/dL Final    Creatinine 07/13/2024 0.71  0.57 - 1.00 mg/dL Final    Sodium 07/13/2024 141  136 - 145 mmol/L Final    Potassium 07/13/2024 3.7  3.5 - 5.2 mmol/L Final    Chloride 07/13/2024 105  98 - 107 mmol/L Final    CO2 07/13/2024 20.2 (L)  22.0 - 29.0 mmol/L Final    Calcium 07/13/2024 9.0  8.6 - 10.5 mg/dL Final    Total Protein 07/13/2024 7.0  6.0 - 8.5 g/dL Final    Albumin 07/13/2024 4.2  3.5 - 5.2 g/dL Final    ALT (SGPT) 07/13/2024 10  1 - 33 U/L Final    AST (SGOT) 07/13/2024 11  1 - 32 U/L Final    Alkaline Phosphatase 07/13/2024 82  39 - 117 U/L Final    Total Bilirubin 07/13/2024 <0.2  0.0 - 1.2 mg/dL Final    Globulin 07/13/2024 2.8  gm/dL Final    A/G Ratio 07/13/2024 1.5  g/dL Final    BUN/Creatinine Ratio 07/13/2024 12.7  7.0 - 25.0 Final    Anion Gap 07/13/2024 15.8 (H)  5.0 - 15.0 mmol/L Final    eGFR 07/13/2024 116.7  >60.0 mL/min/1.73 Final    Lipase 07/13/2024 37  13 - 60 U/L Final    Color, UA 07/13/2024 Yellow  Yellow, Straw Final    Appearance, UA 07/13/2024 Turbid (A)  Clear Final    pH, UA 07/13/2024 >=9.0 (H)  5.0 - 8.0 Final    Specific [place], UA 07/13/2024 1.021  1.005 - 1.030 Final    Glucose, UA 07/13/2024 Negative  Negative Final    Ketones, UA 07/13/2024 15 mg/dL (1+) (A)  Negative Final    Bilirubin, UA 07/13/2024 Negative  Negative Final    Blood, UA 07/13/2024 Negative  Negative Final    Protein, UA 07/13/2024 Trace  (A)  Negative Final    Leuk Esterase, UA 07/13/2024 Negative  Negative Final    Nitrite, UA 07/13/2024 Negative  Negative Final    Urobilinogen, UA 07/13/2024 1.0 E.U./dL  0.2 - 1.0 E.U./dL Final    Extra Tube 07/13/2024 Hold for add-ons.   Final    Auto resulted.    Extra Tube 07/13/2024 hold for add-on   Final    Auto resulted    Extra Tube 07/13/2024 Hold for add-ons.   Final    Auto resulted.    Extra Tube 07/13/2024 Hold for add-ons.   Final    Auto resulted    WBC 07/13/2024 14.01 (H)  3.40 - 10.80 10*3/mm3 Final    RBC 07/13/2024 4.85  3.77 - 5.28 10*6/mm3 Final    Hemoglobin 07/13/2024 13.3  12.0 - 15.9 g/dL Final    Hematocrit 07/13/2024 39.7  34.0 - 46.6 % Final    MCV 07/13/2024 81.9  79.0 - 97.0 fL Final    MCH 07/13/2024 27.4  26.6 - 33.0 pg Final    MCHC 07/13/2024 33.5  31.5 - 35.7 g/dL Final    RDW 07/13/2024 13.1  12.3 - 15.4 % Final    RDW-SD 07/13/2024 39.0  37.0 - 54.0 fl Final    MPV 07/13/2024 10.3  6.0 - 12.0 fL Final    Platelets 07/13/2024 461 (H)  140 - 450 10*3/mm3 Final    Neutrophil % 07/13/2024 54.4  42.7 - 76.0 % Final    Lymphocyte % 07/13/2024 37.3  19.6 - 45.3 % Final    Monocyte % 07/13/2024 6.2  5.0 - 12.0 % Final    Eosinophil % 07/13/2024 1.1  0.3 - 6.2 % Final    Basophil % 07/13/2024 0.6  0.0 - 1.5 % Final    Immature Grans % 07/13/2024 0.4  0.0 - 0.5 % Final    Neutrophils, Absolute 07/13/2024 7.61 (H)  1.70 - 7.00 10*3/mm3 Final    Lymphocytes, Absolute 07/13/2024 5.23 (H)  0.70 - 3.10 10*3/mm3 Final    Monocytes, Absolute 07/13/2024 0.87  0.10 - 0.90 10*3/mm3 Final    Eosinophils, Absolute 07/13/2024 0.16  0.00 - 0.40 10*3/mm3 Final    Basophils, Absolute 07/13/2024 0.09  0.00 - 0.20 10*3/mm3 Final    Immature Grans, Absolute 07/13/2024 0.05  0.00 - 0.05 10*3/mm3 Final    nRBC 07/13/2024 0.0  0.0 - 0.2 /100 WBC Final    Anisocytosis 07/13/2024 Slight/1+  None Seen Final    Poikilocytes 07/13/2024 Slight/1+  None Seen Final    WBC Morphology 07/13/2024 Normal  Normal Final     Large Platelets 07/13/2024 Slight/1+  None Seen Final   Lab on 04/10/2024   Component Date Value Ref Range Status    WBC 04/10/2024 11.38 (H)  3.40 - 10.80 10*3/mm3 Final    RBC 04/10/2024 4.76  3.77 - 5.28 10*6/mm3 Final    Hemoglobin 04/10/2024 12.9  12.0 - 15.9 g/dL Final    Hematocrit 04/10/2024 39.7  34.0 - 46.6 % Final    MCV 04/10/2024 83.4  79.0 - 97.0 fL Final    MCH 04/10/2024 27.1  26.6 - 33.0 pg Final    MCHC 04/10/2024 32.5  31.5 - 35.7 g/dL Final    RDW 04/10/2024 13.6  12.3 - 15.4 % Final    RDW-SD 04/10/2024 41.8  37.0 - 54.0 fl Final    MPV 04/10/2024 10.0  6.0 - 12.0 fL Final    Platelets 04/10/2024 380  140 - 450 10*3/mm3 Final    Neutrophil % 04/10/2024 67.4  42.7 - 76.0 % Final    Lymphocyte % 04/10/2024 24.2  19.6 - 45.3 % Final    Monocyte % 04/10/2024 7.6  5.0 - 12.0 % Final    Eosinophil % 04/10/2024 0.0 (L)  0.3 - 6.2 % Final    Basophil % 04/10/2024 0.4  0.0 - 1.5 % Final    Immature Grans % 04/10/2024 0.4  0.0 - 0.5 % Final    Neutrophils, Absolute 04/10/2024 7.67 (H)  1.70 - 7.00 10*3/mm3 Final    Lymphocytes, Absolute 04/10/2024 2.75  0.70 - 3.10 10*3/mm3 Final    Monocytes, Absolute 04/10/2024 0.87  0.10 - 0.90 10*3/mm3 Final    Eosinophils, Absolute 04/10/2024 0.00  0.00 - 0.40 10*3/mm3 Final    Basophils, Absolute 04/10/2024 0.05  0.00 - 0.20 10*3/mm3 Final    Immature Grans, Absolute 04/10/2024 0.04  0.00 - 0.05 10*3/mm3 Final   Office Visit on 01/29/2024   Component Date Value Ref Range Status    WBC 01/29/2024 13.49 (H)  3.40 - 10.80 10*3/mm3 Final    RBC 01/29/2024 5.06  3.77 - 5.28 10*6/mm3 Final    Hemoglobin 01/29/2024 13.7  12.0 - 15.9 g/dL Final    Hematocrit 01/29/2024 42.3  34.0 - 46.6 % Final    MCV 01/29/2024 83.6  79.0 - 97.0 fL Final    MCH 01/29/2024 27.1  26.6 - 33.0 pg Final    MCHC 01/29/2024 32.4  31.5 - 35.7 g/dL Final    RDW 01/29/2024 13.1  12.3 - 15.4 % Final    RDW-SD 01/29/2024 40.0  37.0 - 54.0 fl Final    MPV 01/29/2024 11.1  6.0 - 12.0 fL Final     Platelets 01/29/2024 411  140 - 450 10*3/mm3 Final    Neutrophil % 01/29/2024 65.4  42.7 - 76.0 % Final    Lymphocyte % 01/29/2024 24.8  19.6 - 45.3 % Final    Monocyte % 01/29/2024 6.9  5.0 - 12.0 % Final    Eosinophil % 01/29/2024 1.8  0.3 - 6.2 % Final    Basophil % 01/29/2024 0.7  0.0 - 1.5 % Final    Immature Grans % 01/29/2024 0.4  0.0 - 0.5 % Final    Neutrophils, Absolute 01/29/2024 8.81 (H)  1.70 - 7.00 10*3/mm3 Final    Lymphocytes, Absolute 01/29/2024 3.35 (H)  0.70 - 3.10 10*3/mm3 Final    Monocytes, Absolute 01/29/2024 0.93 (H)  0.10 - 0.90 10*3/mm3 Final    Eosinophils, Absolute 01/29/2024 0.24  0.00 - 0.40 10*3/mm3 Final    Basophils, Absolute 01/29/2024 0.10  0.00 - 0.20 10*3/mm3 Final    Immature Grans, Absolute 01/29/2024 0.06 (H)  0.00 - 0.05 10*3/mm3 Final    nRBC 01/29/2024 0.0  0.0 - 0.2 /100 WBC Final    25 Hydroxy, Vitamin D 01/29/2024 62.3  30.0 - 100.0 ng/ml Final    Glucose 01/29/2024 72  65 - 99 mg/dL Final    BUN 01/29/2024 11  6 - 20 mg/dL Final    Creatinine 01/29/2024 0.69  0.57 - 1.00 mg/dL Final    Sodium 01/29/2024 137  136 - 145 mmol/L Final    Potassium 01/29/2024 4.3  3.5 - 5.2 mmol/L Final    Chloride 01/29/2024 102  98 - 107 mmol/L Final    CO2 01/29/2024 21.4 (L)  22.0 - 29.0 mmol/L Final    Calcium 01/29/2024 9.6  8.6 - 10.5 mg/dL Final    BUN/Creatinine Ratio 01/29/2024 15.9  7.0 - 25.0 Final    Anion Gap 01/29/2024 13.6  5.0 - 15.0 mmol/L Final    eGFR 01/29/2024 119.9  >60.0 mL/min/1.73 Final   Office Visit on 12/29/2023   Component Date Value Ref Range Status    Amphetamine Screen, Urine 12/29/2023 Negative  Negative Final    AMP INTERNAL CONTROL 12/29/2023 Passed  Passed Final    Barbiturates Screen, Urine 12/29/2023 Negative  Negative Final    BARBITURATE INTERNAL CONTROL 12/29/2023 Passed  Passed Final    Buprenorphine, Screen, Urine 12/29/2023 Negative  Negative Final    BUPRENORPHINE INTERNAL CONTROL 12/29/2023 Passed  Passed Final    Benzodiazepine Screen, Urine  12/29/2023 Negative  Negative Final    BENZODIAZEPINE INTERNAL CONTROL 12/29/2023 Passed  Passed Final    Cocaine Screen, Urine 12/29/2023 Negative  Negative Final    COCAINE INTERNAL CONTROL 12/29/2023 Passed  Passed Final    MDMA (ECSTASY) 12/29/2023 Negative  Negative Final    MDMA (ECSTASY) INTERNAL CONTROL 12/29/2023 Passed  Passed Final    Methamphetamine, Ur 12/29/2023 Negative  Negative Final    METHAMPHETAMINE INTERNAL CONTROL 12/29/2023 Passed  Passed Final    Methadone Screen, Urine 12/29/2023 Negative  Negative Final    METHADONE INTERNAL CONTROL 12/29/2023 Passed  Passed Final    Opiate Screen 12/29/2023 Negative  Negative Final    OPIATES INTERNAL CONTROL 12/29/2023 Passed  Passed Final    Oxycodone Screen, Urine 12/29/2023 Negative  Negative Final    OXYCODONE INTERNAL CONTROL 12/29/2023 Passed  Passed Final    Phencyclidine (PCP), Urine 12/29/2023 Negative  Negative Final    PHENCYCLIDINE INTERNAL CONTROL 12/29/2023 Passed  Passed Final    THC, Screen, Urine 12/29/2023 Negative  Negative Final    THC INTERNAL CONTROL 12/29/2023 Passed  Passed Final    Lot Number 12/29/2023 [id number]   Final    Expiration Date 12/29/2023 9/14/24   Final   Lab on 10/31/2023   Component Date Value Ref Range Status    Protime 10/31/2023 13.6  11.8 - 14.9 Seconds Final    INR 10/31/2023 1.03  0.86 - 1.15 Final    Immunofixation Result, Serum 10/31/2023 Comment   Final    No monoclonality detected.    IgG 10/31/2023 941  586 - 1602 mg/dL Final    IgA 10/31/2023 187  87 - 352 mg/dL Final    IgM 10/31/2023 93  26 - 217 mg/dL Final    Iron 10/31/2023 63  37 - 145 mcg/dL Final    Iron Saturation (TSAT) 10/31/2023 17 (L)  20 - 50 % Final    Transferrin 10/31/2023 249  200 - 360 mg/dL Final    TIBC 10/31/2023 371  298 - 536 mcg/dL Final   Office Visit on 10/31/2023   Component Date Value Ref Range Status    Hepatitis B Surface Ag 10/31/2023 Non-Reactive  Non-Reactive Final    Hep A IgM 10/31/2023 Non-Reactive  Non-Reactive Final     Hep B C IgM 10/31/2023 Non-Reactive  Non-Reactive Final    Hepatitis C Ab 10/31/2023 Non-Reactive  Non-Reactive Final    LUDA Direct 10/31/2023 Negative  Negative Final    ALPHA -1 ANTITRYPSIN 10/31/2023 156  90 - 200 mg/dL Final    Smooth Muscle Ab 10/31/2023 4  0 - 19 Units Final                     Negative                     0 - 19                   Weak positive               20 - 30                   Moderate to strong positive     >30   Actin Antibodies are found in 52-85% of patients with   autoimmune hepatitis or chronic active hepatitis and   in 22% of patients with primary biliary cirrhosis.    Ceruloplasmin 10/31/2023 26  19 - 39 mg/dL Final    Mitochondrial Ab 10/31/2023 <20.0  0.0 - 20.0 Units Final                                    Negative    0.0 - 20.0                                  Equivocal  20.1 - 24.9                                  Positive         >24.9  Mitochondrial (M2) Antibodies are found in 90-96% of  patients with primary biliary cirrhosis.           Assessment & Plan   Problems Addressed this Visit          Mental Health    Generalized anxiety disorder    Relevant Medications    Vortioxetine HBr (Trintellix) 10 MG tablet tablet    amitriptyline (ELAVIL) 10 MG tablet    buPROPion SR (Wellbutrin SR) 100 MG 12 hr tablet     Other Visit Diagnoses       Mild episode of recurrent major depressive disorder  (Chronic)   -  Primary    R/O BPAD/BPD    Relevant Medications    Vortioxetine HBr (Trintellix) 10 MG tablet tablet    amitriptyline (ELAVIL) 10 MG tablet    lamoTRIgine (LaMICtal) 150 MG tablet    buPROPion SR (Wellbutrin SR) 100 MG 12 hr tablet    Sleeping difficulties        Relevant Medications    amitriptyline (ELAVIL) 10 MG tablet          Diagnoses         Codes Comments    Mild episode of recurrent major depressive disorder    -  Primary ICD-10-CM: F33.0  ICD-9-CM: 296.31 R/O BPAD/BPD    Generalized anxiety disorder     ICD-10-CM: F41.1  ICD-9-CM: 300.02     Sleeping  difficulties     ICD-10-CM: G47.9  ICD-9-CM: 780.50             Visit Diagnoses:    ICD-10-CM ICD-9-CM   1. Mild episode of recurrent major depressive disorder  F33.0 296.31   2. Generalized anxiety disorder  F41.1 300.02   3. Sleeping difficulties  G47.9 780.50           GOALS:  Short Term Goals: Patient will be compliant with medication, and patient will have no significant medication related side effects.  Patient will be engaged in psychotherapy as indicated.  Patient will report subjective improvement of symptoms.  Long term goals: To stabilize mood and treat/improve subjective symptoms, the patient will stay out of the hospital, the patient will be at an optimal level of functioning, and the patient will take all medications as prescribed.  The patient verbalized understanding and agreement with goals that were mutually set.      TREATMENT PLAN: Begin supportive psychotherapy efforts and take medications as indicated.  Medication and treatment options, both pharmacological and non-pharmacological treatment options, discussed during today's visit, including any off label use of medication. Patient acknowledged and verbally consented with current treatment plan and was educated on the importance of compliance with treatment and follow-up appointments.  The patient has declined a referral to begin psychotherapy, but has reported she will reach out to this APRN/office if she changes her mind.    -Continue amitriptyline 10 mg by mouth once nightly to assist with sleeping difficulties and also can assist with mood.  -Continue lamotrigine 150 mg by mouth once daily for mood.  -Continue Trintellix 10 mg by mouth once daily with food for mood.  -Discontinue BuSpar due to reported side effects and reported lack of efficacy.  -Begin Wellbutrin  mg by mouth once daily in the morning as an adjunct for mood.  -The patient declines a referral for psychotherapy.      MEDICATION ISSUES:  Discussed medication options and  treatment plan of prescribed medication, any off label use of medication, as well as the risks, benefits, any black box warnings including increased suicidality, and side effects including but not limited to potential falls, dizziness, possible impaired driving, GI side effects (change in appetite, abdominal discomfort, nausea, vomiting, diarrhea, and/or constipation), dry mouth, somnolence, sedation, insomnia, activation, agitation, irritation, tremors, abnormal muscle movements or disorders, tardive dyskinesia, akathisia, asthenia, headache, sweating, possible bruising or rare bleeding, electrolyte and/or fluid abnormalities, change in blood pressure/heart rate/and or heart rhythm, hypotension, sexual dysfunction, rare impulse control problems, rare seizures, rare neuroleptic malignant syndrome, increased risk of death and cerebrovascular events, change in blood glucose and increased risk for diabetes, change in triglycerides and cholesterol and increased risk for dyslipidemia,  weight gain, weight gain that can become problematic to health, skin conditions and reactions, and metabolic adversities among others. Patient and/or guardian are agreeable to call the office with any worsening of symptoms or onset of side effects, or if any concerns or questions arise.  The contact information for the office is made available to the patient and/or guardian. Patient and/or guardian are agreeable to call 911 or go to the nearest ER should they begin having any SI/HI, or if any urgent concerns arise.    Due to the nature of virtual visits and inability to monitor vital signs and weight with virtual visits, the patient has been encouraged to monitor their vital signs and weight regularly either through self-monitoring via home device(s) or with their Primary Care Provider, and the patient has been instructed to notify this APRN of any abnormalities or significant changes from baseline.     This APRN has discussed the benefits  and risks of taking/continuing Lamictal (Lamotrigine).  The side effects of Lamictal can include a benign rash, blurred or double vision, dizziness, ataxia, sedation, headache, tremor, insomnia, poor coordination, fatigue,  nausea, vomiting, dyspepsia, rhinitis, infection, pharyngitis, asthenia, a rare but serious rash, rare multi-organ failure associated with Bull-Avelino Syndrome, toxic epidermal necrolysis, drug hypersensitivity syndrome, rare blood dyscrasias, rare aseptic meningitis, rare sudden unexplained deaths in people with epilepsy, withdrawal seizures upon abrupt withdrawal, and rare activation of suicidal ideation and behavior (suicidality).  This APRN has discussed that a very slow dose titration when starting, or changing doses, of Lamictal may reduce the incidence of skin rash and other side effects.  The dosage should not be titrated upwards or increased faster than recommended due to the possibility of the discussed side effects and risk of development of a skin rash (which can become life threatening).    This APRN has also discussed that if the patient stops taking the Lamictal for 3-5 days or longer, it will be necessary to restart the drug with an initial dose titration, as rashes have been reported on reexposure.  If the patient and Provider decide to stop the Lamictal, the patient will follow the directions of this APRN/this office as a guided taper over about two weeks is appropriate due to the risk of relapse in bipolar disorder with those with a mood or bipolar disorder, the risk of seizures in those with epilepsy, and discontinuation symptoms upon rapid discontinuation of Lamictal.    The patient verbalizes understanding of benefits and risks as discussed, the patient/guardian feels the benefits outweigh the risks and is agreeable to continue/take Lamictal as discussed.  The patient is advised should any side effects or rash develops they are to stop the Lamictal immediately and  contact this APRN/this office or go to the emergency department immediately.  The patient verbalizes understanding and agreement with treatment plan in their own words.      VERBAL INFORMED CONSENT FOR MEDICATION:  The patient was educated that their proposed/prescribed psychotropic medication(s) has potential risks, side effects, adverse effects, and black box warnings; and these have been discussed with the patient.  The patient has been informed that their treatment and medication dosage is to be individualized, and may even be above or below the recommended range/dosage due to patient individualization and response, but medication is prescribed using a shared decision making approach, and no medication or dosage will be prescribed without the patient's verbal consent.  The reason for the use of the medication including any off label use and alternative modes of treatment other than or in addition to medication has been considered and discussed, the probable consequences of not receiving the proposed treatment have been discussed, and any treatment side effects, black box warnings, and cautions associated with treatment have been discussed with the patient.  The patient is allowed ample time to openly discuss and ask questions regarding the proposed medication(s) and treatment plan and the patient verbalizes understanding the reasons for the use of the medication, its potential risks and benefits, other alternative treatment(s), and the probable consequences that may occur if the proposed medication is not given.  The patient has been given ample time to ask questions and study the information and find the information to be specific, accurate, and complete.  The patient gives verbal consent for the medication(s) proposed/prescribed, they verbalized understanding that they can refuse and withdraw consent at any time with the assistance of this APRN, and the patient has verbally confirmed that they are aware, and  are willing, to take the prescribed medication and follow the treatment plan with the known possible risks, side effect, black box warnings, and any potential medication interactions, and the patient reports they will be worse off without this medication and treatment plan.  The patient is advised to contact this APRN/this office if any questions or concerns arise at any time (at [phone]), or call 911/go to the closest emergency department if needed or outside of office hours.      SUICIDE RISK ASSESSMENT AND SAFETY PLAN: Unalterable demographics and a history of mental health intervention indicate this patient is in a high risk category compared to the general population. At present, the patient denies active SI/HI, intentions, or plans at this time and agrees to seek immediate care should such thoughts develop. The patient verbalizes understanding of how to access emergency care if needed and agrees to do so. Consideration of suicide risk and protective factors such as history, current presentation, individual strengths and weaknesses, psychosocial and environmental stressors and variables, psychiatric illness and symptoms, medical conditions and pain, took place in this interview. Based on those considerations, the patient is determined: within individual baseline and presenting no imminent risk for suicide or homicide. Other recommendations: The patient does not meet the criteria for inpatient admission and is not a safety risk to self or others at today's visit. Inpatient treatment offers no significant advantages over outpatient treatment for this patient at today's visit.  The patient was given ample time for questions and fully participated in treatment planning.  The patient was encouraged to call the clinic with any questions or concerns.  The patient was informed of access to emergency care. If patient were to develop any significant symptomatology, suicidal ideation, homicidal ideation, any  concerns, or feel unsafe at any time they are to call the clinic and if unable to get immediate assistance should immediately call 911 or go to the nearest emergency room.  Patient contracted verbally for the following: If you are experiencing an emotional crisis or have thoughts of harming yourself or others, please go to your nearest local emergency room or call 911. Will continue to re-assess medication response and side effects frequently to establish efficacy and ensure safety. Risks, any black box warnings, side effects, off label usage, and benefits of medication and treatment discussed with patient, along with potential adverse side effects of current and/or newly prescribed medication, alternative treatment options, and OTC medications.  Patient verbalized understanding of potential risks, any off label use of medication, any black box warnings, and any side effects in their own words. The patient verbalized understanding and agreed to comply with the safety plan discussed in their own words.  Patient given the number to the office. Number also discussed of the 24- hour suicide hotline.           MEDS ORDERED DURING VISIT:  New Medications Ordered This Visit   Medications    Vortioxetine HBr (Trintellix) 10 MG tablet tablet     Sig: Take 1 tablet by mouth Daily With Breakfast.     Dispense:  30 tablet     Refill:  0    amitriptyline (ELAVIL) 10 MG tablet     Sig: Take 1 tablet by mouth At Night As Needed for Sleep.     Dispense:  30 tablet     Refill:  0    lamoTRIgine (LaMICtal) 150 MG tablet     Sig: Take 1 tablet by mouth Daily.     Dispense:  30 tablet     Refill:  0    buPROPion SR (Wellbutrin SR) 100 MG 12 hr tablet     Sig: Take 1 tablet by mouth Every Morning.     Dispense:  30 tablet     Refill:  0       Return in about 4 weeks (around 11/14/2024), or if symptoms worsen or fail to improve, for Next scheduled follow up and Recheck.         Progress toward goal: Not at goal    Functional Status: Mild  impairment     Prognosis: Good with Ongoing Treatment             This document has been electronically signed by MINI Conte  October 17, 2024 10:31 EDT    Some of the data in this electronic note has been brought forward from a previous encounter, any necessary changes have been made, it has been reviewed by this APRN, and it is accurate.    Please note that portions of this note were completed with a voice recognition program.

## 2024-11-14 ENCOUNTER — TELEMEDICINE (OUTPATIENT)
Dept: PSYCHIATRY | Facility: CLINIC | Age: 29
End: 2024-11-14
Payer: COMMERCIAL

## 2024-11-14 DIAGNOSIS — G47.9 SLEEPING DIFFICULTIES: ICD-10-CM

## 2024-11-14 DIAGNOSIS — F33.0 MILD EPISODE OF RECURRENT MAJOR DEPRESSIVE DISORDER: Primary | Chronic | ICD-10-CM

## 2024-11-14 DIAGNOSIS — F41.1 GENERALIZED ANXIETY DISORDER: Chronic | ICD-10-CM

## 2024-11-14 RX ORDER — LAMOTRIGINE 150 MG/1
150 TABLET ORAL DAILY
Qty: 30 TABLET | Refills: 1 | Status: SHIPPED | OUTPATIENT
Start: 2024-11-14

## 2024-11-14 RX ORDER — AMITRIPTYLINE HYDROCHLORIDE 10 MG/1
10 TABLET ORAL NIGHTLY PRN
Qty: 30 TABLET | Refills: 1 | Status: SHIPPED | OUTPATIENT
Start: 2024-11-14

## 2024-11-14 NOTE — PROGRESS NOTES
This provider is located at the [facility] (through [facility]), [address], [facility], [address] using a secure [employer] Video Visit through [employer]. Patient is being seen remotely via telehealth at their home address in [place], and stated they are in a secure environment for this session. The patient's condition being diagnosed/treated is appropriate for telemedicine. The provider identified herself as well as her credentials.   The patient, and/or patients guardian, consent to be seen remotely, and when consent is given they understand that the consent allows for patient identifiable information to be sent to a third party as needed.   They may refuse to be seen remotely at any time. The electronic data is encrypted and password protected, and the patient and/or guardian has been advised of the potential risks to privacy not withstanding such measures.    You have chosen to receive care through a telehealth visit.  Do you consent to use a video/audio connection for your medical care today? Yes    Patient identifiers utilized: Name and date of birth.    Patient verbally confirmed consent for today's encounter  11/14/2024 .    The patient does verbally confirm they are being seen today while physically located in the [facility].  This provider/this APRN is licensed in the [facility] where the patient is located/being seen.     Subjective   Palmira Carbajal is a 29 y.o. female who presents today for follow up    Chief Complaint: Medication management follow-up: Anxiety, depression, and sleeping difficulties follow-up    Accompanied by: The patient is interviewed alone at today's encounter    History of Present Illness:   -Last encounter with this APRN/Provider: 10/17/2024   -Since last encounter with this APRN/Office: The patient reports there has been a lot of stressors in her life this year, and recently.  The patient reports the upcoming holiday season  "will be the first holiday season without her stepfather.  The patient reports her children recently lost their 20-year-old brother, her previous stepson, and this has been difficult for them.  -Mood reported as: \"Okay\"  -The patient reports feeling like her depressive symptoms are well-controlled, and \"almost nonexistent\", but reports her anxiety symptoms continue to be elevated.  The patient reports she knows a lot of her increased anxiety symptoms are related to external stressors and factors.    -The patient's total PHQ-9 depression screener at today's encounter is a 5.  -The patient's total STACY-7 anxiety screener at today's encounter is a 13.    -Appetite reported as: Good, and reports her primary care provider has spoken with her about weight gain, and possibly retaking phentermine in the future if needed.  -Sleep reported as: Good when she falls asleep, and reports this is an improvement overall, but reports it takes a long time to fall asleep it seems.    -Changes in medications or new medical problems/concerns since last visit: Denies any  -Reported medication compliance: The patient reports compliance with current psychotropic medication regimen.  -Reported medication side effects or concerns: The patient reports Wellbutrin SR caused her to feel drunk, woozy, and she did not like the sensation.  Denies any others.    -Auditory or visual hallucinations: Denies  -Behaviors different from patient baseline, or any reckless, impulsive, or risky behaviors: Denies  -Symptoms of karl or psychosis: Denies  -Self-injurious behavior: Denies  -SI/HI: The patient adamantly denies any suicidal or homicidal ideations, plans, or intent at the time of this encounter and is convincing.    -Using a shared decision-making approach the patient reports she is pleased with her current psychotropic medications and doses, and does not want to make any adjustments in medications or doses at this time other than she would like to " keep Wellbutrin SR discontinued as she does not like the way it makes her feel.    -The patient does verbally contract for safety at today's encounter and is in verbal agreement with the safety/crisis plan. The patient reports in her own words that she will reach out to this APRN/office prior to next scheduled appointment if there is any worsening of mood, any new psychiatric symptoms, any medication side effects or concerns, any concern for safety to self or others, any suicidal or homicidal ideations plans or intent, or any concerns, or she will call 911, call or text the suicide and crisis lifeline at [phone], or go to the closest emergency department.      Patient Health Questionnaire-9 (PHQ-9) (Depression Screening Tool)  Little interest or pleasure in doing things? (Patient-Rptd) Not at all   Feeling down, depressed, or hopeless? (Patient-Rptd) Not at all   PHQ-2 Total Score (Patient-Rptd) 0   Trouble falling or staying asleep, or sleeping too much? (Patient-Rptd) Over half   Feeling tired or having little energy? (Patient-Rptd) Over half   Poor appetite or overeating? (Patient-Rptd) Not at all   Feeling bad about yourself - or that you are a failure or have let yourself or your family down? (Patient-Rptd) Several days   Trouble concentrating on things, such as reading the newspaper or watching television? (Patient-Rptd) Not at all   Moving or speaking so slowly that other people could have noticed? Or the opposite - being so fidgety or restless that you have been moving around a lot more than usual? (Patient-Rptd) Not at all   Thoughts that you would be better off dead, or of hurting yourself in some way? (Patient-Rptd) Not at all   PHQ-9 Total Score (Patient-Rptd) 5   If you checked off any problems, how difficult have these problems made it for you to do your work, take care of things at home, or get along with other people? (Patient-Rptd) Not difficult at all         PHQ-9 Total Score: (Patient-Rptd) 5        Generalized Anxiety Disorder 7-Item (STACY-7) Screening Tool  Feeling nervous, anxious or on edge: (Patient-Rptd) More than half the days  Not being able to stop or control worrying: (Patient-Rptd) More than half the days  Worrying too much about different things: (Patient-Rptd) Nearly every day  Trouble Relaxing: (Patient-Rptd) Nearly every day  Being so restless that it is hard to sit still: (Patient-Rptd) Not at all  Feeling afraid as if something awful might happen: (Patient-Rptd) More than half the days  Becoming easily annoyed or irritable: (Patient-Rptd) Several days  STACY 7 Total Score: (Patient-Rptd) 13  If you checked any problems, how difficult have these problems made it for you to do your work, take care of things at home, or get along with other people: (Patient-Rptd) Somewhat difficult      All Known Prior Psychiatric Medications and Responses if Known:  -Zoloft - does not remember response, possible lack of efficacy  -Paxil - does not remember response, possible lack of efficacy  -Prozac - does not remember response, possible lack of efficacy  -Lexapro - does not remember response, possible lack of efficacy  -Celexa - does not remember response, possible lack of efficacy  -Desvenlafaxine - does not remember response, possible lack of efficacy  -Effexor - does not remember response, possible lack of efficacy  -Cymbalta - caused jitteriness and insomnia  -Viibryd - reports she cannot remember why she could not take this medication, but feels there was some kind of side effect  -Wellbutrin SR - did not like the way it made her feel, made her feel drunk and woozy  -Quetiapine - does not remember response, possible lack of efficacy  -Abilify - does not remember response, possible lack of efficacy  -Trazodone - does not remember response, possible lack of efficacy  -Hydroxyzine - does not remember response, possible lack of efficacy  -Ambien - does not remember response, possible lack of  efficacy  -Lorazepam - does not remember response, possible lack of efficacy  -Buspar - reported lack of efficacy, and also caused dizziness and vivid dreams  -Propranolol - the patient reports lack of efficacy and decreased heart rate  -The patient reports some of the medications that she cannot remember the response to listed above included side effects such as restless leg, nightmares, and heart palpitations, but she cannot remember which medications caused these side effects  -Olanzapine - patient reports possible partial efficacy, if any efficacy, unsure  -Lamictal - patient reports effective for depression  -Amitriptyline - patient reports effective  -Trintellix - patient reports effective    History of Seizures or TBI:  The patient denies any    Patient's Support Network Includes:   and mother    Last Menstrual Period:  Hysterectomy - partial.        The following portions of the patient's history were reviewed and updated as appropriate: allergies, current medications, past family history, past medical history, past social history, past surgical history and problem list.          Past Medical History:  Past Medical History:   Diagnosis Date    Acid reflux     Anxiety     Chronic pain disorder     NO PAIN CLINIC, PCP/PSYCH CONT W/AMITRIPTYLINE    Depression     Eczema     Fatty liver     Gallstones     Intractable migraine without aura and without status migrainosus 02/09/2023    Kidney stone     HX OF    Maltracking of right patella     Migraines     Orthostatic hypotension     WORKED UP FOR POTS, CONT W/MEDS, MONNIN. RX FLORINEF    Panic disorder     PONV (postoperative nausea and vomiting)     GOOD RESULTS WITH SCOPALAMINE    PTSD (post-traumatic stress disorder)     WAKES UP FROM ANESTHESIA WITH PANIC ATTACK    Seasonal allergic rhinitis 05/31/2022    Self-injurious behavior     as a teenager    Suicide attempt 2007    NO ADULT ISSUES       Social History:  Social History     Socioeconomic History     Marital status:      Spouse name: TRISTAN    Number of children: 3   Tobacco Use    Smoking status: Former     Current packs/day: 0.00     Average packs/day: 0.5 packs/day for 15.0 years (7.5 ttl pk-yrs)     Types: Cigarettes     Start date: 2007     Quit date: 2022     Years since quittin.7    Smokeless tobacco: Never   Vaping Use    Vaping status: Every Day    Substances: Nicotine, Flavoring    Devices: Disposable   Substance and Sexual Activity    Alcohol use: Not Currently    Drug use: Not Currently     Types: Marijuana     Comment: Marijuana use in teenage years    Sexual activity: Defer     Partners: Male     Birth control/protection: Hysterectomy       Family History:  Family History   Problem Relation Age of Onset    Arthritis Mother     Restless legs syndrome Mother     Thyroid disease Father     Colon polyps Father     Diabetes Father     Drug abuse Maternal Uncle     Alcohol abuse Maternal Uncle     Cancer Maternal Grandmother     Sleep apnea Son     Malig Hyperthermia Neg Hx        Past Surgical History:  Past Surgical History:   Procedure Laterality Date     SECTION      CHOLECYSTECTOMY N/A 2024    Procedure: CHOLECYSTECTOMY LAPAROSCOPIC;  Surgeon: Cisco Hickman MD;  Location: [facility];  Service: General;  Laterality: N/A;    COLONOSCOPY N/A 10/11/2022    Procedure: COLONOSCOPY;  Surgeon: Dyan Griffin MD;  Location: [facility] ENDOSCOPY;  Service: Gastroenterology;  Laterality: N/A;  HEMORRHOIDS    CYSTOSCOPY      CYSTOSCOPY URETEROSCOPY Left 2023    Procedure: CYSTOSCOPY URETEROSCOPY RETROGRADE PYELOGRAM HOLMIUM LASER STENT INSERTION, left;  Surgeon: Melisa Garcia MD;  Location: [facility] MAIN OR;  Service: Urology;  Laterality: Left;    ENDOSCOPY N/A 2023    Procedure: ESOPHAGOGASTRODUODENOSCOPY WITH BIOPSIES;  Surgeon: Dyan Griffin MD;  Location: [facility] ENDOSCOPY;  Service: Gastroenterology;  Laterality: N/A;  ESOPHAGITIS,  GASTRITIS    HYSTERECTOMY      KIDNEY STONE SURGERY      unspecified    KNEE ARTHROSCOPY Right 7/11/2022    Procedure: KNEE ARTHROSCOPY WITH A LATERAL RELEASE AND CHONDROPLASTY;  Surgeon: Marco A Pitts MD;  Location: [facility] [place] [place];  Service: Orthopedics;  Laterality: Right;    WISDOM TOOTH EXTRACTION         Problem List:  Patient Active Problem List   Diagnosis    Maltracking of right patella    Impingement syndrome involving patellar fat pad of right knee    Chondromalacia    Patellar malalignment syndrome of right knee    Binocular vision disorder with diplopia    Generalized anxiety disorder    Acid reflux    PTSD (post-traumatic stress disorder)    Kidney stone on left side    Seasonal allergic rhinitis    Burn of finger and thumb of right hand, second degree    Hemorrhoids    Right hand pain    Rectal bleeding    Anal or rectal pain    Decreased appetite    Aftercare following surgery of right knee arthroscopic chondroplasty and lateral release, 7/11/2022    Generalized body aches    Epigastric pain    Female hirsutism    Right ovarian cyst    Moderate episode of recurrent major depressive disorder    Primary insomnia    Vitamin D deficiency    Intractable migraine without aura and without status migrainosus    Calcified granuloma of lung    Nipple discharge    Nausea and vomiting    Overweight (BMI 25.0-29.9)    Acne vulgaris    Vaping nicotine dependence, tobacco product    Achilles tendinitis of both lower extremities    BRADY (nonalcoholic steatohepatitis)    Symptomatic cholelithiasis       Allergy:   Allergies   Allergen Reactions    Cymbalta [Duloxetine Hcl] Mental Status Change     Jittery and insomnia    Penicillins Rash        Current Medications:   Current Outpatient Medications   Medication Sig Dispense Refill    amitriptyline (ELAVIL) 10 MG tablet Take 1 tablet by mouth At Night As Needed for Sleep. 30 tablet 1    lamoTRIgine (LaMICtal) 150 MG tablet Take 1 tablet by mouth Daily. 30 tablet 1     Vortioxetine HBr (Trintellix) 10 MG tablet tablet Take 1 tablet by mouth Daily With Breakfast. 30 tablet 1    dicyclomine (BENTYL) 20 MG tablet Take 1 tablet by mouth Every 6 (Six) Hours As Needed (abdominal cramping). 60 tablet 3    esomeprazole (nexIUM) 40 MG capsule Take 1 capsule by mouth Every Morning Before Breakfast. 90 capsule 3    famotidine (PEPCID) 20 MG tablet Take 1 tablet by mouth At Night As Needed for Heartburn. (Patient taking differently: Take 1 tablet by mouth Every Night.) 90 tablet 1    fludrocortisone 0.1 MG tablet Take 1 tablet by mouth 2 (Two) Times a Day. 90 tablet 1    galcanezumab-gnlm (Emgality) 120 MG/ML auto-injector pen Inject 2 mL under the skin into the appropriate area as directed Every 30 (Thirty) Days. Indications: Migraine Headache 1.12 mL 5    Hydrocort-Pramoxine, Perianal, (ANALPRAM-HC) 2.5-1 % rectal cream Insert  into the rectum 3 (Three) Times a Day. 1 each 3    polyethylene glycol (MIRALAX) 17 g packet Take 17 g by mouth Daily. 5 packet 0    Resmetirom (Rezdiffra) 80 MG tablet Take 1 tablet by mouth Daily. 90 tablet 3    Rimegepant Sulfate (Nurtec) 75 MG tablet dispersible tablet Take 1 tablet by mouth Daily As Needed (migraine). Indications: Migraine Headache 8 tablet 5    spironolactone (Aldactone) 50 MG tablet Take 1 tablet by mouth Daily. Indications: Abnormal Growth of Body or Facial Hair 30 tablet 3    vitamin D3 (Vitamin D) 125 MCG (5000 UT) capsule capsule Take 1 capsule by mouth Daily. 90 capsule 1     No current facility-administered medications for this visit.           Review of Symptoms:    Review of Systems   Psychiatric/Behavioral:  Positive for sleep disturbance, depressed mood and stress. Negative for agitation, behavioral problems, hallucinations, self-injury, suicidal ideas and negative for hyperactivity. The patient is nervous/anxious.          Physical Exam:   not currently breastfeeding. There is no height or weight on file to calculate BMI.   Due  to the remote nature of this encounter (virtual encounter), vitals were unable to be obtained.  Height stated at 64 inches.  Weight stated at around 157 pounds.        Physical Exam  Neurological:      Mental Status: She is alert and oriented to person, place, and time.   Psychiatric:         Attention and Perception: Attention normal.         Mood and Affect: Mood and affect normal.         Speech: Speech normal.         Behavior: Behavior normal. Behavior is cooperative.         Thought Content: Thought content normal. Thought content is not paranoid or delusional. Thought content does not include homicidal or suicidal ideation. Thought content does not include homicidal or suicidal plan.         Cognition and Memory: Cognition and memory normal.         Judgment: Judgment normal.         Mental Status Exam:   Hygiene:   good  Cooperation:  Cooperative  Eye Contact:  Good  Psychomotor Behavior:  Appropriate  Affect:  Appropriate  Mood: normal  Hopelessness: Denies  Speech:  Normal  Thought Process:  Goal directed and Linear  Thought Content:  Mood congruent  Suicidal:  None  Homicidal:  None  Hallucinations:  None  Delusion:  None  Memory:  Intact  Orientation:  Person, Place, Time, and Situation  Reliability:  good  Insight:  Good  Judgement:  Good  Impulse Control:  Good  Physical/Medical Issues:  No            Wt Readings from Last 3 Encounters:   10/08/24 71.5 kg (157 lb 9.6 oz)   09/09/24 69.7 kg (153 lb 9.6 oz)   08/08/24 68.6 kg (151 lb 4.8 oz)     Temp Readings from Last 3 Encounters:   08/08/24 97.2 °F (36.2 °C)   07/18/24 96.9 °F (36.1 °C) (Temporal)   07/14/24 98.5 °F (36.9 °C) (Oral)     BP Readings from Last 3 Encounters:   10/08/24 109/74   09/09/24 125/70   08/08/24 114/77     Pulse Readings from Last 3 Encounters:   10/08/24 98   09/09/24 72   08/08/24 85      BMI Readings from Last 3 Encounters:   10/08/24 27.04 kg/m²   09/09/24 26.35 kg/m²   08/08/24 25.95 kg/m²       Lab Results:   Office Visit  on 08/08/2024   Component Date Value Ref Range Status    Penicillin G Allergen IgE 08/08/2024 <0.10  Class 0 kU/L Final    Class Description 08/08/2024 Comment   Final        Levels of Specific IgE       Class  Description of Class      ---------------------------  -----  --------------------                     < 0.10         0         Negative             0.10 -    0.31         0/I       Equivocal/Low             0.32 -    0.55         I         Low             0.56 -    1.40         II        Moderate             1.41 -    3.90         III       High             3.91 -   19.00         IV        Very High            19.01 -  100.00         V         Very High                    >100.00         VI        Very High    25 Hydroxy, Vitamin D 08/08/2024 37.6  30.0 - 100.0 ng/ml Final   Admission on 07/18/2024, Discharged on 07/18/2024   Component Date Value Ref Range Status    Case Report 07/18/2024    Final                    Value:Surgical Pathology Report                         Case: [id number]                                  Authorizing Provider:  Cisco Hickman MD        Collected:           07/18/2024 12:54 PM          Ordering Location:     [facility]  Received:            07/19/2024 07:01 AM                                 OR                                                                           Pathologist:           uSe Frankel DO                                                       Specimen:    Gallbladder, gallbladder and contents                                                      Clinical Information 07/18/2024    Final                    Value:This result contains rich text formatting which cannot be displayed here.    Final Diagnosis 07/18/2024    Final                    Value:This result contains rich text formatting which cannot be displayed here.    Gross Description 07/18/2024    Final                    Value:This result contains rich text formatting which cannot be  displayed here.    Microscopic Description 07/18/2024    Final                    Value:This result contains rich text formatting which cannot be displayed here.   Admission on 07/14/2024, Discharged on 07/14/2024   Component Date Value Ref Range Status    Glucose 07/14/2024 82  65 - 99 mg/dL Final    BUN 07/14/2024 7  6 - 20 mg/dL Final    Creatinine 07/14/2024 0.70  0.57 - 1.00 mg/dL Final    Sodium 07/14/2024 141  136 - 145 mmol/L Final    Potassium 07/14/2024 3.7  3.5 - 5.2 mmol/L Final    Chloride 07/14/2024 105  98 - 107 mmol/L Final    CO2 07/14/2024 22.5  22.0 - 29.0 mmol/L Final    Calcium 07/14/2024 8.8  8.6 - 10.5 mg/dL Final    Total Protein 07/14/2024 6.6  6.0 - 8.5 g/dL Final    Albumin 07/14/2024 4.0  3.5 - 5.2 g/dL Final    ALT (SGPT) 07/14/2024 9  1 - 33 U/L Final    AST (SGOT) 07/14/2024 11  1 - 32 U/L Final    Alkaline Phosphatase 07/14/2024 83  39 - 117 U/L Final    Total Bilirubin 07/14/2024 <0.2  0.0 - 1.2 mg/dL Final    Globulin 07/14/2024 2.6  gm/dL Final    A/G Ratio 07/14/2024 1.5  g/dL Final    BUN/Creatinine Ratio 07/14/2024 10.0  7.0 - 25.0 Final    Anion Gap 07/14/2024 13.5  5.0 - 15.0 mmol/L Final    eGFR 07/14/2024 118.7  >60.0 mL/min/1.73 Final    Lipase 07/14/2024 33  13 - 60 U/L Final    Color, UA 07/14/2024 Yellow  Yellow, Straw Final    Appearance, UA 07/14/2024 Clear  Clear Final    pH, UA 07/14/2024 8.0  5.0 - 8.0 Final    Specific Gravity, UA 07/14/2024 <=1.005  1.005 - 1.030 Final    Glucose, UA 07/14/2024 Negative  Negative Final    Ketones, UA 07/14/2024 Negative  Negative Final    Bilirubin, UA 07/14/2024 Negative  Negative Final    Blood, UA 07/14/2024 Negative  Negative Final    Protein, UA 07/14/2024 Negative  Negative Final    Leuk Esterase, UA 07/14/2024 Negative  Negative Final    Nitrite, UA 07/14/2024 Negative  Negative Final    Urobilinogen, UA 07/14/2024 0.2 E.U./dL  0.2 - 1.0 E.U./dL Final    Extra Tube 07/14/2024 Hold for add-ons.   Final    Auto resulted.     Extra Tube 07/14/2024 hold for add-on   Final    Auto resulted    Extra Tube 07/14/2024 Hold for add-ons.   Final    Auto resulted.    Extra Tube 07/14/2024 Hold for add-ons.   Final    Auto resulted    WBC 07/14/2024 10.60  3.40 - 10.80 10*3/mm3 Final    RBC 07/14/2024 4.58  3.77 - 5.28 10*6/mm3 Final    Hemoglobin 07/14/2024 12.7  12.0 - 15.9 g/dL Final    Hematocrit 07/14/2024 37.7  34.0 - 46.6 % Final    MCV 07/14/2024 82.3  79.0 - 97.0 fL Final    MCH 07/14/2024 27.7  26.6 - 33.0 pg Final    MCHC 07/14/2024 33.7  31.5 - 35.7 g/dL Final    RDW 07/14/2024 13.3  12.3 - 15.4 % Final    RDW-SD 07/14/2024 39.8  37.0 - 54.0 fl Final    MPV 07/14/2024 10.1  6.0 - 12.0 fL Final    Platelets 07/14/2024 456 (H)  140 - 450 10*3/mm3 Final    Neutrophil % 07/14/2024 47.6  42.7 - 76.0 % Final    Lymphocyte % 07/14/2024 43.2  19.6 - 45.3 % Final    Monocyte % 07/14/2024 6.4  5.0 - 12.0 % Final    Eosinophil % 07/14/2024 1.7  0.3 - 6.2 % Final    Basophil % 07/14/2024 0.8  0.0 - 1.5 % Final    Immature Grans % 07/14/2024 0.3  0.0 - 0.5 % Final    Neutrophils, Absolute 07/14/2024 5.04  1.70 - 7.00 10*3/mm3 Final    Lymphocytes, Absolute 07/14/2024 4.58 (H)  0.70 - 3.10 10*3/mm3 Final    Monocytes, Absolute 07/14/2024 0.68  0.10 - 0.90 10*3/mm3 Final    Eosinophils, Absolute 07/14/2024 0.18  0.00 - 0.40 10*3/mm3 Final    Basophils, Absolute 07/14/2024 0.09  0.00 - 0.20 10*3/mm3 Final    Immature Grans, Absolute 07/14/2024 0.03  0.00 - 0.05 10*3/mm3 Final    nRBC 07/14/2024 0.0  0.0 - 0.2 /100 WBC Final    RBC Morphology 07/14/2024 Normal  Normal Final    WBC Morphology 07/14/2024 Normal  Normal Final    Platelet Morphology 07/14/2024 Normal  Normal Final   Admission on 07/13/2024, Discharged on 07/14/2024   Component Date Value Ref Range Status    Glucose 07/13/2024 111 (H)  65 - 99 mg/dL Final    BUN 07/13/2024 9  6 - 20 mg/dL Final    Creatinine 07/13/2024 0.71  0.57 - 1.00 mg/dL Final    Sodium 07/13/2024 141  136 - 145 mmol/L  Final    Potassium 07/13/2024 3.7  3.5 - 5.2 mmol/L Final    Chloride 07/13/2024 105  98 - 107 mmol/L Final    CO2 07/13/2024 20.2 (L)  22.0 - 29.0 mmol/L Final    Calcium 07/13/2024 9.0  8.6 - 10.5 mg/dL Final    Total Protein 07/13/2024 7.0  6.0 - 8.5 g/dL Final    Albumin 07/13/2024 4.2  3.5 - 5.2 g/dL Final    ALT (SGPT) 07/13/2024 10  1 - 33 U/L Final    AST (SGOT) 07/13/2024 11  1 - 32 U/L Final    Alkaline Phosphatase 07/13/2024 82  39 - 117 U/L Final    Total Bilirubin 07/13/2024 <0.2  0.0 - 1.2 mg/dL Final    Globulin 07/13/2024 2.8  gm/dL Final    A/G Ratio 07/13/2024 1.5  g/dL Final    BUN/Creatinine Ratio 07/13/2024 12.7  7.0 - 25.0 Final    Anion Gap 07/13/2024 15.8 (H)  5.0 - 15.0 mmol/L Final    eGFR 07/13/2024 116.7  >60.0 mL/min/1.73 Final    Lipase 07/13/2024 37  13 - 60 U/L Final    Color, UA 07/13/2024 Yellow  Yellow, Straw Final    Appearance, UA 07/13/2024 Turbid (A)  Clear Final    pH, UA 07/13/2024 >=9.0 (H)  5.0 - 8.0 Final    Specific [place], UA 07/13/2024 1.021  1.005 - 1.030 Final    Glucose, UA 07/13/2024 Negative  Negative Final    Ketones, UA 07/13/2024 15 mg/dL (1+) (A)  Negative Final    Bilirubin, UA 07/13/2024 Negative  Negative Final    Blood, UA 07/13/2024 Negative  Negative Final    Protein, UA 07/13/2024 Trace (A)  Negative Final    Leuk Esterase, UA 07/13/2024 Negative  Negative Final    Nitrite, UA 07/13/2024 Negative  Negative Final    Urobilinogen, UA 07/13/2024 1.0 E.U./dL  0.2 - 1.0 E.U./dL Final    Extra Tube 07/13/2024 Hold for add-ons.   Final    Auto resulted.    Extra Tube 07/13/2024 hold for add-on   Final    Auto resulted    Extra Tube 07/13/2024 Hold for add-ons.   Final    Auto resulted.    Extra Tube 07/13/2024 Hold for add-ons.   Final    Auto resulted    WBC 07/13/2024 14.01 (H)  3.40 - 10.80 10*3/mm3 Final    RBC 07/13/2024 4.85  3.77 - 5.28 10*6/mm3 Final    Hemoglobin 07/13/2024 13.3  12.0 - 15.9 g/dL Final    Hematocrit 07/13/2024 39.7  34.0 - 46.6 %  Final    MCV 07/13/2024 81.9  79.0 - 97.0 fL Final    MCH 07/13/2024 27.4  26.6 - 33.0 pg Final    MCHC 07/13/2024 33.5  31.5 - 35.7 g/dL Final    RDW 07/13/2024 13.1  12.3 - 15.4 % Final    RDW-SD 07/13/2024 39.0  37.0 - 54.0 fl Final    MPV 07/13/2024 10.3  6.0 - 12.0 fL Final    Platelets 07/13/2024 461 (H)  140 - 450 10*3/mm3 Final    Neutrophil % 07/13/2024 54.4  42.7 - 76.0 % Final    Lymphocyte % 07/13/2024 37.3  19.6 - 45.3 % Final    Monocyte % 07/13/2024 6.2  5.0 - 12.0 % Final    Eosinophil % 07/13/2024 1.1  0.3 - 6.2 % Final    Basophil % 07/13/2024 0.6  0.0 - 1.5 % Final    Immature Grans % 07/13/2024 0.4  0.0 - 0.5 % Final    Neutrophils, Absolute 07/13/2024 7.61 (H)  1.70 - 7.00 10*3/mm3 Final    Lymphocytes, Absolute 07/13/2024 5.23 (H)  0.70 - 3.10 10*3/mm3 Final    Monocytes, Absolute 07/13/2024 0.87  0.10 - 0.90 10*3/mm3 Final    Eosinophils, Absolute 07/13/2024 0.16  0.00 - 0.40 10*3/mm3 Final    Basophils, Absolute 07/13/2024 0.09  0.00 - 0.20 10*3/mm3 Final    Immature Grans, Absolute 07/13/2024 0.05  0.00 - 0.05 10*3/mm3 Final    nRBC 07/13/2024 0.0  0.0 - 0.2 /100 WBC Final    Anisocytosis 07/13/2024 Slight/1+  None Seen Final    Poikilocytes 07/13/2024 Slight/1+  None Seen Final    WBC Morphology 07/13/2024 Normal  Normal Final    Large Platelets 07/13/2024 Slight/1+  None Seen Final   Lab on 04/10/2024   Component Date Value Ref Range Status    WBC 04/10/2024 11.38 (H)  3.40 - 10.80 10*3/mm3 Final    RBC 04/10/2024 4.76  3.77 - 5.28 10*6/mm3 Final    Hemoglobin 04/10/2024 12.9  12.0 - 15.9 g/dL Final    Hematocrit 04/10/2024 39.7  34.0 - 46.6 % Final    MCV 04/10/2024 83.4  79.0 - 97.0 fL Final    MCH 04/10/2024 27.1  26.6 - 33.0 pg Final    MCHC 04/10/2024 32.5  31.5 - 35.7 g/dL Final    RDW 04/10/2024 13.6  12.3 - 15.4 % Final    RDW-SD 04/10/2024 41.8  37.0 - 54.0 fl Final    MPV 04/10/2024 10.0  6.0 - 12.0 fL Final    Platelets 04/10/2024 380  140 - 450 10*3/mm3 Final    Neutrophil %  04/10/2024 67.4  42.7 - 76.0 % Final    Lymphocyte % 04/10/2024 24.2  19.6 - 45.3 % Final    Monocyte % 04/10/2024 7.6  5.0 - 12.0 % Final    Eosinophil % 04/10/2024 0.0 (L)  0.3 - 6.2 % Final    Basophil % 04/10/2024 0.4  0.0 - 1.5 % Final    Immature Grans % 04/10/2024 0.4  0.0 - 0.5 % Final    Neutrophils, Absolute 04/10/2024 7.67 (H)  1.70 - 7.00 10*3/mm3 Final    Lymphocytes, Absolute 04/10/2024 2.75  0.70 - 3.10 10*3/mm3 Final    Monocytes, Absolute 04/10/2024 0.87  0.10 - 0.90 10*3/mm3 Final    Eosinophils, Absolute 04/10/2024 0.00  0.00 - 0.40 10*3/mm3 Final    Basophils, Absolute 04/10/2024 0.05  0.00 - 0.20 10*3/mm3 Final    Immature Grans, Absolute 04/10/2024 0.04  0.00 - 0.05 10*3/mm3 Final   Office Visit on 01/29/2024   Component Date Value Ref Range Status    WBC 01/29/2024 13.49 (H)  3.40 - 10.80 10*3/mm3 Final    RBC 01/29/2024 5.06  3.77 - 5.28 10*6/mm3 Final    Hemoglobin 01/29/2024 13.7  12.0 - 15.9 g/dL Final    Hematocrit 01/29/2024 42.3  34.0 - 46.6 % Final    MCV 01/29/2024 83.6  79.0 - 97.0 fL Final    MCH 01/29/2024 27.1  26.6 - 33.0 pg Final    MCHC 01/29/2024 32.4  31.5 - 35.7 g/dL Final    RDW 01/29/2024 13.1  12.3 - 15.4 % Final    RDW-SD 01/29/2024 40.0  37.0 - 54.0 fl Final    MPV 01/29/2024 11.1  6.0 - 12.0 fL Final    Platelets 01/29/2024 411  140 - 450 10*3/mm3 Final    Neutrophil % 01/29/2024 65.4  42.7 - 76.0 % Final    Lymphocyte % 01/29/2024 24.8  19.6 - 45.3 % Final    Monocyte % 01/29/2024 6.9  5.0 - 12.0 % Final    Eosinophil % 01/29/2024 1.8  0.3 - 6.2 % Final    Basophil % 01/29/2024 0.7  0.0 - 1.5 % Final    Immature Grans % 01/29/2024 0.4  0.0 - 0.5 % Final    Neutrophils, Absolute 01/29/2024 8.81 (H)  1.70 - 7.00 10*3/mm3 Final    Lymphocytes, Absolute 01/29/2024 3.35 (H)  0.70 - 3.10 10*3/mm3 Final    Monocytes, Absolute 01/29/2024 0.93 (H)  0.10 - 0.90 10*3/mm3 Final    Eosinophils, Absolute 01/29/2024 0.24  0.00 - 0.40 10*3/mm3 Final    Basophils, Absolute 01/29/2024  0.10  0.00 - 0.20 10*3/mm3 Final    Immature Grans, Absolute 01/29/2024 0.06 (H)  0.00 - 0.05 10*3/mm3 Final    nRBC 01/29/2024 0.0  0.0 - 0.2 /100 WBC Final    25 Hydroxy, Vitamin D 01/29/2024 62.3  30.0 - 100.0 ng/ml Final    Glucose 01/29/2024 72  65 - 99 mg/dL Final    BUN 01/29/2024 11  6 - 20 mg/dL Final    Creatinine 01/29/2024 0.69  0.57 - 1.00 mg/dL Final    Sodium 01/29/2024 137  136 - 145 mmol/L Final    Potassium 01/29/2024 4.3  3.5 - 5.2 mmol/L Final    Chloride 01/29/2024 102  98 - 107 mmol/L Final    CO2 01/29/2024 21.4 (L)  22.0 - 29.0 mmol/L Final    Calcium 01/29/2024 9.6  8.6 - 10.5 mg/dL Final    BUN/Creatinine Ratio 01/29/2024 15.9  7.0 - 25.0 Final    Anion Gap 01/29/2024 13.6  5.0 - 15.0 mmol/L Final    eGFR 01/29/2024 119.9  >60.0 mL/min/1.73 Final   Office Visit on 12/29/2023   Component Date Value Ref Range Status    Amphetamine Screen, Urine 12/29/2023 Negative  Negative Final    AMP INTERNAL CONTROL 12/29/2023 Passed  Passed Final    Barbiturates Screen, Urine 12/29/2023 Negative  Negative Final    BARBITURATE INTERNAL CONTROL 12/29/2023 Passed  Passed Final    Buprenorphine, Screen, Urine 12/29/2023 Negative  Negative Final    BUPRENORPHINE INTERNAL CONTROL 12/29/2023 Passed  Passed Final    Benzodiazepine Screen, Urine 12/29/2023 Negative  Negative Final    BENZODIAZEPINE INTERNAL CONTROL 12/29/2023 Passed  Passed Final    Cocaine Screen, Urine 12/29/2023 Negative  Negative Final    COCAINE INTERNAL CONTROL 12/29/2023 Passed  Passed Final    MDMA (ECSTASY) 12/29/2023 Negative  Negative Final    MDMA (ECSTASY) INTERNAL CONTROL 12/29/2023 Passed  Passed Final    Methamphetamine, Ur 12/29/2023 Negative  Negative Final    METHAMPHETAMINE INTERNAL CONTROL 12/29/2023 Passed  Passed Final    Methadone Screen, Urine 12/29/2023 Negative  Negative Final    METHADONE INTERNAL CONTROL 12/29/2023 Passed  Passed Final    Opiate Screen 12/29/2023 Negative  Negative Final    OPIATES INTERNAL CONTROL  12/29/2023 Passed  Passed Final    Oxycodone Screen, Urine 12/29/2023 Negative  Negative Final    OXYCODONE INTERNAL CONTROL 12/29/2023 Passed  Passed Final    Phencyclidine (PCP), Urine 12/29/2023 Negative  Negative Final    PHENCYCLIDINE INTERNAL CONTROL 12/29/2023 Passed  Passed Final    THC, Screen, Urine 12/29/2023 Negative  Negative Final    THC INTERNAL CONTROL 12/29/2023 Passed  Passed Final    Lot Number 12/29/2023 [id number]   Final    Expiration Date 12/29/2023 9/14/24   Final           Assessment & Plan   Problems Addressed this Visit          Mental Health    Generalized anxiety disorder    Relevant Medications    Vortioxetine HBr (Trintellix) 10 MG tablet tablet    amitriptyline (ELAVIL) 10 MG tablet     Other Visit Diagnoses       Mild episode of recurrent major depressive disorder  (Chronic)   -  Primary    R/O BPAD/BPD    Relevant Medications    Vortioxetine HBr (Trintellix) 10 MG tablet tablet    lamoTRIgine (LaMICtal) 150 MG tablet    amitriptyline (ELAVIL) 10 MG tablet    Sleeping difficulties        Relevant Medications    amitriptyline (ELAVIL) 10 MG tablet          Diagnoses         Codes Comments    Mild episode of recurrent major depressive disorder    -  Primary ICD-10-CM: F33.0  ICD-9-CM: 296.31 R/O BPAD/BPD    Generalized anxiety disorder     ICD-10-CM: F41.1  ICD-9-CM: 300.02     Sleeping difficulties     ICD-10-CM: G47.9  ICD-9-CM: 780.50             Visit Diagnoses:    ICD-10-CM ICD-9-CM   1. Mild episode of recurrent major depressive disorder  F33.0 296.31   2. Generalized anxiety disorder  F41.1 300.02   3. Sleeping difficulties  G47.9 780.50           GOALS:  Short Term Goals: Patient will be compliant with medication, and patient will have no significant medication related side effects.  Patient will be engaged in psychotherapy as indicated.  Patient will report subjective improvement of symptoms.  Long term goals: To stabilize mood and treat/improve subjective symptoms, the patient  will stay out of the hospital, the patient will be at an optimal level of functioning, and the patient will take all medications as prescribed.  The patient verbalized understanding and agreement with goals that were mutually set.      TREATMENT PLAN: Begin supportive psychotherapy efforts and take medications as indicated.  Medication and treatment options, both pharmacological and non-pharmacological treatment options, discussed during today's visit, including any off label use of medication. Patient acknowledged and verbally consented with current treatment plan and was educated on the importance of compliance with treatment and follow-up appointments.  The patient has declined a referral to begin psychotherapy, but has verbalized she will reach out to this APRN/office if she changes her mind.    -Continue amitriptyline 10 mg by mouth once nightly to assist with sleeping difficulties and also can assist with mood.  -Continue lamotrigine 150 mg by mouth once daily for mood.  -Continue Trintellix 10 mg by mouth once daily with food for mood.  -Discontinue Wellbutrin SR due to reported side effects.      MEDICATION ISSUES:  Discussed medication options and treatment plan of prescribed medication, any off label use of medication, as well as the risks, benefits, any black box warnings including increased suicidality, and side effects including but not limited to potential falls, dizziness, possible impaired driving, GI side effects (change in appetite, abdominal discomfort, nausea, vomiting, diarrhea, and/or constipation), dry mouth, somnolence, sedation, insomnia, activation, agitation, irritation, tremors, abnormal muscle movements or disorders, tardive dyskinesia, akathisia, asthenia, headache, sweating, possible bruising or rare bleeding, electrolyte and/or fluid abnormalities, change in blood pressure/heart rate/and or heart rhythm, hypotension, sexual dysfunction, rare impulse control problems, rare seizures,  rare neuroleptic malignant syndrome, increased risk of death and cerebrovascular events, change in blood glucose and increased risk for diabetes, change in triglycerides and cholesterol and increased risk for dyslipidemia,  weight gain, weight gain that can become problematic to health, skin conditions and reactions, and metabolic adversities among others. Patient and/or guardian are agreeable to call the office with any worsening of symptoms or onset of side effects, or if any concerns or questions arise.  The contact information for the office is made available to the patient and/or guardian. Patient and/or guardian are agreeable to call 911 or go to the nearest ER should they begin having any SI/HI, or if any urgent concerns arise.    Due to the nature of virtual visits and inability to monitor vital signs and weight with virtual visits, the patient has been encouraged to monitor their vital signs and weight regularly either through self-monitoring via home device(s) or with their Primary Care Provider, and the patient has been instructed to notify this APRN of any abnormalities or significant changes from baseline.     This APRN has discussed the benefits and risks of taking/continuing Lamictal (Lamotrigine).  The side effects of Lamictal can include a benign rash, blurred or double vision, dizziness, ataxia, sedation, headache, tremor, insomnia, poor coordination, fatigue,  nausea, vomiting, dyspepsia, rhinitis, infection, pharyngitis, asthenia, a rare but serious rash, rare multi-organ failure associated with Bull-Avelino Syndrome, toxic epidermal necrolysis, drug hypersensitivity syndrome, rare blood dyscrasias, rare aseptic meningitis, rare sudden unexplained deaths in people with epilepsy, withdrawal seizures upon abrupt withdrawal, and rare activation of suicidal ideation and behavior (suicidality).  This APRN has discussed that a very slow dose titration when starting, or changing doses, of Lamictal  may reduce the incidence of skin rash and other side effects.  The dosage should not be titrated upwards or increased faster than recommended due to the possibility of the discussed side effects and risk of development of a skin rash (which can become life threatening).    This APRN has also discussed that if the patient stops taking the Lamictal for 3-5 days or longer, it will be necessary to restart the drug with an initial dose titration, as rashes have been reported on reexposure.  If the patient and Provider decide to stop the Lamictal, the patient will follow the directions of this APRN/this office as a guided taper over about two weeks is appropriate due to the risk of relapse in bipolar disorder with those with a mood or bipolar disorder, the risk of seizures in those with epilepsy, and discontinuation symptoms upon rapid discontinuation of Lamictal.    The patient verbalizes understanding of benefits and risks as discussed, the patient/guardian feels the benefits outweigh the risks and is agreeable to continue/take Lamictal as discussed.  The patient is advised should any side effects or rash develops they are to stop the Lamictal immediately and contact this APRN/this office or go to the emergency department immediately.  The patient verbalizes understanding and agreement with treatment plan in their own words.      VERBAL INFORMED CONSENT FOR MEDICATION:  The patient was educated that their proposed/prescribed psychotropic medication(s) has potential risks, side effects, adverse effects, and black box warnings; and these have been discussed with the patient.  The patient has been informed that their treatment and medication dosage is to be individualized, and may even be above or below the recommended range/dosage due to patient individualization and response, but medication is prescribed using a shared decision making approach, and no medication or dosage will be prescribed without the patient's verbal  consent.  The reason for the use of the medication including any off label use and alternative modes of treatment other than or in addition to medication has been considered and discussed, the probable consequences of not receiving the proposed treatment have been discussed, and any treatment side effects, black box warnings, and cautions associated with treatment have been discussed with the patient.  The patient is allowed ample time to openly discuss and ask questions regarding the proposed medication(s) and treatment plan and the patient verbalizes understanding the reasons for the use of the medication, its potential risks and benefits, other alternative treatment(s), and the probable consequences that may occur if the proposed medication is not given.  The patient has been given ample time to ask questions and study the information and find the information to be specific, accurate, and complete.  The patient gives verbal consent for the medication(s) proposed/prescribed, they verbalized understanding that they can refuse and withdraw consent at any time with the assistance of this APRN, and the patient has verbally confirmed that they are aware, and are willing, to take the prescribed medication and follow the treatment plan with the known possible risks, side effect, black box warnings, and any potential medication interactions, and the patient reports they will be worse off without this medication and treatment plan.  The patient is advised to contact this APRN/this office if any questions or concerns arise at any time (at [phone]), or call 911/go to the closest emergency department if needed or outside of office hours.      SUICIDE RISK ASSESSMENT AND SAFETY PLAN: Unalterable demographics and a history of mental health intervention indicate this patient is in a high risk category compared to the general population. At present, the patient denies active SI/HI, intentions, or plans at this time and  agrees to seek immediate care should such thoughts develop. The patient verbalizes understanding of how to access emergency care if needed and agrees to do so. Consideration of suicide risk and protective factors such as history, current presentation, individual strengths and weaknesses, psychosocial and environmental stressors and variables, psychiatric illness and symptoms, medical conditions and pain, took place in this interview. Based on those considerations, the patient is determined: within individual baseline and presenting no imminent risk for suicide or homicide. Other recommendations: The patient does not meet the criteria for inpatient admission and is not a safety risk to self or others at today's visit. Inpatient treatment offers no significant advantages over outpatient treatment for this patient at today's visit.  The patient was given ample time for questions and fully participated in treatment planning.  The patient was encouraged to call the clinic with any questions or concerns.  The patient was informed of access to emergency care. If patient were to develop any significant symptomatology, suicidal ideation, homicidal ideation, any concerns, or feel unsafe at any time they are to call the clinic and if unable to get immediate assistance should immediately call 911 or go to the nearest emergency room.  Patient contracted verbally for the following: If you are experiencing an emotional crisis or have thoughts of harming yourself or others, please go to your nearest local emergency room or call 911. Will continue to re-assess medication response and side effects frequently to establish efficacy and ensure safety. Risks, any black box warnings, side effects, off label usage, and benefits of medication and treatment discussed with patient, along with potential adverse side effects of current and/or newly prescribed medication, alternative treatment options, and OTC medications.  Patient verbalized  understanding of potential risks, any off label use of medication, any black box warnings, and any side effects in their own words. The patient verbalized understanding and agreed to comply with the safety plan discussed in their own words.  Patient given the number to the office. Number also discussed of the 24- hour suicide hotline.           MEDS ORDERED DURING VISIT:  New Medications Ordered This Visit   Medications    Vortioxetine HBr (Trintellix) 10 MG tablet tablet     Sig: Take 1 tablet by mouth Daily With Breakfast.     Dispense:  30 tablet     Refill:  1    lamoTRIgine (LaMICtal) 150 MG tablet     Sig: Take 1 tablet by mouth Daily.     Dispense:  30 tablet     Refill:  1    amitriptyline (ELAVIL) 10 MG tablet     Sig: Take 1 tablet by mouth At Night As Needed for Sleep.     Dispense:  30 tablet     Refill:  1       Return in about 8 weeks (around 1/9/2025), or if symptoms worsen or fail to improve, for Next scheduled follow up and Recheck.         Progress toward goal: Not at goal    Functional Status: Mild impairment     Prognosis: Good with Ongoing Treatment             This document has been electronically signed by MINI Conte  November 14, 2024 10:20 EST    Some of the data in this electronic note has been brought forward from a previous encounter, any necessary changes have been made, it has been reviewed by this APRN, and it is accurate.    Please note that portions of this note were completed with a voice recognition program.

## 2024-12-16 DIAGNOSIS — G43.019 INTRACTABLE MIGRAINE WITHOUT AURA AND WITHOUT STATUS MIGRAINOSUS: ICD-10-CM

## 2024-12-16 RX ORDER — GALCANEZUMAB 120 MG/ML
INJECTION, SOLUTION SUBCUTANEOUS
Qty: 1 ML | Refills: 4 | Status: SHIPPED | OUTPATIENT
Start: 2024-12-16

## 2025-01-09 ENCOUNTER — TELEMEDICINE (OUTPATIENT)
Dept: PSYCHIATRY | Facility: CLINIC | Age: 30
End: 2025-01-09
Payer: COMMERCIAL

## 2025-01-09 DIAGNOSIS — F33.0 MILD EPISODE OF RECURRENT MAJOR DEPRESSIVE DISORDER: Primary | Chronic | ICD-10-CM

## 2025-01-09 DIAGNOSIS — F41.1 GENERALIZED ANXIETY DISORDER: Chronic | ICD-10-CM

## 2025-01-09 DIAGNOSIS — G47.9 SLEEPING DIFFICULTIES: ICD-10-CM

## 2025-01-09 RX ORDER — HYDROXYZINE HYDROCHLORIDE 25 MG/1
25-50 TABLET, FILM COATED ORAL 2 TIMES DAILY PRN
Qty: 120 TABLET | Refills: 0 | Status: SHIPPED | OUTPATIENT
Start: 2025-01-09

## 2025-01-09 RX ORDER — AMITRIPTYLINE HYDROCHLORIDE 10 MG/1
10 TABLET ORAL NIGHTLY PRN
Qty: 30 TABLET | Refills: 0 | Status: SHIPPED | OUTPATIENT
Start: 2025-01-09

## 2025-01-09 RX ORDER — LAMOTRIGINE 150 MG/1
150 TABLET ORAL DAILY
Qty: 30 TABLET | Refills: 0 | Status: SHIPPED | OUTPATIENT
Start: 2025-01-09

## 2025-01-09 NOTE — PROGRESS NOTES
This provider is located at the [facility] (through [facility]), [address] [place], [facility], [address] using a secure [employer] Video Visit through [employer]. Patient is being seen remotely via telehealth at their home address in [place], and stated they are in a secure environment for this session. The patient's condition being diagnosed/treated is appropriate for telemedicine. The provider identified herself as well as her credentials.   The patient, and/or patients guardian, consent to be seen remotely, and when consent is given they understand that the consent allows for patient identifiable information to be sent to a third party as needed.   They may refuse to be seen remotely at any time. The electronic data is encrypted and password protected, and the patient and/or guardian has been advised of the potential risks to privacy not withstanding such measures.    You have chosen to receive care through a telehealth visit.  Do you consent to use a video/audio connection for your medical care today? Yes    Patient identifiers utilized: Name and date of birth.    Patient verbally confirmed consent for today's encounter  01/09/2025 .    The patient does verbally confirm they are being seen today while physically located in the [facility].  This provider/this APRN is licensed in the [facility] where the patient is located/being seen.     Subjective   Palmira Carbajal is a 29 y.o. female who presents today for follow up    Chief Complaint: Medication management follow-up: Anxiety, depression, and sleeping difficulties follow-up    Accompanied by: The patient is interviewed alone at today's encounter    History of Present Illness:   -Last encounter with this APRN/Provider: 11/14/2024   -Since last encounter with this APRN/Office: The patient reports she has a lot going on this month.  She reports her son has hernia surgery coming up, both of her sons have birthdays,  "and she has been selected for jury duty.  -Mood reported as: The patient reports overall she feels like her depressive symptoms are controlled on her current treatment regimen, but her anxiety symptoms continue to be higher for her  -Patient rates symptoms of depression on average over the past two weeks at a 1-2/10 on a 0-10 scale, with 10 being the worst.  The patient reports symptoms of depression include lack of motivation.  -Patient rates symptoms of anxiety on average over the past two weeks at a 6/10 on a 0-10 scale, with 10 being the worst.    -Appetite reported as: Good, and reports it has probably been increased  -Changes in weight: The patient reports recently gaining a couple of pounds  -Sleep reported as: \"Okay\" and \"pretty good for the most part\".  She reports it can take a while to fall asleep at night, but once she gets to sleep she sleeps good.  She reports her spouse got her a new puppy for [email or web address], and this has interrupted her sleep more recently.  -The patient reports averaging 7 hours of sleep each night.  -The patient denies any nightmares.    -Changes in medications or new medical problems/concerns since last visit: Denies  -Reported medication compliance: The patient reports compliance with current psychotropic medication regimen.  -Reported medication side effects or concerns: Denies    -Auditory or visual hallucinations: Denies  -Behaviors different from patient baseline, or any reckless, impulsive, or risky behaviors: Denies  -Symptoms of karl or psychosis: Denies  -Self-injurious behavior: Denies  -SI/HI: The patient adamantly denies any suicidal or homicidal ideations, plans, or intent at the time of this encounter and is convincing.    -Using a shared decision-making approach the patient reports she would like to retry hydroxyzine as needed to add to her current treatment regimen to assist with continued heightened anxiety symptoms.  The patient reports she is pleased with her other " medications and doses at this time, and does not want to make any adjustments at today's encounter.    -The patient does verbally contract for safety at today's encounter and is in verbal agreement with the safety/crisis plan. The patient reports in her own words that she will reach out to this APRN/office prior to next scheduled appointment if there is any worsening of mood, any new psychiatric symptoms, any medication side effects or concerns, any concern for safety to self or others, any suicidal or homicidal ideations plans or intent, or any concerns, or she will call 911, call or text the suicide and crisis lifeline at [phone], or go to the closest emergency department.      Patient Health Questionnaire-9 (PHQ-9) (Depression Screening Tool)  Little interest or pleasure in doing things? (Patient-Rptd) Not at all   Feeling down, depressed, or hopeless? (Patient-Rptd) Not at all   PHQ-2 Total Score (Patient-Rptd) 0   Trouble falling or staying asleep, or sleeping too much? (Patient-Rptd) Several days   Feeling tired or having little energy? (Patient-Rptd) Several days   Poor appetite or overeating? (Patient-Rptd) Several days   Feeling bad about yourself - or that you are a failure or have let yourself or your family down? (Patient-Rptd) Several days   Trouble concentrating on things, such as reading the newspaper or watching television? (Patient-Rptd) Not at all   Moving or speaking so slowly that other people could have noticed? Or the opposite - being so fidgety or restless that you have been moving around a lot more than usual? (Patient-Rptd) Not at all   Thoughts that you would be better off dead, or of hurting yourself in some way? (Patient-Rptd) Not at all   PHQ-9 Total Score (Patient-Rptd) 4   If you checked off any problems, how difficult have these problems made it for you to do your work, take care of things at home, or get along with other people? (Patient-Rptd) Not difficult at all         PHQ-9 Total  Score: (Patient-Rptd) 4       Generalized Anxiety Disorder 7-Item (STACY-7) Screening Tool  Feeling nervous, anxious or on edge: (Patient-Rptd) More than half the days  Not being able to stop or control worrying: (Patient-Rptd) More than half the days  Worrying too much about different things: (Patient-Rptd) More than half the days  Trouble Relaxing: (Patient-Rptd) Several days  Being so restless that it is hard to sit still: (Patient-Rptd) Not at all  Feeling afraid as if something awful might happen: (Patient-Rptd) Several days  Becoming easily annoyed or irritable: (Patient-Rptd) Not at all  STACY 7 Total Score: (Patient-Rptd) 8  If you checked any problems, how difficult have these problems made it for you to do your work, take care of things at home, or get along with other people: (Patient-Rptd) Somewhat difficult      All Known Prior Psychiatric Medications and Responses if Known:  -Zoloft - does not remember response, possible lack of efficacy  -Paxil - does not remember response, possible lack of efficacy  -Prozac - does not remember response, possible lack of efficacy  -Lexapro - does not remember response, possible lack of efficacy  -Celexa - does not remember response, possible lack of efficacy  -Desvenlafaxine - does not remember response, possible lack of efficacy  -Effexor - does not remember response, possible lack of efficacy  -Cymbalta - caused jitteriness and insomnia  -Viibryd - reports she cannot remember why she could not take this medication, but feels there was some kind of side effect  -Wellbutrin SR - did not like the way it made her feel, made her feel drunk and woozy  -Quetiapine - does not remember response, possible lack of efficacy  -Abilify - does not remember response, possible lack of efficacy  -Trazodone - does not remember response, possible lack of efficacy  -Hydroxyzine - does not remember response, possible lack of efficacy  -Ambien - does not remember response, possible lack of  efficacy  -Lorazepam - does not remember response, possible lack of efficacy  -Buspar - reported lack of efficacy, and also caused dizziness and vivid dreams  -Propranolol - the patient reports lack of efficacy and decreased heart rate  -The patient reports some of the medications that she cannot remember the response to listed above included side effects such as restless leg, nightmares, and heart palpitations, but she cannot remember which medications caused these side effects  -Olanzapine - patient reports possible partial efficacy, if any efficacy, unsure  -Lamictal - patient reports effective for depression  -Amitriptyline - patient reports effective  -Trintellix - patient reports effective    History of Seizures or TBI:  The patient denies any    Patient's Support Network Includes:   and mother    Last Menstrual Period:  Hysterectomy - partial.        The following portions of the patient's history were reviewed and updated as appropriate: allergies, current medications, past family history, past medical history, past social history, past surgical history and problem list.          Past Medical History:  Past Medical History:   Diagnosis Date    Acid reflux     Anxiety     Chronic pain disorder     NO PAIN CLINIC, PCP/PSYCH CONT W/AMITRIPTYLINE    Depression     Eczema     Fatty liver     Gallstones     Intractable migraine without aura and without status migrainosus 02/09/2023    Kidney stone     HX OF    Maltracking of right patella     Migraines     Orthostatic hypotension     WORKED UP FOR POTS, CONT W/MEDS, MONNIN. RX FLORINEF    Panic disorder     PONV (postoperative nausea and vomiting)     GOOD RESULTS WITH SCOPALAMINE    PTSD (post-traumatic stress disorder)     WAKES UP FROM ANESTHESIA WITH PANIC ATTACK    Seasonal allergic rhinitis 05/31/2022    Self-injurious behavior     as a teenager    Suicide attempt 2007    NO ADULT ISSUES       Social History:  Social History     Socioeconomic History     Marital status:      Spouse name: TRISTAN    Number of children: 3   Tobacco Use    Smoking status: Former     Current packs/day: 0.00     Average packs/day: 0.5 packs/day for 15.0 years (7.5 ttl pk-yrs)     Types: Cigarettes     Start date: 2007     Quit date: 2022     Years since quittin.9    Smokeless tobacco: Never   Vaping Use    Vaping status: Every Day    Substances: Nicotine, Flavoring    Devices: Disposable   Substance and Sexual Activity    Alcohol use: Not Currently    Drug use: Not Currently     Types: Marijuana     Comment: Marijuana use in teenage years    Sexual activity: Defer     Partners: Male     Birth control/protection: Hysterectomy       Family History:  Family History   Problem Relation Age of Onset    Arthritis Mother     Restless legs syndrome Mother     Thyroid disease Father     Colon polyps Father     Diabetes Father     Drug abuse Maternal Uncle     Alcohol abuse Maternal Uncle     Cancer Maternal Grandmother     Sleep apnea Son     Malig Hyperthermia Neg Hx        Past Surgical History:  Past Surgical History:   Procedure Laterality Date     SECTION      CHOLECYSTECTOMY N/A 2024    Procedure: CHOLECYSTECTOMY LAPAROSCOPIC;  Surgeon: Cisco Hickman MD;  Location: [facility];  Service: General;  Laterality: N/A;    COLONOSCOPY N/A 10/11/2022    Procedure: COLONOSCOPY;  Surgeon: Dyan Griffin MD;  Location: [facility] ENDOSCOPY;  Service: Gastroenterology;  Laterality: N/A;  HEMORRHOIDS    CYSTOSCOPY      CYSTOSCOPY URETEROSCOPY Left 2023    Procedure: CYSTOSCOPY URETEROSCOPY RETROGRADE PYELOGRAM HOLMIUM LASER STENT INSERTION, left;  Surgeon: Melisa Garcia MD;  Location: [facility] MAIN OR;  Service: Urology;  Laterality: Left;    ENDOSCOPY N/A 2023    Procedure: ESOPHAGOGASTRODUODENOSCOPY WITH BIOPSIES;  Surgeon: Dyan Griffin MD;  Location: [facility] ENDOSCOPY;  Service: Gastroenterology;  Laterality: N/A;  ESOPHAGITIS,  GASTRITIS    HYSTERECTOMY      KIDNEY STONE SURGERY      unspecified    KNEE ARTHROSCOPY Right 7/11/2022    Procedure: KNEE ARTHROSCOPY WITH A LATERAL RELEASE AND CHONDROPLASTY;  Surgeon: Marco A Pitts MD;  [facility];  Service: Orthopedics;  Laterality: Right;    WISDOM TOOTH EXTRACTION         Problem List:  Patient Active Problem List   Diagnosis    Maltracking of right patella    Impingement syndrome involving patellar fat pad of right knee    Chondromalacia    Patellar malalignment syndrome of right knee    Binocular vision disorder with diplopia    Generalized anxiety disorder    Acid reflux    PTSD (post-traumatic stress disorder)    Kidney stone on left side    Seasonal allergic rhinitis    Burn of finger and thumb of right hand, second degree    Hemorrhoids    Right hand pain    Rectal bleeding    Anal or rectal pain    Decreased appetite    Aftercare following surgery of right knee arthroscopic chondroplasty and lateral release, 7/11/2022    Generalized body aches    Epigastric pain    Female hirsutism    Right ovarian cyst    Moderate episode of recurrent major depressive disorder    Primary insomnia    Vitamin D deficiency    Intractable migraine without aura and without status migrainosus    Calcified granuloma of lung    Nipple discharge    Nausea and vomiting    Overweight (BMI 25.0-29.9)    Acne vulgaris    Vaping nicotine dependence, tobacco product    Achilles tendinitis of both lower extremities    BRADY (nonalcoholic steatohepatitis)    Symptomatic cholelithiasis       Allergy:   Allergies   Allergen Reactions    Cymbalta [Duloxetine Hcl] Mental Status Change     Jittery and insomnia    Penicillins Rash        Current Medications:   Current Outpatient Medications   Medication Sig Dispense Refill    amitriptyline (ELAVIL) 10 MG tablet Take 1 tablet by mouth At Night As Needed for Sleep. 30 tablet 0    lamoTRIgine (LaMICtal) 150 MG tablet Take 1 tablet by mouth Daily. 30 tablet 0     Vortioxetine HBr (Trintellix) 10 MG tablet tablet Take 1 tablet by mouth Daily With Breakfast. 30 tablet 0    dicyclomine (BENTYL) 20 MG tablet Take 1 tablet by mouth Every 6 (Six) Hours As Needed (abdominal cramping). 60 tablet 3    Emgality 120 MG/ML auto-injector pen INJECT AS DIRECTED 2ML SUBQ EVERY 30 DAYS FOR MIGRAINE 1 mL 4    esomeprazole (nexIUM) 40 MG capsule Take 1 capsule by mouth Every Morning Before Breakfast. 90 capsule 3    famotidine (PEPCID) 20 MG tablet Take 1 tablet by mouth At Night As Needed for Heartburn. (Patient taking differently: Take 1 tablet by mouth Every Night.) 90 tablet 1    fludrocortisone 0.1 MG tablet Take 1 tablet by mouth 2 (Two) Times a Day. 90 tablet 1    Hydrocort-Pramoxine, Perianal, (ANALPRAM-HC) 2.5-1 % rectal cream Insert  into the rectum 3 (Three) Times a Day. 1 each 3    hydrOXYzine (ATARAX) 25 MG tablet Take 1-2 tablets by mouth 2 (Two) Times a Day As Needed for Anxiety. 120 tablet 0    polyethylene glycol (MIRALAX) 17 g packet Take 17 g by mouth Daily. 5 packet 0    Resmetirom (Rezdiffra) 80 MG tablet Take 1 tablet by mouth Daily. 90 tablet 3    Rimegepant Sulfate (Nurtec) 75 MG tablet dispersible tablet Take 1 tablet by mouth Daily As Needed (migraine). Indications: Migraine Headache 8 tablet 5    spironolactone (Aldactone) 50 MG tablet Take 1 tablet by mouth Daily. Indications: Abnormal Growth of Body or Facial Hair 30 tablet 3    vitamin D3 (Vitamin D) 125 MCG (5000 UT) capsule capsule Take 1 capsule by mouth Daily. 90 capsule 1     No current facility-administered medications for this visit.           Review of Symptoms:    Review of Systems   Psychiatric/Behavioral:  Positive for depressed mood and stress. Negative for agitation, behavioral problems, dysphoric mood, hallucinations, self-injury, suicidal ideas and negative for hyperactivity. The patient is nervous/anxious.          Physical Exam:   not currently breastfeeding. There is no height or weight on  file to calculate BMI.   Due to the remote nature of this encounter (virtual encounter), vitals were unable to be obtained.  Height stated at 64 inches.  Weight stated at around 160 pounds.        Physical Exam  Neurological:      Mental Status: She is alert and oriented to person, place, and time.   Psychiatric:         Attention and Perception: Attention normal.         Mood and Affect: Mood and affect normal.         Speech: Speech normal.         Behavior: Behavior normal. Behavior is cooperative.         Thought Content: Thought content normal. Thought content is not paranoid or delusional. Thought content does not include homicidal or suicidal ideation. Thought content does not include homicidal or suicidal plan.         Cognition and Memory: Cognition and memory normal.         Judgment: Judgment normal.         Mental Status Exam:   Hygiene:   good  Cooperation:  Cooperative  Eye Contact:  Good  Psychomotor Behavior:  Appropriate  Affect:  Appropriate  Mood: normal  Hopelessness: Denies  Speech:  Normal  Thought Process:  Goal directed and Linear  Thought Content:  Mood congruent  Suicidal:  None  Homicidal:  None  Hallucinations:  None  Delusion:  None  Memory:  Intact  Orientation:  Person, Place, Time, and Situation  Reliability:  good  Insight:  Good  Judgement:  Good  Impulse Control:  Good  Physical/Medical Issues:  No            Wt Readings from Last 3 Encounters:   10/08/24 71.5 kg (157 lb 9.6 oz)   09/09/24 69.7 kg (153 lb 9.6 oz)   08/08/24 68.6 kg (151 lb 4.8 oz)     Temp Readings from Last 3 Encounters:   08/08/24 97.2 °F (36.2 °C)   07/18/24 96.9 °F (36.1 °C) (Temporal)   07/14/24 98.5 °F (36.9 °C) (Oral)     BP Readings from Last 3 Encounters:   10/08/24 109/74   09/09/24 125/70   08/08/24 114/77     Pulse Readings from Last 3 Encounters:   10/08/24 98   09/09/24 72   08/08/24 85      BMI Readings from Last 3 Encounters:   10/08/24 27.04 kg/m²   09/09/24 26.35 kg/m²   08/08/24 25.95 kg/m²        Lab Results:   Office Visit on 08/08/2024   Component Date Value Ref Range Status    Penicillin G Allergen IgE 08/08/2024 <0.10  Class 0 kU/L Final    Class Description 08/08/2024 Comment   Final        Levels of Specific IgE       Class  Description of Class      ---------------------------  -----  --------------------                     < 0.10         0         Negative             0.10 -    0.31         0/I       Equivocal/Low             0.32 -    0.55         I         Low             0.56 -    1.40         II        Moderate             1.41 -    3.90         III       High             3.91 -   19.00         IV        Very High            19.01 -  100.00         V         Very High                    >100.00         VI        Very High    25 Hydroxy, Vitamin D 08/08/2024 37.6  30.0 - 100.0 ng/ml Final   Admission on 07/18/2024, Discharged on 07/18/2024   Component Date Value Ref Range Status    Case Report 07/18/2024    Final                    Value:Surgical Pathology Report                         Case: [id number]                                  Authorizing Provider:  Cisco Hickman MD        Collected:           07/18/2024 12:54 PM          Ordering Location:     [facility]  Received:            07/19/2024 07:01 AM                                 OR                                                                           Pathologist:           Sue Frankel DO                                                       Specimen:    Gallbladder, gallbladder and contents                                                      Clinical Information 07/18/2024    Final                    Value:Symptomatic cholelithiasis    Final Diagnosis 07/18/2024    Final                    Value:Gallbladder, cholecystectomy:                           - Mild chronic cholecystitis                           - Focal cholesterolosis                           - Cholelithiasis     Gross Description 07/18/2024  "   Final                    Value:1. Gallbladder.                          Received in formalin and labeled \"gallbladder and contents\" is an 8.0 x                           3.0 x 3.0 cm intact gallbladder received with a clipped cystic duct                           margin.  The dusky serosa is smooth, and the opposing cauterized                           adventitia is shaggy.  Opening reveals dark green, tenacious bile fluid                           with 2 yellow, nodular gallstones (averaging 0.3 cm), dark green, velvety                           mucosa with mild yellow stippling, and an average wall thickness of 0.2                           cm.  No distinct lesions or lymph nodes are identified.  Representative                           sections are submitted in 1 cassette.                           YOBANI    Microscopic Description 07/18/2024    Final                    Value:Microscopic examination performed.   Admission on 07/14/2024, Discharged on 07/14/2024   Component Date Value Ref Range Status    Glucose 07/14/2024 82  65 - 99 mg/dL Final    BUN 07/14/2024 7  6 - 20 mg/dL Final    Creatinine 07/14/2024 0.70  0.57 - 1.00 mg/dL Final    Sodium 07/14/2024 141  136 - 145 mmol/L Final    Potassium 07/14/2024 3.7  3.5 - 5.2 mmol/L Final    Chloride 07/14/2024 105  98 - 107 mmol/L Final    CO2 07/14/2024 22.5  22.0 - 29.0 mmol/L Final    Calcium 07/14/2024 8.8  8.6 - 10.5 mg/dL Final    Total Protein 07/14/2024 6.6  6.0 - 8.5 g/dL Final    Albumin 07/14/2024 4.0  3.5 - 5.2 g/dL Final    ALT (SGPT) 07/14/2024 9  1 - 33 U/L Final    AST (SGOT) 07/14/2024 11  1 - 32 U/L Final    Alkaline Phosphatase 07/14/2024 83  39 - 117 U/L Final    Total Bilirubin 07/14/2024 <0.2  0.0 - 1.2 mg/dL Final    Globulin 07/14/2024 2.6  gm/dL Final    A/G Ratio 07/14/2024 1.5  g/dL Final    BUN/Creatinine Ratio 07/14/2024 10.0  7.0 - 25.0 Final    Anion Gap 07/14/2024 13.5  5.0 - 15.0 mmol/L Final    eGFR 07/14/2024 118.7  >60.0 " mL/min/1.73 Final    Lipase 07/14/2024 33  13 - 60 U/L Final    Color, UA 07/14/2024 Yellow  Yellow, Straw Final    Appearance, UA 07/14/2024 Clear  Clear Final    pH, UA 07/14/2024 8.0  5.0 - 8.0 Final    Specific Gravity, UA 07/14/2024 <=1.005  1.005 - 1.030 Final    Glucose, UA 07/14/2024 Negative  Negative Final    Ketones, UA 07/14/2024 Negative  Negative Final    Bilirubin, UA 07/14/2024 Negative  Negative Final    Blood, UA 07/14/2024 Negative  Negative Final    Protein, UA 07/14/2024 Negative  Negative Final    Leuk Esterase, UA 07/14/2024 Negative  Negative Final    Nitrite, UA 07/14/2024 Negative  Negative Final    Urobilinogen, UA 07/14/2024 0.2 E.U./dL  0.2 - 1.0 E.U./dL Final    Extra Tube 07/14/2024 Hold for add-ons.   Final    Auto resulted.    Extra Tube 07/14/2024 hold for add-on   Final    Auto resulted    Extra Tube 07/14/2024 Hold for add-ons.   Final    Auto resulted.    Extra Tube 07/14/2024 Hold for add-ons.   Final    Auto resulted    WBC 07/14/2024 10.60  3.40 - 10.80 10*3/mm3 Final    RBC 07/14/2024 4.58  3.77 - 5.28 10*6/mm3 Final    Hemoglobin 07/14/2024 12.7  12.0 - 15.9 g/dL Final    Hematocrit 07/14/2024 37.7  34.0 - 46.6 % Final    MCV 07/14/2024 82.3  79.0 - 97.0 fL Final    MCH 07/14/2024 27.7  26.6 - 33.0 pg Final    MCHC 07/14/2024 33.7  31.5 - 35.7 g/dL Final    RDW 07/14/2024 13.3  12.3 - 15.4 % Final    RDW-SD 07/14/2024 39.8  37.0 - 54.0 fl Final    MPV 07/14/2024 10.1  6.0 - 12.0 fL Final    Platelets 07/14/2024 456 (H)  140 - 450 10*3/mm3 Final    Neutrophil % 07/14/2024 47.6  42.7 - 76.0 % Final    Lymphocyte % 07/14/2024 43.2  19.6 - 45.3 % Final    Monocyte % 07/14/2024 6.4  5.0 - 12.0 % Final    Eosinophil % 07/14/2024 1.7  0.3 - 6.2 % Final    Basophil % 07/14/2024 0.8  0.0 - 1.5 % Final    Immature Grans % 07/14/2024 0.3  0.0 - 0.5 % Final    Neutrophils, Absolute 07/14/2024 5.04  1.70 - 7.00 10*3/mm3 Final    Lymphocytes, Absolute 07/14/2024 4.58 (H)  0.70 - 3.10  10*3/mm3 Final    Monocytes, Absolute 07/14/2024 0.68  0.10 - 0.90 10*3/mm3 Final    Eosinophils, Absolute 07/14/2024 0.18  0.00 - 0.40 10*3/mm3 Final    Basophils, Absolute 07/14/2024 0.09  0.00 - 0.20 10*3/mm3 Final    Immature Grans, Absolute 07/14/2024 0.03  0.00 - 0.05 10*3/mm3 Final    nRBC 07/14/2024 0.0  0.0 - 0.2 /100 WBC Final    RBC Morphology 07/14/2024 Normal  Normal Final    WBC Morphology 07/14/2024 Normal  Normal Final    Platelet Morphology 07/14/2024 Normal  Normal Final   Admission on 07/13/2024, Discharged on 07/14/2024   Component Date Value Ref Range Status    Glucose 07/13/2024 111 (H)  65 - 99 mg/dL Final    BUN 07/13/2024 9  6 - 20 mg/dL Final    Creatinine 07/13/2024 0.71  0.57 - 1.00 mg/dL Final    Sodium 07/13/2024 141  136 - 145 mmol/L Final    Potassium 07/13/2024 3.7  3.5 - 5.2 mmol/L Final    Chloride 07/13/2024 105  98 - 107 mmol/L Final    CO2 07/13/2024 20.2 (L)  22.0 - 29.0 mmol/L Final    Calcium 07/13/2024 9.0  8.6 - 10.5 mg/dL Final    Total Protein 07/13/2024 7.0  6.0 - 8.5 g/dL Final    Albumin 07/13/2024 4.2  3.5 - 5.2 g/dL Final    ALT (SGPT) 07/13/2024 10  1 - 33 U/L Final    AST (SGOT) 07/13/2024 11  1 - 32 U/L Final    Alkaline Phosphatase 07/13/2024 82  39 - 117 U/L Final    Total Bilirubin 07/13/2024 <0.2  0.0 - 1.2 mg/dL Final    Globulin 07/13/2024 2.8  gm/dL Final    A/G Ratio 07/13/2024 1.5  g/dL Final    BUN/Creatinine Ratio 07/13/2024 12.7  7.0 - 25.0 Final    Anion Gap 07/13/2024 15.8 (H)  5.0 - 15.0 mmol/L Final    eGFR 07/13/2024 116.7  >60.0 mL/min/1.73 Final    Lipase 07/13/2024 37  13 - 60 U/L Final    Color, UA 07/13/2024 Yellow  Yellow, Straw Final    Appearance, UA 07/13/2024 Turbid (A)  Clear Final    pH, UA 07/13/2024 >=9.0 (H)  5.0 - 8.0 Final    Specific [place], UA 07/13/2024 1.021  1.005 - 1.030 Final    Glucose, UA 07/13/2024 Negative  Negative Final    Ketones, UA 07/13/2024 15 mg/dL (1+) (A)  Negative Final    Bilirubin, UA 07/13/2024 Negative   Negative Final    Blood, UA 07/13/2024 Negative  Negative Final    Protein, UA 07/13/2024 Trace (A)  Negative Final    Leuk Esterase, UA 07/13/2024 Negative  Negative Final    Nitrite, UA 07/13/2024 Negative  Negative Final    Urobilinogen, UA 07/13/2024 1.0 E.U./dL  0.2 - 1.0 E.U./dL Final    Extra Tube 07/13/2024 Hold for add-ons.   Final    Auto resulted.    Extra Tube 07/13/2024 hold for add-on   Final    Auto resulted    Extra Tube 07/13/2024 Hold for add-ons.   Final    Auto resulted.    Extra Tube 07/13/2024 Hold for add-ons.   Final    Auto resulted    WBC 07/13/2024 14.01 (H)  3.40 - 10.80 10*3/mm3 Final    RBC 07/13/2024 4.85  3.77 - 5.28 10*6/mm3 Final    Hemoglobin 07/13/2024 13.3  12.0 - 15.9 g/dL Final    Hematocrit 07/13/2024 39.7  34.0 - 46.6 % Final    MCV 07/13/2024 81.9  79.0 - 97.0 fL Final    MCH 07/13/2024 27.4  26.6 - 33.0 pg Final    MCHC 07/13/2024 33.5  31.5 - 35.7 g/dL Final    RDW 07/13/2024 13.1  12.3 - 15.4 % Final    RDW-SD 07/13/2024 39.0  37.0 - 54.0 fl Final    MPV 07/13/2024 10.3  6.0 - 12.0 fL Final    Platelets 07/13/2024 461 (H)  140 - 450 10*3/mm3 Final    Neutrophil % 07/13/2024 54.4  42.7 - 76.0 % Final    Lymphocyte % 07/13/2024 37.3  19.6 - 45.3 % Final    Monocyte % 07/13/2024 6.2  5.0 - 12.0 % Final    Eosinophil % 07/13/2024 1.1  0.3 - 6.2 % Final    Basophil % 07/13/2024 0.6  0.0 - 1.5 % Final    Immature Grans % 07/13/2024 0.4  0.0 - 0.5 % Final    Neutrophils, Absolute 07/13/2024 7.61 (H)  1.70 - 7.00 10*3/mm3 Final    Lymphocytes, Absolute 07/13/2024 5.23 (H)  0.70 - 3.10 10*3/mm3 Final    Monocytes, Absolute 07/13/2024 0.87  0.10 - 0.90 10*3/mm3 Final    Eosinophils, Absolute 07/13/2024 0.16  0.00 - 0.40 10*3/mm3 Final    Basophils, Absolute 07/13/2024 0.09  0.00 - 0.20 10*3/mm3 Final    Immature Grans, Absolute 07/13/2024 0.05  0.00 - 0.05 10*3/mm3 Final    nRBC 07/13/2024 0.0  0.0 - 0.2 /100 WBC Final    Anisocytosis 07/13/2024 Slight/1+  None Seen Final     Poikilocytes 07/13/2024 Slight/1+  None Seen Final    WBC Morphology 07/13/2024 Normal  Normal Final    Large Platelets 07/13/2024 Slight/1+  None Seen Final   Lab on 04/10/2024   Component Date Value Ref Range Status    WBC 04/10/2024 11.38 (H)  3.40 - 10.80 10*3/mm3 Final    RBC 04/10/2024 4.76  3.77 - 5.28 10*6/mm3 Final    Hemoglobin 04/10/2024 12.9  12.0 - 15.9 g/dL Final    Hematocrit 04/10/2024 39.7  34.0 - 46.6 % Final    MCV 04/10/2024 83.4  79.0 - 97.0 fL Final    MCH 04/10/2024 27.1  26.6 - 33.0 pg Final    MCHC 04/10/2024 32.5  31.5 - 35.7 g/dL Final    RDW 04/10/2024 13.6  12.3 - 15.4 % Final    RDW-SD 04/10/2024 41.8  37.0 - 54.0 fl Final    MPV 04/10/2024 10.0  6.0 - 12.0 fL Final    Platelets 04/10/2024 380  140 - 450 10*3/mm3 Final    Neutrophil % 04/10/2024 67.4  42.7 - 76.0 % Final    Lymphocyte % 04/10/2024 24.2  19.6 - 45.3 % Final    Monocyte % 04/10/2024 7.6  5.0 - 12.0 % Final    Eosinophil % 04/10/2024 0.0 (L)  0.3 - 6.2 % Final    Basophil % 04/10/2024 0.4  0.0 - 1.5 % Final    Immature Grans % 04/10/2024 0.4  0.0 - 0.5 % Final    Neutrophils, Absolute 04/10/2024 7.67 (H)  1.70 - 7.00 10*3/mm3 Final    Lymphocytes, Absolute 04/10/2024 2.75  0.70 - 3.10 10*3/mm3 Final    Monocytes, Absolute 04/10/2024 0.87  0.10 - 0.90 10*3/mm3 Final    Eosinophils, Absolute 04/10/2024 0.00  0.00 - 0.40 10*3/mm3 Final    Basophils, Absolute 04/10/2024 0.05  0.00 - 0.20 10*3/mm3 Final    Immature Grans, Absolute 04/10/2024 0.04  0.00 - 0.05 10*3/mm3 Final   Office Visit on 01/29/2024   Component Date Value Ref Range Status    WBC 01/29/2024 13.49 (H)  3.40 - 10.80 10*3/mm3 Final    RBC 01/29/2024 5.06  3.77 - 5.28 10*6/mm3 Final    Hemoglobin 01/29/2024 13.7  12.0 - 15.9 g/dL Final    Hematocrit 01/29/2024 42.3  34.0 - 46.6 % Final    MCV 01/29/2024 83.6  79.0 - 97.0 fL Final    MCH 01/29/2024 27.1  26.6 - 33.0 pg Final    MCHC 01/29/2024 32.4  31.5 - 35.7 g/dL Final    RDW 01/29/2024 13.1  12.3 - 15.4 %  Final    RDW-SD 01/29/2024 40.0  37.0 - 54.0 fl Final    MPV 01/29/2024 11.1  6.0 - 12.0 fL Final    Platelets 01/29/2024 411  140 - 450 10*3/mm3 Final    Neutrophil % 01/29/2024 65.4  42.7 - 76.0 % Final    Lymphocyte % 01/29/2024 24.8  19.6 - 45.3 % Final    Monocyte % 01/29/2024 6.9  5.0 - 12.0 % Final    Eosinophil % 01/29/2024 1.8  0.3 - 6.2 % Final    Basophil % 01/29/2024 0.7  0.0 - 1.5 % Final    Immature Grans % 01/29/2024 0.4  0.0 - 0.5 % Final    Neutrophils, Absolute 01/29/2024 8.81 (H)  1.70 - 7.00 10*3/mm3 Final    Lymphocytes, Absolute 01/29/2024 3.35 (H)  0.70 - 3.10 10*3/mm3 Final    Monocytes, Absolute 01/29/2024 0.93 (H)  0.10 - 0.90 10*3/mm3 Final    Eosinophils, Absolute 01/29/2024 0.24  0.00 - 0.40 10*3/mm3 Final    Basophils, Absolute 01/29/2024 0.10  0.00 - 0.20 10*3/mm3 Final    Immature Grans, Absolute 01/29/2024 0.06 (H)  0.00 - 0.05 10*3/mm3 Final    nRBC 01/29/2024 0.0  0.0 - 0.2 /100 WBC Final    25 Hydroxy, Vitamin D 01/29/2024 62.3  30.0 - 100.0 ng/ml Final    Glucose 01/29/2024 72  65 - 99 mg/dL Final    BUN 01/29/2024 11  6 - 20 mg/dL Final    Creatinine 01/29/2024 0.69  0.57 - 1.00 mg/dL Final    Sodium 01/29/2024 137  136 - 145 mmol/L Final    Potassium 01/29/2024 4.3  3.5 - 5.2 mmol/L Final    Chloride 01/29/2024 102  98 - 107 mmol/L Final    CO2 01/29/2024 21.4 (L)  22.0 - 29.0 mmol/L Final    Calcium 01/29/2024 9.6  8.6 - 10.5 mg/dL Final    BUN/Creatinine Ratio 01/29/2024 15.9  7.0 - 25.0 Final    Anion Gap 01/29/2024 13.6  5.0 - 15.0 mmol/L Final    eGFR 01/29/2024 119.9  >60.0 mL/min/1.73 Final           Assessment & Plan   Problems Addressed this Visit          Mental Health    Generalized anxiety disorder    Relevant Medications    Vortioxetine HBr (Trintellix) 10 MG tablet tablet    amitriptyline (ELAVIL) 10 MG tablet    hydrOXYzine (ATARAX) 25 MG tablet     Other Visit Diagnoses       Mild episode of recurrent major depressive disorder  (Chronic)   -  Primary    R/O  BPAD/BPD    Relevant Medications    Vortioxetine HBr (Trintellix) 10 MG tablet tablet    lamoTRIgine (LaMICtal) 150 MG tablet    amitriptyline (ELAVIL) 10 MG tablet    hydrOXYzine (ATARAX) 25 MG tablet    Sleeping difficulties        Relevant Medications    amitriptyline (ELAVIL) 10 MG tablet    hydrOXYzine (ATARAX) 25 MG tablet          Diagnoses         Codes Comments    Mild episode of recurrent major depressive disorder    -  Primary ICD-10-CM: F33.0  ICD-9-CM: 296.31 R/O BPAD/BPD    Generalized anxiety disorder     ICD-10-CM: F41.1  ICD-9-CM: 300.02     Sleeping difficulties     ICD-10-CM: G47.9  ICD-9-CM: 780.50             Visit Diagnoses:    ICD-10-CM ICD-9-CM   1. Mild episode of recurrent major depressive disorder  F33.0 296.31   2. Generalized anxiety disorder  F41.1 300.02   3. Sleeping difficulties  G47.9 780.50           GOALS:  Short Term Goals: Patient will be compliant with medication, and patient will have no significant medication related side effects.  Patient will be engaged in psychotherapy as indicated.  Patient will report subjective improvement of symptoms.  Long term goals: To stabilize mood and treat/improve subjective symptoms, the patient will stay out of the hospital, the patient will be at an optimal level of functioning, and the patient will take all medications as prescribed.  The patient verbalized understanding and agreement with goals that were mutually set.      TREATMENT PLAN: Begin supportive psychotherapy efforts and take medications as indicated.  Medication and treatment options, both pharmacological and non-pharmacological treatment options, discussed during today's visit, including any off label use of medication. Patient acknowledged and verbally consented with current treatment plan and was educated on the importance of compliance with treatment and follow-up appointments.  The patient has declined a referral to begin psychotherapy, but has verbalized she will reach out  to this APRN/office if she changes her mind.    -Continue amitriptyline 10 mg by mouth once nightly to assist with sleeping difficulties and also can assist with mood.  -Continue lamotrigine 150 mg by mouth once daily for mood.  -Continue Trintellix 10 mg by mouth once daily with food for mood.  -Begin hydroxyzine 25 to 50 mg by mouth up to twice daily as needed for anxiety.      MEDICATION ISSUES:  Discussed medication options and treatment plan of prescribed medication, any off label use of medication, as well as the risks, benefits, any black box warnings including increased suicidality, and side effects including but not limited to potential falls, dizziness, possible impaired driving, GI side effects (change in appetite, abdominal discomfort, nausea, vomiting, diarrhea, and/or constipation), dry mouth, somnolence, sedation, insomnia, activation, agitation, irritation, tremors, abnormal muscle movements or disorders, tardive dyskinesia, akathisia, asthenia, headache, sweating, possible bruising or rare bleeding, electrolyte and/or fluid abnormalities, change in blood pressure/heart rate/and or heart rhythm, hypotension, sexual dysfunction, rare impulse control problems, rare seizures, rare neuroleptic malignant syndrome, increased risk of death and cerebrovascular events, change in blood glucose and increased risk for diabetes, change in triglycerides and cholesterol and increased risk for dyslipidemia,  weight gain, weight gain that can become problematic to health, skin conditions and reactions, and metabolic adversities among others. Patient and/or guardian are agreeable to call the office with any worsening of symptoms or onset of side effects, or if any concerns or questions arise.  The contact information for the office is made available to the patient and/or guardian. Patient and/or guardian are agreeable to call 911 or go to the nearest ER should they begin having any SI/HI, or if any urgent concerns  arise.    Due to the nature of virtual visits and inability to monitor vital signs and weight with virtual visits, the patient has been encouraged to monitor their vital signs and weight regularly either through self-monitoring via home device(s) or with their Primary Care Provider, and the patient has been instructed to notify this APRN of any abnormalities or significant changes from baseline.     This APRN has discussed the benefits and risks of taking/continuing Lamictal (Lamotrigine).  The side effects of Lamictal can include a benign rash, blurred or double vision, dizziness, ataxia, sedation, headache, tremor, insomnia, poor coordination, fatigue,  nausea, vomiting, dyspepsia, rhinitis, infection, pharyngitis, asthenia, a rare but serious rash, rare multi-organ failure associated with Bull-Avelino Syndrome, toxic epidermal necrolysis, drug hypersensitivity syndrome, rare blood dyscrasias, rare aseptic meningitis, rare sudden unexplained deaths in people with epilepsy, withdrawal seizures upon abrupt withdrawal, and rare activation of suicidal ideation and behavior (suicidality).  This APRN has discussed that a very slow dose titration when starting, or changing doses, of Lamictal may reduce the incidence of skin rash and other side effects.  The dosage should not be titrated upwards or increased faster than recommended due to the possibility of the discussed side effects and risk of development of a skin rash (which can become life threatening).    This APRN has also discussed that if the patient stops taking the Lamictal for 3-5 days or longer, it will be necessary to restart the drug with an initial dose titration, as rashes have been reported on reexposure.  If the patient and Provider decide to stop the Lamictal, the patient will follow the directions of this APRN/this office as a guided taper over about two weeks is appropriate due to the risk of relapse in bipolar disorder with those with a mood or  bipolar disorder, the risk of seizures in those with epilepsy, and discontinuation symptoms upon rapid discontinuation of Lamictal.    The patient verbalizes understanding of benefits and risks as discussed, the patient/guardian feels the benefits outweigh the risks and is agreeable to continue/take Lamictal as discussed.  The patient is advised should any side effects or rash develops they are to stop the Lamictal immediately and contact this APRN/this office or go to the emergency department immediately.  The patient verbalizes understanding and agreement with treatment plan in their own words.      VERBAL INFORMED CONSENT FOR MEDICATION:  The patient was educated that their proposed/prescribed psychotropic medication(s) has potential risks, side effects, adverse effects, and black box warnings; and these have been discussed with the patient.  The patient has been informed that their treatment and medication dosage is to be individualized, and may even be above or below the recommended range/dosage due to patient individualization and response, but medication is prescribed using a shared decision making approach, and no medication or dosage will be prescribed without the patient's verbal consent.  The reason for the use of the medication including any off label use and alternative modes of treatment other than or in addition to medication has been considered and discussed, the probable consequences of not receiving the proposed treatment have been discussed, and any treatment side effects, black box warnings, and cautions associated with treatment have been discussed with the patient.  The patient is allowed ample time to openly discuss and ask questions regarding the proposed medication(s) and treatment plan and the patient verbalizes understanding the reasons for the use of the medication, its potential risks and benefits, other alternative treatment(s), and the probable consequences that may occur if the  proposed medication is not given.  The patient has been given ample time to ask questions and study the information and find the information to be specific, accurate, and complete.  The patient gives verbal consent for the medication(s) proposed/prescribed, they verbalized understanding that they can refuse and withdraw consent at any time with the assistance of this APRN, and the patient has verbally confirmed that they are aware, and are willing, to take the prescribed medication and follow the treatment plan with the known possible risks, side effect, black box warnings, and any potential medication interactions, and the patient reports they will be worse off without this medication and treatment plan.  The patient is advised to contact this APRN/this office if any questions or concerns arise at any time (at [phone]), or call 911/go to the closest emergency department if needed or outside of office hours.      SUICIDE RISK ASSESSMENT AND SAFETY PLAN: Unalterable demographics and a history of mental health intervention indicate this patient is in a high risk category compared to the general population. At present, the patient denies active SI/HI, intentions, or plans at this time and agrees to seek immediate care should such thoughts develop. The patient verbalizes understanding of how to access emergency care if needed and agrees to do so. Consideration of suicide risk and protective factors such as history, current presentation, individual strengths and weaknesses, psychosocial and environmental stressors and variables, psychiatric illness and symptoms, medical conditions and pain, took place in this interview. Based on those considerations, the patient is determined: within individual baseline and presenting no imminent risk for suicide or homicide. Other recommendations: The patient does not meet the criteria for inpatient admission and is not a safety risk to self or others at today's visit. Inpatient  treatment offers no significant advantages over outpatient treatment for this patient at today's visit.  The patient was given ample time for questions and fully participated in treatment planning.  The patient was encouraged to call the clinic with any questions or concerns.  The patient was informed of access to emergency care. If patient were to develop any significant symptomatology, suicidal ideation, homicidal ideation, any concerns, or feel unsafe at any time they are to call the clinic and if unable to get immediate assistance should immediately call 911 or go to the nearest emergency room.  Patient contracted verbally for the following: If you are experiencing an emotional crisis or have thoughts of harming yourself or others, please go to your nearest local emergency room or call 911. Will continue to re-assess medication response and side effects frequently to establish efficacy and ensure safety. Risks, any black box warnings, side effects, off label usage, and benefits of medication and treatment discussed with patient, along with potential adverse side effects of current and/or newly prescribed medication, alternative treatment options, and OTC medications.  Patient verbalized understanding of potential risks, any off label use of medication, any black box warnings, and any side effects in their own words. The patient verbalized understanding and agreed to comply with the safety plan discussed in their own words.  Patient given the number to the office. Number also discussed of the 24- hour suicide hotline.           MEDS ORDERED DURING VISIT:  New Medications Ordered This Visit   Medications    Vortioxetine HBr (Trintellix) 10 MG tablet tablet     Sig: Take 1 tablet by mouth Daily With Breakfast.     Dispense:  30 tablet     Refill:  0    lamoTRIgine (LaMICtal) 150 MG tablet     Sig: Take 1 tablet by mouth Daily.     Dispense:  30 tablet     Refill:  0    amitriptyline (ELAVIL) 10 MG tablet     Sig:  Take 1 tablet by mouth At Night As Needed for Sleep.     Dispense:  30 tablet     Refill:  0    hydrOXYzine (ATARAX) 25 MG tablet     Sig: Take 1-2 tablets by mouth 2 (Two) Times a Day As Needed for Anxiety.     Dispense:  120 tablet     Refill:  0       Return in about 4 weeks (around 2/6/2025), or if symptoms worsen or fail to improve, for Next scheduled follow up and Recheck.         Progress toward goal: Not at goal    Functional Status: Mild impairment     Prognosis: Good with Ongoing Treatment             This document has been electronically signed by MINI Conte  January 9, 2025 09:59 EST    Some of the data in this electronic note has been brought forward from a previous encounter, any necessary changes have been made, it has been reviewed by this APRN, and it is accurate.    Please note that portions of this note were completed with a voice recognition program.

## 2025-02-06 ENCOUNTER — TELEMEDICINE (OUTPATIENT)
Dept: PSYCHIATRY | Facility: CLINIC | Age: 30
End: 2025-02-06
Payer: COMMERCIAL

## 2025-02-06 DIAGNOSIS — F33.0 MILD EPISODE OF RECURRENT MAJOR DEPRESSIVE DISORDER: Primary | Chronic | ICD-10-CM

## 2025-02-06 DIAGNOSIS — G47.9 SLEEPING DIFFICULTIES: ICD-10-CM

## 2025-02-06 DIAGNOSIS — F41.1 GENERALIZED ANXIETY DISORDER: Chronic | ICD-10-CM

## 2025-02-06 RX ORDER — AMITRIPTYLINE HYDROCHLORIDE 10 MG/1
10 TABLET ORAL NIGHTLY PRN
Qty: 30 TABLET | Refills: 0 | Status: SHIPPED | OUTPATIENT
Start: 2025-02-06

## 2025-02-06 RX ORDER — LAMOTRIGINE 150 MG/1
150 TABLET ORAL DAILY
Qty: 30 TABLET | Refills: 0 | Status: SHIPPED | OUTPATIENT
Start: 2025-02-06

## 2025-02-06 NOTE — PROGRESS NOTES
This provider is located at home office working remotely through the [facility] (through [facility]), [address], [facility], [address] using a secure cashcloudhart Video Visit through [employer]. Patient is being seen remotely via telehealth at their home address in [place], and stated they are in a secure environment for this session. The patient's condition being diagnosed/treated is appropriate for telemedicine. The provider identified herself as well as her credentials.   The patient, and/or patients guardian, consent to be seen remotely, and when consent is given they understand that the consent allows for patient identifiable information to be sent to a third party as needed.   They may refuse to be seen remotely at any time. The electronic data is encrypted and password protected, and the patient and/or guardian has been advised of the potential risks to privacy not withstanding such measures.    You have chosen to receive care through a telehealth visit.  Do you consent to use a video/audio connection for your medical care today? Yes    Patient identifiers utilized: Name and date of birth.    Patient verbally confirmed consent for today's encounter  02/06/2025 .    The patient does verbally confirm they are being seen today while physically located in the [facility].  This provider/this APRN is licensed in the [facility] where the patient is located/being seen.     Subjective   Palmira Carbajal is a 29 y.o. female who presents today for follow up    Chief Complaint: Medication management follow-up: Anxiety, depression, and sleeping difficulties follow-up    Accompanied by: The patient is interviewed alone at today's encounter    History of Present Illness:   -Last encounter with this APRN/Provider: 1/9/2025   -Since last encounter with this APRN/Office: The patient reports there is a lot of sickness in her household currently.  She  reports her son has strep throat, her daughter has the flu, her spouse has COVID, and she has started feeling bad today herself and thinks she may have COVID.  -The patient reports February is a difficult month for her, and this next week will be the one year anniversary of the passing of her father.  -Mood reported as: The patient reports she feels her depressive symptoms are under control, but reports her anxiety symptoms remain high, and are higher currently due to stressors in the household as well as the upcoming 1 year anniversary of her father's passing.  -The patient declines a referral to begin psychotherapy.  The patient reports she has been with therapists in the past, and has not found full benefit from therapy, and reports all therapist she has established with in the past always end up leaving and working somewhere else, and she does not like the idea of establishing with somebody and then them leaving, and having to start over again.  -Patient rates symptoms of depression at a 1-2/10 on a 0-10 scale, with 10 being the worst.  -Patient rates symptoms of anxiety at a 8/10 on a 0-10 scale, with 10 being the worst.    -Appetite reported as: Good  -Sleep reported as: Good with current treatment regimen    -Changes in medications or new medical problems/concerns since last visit: Denies any other than reported as above  -Reported medication compliance: The patient reports compliance with current psychotropic medication regimen.  -Reported medication side effects or concerns: Denies any    -Auditory or visual hallucinations: Denies  -Behaviors different from patient baseline, or any reckless, impulsive, or risky behaviors: Denies  -Symptoms of karl or psychosis: Denies  -Self-injurious behavior: Denies  -SI/HI: The patient adamantly denies any suicidal or homicidal ideations, plans, or intent at the time of this encounter and is convincing.    -Using a shared decision-making approach the patient reports she  does not want to make any changes in her Trintellix, lamotrigine, or amitriptyline medications or doses.  The patient reports she does not want to be on any higher doses of them at this time, especially the Trintellix for possible increased coverage of reported anxiety symptoms.  The patient reports she has not found hydroxyzine beneficial, and does not want to continue hydroxyzine at this time due to reported lack of efficacy.    -The patient does verbally contract for safety at today's encounter and is in verbal agreement with the safety/crisis plan. The patient reports in her own words that she will reach out to this APRN/office prior to next scheduled appointment if there is any worsening of mood, any new psychiatric symptoms, any medication side effects or concerns, any concern for safety to self or others, any suicidal or homicidal ideations plans or intent, or any concerns, or she will call 911, call or text the suicide and crisis lifeline at [phone], or go to the closest emergency department.      Patient Health Questionnaire-9 (PHQ-9) (Depression Screening Tool)  Little interest or pleasure in doing things? (Patient-Rptd) Not at all   Feeling down, depressed, or hopeless? (Patient-Rptd) Not at all   PHQ-2 Total Score (Patient-Rptd) 0   Trouble falling or staying asleep, or sleeping too much? (Patient-Rptd) Over half   Feeling tired or having little energy? (Patient-Rptd) Over half   Poor appetite or overeating? (Patient-Rptd) Not at all   Feeling bad about yourself - or that you are a failure or have let yourself or your family down? (Patient-Rptd) Not at all   Trouble concentrating on things, such as reading the newspaper or watching television? (Patient-Rptd) Not at all   Moving or speaking so slowly that other people could have noticed? Or the opposite - being so fidgety or restless that you have been moving around a lot more than usual? (Patient-Rptd) Not at all   Thoughts that you would be better off dead,  or of hurting yourself in some way? (Patient-Rptd) Not at all   PHQ-9 Total Score (Patient-Rptd) 4   If you checked off any problems, how difficult have these problems made it for you to do your work, take care of things at home, or get along with other people? (Patient-Rptd) Not difficult at all         PHQ-9 Total Score: (Patient-Rptd) 4       Generalized Anxiety Disorder 7-Item (STACY-7) Screening Tool  Feeling nervous, anxious or on edge: (Patient-Rptd) More than half the days  Not being able to stop or control worrying: (Patient-Rptd) More than half the days  Worrying too much about different things: (Patient-Rptd) More than half the days  Trouble Relaxing: (Patient-Rptd) Several days  Being so restless that it is hard to sit still: (Patient-Rptd) Not at all  Feeling afraid as if something awful might happen: (Patient-Rptd) Several days  Becoming easily annoyed or irritable: (Patient-Rptd) Several days  STACY 7 Total Score: (Patient-Rptd) 9  If you checked any problems, how difficult have these problems made it for you to do your work, take care of things at home, or get along with other people: (Patient-Rptd) Somewhat difficult      All Known Prior Psychiatric Medications and Responses if Known:  -Zoloft - does not remember response, possible lack of efficacy  -Paxil - does not remember response, possible lack of efficacy  -Prozac - does not remember response, possible lack of efficacy  -Lexapro - does not remember response, possible lack of efficacy  -Celexa - does not remember response, possible lack of efficacy  -Desvenlafaxine - does not remember response, possible lack of efficacy  -Effexor - does not remember response, possible lack of efficacy  -Cymbalta - caused jitteriness and insomnia  -Viibryd - reports she cannot remember why she could not take this medication, but feels there was some kind of side effect  -Wellbutrin SR - did not like the way it made her feel, made her feel drunk and  woozy  -Quetiapine - does not remember response, possible lack of efficacy  -Abilify - does not remember response, possible lack of efficacy  -Trazodone - does not remember response, possible lack of efficacy  -Hydroxyzine - Lack of efficacy  -Ambien - does not remember response, possible lack of efficacy  -Lorazepam - does not remember response, possible lack of efficacy  -Buspar - reported lack of efficacy, and also caused dizziness and vivid dreams  -Propranolol - the patient reports lack of efficacy and decreased heart rate  -The patient reports some of the medications that she cannot remember the response to listed above included side effects such as restless leg, nightmares, and heart palpitations, but she cannot remember which medications caused these side effects  -Olanzapine - patient reports possible partial efficacy, if any efficacy, unsure  -Lamictal - patient reports effective for depression  -Amitriptyline - patient reports effective  -Trintellix - patient reports effective    Previous Suicide Attempts:  The patient reports one previous suicide attempt as a teenager by overdosing on pills.  She reports no psychiatric hospitalization after this attempt, but she reports she was taken to the emergency department.     Previous Self-Harming Behavior:  The patient reports a history of self harming by cutting in her teenage years, but none as an adult.    History of Seizures or TBI:  The patient denies any    Patient's Support Network Includes:   and mother    Last Menstrual Period:  Hysterectomy - partial.        The following portions of the patient's history were reviewed and updated as appropriate: allergies, current medications, past family history, past medical history, past social history, past surgical history and problem list.          Past Medical History:  Past Medical History:   Diagnosis Date    Acid reflux     Anxiety     Chronic pain disorder     NO PAIN CLINIC, PCP/PSYCH CONT  W/AMITRIPTYLINE    Depression     Eczema     Fatty liver     Gallstones     Intractable migraine without aura and without status migrainosus 2023    Kidney stone     HX OF    Maltracking of right patella     Migraines     Orthostatic hypotension     WORKED UP FOR POTS, CONT W/MEDS, MONNIN. RX FLORINEF    Panic disorder     PONV (postoperative nausea and vomiting)     GOOD RESULTS WITH SCOPALAMINE    PTSD (post-traumatic stress disorder)     WAKES UP FROM ANESTHESIA WITH PANIC ATTACK    Seasonal allergic rhinitis 2022    Self-injurious behavior     as a teenager    Suicide attempt     NO ADULT ISSUES       Social History:  Social History     Socioeconomic History    Marital status:      Spouse name: TRISTAN    Number of children: 3   Tobacco Use    Smoking status: Former     Current packs/day: 0.00     Average packs/day: 0.5 packs/day for 15.0 years (7.5 ttl pk-yrs)     Types: Cigarettes     Start date: 2007     Quit date: 2022     Years since quittin.9    Smokeless tobacco: Never   Vaping Use    Vaping status: Every Day    Substances: Nicotine, Flavoring    Devices: Disposable   Substance and Sexual Activity    Alcohol use: Not Currently    Drug use: Not Currently     Types: Marijuana     Comment: Marijuana use in teenage years    Sexual activity: Defer     Partners: Male     Birth control/protection: Hysterectomy       Family History:  Family History   Problem Relation Age of Onset    Arthritis Mother     Restless legs syndrome Mother     Thyroid disease Father     Colon polyps Father     Diabetes Father     Drug abuse Maternal Uncle     Alcohol abuse Maternal Uncle     Cancer Maternal Grandmother     Sleep apnea Son     Malig Hyperthermia Neg Hx        Past Surgical History:  Past Surgical History:   Procedure Laterality Date     SECTION      CHOLECYSTECTOMY N/A 2024    Procedure: CHOLECYSTECTOMY LAPAROSCOPIC;  Surgeon: Cisco Hickman MD;  [place] [facility] [place] [facility] [place]   Service: General;  Laterality: N/A;    COLONOSCOPY N/A 10/11/2022    Procedure: COLONOSCOPY;  Surgeon: Dyan Griffin MD;  Location[facility];  Service: Gastroenterology;  Laterality: N/A;  HEMORRHOIDS    CYSTOSCOPY      CYSTOSCOPY URETEROSCOPY Left 1/30/2023    Procedure: CYSTOSCOPY URETEROSCOPY RETROGRADE PYELOGRAM HOLMIUM LASER STENT INSERTION, left;  Surgeon: Melisa Garcia MD;  Location: [facility] MAIN OR;  Service: Urology;  Laterality: Left;    ENDOSCOPY N/A 9/8/2023    Procedure: ESOPHAGOGASTRODUODENOSCOPY WITH BIOPSIES;  Surgeon: Dyan Griffin MD;  Location: [facility] ENDOSCOPY;  Service: Gastroenterology;  Laterality: N/A;  ESOPHAGITIS, GASTRITIS    HYSTERECTOMY      KIDNEY STONE SURGERY      unspecified    KNEE ARTHROSCOPY Right 7/11/2022    Procedure: KNEE ARTHROSCOPY WITH A LATERAL RELEASE AND CHONDROPLASTY;  Surgeon: Marco A Pitts MD;  [place] [facility] [place] [facility] [place]  Service: Orthopedics;  Laterality: Right;    WISDOM TOOTH EXTRACTION         Problem List:  Patient Active Problem List   Diagnosis    Maltracking of right patella    Impingement syndrome involving patellar fat pad of right knee    Chondromalacia    Patellar malalignment syndrome of right knee    Binocular vision disorder with diplopia    Generalized anxiety disorder    Acid reflux    PTSD (post-traumatic stress disorder)    Kidney stone on left side    Seasonal allergic rhinitis    Burn of finger and thumb of right hand, second degree    Hemorrhoids    Right hand pain    Rectal bleeding    Anal or rectal pain    Decreased appetite    Aftercare following surgery of right knee arthroscopic chondroplasty and lateral release, 7/11/2022    Generalized body aches    Epigastric pain    Female hirsutism    Right ovarian cyst    Moderate episode of recurrent major depressive disorder    Primary insomnia    Vitamin D deficiency    Intractable migraine without aura and without status migrainosus    Calcified granuloma of  lung    Nipple discharge    Nausea and vomiting    Overweight (BMI 25.0-29.9)    Acne vulgaris    Vaping nicotine dependence, tobacco product    Achilles tendinitis of both lower extremities    BRADY (nonalcoholic steatohepatitis)    Symptomatic cholelithiasis       Allergy:   Allergies   Allergen Reactions    Cymbalta [Duloxetine Hcl] Mental Status Change     Jittery and insomnia    Penicillins Rash        Current Medications:   Current Outpatient Medications   Medication Sig Dispense Refill    amitriptyline (ELAVIL) 10 MG tablet Take 1 tablet by mouth At Night As Needed for Sleep. 30 tablet 0    lamoTRIgine (LaMICtal) 150 MG tablet Take 1 tablet by mouth Daily. 30 tablet 0    Vortioxetine HBr (Trintellix) 10 MG tablet tablet Take 1 tablet by mouth Daily With Breakfast. 30 tablet 0    dicyclomine (BENTYL) 20 MG tablet Take 1 tablet by mouth Every 6 (Six) Hours As Needed (abdominal cramping). 60 tablet 3    Emgality 120 MG/ML auto-injector pen INJECT AS DIRECTED 2ML SUBQ EVERY 30 DAYS FOR MIGRAINE 1 mL 4    esomeprazole (nexIUM) 40 MG capsule Take 1 capsule by mouth Every Morning Before Breakfast. 90 capsule 3    famotidine (PEPCID) 20 MG tablet Take 1 tablet by mouth At Night As Needed for Heartburn. (Patient taking differently: Take 1 tablet by mouth Every Night.) 90 tablet 1    fludrocortisone 0.1 MG tablet Take 1 tablet by mouth 2 (Two) Times a Day. 90 tablet 1    Hydrocort-Pramoxine, Perianal, (ANALPRAM-HC) 2.5-1 % rectal cream Insert  into the rectum 3 (Three) Times a Day. 1 each 3    polyethylene glycol (MIRALAX) 17 g packet Take 17 g by mouth Daily. 5 packet 0    Resmetirom (Rezdiffra) 80 MG tablet Take 1 tablet by mouth Daily. 90 tablet 3    Rimegepant Sulfate (Nurtec) 75 MG tablet dispersible tablet Take 1 tablet by mouth Daily As Needed (migraine). Indications: Migraine Headache 8 tablet 5    spironolactone (Aldactone) 50 MG tablet Take 1 tablet by mouth Daily. Indications: Abnormal Growth of Body or  Facial Hair 30 tablet 3    vitamin D3 (Vitamin D) 125 MCG (5000 UT) capsule capsule Take 1 capsule by mouth Daily. 90 capsule 1     No current facility-administered medications for this visit.           Review of Symptoms:    Review of Systems   Psychiatric/Behavioral:  Positive for depressed mood and stress. Negative for agitation, behavioral problems, dysphoric mood, hallucinations, self-injury, suicidal ideas and negative for hyperactivity. The patient is nervous/anxious.          Physical Exam:   not currently breastfeeding. There is no height or weight on file to calculate BMI.   Due to the remote nature of this encounter (virtual encounter), vitals were unable to be obtained.  Height stated at 64 inches.  Weight stated at around 160 pounds.        Physical Exam  Neurological:      Mental Status: She is alert and oriented to person, place, and time.   Psychiatric:         Attention and Perception: Attention normal.         Mood and Affect: Mood and affect normal.         Speech: Speech normal.         Behavior: Behavior normal. Behavior is cooperative.         Thought Content: Thought content normal. Thought content is not paranoid or delusional. Thought content does not include homicidal or suicidal ideation. Thought content does not include homicidal or suicidal plan.         Cognition and Memory: Cognition and memory normal.         Judgment: Judgment normal.         Mental Status Exam:   Hygiene:   good  Cooperation:  Cooperative  Eye Contact:  Good  Psychomotor Behavior:  Appropriate  Affect:  Appropriate  Mood: normal  Hopelessness: Denies  Speech:  Normal  Thought Process:  Goal directed and Linear  Thought Content:  Mood congruent  Suicidal:  None  Homicidal:  None  Hallucinations:  None  Delusion:  None  Memory:  Intact  Orientation:  Person, Place, Time, and Situation  Reliability:  good  Insight:  Good  Judgement:  Good  Impulse Control:  Good  Physical/Medical Issues:  No            Wt Readings from  Last 3 Encounters:   10/08/24 71.5 kg (157 lb 9.6 oz)   09/09/24 69.7 kg (153 lb 9.6 oz)   08/08/24 68.6 kg (151 lb 4.8 oz)     Temp Readings from Last 3 Encounters:   08/08/24 97.2 °F (36.2 °C)   07/18/24 96.9 °F (36.1 °C) (Temporal)   07/14/24 98.5 °F (36.9 °C) (Oral)     BP Readings from Last 3 Encounters:   10/08/24 109/74   09/09/24 125/70   08/08/24 114/77     Pulse Readings from Last 3 Encounters:   10/08/24 98   09/09/24 72   08/08/24 85      BMI Readings from Last 3 Encounters:   10/08/24 27.04 kg/m²   09/09/24 26.35 kg/m²   08/08/24 25.95 kg/m²       Lab Results:   Office Visit on 08/08/2024   Component Date Value Ref Range Status    Penicillin G Allergen IgE 08/08/2024 <0.10  Class 0 kU/L Final    Class Description 08/08/2024 Comment   Final        Levels of Specific IgE       Class  Description of Class      ---------------------------  -----  --------------------                     < 0.10         0         Negative             0.10 -    0.31         0/I       Equivocal/Low             0.32 -    0.55         I         Low             0.56 -    1.40         II        Moderate             1.41 -    3.90         III       High             3.91 -   19.00         IV        Very High            19.01 -  100.00         V         Very High                    >100.00         VI        Very High    25 Hydroxy, Vitamin D 08/08/2024 37.6  30.0 - 100.0 ng/ml Final   Admission on 07/18/2024, Discharged on 07/18/2024   Component Date Value Ref Range Status    Case Report 07/18/2024    Final                    Value:Surgical Pathology Report                         Case: [id number]                                  Authorizing Provider:  Cisco Hickman MD        Collected:           07/18/2024 12:54 PM          Ordering Location:     [facility]  Received:            07/19/2024 07:01 AM                                 OR                                                                          "  Pathologist:           Sue Frankel DO                                                       Specimen:    Gallbladder, gallbladder and contents                                                      Clinical Information 07/18/2024    Final                    Value:Symptomatic cholelithiasis    Final Diagnosis 07/18/2024    Final                    Value:Gallbladder, cholecystectomy:                           - Mild chronic cholecystitis                           - Focal cholesterolosis                           - Cholelithiasis     Gross Description 07/18/2024    Final                    Value:1. Gallbladder.                          Received in formalin and labeled \"gallbladder and contents\" is an 8.0 x                           3.0 x 3.0 cm intact gallbladder received with a clipped cystic duct                           margin.  The dusky serosa is smooth, and the opposing cauterized                           adventitia is shaggy.  Opening reveals dark green, tenacious bile fluid                           with 2 yellow, nodular gallstones (averaging 0.3 cm), dark green, velvety                           mucosa with mild yellow stippling, and an average wall thickness of 0.2                           cm.  No distinct lesions or lymph nodes are identified.  Representative                           sections are submitted in 1 cassette.                           YOBANI    Microscopic Description 07/18/2024    Final                    Value:Microscopic examination performed.   Admission on 07/14/2024, Discharged on 07/14/2024   Component Date Value Ref Range Status    Glucose 07/14/2024 82  65 - 99 mg/dL Final    BUN 07/14/2024 7  6 - 20 mg/dL Final    Creatinine 07/14/2024 0.70  0.57 - 1.00 mg/dL Final    Sodium 07/14/2024 141  136 - 145 mmol/L Final    Potassium 07/14/2024 3.7  3.5 - 5.2 mmol/L Final    Chloride 07/14/2024 105  98 - 107 mmol/L Final    CO2 07/14/2024 22.5  22.0 - 29.0 mmol/L Final    Calcium " 07/14/2024 8.8  8.6 - 10.5 mg/dL Final    Total Protein 07/14/2024 6.6  6.0 - 8.5 g/dL Final    Albumin 07/14/2024 4.0  3.5 - 5.2 g/dL Final    ALT (SGPT) 07/14/2024 9  1 - 33 U/L Final    AST (SGOT) 07/14/2024 11  1 - 32 U/L Final    Alkaline Phosphatase 07/14/2024 83  39 - 117 U/L Final    Total Bilirubin 07/14/2024 <0.2  0.0 - 1.2 mg/dL Final    Globulin 07/14/2024 2.6  gm/dL Final    A/G Ratio 07/14/2024 1.5  g/dL Final    BUN/Creatinine Ratio 07/14/2024 10.0  7.0 - 25.0 Final    Anion Gap 07/14/2024 13.5  5.0 - 15.0 mmol/L Final    eGFR 07/14/2024 118.7  >60.0 mL/min/1.73 Final    Lipase 07/14/2024 33  13 - 60 U/L Final    Color, UA 07/14/2024 Yellow  Yellow, Straw Final    Appearance, UA 07/14/2024 Clear  Clear Final    pH, UA 07/14/2024 8.0  5.0 - 8.0 Final    Specific Gravity, UA 07/14/2024 <=1.005  1.005 - 1.030 Final    Glucose, UA 07/14/2024 Negative  Negative Final    Ketones, UA 07/14/2024 Negative  Negative Final    Bilirubin, UA 07/14/2024 Negative  Negative Final    Blood, UA 07/14/2024 Negative  Negative Final    Protein, UA 07/14/2024 Negative  Negative Final    Leuk Esterase, UA 07/14/2024 Negative  Negative Final    Nitrite, UA 07/14/2024 Negative  Negative Final    Urobilinogen, UA 07/14/2024 0.2 E.U./dL  0.2 - 1.0 E.U./dL Final    Extra Tube 07/14/2024 Hold for add-ons.   Final    Auto resulted.    Extra Tube 07/14/2024 hold for add-on   Final    Auto resulted    Extra Tube 07/14/2024 Hold for add-ons.   Final    Auto resulted.    Extra Tube 07/14/2024 Hold for add-ons.   Final    Auto resulted    WBC 07/14/2024 10.60  3.40 - 10.80 10*3/mm3 Final    RBC 07/14/2024 4.58  3.77 - 5.28 10*6/mm3 Final    Hemoglobin 07/14/2024 12.7  12.0 - 15.9 g/dL Final    Hematocrit 07/14/2024 37.7  34.0 - 46.6 % Final    MCV 07/14/2024 82.3  79.0 - 97.0 fL Final    MCH 07/14/2024 27.7  26.6 - 33.0 pg Final    MCHC 07/14/2024 33.7  31.5 - 35.7 g/dL Final    RDW 07/14/2024 13.3  12.3 - 15.4 % Final    RDW-SD  07/14/2024 39.8  37.0 - 54.0 fl Final    MPV 07/14/2024 10.1  6.0 - 12.0 fL Final    Platelets 07/14/2024 456 (H)  140 - 450 10*3/mm3 Final    Neutrophil % 07/14/2024 47.6  42.7 - 76.0 % Final    Lymphocyte % 07/14/2024 43.2  19.6 - 45.3 % Final    Monocyte % 07/14/2024 6.4  5.0 - 12.0 % Final    Eosinophil % 07/14/2024 1.7  0.3 - 6.2 % Final    Basophil % 07/14/2024 0.8  0.0 - 1.5 % Final    Immature Grans % 07/14/2024 0.3  0.0 - 0.5 % Final    Neutrophils, Absolute 07/14/2024 5.04  1.70 - 7.00 10*3/mm3 Final    Lymphocytes, Absolute 07/14/2024 4.58 (H)  0.70 - 3.10 10*3/mm3 Final    Monocytes, Absolute 07/14/2024 0.68  0.10 - 0.90 10*3/mm3 Final    Eosinophils, Absolute 07/14/2024 0.18  0.00 - 0.40 10*3/mm3 Final    Basophils, Absolute 07/14/2024 0.09  0.00 - 0.20 10*3/mm3 Final    Immature Grans, Absolute 07/14/2024 0.03  0.00 - 0.05 10*3/mm3 Final    nRBC 07/14/2024 0.0  0.0 - 0.2 /100 WBC Final    RBC Morphology 07/14/2024 Normal  Normal Final    WBC Morphology 07/14/2024 Normal  Normal Final    Platelet Morphology 07/14/2024 Normal  Normal Final   Admission on 07/13/2024, Discharged on 07/14/2024   Component Date Value Ref Range Status    Glucose 07/13/2024 111 (H)  65 - 99 mg/dL Final    BUN 07/13/2024 9  6 - 20 mg/dL Final    Creatinine 07/13/2024 0.71  0.57 - 1.00 mg/dL Final    Sodium 07/13/2024 141  136 - 145 mmol/L Final    Potassium 07/13/2024 3.7  3.5 - 5.2 mmol/L Final    Chloride 07/13/2024 105  98 - 107 mmol/L Final    CO2 07/13/2024 20.2 (L)  22.0 - 29.0 mmol/L Final    Calcium 07/13/2024 9.0  8.6 - 10.5 mg/dL Final    Total Protein 07/13/2024 7.0  6.0 - 8.5 g/dL Final    Albumin 07/13/2024 4.2  3.5 - 5.2 g/dL Final    ALT (SGPT) 07/13/2024 10  1 - 33 U/L Final    AST (SGOT) 07/13/2024 11  1 - 32 U/L Final    Alkaline Phosphatase 07/13/2024 82  39 - 117 U/L Final    Total Bilirubin 07/13/2024 <0.2  0.0 - 1.2 mg/dL Final    Globulin 07/13/2024 2.8  gm/dL Final    A/G Ratio 07/13/2024 1.5  g/dL Final     BUN/Creatinine Ratio 07/13/2024 12.7  7.0 - 25.0 Final    Anion Gap 07/13/2024 15.8 (H)  5.0 - 15.0 mmol/L Final    eGFR 07/13/2024 116.7  >60.0 mL/min/1.73 Final    Lipase 07/13/2024 37  13 - 60 U/L Final    Color, UA 07/13/2024 Yellow  Yellow, Straw Final    Appearance, UA 07/13/2024 Turbid (A)  Clear Final    pH, UA 07/13/2024 >=9.0 (H)  5.0 - 8.0 Final    Specific Challenge, UA 07/13/2024 1.021  1.005 - 1.030 Final    Glucose, UA 07/13/2024 Negative  Negative Final    Ketones, UA 07/13/2024 15 mg/dL (1+) (A)  Negative Final    Bilirubin, UA 07/13/2024 Negative  Negative Final    Blood, UA 07/13/2024 Negative  Negative Final    Protein, UA 07/13/2024 Trace (A)  Negative Final    Leuk Esterase, UA 07/13/2024 Negative  Negative Final    Nitrite, UA 07/13/2024 Negative  Negative Final    Urobilinogen, UA 07/13/2024 1.0 E.U./dL  0.2 - 1.0 E.U./dL Final    Extra Tube 07/13/2024 Hold for add-ons.   Final    Auto resulted.    Extra Tube 07/13/2024 hold for add-on   Final    Auto resulted    Extra Tube 07/13/2024 Hold for add-ons.   Final    Auto resulted.    Extra Tube 07/13/2024 Hold for add-ons.   Final    Auto resulted    WBC 07/13/2024 14.01 (H)  3.40 - 10.80 10*3/mm3 Final    RBC 07/13/2024 4.85  3.77 - 5.28 10*6/mm3 Final    Hemoglobin 07/13/2024 13.3  12.0 - 15.9 g/dL Final    Hematocrit 07/13/2024 39.7  34.0 - 46.6 % Final    MCV 07/13/2024 81.9  79.0 - 97.0 fL Final    MCH 07/13/2024 27.4  26.6 - 33.0 pg Final    MCHC 07/13/2024 33.5  31.5 - 35.7 g/dL Final    RDW 07/13/2024 13.1  12.3 - 15.4 % Final    RDW-SD 07/13/2024 39.0  37.0 - 54.0 fl Final    MPV 07/13/2024 10.3  6.0 - 12.0 fL Final    Platelets 07/13/2024 461 (H)  140 - 450 10*3/mm3 Final    Neutrophil % 07/13/2024 54.4  42.7 - 76.0 % Final    Lymphocyte % 07/13/2024 37.3  19.6 - 45.3 % Final    Monocyte % 07/13/2024 6.2  5.0 - 12.0 % Final    Eosinophil % 07/13/2024 1.1  0.3 - 6.2 % Final    Basophil % 07/13/2024 0.6  0.0 - 1.5 % Final    Immature  Grans % 07/13/2024 0.4  0.0 - 0.5 % Final    Neutrophils, Absolute 07/13/2024 7.61 (H)  1.70 - 7.00 10*3/mm3 Final    Lymphocytes, Absolute 07/13/2024 5.23 (H)  0.70 - 3.10 10*3/mm3 Final    Monocytes, Absolute 07/13/2024 0.87  0.10 - 0.90 10*3/mm3 Final    Eosinophils, Absolute 07/13/2024 0.16  0.00 - 0.40 10*3/mm3 Final    Basophils, Absolute 07/13/2024 0.09  0.00 - 0.20 10*3/mm3 Final    Immature Grans, Absolute 07/13/2024 0.05  0.00 - 0.05 10*3/mm3 Final    nRBC 07/13/2024 0.0  0.0 - 0.2 /100 WBC Final    Anisocytosis 07/13/2024 Slight/1+  None Seen Final    Poikilocytes 07/13/2024 Slight/1+  None Seen Final    WBC Morphology 07/13/2024 Normal  Normal Final    Large Platelets 07/13/2024 Slight/1+  None Seen Final   Lab on 04/10/2024   Component Date Value Ref Range Status    WBC 04/10/2024 11.38 (H)  3.40 - 10.80 10*3/mm3 Final    RBC 04/10/2024 4.76  3.77 - 5.28 10*6/mm3 Final    Hemoglobin 04/10/2024 12.9  12.0 - 15.9 g/dL Final    Hematocrit 04/10/2024 39.7  34.0 - 46.6 % Final    MCV 04/10/2024 83.4  79.0 - 97.0 fL Final    MCH 04/10/2024 27.1  26.6 - 33.0 pg Final    MCHC 04/10/2024 32.5  31.5 - 35.7 g/dL Final    RDW 04/10/2024 13.6  12.3 - 15.4 % Final    RDW-SD 04/10/2024 41.8  37.0 - 54.0 fl Final    MPV 04/10/2024 10.0  6.0 - 12.0 fL Final    Platelets 04/10/2024 380  140 - 450 10*3/mm3 Final    Neutrophil % 04/10/2024 67.4  42.7 - 76.0 % Final    Lymphocyte % 04/10/2024 24.2  19.6 - 45.3 % Final    Monocyte % 04/10/2024 7.6  5.0 - 12.0 % Final    Eosinophil % 04/10/2024 0.0 (L)  0.3 - 6.2 % Final    Basophil % 04/10/2024 0.4  0.0 - 1.5 % Final    Immature Grans % 04/10/2024 0.4  0.0 - 0.5 % Final    Neutrophils, Absolute 04/10/2024 7.67 (H)  1.70 - 7.00 10*3/mm3 Final    Lymphocytes, Absolute 04/10/2024 2.75  0.70 - 3.10 10*3/mm3 Final    Monocytes, Absolute 04/10/2024 0.87  0.10 - 0.90 10*3/mm3 Final    Eosinophils, Absolute 04/10/2024 0.00  0.00 - 0.40 10*3/mm3 Final    Basophils, Absolute  04/10/2024 0.05  0.00 - 0.20 10*3/mm3 Final    Immature Grans, Absolute 04/10/2024 0.04  0.00 - 0.05 10*3/mm3 Final           Assessment & Plan   Problems Addressed this Visit          Mental Health    Generalized anxiety disorder    Relevant Medications    Vortioxetine HBr (Trintellix) 10 MG tablet tablet    amitriptyline (ELAVIL) 10 MG tablet     Other Visit Diagnoses       Mild episode of recurrent major depressive disorder  (Chronic)   -  Primary    R/O BPAD/BPD    Relevant Medications    Vortioxetine HBr (Trintellix) 10 MG tablet tablet    lamoTRIgine (LaMICtal) 150 MG tablet    amitriptyline (ELAVIL) 10 MG tablet    Sleeping difficulties        Relevant Medications    amitriptyline (ELAVIL) 10 MG tablet          Diagnoses         Codes Comments    Mild episode of recurrent major depressive disorder    -  Primary ICD-10-CM: F33.0  ICD-9-CM: 296.31 R/O BPAD/BPD    Generalized anxiety disorder     ICD-10-CM: F41.1  ICD-9-CM: 300.02     Sleeping difficulties     ICD-10-CM: G47.9  ICD-9-CM: 780.50             Visit Diagnoses:    ICD-10-CM ICD-9-CM   1. Mild episode of recurrent major depressive disorder  F33.0 296.31   2. Generalized anxiety disorder  F41.1 300.02   3. Sleeping difficulties  G47.9 780.50           GOALS:  Short Term Goals: Patient will be compliant with medication, and patient will have no significant medication related side effects.  Patient will be engaged in psychotherapy as indicated.  Patient will report subjective improvement of symptoms.  Long term goals: To stabilize mood and treat/improve subjective symptoms, the patient will stay out of the hospital, the patient will be at an optimal level of functioning, and the patient will take all medications as prescribed.  The patient verbalized understanding and agreement with goals that were mutually set.      TREATMENT PLAN: Begin supportive psychotherapy efforts and take medications as indicated.  Medication and treatment options, both  pharmacological and non-pharmacological treatment options, discussed during today's visit, including any off label use of medication. Patient acknowledged and verbally consented with current treatment plan and was educated on the importance of compliance with treatment and follow-up appointments.  The patient has declined a referral to begin psychotherapy, but has verbalized she will reach out to this APRN/office if she changes her mind.    -Continue amitriptyline 10 mg by mouth once nightly to assist with sleeping difficulties and also can assist with mood.  -Continue lamotrigine 150 mg by mouth once daily for mood.  -Continue Trintellix 10 mg by mouth once daily with food for mood.  -Discontinue hydroxyzine due to reported lack of efficacy.      MEDICATION ISSUES:  Discussed medication options and treatment plan of prescribed medication, any off label use of medication, as well as the risks, benefits, any black box warnings including increased suicidality, and side effects including but not limited to potential falls, dizziness, possible impaired driving, GI side effects (change in appetite, abdominal discomfort, nausea, vomiting, diarrhea, and/or constipation), dry mouth, somnolence, sedation, insomnia, activation, agitation, irritation, tremors, abnormal muscle movements or disorders, tardive dyskinesia, akathisia, asthenia, headache, sweating, possible bruising or rare bleeding, electrolyte and/or fluid abnormalities, change in blood pressure/heart rate/and or heart rhythm, hypotension, sexual dysfunction, rare impulse control problems, rare seizures, rare neuroleptic malignant syndrome, increased risk of death and cerebrovascular events, change in blood glucose and increased risk for diabetes, change in triglycerides and cholesterol and increased risk for dyslipidemia,  weight gain, weight gain that can become problematic to health, skin conditions and reactions, and metabolic adversities among others. Patient  and/or guardian are agreeable to call the office with any worsening of symptoms or onset of side effects, or if any concerns or questions arise.  The contact information for the office is made available to the patient and/or guardian. Patient and/or guardian are agreeable to call 911 or go to the nearest ER should they begin having any SI/HI, or if any urgent concerns arise.    Due to the nature of virtual visits and inability to monitor vital signs and weight with virtual visits, the patient has been encouraged to monitor their vital signs and weight regularly either through self-monitoring via home device(s) or with their Primary Care Provider, and the patient has been instructed to notify this APRN of any abnormalities or significant changes from baseline.     This APRN has discussed the benefits and risks of taking/continuing Lamictal (Lamotrigine).  The side effects of Lamictal can include a benign rash, blurred or double vision, dizziness, ataxia, sedation, headache, tremor, insomnia, poor coordination, fatigue,  nausea, vomiting, dyspepsia, rhinitis, infection, pharyngitis, asthenia, a rare but serious rash, rare multi-organ failure associated with Bull-Avelino Syndrome, toxic epidermal necrolysis, drug hypersensitivity syndrome, rare blood dyscrasias, rare aseptic meningitis, rare sudden unexplained deaths in people with epilepsy, withdrawal seizures upon abrupt withdrawal, and rare activation of suicidal ideation and behavior (suicidality).  This APRN has discussed that a very slow dose titration when starting, or changing doses, of Lamictal may reduce the incidence of skin rash and other side effects.  The dosage should not be titrated upwards or increased faster than recommended due to the possibility of the discussed side effects and risk of development of a skin rash (which can become life threatening).    This APRN has also discussed that if the patient stops taking the Lamictal for 3-5 days or  longer, it will be necessary to restart the drug with an initial dose titration, as rashes have been reported on reexposure.  If the patient and Provider decide to stop the Lamictal, the patient will follow the directions of this APRN/this office as a guided taper over about two weeks is appropriate due to the risk of relapse in bipolar disorder with those with a mood or bipolar disorder, the risk of seizures in those with epilepsy, and discontinuation symptoms upon rapid discontinuation of Lamictal.    The patient verbalizes understanding of benefits and risks as discussed, the patient/guardian feels the benefits outweigh the risks and is agreeable to continue/take Lamictal as discussed.  The patient is advised should any side effects or rash develops they are to stop the Lamictal immediately and contact this APRN/this office or go to the emergency department immediately.  The patient verbalizes understanding and agreement with treatment plan in their own words.      VERBAL INFORMED CONSENT FOR MEDICATION:  The patient was educated that their proposed/prescribed psychotropic medication(s) has potential risks, side effects, adverse effects, and black box warnings; and these have been discussed with the patient.  The patient has been informed that their treatment and medication dosage is to be individualized, and may even be above or below the recommended range/dosage due to patient individualization and response, but medication is prescribed using a shared decision making approach, and no medication or dosage will be prescribed without the patient's verbal consent.  The reason for the use of the medication including any off label use and alternative modes of treatment other than or in addition to medication has been considered and discussed, the probable consequences of not receiving the proposed treatment have been discussed, and any treatment side effects, black box warnings, and cautions associated with treatment  have been discussed with the patient.  The patient is allowed ample time to openly discuss and ask questions regarding the proposed medication(s) and treatment plan and the patient verbalizes understanding the reasons for the use of the medication, its potential risks and benefits, other alternative treatment(s), and the probable consequences that may occur if the proposed medication is not given.  The patient has been given ample time to ask questions and study the information and find the information to be specific, accurate, and complete.  The patient gives verbal consent for the medication(s) proposed/prescribed, they verbalized understanding that they can refuse and withdraw consent at any time with the assistance of this APRN, and the patient has verbally confirmed that they are aware, and are willing, to take the prescribed medication and follow the treatment plan with the known possible risks, side effect, black box warnings, and any potential medication interactions, and the patient reports they will be worse off without this medication and treatment plan.  The patient is advised to contact this APRN/this office if any questions or concerns arise at any time (at [phone]), or call 911/go to the closest emergency department if needed or outside of office hours.      SUICIDE RISK ASSESSMENT AND SAFETY PLAN: Unalterable demographics and a history of mental health intervention indicate this patient is in a high risk category compared to the general population. At present, the patient denies active SI/HI, intentions, or plans at this time and agrees to seek immediate care should such thoughts develop. The patient verbalizes understanding of how to access emergency care if needed and agrees to do so. Consideration of suicide risk and protective factors such as history, current presentation, individual strengths and weaknesses, psychosocial and environmental stressors and variables, psychiatric illness and  symptoms, medical conditions and pain, took place in this interview. Based on those considerations, the patient is determined: within individual baseline and presenting no imminent risk for suicide or homicide. Other recommendations: The patient does not meet the criteria for inpatient admission and is not a safety risk to self or others at today's visit. Inpatient treatment offers no significant advantages over outpatient treatment for this patient at today's visit.  The patient was given ample time for questions and fully participated in treatment planning.  The patient was encouraged to call the clinic with any questions or concerns.  The patient was informed of access to emergency care. If patient were to develop any significant symptomatology, suicidal ideation, homicidal ideation, any concerns, or feel unsafe at any time they are to call the clinic and if unable to get immediate assistance should immediately call 911 or go to the nearest emergency room.  Patient contracted verbally for the following: If you are experiencing an emotional crisis or have thoughts of harming yourself or others, please go to your nearest local emergency room or call 911. Will continue to re-assess medication response and side effects frequently to establish efficacy and ensure safety. Risks, any black box warnings, side effects, off label usage, and benefits of medication and treatment discussed with patient, along with potential adverse side effects of current and/or newly prescribed medication, alternative treatment options, and OTC medications.  Patient verbalized understanding of potential risks, any off label use of medication, any black box warnings, and any side effects in their own words. The patient verbalized understanding and agreed to comply with the safety plan discussed in their own words.  Patient given the number to the office. Number also discussed of the 24- hour suicide hotline.           MEDS ORDERED DURING  VISIT:  New Medications Ordered This Visit   Medications    Vortioxetine HBr (Trintellix) 10 MG tablet tablet     Sig: Take 1 tablet by mouth Daily With Breakfast.     Dispense:  30 tablet     Refill:  0    lamoTRIgine (LaMICtal) 150 MG tablet     Sig: Take 1 tablet by mouth Daily.     Dispense:  30 tablet     Refill:  0    amitriptyline (ELAVIL) 10 MG tablet     Sig: Take 1 tablet by mouth At Night As Needed for Sleep.     Dispense:  30 tablet     Refill:  0       Return in about 4 weeks (around 3/6/2025), or if symptoms worsen or fail to improve, for Next scheduled follow up and Recheck.         Progress toward goal: Not at goal    Functional Status: Mild impairment     Prognosis: Good with Ongoing Treatment             This document has been electronically signed by MINI Conte  February 6, 2025 10:16 EST    Some of the data in this electronic note has been brought forward from a previous encounter, any necessary changes have been made, it has been reviewed by this APRN, and it is accurate.    Please note that portions of this note were completed with a voice recognition program.

## 2025-02-10 ENCOUNTER — OFFICE VISIT (OUTPATIENT)
Dept: CARDIOLOGY | Facility: CLINIC | Age: 30
End: 2025-02-10
Payer: COMMERCIAL

## 2025-02-10 VITALS
HEART RATE: 69 BPM | SYSTOLIC BLOOD PRESSURE: 119 MMHG | WEIGHT: 165 LBS | BODY MASS INDEX: 28.17 KG/M2 | DIASTOLIC BLOOD PRESSURE: 80 MMHG | HEIGHT: 64 IN

## 2025-02-10 DIAGNOSIS — R55 POSTURAL DIZZINESS WITH PRESYNCOPE: ICD-10-CM

## 2025-02-10 DIAGNOSIS — R00.2 PALPITATIONS: Primary | ICD-10-CM

## 2025-02-10 DIAGNOSIS — R42 POSTURAL DIZZINESS WITH PRESYNCOPE: ICD-10-CM

## 2025-02-10 PROCEDURE — 1160F RVW MEDS BY RX/DR IN RCRD: CPT

## 2025-02-10 PROCEDURE — 1159F MED LIST DOCD IN RCRD: CPT

## 2025-02-10 PROCEDURE — 99214 OFFICE O/P EST MOD 30 MIN: CPT

## 2025-02-10 RX ORDER — PROPRANOLOL HYDROCHLORIDE 10 MG/1
10 TABLET ORAL 2 TIMES DAILY
Qty: 60 TABLET | Refills: 3 | Status: SHIPPED | OUTPATIENT
Start: 2025-02-10

## 2025-02-10 NOTE — PROGRESS NOTES
Chief Complaint  Follow-up, Palpitations, and Loss of Consciousness    Subjective        History of Present Illness  Palmira Carbajal presents to [facility] for follow up.   Patient is a 29-year-old female with past medical history outlined below who presents for follow-up on pre syncope and palpitations.she does report significant improvement in her presyncope since increasing the fludrocortisone.  She still reports some severe episodes that occur approximately 1 day a week but he reports this is a significant improvement.  She continues to note palpitations which are sporadic in nature, brief and resolve on their own.  She also endorses some anxiety surrounding her symptoms. She denies chest pain or discomfort, dyspnea, edema.     Past Medical History:   Diagnosis Date    Acid reflux     Anxiety     Chronic pain disorder     NO PAIN CLINIC, PCP/PSYCH CONT W/AMITRIPTYLINE    Depression     Eczema     Fatty liver     Gallstones     Intractable migraine without aura and without status migrainosus 2023    Kidney stone     HX OF    Maltracking of right patella     Migraines     Orthostatic hypotension     WORKED UP FOR POTS, CONT W/MEDS, MONNIN. RX FLORINEF    Panic disorder     PONV (postoperative nausea and vomiting)     GOOD RESULTS WITH SCOPALAMINE    PTSD (post-traumatic stress disorder)     WAKES UP FROM ANESTHESIA WITH PANIC ATTACK    Seasonal allergic rhinitis 2022    Self-injurious behavior     as a teenager    Suicide attempt     NO ADULT ISSUES       ALLERGY  Allergies   Allergen Reactions    Cymbalta [Duloxetine Hcl] Mental Status Change     Jittery and insomnia    Penicillins Rash        Past Surgical History:   Procedure Laterality Date     SECTION      CHOLECYSTECTOMY N/A 2024    Procedure: CHOLECYSTECTOMY LAPAROSCOPIC;  Surgeon: Cisco Hickman MD;  Location: [facility];  Service: General;  Laterality: N/A;    COLONOSCOPY N/A 10/11/2022     Procedure: COLONOSCOPY;  Surgeon: Dyan Griffin MD;  Location: formerly [facility] ENDOSCOPY;  Service: Gastroenterology;  Laterality: N/A;  HEMORRHOIDS    CYSTOSCOPY      CYSTOSCOPY URETEROSCOPY Left 2023    Procedure: CYSTOSCOPY URETEROSCOPY RETROGRADE PYELOGRAM HOLMIUM LASER STENT INSERTION, left;  Surgeon: Melisa Garcia MD;  Location: formerly [facility] MAIN OR;  Service: Urology;  Laterality: Left;    ENDOSCOPY N/A 2023    Procedure: ESOPHAGOGASTRODUODENOSCOPY WITH BIOPSIES;  Surgeon: Dyan Griffin MD;  Location: formerly [facility] ENDOSCOPY;  Service: Gastroenterology;  Laterality: N/A;  ESOPHAGITIS, GASTRITIS    HYSTERECTOMY      KIDNEY STONE SURGERY      unspecified    KNEE ARTHROSCOPY Right 2022    Procedure: KNEE ARTHROSCOPY WITH A LATERAL RELEASE AND CHONDROPLASTY;  Surgeon: Marco A Pitts MD;  Location: [facility] OR [facility];  Service: Orthopedics;  Laterality: Right;    WISDOM TOOTH EXTRACTION          Social History     Socioeconomic History    Marital status:      Spouse name: TRISTAN    Number of children: 3   Tobacco Use    Smoking status: Former     Current packs/day: 0.00     Average packs/day: 0.5 packs/day for 15.0 years (7.5 ttl pk-yrs)     Types: Cigarettes     Start date: 2007     Quit date: 2022     Years since quittin.9    Smokeless tobacco: Never   Vaping Use    Vaping status: Every Day    Substances: Nicotine, Flavoring    Devices: Disposable   Substance and Sexual Activity    Alcohol use: Not Currently    Drug use: Not Currently     Types: Marijuana     Comment: Marijuana use in teenage years    Sexual activity: Defer     Partners: Male     Birth control/protection: Hysterectomy       Family History   Problem Relation Age of Onset    Arthritis Mother     Restless legs syndrome Mother     Thyroid disease Father     Colon polyps Father     Diabetes Father     Drug abuse Maternal Uncle     Alcohol abuse Maternal Uncle     Cancer Maternal Grandmother     Sleep apnea Son   "   Malig Hyperthermia Neg Hx         Current Outpatient Medications on File Prior to Visit   Medication Sig    amitriptyline (ELAVIL) 10 MG tablet Take 1 tablet by mouth At Night As Needed for Sleep.    dicyclomine (BENTYL) 20 MG tablet Take 1 tablet by mouth Every 6 (Six) Hours As Needed (abdominal cramping).    Emgality 120 MG/ML auto-injector pen INJECT AS DIRECTED 2ML SUBQ EVERY 30 DAYS FOR MIGRAINE    esomeprazole (nexIUM) 40 MG capsule Take 1 capsule by mouth Every Morning Before Breakfast.    famotidine (PEPCID) 20 MG tablet Take 1 tablet by mouth At Night As Needed for Heartburn. (Patient taking differently: Take 1 tablet by mouth Every Night.)    fludrocortisone 0.1 MG tablet Take 1 tablet by mouth 2 (Two) Times a Day.    Hydrocort-Pramoxine, Perianal, (ANALPRAM-HC) 2.5-1 % rectal cream Insert  into the rectum 3 (Three) Times a Day.    lamoTRIgine (LaMICtal) 150 MG tablet Take 1 tablet by mouth Daily.    polyethylene glycol (MIRALAX) 17 g packet Take 17 g by mouth Daily.    Resmetirom (Rezdiffra) 80 MG tablet Take 1 tablet by mouth Daily.    Rimegepant Sulfate (Nurtec) 75 MG tablet dispersible tablet Take 1 tablet by mouth Daily As Needed (migraine). Indications: Migraine Headache    spironolactone (Aldactone) 50 MG tablet Take 1 tablet by mouth Daily. Indications: Abnormal Growth of Body or Facial Hair    vitamin D3 (Vitamin D) 125 MCG (5000 UT) capsule capsule Take 1 capsule by mouth Daily.    Vortioxetine HBr (Trintellix) 10 MG tablet tablet Take 1 tablet by mouth Daily With Breakfast.     No current facility-administered medications on file prior to visit.       Objective   Vitals:    02/10/25 1002   BP: 119/80   BP Location: Left arm   Pulse: 69   Weight: 74.8 kg (165 lb)   Height: 162.6 cm (64\")       Physical Exam  Constitutional:       General: She is awake. She is not in acute distress.     Appearance: Normal appearance.   HENT:      Head: Normocephalic.      Nose: Nose normal. No congestion. "   Eyes:      Extraocular Movements: Extraocular movements intact.      Conjunctiva/sclera: Conjunctivae normal.      Pupils: Pupils are equal, round, and reactive to light.   Neck:      Thyroid: No thyromegaly.      Vascular: No JVD.   Cardiovascular:      Rate and Rhythm: Normal rate and regular rhythm.      Chest Wall: PMI is not displaced.      Pulses: Normal pulses.      Heart sounds: Normal heart sounds, S1 normal and S2 normal. No murmur heard.     No friction rub. No gallop. No S3 or S4 sounds.   Pulmonary:      Effort: Pulmonary effort is normal.      Breath sounds: Normal breath sounds. No wheezing, rhonchi or rales.   Abdominal:      General: Bowel sounds are normal.      Palpations: Abdomen is soft.      Tenderness: There is no abdominal tenderness.   Musculoskeletal:      Cervical back: No tenderness.      Right lower leg: No edema.      Left lower leg: No edema.   Lymphadenopathy:      Cervical: No cervical adenopathy.   Skin:     General: Skin is warm and dry.      Capillary Refill: Capillary refill takes less than 2 seconds.      Coloration: Skin is not cyanotic.      Findings: No petechiae or rash.      Nails: There is no clubbing.   Neurological:      Mental Status: She is alert.   Psychiatric:         Mood and Affect: Mood normal.         Behavior: Behavior is cooperative.           Result Review     The following data was reviewed by MINI Browning on 02/10/25.      CMP          7/13/2024    21:20 7/14/2024    21:02   CMP   Glucose 111  82    BUN 9  7    Creatinine 0.71  0.70    EGFR 116.7  118.7    Sodium 141  141    Potassium 3.7  3.7    Chloride 105  105    Calcium 9.0  8.8    Total Protein 7.0  6.6    Albumin 4.2  4.0    Globulin 2.8  2.6    Total Bilirubin <0.2  <0.2    Alkaline Phosphatase 82  83    AST (SGOT) 11  11    ALT (SGPT) 10  9    Albumin/Globulin Ratio 1.5  1.5    BUN/Creatinine Ratio 12.7  10.0    Anion Gap 15.8  13.5      CBC w/diff          4/10/2024    08:55 7/13/2024     21:20 7/14/2024    21:02   CBC w/Diff   WBC 11.38  14.01  10.60    RBC 4.76  4.85  4.58    Hemoglobin 12.9  13.3  12.7    Hematocrit 39.7  39.7  37.7    MCV 83.4  81.9  82.3    MCH 27.1  27.4  27.7    MCHC 32.5  33.5  33.7    RDW 13.6  13.1  13.3    Platelets 380  461  456    Neutrophil Rel % 67.4  54.4  47.6    Immature Granulocyte Rel % 0.4  0.4  0.3    Lymphocyte Rel % 24.2  37.3  43.2    Monocyte Rel % 7.6  6.2  6.4    Eosinophil Rel % 0.0  1.1  1.7    Basophil Rel % 0.4  0.6  0.8               === Results for orders placed in visit on 09/12/23 ===    CARDIAC EVENT MONITOR    - Interpretation Summary -    Patient was monitored for 6 days, 23 hours and 29 minutes.    Lowest heart rate was 59 bpm, average heart rate was 103 bpm, and maximum heart rate was 163 bpm.    Rare PVC, couplet.  Rare PAC, couplet and triplet.    239 patient activated events.  Most of these corresponded to normal sinus rhythm and sinus tachycardia.  Palpitations corresponded sometimes to PACs and sometimes to normal sinus rhythm    No significant abnormal heart rhythms noted,          Procedures      Assessment & Plan  Palpitations  Patient's palpitations persist.  Reviewed his Holter monitor with no abnormal heart rhythms.  Will add propranolol 10 mg twice daily.  She is advised to take 10 mg at night for 1 week and then added morning dose.  Postural dizziness with presyncope  Patient does note improvement in her symptoms since increasing the fludrocortisone.  BMP has not been checked and she was encouraged to have this done.  She does still note occasional episodes of dizziness and presyncope that occur about once a week which is an improvement.  Add propranolol as discussed above.  Also talked to her about stopping vaping and increasing her water and salt intake.  We discussed possibly discontinuing the spironolactone as she does not feel that it is helping her and it may be contributing to her symptoms.                   The medical  services provided during this encounter are part of ongoing care related to this patient's single serious condition or complex condition.  Follow Up   Return in about 3 months (around 5/10/2025) for With MINI Prakash.    Patient was given instructions and counseling regarding her condition or for health maintenance advice. Please see specific information pulled into the AVS if appropriate.     MINI Browning  02/10/25  10:32 EST    Dictated Utilizing Dragon Dictation

## 2025-03-06 ENCOUNTER — TELEMEDICINE (OUTPATIENT)
Dept: PSYCHIATRY | Facility: CLINIC | Age: 30
End: 2025-03-06
Payer: COMMERCIAL

## 2025-03-06 DIAGNOSIS — F41.1 GENERALIZED ANXIETY DISORDER: Chronic | ICD-10-CM

## 2025-03-06 DIAGNOSIS — F33.0 MILD EPISODE OF RECURRENT MAJOR DEPRESSIVE DISORDER: Primary | Chronic | ICD-10-CM

## 2025-03-06 DIAGNOSIS — G47.9 SLEEPING DIFFICULTIES: ICD-10-CM

## 2025-03-06 RX ORDER — AMITRIPTYLINE HYDROCHLORIDE 10 MG/1
10 TABLET ORAL NIGHTLY PRN
Qty: 30 TABLET | Refills: 0 | Status: SHIPPED | OUTPATIENT
Start: 2025-03-06

## 2025-03-06 RX ORDER — LAMOTRIGINE 150 MG/1
150 TABLET ORAL DAILY
Qty: 30 TABLET | Refills: 0 | Status: SHIPPED | OUTPATIENT
Start: 2025-03-06

## 2025-03-06 NOTE — PROGRESS NOTES
This provider is located at home office working remotely through the [facility] (through [facility]), [address], [facility], [address] using a secure [employer] Video Visit through [employer]. Patient is being seen remotely via telehealth at their home address in [place], and stated they are in a secure environment for this session. The patient's condition being diagnosed/treated is appropriate for telemedicine. The provider identified herself as well as her credentials.   The patient, and/or patients guardian, consent to be seen remotely, and when consent is given they understand that the consent allows for patient identifiable information to be sent to a third party as needed.   They may refuse to be seen remotely at any time. The electronic data is encrypted and password protected, and the patient and/or guardian has been advised of the potential risks to privacy not withstanding such measures.    You have chosen to receive care through a telehealth visit.  Do you consent to use a video/audio connection for your medical care today? Yes    Patient identifiers utilized: Name and date of birth.    Patient verbally confirmed consent for today's encounter 03/06/2025.    The patient does verbally confirm they are being seen today while physically located in the [facility].  This provider/this APRN is licensed in the [facility] where the patient is located/being seen.     Subjective   Palmira Carbajal is a 29 y.o. female who presents today for follow up    Chief Complaint: Medication management follow-up: Anxiety, depression, and sleeping difficulties follow-up    Accompanied by: The patient is interviewed alone at today's encounter    History of Present Illness:   -Last encounter with this APRN/Provider: 2/6/2025   -Since last encounter with this APRN/Office: The patient reports many situational stressors in her life which negatively influence  her mood.  The patient reports it was most recently the 1 year anniversary of her father's passing, and that was difficult for her.  The patient reports she feels like she has been very busy, and stressed, and being pulled in a lot of different directions.  She reports stressors revolving around getting mental health care for her children.  She reports her son was most recently diagnosed as having a developmental delay, as well as ADHD.  She also suspects him being on the autism spectrum scale.  She reports her son will have to attend two appointments with a therapist before he can even meet with the psychiatrist for medication options, but she is concerned he is struggling in school/classes now and does not want him to have to wait.  -Mood reported as: Not depressed, but with continued stress and anxious symptoms.  The patient reports knowing some of her anxiety is induced by stress and outside situational stressors, but reports she does not feel Trintellix is covering her anxiety symptoms, and feels her anxiety symptoms could have better coverage.  The patient reports she does feel Trintellix manages her depressive symptoms.  -Patient rates symptoms of depression at a 1-2/10 on a 0-10 scale, with 10 being the worst.  -Patient rates symptoms of anxiety at a 8/10 on a 0-10 scale, with 10 being the worst.    -Appetite reported as: Good  -Sleep reported as: Good, but reports some nights she has difficulty falling asleep because of racing thoughts and worries, but reports she does wake up feeling rested each day.    -Changes in medications or new medical problems/concerns since last visit: The patient reports cardiology recently started her on Propranolol twice daily.  She reports she has not noticed any improvement in anxiety symptoms while taking propranolol from cardiology.  -Reported medication compliance: The patient reports compliance with current psychotropic medication regimen.  -Reported medication side  effects or concerns: Denies any    -Auditory or visual hallucinations: Denies  -Behaviors different from patient baseline, or any reckless, impulsive, or risky behaviors: Denies  -Symptoms of karl or psychosis: Denies  -Self-injurious behavior: Denies  -SI/HI: The patient adamantly denies any suicidal or homicidal ideations, plans, or intent at the time of this encounter and is convincing.    -Using a shared decision-making approach the patient reports she would like to try coming off of Trintellix and beginning sertraline for continued heightened anxious symptoms, as well as for continued depressive symptoms.  The patient reports she just does not feel that Trintellix is doing anything to help with her anxious symptoms.  The patient reports she does not want to change lamotrigine at all, and has found this medication very beneficial.  She reports she does not want to make any changes in amitriptyline or its dosing at this time either.    -The patient does verbally contract for safety at today's encounter and is in verbal agreement with the safety/crisis plan. The patient reports in her own words that she will reach out to this APRN/office prior to next scheduled appointment if there is any worsening of mood, any new psychiatric symptoms, any medication side effects or concerns, any concern for safety to self or others, any suicidal or homicidal ideations plans or intent, or any concerns, or she will call 911, call or text the suicide and crisis lifeline at [phone], or go to the closest emergency department.      Patient Health Questionnaire-9 (PHQ-9) (Depression Screening Tool)  Little interest or pleasure in doing things? (Patient-Rptd) Not at all   Feeling down, depressed, or hopeless? (Patient-Rptd) Not at all   PHQ-2 Total Score (Patient-Rptd) 0   Trouble falling or staying asleep, or sleeping too much? (Patient-Rptd) Several days   Feeling tired or having little energy? (Patient-Rptd) Several days   Poor appetite  or overeating? (Patient-Rptd) Not at all   Feeling bad about yourself - or that you are a failure or have let yourself or your family down? (Patient-Rptd) Not at all   Trouble concentrating on things, such as reading the newspaper or watching television? (Patient-Rptd) Not at all   Moving or speaking so slowly that other people could have noticed? Or the opposite - being so fidgety or restless that you have been moving around a lot more than usual? (Patient-Rptd) Not at all   Thoughts that you would be better off dead, or of hurting yourself in some way? (Patient-Rptd) Not at all   PHQ-9 Total Score (Patient-Rptd) 2   If you checked off any problems, how difficult have these problems made it for you to do your work, take care of things at home, or get along with other people? (Patient-Rptd) Not difficult at all         PHQ-9 Total Score: (Patient-Rptd) 2       Generalized Anxiety Disorder 7-Item (STACY-7) Screening Tool  Feeling nervous, anxious or on edge: (Patient-Rptd) More than half the days  Not being able to stop or control worrying: (Patient-Rptd) Nearly every day  Worrying too much about different things: (Patient-Rptd) Nearly every day  Trouble Relaxing: (Patient-Rptd) More than half the days  Being so restless that it is hard to sit still: (Patient-Rptd) Not at all  Feeling afraid as if something awful might happen: (Patient-Rptd) Several days  Becoming easily annoyed or irritable: (Patient-Rptd) Several days  STACY 7 Total Score: (Patient-Rptd) 12  If you checked any problems, how difficult have these problems made it for you to do your work, take care of things at home, or get along with other people: (Patient-Rptd) Very difficult      All Known Prior Psychiatric Medications and Responses if Known:  -Zoloft - does not remember response, possible lack of efficacy  -Paxil - does not remember response, possible lack of efficacy  -Prozac - does not remember response, possible lack of efficacy  -Lexapro - does  not remember response, possible lack of efficacy  -Celexa - does not remember response, possible lack of efficacy  -Desvenlafaxine/Pristiq - does not remember response, possible lack of efficacy  -Effexor - does not remember response, possible lack of efficacy  -Cymbalta - caused jitteriness and insomnia  -Viibryd - reports she cannot remember why she could not take this medication, but feels there was some kind of side effect  -Wellbutrin SR - did not like the way it made her feel, made her feel drunk and woozy  -Quetiapine - does not remember response, possible lack of efficacy  -Abilify - does not remember response, possible lack of efficacy  -Trazodone - does not remember response, possible lack of efficacy  -Hydroxyzine - Lack of efficacy  -Ambien - does not remember response, possible lack of efficacy  -Lorazepam - does not remember response, possible lack of efficacy  -Buspar - reported lack of efficacy, and also caused dizziness and vivid dreams  -Propranolol - the patient reports lack of efficacy and decreased heart rate when taking for mental health reasons  -The patient reports some of the medications that she cannot remember the response to listed above included side effects such as restless leg, nightmares, and heart palpitations, but she cannot remember which medications caused these side effects  -Olanzapine - patient reports possible partial efficacy, if any efficacy, unsure  -Lamictal - patient reports effective for depression  -Amitriptyline - patient reports effective  -Trintellix - patient reports effective for depressive symptoms, but not for anxiety    Previous Suicide Attempts:  The patient reports one previous suicide attempt as a teenager by overdosing on pills.  She reports no psychiatric hospitalization after this attempt, but she reports she was taken to the emergency department.     Previous Self-Harming Behavior:  The patient reports a history of self harming by cutting in her teenage  years, but none as an adult.    History of Seizures or TBI:  The patient denies any    Patient's Support Network Includes:   and mother    Last Menstrual Period:  Hysterectomy - partial.        The following portions of the patient's history were reviewed and updated as appropriate: allergies, current medications, past family history, past medical history, past social history, past surgical history and problem list.          Past Medical History:  Past Medical History:   Diagnosis Date    Acid reflux     Anxiety     Chronic pain disorder     NO PAIN CLINIC, PCP/PSYCH CONT W/AMITRIPTYLINE    Depression     Eczema     Fatty liver     Gallstones     Intractable migraine without aura and without status migrainosus 02/09/2023    Kidney stone     HX OF    Maltracking of right patella     Migraines     Orthostatic hypotension     WORKED UP FOR POTS, CONT W/MEDS, MONNIN. RX FLORINEF    Panic disorder     PONV (postoperative nausea and vomiting)     GOOD RESULTS WITH SCOPALAMINE    PTSD (post-traumatic stress disorder)     WAKES UP FROM ANESTHESIA WITH PANIC ATTACK    Seasonal allergic rhinitis 05/31/2022    Self-injurious behavior     as a teenager    Suicide attempt 2007    NO ADULT ISSUES       Social History:  Social History     Socioeconomic History    Marital status:      Spouse name: TRISTAN    Number of children: 3   Tobacco Use    Smoking status: Former     Current packs/day: 0.00     Average packs/day: 0.5 packs/day for 15.0 years (7.5 ttl pk-yrs)     Types: Cigarettes     Start date: 2/14/2007     Quit date: 2/14/2022     Years since quitting: 3.0    Smokeless tobacco: Never   Vaping Use    Vaping status: Every Day    Substances: Nicotine, Flavoring    Devices: Disposable   Substance and Sexual Activity    Alcohol use: Not Currently    Drug use: Not Currently     Types: Marijuana     Comment: Marijuana use in teenage years    Sexual activity: Defer     Partners: Male     Birth control/protection:  Hysterectomy       Family History:  Family History   Problem Relation Age of Onset    Arthritis Mother     Restless legs syndrome Mother     Thyroid disease Father     Colon polyps Father     Diabetes Father     Drug abuse Maternal Uncle     Alcohol abuse Maternal Uncle     Cancer Maternal Grandmother     Sleep apnea Son     Malig Hyperthermia Neg Hx        Past Surgical History:  Past Surgical History:   Procedure Laterality Date     SECTION      CHOLECYSTECTOMY N/A 2024    Procedure: CHOLECYSTECTOMY LAPAROSCOPIC;  Surgeon: Cisco Hickman MD;  Location: [facility] OR [facility];  Service: General;  Laterality: N/A;    COLONOSCOPY N/A 10/11/2022    Procedure: COLONOSCOPY;  Surgeon: Dyan Griffin MD;  Location: [facility] ENDOSCOPY;  Service: Gastroenterology;  Laterality: N/A;  HEMORRHOIDS    CYSTOSCOPY      CYSTOSCOPY URETEROSCOPY Left 2023    Procedure: CYSTOSCOPY URETEROSCOPY RETROGRADE PYELOGRAM HOLMIUM LASER STENT INSERTION, left;  Surgeon: Melisa Garcia MD;  Location: [facility] MAIN OR;  Service: Urology;  Laterality: Left;    ENDOSCOPY N/A 2023    Procedure: ESOPHAGOGASTRODUODENOSCOPY WITH BIOPSIES;  Surgeon: Dyan Griffin MD;  Location: [facility] ENDOSCOPY;  Service: Gastroenterology;  Laterality: N/A;  ESOPHAGITIS, GASTRITIS    HYSTERECTOMY      KIDNEY STONE SURGERY      unspecified    KNEE ARTHROSCOPY Right 2022    Procedure: KNEE ARTHROSCOPY WITH A LATERAL RELEASE AND CHONDROPLASTY;  Surgeon: Marco A Pitts MD;  Location: [facility] OR [facility];  Service: Orthopedics;  Laterality: Right;    WISDOM TOOTH EXTRACTION         Problem List:  Patient Active Problem List   Diagnosis    Maltracking of right patella    Impingement syndrome involving patellar fat pad of right knee    Chondromalacia    Patellar malalignment syndrome of right knee    Binocular vision disorder with diplopia    Generalized anxiety disorder    Acid reflux    PTSD (post-traumatic stress disorder)    Kidney  stone on left side    Seasonal allergic rhinitis    Burn of finger and thumb of right hand, second degree    Hemorrhoids    Right hand pain    Rectal bleeding    Anal or rectal pain    Decreased appetite    Aftercare following surgery of right knee arthroscopic chondroplasty and lateral release, 7/11/2022    Generalized body aches    Epigastric pain    Female hirsutism    Right ovarian cyst    Moderate episode of recurrent major depressive disorder    Primary insomnia    Vitamin D deficiency    Intractable migraine without aura and without status migrainosus    Calcified granuloma of lung    Nipple discharge    Nausea and vomiting    Overweight (BMI 25.0-29.9)    Acne vulgaris    Vaping nicotine dependence, tobacco product    Achilles tendinitis of both lower extremities    BRADY (nonalcoholic steatohepatitis)    Symptomatic cholelithiasis       Allergy:   Allergies   Allergen Reactions    Cymbalta [Duloxetine Hcl] Mental Status Change     Jittery and insomnia    Penicillins Rash        Current Medications:   Current Outpatient Medications   Medication Sig Dispense Refill    amitriptyline (ELAVIL) 10 MG tablet Take 1 tablet by mouth At Night As Needed for Sleep. 30 tablet 0    lamoTRIgine (LaMICtal) 150 MG tablet Take 1 tablet by mouth Daily. 30 tablet 0    dicyclomine (BENTYL) 20 MG tablet Take 1 tablet by mouth Every 6 (Six) Hours As Needed (abdominal cramping). 60 tablet 3    Emgality 120 MG/ML auto-injector pen INJECT AS DIRECTED 2ML SUBQ EVERY 30 DAYS FOR MIGRAINE 1 mL 4    esomeprazole (nexIUM) 40 MG capsule Take 1 capsule by mouth Every Morning Before Breakfast. 90 capsule 3    famotidine (PEPCID) 20 MG tablet Take 1 tablet by mouth At Night As Needed for Heartburn. (Patient taking differently: Take 1 tablet by mouth Every Night.) 90 tablet 1    fludrocortisone 0.1 MG tablet Take 1 tablet by mouth 2 (Two) Times a Day. 90 tablet 1    Hydrocort-Pramoxine, Perianal, (ANALPRAM-HC) 2.5-1 % rectal cream Insert  into  the rectum 3 (Three) Times a Day. 1 each 3    polyethylene glycol (MIRALAX) 17 g packet Take 17 g by mouth Daily. 5 packet 0    propranolol (INDERAL) 10 MG tablet Take 1 tablet by mouth 2 (Two) Times a Day. 60 tablet 3    Resmetirom (Rezdiffra) 80 MG tablet Take 1 tablet by mouth Daily. 90 tablet 3    Rimegepant Sulfate (Nurtec) 75 MG tablet dispersible tablet Take 1 tablet by mouth Daily As Needed (migraine). Indications: Migraine Headache 8 tablet 5    sertraline (Zoloft) 50 MG tablet Take 1 tablet by mouth Daily. 30 tablet 0    spironolactone (Aldactone) 50 MG tablet Take 1 tablet by mouth Daily. Indications: Abnormal Growth of Body or Facial Hair 30 tablet 3    vitamin D3 (Vitamin D) 125 MCG (5000 UT) capsule capsule Take 1 capsule by mouth Daily. 90 capsule 1     No current facility-administered medications for this visit.           Review of Symptoms:    Review of Systems   Psychiatric/Behavioral:  Positive for depressed mood and stress. Negative for agitation, behavioral problems, dysphoric mood, hallucinations, self-injury, suicidal ideas and negative for hyperactivity. The patient is nervous/anxious.          Physical Exam:   not currently breastfeeding. There is no height or weight on file to calculate BMI.   Due to the remote nature of this encounter (virtual encounter), vitals were unable to be obtained.  Height stated at 64 inches.  Weight reported at around 160 pounds.        Physical Exam  Neurological:      Mental Status: She is alert and oriented to person, place, and time.   Psychiatric:         Attention and Perception: Attention normal.         Mood and Affect: Affect normal. Mood is anxious.         Speech: Speech normal.         Behavior: Behavior normal. Behavior is cooperative.         Thought Content: Thought content normal. Thought content is not paranoid or delusional. Thought content does not include homicidal or suicidal ideation. Thought content does not include homicidal or suicidal  plan.         Cognition and Memory: Cognition and memory normal.         Judgment: Judgment normal.         Mental Status Exam:   Hygiene:   good  Cooperation:  Cooperative  Eye Contact:  Good  Psychomotor Behavior:  Appropriate  Affect:  Appropriate  Mood: anxious  Hopelessness: Denies  Speech:  Normal  Thought Process:  Goal directed and Linear  Thought Content:  Mood congruent  Suicidal:  None  Homicidal:  None  Hallucinations:  None  Delusion:  None  Memory:  Intact  Orientation:  Person, Place, Time, and Situation  Reliability:  good  Insight:  Good  Judgement:  Good  Impulse Control:  Good  Physical/Medical Issues:  No            Wt Readings from Last 3 Encounters:   02/10/25 74.8 kg (165 lb)   10/08/24 71.5 kg (157 lb 9.6 oz)   09/09/24 69.7 kg (153 lb 9.6 oz)     Temp Readings from Last 3 Encounters:   08/08/24 97.2 °F (36.2 °C)   07/18/24 96.9 °F (36.1 °C) (Temporal)   07/14/24 98.5 °F (36.9 °C) (Oral)     BP Readings from Last 3 Encounters:   02/10/25 119/80   10/08/24 109/74   09/09/24 125/70     Pulse Readings from Last 3 Encounters:   02/10/25 69   10/08/24 98   09/09/24 72      BMI Readings from Last 3 Encounters:   02/10/25 28.32 kg/m²   10/08/24 27.04 kg/m²   09/09/24 26.35 kg/m²       Lab Results:   Office Visit on 08/08/2024   Component Date Value Ref Range Status    Penicillin G Allergen IgE 08/08/2024 <0.10  Class 0 kU/L Final    Class Description 08/08/2024 Comment   Final        Levels of Specific IgE       Class  Description of Class      ---------------------------  -----  --------------------                     < 0.10         0         Negative             0.10 -    0.31         0/I       Equivocal/Low             0.32 -    0.55         I         Low             0.56 -    1.40         II        Moderate             1.41 -    3.90         III       High             3.91 -   19.00         IV        Very High            19.01 -  100.00         V         Very High                    >100.00      "    VI        Very High    25 Hydroxy, Vitamin D 08/08/2024 37.6  30.0 - 100.0 ng/ml Final   Admission on 07/18/2024, Discharged on 07/18/2024   Component Date Value Ref Range Status    Case Report 07/18/2024    Final                    Value:Surgical Pathology Report                         Case: [id number]                                  Authorizing Provider:  Cisco Hickman MD        Collected:           07/18/2024 12:54 PM          Ordering Location:     [facility]  Received:            07/19/2024 07:01 AM                                 OR                                                                           Pathologist:           Sue Frankel DO                                                       Specimen:    Gallbladder, gallbladder and contents                                                      Clinical Information 07/18/2024    Final                    Value:Symptomatic cholelithiasis    Final Diagnosis 07/18/2024    Final                    Value:Gallbladder, cholecystectomy:                           - Mild chronic cholecystitis                           - Focal cholesterolosis                           - Cholelithiasis     Gross Description 07/18/2024    Final                    Value:1. Gallbladder.                          Received in formalin and labeled \"gallbladder and contents\" is an 8.0 x                           3.0 x 3.0 cm intact gallbladder received with a clipped cystic duct                           margin.  The dusky serosa is smooth, and the opposing cauterized                           adventitia is shaggy.  Opening reveals dark green, tenacious bile fluid                           with 2 yellow, nodular gallstones (averaging 0.3 cm), dark green, velvety                           mucosa with mild yellow stippling, and an average wall thickness of 0.2                           cm.  No distinct lesions or lymph nodes are identified.  Representative          "                  sections are submitted in 1 cassette.                           YOBANI    Microscopic Description 07/18/2024    Final                    Value:Microscopic examination performed.   Admission on 07/14/2024, Discharged on 07/14/2024   Component Date Value Ref Range Status    Glucose 07/14/2024 82  65 - 99 mg/dL Final    BUN 07/14/2024 7  6 - 20 mg/dL Final    Creatinine 07/14/2024 0.70  0.57 - 1.00 mg/dL Final    Sodium 07/14/2024 141  136 - 145 mmol/L Final    Potassium 07/14/2024 3.7  3.5 - 5.2 mmol/L Final    Chloride 07/14/2024 105  98 - 107 mmol/L Final    CO2 07/14/2024 22.5  22.0 - 29.0 mmol/L Final    Calcium 07/14/2024 8.8  8.6 - 10.5 mg/dL Final    Total Protein 07/14/2024 6.6  6.0 - 8.5 g/dL Final    Albumin 07/14/2024 4.0  3.5 - 5.2 g/dL Final    ALT (SGPT) 07/14/2024 9  1 - 33 U/L Final    AST (SGOT) 07/14/2024 11  1 - 32 U/L Final    Alkaline Phosphatase 07/14/2024 83  39 - 117 U/L Final    Total Bilirubin 07/14/2024 <0.2  0.0 - 1.2 mg/dL Final    Globulin 07/14/2024 2.6  gm/dL Final    A/G Ratio 07/14/2024 1.5  g/dL Final    BUN/Creatinine Ratio 07/14/2024 10.0  7.0 - 25.0 Final    Anion Gap 07/14/2024 13.5  5.0 - 15.0 mmol/L Final    eGFR 07/14/2024 118.7  >60.0 mL/min/1.73 Final    Lipase 07/14/2024 33  13 - 60 U/L Final    Color, UA 07/14/2024 Yellow  Yellow, Straw Final    Appearance, UA 07/14/2024 Clear  Clear Final    pH, UA 07/14/2024 8.0  5.0 - 8.0 Final    Specific Gravity, UA 07/14/2024 <=1.005  1.005 - 1.030 Final    Glucose, UA 07/14/2024 Negative  Negative Final    Ketones, UA 07/14/2024 Negative  Negative Final    Bilirubin, UA 07/14/2024 Negative  Negative Final    Blood, UA 07/14/2024 Negative  Negative Final    Protein, UA 07/14/2024 Negative  Negative Final    Leuk Esterase, UA 07/14/2024 Negative  Negative Final    Nitrite, UA 07/14/2024 Negative  Negative Final    Urobilinogen, UA 07/14/2024 0.2 E.U./dL  0.2 - 1.0 E.U./dL Final    Extra Tube 07/14/2024 Hold for add-ons.    Final    Auto resulted.    Extra Tube 07/14/2024 hold for add-on   Final    Auto resulted    Extra Tube 07/14/2024 Hold for add-ons.   Final    Auto resulted.    Extra Tube 07/14/2024 Hold for add-ons.   Final    Auto resulted    WBC 07/14/2024 10.60  3.40 - 10.80 10*3/mm3 Final    RBC 07/14/2024 4.58  3.77 - 5.28 10*6/mm3 Final    Hemoglobin 07/14/2024 12.7  12.0 - 15.9 g/dL Final    Hematocrit 07/14/2024 37.7  34.0 - 46.6 % Final    MCV 07/14/2024 82.3  79.0 - 97.0 fL Final    MCH 07/14/2024 27.7  26.6 - 33.0 pg Final    MCHC 07/14/2024 33.7  31.5 - 35.7 g/dL Final    RDW 07/14/2024 13.3  12.3 - 15.4 % Final    RDW-SD 07/14/2024 39.8  37.0 - 54.0 fl Final    MPV 07/14/2024 10.1  6.0 - 12.0 fL Final    Platelets 07/14/2024 456 (H)  140 - 450 10*3/mm3 Final    Neutrophil % 07/14/2024 47.6  42.7 - 76.0 % Final    Lymphocyte % 07/14/2024 43.2  19.6 - 45.3 % Final    Monocyte % 07/14/2024 6.4  5.0 - 12.0 % Final    Eosinophil % 07/14/2024 1.7  0.3 - 6.2 % Final    Basophil % 07/14/2024 0.8  0.0 - 1.5 % Final    Immature Grans % 07/14/2024 0.3  0.0 - 0.5 % Final    Neutrophils, Absolute 07/14/2024 5.04  1.70 - 7.00 10*3/mm3 Final    Lymphocytes, Absolute 07/14/2024 4.58 (H)  0.70 - 3.10 10*3/mm3 Final    Monocytes, Absolute 07/14/2024 0.68  0.10 - 0.90 10*3/mm3 Final    Eosinophils, Absolute 07/14/2024 0.18  0.00 - 0.40 10*3/mm3 Final    Basophils, Absolute 07/14/2024 0.09  0.00 - 0.20 10*3/mm3 Final    Immature Grans, Absolute 07/14/2024 0.03  0.00 - 0.05 10*3/mm3 Final    nRBC 07/14/2024 0.0  0.0 - 0.2 /100 WBC Final    RBC Morphology 07/14/2024 Normal  Normal Final    WBC Morphology 07/14/2024 Normal  Normal Final    Platelet Morphology 07/14/2024 Normal  Normal Final   Admission on 07/13/2024, Discharged on 07/14/2024   Component Date Value Ref Range Status    Glucose 07/13/2024 111 (H)  65 - 99 mg/dL Final    BUN 07/13/2024 9  6 - 20 mg/dL Final    Creatinine 07/13/2024 0.71  0.57 - 1.00 mg/dL Final    Sodium  07/13/2024 141  136 - 145 mmol/L Final    Potassium 07/13/2024 3.7  3.5 - 5.2 mmol/L Final    Chloride 07/13/2024 105  98 - 107 mmol/L Final    CO2 07/13/2024 20.2 (L)  22.0 - 29.0 mmol/L Final    Calcium 07/13/2024 9.0  8.6 - 10.5 mg/dL Final    Total Protein 07/13/2024 7.0  6.0 - 8.5 g/dL Final    Albumin 07/13/2024 4.2  3.5 - 5.2 g/dL Final    ALT (SGPT) 07/13/2024 10  1 - 33 U/L Final    AST (SGOT) 07/13/2024 11  1 - 32 U/L Final    Alkaline Phosphatase 07/13/2024 82  39 - 117 U/L Final    Total Bilirubin 07/13/2024 <0.2  0.0 - 1.2 mg/dL Final    Globulin 07/13/2024 2.8  gm/dL Final    A/G Ratio 07/13/2024 1.5  g/dL Final    BUN/Creatinine Ratio 07/13/2024 12.7  7.0 - 25.0 Final    Anion Gap 07/13/2024 15.8 (H)  5.0 - 15.0 mmol/L Final    eGFR 07/13/2024 116.7  >60.0 mL/min/1.73 Final    Lipase 07/13/2024 37  13 - 60 U/L Final    Color, UA 07/13/2024 Yellow  Yellow, Straw Final    Appearance, UA 07/13/2024 Turbid (A)  Clear Final    pH, UA 07/13/2024 >=9.0 (H)  5.0 - 8.0 Final    Specific [place], UA 07/13/2024 1.021  1.005 - 1.030 Final    Glucose, UA 07/13/2024 Negative  Negative Final    Ketones, UA 07/13/2024 15 mg/dL (1+) (A)  Negative Final    Bilirubin, UA 07/13/2024 Negative  Negative Final    Blood, UA 07/13/2024 Negative  Negative Final    Protein, UA 07/13/2024 Trace (A)  Negative Final    Leuk Esterase, UA 07/13/2024 Negative  Negative Final    Nitrite, UA 07/13/2024 Negative  Negative Final    Urobilinogen, UA 07/13/2024 1.0 E.U./dL  0.2 - 1.0 E.U./dL Final    Extra Tube 07/13/2024 Hold for add-ons.   Final    Auto resulted.    Extra Tube 07/13/2024 hold for add-on   Final    Auto resulted    Extra Tube 07/13/2024 Hold for add-ons.   Final    Auto resulted.    Extra Tube 07/13/2024 Hold for add-ons.   Final    Auto resulted    WBC 07/13/2024 14.01 (H)  3.40 - 10.80 10*3/mm3 Final    RBC 07/13/2024 4.85  3.77 - 5.28 10*6/mm3 Final    Hemoglobin 07/13/2024 13.3  12.0 - 15.9 g/dL Final    Hematocrit  07/13/2024 39.7  34.0 - 46.6 % Final    MCV 07/13/2024 81.9  79.0 - 97.0 fL Final    MCH 07/13/2024 27.4  26.6 - 33.0 pg Final    MCHC 07/13/2024 33.5  31.5 - 35.7 g/dL Final    RDW 07/13/2024 13.1  12.3 - 15.4 % Final    RDW-SD 07/13/2024 39.0  37.0 - 54.0 fl Final    MPV 07/13/2024 10.3  6.0 - 12.0 fL Final    Platelets 07/13/2024 461 (H)  140 - 450 10*3/mm3 Final    Neutrophil % 07/13/2024 54.4  42.7 - 76.0 % Final    Lymphocyte % 07/13/2024 37.3  19.6 - 45.3 % Final    Monocyte % 07/13/2024 6.2  5.0 - 12.0 % Final    Eosinophil % 07/13/2024 1.1  0.3 - 6.2 % Final    Basophil % 07/13/2024 0.6  0.0 - 1.5 % Final    Immature Grans % 07/13/2024 0.4  0.0 - 0.5 % Final    Neutrophils, Absolute 07/13/2024 7.61 (H)  1.70 - 7.00 10*3/mm3 Final    Lymphocytes, Absolute 07/13/2024 5.23 (H)  0.70 - 3.10 10*3/mm3 Final    Monocytes, Absolute 07/13/2024 0.87  0.10 - 0.90 10*3/mm3 Final    Eosinophils, Absolute 07/13/2024 0.16  0.00 - 0.40 10*3/mm3 Final    Basophils, Absolute 07/13/2024 0.09  0.00 - 0.20 10*3/mm3 Final    Immature Grans, Absolute 07/13/2024 0.05  0.00 - 0.05 10*3/mm3 Final    nRBC 07/13/2024 0.0  0.0 - 0.2 /100 WBC Final    Anisocytosis 07/13/2024 Slight/1+  None Seen Final    Poikilocytes 07/13/2024 Slight/1+  None Seen Final    WBC Morphology 07/13/2024 Normal  Normal Final    Large Platelets 07/13/2024 Slight/1+  None Seen Final   Lab on 04/10/2024   Component Date Value Ref Range Status    WBC 04/10/2024 11.38 (H)  3.40 - 10.80 10*3/mm3 Final    RBC 04/10/2024 4.76  3.77 - 5.28 10*6/mm3 Final    Hemoglobin 04/10/2024 12.9  12.0 - 15.9 g/dL Final    Hematocrit 04/10/2024 39.7  34.0 - 46.6 % Final    MCV 04/10/2024 83.4  79.0 - 97.0 fL Final    MCH 04/10/2024 27.1  26.6 - 33.0 pg Final    MCHC 04/10/2024 32.5  31.5 - 35.7 g/dL Final    RDW 04/10/2024 13.6  12.3 - 15.4 % Final    RDW-SD 04/10/2024 41.8  37.0 - 54.0 fl Final    MPV 04/10/2024 10.0  6.0 - 12.0 fL Final    Platelets 04/10/2024 380  140 - 450  10*3/mm3 Final    Neutrophil % 04/10/2024 67.4  42.7 - 76.0 % Final    Lymphocyte % 04/10/2024 24.2  19.6 - 45.3 % Final    Monocyte % 04/10/2024 7.6  5.0 - 12.0 % Final    Eosinophil % 04/10/2024 0.0 (L)  0.3 - 6.2 % Final    Basophil % 04/10/2024 0.4  0.0 - 1.5 % Final    Immature Grans % 04/10/2024 0.4  0.0 - 0.5 % Final    Neutrophils, Absolute 04/10/2024 7.67 (H)  1.70 - 7.00 10*3/mm3 Final    Lymphocytes, Absolute 04/10/2024 2.75  0.70 - 3.10 10*3/mm3 Final    Monocytes, Absolute 04/10/2024 0.87  0.10 - 0.90 10*3/mm3 Final    Eosinophils, Absolute 04/10/2024 0.00  0.00 - 0.40 10*3/mm3 Final    Basophils, Absolute 04/10/2024 0.05  0.00 - 0.20 10*3/mm3 Final    Immature Grans, Absolute 04/10/2024 0.04  0.00 - 0.05 10*3/mm3 Final           Assessment & Plan   Problems Addressed this Visit          Mental Health    Generalized anxiety disorder    Relevant Medications    amitriptyline (ELAVIL) 10 MG tablet    sertraline (Zoloft) 50 MG tablet     Other Visit Diagnoses       Mild episode of recurrent major depressive disorder  (Chronic)   -  Primary    R/O BPAD/BPD    Relevant Medications    lamoTRIgine (LaMICtal) 150 MG tablet    amitriptyline (ELAVIL) 10 MG tablet    sertraline (Zoloft) 50 MG tablet    Sleeping difficulties        Relevant Medications    amitriptyline (ELAVIL) 10 MG tablet          Diagnoses         Codes Comments    Mild episode of recurrent major depressive disorder    -  Primary ICD-10-CM: F33.0  ICD-9-CM: 296.31 R/O BPAD/BPD    Generalized anxiety disorder     ICD-10-CM: F41.1  ICD-9-CM: 300.02     Sleeping difficulties     ICD-10-CM: G47.9  ICD-9-CM: 780.50             Visit Diagnoses:    ICD-10-CM ICD-9-CM   1. Mild episode of recurrent major depressive disorder  F33.0 296.31   2. Generalized anxiety disorder  F41.1 300.02   3. Sleeping difficulties  G47.9 780.50           GOALS:  Short Term Goals: Patient will be compliant with medication, and patient will have no significant medication  related side effects.  Patient will be engaged in psychotherapy as indicated.  Patient will report subjective improvement of symptoms.  Long term goals: To stabilize mood and treat/improve subjective symptoms, the patient will stay out of the hospital, the patient will be at an optimal level of functioning, and the patient will take all medications as prescribed.  The patient verbalized understanding and agreement with goals that were mutually set.      TREATMENT PLAN: Begin supportive psychotherapy efforts and take medications as indicated.  Medication and treatment options, both pharmacological and non-pharmacological treatment options, discussed during today's visit, including any off label use of medication. Patient acknowledged and verbally consented with current treatment plan and was educated on the importance of compliance with treatment and follow-up appointments.  The patient has declined a referral to begin psychotherapy, but has verbalized she will reach out to this APRN/office if she changes her mind.    -Continue amitriptyline 10 mg by mouth once nightly to assist with sleeping difficulties and also can assist with mood.  -Continue lamotrigine 150 mg by mouth once daily for mood.  -Taper and discontinue Trintellix by taking Trintellix 5 mg by mouth once daily for 5 days, then completely stop taking.  -Begin Sertraline 50 mg by mouth once daily for mood.      MEDICATION ISSUES:  Discussed medication options and treatment plan of prescribed medication, any off label use of medication, as well as the risks, benefits, any black box warnings including increased suicidality, and side effects including but not limited to potential falls, dizziness, possible impaired driving, GI side effects (change in appetite, abdominal discomfort, nausea, vomiting, diarrhea, and/or constipation), dry mouth, somnolence, sedation, insomnia, activation, agitation, irritation, tremors, abnormal muscle movements or disorders,  tardive dyskinesia, akathisia, asthenia, headache, sweating, possible bruising or rare bleeding, electrolyte and/or fluid abnormalities, change in blood pressure/heart rate/and or heart rhythm, hypotension, sexual dysfunction, rare impulse control problems, rare seizures, rare neuroleptic malignant syndrome, increased risk of death and cerebrovascular events, change in blood glucose and increased risk for diabetes, change in triglycerides and cholesterol and increased risk for dyslipidemia,  weight gain, weight gain that can become problematic to health, skin conditions and reactions, and metabolic adversities among others. Patient and/or guardian are agreeable to call the office with any worsening of symptoms or onset of side effects, or if any concerns or questions arise.  The contact information for the office is made available to the patient and/or guardian. Patient and/or guardian are agreeable to call 911 or go to the nearest ER should they begin having any SI/HI, or if any urgent concerns arise.    Due to the nature of virtual visits and inability to monitor vital signs and weight with virtual visits, the patient has been encouraged to monitor their vital signs and weight regularly either through self-monitoring via home device(s) or with their Primary Care Provider, and the patient has been instructed to notify this APRN of any abnormalities or significant changes from baseline.     This APRN has discussed the benefits and risks of taking/continuing Lamictal (Lamotrigine).  The side effects of Lamictal can include a benign rash, blurred or double vision, dizziness, ataxia, sedation, headache, tremor, insomnia, poor coordination, fatigue,  nausea, vomiting, dyspepsia, rhinitis, infection, pharyngitis, asthenia, a rare but serious rash, rare multi-organ failure associated with Bull-Avelino Syndrome, toxic epidermal necrolysis, drug hypersensitivity syndrome, rare blood dyscrasias, rare aseptic meningitis,  rare sudden unexplained deaths in people with epilepsy, withdrawal seizures upon abrupt withdrawal, and rare activation of suicidal ideation and behavior (suicidality).  This APRN has discussed that a very slow dose titration when starting, or changing doses, of Lamictal may reduce the incidence of skin rash and other side effects.  The dosage should not be titrated upwards or increased faster than recommended due to the possibility of the discussed side effects and risk of development of a skin rash (which can become life threatening).    This APRN has also discussed that if the patient stops taking the Lamictal for 3-5 days or longer, it will be necessary to restart the drug with an initial dose titration, as rashes have been reported on reexposure.  If the patient and Provider decide to stop the Lamictal, the patient will follow the directions of this APRN/this office as a guided taper over about two weeks is appropriate due to the risk of relapse in bipolar disorder with those with a mood or bipolar disorder, the risk of seizures in those with epilepsy, and discontinuation symptoms upon rapid discontinuation of Lamictal.    The patient verbalizes understanding of benefits and risks as discussed, the patient/guardian feels the benefits outweigh the risks and is agreeable to continue/take Lamictal as discussed.  The patient is advised should any side effects or rash develops they are to stop the Lamictal immediately and contact this APRN/this office or go to the emergency department immediately.  The patient verbalizes understanding and agreement with treatment plan in their own words.      VERBAL INFORMED CONSENT FOR MEDICATION:  The patient was educated that their proposed/prescribed psychotropic medication(s) has potential risks, side effects, adverse effects, and black box warnings; and these have been discussed with the patient.  The patient has been informed that their treatment and medication dosage is to  be individualized, and may even be above or below the recommended range/dosage due to patient individualization and response, but medication is prescribed using a shared decision making approach, and no medication or dosage will be prescribed without the patient's verbal consent.  The reason for the use of the medication including any off label use and alternative modes of treatment other than or in addition to medication has been considered and discussed, the probable consequences of not receiving the proposed treatment have been discussed, and any treatment side effects, black box warnings, and cautions associated with treatment have been discussed with the patient.  The patient is allowed ample time to openly discuss and ask questions regarding the proposed medication(s) and treatment plan and the patient verbalizes understanding the reasons for the use of the medication, its potential risks and benefits, other alternative treatment(s), and the probable consequences that may occur if the proposed medication is not given.  The patient has been given ample time to ask questions and study the information and find the information to be specific, accurate, and complete.  The patient gives verbal consent for the medication(s) proposed/prescribed, they verbalized understanding that they can refuse and withdraw consent at any time with the assistance of this APRN, and the patient has verbally confirmed that they are aware, and are willing, to take the prescribed medication and follow the treatment plan with the known possible risks, side effect, black box warnings, and any potential medication interactions, and the patient reports they will be worse off without this medication and treatment plan.  The patient is advised to contact this APRN/this office if any questions or concerns arise at any time (at [phone]), or call 911/go to the closest emergency department if needed or outside of office hours.      SUICIDE  RISK ASSESSMENT AND SAFETY PLAN: Unalterable demographics and a history of mental health intervention indicate this patient is in a high risk category compared to the general population. At present, the patient denies active SI/HI, intentions, or plans at this time and agrees to seek immediate care should such thoughts develop. The patient verbalizes understanding of how to access emergency care if needed and agrees to do so. Consideration of suicide risk and protective factors such as history, current presentation, individual strengths and weaknesses, psychosocial and environmental stressors and variables, psychiatric illness and symptoms, medical conditions and pain, took place in this interview. Based on those considerations, the patient is determined: within individual baseline and presenting no imminent risk for suicide or homicide. Other recommendations: The patient does not meet the criteria for inpatient admission and is not a safety risk to self or others at today's visit. Inpatient treatment offers no significant advantages over outpatient treatment for this patient at today's visit.  The patient was given ample time for questions and fully participated in treatment planning.  The patient was encouraged to call the clinic with any questions or concerns.  The patient was informed of access to emergency care. If patient were to develop any significant symptomatology, suicidal ideation, homicidal ideation, any concerns, or feel unsafe at any time they are to call the clinic and if unable to get immediate assistance should immediately call 911 or go to the nearest emergency room.  Patient contracted verbally for the following: If you are experiencing an emotional crisis or have thoughts of harming yourself or others, please go to your nearest local emergency room or call 911. Will continue to re-assess medication response and side effects frequently to establish efficacy and ensure safety. Risks, any black box  warnings, side effects, off label usage, and benefits of medication and treatment discussed with patient, along with potential adverse side effects of current and/or newly prescribed medication, alternative treatment options, and OTC medications.  Patient verbalized understanding of potential risks, any off label use of medication, any black box warnings, and any side effects in their own words. The patient verbalized understanding and agreed to comply with the safety plan discussed in their own words.  Patient given the number to the office. Number also discussed of the 24- hour suicide hotline.           MEDS ORDERED DURING VISIT:  New Medications Ordered This Visit   Medications    lamoTRIgine (LaMICtal) 150 MG tablet     Sig: Take 1 tablet by mouth Daily.     Dispense:  30 tablet     Refill:  0    amitriptyline (ELAVIL) 10 MG tablet     Sig: Take 1 tablet by mouth At Night As Needed for Sleep.     Dispense:  30 tablet     Refill:  0    sertraline (Zoloft) 50 MG tablet     Sig: Take 1 tablet by mouth Daily.     Dispense:  30 tablet     Refill:  0       Return in about 4 weeks (around 4/3/2025), or if symptoms worsen or fail to improve, for Next scheduled follow up and Recheck.         Progress toward goal: Not at goal    Functional Status: Moderate impairment     Prognosis: Good with Ongoing Treatment             This document has been electronically signed by MINI Conte  March 6, 2025 12:19 EST    Some of the data in this electronic note has been brought forward from a previous encounter, any necessary changes have been made, it has been reviewed by this APRN, and it is accurate.    Please note that portions of this note were completed with a voice recognition program.

## 2025-04-07 ENCOUNTER — TELEMEDICINE (OUTPATIENT)
Dept: PSYCHIATRY | Facility: CLINIC | Age: 30
End: 2025-04-07
Payer: COMMERCIAL

## 2025-04-07 DIAGNOSIS — F41.1 GENERALIZED ANXIETY DISORDER: Chronic | ICD-10-CM

## 2025-04-07 DIAGNOSIS — F33.0 MILD EPISODE OF RECURRENT MAJOR DEPRESSIVE DISORDER: Primary | Chronic | ICD-10-CM

## 2025-04-07 DIAGNOSIS — G47.9 SLEEPING DIFFICULTIES: ICD-10-CM

## 2025-04-07 PROCEDURE — 96127 BRIEF EMOTIONAL/BEHAV ASSMT: CPT | Performed by: NURSE PRACTITIONER

## 2025-04-07 PROCEDURE — 1160F RVW MEDS BY RX/DR IN RCRD: CPT | Performed by: NURSE PRACTITIONER

## 2025-04-07 PROCEDURE — 1159F MED LIST DOCD IN RCRD: CPT | Performed by: NURSE PRACTITIONER

## 2025-04-07 PROCEDURE — 99214 OFFICE O/P EST MOD 30 MIN: CPT | Performed by: NURSE PRACTITIONER

## 2025-04-07 RX ORDER — AMITRIPTYLINE HYDROCHLORIDE 10 MG/1
10 TABLET ORAL NIGHTLY PRN
Qty: 30 TABLET | Refills: 0 | Status: SHIPPED | OUTPATIENT
Start: 2025-04-07

## 2025-04-07 RX ORDER — LAMOTRIGINE 150 MG/1
150 TABLET ORAL DAILY
Qty: 30 TABLET | Refills: 0 | Status: SHIPPED | OUTPATIENT
Start: 2025-04-07

## 2025-04-07 NOTE — PROGRESS NOTES
This provider is located at home office working remotely through the [facility] (through [facility]), [address], [facility], [address] using a secure [employer] Video Visit through [employer]. Patient is being seen remotely via telehealth at their home address in [place], and stated they are in a secure environment for this session. The patient's condition being diagnosed/treated is appropriate for telemedicine. The provider identified herself as well as her credentials.   The patient, and/or patients guardian, consent to be seen remotely, and when consent is given they understand that the consent allows for patient identifiable information to be sent to a third party as needed.   They may refuse to be seen remotely at any time. The electronic data is encrypted and password protected, and the patient and/or guardian has been advised of the potential risks to privacy not withstanding such measures.    You have chosen to receive care through a telehealth visit.  Do you consent to use a video/audio connection for your medical care today? Yes    Patient identifiers utilized: Name and date of birth.    Patient verbally confirmed consent for today's encounter 04/07/2025.    The patient does verbally confirm they are being seen today while physically located in the [facility].  This provider/this APRN is licensed in the [facility] where the patient is located/being seen.     Subjective   Palmira Carbajal is a 29 y.o. female who presents today for follow up    Chief Complaint: Medication management follow-up: Anxiety, depression, and sleeping difficulties follow-up    Accompanied by: The patient is seen alone at today's encounter    History of Present Illness:   -Last encounter with this APRN/Provider: 3/6/2025   -Since last encounter with this APRN/Office: The patient reports she feels the recent switch from Trintellix to sertraline has been  beneficial.  The patient reports feeling she has continued control of depressive symptoms with the change to sertraline, and improvement in anxiety symptoms.  -Mood reported as: Improved and remaining stable on current treatment regimen with recent changes  -Patient rates symptoms of depression at a 1-2/10 on a 0-10 scale, with 10 being the worst.  -Patient rates symptoms of anxiety at a 5-6/10 on a 0-10 scale, with 10 being the worst.    -Appetite reported as: Good.  The patient reports she just got back from spring break in [place], so she has probably gained a couple of pounds.  -Sleep reported as: Fair to good.  The patient reports averaging around 8 hours of sleep each night.  The patient reports no difficulty falling asleep, but reports she wakes up multiple times throughout the night.  The patient reports when she wakes up to toss and turn she typically does fall back to sleep easily.    -Changes in medications or new medical problems/concerns since last visit: Denies any  -Reported medication compliance: The patient reports compliance with current psychotropic medication regimen.  -Reported medication side effects or concerns: Denies    -Auditory or visual hallucinations: Denies  -Behaviors different from patient baseline, or any reckless, impulsive, or risky behaviors: Denies  -Symptoms of karl or psychosis: Denies  -Self-injurious behavior: Denies  -SI/HI: The patient adamantly denies any suicidal or homicidal ideations, plans, or intent at the time of this encounter and is convincing.    -Using a shared decision-making approach the patient reports she would like to continue her current treatment/medication regimen without any adjustments/changes at this time.  When discussing medication efficacy with the patient, and reassessing the need and appropriateness of continued psychotropic medication treatment and doses, she reports she is pleased with management of symptoms at this time, and that her current  treatment/medication regimen has continued to be effective for her and she does not want to make any changes or adjustments at today's encounter.    -The patient does verbally contract for safety at today's encounter and is in verbal agreement with the safety/crisis plan. The patient reports in her own words that she will reach out to this APRN/office prior to next scheduled appointment if there is any worsening of mood, any new psychiatric symptoms, any medication side effects or concerns, any concern for safety to self or others, any suicidal or homicidal ideations plans or intent, or any concerns, or she will call 911, call or text the suicide and crisis lifeline at [phone], or go to the closest emergency department.      Patient Health Questionnaire-9 (PHQ-9) (Depression Screening Tool)  Little interest or pleasure in doing things? (Patient-Rptd) Not at all   Feeling down, depressed, or hopeless? (Patient-Rptd) Not at all   PHQ-2 Total Score (Patient-Rptd) 0   Trouble falling or staying asleep, or sleeping too much? (Patient-Rptd) Several days   Feeling tired or having little energy? (Patient-Rptd) Not at all   Poor appetite or overeating? (Patient-Rptd) Not at all   Feeling bad about yourself - or that you are a failure or have let yourself or your family down? (Patient-Rptd) Not at all   Trouble concentrating on things, such as reading the newspaper or watching television? (Patient-Rptd) Not at all   Moving or speaking so slowly that other people could have noticed? Or the opposite - being so fidgety or restless that you have been moving around a lot more than usual? (Patient-Rptd) Not at all   Thoughts that you would be better off dead, or of hurting yourself in some way? (Patient-Rptd) Not at all   PHQ-9 Total Score (Patient-Rptd) 1   If you checked off any problems, how difficult have these problems made it for you to do your work, take care of things at home, or get along with other people? (Patient-Rptd) Not  difficult at all         PHQ-9 Total Score: (Patient-Rptd) 1       Generalized Anxiety Disorder 7-Item (STACY-7) Screening Tool  Feeling nervous, anxious or on edge: (Patient-Rptd) Several days  Not being able to stop or control worrying: (Patient-Rptd) More than half the days  Worrying too much about different things: (Patient-Rptd) More than half the days  Trouble Relaxing: (Patient-Rptd) Several days  Being so restless that it is hard to sit still: (Patient-Rptd) Not at all  Feeling afraid as if something awful might happen: (Patient-Rptd) Several days  Becoming easily annoyed or irritable: (Patient-Rptd) Not at all  STACY 7 Total Score: (Patient-Rptd) 7  If you checked any problems, how difficult have these problems made it for you to do your work, take care of things at home, or get along with other people: (Patient-Rptd) Somewhat difficult      All Known Prior Psychiatric Medications and Responses if Known:  -Zoloft - does not remember response, possible lack of efficacy  -Paxil - does not remember response, possible lack of efficacy  -Prozac - does not remember response, possible lack of efficacy  -Lexapro - does not remember response, possible lack of efficacy  -Celexa - does not remember response, possible lack of efficacy  -Desvenlafaxine/Pristiq - does not remember response, possible lack of efficacy  -Effexor - does not remember response, possible lack of efficacy  -Cymbalta - caused jitteriness and insomnia  -Viibryd - reports she cannot remember why she could not take this medication, but feels there was some kind of side effect  -Wellbutrin SR - did not like the way it made her feel, made her feel drunk and woozy  -Quetiapine - does not remember response, possible lack of efficacy  -Abilify - does not remember response, possible lack of efficacy  -Trazodone - does not remember response, possible lack of efficacy  -Hydroxyzine - Lack of efficacy  -Ambien - does not remember response, possible lack of  efficacy  -Lorazepam - does not remember response, possible lack of efficacy  -Buspar - reported lack of efficacy, and also caused dizziness and vivid dreams  -Propranolol - the patient reports lack of efficacy and decreased heart rate when taking for mental health reasons  -The patient reports some of the medications that she cannot remember the response to listed above included side effects such as restless leg, nightmares, and heart palpitations, but she cannot remember which medications caused these side effects  -Olanzapine - patient reports possible partial efficacy, if any efficacy, unsure  -Trintellix - patient reports effective for depressive symptoms, but not for anxiety  -Lamictal - patient reports effective for depression/mood  -Amitriptyline - patient reports effective  -Sertraline - patient reports effective    Previous Suicide Attempts:  The patient reports one previous suicide attempt as a teenager by overdosing on pills.  She reports no psychiatric hospitalization after this attempt, but she reports she was taken to the emergency department.  The patient denies any other previous suicide attempts.     Previous Self-Harming Behavior:  The patient reports a history of self harming by cutting in her teenage years, but none as an adult.    History of Seizures or TBI:  The patient denies any    Patient's Support Network Includes:   and mother    Last Menstrual Period:  Hysterectomy - partial.        The following portions of the patient's history were reviewed and updated as appropriate: allergies, current medications, past family history, past medical history, past social history, past surgical history and problem list.          Past Medical History:  Past Medical History:   Diagnosis Date    Acid reflux     Anxiety     Chronic pain disorder     NO PAIN CLINIC, PCP/PSYCH CONT W/AMITRIPTYLINE    Depression     Eczema     Fatty liver     Gallstones     Intractable migraine without aura and without  status migrainosus 2023    Kidney stone     HX OF    Maltracking of right patella     Migraines     Orthostatic hypotension     WORKED UP FOR POTS, CONT W/MEDS, MONNIN. RX FLORINEF    Panic disorder     PONV (postoperative nausea and vomiting)     GOOD RESULTS WITH SCOPALAMINE    PTSD (post-traumatic stress disorder)     WAKES UP FROM ANESTHESIA WITH PANIC ATTACK    Seasonal allergic rhinitis 2022    Self-injurious behavior     as a teenager    Suicide attempt     NO ADULT ISSUES       Social History:  Social History     Socioeconomic History    Marital status:      Spouse name: TRISTAN    Number of children: 3   Tobacco Use    Smoking status: Former     Current packs/day: 0.00     Average packs/day: 0.5 packs/day for 15.0 years (7.5 ttl pk-yrs)     Types: Cigarettes     Start date: 2007     Quit date: 2022     Years since quitting: 3.1    Smokeless tobacco: Never   Vaping Use    Vaping status: Every Day    Substances: Nicotine, Flavoring    Devices: Disposable   Substance and Sexual Activity    Alcohol use: Not Currently    Drug use: Not Currently     Types: Marijuana     Comment: Marijuana use in teenage years    Sexual activity: Defer     Partners: Male     Birth control/protection: Hysterectomy       Family History:  Family History   Problem Relation Age of Onset    Arthritis Mother     Restless legs syndrome Mother     Thyroid disease Father     Colon polyps Father     Diabetes Father     Drug abuse Maternal Uncle     Alcohol abuse Maternal Uncle     Cancer Maternal Grandmother     Sleep apnea Son     Malig Hyperthermia Neg Hx        Past Surgical History:  Past Surgical History:   Procedure Laterality Date     SECTION      CHOLECYSTECTOMY N/A 2024    Procedure: CHOLECYSTECTOMY LAPAROSCOPIC;  Surgeon: Cisco Hickman MD;  [facility];  Service: General;  Laterality: N/A;    COLONOSCOPY N/A 10/11/2022    Procedure: COLONOSCOPY;  Surgeon: Dyan Griffin  MD [place];  Location: [facility] ENDOSCOPY;  Service: Gastroenterology;  Laterality: N/A;  HEMORRHOIDS    CYSTOSCOPY      CYSTOSCOPY URETEROSCOPY Left 1/30/2023    Procedure: CYSTOSCOPY URETEROSCOPY RETROGRADE PYELOGRAM HOLMIUM LASER STENT INSERTION, left;  Surgeon: Melisa Garcia MD;  Location: [facility] MAIN OR;  Service: Urology;  Laterality: Left;    ENDOSCOPY N/A 9/8/2023    Procedure: ESOPHAGOGASTRODUODENOSCOPY WITH BIOPSIES;  Surgeon: Dyan Griffin MD;  Location: [facility] ENDOSCOPY;  Service: Gastroenterology;  Laterality: N/A;  ESOPHAGITIS, GASTRITIS    HYSTERECTOMY      KIDNEY STONE SURGERY      unspecified    KNEE ARTHROSCOPY Right 7/11/2022    Procedure: KNEE ARTHROSCOPY WITH A LATERAL RELEASE AND CHONDROPLASTY;  Surgeon: Marco A Pitts MD;  [place] [facility] [place] [facility] [place]  Service: Orthopedics;  Laterality: Right;    WISDOM TOOTH EXTRACTION         Problem List:  Patient Active Problem List   Diagnosis    Maltracking of right patella    Impingement syndrome involving patellar fat pad of right knee    Chondromalacia    Patellar malalignment syndrome of right knee    Binocular vision disorder with diplopia    Generalized anxiety disorder    Acid reflux    PTSD (post-traumatic stress disorder)    Kidney stone on left side    Seasonal allergic rhinitis    Burn of finger and thumb of right hand, second degree    Hemorrhoids    Right hand pain    Rectal bleeding    Anal or rectal pain    Decreased appetite    Aftercare following surgery of right knee arthroscopic chondroplasty and lateral release, 7/11/2022    Generalized body aches    Epigastric pain    Female hirsutism    Right ovarian cyst    Moderate episode of recurrent major depressive disorder    Primary insomnia    Vitamin D deficiency    Intractable migraine without aura and without status migrainosus    Calcified granuloma of lung    Nipple discharge    Nausea and vomiting    Overweight (BMI 25.0-29.9)    Acne vulgaris    Vaping nicotine  dependence, tobacco product    Achilles tendinitis of both lower extremities    BRADY (nonalcoholic steatohepatitis)    Symptomatic cholelithiasis       Allergy:   Allergies   Allergen Reactions    Cymbalta [Duloxetine Hcl] Mental Status Change     Jittery and insomnia    Penicillins Rash        Current Medications:   Current Outpatient Medications   Medication Sig Dispense Refill    amitriptyline (ELAVIL) 10 MG tablet Take 1 tablet by mouth At Night As Needed for Sleep. 30 tablet 0    lamoTRIgine (LaMICtal) 150 MG tablet Take 1 tablet by mouth Daily. 30 tablet 0    sertraline (Zoloft) 50 MG tablet Take 1 tablet by mouth Daily. 30 tablet 0    dicyclomine (BENTYL) 20 MG tablet Take 1 tablet by mouth Every 6 (Six) Hours As Needed (abdominal cramping). 60 tablet 3    Emgality 120 MG/ML auto-injector pen INJECT AS DIRECTED 2ML SUBQ EVERY 30 DAYS FOR MIGRAINE 1 mL 4    esomeprazole (nexIUM) 40 MG capsule Take 1 capsule by mouth Every Morning Before Breakfast. 90 capsule 3    famotidine (PEPCID) 20 MG tablet Take 1 tablet by mouth At Night As Needed for Heartburn. (Patient taking differently: Take 1 tablet by mouth Every Night.) 90 tablet 1    fludrocortisone 0.1 MG tablet Take 1 tablet by mouth 2 (Two) Times a Day. 90 tablet 1    Hydrocort-Pramoxine, Perianal, (ANALPRAM-HC) 2.5-1 % rectal cream Insert  into the rectum 3 (Three) Times a Day. 1 each 3    polyethylene glycol (MIRALAX) 17 g packet Take 17 g by mouth Daily. 5 packet 0    propranolol (INDERAL) 10 MG tablet Take 1 tablet by mouth 2 (Two) Times a Day. 60 tablet 3    Resmetirom (Rezdiffra) 80 MG tablet Take 1 tablet by mouth Daily. 90 tablet 3    Rimegepant Sulfate (Nurtec) 75 MG tablet dispersible tablet Take 1 tablet by mouth Daily As Needed (migraine). Indications: Migraine Headache 8 tablet 5    spironolactone (Aldactone) 50 MG tablet Take 1 tablet by mouth Daily. Indications: Abnormal Growth of Body or Facial Hair 30 tablet 3    vitamin D3 (Vitamin D) 125 MCG  (5000 UT) capsule capsule Take 1 capsule by mouth Daily. 90 capsule 1     No current facility-administered medications for this visit.           Review of Symptoms:    Review of Systems   Psychiatric/Behavioral:  Positive for depressed mood and stress. Negative for agitation, behavioral problems, dysphoric mood, hallucinations, self-injury, suicidal ideas and negative for hyperactivity. The patient is nervous/anxious.          Physical Exam:   not currently breastfeeding. There is no height or weight on file to calculate BMI.   Due to the remote nature of this encounter (virtual encounter), vitals were unable to be obtained.  Height stated at 64 inches.  Weight reported at around 161-162 pounds.        Physical Exam  Constitutional:       General: She is not in acute distress.  Neurological:      Mental Status: She is alert and oriented to person, place, and time.   Psychiatric:         Attention and Perception: Attention normal.         Mood and Affect: Mood and affect normal.         Speech: Speech normal.         Behavior: Behavior normal. Behavior is cooperative.         Thought Content: Thought content normal. Thought content is not paranoid or delusional. Thought content does not include homicidal or suicidal ideation. Thought content does not include homicidal or suicidal plan.         Cognition and Memory: Cognition and memory normal.         Judgment: Judgment normal.         Mental Status Exam:   Hygiene:   good  Cooperation:  Cooperative  Eye Contact:  Good  Psychomotor Behavior:  Appropriate  Affect:  Appropriate  Mood: normal  Hopelessness: Denies  Speech:  Normal  Thought Process:  Goal directed and Linear  Thought Content:  Mood congruent  Suicidal:  None  Homicidal:  None  Hallucinations:  None  Delusion:  None  Memory:  Intact  Orientation:  Person, Place, Time, and Situation  Reliability:  good  Insight:  Good  Judgement:  Good  Impulse Control:  Good  Physical/Medical Issues:  No            Wt  Readings from Last 3 Encounters:   02/10/25 74.8 kg (165 lb)   10/08/24 71.5 kg (157 lb 9.6 oz)   09/09/24 69.7 kg (153 lb 9.6 oz)     Temp Readings from Last 3 Encounters:   08/08/24 97.2 °F (36.2 °C)   07/18/24 96.9 °F (36.1 °C) (Temporal)   07/14/24 98.5 °F (36.9 °C) (Oral)     BP Readings from Last 3 Encounters:   02/10/25 119/80   10/08/24 109/74   09/09/24 125/70     Pulse Readings from Last 3 Encounters:   02/10/25 69   10/08/24 98   09/09/24 72      BMI Readings from Last 3 Encounters:   02/10/25 28.32 kg/m²   10/08/24 27.04 kg/m²   09/09/24 26.35 kg/m²       Lab Results:   Office Visit on 08/08/2024   Component Date Value Ref Range Status    Penicillin G Allergen IgE 08/08/2024 <0.10  Class 0 kU/L Final    Class Description 08/08/2024 Comment   Final        Levels of Specific IgE       Class  Description of Class      ---------------------------  -----  --------------------                     < 0.10         0         Negative             0.10 -    0.31         0/I       Equivocal/Low             0.32 -    0.55         I         Low             0.56 -    1.40         II        Moderate             1.41 -    3.90         III       High             3.91 -   19.00         IV        Very High            19.01 -  100.00         V         Very High                    >100.00         VI        Very High    25 Hydroxy, Vitamin D 08/08/2024 37.6  30.0 - 100.0 ng/ml Final   Admission on 07/18/2024, Discharged on 07/18/2024   Component Date Value Ref Range Status    Case Report 07/18/2024    Final                    Value:Surgical Pathology Report                         Case: [id number]                                  Authorizing Provider:  Cisco Hickman MD        Collected:           07/18/2024 12:54 PM          Ordering Location:     [facility]  Received:            07/19/2024 07:01 AM                                 OR                                                                          "  Pathologist:           Sue Frankel DO                                                       Specimen:    Gallbladder, gallbladder and contents                                                      Clinical Information 07/18/2024    Final                    Value:Symptomatic cholelithiasis    Final Diagnosis 07/18/2024    Final                    Value:Gallbladder, cholecystectomy:                           - Mild chronic cholecystitis                           - Focal cholesterolosis                           - Cholelithiasis     Gross Description 07/18/2024    Final                    Value:1. Gallbladder.                          Received in formalin and labeled \"gallbladder and contents\" is an 8.0 x                           3.0 x 3.0 cm intact gallbladder received with a clipped cystic duct                           margin.  The dusky serosa is smooth, and the opposing cauterized                           adventitia is shaggy.  Opening reveals dark green, tenacious bile fluid                           with 2 yellow, nodular gallstones (averaging 0.3 cm), dark green, velvety                           mucosa with mild yellow stippling, and an average wall thickness of 0.2                           cm.  No distinct lesions or lymph nodes are identified.  Representative                           sections are submitted in 1 cassette.                           YOBANI    Microscopic Description 07/18/2024    Final                    Value:Microscopic examination performed.   Admission on 07/14/2024, Discharged on 07/14/2024   Component Date Value Ref Range Status    Glucose 07/14/2024 82  65 - 99 mg/dL Final    BUN 07/14/2024 7  6 - 20 mg/dL Final    Creatinine 07/14/2024 0.70  0.57 - 1.00 mg/dL Final    Sodium 07/14/2024 141  136 - 145 mmol/L Final    Potassium 07/14/2024 3.7  3.5 - 5.2 mmol/L Final    Chloride 07/14/2024 105  98 - 107 mmol/L Final    CO2 07/14/2024 22.5  22.0 - 29.0 mmol/L Final    Calcium " 07/14/2024 8.8  8.6 - 10.5 mg/dL Final    Total Protein 07/14/2024 6.6  6.0 - 8.5 g/dL Final    Albumin 07/14/2024 4.0  3.5 - 5.2 g/dL Final    ALT (SGPT) 07/14/2024 9  1 - 33 U/L Final    AST (SGOT) 07/14/2024 11  1 - 32 U/L Final    Alkaline Phosphatase 07/14/2024 83  39 - 117 U/L Final    Total Bilirubin 07/14/2024 <0.2  0.0 - 1.2 mg/dL Final    Globulin 07/14/2024 2.6  gm/dL Final    A/G Ratio 07/14/2024 1.5  g/dL Final    BUN/Creatinine Ratio 07/14/2024 10.0  7.0 - 25.0 Final    Anion Gap 07/14/2024 13.5  5.0 - 15.0 mmol/L Final    eGFR 07/14/2024 118.7  >60.0 mL/min/1.73 Final    Lipase 07/14/2024 33  13 - 60 U/L Final    Color, UA 07/14/2024 Yellow  Yellow, Straw Final    Appearance, UA 07/14/2024 Clear  Clear Final    pH, UA 07/14/2024 8.0  5.0 - 8.0 Final    Specific Gravity, UA 07/14/2024 <=1.005  1.005 - 1.030 Final    Glucose, UA 07/14/2024 Negative  Negative Final    Ketones, UA 07/14/2024 Negative  Negative Final    Bilirubin, UA 07/14/2024 Negative  Negative Final    Blood, UA 07/14/2024 Negative  Negative Final    Protein, UA 07/14/2024 Negative  Negative Final    Leuk Esterase, UA 07/14/2024 Negative  Negative Final    Nitrite, UA 07/14/2024 Negative  Negative Final    Urobilinogen, UA 07/14/2024 0.2 E.U./dL  0.2 - 1.0 E.U./dL Final    Extra Tube 07/14/2024 Hold for add-ons.   Final    Auto resulted.    Extra Tube 07/14/2024 hold for add-on   Final    Auto resulted    Extra Tube 07/14/2024 Hold for add-ons.   Final    Auto resulted.    Extra Tube 07/14/2024 Hold for add-ons.   Final    Auto resulted    WBC 07/14/2024 10.60  3.40 - 10.80 10*3/mm3 Final    RBC 07/14/2024 4.58  3.77 - 5.28 10*6/mm3 Final    Hemoglobin 07/14/2024 12.7  12.0 - 15.9 g/dL Final    Hematocrit 07/14/2024 37.7  34.0 - 46.6 % Final    MCV 07/14/2024 82.3  79.0 - 97.0 fL Final    MCH 07/14/2024 27.7  26.6 - 33.0 pg Final    MCHC 07/14/2024 33.7  31.5 - 35.7 g/dL Final    RDW 07/14/2024 13.3  12.3 - 15.4 % Final    RDW-SD  07/14/2024 39.8  37.0 - 54.0 fl Final    MPV 07/14/2024 10.1  6.0 - 12.0 fL Final    Platelets 07/14/2024 456 (H)  140 - 450 10*3/mm3 Final    Neutrophil % 07/14/2024 47.6  42.7 - 76.0 % Final    Lymphocyte % 07/14/2024 43.2  19.6 - 45.3 % Final    Monocyte % 07/14/2024 6.4  5.0 - 12.0 % Final    Eosinophil % 07/14/2024 1.7  0.3 - 6.2 % Final    Basophil % 07/14/2024 0.8  0.0 - 1.5 % Final    Immature Grans % 07/14/2024 0.3  0.0 - 0.5 % Final    Neutrophils, Absolute 07/14/2024 5.04  1.70 - 7.00 10*3/mm3 Final    Lymphocytes, Absolute 07/14/2024 4.58 (H)  0.70 - 3.10 10*3/mm3 Final    Monocytes, Absolute 07/14/2024 0.68  0.10 - 0.90 10*3/mm3 Final    Eosinophils, Absolute 07/14/2024 0.18  0.00 - 0.40 10*3/mm3 Final    Basophils, Absolute 07/14/2024 0.09  0.00 - 0.20 10*3/mm3 Final    Immature Grans, Absolute 07/14/2024 0.03  0.00 - 0.05 10*3/mm3 Final    nRBC 07/14/2024 0.0  0.0 - 0.2 /100 WBC Final    RBC Morphology 07/14/2024 Normal  Normal Final    WBC Morphology 07/14/2024 Normal  Normal Final    Platelet Morphology 07/14/2024 Normal  Normal Final   Admission on 07/13/2024, Discharged on 07/14/2024   Component Date Value Ref Range Status    Glucose 07/13/2024 111 (H)  65 - 99 mg/dL Final    BUN 07/13/2024 9  6 - 20 mg/dL Final    Creatinine 07/13/2024 0.71  0.57 - 1.00 mg/dL Final    Sodium 07/13/2024 141  136 - 145 mmol/L Final    Potassium 07/13/2024 3.7  3.5 - 5.2 mmol/L Final    Chloride 07/13/2024 105  98 - 107 mmol/L Final    CO2 07/13/2024 20.2 (L)  22.0 - 29.0 mmol/L Final    Calcium 07/13/2024 9.0  8.6 - 10.5 mg/dL Final    Total Protein 07/13/2024 7.0  6.0 - 8.5 g/dL Final    Albumin 07/13/2024 4.2  3.5 - 5.2 g/dL Final    ALT (SGPT) 07/13/2024 10  1 - 33 U/L Final    AST (SGOT) 07/13/2024 11  1 - 32 U/L Final    Alkaline Phosphatase 07/13/2024 82  39 - 117 U/L Final    Total Bilirubin 07/13/2024 <0.2  0.0 - 1.2 mg/dL Final    Globulin 07/13/2024 2.8  gm/dL Final    A/G Ratio 07/13/2024 1.5  g/dL Final     BUN/Creatinine Ratio 07/13/2024 12.7  7.0 - 25.0 Final    Anion Gap 07/13/2024 15.8 (H)  5.0 - 15.0 mmol/L Final    eGFR 07/13/2024 116.7  >60.0 mL/min/1.73 Final    Lipase 07/13/2024 37  13 - 60 U/L Final    Color, UA 07/13/2024 Yellow  Yellow, Straw Final    Appearance, UA 07/13/2024 Turbid (A)  Clear Final    pH, UA 07/13/2024 >=9.0 (H)  5.0 - 8.0 Final    Specific [place], UA 07/13/2024 1.021  1.005 - 1.030 Final    Glucose, UA 07/13/2024 Negative  Negative Final    Ketones, UA 07/13/2024 15 mg/dL (1+) (A)  Negative Final    Bilirubin, UA 07/13/2024 Negative  Negative Final    Blood, UA 07/13/2024 Negative  Negative Final    Protein, UA 07/13/2024 Trace (A)  Negative Final    Leuk Esterase, UA 07/13/2024 Negative  Negative Final    Nitrite, UA 07/13/2024 Negative  Negative Final    Urobilinogen, UA 07/13/2024 1.0 E.U./dL  0.2 - 1.0 E.U./dL Final    Extra Tube 07/13/2024 Hold for add-ons.   Final    Auto resulted.    Extra Tube 07/13/2024 hold for add-on   Final    Auto resulted    Extra Tube 07/13/2024 Hold for add-ons.   Final    Auto resulted.    Extra Tube 07/13/2024 Hold for add-ons.   Final    Auto resulted    WBC 07/13/2024 14.01 (H)  3.40 - 10.80 10*3/mm3 Final    RBC 07/13/2024 4.85  3.77 - 5.28 10*6/mm3 Final    Hemoglobin 07/13/2024 13.3  12.0 - 15.9 g/dL Final    Hematocrit 07/13/2024 39.7  34.0 - 46.6 % Final    MCV 07/13/2024 81.9  79.0 - 97.0 fL Final    MCH 07/13/2024 27.4  26.6 - 33.0 pg Final    MCHC 07/13/2024 33.5  31.5 - 35.7 g/dL Final    RDW 07/13/2024 13.1  12.3 - 15.4 % Final    RDW-SD 07/13/2024 39.0  37.0 - 54.0 fl Final    MPV 07/13/2024 10.3  6.0 - 12.0 fL Final    Platelets 07/13/2024 461 (H)  140 - 450 10*3/mm3 Final    Neutrophil % 07/13/2024 54.4  42.7 - 76.0 % Final    Lymphocyte % 07/13/2024 37.3  19.6 - 45.3 % Final    Monocyte % 07/13/2024 6.2  5.0 - 12.0 % Final    Eosinophil % 07/13/2024 1.1  0.3 - 6.2 % Final    Basophil % 07/13/2024 0.6  0.0 - 1.5 % Final    Immature  Grans % 07/13/2024 0.4  0.0 - 0.5 % Final    Neutrophils, Absolute 07/13/2024 7.61 (H)  1.70 - 7.00 10*3/mm3 Final    Lymphocytes, Absolute 07/13/2024 5.23 (H)  0.70 - 3.10 10*3/mm3 Final    Monocytes, Absolute 07/13/2024 0.87  0.10 - 0.90 10*3/mm3 Final    Eosinophils, Absolute 07/13/2024 0.16  0.00 - 0.40 10*3/mm3 Final    Basophils, Absolute 07/13/2024 0.09  0.00 - 0.20 10*3/mm3 Final    Immature Grans, Absolute 07/13/2024 0.05  0.00 - 0.05 10*3/mm3 Final    nRBC 07/13/2024 0.0  0.0 - 0.2 /100 WBC Final    Anisocytosis 07/13/2024 Slight/1+  None Seen Final    Poikilocytes 07/13/2024 Slight/1+  None Seen Final    WBC Morphology 07/13/2024 Normal  Normal Final    Large Platelets 07/13/2024 Slight/1+  None Seen Final   Lab on 04/10/2024   Component Date Value Ref Range Status    WBC 04/10/2024 11.38 (H)  3.40 - 10.80 10*3/mm3 Final    RBC 04/10/2024 4.76  3.77 - 5.28 10*6/mm3 Final    Hemoglobin 04/10/2024 12.9  12.0 - 15.9 g/dL Final    Hematocrit 04/10/2024 39.7  34.0 - 46.6 % Final    MCV 04/10/2024 83.4  79.0 - 97.0 fL Final    MCH 04/10/2024 27.1  26.6 - 33.0 pg Final    MCHC 04/10/2024 32.5  31.5 - 35.7 g/dL Final    RDW 04/10/2024 13.6  12.3 - 15.4 % Final    RDW-SD 04/10/2024 41.8  37.0 - 54.0 fl Final    MPV 04/10/2024 10.0  6.0 - 12.0 fL Final    Platelets 04/10/2024 380  140 - 450 10*3/mm3 Final    Neutrophil % 04/10/2024 67.4  42.7 - 76.0 % Final    Lymphocyte % 04/10/2024 24.2  19.6 - 45.3 % Final    Monocyte % 04/10/2024 7.6  5.0 - 12.0 % Final    Eosinophil % 04/10/2024 0.0 (L)  0.3 - 6.2 % Final    Basophil % 04/10/2024 0.4  0.0 - 1.5 % Final    Immature Grans % 04/10/2024 0.4  0.0 - 0.5 % Final    Neutrophils, Absolute 04/10/2024 7.67 (H)  1.70 - 7.00 10*3/mm3 Final    Lymphocytes, Absolute 04/10/2024 2.75  0.70 - 3.10 10*3/mm3 Final    Monocytes, Absolute 04/10/2024 0.87  0.10 - 0.90 10*3/mm3 Final    Eosinophils, Absolute 04/10/2024 0.00  0.00 - 0.40 10*3/mm3 Final    Basophils, Absolute  04/10/2024 0.05  0.00 - 0.20 10*3/mm3 Final    Immature Grans, Absolute 04/10/2024 0.04  0.00 - 0.05 10*3/mm3 Final           Assessment & Plan   Problems Addressed this Visit          Mental Health    Generalized anxiety disorder    Relevant Medications    sertraline (Zoloft) 50 MG tablet    amitriptyline (ELAVIL) 10 MG tablet     Other Visit Diagnoses         Mild episode of recurrent major depressive disorder  (Chronic)   -  Primary    R/O BPAD/BPD    Relevant Medications    sertraline (Zoloft) 50 MG tablet    lamoTRIgine (LaMICtal) 150 MG tablet    amitriptyline (ELAVIL) 10 MG tablet      Sleeping difficulties        Relevant Medications    amitriptyline (ELAVIL) 10 MG tablet          Diagnoses         Codes Comments      Mild episode of recurrent major depressive disorder    -  Primary ICD-10-CM: F33.0  ICD-9-CM: 296.31 R/O BPAD/BPD      Generalized anxiety disorder     ICD-10-CM: F41.1  ICD-9-CM: 300.02       Sleeping difficulties     ICD-10-CM: G47.9  ICD-9-CM: 780.50             Visit Diagnoses:    ICD-10-CM ICD-9-CM   1. Mild episode of recurrent major depressive disorder  F33.0 296.31   2. Generalized anxiety disorder  F41.1 300.02   3. Sleeping difficulties  G47.9 780.50           GOALS:  Short Term Goals: Patient will be compliant with medication, and patient will have no significant medication related side effects.  Patient will be engaged in psychotherapy as indicated.  Patient will report subjective improvement of symptoms.  Long term goals: To stabilize mood and treat/improve subjective symptoms, the patient will stay out of the hospital, the patient will be at an optimal level of functioning, and the patient will take all medications as prescribed.  The patient verbalized understanding and agreement with goals that were mutually set.      TREATMENT PLAN: Begin supportive psychotherapy efforts and take medications as indicated.  Medication and treatment options, both pharmacological and  non-pharmacological treatment options, discussed during today's visit, including any off label use of medication. Patient acknowledged and verbally consented with current treatment plan and was educated on the importance of compliance with treatment and follow-up appointments.  The patient has previously declined a referral to begin psychotherapy, but has verbalized she will reach out to this APRN/office if she changes her mind.    -Continue amitriptyline 10 mg by mouth once nightly to assist with sleeping difficulties and also can assist with mood.  -Continue lamotrigine 150 mg by mouth once daily for mood.  -Continue Sertraline 50 mg by mouth once daily for mood.      MEDICATION ISSUES:  Discussed medication options and treatment plan of prescribed medication, any off label use of medication, as well as the risks, benefits, any black box warnings including increased suicidality, and side effects including but not limited to potential falls, dizziness, possible impaired driving, GI side effects (change in appetite, abdominal discomfort, nausea, vomiting, diarrhea, and/or constipation), dry mouth, somnolence, sedation, insomnia, activation, agitation, irritation, tremors, abnormal muscle movements or disorders, tardive dyskinesia, akathisia, asthenia, headache, sweating, possible bruising or rare bleeding, electrolyte and/or fluid abnormalities, change in blood pressure/heart rate/and or heart rhythm, hypotension, sexual dysfunction, rare impulse control problems, rare seizures, rare neuroleptic malignant syndrome, increased risk of death and cerebrovascular events, change in blood glucose and increased risk for diabetes, change in triglycerides and cholesterol and increased risk for dyslipidemia,  weight gain, weight gain that can become problematic to health, skin conditions and reactions, and metabolic adversities among others. Patient and/or guardian are agreeable to call the office with any worsening of  symptoms or onset of side effects, or if any concerns or questions arise.  The contact information for the office is made available to the patient and/or guardian. Patient and/or guardian are agreeable to call 911 or go to the nearest ER should they begin having any SI/HI, or if any urgent concerns arise.    Due to the nature of virtual visits and inability to monitor vital signs and weight with virtual visits, the patient has been encouraged to monitor their vital signs and weight regularly either through self-monitoring via home device(s) or with their Primary Care Provider, and the patient has been instructed to notify this APRN of any abnormalities or significant changes from baseline.     Patient aware of limitations of provider's availability and office hours, and how to reach provider/office if needed (office number for patient to call for any questions/concerns: [phone]). If the patient's needs require more frequent or intensive management/monitoring than can be provided from this provider utilizing a strictly virtual platform, or care that is outside of this provider's scope, then patient may be referred to more appropriate provider or modality.    This APRN has discussed the benefits and risks of taking/continuing Lamictal (Lamotrigine).  The side effects of Lamictal can include a benign rash, blurred or double vision, dizziness, ataxia, sedation, headache, tremor, insomnia, poor coordination, fatigue,  nausea, vomiting, dyspepsia, rhinitis, infection, pharyngitis, asthenia, a rare but serious rash, rare multi-organ failure associated with Bull-Avelino Syndrome, toxic epidermal necrolysis, drug hypersensitivity syndrome, rare blood dyscrasias, rare aseptic meningitis, rare sudden unexplained deaths in people with epilepsy, withdrawal seizures upon abrupt withdrawal, and rare activation of suicidal ideation and behavior (suicidality).  This APRN has discussed that a very slow dose titration when  starting, or changing doses, of Lamictal may reduce the incidence of skin rash and other side effects.  The dosage should not be titrated upwards or increased faster than recommended due to the possibility of the discussed side effects and risk of development of a skin rash (which can become life threatening).    This APRN has also discussed that if the patient stops taking the Lamictal for 3-5 days or longer, it will be necessary to restart the drug with an initial dose titration, as rashes have been reported on reexposure.  If the patient and Provider decide to stop the Lamictal, the patient will follow the directions of this APRN/this office as a guided taper over about two weeks is appropriate due to the risk of relapse in bipolar disorder with those with a mood or bipolar disorder, the risk of seizures in those with epilepsy, and discontinuation symptoms upon rapid discontinuation of Lamictal.    The patient verbalizes understanding of benefits and risks as discussed, the patient/guardian feels the benefits outweigh the risks and is agreeable to continue/take Lamictal as discussed.  The patient is advised should any side effects or rash develops they are to stop the Lamictal immediately and contact this APRN/this office or go to the emergency department immediately.  The patient verbalizes understanding and agreement with treatment plan in their own words.      VERBAL INFORMED CONSENT FOR MEDICATION:  The patient was educated that their proposed/prescribed psychotropic medication(s) has potential risks, side effects, adverse effects, and black box warnings; and these have been discussed with the patient.  The patient has been informed that their treatment and medication dosage is to be individualized, and may even be above or below the recommended range/dosage due to patient individualization and response, but medication is prescribed using a shared decision making approach, and no medication or dosage will be  prescribed without the patient's verbal consent.  The reason for the use of the medication including any off label use and alternative modes of treatment other than or in addition to medication has been considered and discussed, the probable consequences of not receiving the proposed treatment have been discussed, and any treatment side effects, black box warnings, and cautions associated with treatment have been discussed with the patient.  The patient is allowed ample time to openly discuss and ask questions regarding the proposed medication(s) and treatment plan and the patient verbalizes understanding the reasons for the use of the medication, its potential risks and benefits, other alternative treatment(s), and the probable consequences that may occur if the proposed medication is not given.  The patient has been given ample time to ask questions and study the information and find the information to be specific, accurate, and complete.  The patient gives verbal consent for the medication(s) proposed/prescribed, they verbalized understanding that they can refuse and withdraw consent at any time with the assistance of this APRN, and the patient has verbally confirmed that they are aware, and are willing, to take the prescribed medication and follow the treatment plan with the known possible risks, side effect, black box warnings, and any potential medication interactions, and the patient reports they will be worse off without this medication and treatment plan.  The patient is advised to contact this APRN/this office if any questions or concerns arise at any time (at [phone]), or call 911/go to the closest emergency department if needed or outside of office hours.      SUICIDE RISK ASSESSMENT AND SAFETY PLAN: Unalterable demographics and a history of mental health intervention indicate this patient is in a high risk category compared to the general population. At present, the patient denies active SI/HI,  intentions, or plans at this time and agrees to seek immediate care should such thoughts develop. The patient verbalizes understanding of how to access emergency care if needed and agrees to do so. Consideration of suicide risk and protective factors such as history, current presentation, individual strengths and weaknesses, psychosocial and environmental stressors and variables, psychiatric illness and symptoms, medical conditions and pain, took place in this interview. Based on those considerations, the patient is determined: within individual baseline and presenting no imminent risk for suicide or homicide. Other recommendations: The patient does not meet the criteria for inpatient admission and is not a safety risk to self or others at today's visit. Inpatient treatment offers no significant advantages over outpatient treatment for this patient at today's visit.  The patient was given ample time for questions and fully participated in treatment planning.  The patient was encouraged to call the clinic with any questions or concerns.  The patient was informed of access to emergency care. If patient were to develop any significant symptomatology, suicidal ideation, homicidal ideation, any concerns, or feel unsafe at any time they are to call the clinic and if unable to get immediate assistance should immediately call 911 or go to the nearest emergency room.  Patient contracted verbally for the following: If you are experiencing an emotional crisis or have thoughts of harming yourself or others, please go to your nearest local emergency room or call 911. Will continue to re-assess medication response and side effects frequently to establish efficacy and ensure safety. Risks, any black box warnings, side effects, off label usage, and benefits of medication and treatment discussed with patient, along with potential adverse side effects of current and/or newly prescribed medication, alternative treatment options, and  OTC medications.  Patient verbalized understanding of potential risks, any off label use of medication, any black box warnings, and any side effects in their own words. The patient verbalized understanding and agreed to comply with the safety plan discussed in their own words.  Patient given the number to the office. Number also discussed of the 24- hour suicide hotline.           MEDS ORDERED DURING VISIT:  New Medications Ordered This Visit   Medications    sertraline (Zoloft) 50 MG tablet     Sig: Take 1 tablet by mouth Daily.     Dispense:  30 tablet     Refill:  0    lamoTRIgine (LaMICtal) 150 MG tablet     Sig: Take 1 tablet by mouth Daily.     Dispense:  30 tablet     Refill:  0    amitriptyline (ELAVIL) 10 MG tablet     Sig: Take 1 tablet by mouth At Night As Needed for Sleep.     Dispense:  30 tablet     Refill:  0       Return in about 4 weeks (around 5/5/2025), or if symptoms worsen or fail to improve, for Next scheduled follow up and Recheck.         Progress toward goal: Not at goal    Functional Status: Mild impairment     Prognosis: Good with Ongoing Treatment             This document has been electronically signed by MINI Conte  April 7, 2025 09:01 EDT    Some of the data in this electronic note has been brought forward from a previous encounter, any necessary changes have been made, it has been reviewed by this APRN, and it is accurate.    Please note that portions of this note were completed with a voice recognition program.

## 2025-04-10 ENCOUNTER — OFFICE VISIT (OUTPATIENT)
Dept: ONCOLOGY | Facility: HOSPITAL | Age: 30
End: 2025-04-10
Payer: COMMERCIAL

## 2025-04-10 ENCOUNTER — LAB (OUTPATIENT)
Dept: ONCOLOGY | Facility: HOSPITAL | Age: 30
End: 2025-04-10
Payer: COMMERCIAL

## 2025-04-10 VITALS
RESPIRATION RATE: 18 BRPM | SYSTOLIC BLOOD PRESSURE: 110 MMHG | HEIGHT: 64 IN | DIASTOLIC BLOOD PRESSURE: 68 MMHG | TEMPERATURE: 97.9 F | WEIGHT: 165.79 LBS | BODY MASS INDEX: 28.3 KG/M2 | OXYGEN SATURATION: 98 % | HEART RATE: 73 BPM

## 2025-04-10 DIAGNOSIS — D72.829 LEUKOCYTOSIS, UNSPECIFIED TYPE: ICD-10-CM

## 2025-04-10 DIAGNOSIS — G90.A POTS (POSTURAL ORTHOSTATIC TACHYCARDIA SYNDROME): ICD-10-CM

## 2025-04-10 DIAGNOSIS — D72.829 LEUKOCYTOSIS, UNSPECIFIED TYPE: Primary | ICD-10-CM

## 2025-04-10 DIAGNOSIS — F41.1 GENERALIZED ANXIETY DISORDER: ICD-10-CM

## 2025-04-10 LAB
BASOPHILS # BLD AUTO: 0.08 10*3/MM3 (ref 0–0.2)
BASOPHILS NFR BLD AUTO: 0.8 % (ref 0–1.5)
DEPRECATED RDW RBC AUTO: 40.4 FL (ref 37–54)
EOSINOPHIL # BLD AUTO: 0.19 10*3/MM3 (ref 0–0.4)
EOSINOPHIL NFR BLD AUTO: 1.9 % (ref 0.3–6.2)
ERYTHROCYTE [DISTWIDTH] IN BLOOD BY AUTOMATED COUNT: 13 % (ref 12.3–15.4)
HCT VFR BLD AUTO: 42.5 % (ref 34–46.6)
HGB BLD-MCNC: 14.2 G/DL (ref 12–15.9)
IMM GRANULOCYTES # BLD AUTO: 0.04 10*3/MM3 (ref 0–0.05)
IMM GRANULOCYTES NFR BLD AUTO: 0.4 % (ref 0–0.5)
LYMPHOCYTES # BLD AUTO: 2.57 10*3/MM3 (ref 0.7–3.1)
LYMPHOCYTES NFR BLD AUTO: 25.4 % (ref 19.6–45.3)
MCH RBC QN AUTO: 28.9 PG (ref 26.6–33)
MCHC RBC AUTO-ENTMCNC: 33.4 G/DL (ref 31.5–35.7)
MCV RBC AUTO: 86.6 FL (ref 79–97)
MONOCYTES # BLD AUTO: 0.76 10*3/MM3 (ref 0.1–0.9)
MONOCYTES NFR BLD AUTO: 7.5 % (ref 5–12)
NEUTROPHILS NFR BLD AUTO: 6.49 10*3/MM3 (ref 1.7–7)
NEUTROPHILS NFR BLD AUTO: 64 % (ref 42.7–76)
NRBC BLD AUTO-RTO: 0 /100 WBC (ref 0–0.2)
PLATELET # BLD AUTO: 340 10*3/MM3 (ref 140–450)
PMV BLD AUTO: 10.3 FL (ref 6–12)
RBC # BLD AUTO: 4.91 10*6/MM3 (ref 3.77–5.28)
WBC NRBC COR # BLD AUTO: 10.13 10*3/MM3 (ref 3.4–10.8)

## 2025-04-10 PROCEDURE — G0463 HOSPITAL OUTPT CLINIC VISIT: HCPCS | Performed by: INTERNAL MEDICINE

## 2025-04-10 PROCEDURE — 85025 COMPLETE CBC W/AUTO DIFF WBC: CPT

## 2025-04-10 PROCEDURE — 36415 COLL VENOUS BLD VENIPUNCTURE: CPT

## 2025-04-10 NOTE — PROGRESS NOTES
Chief Complaint/Reason for Referral:  FOLLOW UP 2     Jaja Castillo, A*  Jaja Castillo, APRN        Subjective    History of Present Illness  Ms. Palmira Carbajal presents for 1 year follow up for leukocytosis. Chronic leukocytosis that waxes and wanes.  BCR-abl, flow cytometry, LUDA, RF, celiac, hepatitis testing were all negative.  White blood cell normal today 10.13.  Patient had her gallbladder removed in July.  Reports feeling much better since then.  Continues to have passing out spells.  Reports about once or twice a month.  This is down from prior.  She is on fludrocortisone as well as propranolol.  She is following with cardiology.  She follows with behavioral health for anxiety.  Denies any fevers or chills.  Feels well overall.  No night sweats weight loss reported.  No lymphadenopathy.          Oncology/Hematology History    No history exists.       Review of Systems   Constitutional:  Positive for fatigue. Negative for appetite change, diaphoresis, fever, unexpected weight gain and unexpected weight loss.   HENT:  Negative for hearing loss, mouth sores, sore throat, swollen glands, trouble swallowing and voice change.    Eyes:  Negative for blurred vision, double vision, pain, redness and visual disturbance.   Respiratory:  Negative for cough, shortness of breath and wheezing.    Cardiovascular:  Negative for chest pain, palpitations and leg swelling.   Gastrointestinal:  Negative for abdominal pain, blood in stool, constipation, diarrhea, nausea and vomiting.   Endocrine: Negative for cold intolerance, heat intolerance, polydipsia and polyuria.   Genitourinary:  Negative for decreased urine volume, difficulty urinating, dysuria, frequency, hematuria and urinary incontinence.   Musculoskeletal:  Negative for arthralgias, back pain, joint swelling and myalgias.   Skin:  Negative for color change, rash, skin lesions and wound.   Neurological:  Negative for dizziness, seizures, weakness,  numbness and headache.   Hematological:  Negative for adenopathy. Does not bruise/bleed easily.   Psychiatric/Behavioral:  Negative for depressed mood. The patient is not nervous/anxious.    All other systems reviewed and are negative.      Current Outpatient Medications on File Prior to Visit   Medication Sig Dispense Refill    amitriptyline (ELAVIL) 10 MG tablet Take 1 tablet by mouth At Night As Needed for Sleep. 30 tablet 0    dicyclomine (BENTYL) 20 MG tablet Take 1 tablet by mouth Every 6 (Six) Hours As Needed (abdominal cramping). 60 tablet 3    Emgality 120 MG/ML auto-injector pen INJECT AS DIRECTED 2ML SUBQ EVERY 30 DAYS FOR MIGRAINE 1 mL 4    esomeprazole (nexIUM) 40 MG capsule Take 1 capsule by mouth Every Morning Before Breakfast. 90 capsule 3    famotidine (PEPCID) 20 MG tablet Take 1 tablet by mouth At Night As Needed for Heartburn. (Patient taking differently: Take 1 tablet by mouth Every Night.) 90 tablet 1    fludrocortisone 0.1 MG tablet Take 1 tablet by mouth 2 (Two) Times a Day. 90 tablet 1    Hydrocort-Pramoxine, Perianal, (ANALPRAM-HC) 2.5-1 % rectal cream Insert  into the rectum 3 (Three) Times a Day. 1 each 3    lamoTRIgine (LaMICtal) 150 MG tablet Take 1 tablet by mouth Daily. 30 tablet 0    polyethylene glycol (MIRALAX) 17 g packet Take 17 g by mouth Daily. 5 packet 0    propranolol (INDERAL) 10 MG tablet Take 1 tablet by mouth 2 (Two) Times a Day. 60 tablet 3    Resmetirom (Rezdiffra) 80 MG tablet Take 1 tablet by mouth Daily. 90 tablet 3    Rimegepant Sulfate (Nurtec) 75 MG tablet dispersible tablet Take 1 tablet by mouth Daily As Needed (migraine). Indications: Migraine Headache 8 tablet 5    sertraline (Zoloft) 50 MG tablet Take 1 tablet by mouth Daily. 30 tablet 0    spironolactone (Aldactone) 50 MG tablet Take 1 tablet by mouth Daily. Indications: Abnormal Growth of Body or Facial Hair 30 tablet 3    vitamin D3 (Vitamin D) 125 MCG (5000 UT) capsule capsule Take 1 capsule by mouth  Daily. 90 capsule 1     No current facility-administered medications on file prior to visit.       Allergies   Allergen Reactions    Cymbalta [Duloxetine Hcl] Mental Status Change     Jittery and insomnia    Penicillins Rash     Past Medical History:   Diagnosis Date    Acid reflux     Anxiety     Chronic pain disorder     NO PAIN CLINIC, PCP/PSYCH CONT W/AMITRIPTYLINE    Depression     Eczema     Fatty liver     Gallstones     Intractable migraine without aura and without status migrainosus 2023    Kidney stone     HX OF    Maltracking of right patella     Migraines     Orthostatic hypotension     WORKED UP FOR POTS, CONT W/MEDS, MONNIN. RX FLORINEF    Panic disorder     PONV (postoperative nausea and vomiting)     GOOD RESULTS WITH SCOPALAMINE    PTSD (post-traumatic stress disorder)     WAKES UP FROM ANESTHESIA WITH PANIC ATTACK    Seasonal allergic rhinitis 2022    Self-injurious behavior     as a teenager    Suicide attempt     NO ADULT ISSUES     Past Surgical History:   Procedure Laterality Date     SECTION      CHOLECYSTECTOMY N/A 2024    Procedure: CHOLECYSTECTOMY LAPAROSCOPIC;  Surgeon: Cisco Hickman MD;  Location: [facility] OR [facility];  Service: General;  Laterality: N/A;    COLONOSCOPY N/A 10/11/2022    Procedure: COLONOSCOPY;  Surgeon: Dyan Griffin MD;  Location: [facility] ENDOSCOPY;  Service: Gastroenterology;  Laterality: N/A;  HEMORRHOIDS    CYSTOSCOPY      CYSTOSCOPY URETEROSCOPY Left 2023    Procedure: CYSTOSCOPY URETEROSCOPY RETROGRADE PYELOGRAM HOLMIUM LASER STENT INSERTION, left;  Surgeon: Melisa Garcia MD;  Location: [facility] MAIN OR;  Service: Urology;  Laterality: Left;    ENDOSCOPY N/A 2023    Procedure: ESOPHAGOGASTRODUODENOSCOPY WITH BIOPSIES;  Surgeon: Dyan Griffin MD;  Location: [facility] ENDOSCOPY;  Service: Gastroenterology;  Laterality: N/A;  ESOPHAGITIS, GASTRITIS    HYSTERECTOMY      KIDNEY STONE SURGERY      unspecified    KNEE  ARTHROSCOPY Right 7/11/2022    Procedure: KNEE ARTHROSCOPY WITH A LATERAL RELEASE AND CHONDROPLASTY;  Surgeon: Marco A Pitts MD;  [place] [facility] [place] [facility] [place]  Service: [place]opedics;  Laterality: Right;    WISDOM TOOTH EXTRACTION       Social History     Socioeconomic History    Marital status:      Spouse name: TRISTAN    Number of children: 3   Tobacco Use    Smoking status: Former     Current packs/day: 0.00     Average packs/day: 0.5 packs/day for 15.0 years (7.5 ttl pk-yrs)     Types: Cigarettes     Start date: 2/14/2007     Quit date: 2/14/2022     Years since quitting: 3.1    Smokeless tobacco: Never   Vaping Use    Vaping status: Every Day    Substances: Nicotine, Flavoring    Devices: Disposable   Substance and Sexual Activity    Alcohol use: Not Currently    Drug use: Not Currently     Types: Marijuana     Comment: Marijuana use in teenage years    Sexual activity: Defer     Partners: Male     Birth control/protection: Hysterectomy     Family History   Problem Relation Age of Onset    Arthritis Mother     Restless legs syndrome Mother     Thyroid disease Father     Colon polyps Father     Diabetes Father     Drug abuse Maternal Uncle     Alcohol abuse Maternal Uncle     Cancer Maternal Grandmother     Sleep apnea Son     Malig Hyperthermia Neg Hx      Immunization History   Administered Date(s) Administered    COVID-19 (MODERNA) 1st,2nd,3rd Dose Monovalent 04/12/2021, 05/03/2021    Tdap 01/04/2019       Tobacco Use: Medium Risk (4/10/2025)    Patient History     Smoking Tobacco Use: Former     Smokeless Tobacco Use: Never     Passive Exposure: Not on file       Objective     Physical Exam  Well appearing patient in no acute distress on RA  Anicteric sclerae, no rash on exposed skin  Respirations non-labored  Awake, alert, and oriented x 4. Speech intact. No gross neurologic deficit  Appropriate mood and affect    Vitals:    04/10/25 0849   BP: 110/68   Pulse: 73   Resp: 18   Temp: 97.9 °F (36.6 °C)  "  TempSrc: Temporal   SpO2: 98%   Weight: 75.2 kg (165 lb 12.6 oz)   Height: 162.6 cm (64.02\")   PainSc: 0-No pain         Wt Readings from Last 3 Encounters:   02/10/25 74.8 kg (165 lb)   10/08/24 71.5 kg (157 lb 9.6 oz)   09/09/24 69.7 kg (153 lb 9.6 oz)                ECOG: (0) Fully Active - Able to Carry On All Pre-disease Performance Without Restriction  Fall Risk Assessment was completed, and patient is at low risk for falls.  PHQ-9 Total Score:         The patient is  experiencing fatigue. Fatigue score: 4    PT/OT Functional Screening: PT fx screen : No needs identified  Speech Functional Screening: Speech fx screen : No needs identified  Rehab to be ordered: Rehab to be ordered : No needs identified        Result Review :  The following data was reviewed by: Luis Enrique Wiggins MD on 04/10/25:  Lab Results   Component Value Date    HGB 12.7 07/14/2024    HCT 37.7 07/14/2024    MCV 82.3 07/14/2024     (H) 07/14/2024    WBC 10.60 07/14/2024    NEUTROABS 5.04 07/14/2024    LYMPHSABS 4.58 (H) 07/14/2024    MONOSABS 0.68 07/14/2024    EOSABS 0.18 07/14/2024    BASOSABS 0.09 07/14/2024     Lab Results   Component Value Date    GLUCOSE 82 07/14/2024    BUN 7 07/14/2024    CREATININE 0.70 07/14/2024     07/14/2024    K 3.7 07/14/2024     07/14/2024    CO2 22.5 07/14/2024    CALCIUM 8.8 07/14/2024    PROTEINTOT 6.6 07/14/2024    ALBUMIN 4.0 07/14/2024    BILITOT <0.2 07/14/2024    ALKPHOS 83 07/14/2024    AST 11 07/14/2024    ALT 9 07/14/2024     Lab Results   Component Value Date     03/14/2023     Lab Results   Component Value Date    IRON 63 10/31/2023    LABIRON 17 (L) 10/31/2023    TRANSFERRIN 249 10/31/2023    TIBC 371 10/31/2023     Lab Results   Component Value Date     03/14/2023    [id number] 11/23/2022     No results found for: \"PSA\", \"CEA\", \"AFP\", \"\", \"\"    Behavioral health note personally reviewed    Cardiology note personally reviewed    Pathology " report personally reviewed         Assessment and Plan:  Diagnoses and all orders for this visit:    1. Leukocytosis, unspecified type (Primary)  -     CBC & Differential; Future    2. POTS (postural orthostatic tachycardia syndrome)    3. Generalized anxiety disorder      Chronic leukocytosis. Testing was negative for flow cytometry, BCR-abl, celiac, RA, LUDA, hepatitis. Likely benign etiology. May be secondary to history of the fatty liver. We discussed lifestyle changes in diet and exercise. CBC today 4/10/25 with normal WBC.     Repeat CBC 1 year.     POTS  Treatment per cardiology. Syncope has lessened.     STACY  Follows with psychiatry.       I spent 20 minutes caring for Palmira on this date of service. This time includes time spent by me in the following activities:preparing for the visit, reviewing tests, counseling and educating the patient/family/caregiver, ordering medications, tests, or procedures, documenting information in the medical record, and independently interpreting results and communicating that information with the patient/family/caregiver    Patient Follow Up: 1 year    Patient was given instructions and counseling regarding her condition or for health maintenance advice. Please see specific information pulled into the AVS if appropriate.

## 2025-05-06 ENCOUNTER — TELEMEDICINE (OUTPATIENT)
Dept: PSYCHIATRY | Facility: CLINIC | Age: 30
End: 2025-05-06
Payer: COMMERCIAL

## 2025-05-06 DIAGNOSIS — F33.0 MILD EPISODE OF RECURRENT MAJOR DEPRESSIVE DISORDER: Primary | Chronic | ICD-10-CM

## 2025-05-06 DIAGNOSIS — G47.9 SLEEPING DIFFICULTIES: ICD-10-CM

## 2025-05-06 DIAGNOSIS — G43.019 INTRACTABLE MIGRAINE WITHOUT AURA AND WITHOUT STATUS MIGRAINOSUS: ICD-10-CM

## 2025-05-06 DIAGNOSIS — F41.1 GENERALIZED ANXIETY DISORDER: Chronic | ICD-10-CM

## 2025-05-06 RX ORDER — GALCANEZUMAB 120 MG/ML
INJECTION, SOLUTION SUBCUTANEOUS
Qty: 1 ML | Refills: 3 | Status: SHIPPED | OUTPATIENT
Start: 2025-05-06

## 2025-05-06 RX ORDER — AMITRIPTYLINE HYDROCHLORIDE 10 MG/1
10 TABLET ORAL NIGHTLY PRN
Qty: 30 TABLET | Refills: 0 | Status: SHIPPED | OUTPATIENT
Start: 2025-05-06

## 2025-05-06 RX ORDER — LAMOTRIGINE 150 MG/1
150 TABLET ORAL DAILY
Qty: 30 TABLET | Refills: 0 | Status: SHIPPED | OUTPATIENT
Start: 2025-05-06

## 2025-05-06 NOTE — PROGRESS NOTES
This provider is located at home office working remotely through the [facility] (through [facility]), [address], [facility], [address] using a secure G[employer] Video Visit through [employer]. Patient is being seen remotely via telehealth at their home address in [place], and stated they are in a secure environment for this session. The patient's condition being diagnosed/treated is appropriate for telemedicine. The provider identified herself as well as her credentials.   The patient, and/or patients guardian, consent to be seen remotely, and when consent is given they understand that the consent allows for patient identifiable information to be sent to a third party as needed.   They may refuse to be seen remotely at any time. The electronic data is encrypted and password protected, and the patient and/or guardian has been advised of the potential risks to privacy not withstanding such measures.    You have chosen to receive care through a telehealth visit.  Do you consent to use a video/audio connection for your medical care today? Yes    Patient identifiers utilized: Name and date of birth.    Patient verbally confirmed consent for today's encounter 05/06/2025.    The patient does verbally confirm they are being seen today while physically located in the [facility].  This provider/this APRN is licensed in the [facility] where the patient is located/being seen.     Subjective   Palmira Carbajal is a 29 y.o. female who presents today for follow up    Chief Complaint: Medication management follow-up: Anxiety, depression, and sleeping difficulties follow-up    Accompanied by: The patient is seen alone at today's encounter    History of Present Illness:   -Last encounter with this APRN/Provider: 4/7/2025   -Mood reported as: Doing good and remaining stable on current treatment regimen.  The patient reports her anxiety symptoms continue to be  "higher than she would like, but reports she has a lot of external factors and stressors currently that she feels is influencing this.  -The patient reports her spouse is taking her back to [place] for her birthday next month, and she is excited about this.  -Patient rates symptoms of depression on average over the past two weeks at a 0/10 on a 0-10 scale, with 10 being the worst.  -Patient rates symptoms of anxiety on average over the past two weeks at a 5-6/10 on a 0-10 scale, with 10 being the worst.    -Appetite reported as: Good  -Changes in weight: Denies any  -Sleep reported as: Good for the most part, she reports still struggling at times with falling asleep initially at night  -The patient reports averaging 7-8 hours of sleep each night.  -The patient reports no nightmares, but reports she has had \"weird\" dreams.  -The patient reports occasional nighttime awakenings, she reports tossing and turning a lot at night, and will occasionally get up to the restroom, but reports she is able to fall back to sleep easily when she does wake up.    -Changes in medications or new medical problems/concerns since last visit: Denies any  -Reported medication compliance: The patient reports compliance with current psychotropic medication regimen.  -Reported medication side effects or concerns: Denies any other than reporting she does feel tired in the daytime ever since returning from her previous trip to [place], and she is not sure if this is medication related or not.  She reports when she first gets up in the morning she has energy, but in the afternoon she is tired and feels like she wants to take a nap.    -Auditory or visual hallucinations: Denies  -Behaviors different from patient baseline, or any reckless, impulsive, or risky behaviors: Denies  -Symptoms of karl or psychosis: Denies  -Self-injurious behavior: Denies  -SI/HI: The patient adamantly denies any suicidal or homicidal ideations, plans, or intent at the " time of this encounter and is convincing.    -Using a shared decision-making approach the patient reports she would like to continue her current treatment/medication regimen without any adjustments/changes at this time.  When discussing medication efficacy with the patient, and reassessing the need and appropriateness of continued psychotropic medication treatment and doses, she reports she is pleased with management of symptoms at this time, and that her current treatment/medication regimen has continued to be effective for her and she does not want to make any changes or adjustments at today's encounter.  -The patient reports when she first tapered off of Trintellix and began sertraline she took the sertraline at bedtime, and at that time felt that the sertraline made it hard for her to fall asleep.  The patient reports she is not sure if it was the sertraline causing this, or the adjustment of coming off of the Trintellix and starting the sertraline, but she is going to try taking sertraline at bedtime to see if this affects her daytime fatigue any, and interferes with her sleep any, but will return to morning dosing of sertraline if any concerns with taking it at bedtime.    -The patient does verbally contract for safety at today's encounter and is in verbal agreement with the safety/crisis plan. The patient reports in her own words that she will reach out to this APRN/office prior to next scheduled appointment if there is any worsening of mood, any new psychiatric symptoms, any medication side effects or concerns, any concern for safety to self or others, any suicidal or homicidal ideations plans or intent, or any concerns, or she will call 911, call or text the suicide and crisis lifeline at [phone], or go to the closest emergency department.      Patient Health Questionnaire-9 (PHQ-9) (Depression Screening Tool)  Little interest or pleasure in doing things? (Patient-Rptd) Not at all   Feeling down, depressed, or  hopeless? (Patient-Rptd) Not at all   PHQ-2 Total Score (Patient-Rptd) 0   Trouble falling or staying asleep, or sleeping too much? (Patient-Rptd) Several days   Feeling tired or having little energy? (Patient-Rptd) Almost all   Poor appetite or overeating? (Patient-Rptd) Not at all   Feeling bad about yourself - or that you are a failure or have let yourself or your family down? (Patient-Rptd) Not at all   Trouble concentrating on things, such as reading the newspaper or watching television? (Patient-Rptd) Not at all   Moving or speaking so slowly that other people could have noticed? Or the opposite - being so fidgety or restless that you have been moving around a lot more than usual? (Patient-Rptd) Not at all   Thoughts that you would be better off dead, or of hurting yourself in some way? (Patient-Rptd) Not at all   PHQ-9 Total Score (Patient-Rptd) 4   If you checked off any problems, how difficult have these problems made it for you to do your work, take care of things at home, or get along with other people? (Patient-Rptd) Somewhat difficult         PHQ-9 Total Score: (Patient-Rptd) 4       Generalized Anxiety Disorder 7-Item (STACY-7) Screening Tool  Feeling nervous, anxious or on edge: (Patient-Rptd) More than half the days  Not being able to stop or control worrying: (Patient-Rptd) More than half the days  Worrying too much about different things: (Patient-Rptd) Nearly every day  Trouble Relaxing: (Patient-Rptd) Several days  Being so restless that it is hard to sit still: (Patient-Rptd) Not at all  Feeling afraid as if something awful might happen: (Patient-Rptd) Several days  Becoming easily annoyed or irritable: (Patient-Rptd) Not at all  STACY 7 Total Score: (Patient-Rptd) 9  If you checked any problems, how difficult have these problems made it for you to do your work, take care of things at home, or get along with other people: (Patient-Rptd) Somewhat difficult      All Known Prior Psychiatric Medications  and Responses if Known:  -Zoloft - does not remember response, possible lack of efficacy  -Paxil - does not remember response, possible lack of efficacy  -Prozac - does not remember response, possible lack of efficacy  -Lexapro - does not remember response, possible lack of efficacy  -Celexa - does not remember response, possible lack of efficacy  -Desvenlafaxine/Pristiq - does not remember response, possible lack of efficacy  -Effexor - does not remember response, possible lack of efficacy  -Cymbalta - caused jitteriness and insomnia  -Viibryd - reports she cannot remember why she could not take this medication, but feels there was some kind of side effect  -Wellbutrin SR - did not like the way it made her feel, made her feel drunk and woozy  -Quetiapine - does not remember response, possible lack of efficacy  -Abilify - does not remember response, possible lack of efficacy  -Trazodone - does not remember response, possible lack of efficacy  -Hydroxyzine - Lack of efficacy  -Ambien - does not remember response, possible lack of efficacy  -Lorazepam - does not remember response, possible lack of efficacy  -Buspar - reported lack of efficacy, and also caused dizziness and vivid dreams  -Propranolol - the patient reports lack of efficacy and decreased heart rate when taking for mental health reasons  -The patient reports some of the medications that she cannot remember the response to listed above included side effects such as restless leg, nightmares, and heart palpitations, but she cannot remember which medications caused these side effects  -Olanzapine - patient reports possible partial efficacy, if any efficacy, unsure  -Trintellix - patient reports effective for depressive symptoms, but not for anxiety  -Lamictal - patient reports effective for depression/mood  -Amitriptyline - patient reports effective  -Sertraline - patient reports effective    Previous Suicide Attempts:  The patient reports one previous  suicide attempt as a teenager by overdosing on pills.  She reports no psychiatric hospitalization after this attempt, but she reports she was taken to the emergency department.  The patient denies any other previous suicide attempts.     Previous Self-Harming Behavior:  The patient reports a history of self harming by cutting in her teenage years, but none as an adult.    History of Seizures or TBI:  The patient denies any    Patient's Support Network Includes:   and mother    Last Menstrual Period:  Hysterectomy - partial.        The following portions of the patient's history were reviewed and updated as appropriate: allergies, current medications, past family history, past medical history, past social history, past surgical history and problem list.          Past Medical History:  Past Medical History:   Diagnosis Date    Acid reflux     Anxiety     Chronic pain disorder     NO PAIN CLINIC, PCP/PSYCH CONT W/AMITRIPTYLINE    Depression     Eczema     Fatty liver     Gallstones     Intractable migraine without aura and without status migrainosus 02/09/2023    Kidney stone     HX OF    Maltracking of right patella     Migraines     Orthostatic hypotension     WORKED UP FOR POTS, CONT W/MEDS, MONNIN. RX FLORINEF    Panic disorder     PONV (postoperative nausea and vomiting)     GOOD RESULTS WITH SCOPALAMINE    PTSD (post-traumatic stress disorder)     WAKES UP FROM ANESTHESIA WITH PANIC ATTACK    Seasonal allergic rhinitis 05/31/2022    Self-injurious behavior     as a teenager    Suicide attempt 2007    NO ADULT ISSUES       Social History:  Social History     Socioeconomic History    Marital status:      Spouse name: TRISTAN    Number of children: 3   Tobacco Use    Smoking status: Former     Current packs/day: 0.00     Average packs/day: 0.5 packs/day for 15.0 years (7.5 ttl pk-yrs)     Types: Cigarettes     Start date: 2/14/2007     Quit date: 2/14/2022     Years since quitting: 3.2    Smokeless  tobacco: Never   Vaping Use    Vaping status: Every Day    Substances: Nicotine, Flavoring    Devices: Disposable   Substance and Sexual Activity    Alcohol use: Not Currently    Drug use: Not Currently     Types: Marijuana     Comment: Marijuana use in teenage years    Sexual activity: Defer     Partners: Male     Birth control/protection: Hysterectomy       Family History:  Family History   Problem Relation Age of Onset    Arthritis Mother     Restless legs syndrome Mother     Thyroid disease Father     Colon polyps Father     Diabetes Father     Drug abuse Maternal Uncle     Alcohol abuse Maternal Uncle     Cancer Maternal Grandmother     Sleep apnea Son     Malig Hyperthermia Neg Hx        Past Surgical History:  Past Surgical History:   Procedure Laterality Date     SECTION      CHOLECYSTECTOMY N/A 2024    Procedure: CHOLECYSTECTOMY LAPAROSCOPIC;  Surgeon: Cisco Hickman MD;  [place] [facility] [place] [facility]  Service: General;  Laterality: N/A;    COLONOSCOPY N/A 10/11/2022    Procedure: COLONOSCOPY;  Surgeon: Dyan Griffin MD;  Location: [facility] ENDOSCOPY;  Service: Gastroenterology;  Laterality: N/A;  HEMORRHOIDS    CYSTOSCOPY      CYSTOSCOPY URETEROSCOPY Left 2023    Procedure: CYSTOSCOPY URETEROSCOPY RETROGRADE PYELOGRAM HOLMIUM LASER STENT INSERTION, left;  Surgeon: Melisa Garcia MD;  Location: [facility] MAIN OR;  Service: Urology;  Laterality: Left;    ENDOSCOPY N/A 2023    Procedure: ESOPHAGOGASTRODUODENOSCOPY WITH BIOPSIES;  Surgeon: Dyan Griffin MD;  Location: [facility] ENDOSCOPY;  Service: Gastroenterology;  Laterality: N/A;  ESOPHAGITIS, GASTRITIS    HYSTERECTOMY      KIDNEY STONE SURGERY      unspecified    KNEE ARTHROSCOPY Right 2022    Procedure: KNEE ARTHROSCOPY WITH A LATERAL RELEASE AND CHONDROPLASTY;  Surgeon: Marco A iPtts MD;  Location: [facility];  Service: Orthopedics;  Laterality: Right;    WISDOM TOOTH EXTRACTION         Problem  List:  Patient Active Problem List   Diagnosis    Maltracking of right patella    Impingement syndrome involving patellar fat pad of right knee    Chondromalacia    Patellar malalignment syndrome of right knee    Binocular vision disorder with diplopia    Generalized anxiety disorder    Acid reflux    PTSD (post-traumatic stress disorder)    Kidney stone on left side    Seasonal allergic rhinitis    Burn of finger and thumb of right hand, second degree    Hemorrhoids    Right hand pain    Rectal bleeding    Anal or rectal pain    Decreased appetite    Aftercare following surgery of right knee arthroscopic chondroplasty and lateral release, 7/11/2022    Generalized body aches    Epigastric pain    Female hirsutism    Right ovarian cyst    Moderate episode of recurrent major depressive disorder    Primary insomnia    Vitamin D deficiency    Intractable migraine without aura and without status migrainosus    Calcified granuloma of lung    Nipple discharge    Nausea and vomiting    Overweight (BMI 25.0-29.9)    Acne vulgaris    Vaping nicotine dependence, tobacco product    Achilles tendinitis of both lower extremities    BRADY (nonalcoholic steatohepatitis)    Symptomatic cholelithiasis       Allergy:   Allergies   Allergen Reactions    Cymbalta [Duloxetine Hcl] Mental Status Change     Jittery and insomnia    Penicillins Rash        Current Medications:   Current Outpatient Medications   Medication Sig Dispense Refill    amitriptyline (ELAVIL) 10 MG tablet Take 1 tablet by mouth At Night As Needed for Sleep. 30 tablet 0    lamoTRIgine (LaMICtal) 150 MG tablet Take 1 tablet by mouth Daily. 30 tablet 0    sertraline (Zoloft) 50 MG tablet Take 1 tablet by mouth Daily. 30 tablet 0    dicyclomine (BENTYL) 20 MG tablet Take 1 tablet by mouth Every 6 (Six) Hours As Needed (abdominal cramping). 60 tablet 3    Emgality 120 MG/ML auto-injector pen INJECT AS DIRECTED 2ML SUBQ EVERY 30 DAYS FOR MIGRAINE 1 mL 4    esomeprazole  (nexIUM) 40 MG capsule Take 1 capsule by mouth Every Morning Before Breakfast. 90 capsule 3    famotidine (PEPCID) 20 MG tablet Take 1 tablet by mouth At Night As Needed for Heartburn. (Patient taking differently: Take 1 tablet by mouth Every Night.) 90 tablet 1    fludrocortisone 0.1 MG tablet Take 1 tablet by mouth 2 (Two) Times a Day. 90 tablet 1    Hydrocort-Pramoxine, Perianal, (ANALPRAM-HC) 2.5-1 % rectal cream Insert  into the rectum 3 (Three) Times a Day. 1 each 3    polyethylene glycol (MIRALAX) 17 g packet Take 17 g by mouth Daily. 5 packet 0    propranolol (INDERAL) 10 MG tablet Take 1 tablet by mouth 2 (Two) Times a Day. 60 tablet 3    Resmetirom (Rezdiffra) 80 MG tablet Take 1 tablet by mouth Daily. 90 tablet 3    Rimegepant Sulfate (Nurtec) 75 MG tablet dispersible tablet Take 1 tablet by mouth Daily As Needed (migraine). Indications: Migraine Headache 8 tablet 5    spironolactone (Aldactone) 50 MG tablet Take 1 tablet by mouth Daily. Indications: Abnormal Growth of Body or Facial Hair 30 tablet 3    vitamin D3 (Vitamin D) 125 MCG (5000 UT) capsule capsule Take 1 capsule by mouth Daily. 90 capsule 1     No current facility-administered medications for this visit.         Review of Symptoms:    Review of Systems   Psychiatric/Behavioral:  Positive for depressed mood and stress. Negative for agitation, behavioral problems, dysphoric mood, hallucinations, self-injury, suicidal ideas and negative for hyperactivity. The patient is nervous/anxious.          Physical Exam:   not currently breastfeeding. There is no height or weight on file to calculate BMI.   Due to the remote nature of this encounter (virtual encounter), vitals were unable to be obtained.  Height stated at 64 inches.  Weight reported at around 161-162 pounds.        Physical Exam  Constitutional:       General: She is not in acute distress.  Neurological:      Mental Status: She is alert and oriented to person, place, and time.    Psychiatric:         Attention and Perception: Attention normal.         Mood and Affect: Mood and affect normal.         Speech: Speech normal.         Behavior: Behavior normal. Behavior is cooperative.         Thought Content: Thought content normal. Thought content is not paranoid or delusional. Thought content does not include homicidal or suicidal ideation. Thought content does not include homicidal or suicidal plan.         Cognition and Memory: Cognition and memory normal.         Judgment: Judgment normal.         Mental Status Exam:   Hygiene:   good  Cooperation:  Cooperative and attentive  Eye Contact:  Good  Psychomotor Behavior:  Appropriate  Affect:  Appropriate  Mood: normal  Hopelessness: Denies  Speech:  Normal  Thought Process:  Goal directed and Linear  Thought Content:  Mood congruent  Suicidal:  None  Homicidal:  None  Hallucinations:  None  Delusion:  None  Memory:  Intact  Orientation:  Person, Place, Time, and Situation  Reliability:  good  Insight:  Good  Judgement:  Good  Impulse Control:  Good  Physical/Medical Issues:  No            Wt Readings from Last 3 Encounters:   04/10/25 75.2 kg (165 lb 12.6 oz)   02/10/25 74.8 kg (165 lb)   10/08/24 71.5 kg (157 lb 9.6 oz)     Temp Readings from Last 3 Encounters:   04/10/25 97.9 °F (36.6 °C) (Temporal)   08/08/24 97.2 °F (36.2 °C)   07/18/24 96.9 °F (36.1 °C) (Temporal)     BP Readings from Last 3 Encounters:   04/10/25 110/68   02/10/25 119/80   10/08/24 109/74     Pulse Readings from Last 3 Encounters:   04/10/25 73   02/10/25 69   10/08/24 98      BMI Readings from Last 3 Encounters:   04/10/25 28.44 kg/m²   02/10/25 28.32 kg/m²   10/08/24 27.04 kg/m²       Lab Results:   Lab on 04/10/2025   Component Date Value Ref Range Status    WBC 04/10/2025 10.13  3.40 - 10.80 10*3/mm3 Final    RBC 04/10/2025 4.91  3.77 - 5.28 10*6/mm3 Final    Hemoglobin 04/10/2025 14.2  12.0 - 15.9 g/dL Final    Hematocrit 04/10/2025 42.5  34.0 - 46.6 % Final     MCV 04/10/2025 86.6  79.0 - 97.0 fL Final    MCH 04/10/2025 28.9  26.6 - 33.0 pg Final    MCHC 04/10/2025 33.4  31.5 - 35.7 g/dL Final    RDW 04/10/2025 13.0  12.3 - 15.4 % Final    RDW-SD 04/10/2025 40.4  37.0 - 54.0 fl Final    MPV 04/10/2025 10.3  6.0 - 12.0 fL Final    Platelets 04/10/2025 340  140 - 450 10*3/mm3 Final    Neutrophil % 04/10/2025 64.0  42.7 - 76.0 % Final    Lymphocyte % 04/10/2025 25.4  19.6 - 45.3 % Final    Monocyte % 04/10/2025 7.5  5.0 - 12.0 % Final    Eosinophil % 04/10/2025 1.9  0.3 - 6.2 % Final    Basophil % 04/10/2025 0.8  0.0 - 1.5 % Final    Immature Grans % 04/10/2025 0.4  0.0 - 0.5 % Final    Neutrophils, Absolute 04/10/2025 6.49  1.70 - 7.00 10*3/mm3 Final    Lymphocytes, Absolute 04/10/2025 2.57  0.70 - 3.10 10*3/mm3 Final    Monocytes, Absolute 04/10/2025 0.76  0.10 - 0.90 10*3/mm3 Final    Eosinophils, Absolute 04/10/2025 0.19  0.00 - 0.40 10*3/mm3 Final    Basophils, Absolute 04/10/2025 0.08  0.00 - 0.20 10*3/mm3 Final    Immature Grans, Absolute 04/10/2025 0.04  0.00 - 0.05 10*3/mm3 Final    nRBC 04/10/2025 0.0  0.0 - 0.2 /100 WBC Final   Office Visit on 08/08/2024   Component Date Value Ref Range Status    Penicillin G Allergen IgE 08/08/2024 <0.10  Class 0 kU/L Final    Class Description 08/08/2024 Comment   Final        Levels of Specific IgE       Class  Description of Class      ---------------------------  -----  --------------------                     < 0.10         0         Negative             0.10 -    0.31         0/I       Equivocal/Low             0.32 -    0.55         I         Low             0.56 -    1.40         II        Moderate             1.41 -    3.90         III       High             3.91 -   19.00         IV        Very High            19.01 -  100.00         V         Very High                    >100.00         VI        Very High    25 Hydroxy, Vitamin D 08/08/2024 37.6  30.0 - 100.0 ng/ml Final   Admission on 07/18/2024, Discharged on  "07/18/2024   Component Date Value Ref Range Status    Case Report 07/18/2024    Final                    Value:Surgical Pathology Report                         Case: [id number]                                  Authorizing Provider:  Cisco Hickman MD        Collected:           07/18/2024 12:54 PM          Ordering Location:     [facility]  Received:            07/19/2024 07:01 AM                                 OR                                                                           Pathologist:           Sue Frankel DO                                                       Specimen:    Gallbladder, gallbladder and contents                                                      Clinical Information 07/18/2024    Final                    Value:Symptomatic cholelithiasis    Final Diagnosis 07/18/2024    Final                    Value:Gallbladder, cholecystectomy:                           - Mild chronic cholecystitis                           - Focal cholesterolosis                           - Cholelithiasis     Gross Description 07/18/2024    Final                    Value:1. Gallbladder.                          Received in formalin and labeled \"gallbladder and contents\" is an 8.0 x                           3.0 x 3.0 cm intact gallbladder received with a clipped cystic duct                           margin.  The dusky serosa is smooth, and the opposing cauterized                           adventitia is shaggy.  Opening reveals dark green, tenacious bile fluid                           with 2 yellow, nodular gallstones (averaging 0.3 cm), dark green, velvety                           mucosa with mild yellow stippling, and an average wall thickness of 0.2                           cm.  No distinct lesions or lymph nodes are identified.  Representative                           sections are submitted in 1 cassette.                           YOBANI    Microscopic Description 07/18/2024    Final "                    Value:Microscopic examination performed.   Admission on 07/14/2024, Discharged on 07/14/2024   Component Date Value Ref Range Status    Glucose 07/14/2024 82  65 - 99 mg/dL Final    BUN 07/14/2024 7  6 - 20 mg/dL Final    Creatinine 07/14/2024 0.70  0.57 - 1.00 mg/dL Final    Sodium 07/14/2024 141  136 - 145 mmol/L Final    Potassium 07/14/2024 3.7  3.5 - 5.2 mmol/L Final    Chloride 07/14/2024 105  98 - 107 mmol/L Final    CO2 07/14/2024 22.5  22.0 - 29.0 mmol/L Final    Calcium 07/14/2024 8.8  8.6 - 10.5 mg/dL Final    Total Protein 07/14/2024 6.6  6.0 - 8.5 g/dL Final    Albumin 07/14/2024 4.0  3.5 - 5.2 g/dL Final    ALT (SGPT) 07/14/2024 9  1 - 33 U/L Final    AST (SGOT) 07/14/2024 11  1 - 32 U/L Final    Alkaline Phosphatase 07/14/2024 83  39 - 117 U/L Final    Total Bilirubin 07/14/2024 <0.2  0.0 - 1.2 mg/dL Final    Globulin 07/14/2024 2.6  gm/dL Final    A/G Ratio 07/14/2024 1.5  g/dL Final    BUN/Creatinine Ratio 07/14/2024 10.0  7.0 - 25.0 Final    Anion Gap 07/14/2024 13.5  5.0 - 15.0 mmol/L Final    eGFR 07/14/2024 118.7  >60.0 mL/min/1.73 Final    Lipase 07/14/2024 33  13 - 60 U/L Final    Color, UA 07/14/2024 Yellow  Yellow, Straw Final    Appearance, UA 07/14/2024 Clear  Clear Final    pH, UA 07/14/2024 8.0  5.0 - 8.0 Final    Specific Gravity, UA 07/14/2024 <=1.005  1.005 - 1.030 Final    Glucose, UA 07/14/2024 Negative  Negative Final    Ketones, UA 07/14/2024 Negative  Negative Final    Bilirubin, UA 07/14/2024 Negative  Negative Final    Blood, UA 07/14/2024 Negative  Negative Final    Protein, UA 07/14/2024 Negative  Negative Final    Leuk Esterase, UA 07/14/2024 Negative  Negative Final    Nitrite, UA 07/14/2024 Negative  Negative Final    Urobilinogen, UA 07/14/2024 0.2 E.U./dL  0.2 - 1.0 E.U./dL Final    Extra Tube 07/14/2024 Hold for add-ons.   Final    Auto resulted.    Extra Tube 07/14/2024 hold for add-on   Final    Auto resulted    Extra Tube 07/14/2024 Hold for  add-ons.   Final    Auto resulted.    Extra Tube 07/14/2024 Hold for add-ons.   Final    Auto resulted    WBC 07/14/2024 10.60  3.40 - 10.80 10*3/mm3 Final    RBC 07/14/2024 4.58  3.77 - 5.28 10*6/mm3 Final    Hemoglobin 07/14/2024 12.7  12.0 - 15.9 g/dL Final    Hematocrit 07/14/2024 37.7  34.0 - 46.6 % Final    MCV 07/14/2024 82.3  79.0 - 97.0 fL Final    MCH 07/14/2024 27.7  26.6 - 33.0 pg Final    MCHC 07/14/2024 33.7  31.5 - 35.7 g/dL Final    RDW 07/14/2024 13.3  12.3 - 15.4 % Final    RDW-SD 07/14/2024 39.8  37.0 - 54.0 fl Final    MPV 07/14/2024 10.1  6.0 - 12.0 fL Final    Platelets 07/14/2024 456 (H)  140 - 450 10*3/mm3 Final    Neutrophil % 07/14/2024 47.6  42.7 - 76.0 % Final    Lymphocyte % 07/14/2024 43.2  19.6 - 45.3 % Final    Monocyte % 07/14/2024 6.4  5.0 - 12.0 % Final    Eosinophil % 07/14/2024 1.7  0.3 - 6.2 % Final    Basophil % 07/14/2024 0.8  0.0 - 1.5 % Final    Immature Grans % 07/14/2024 0.3  0.0 - 0.5 % Final    Neutrophils, Absolute 07/14/2024 5.04  1.70 - 7.00 10*3/mm3 Final    Lymphocytes, Absolute 07/14/2024 4.58 (H)  0.70 - 3.10 10*3/mm3 Final    Monocytes, Absolute 07/14/2024 0.68  0.10 - 0.90 10*3/mm3 Final    Eosinophils, Absolute 07/14/2024 0.18  0.00 - 0.40 10*3/mm3 Final    Basophils, Absolute 07/14/2024 0.09  0.00 - 0.20 10*3/mm3 Final    Immature Grans, Absolute 07/14/2024 0.03  0.00 - 0.05 10*3/mm3 Final    nRBC 07/14/2024 0.0  0.0 - 0.2 /100 WBC Final    RBC Morphology 07/14/2024 Normal  Normal Final    WBC Morphology 07/14/2024 Normal  Normal Final    Platelet Morphology 07/14/2024 Normal  Normal Final   Admission on 07/13/2024, Discharged on 07/14/2024   Component Date Value Ref Range Status    Glucose 07/13/2024 111 (H)  65 - 99 mg/dL Final    BUN 07/13/2024 9  6 - 20 mg/dL Final    Creatinine 07/13/2024 0.71  0.57 - 1.00 mg/dL Final    Sodium 07/13/2024 141  136 - 145 mmol/L Final    Potassium 07/13/2024 3.7  3.5 - 5.2 mmol/L Final    Chloride 07/13/2024 105  98 - 107  mmol/L Final    CO2 07/13/2024 20.2 (L)  22.0 - 29.0 mmol/L Final    Calcium 07/13/2024 9.0  8.6 - 10.5 mg/dL Final    Total Protein 07/13/2024 7.0  6.0 - 8.5 g/dL Final    Albumin 07/13/2024 4.2  3.5 - 5.2 g/dL Final    ALT (SGPT) 07/13/2024 10  1 - 33 U/L Final    AST (SGOT) 07/13/2024 11  1 - 32 U/L Final    Alkaline Phosphatase 07/13/2024 82  39 - 117 U/L Final    Total Bilirubin 07/13/2024 <0.2  0.0 - 1.2 mg/dL Final    Globulin 07/13/2024 2.8  gm/dL Final    A/G Ratio 07/13/2024 1.5  g/dL Final    BUN/Creatinine Ratio 07/13/2024 12.7  7.0 - 25.0 Final    Anion Gap 07/13/2024 15.8 (H)  5.0 - 15.0 mmol/L Final    eGFR 07/13/2024 116.7  >60.0 mL/min/1.73 Final    Lipase 07/13/2024 37  13 - 60 U/L Final    Color, UA 07/13/2024 Yellow  Yellow, Straw Final    Appearance, UA 07/13/2024 Turbid (A)  Clear Final    pH, UA 07/13/2024 >=9.0 (H)  5.0 - 8.0 Final    Specific Van, UA 07/13/2024 1.021  1.005 - 1.030 Final    Glucose, UA 07/13/2024 Negative  Negative Final    Ketones, UA 07/13/2024 15 mg/dL (1+) (A)  Negative Final    Bilirubin, UA 07/13/2024 Negative  Negative Final    Blood, UA 07/13/2024 Negative  Negative Final    Protein, UA 07/13/2024 Trace (A)  Negative Final    Leuk Esterase, UA 07/13/2024 Negative  Negative Final    Nitrite, UA 07/13/2024 Negative  Negative Final    Urobilinogen, UA 07/13/2024 1.0 E.U./dL  0.2 - 1.0 E.U./dL Final    Extra Tube 07/13/2024 Hold for add-ons.   Final    Auto resulted.    Extra Tube 07/13/2024 hold for add-on   Final    Auto resulted    Extra Tube 07/13/2024 Hold for add-ons.   Final    Auto resulted.    Extra Tube 07/13/2024 Hold for add-ons.   Final    Auto resulted    WBC 07/13/2024 14.01 (H)  3.40 - 10.80 10*3/mm3 Final    RBC 07/13/2024 4.85  3.77 - 5.28 10*6/mm3 Final    Hemoglobin 07/13/2024 13.3  12.0 - 15.9 g/dL Final    Hematocrit 07/13/2024 39.7  34.0 - 46.6 % Final    MCV 07/13/2024 81.9  79.0 - 97.0 fL Final    MCH 07/13/2024 27.4  26.6 - 33.0 pg Final     MCHC 07/13/2024 33.5  31.5 - 35.7 g/dL Final    RDW 07/13/2024 13.1  12.3 - 15.4 % Final    RDW-SD 07/13/2024 39.0  37.0 - 54.0 fl Final    MPV 07/13/2024 10.3  6.0 - 12.0 fL Final    Platelets 07/13/2024 461 (H)  140 - 450 10*3/mm3 Final    Neutrophil % 07/13/2024 54.4  42.7 - 76.0 % Final    Lymphocyte % 07/13/2024 37.3  19.6 - 45.3 % Final    Monocyte % 07/13/2024 6.2  5.0 - 12.0 % Final    Eosinophil % 07/13/2024 1.1  0.3 - 6.2 % Final    Basophil % 07/13/2024 0.6  0.0 - 1.5 % Final    Immature Grans % 07/13/2024 0.4  0.0 - 0.5 % Final    Neutrophils, Absolute 07/13/2024 7.61 (H)  1.70 - 7.00 10*3/mm3 Final    Lymphocytes, Absolute 07/13/2024 5.23 (H)  0.70 - 3.10 10*3/mm3 Final    Monocytes, Absolute 07/13/2024 0.87  0.10 - 0.90 10*3/mm3 Final    Eosinophils, Absolute 07/13/2024 0.16  0.00 - 0.40 10*3/mm3 Final    Basophils, Absolute 07/13/2024 0.09  0.00 - 0.20 10*3/mm3 Final    Immature Grans, Absolute 07/13/2024 0.05  0.00 - 0.05 10*3/mm3 Final    nRBC 07/13/2024 0.0  0.0 - 0.2 /100 WBC Final    Anisocytosis 07/13/2024 Slight/1+  None Seen Final    Poikilocytes 07/13/2024 Slight/1+  None Seen Final    WBC Morphology 07/13/2024 Normal  Normal Final    Large Platelets 07/13/2024 Slight/1+  None Seen Final           Assessment & Plan   Problems Addressed this Visit          Mental Health    Generalized anxiety disorder    Relevant Medications    sertraline (Zoloft) 50 MG tablet    amitriptyline (ELAVIL) 10 MG tablet     Other Visit Diagnoses         Mild episode of recurrent major depressive disorder  (Chronic)   -  Primary    R/O BPAD/BPD    Relevant Medications    sertraline (Zoloft) 50 MG tablet    lamoTRIgine (LaMICtal) 150 MG tablet    amitriptyline (ELAVIL) 10 MG tablet      Sleeping difficulties        Relevant Medications    amitriptyline (ELAVIL) 10 MG tablet          Diagnoses         Codes Comments      Mild episode of recurrent major depressive disorder    -  Primary ICD-10-CM: F33.0  ICD-9-CM:  296.31 R/O BPAD/BPD      Generalized anxiety disorder     ICD-10-CM: F41.1  ICD-9-CM: 300.02       Sleeping difficulties     ICD-10-CM: G47.9  ICD-9-CM: 780.50             Visit Diagnoses:    ICD-10-CM ICD-9-CM   1. Mild episode of recurrent major depressive disorder  F33.0 296.31   2. Generalized anxiety disorder  F41.1 300.02   3. Sleeping difficulties  G47.9 780.50           GOALS:  Short Term Goals: Patient will be compliant with medication, and patient will have no significant medication related side effects.  Patient will be engaged in psychotherapy as indicated.  Patient will report subjective improvement of symptoms.  Long term goals: To stabilize mood and treat/improve subjective symptoms, the patient will stay out of the hospital, the patient will be at an optimal level of functioning, and the patient will take all medications as prescribed.  The patient verbalized understanding and agreement with goals that were mutually set.      TREATMENT PLAN: Begin supportive psychotherapy efforts and take medications as indicated.  Medication and treatment options, both pharmacological and non-pharmacological treatment options, discussed during today's visit, including any off label use of medication. Patient acknowledged and verbally consented with current treatment plan and was educated on the importance of compliance with treatment and follow-up appointments.  The patient has previously declined a referral to begin psychotherapy, but has verbalized she will reach out to this APRN/office if she changes her mind.    -Continue amitriptyline 10 mg by mouth once nightly to assist with sleeping difficulties and also can assist with mood.  -Continue lamotrigine 150 mg by mouth once daily for mood.  -Continue Sertraline 50 mg by mouth once daily for mood.      MEDICATION ISSUES:  Discussed medication options and treatment plan of prescribed medication, any off label use of medication, as well as the risks, benefits, any  black box warnings including increased suicidality, and side effects including but not limited to potential falls, dizziness, possible impaired driving, GI side effects (change in appetite, abdominal discomfort, nausea, vomiting, diarrhea, and/or constipation), dry mouth, somnolence, sedation, insomnia, activation, agitation, irritation, tremors, abnormal muscle movements or disorders, tardive dyskinesia, akathisia, asthenia, headache, sweating, possible bruising or rare bleeding, electrolyte and/or fluid abnormalities, change in blood pressure/heart rate/and or heart rhythm, hypotension, sexual dysfunction, rare impulse control problems, rare seizures, rare neuroleptic malignant syndrome, increased risk of death and cerebrovascular events, change in blood glucose and increased risk for diabetes, change in triglycerides and cholesterol and increased risk for dyslipidemia,  weight gain, weight gain that can become problematic to health, skin conditions and reactions, and metabolic adversities among others. Patient and/or guardian are agreeable to call the office with any worsening of symptoms or onset of side effects, or if any concerns or questions arise.  The contact information for the office is made available to the patient and/or guardian. Patient and/or guardian are agreeable to call 911 or go to the nearest ER should they begin having any SI/HI, or if any urgent concerns arise.  Further educational information provided in after visit summary for patient to review.    Due to the nature of virtual visits and inability to monitor vital signs and weight with virtual visits, the patient has been encouraged to monitor their vital signs and weight regularly either through self-monitoring via home device(s) or with their Primary Care Provider, and the patient has been instructed to notify this APRN of any abnormalities or significant changes from baseline.     Patient aware of limitations of provider's availability  and office hours, and how to reach provider/office if needed (office number for patient to call for any questions/concerns: [phone]). If the patient's needs require more frequent or intensive management/monitoring than can be provided from this provider utilizing a strictly virtual platform, or care that is outside of this provider's scope, then patient may be referred to more appropriate provider or modality.    This APRN has discussed the benefits and risks of taking/continuing Lamictal (Lamotrigine).  The side effects of Lamictal can include a benign rash, blurred or double vision, dizziness, ataxia, sedation, headache, tremor, insomnia, poor coordination, fatigue,  nausea, vomiting, dyspepsia, rhinitis, infection, pharyngitis, asthenia, a rare but serious rash, rare multi-organ failure associated with Bull-Avelino Syndrome, toxic epidermal necrolysis, drug hypersensitivity syndrome, rare blood dyscrasias, rare aseptic meningitis, rare sudden unexplained deaths in people with epilepsy, withdrawal seizures upon abrupt withdrawal, and rare activation of suicidal ideation and behavior (suicidality).  This APRN has discussed that a very slow dose titration when starting, or changing doses, of Lamictal may reduce the incidence of skin rash and other side effects.  The dosage should not be titrated upwards or increased faster than recommended due to the possibility of the discussed side effects and risk of development of a skin rash (which can become life threatening).    This APRN has also discussed that if the patient stops taking the Lamictal for 3-5 days or longer, it will be necessary to restart the drug with an initial dose titration, as rashes have been reported on reexposure.  If the patient and Provider decide to stop the Lamictal, the patient will follow the directions of this APRN/this office as a guided taper over about two weeks is appropriate due to the risk of relapse in bipolar disorder with  those with a mood or bipolar disorder, the risk of seizures in those with epilepsy, and discontinuation symptoms upon rapid discontinuation of Lamictal.    The patient verbalizes understanding of benefits and risks as discussed, the patient/guardian feels the benefits outweigh the risks and is agreeable to continue/take Lamictal as discussed.  The patient is advised should any side effects or rash develops they are to stop the Lamictal immediately and contact this APRN/this office or go to the emergency department immediately.  The patient verbalizes understanding and agreement with treatment plan in their own words.      VERBAL INFORMED CONSENT FOR MEDICATION:  The patient was educated that their proposed/prescribed psychotropic medication(s) has potential risks, side effects, adverse effects, and black box warnings; and these have been discussed with the patient.  The patient has been informed that their treatment and medication dosage is to be individualized, and may even be above or below the recommended range/dosage due to patient individualization and response, but medication is prescribed using a shared decision making approach, and no medication or dosage will be prescribed without the patient's verbal consent.  The reason for the use of the medication including any off label use and alternative modes of treatment other than or in addition to medication has been considered and discussed, the probable consequences of not receiving the proposed treatment have been discussed, and any treatment side effects, black box warnings, and cautions associated with treatment have been discussed with the patient.  The patient is allowed ample time to openly discuss and ask questions regarding the proposed medication(s) and treatment plan and the patient verbalizes understanding the reasons for the use of the medication, its potential risks and benefits, other alternative treatment(s), and the probable consequences that  may occur if the proposed medication is not given.  The patient has been given ample time to ask questions and study the information and find the information to be specific, accurate, and complete.  The patient gives verbal consent for the medication(s) proposed/prescribed, they verbalized understanding that they can refuse and withdraw consent at any time with the assistance of this APRN, and the patient has verbally confirmed that they are aware, and are willing, to take the prescribed medication and follow the treatment plan with the known possible risks, side effect, black box warnings, and any potential medication interactions, and the patient reports they will be worse off without this medication and treatment plan.  The patient is advised to contact this APRN/this office if any questions or concerns arise at any time (at [phone]), or call 911/go to the closest emergency department if needed or outside of office hours.      SUICIDE RISK ASSESSMENT AND SAFETY PLAN: Unalterable demographics and a history of mental health intervention indicate this patient is in a high risk category compared to the general population. At present, the patient denies active SI/HI, intentions, or plans at this time and agrees to seek immediate care should such thoughts develop. The patient verbalizes understanding of how to access emergency care if needed and agrees to do so. Consideration of suicide risk and protective factors such as history, current presentation, individual strengths and weaknesses, psychosocial and environmental stressors and variables, psychiatric illness and symptoms, medical conditions and pain, took place in this interview. Based on those considerations, the patient is determined: within individual baseline and presenting no imminent risk for suicide or homicide. Other recommendations: The patient does not meet the criteria for inpatient admission and is not a safety risk to self or others at today's  visit. Inpatient treatment offers no significant advantages over outpatient treatment for this patient at today's visit.  The patient was given ample time for questions and fully participated in treatment planning.  The patient was encouraged to call the clinic with any questions or concerns.  The patient was informed of access to emergency care. If patient were to develop any significant symptomatology, suicidal ideation, homicidal ideation, any concerns, or feel unsafe at any time they are to call the clinic and if unable to get immediate assistance should immediately call 911 or go to the nearest emergency room.  Patient contracted verbally for the following: If you are experiencing an emotional crisis or have thoughts of harming yourself or others, please go to your nearest local emergency room or call 911. Will continue to re-assess medication response and side effects frequently to establish efficacy and ensure safety. Risks, any black box warnings, side effects, off label usage, and benefits of medication and treatment discussed with patient, along with potential adverse side effects of current and/or newly prescribed medication, alternative treatment options, and OTC medications.  Patient verbalized understanding of potential risks, any off label use of medication, any black box warnings, and any side effects in their own words. The patient verbalized understanding and agreed to comply with the safety plan discussed in their own words.  Patient given the number to the office. Number also discussed of the 24- hour suicide hotline.           MEDS ORDERED DURING VISIT:  New Medications Ordered This Visit   Medications    sertraline (Zoloft) 50 MG tablet     Sig: Take 1 tablet by mouth Daily.     Dispense:  30 tablet     Refill:  0    lamoTRIgine (LaMICtal) 150 MG tablet     Sig: Take 1 tablet by mouth Daily.     Dispense:  30 tablet     Refill:  0    amitriptyline (ELAVIL) 10 MG tablet     Sig: Take 1 tablet by  mouth At Night As Needed for Sleep.     Dispense:  30 tablet     Refill:  0       Return in about 4 weeks (around 6/3/2025), or if symptoms worsen or fail to improve, for Next scheduled follow up and Recheck.         Progress toward goal: Not at goal    Functional Status: Mild impairment     Prognosis: Good with Ongoing Treatment             This document has been electronically signed by MINI Conte  May 6, 2025 09:28 EDT    Some of the data in this electronic note has been brought forward from a previous encounter, any necessary changes have been made, it has been reviewed by this APRN, and it is accurate.    Please note that portions of this note were completed with a voice recognition program.

## 2025-05-13 ENCOUNTER — OFFICE VISIT (OUTPATIENT)
Dept: CARDIOLOGY | Facility: CLINIC | Age: 30
End: 2025-05-13
Payer: COMMERCIAL

## 2025-05-13 VITALS
WEIGHT: 164.4 LBS | HEART RATE: 80 BPM | SYSTOLIC BLOOD PRESSURE: 113 MMHG | BODY MASS INDEX: 28.07 KG/M2 | HEIGHT: 64 IN | DIASTOLIC BLOOD PRESSURE: 43 MMHG

## 2025-05-13 DIAGNOSIS — R55 POSTURAL DIZZINESS WITH PRESYNCOPE: ICD-10-CM

## 2025-05-13 DIAGNOSIS — R00.2 PALPITATIONS: Primary | ICD-10-CM

## 2025-05-13 DIAGNOSIS — F17.200 SMOKER: ICD-10-CM

## 2025-05-13 DIAGNOSIS — R42 POSTURAL DIZZINESS WITH PRESYNCOPE: ICD-10-CM

## 2025-05-13 PROCEDURE — 99214 OFFICE O/P EST MOD 30 MIN: CPT

## 2025-05-13 PROCEDURE — 1159F MED LIST DOCD IN RCRD: CPT

## 2025-05-13 PROCEDURE — 1160F RVW MEDS BY RX/DR IN RCRD: CPT

## 2025-05-13 RX ORDER — METOPROLOL SUCCINATE 25 MG/1
25 TABLET, EXTENDED RELEASE ORAL DAILY
Qty: 90 TABLET | Refills: 3 | Status: SHIPPED | OUTPATIENT
Start: 2025-05-13

## 2025-05-13 NOTE — PROGRESS NOTES
Chief Complaint  Follow-up (3 month/) and Palpitations (syncope)    Subjective        History of Present Illness  Palmira Carbajal presents to [facility] CARDIOLOG[facility] for follow up.   Patient is a 29-year-old female with past medical history outlined below who presents for follow-up on pre syncope and palpitations.she does report significant improvement in her presyncope since increasing the fludrocortisone.  She notes that her palpitations have resolved on the propranolol but is complaining of significant daytime fatigue since starting this medication.  She has no chest pain or discomfort, dyspnea, edema or syncope.    Past Medical History:   Diagnosis Date    Acid reflux     Anxiety     Chronic pain disorder     NO PAIN CLINIC, PCP/PSYCH CONT W/AMITRIPTYLINE    Depression     Eczema     Fatty liver     Gallstones     Intractable migraine without aura and without status migrainosus 2023    Kidney stone     HX OF    Maltracking of right patella     Migraines     Orthostatic hypotension     WORKED UP FOR POTS, CONT W/MEDS, MONNIN. RX FLORINEF    Panic disorder     PONV (postoperative nausea and vomiting)     GOOD RESULTS WITH SCOPALAMINE    PTSD (post-traumatic stress disorder)     WAKES UP FROM ANESTHESIA WITH PANIC ATTACK    Seasonal allergic rhinitis 2022    Self-injurious behavior     as a teenager    Suicide attempt     NO ADULT ISSUES       ALLERGY  Allergies   Allergen Reactions    Cymbalta [Duloxetine Hcl] Mental Status Change     Jittery and insomnia    Penicillins Rash        Past Surgical History:   Procedure Laterality Date     SECTION      CHOLECYSTECTOMY N/A 2024    Procedure: CHOLECYSTECTOMY LAPAROSCOPIC;  Surgeon: Cisco Hickman MD;  [place] [facility] [place] [facility]  Service: General;  Laterality: N/A;    COLONOSCOPY N/A 10/11/2022    Procedure: COLONOSCOPY;  Surgeon: Dyan Griffin MD;  Location: [facility] ENDOSCOPY;  Service: Gastroenterology;   Laterality: N/A;  HEMORRHOIDS    CYSTOSCOPY      CYSTOSCOPY URETEROSCOPY Left 1/30/2023    Procedure: CYSTOSCOPY URETEROSCOPY RETROGRADE PYELOGRAM HOLMIUM LASER STENT INSERTION, left;  Surgeon: Melisa Garcia MD;  Location: [facility] MAIN OR;  Service: Urology;  Laterality: Left;    ENDOSCOPY N/A 9/8/2023    Procedure: ESOPHAGOGASTRODUODENOSCOPY WITH BIOPSIES;  Surgeon: Dyan Griffin MD;  Location: [facility] ENDOSCOPY;  Service: Gastroenterology;  Laterality: N/A;  ESOPHAGITIS, GASTRITIS    HYSTERECTOMY      KIDNEY STONE SURGERY      unspecified    KNEE ARTHROSCOPY Right 7/11/2022    Procedure: KNEE ARTHROSCOPY WITH A LATERAL RELEASE AND CHONDROPLASTY;  Surgeon: Marco A Pitts MD;  Location: [facility] OR [facility];  Service: Orthopedics;  Laterality: Right;    WISDOM TOOTH EXTRACTION          Social History     Socioeconomic History    Marital status:      Spouse name: TRISTAN    Number of children: 3   Tobacco Use    Smoking status: Former     Current packs/day: 0.00     Average packs/day: 0.5 packs/day for 15.0 years (7.5 ttl pk-yrs)     Types: Cigarettes     Start date: 2/14/2007     Quit date: 2/14/2022     Years since quitting: 3.2    Smokeless tobacco: Never   Vaping Use    Vaping status: Every Day    Substances: Nicotine, Flavoring    Devices: Disposable   Substance and Sexual Activity    Alcohol use: Not Currently    Drug use: Not Currently    Sexual activity: Defer     Partners: Male     Birth control/protection: Hysterectomy       Family History   Problem Relation Age of Onset    Arthritis Mother     Restless legs syndrome Mother     Thyroid disease Father     Colon polyps Father     Diabetes Father     Drug abuse Maternal Uncle     Alcohol abuse Maternal Uncle     Cancer Maternal Grandmother     Sleep apnea Son     Malig Hyperthermia Neg Hx         Current Outpatient Medications on File Prior to Visit   Medication Sig    amitriptyline (ELAVIL) 10 MG tablet Take 1 tablet by mouth At Night As  "Needed for Sleep.    dicyclomine (BENTYL) 20 MG tablet Take 1 tablet by mouth Every 6 (Six) Hours As Needed (abdominal cramping).    Emgality 120 MG/ML auto-injector pen INJECT AS DIRECTED 2ML SUBCUTANEOUSLY EVERY 30 DAYS FOR MIGRAINE    esomeprazole (nexIUM) 40 MG capsule Take 1 capsule by mouth Every Morning Before Breakfast.    famotidine (PEPCID) 20 MG tablet Take 1 tablet by mouth At Night As Needed for Heartburn. (Patient taking differently: Take 1 tablet by mouth Every Night.)    fludrocortisone 0.1 MG tablet Take 1 tablet by mouth 2 (Two) Times a Day.    lamoTRIgine (LaMICtal) 150 MG tablet Take 1 tablet by mouth Daily.    polyethylene glycol (MIRALAX) 17 g packet Take 17 g by mouth Daily.    Resmetirom (Rezdiffra) 80 MG tablet Take 1 tablet by mouth Daily.    Rimegepant Sulfate (Nurtec) 75 MG tablet dispersible tablet Take 1 tablet by mouth Daily As Needed (migraine). Indications: Migraine Headache    sertraline (Zoloft) 50 MG tablet Take 1 tablet by mouth Daily.    spironolactone (Aldactone) 50 MG tablet Take 1 tablet by mouth Daily. Indications: Abnormal Growth of Body or Facial Hair    vitamin D3 (Vitamin D) 125 MCG (5000 UT) capsule capsule Take 1 capsule by mouth Daily.    [DISCONTINUED] propranolol (INDERAL) 10 MG tablet Take 1 tablet by mouth 2 (Two) Times a Day.    Hydrocort-Pramoxine, Perianal, (ANALPRAM-HC) 2.5-1 % rectal cream Insert  into the rectum 3 (Three) Times a Day. (Patient not taking: Reported on 5/13/2025)     No current facility-administered medications on file prior to visit.       Objective   Vitals:    05/13/25 1004   BP: 113/43   BP Location: Left arm   Patient Position: Sitting   Cuff Size: Adult   Pulse: 80   Weight: 74.6 kg (164 lb 6.4 oz)   Height: 162.6 cm (64.02\")       Physical Exam  Constitutional:       General: She is awake. She is not in acute distress.     Appearance: Normal appearance.   HENT:      Head: Normocephalic.      Nose: Nose normal. No congestion.   Eyes:     "  Extraocular Movements: Extraocular movements intact.      Conjunctiva/sclera: Conjunctivae normal.      Pupils: Pupils are equal, round, and reactive to light.   Neck:      Thyroid: No thyromegaly.      Vascular: No JVD.   Cardiovascular:      Rate and Rhythm: Normal rate and regular rhythm.      Chest Wall: PMI is not displaced.      Pulses: Normal pulses.      Heart sounds: Normal heart sounds, S1 normal and S2 normal. No murmur heard.     No friction rub. No gallop. No S3 or S4 sounds.   Pulmonary:      Effort: Pulmonary effort is normal.      Breath sounds: Normal breath sounds. No wheezing, rhonchi or rales.   Abdominal:      General: Bowel sounds are normal.      Palpations: Abdomen is soft.      Tenderness: There is no abdominal tenderness.   Musculoskeletal:      Cervical back: No tenderness.      Right lower leg: No edema.      Left lower leg: No edema.   Lymphadenopathy:      Cervical: No cervical adenopathy.   Skin:     General: Skin is warm and dry.      Capillary Refill: Capillary refill takes less than 2 seconds.      Coloration: Skin is not cyanotic.      Findings: No petechiae or rash.      Nails: There is no clubbing.   Neurological:      Mental Status: She is alert.   Psychiatric:         Mood and Affect: Mood normal.         Behavior: Behavior is cooperative.           Result Review     The following data was reviewed by MINI Browning on 05/13/25.      CMP          7/13/2024    21:20 7/14/2024    21:02   CMP   Glucose 111  82    BUN 9  7    Creatinine 0.71  0.70    EGFR 116.7  118.7    Sodium 141  141    Potassium 3.7  3.7    Chloride 105  105    Calcium 9.0  8.8    Total Protein 7.0  6.6    Albumin 4.2  4.0    Globulin 2.8  2.6    Total Bilirubin <0.2  <0.2    Alkaline Phosphatase 82  83    AST (SGOT) 11  11    ALT (SGPT) 10  9    Albumin/Globulin Ratio 1.5  1.5    BUN/Creatinine Ratio 12.7  10.0    Anion Gap 15.8  13.5      CBC w/diff          7/13/2024    21:20 7/14/2024    21:02  4/10/2025    09:20   CBC w/Diff   WBC 14.01  10.60  10.13    RBC 4.85  4.58  4.91    Hemoglobin 13.3  12.7  14.2    Hematocrit 39.7  37.7  42.5    MCV 81.9  82.3  86.6    MCH 27.4  27.7  28.9    MCHC 33.5  33.7  33.4    RDW 13.1  13.3  13.0    Platelets 461  456  340    Neutrophil Rel % 54.4  47.6  64.0    Immature Granulocyte Rel % 0.4  0.3  0.4    Lymphocyte Rel % 37.3  43.2  25.4    Monocyte Rel % 6.2  6.4  7.5    Eosinophil Rel % 1.1  1.7  1.9    Basophil Rel % 0.6  0.8  0.8               === Results for orders placed in visit on 09/12/23 ===    CARDIAC EVENT MONITOR    - Interpretation Summary -    Patient was monitored for 6 days, 23 hours and 29 minutes.    Lowest heart rate was 59 bpm, average heart rate was 103 bpm, and maximum heart rate was 163 bpm.    Rare PVC, couplet.  Rare PAC, couplet and triplet.    239 patient activated events.  Most of these corresponded to normal sinus rhythm and sinus tachycardia.  Palpitations corresponded sometimes to PACs and sometimes to normal sinus rhythm    No significant abnormal heart rhythms noted,          Procedures      Assessment & Plan  Palpitations  Palpitations did improve with the use of beta-blocker however patient is having significant daytime fatigue.  Will stop propranolol and try metoprolol.  She is advised to take 12.5 mg daily for 1 to 2 weeks and see how she reacts to it and if she tolerates that she can consider increasing to 25 mg at that time.  Postural dizziness with presyncope  Symptoms improved with the addition of fludrocortisone.  Recommend patient have her BMP done.  Recommend smoking cessation, increased water and salt intake.  Smoker  Looking cessation is advised.                     Follow Up   Return in about 6 months (around 11/13/2025) for With Dr. Nj.    Patient was given instructions and counseling regarding her condition or for health maintenance advice. Please see specific information pulled into the AVS if appropriate.      Kelin Toribio, APRN  05/13/25  10:20 EDT    Dictated Utilizing Dragon Dictation

## 2025-05-13 NOTE — PROGRESS NOTES
Chief Complaint  Fatigue    Patient or patient representative verbalized consent for the use of Ambient Listening during the visit with  MINI Keating for chart documentation. 5/16/2025  09:17 EDT    Subjective            Palmira Carbajal is a 29 y.o. female who presents to [facility]   History of Present Illness  History of Present Illness  The patient presents today with complaints of severe fatigue.    She has been experiencing severe fatigue since the beginning of 04/2025, which she describes as similar to the exhaustion felt during early pregnancy. She also reports difficulty concentrating and maintaining mental clarity. Her sleep quality has been satisfactory recently, and she feels well for the first 2 to 3 hours after waking. However, by mid-morning, her fatigue intensifies. She has attempted to alleviate her symptoms with coffee and energy drinks, but these have proven ineffective. She recalls a trip to [place] at the end of 03/2025, after which her symptoms began, but she does not believe she was exposed to any potential hazards during the trip. She maintains adequate hydration and regular meal intake. She reports no new medications in the past few months. She reports no rashes or gastrointestinal upset. She reports no urinary issues.    She is currently on amitriptyline as needed at night, Bentyl as needed, Emgality for migraine prevention, Nexium for stomach issues, fludrocortisone prescribed by cardiology, Lamictal 150 mg daily, metoprolol XL (recently prescribed but not yet started), MiraLAX as needed, Rezafib for liver issues, Nurtec as needed, Zoloft 50 mg daily, spironolactone 50 mg daily for hirsutism, and vitamin D. She had a CBC done in mid-April at the oncologist, and her white blood cell count was within the normal range. She believes her gallbladder has been causing issues for a while, but since its removal, her levels are normal. She has not  taken vitamin D in a while and is currently out of it. She requests refills for Nurtec and spironolactone.      Tobacco Use: Medium Risk (5/16/2025)    Patient History     Smoking Tobacco Use: Former     Smokeless Tobacco Use: Never     Passive Exposure: Not on file      E-cigarette/Vaping    E-cigarette/Vaping Use Current Every Day User     Comments INST PER ANESTHESIA PROTCOL      E-cigarette/Vaping Substances    Nicotine Yes     THC No     CBD No     Flavoring Yes      E-cigarette/Vaping Devices    Disposable Yes     Pre-filled or Refillable Cartridge No     Refillable Tank No     Pre-filled Pod No        Alcohol Use: Not on file         Objective   Vital Signs:   Vitals:    05/16/25 0908   BP: 114/73   Pulse: 81   Temp: 97.8 °F (36.6 °C)   SpO2: 98%   Weight: 74.9 kg (165 lb 1.6 oz)     Body mass index is 28.33 kg/m².    Wt Readings from Last 3 Encounters:   05/16/25 74.9 kg (165 lb 1.6 oz)   05/13/25 74.6 kg (164 lb 6.4 oz)   04/10/25 75.2 kg (165 lb 12.6 oz)     BP Readings from Last 3 Encounters:   05/16/25 114/73   05/13/25 113/43   04/10/25 110/68       Health Maintenance   Topic Date Due    COVID-19 Vaccine (3 - 2024-25 season) 09/01/2024    ANNUAL PHYSICAL  01/29/2025    INFLUENZA VACCINE  07/01/2025    COLORECTAL CANCER SCREENING  10/11/2027    TDAP/TD VACCINES (2 - Td or Tdap) 01/04/2029    HEPATITIS C SCREENING  Completed    Pneumococcal Vaccine 0-49  Aged Out       /73   Pulse 81   Temp 97.8 °F (36.6 °C)   Wt 74.9 kg (165 lb 1.6 oz)   SpO2 98%   BMI 28.33 kg/m²       Current Outpatient Medications:     amitriptyline (ELAVIL) 10 MG tablet, Take 1 tablet by mouth At Night As Needed for Sleep., Disp: 30 tablet, Rfl: 0    dicyclomine (BENTYL) 20 MG tablet, Take 1 tablet by mouth Every 6 (Six) Hours As Needed (abdominal cramping)., Disp: 60 tablet, Rfl: 3    Emgality 120 MG/ML auto-injector pen, INJECT AS DIRECTED 2ML SUBCUTANEOUSLY EVERY 30 DAYS FOR MIGRAINE, Disp: 1 mL, Rfl: 3    esomeprazole  (nexIUM) 40 MG capsule, Take 1 capsule by mouth Every Morning Before Breakfast., Disp: 90 capsule, Rfl: 3    lamoTRIgine (LaMICtal) 150 MG tablet, Take 1 tablet by mouth Daily., Disp: 30 tablet, Rfl: 0    polyethylene glycol (MIRALAX) 17 g packet, Take 17 g by mouth Daily., Disp: 5 packet, Rfl: 0    Resmetirom (Rezdiffra) 80 MG tablet, Take 1 tablet by mouth Daily., Disp: 90 tablet, Rfl: 3    rimegepant sulfate ODT (Nurtec) 75 MG disintegrating tablet, Place 1 tablet under the tongue Daily As Needed (migraine). Indications: Migraine Headache, Disp: 8 tablet, Rfl: 5    sertraline (Zoloft) 50 MG tablet, Take 1 tablet by mouth Daily., Disp: 30 tablet, Rfl: 0    spironolactone (Aldactone) 50 MG tablet, Take 1 tablet by mouth Daily. Indications: Abnormal Growth of Body or Facial Hair, Disp: 30 tablet, Rfl: 5    fludrocortisone 0.1 MG tablet, Take 1 tablet by mouth 2 (Two) Times a Day., Disp: 90 tablet, Rfl: 1    metoprolol succinate XL (TOPROL-XL) 25 MG 24 hr tablet, Take 1 tablet by mouth Daily. (Patient not taking: Reported on 5/16/2025), Disp: 90 tablet, Rfl: 3   Past Medical History:   Diagnosis Date    Acid reflux     Anxiety     Chronic pain disorder     NO PAIN CLINIC, PCP/PSYCH CONT W/AMITRIPTYLINE    Depression     Eczema     Fatty liver     Gallstones     Intractable migraine without aura and without status migrainosus 02/09/2023    Kidney stone     HX OF    Maltracking of right patella     Migraines     Orthostatic hypotension     WORKED UP FOR POTS, CONT W/MEDS, MONNIN. RX FLORINEF    Panic disorder     PONV (postoperative nausea and vomiting)     GOOD RESULTS WITH SCOPALAMINE    PTSD (post-traumatic stress disorder)     WAKES UP FROM ANESTHESIA WITH PANIC ATTACK    Seasonal allergic rhinitis 05/31/2022    Self-injurious behavior     as a teenager    Suicide attempt 2007    NO ADULT ISSUES        Physical Exam  Vitals reviewed.   Constitutional:       Appearance: Normal appearance. She is well-developed.    Neck:      Thyroid: No thyroid mass, thyromegaly or thyroid tenderness.   Cardiovascular:      Rate and Rhythm: Normal rate and regular rhythm.      Heart sounds: No murmur heard.     No friction rub. No gallop.   Pulmonary:      Effort: Pulmonary effort is normal.      Breath sounds: Normal breath sounds. No wheezing or rhonchi.   Lymphadenopathy:      Cervical: No cervical adenopathy.   Skin:     General: Skin is warm and dry.   Neurological:      Mental Status: She is alert and oriented to person, place, and time.      Cranial Nerves: No cranial nerve deficit.   Psychiatric:         Mood and Affect: Mood and affect normal.         Behavior: Behavior normal.         Thought Content: Thought content normal. Thought content does not include homicidal or suicidal ideation.         Judgment: Judgment normal.       Physical Exam        Result Review :    The following data was reviewed by: MINI Keating on 05/16/2025:           No Images in the past 120 days found.    Results  Labs   - Complete Blood Count (CBC): 04/2025, White blood cell count was within normal range      Assessment & Plan  Other fatigue    Orders:    CBC Auto Differential    Comprehensive Metabolic Panel    TSH+Free T4    Vitamin B12 & Folate    Ehrlichia Antibody Panel    Thyroid Peroxidase Antibody    Methylmalonic Acid, Serum    Babesia Microti Antibody Panel    Alpha-Gal IgE Panel    Lyme Disease, Line Blot    Vitamin D deficiency    Orders:    Vitamin D,25-Hydroxy    Female hirsutism    Orders:    spironolactone (Aldactone) 50 MG tablet; Take 1 tablet by mouth Daily. Indications: Abnormal Growth of Body or Facial Hair    Intractable migraine without aura and without status migrainosus    Orders:    rimegepant sulfate ODT (Nurtec) 75 MG disintegrating tablet; Place 1 tablet under the tongue Daily As Needed (migraine). Indications: Migraine Headache      Assessment & Plan  1. Severe fatigue.  - Reports severe fatigue since the  beginning of April.  - No new medications have been started recently that could contribute to this symptom; advised to take metoprolol at night once she starts it, as it can cause tiredness.  - Comprehensive lab workup will be conducted, including CBC, comprehensive panel, B12 levels, vitamin D levels, thyroid levels, and tick panels.  - If she develops a rash or other symptoms, she should notify the clinic immediately.    2. Medication management.  - Needs refills for Nurtec and spironolactone 50 mg daily.  - Prescriptions will be sent to pharmacy.  - Advised to continue taking Emgality for migraines and to notify the clinic if she needs more refills before her next appointment.  - Prescription Drug Monitoring Program was reviewed.    Follow-up  - Follow up in 6 months.         Diagnosis Plan   1. Other fatigue  CBC Auto Differential    Comprehensive Metabolic Panel    TSH+Free T4    Vitamin B12 & Folate    Ehrlichia Antibody Panel    Thyroid Peroxidase Antibody    Methylmalonic Acid, Serum    Babesia Microti Antibody Panel    Alpha-Gal IgE Panel    Lyme Disease, Line Blot      2. Vitamin D deficiency  Vitamin D,25-Hydroxy      3. Female hirsutism  spironolactone (Aldactone) 50 MG tablet      4. Intractable migraine without aura and without status migrainosus  rimegepant sulfate ODT (Nurtec) 75 MG disintegrating tablet            FOLLOW UP  Return in about 6 months (around 11/16/2025) for Annual physical, Next scheduled follow up.  Patient was given instructions and counseling regarding her condition or for health maintenance advice. Please see specific information pulled into the AVS if appropriate.       CURRENT & DISCONTINUED MEDICATIONS  Current Outpatient Medications   Medication Instructions    amitriptyline (ELAVIL) 10 mg, Oral, Nightly PRN    dicyclomine (BENTYL) 20 mg, Oral, Every 6 Hours PRN    Emgality 120 MG/ML auto-injector pen INJECT AS DIRECTED 2ML SUBCUTANEOUSLY EVERY 30 DAYS FOR MIGRAINE     esomeprazole (NEXIUM) 40 mg, Oral, Every Morning Before Breakfast    fludrocortisone 0.1 mg, Oral, 2 Times Daily    lamoTRIgine (LAMICTAL) 150 mg, Oral, Daily    metoprolol succinate XL (TOPROL-XL) 25 mg, Oral, Daily    polyethylene glycol (MIRALAX) 17 g, Oral, Daily    Rezdiffra 80 mg, Oral, Daily    rimegepant sulfate ODT (NURTEC) 75 mg, Sublingual, Daily PRN    sertraline (ZOLOFT) 50 mg, Oral, Daily    spironolactone (ALDACTONE) 50 mg, Oral, Daily       Medications Discontinued During This Encounter   Medication Reason    Hydrocort-Pramoxine, Perianal, (ANALPRAM-HC) 2.5-1 % rectal cream *Therapy completed    famotidine (PEPCID) 20 MG tablet *Therapy completed    Rimegepant Sulfate (Nurtec) 75 MG tablet dispersible tablet Reorder    spironolactone (Aldactone) 50 MG tablet Reorder    vitamin D3 (Vitamin D) 125 MCG (5000 UT) capsule capsule *Therapy completed        Parts of this note are electronic transcriptions/translations of spoken language to printed text using the Dragon Dictation system.    Jaja Castillo, MINI  05/16/25  12:38 EDT

## 2025-05-16 ENCOUNTER — OFFICE VISIT (OUTPATIENT)
Dept: FAMILY MEDICINE CLINIC | Facility: CLINIC | Age: 30
End: 2025-05-16
Payer: COMMERCIAL

## 2025-05-16 VITALS
HEART RATE: 81 BPM | OXYGEN SATURATION: 98 % | BODY MASS INDEX: 28.33 KG/M2 | SYSTOLIC BLOOD PRESSURE: 114 MMHG | TEMPERATURE: 97.8 F | WEIGHT: 165.1 LBS | DIASTOLIC BLOOD PRESSURE: 73 MMHG

## 2025-05-16 DIAGNOSIS — E55.9 VITAMIN D DEFICIENCY: ICD-10-CM

## 2025-05-16 DIAGNOSIS — R53.83 OTHER FATIGUE: Primary | ICD-10-CM

## 2025-05-16 DIAGNOSIS — L68.0 FEMALE HIRSUTISM: ICD-10-CM

## 2025-05-16 DIAGNOSIS — G43.019 INTRACTABLE MIGRAINE WITHOUT AURA AND WITHOUT STATUS MIGRAINOSUS: ICD-10-CM

## 2025-05-16 LAB
25(OH)D3 SERPL-MCNC: 27.3 NG/ML (ref 30–100)
ALBUMIN SERPL-MCNC: 4.2 G/DL (ref 3.5–5.2)
ALBUMIN/GLOB SERPL: 1.7 G/DL
ALP SERPL-CCNC: 81 U/L (ref 39–117)
ALT SERPL W P-5'-P-CCNC: 15 U/L (ref 1–33)
ANION GAP SERPL CALCULATED.3IONS-SCNC: 11.9 MMOL/L (ref 5–15)
AST SERPL-CCNC: 16 U/L (ref 1–32)
BASOPHILS # BLD AUTO: 0.1 10*3/MM3 (ref 0–0.2)
BASOPHILS NFR BLD AUTO: 0.9 % (ref 0–1.5)
BILIRUB SERPL-MCNC: 0.3 MG/DL (ref 0–1.2)
BUN SERPL-MCNC: 11 MG/DL (ref 6–20)
BUN/CREAT SERPL: 17.7 (ref 7–25)
CALCIUM SPEC-SCNC: 8.9 MG/DL (ref 8.6–10.5)
CHLORIDE SERPL-SCNC: 106 MMOL/L (ref 98–107)
CO2 SERPL-SCNC: 21.1 MMOL/L (ref 22–29)
CREAT SERPL-MCNC: 0.62 MG/DL (ref 0.57–1)
DEPRECATED RDW RBC AUTO: 38.8 FL (ref 37–54)
EGFRCR SERPLBLD CKD-EPI 2021: 122.3 ML/MIN/1.73
EOSINOPHIL # BLD AUTO: 0.19 10*3/MM3 (ref 0–0.4)
EOSINOPHIL NFR BLD AUTO: 1.7 % (ref 0.3–6.2)
ERYTHROCYTE [DISTWIDTH] IN BLOOD BY AUTOMATED COUNT: 12.3 % (ref 12.3–15.4)
FOLATE SERPL-MCNC: 4.43 NG/ML (ref 4.78–24.2)
GLOBULIN UR ELPH-MCNC: 2.5 GM/DL
GLUCOSE SERPL-MCNC: 108 MG/DL (ref 65–99)
HCT VFR BLD AUTO: 40.1 % (ref 34–46.6)
HGB BLD-MCNC: 13.4 G/DL (ref 12–15.9)
IMM GRANULOCYTES # BLD AUTO: 0.05 10*3/MM3 (ref 0–0.05)
IMM GRANULOCYTES NFR BLD AUTO: 0.4 % (ref 0–0.5)
LYMPHOCYTES # BLD AUTO: 3.32 10*3/MM3 (ref 0.7–3.1)
LYMPHOCYTES NFR BLD AUTO: 29.4 % (ref 19.6–45.3)
MCH RBC QN AUTO: 28.8 PG (ref 26.6–33)
MCHC RBC AUTO-ENTMCNC: 33.4 G/DL (ref 31.5–35.7)
MCV RBC AUTO: 86.1 FL (ref 79–97)
MONOCYTES # BLD AUTO: 0.67 10*3/MM3 (ref 0.1–0.9)
MONOCYTES NFR BLD AUTO: 5.9 % (ref 5–12)
NEUTROPHILS NFR BLD AUTO: 6.98 10*3/MM3 (ref 1.7–7)
NEUTROPHILS NFR BLD AUTO: 61.7 % (ref 42.7–76)
NRBC BLD AUTO-RTO: 0 /100 WBC (ref 0–0.2)
PLATELET # BLD AUTO: 344 10*3/MM3 (ref 140–450)
PMV BLD AUTO: 11.1 FL (ref 6–12)
POTASSIUM SERPL-SCNC: 4 MMOL/L (ref 3.5–5.2)
PROT SERPL-MCNC: 6.7 G/DL (ref 6–8.5)
RBC # BLD AUTO: 4.66 10*6/MM3 (ref 3.77–5.28)
SODIUM SERPL-SCNC: 139 MMOL/L (ref 136–145)
T4 FREE SERPL-MCNC: 1.19 NG/DL (ref 0.92–1.68)
TSH SERPL DL<=0.05 MIU/L-ACNC: 1.43 UIU/ML (ref 0.27–4.2)
VIT B12 BLD-MCNC: 422 PG/ML (ref 211–946)
WBC NRBC COR # BLD AUTO: 11.31 10*3/MM3 (ref 3.4–10.8)

## 2025-05-16 PROCEDURE — 80053 COMPREHEN METABOLIC PANEL: CPT | Performed by: NURSE PRACTITIONER

## 2025-05-16 PROCEDURE — 86376 MICROSOMAL ANTIBODY EACH: CPT | Performed by: NURSE PRACTITIONER

## 2025-05-16 PROCEDURE — 86003 ALLG SPEC IGE CRUDE XTRC EA: CPT | Performed by: NURSE PRACTITIONER

## 2025-05-16 PROCEDURE — 84443 ASSAY THYROID STIM HORMONE: CPT | Performed by: NURSE PRACTITIONER

## 2025-05-16 PROCEDURE — 82607 VITAMIN B-12: CPT | Performed by: NURSE PRACTITIONER

## 2025-05-16 PROCEDURE — 84439 ASSAY OF FREE THYROXINE: CPT | Performed by: NURSE PRACTITIONER

## 2025-05-16 PROCEDURE — 86666 EHRLICHIA ANTIBODY: CPT | Performed by: NURSE PRACTITIONER

## 2025-05-16 PROCEDURE — 86617 LYME DISEASE ANTIBODY: CPT | Performed by: NURSE PRACTITIONER

## 2025-05-16 PROCEDURE — 86008 ALLG SPEC IGE RECOMB EA: CPT | Performed by: NURSE PRACTITIONER

## 2025-05-16 PROCEDURE — 82306 VITAMIN D 25 HYDROXY: CPT | Performed by: NURSE PRACTITIONER

## 2025-05-16 PROCEDURE — 85025 COMPLETE CBC W/AUTO DIFF WBC: CPT | Performed by: NURSE PRACTITIONER

## 2025-05-16 PROCEDURE — 83921 ORGANIC ACID SINGLE QUANT: CPT | Performed by: NURSE PRACTITIONER

## 2025-05-16 PROCEDURE — 82746 ASSAY OF FOLIC ACID SERUM: CPT | Performed by: NURSE PRACTITIONER

## 2025-05-16 PROCEDURE — 86753 PROTOZOA ANTIBODY NOS: CPT | Performed by: NURSE PRACTITIONER

## 2025-05-16 PROCEDURE — 82785 ASSAY OF IGE: CPT | Performed by: NURSE PRACTITIONER

## 2025-05-16 RX ORDER — SPIRONOLACTONE 50 MG/1
50 TABLET, FILM COATED ORAL DAILY
Qty: 30 TABLET | Refills: 5 | Status: SHIPPED | OUTPATIENT
Start: 2025-05-16

## 2025-05-16 NOTE — ASSESSMENT & PLAN NOTE
Orders:    rimegepant sulfate ODT (Nurtec) 75 MG disintegrating tablet; Place 1 tablet under the tongue Daily As Needed (migraine). Indications: Migraine Headache

## 2025-05-16 NOTE — ASSESSMENT & PLAN NOTE
Orders:    spironolactone (Aldactone) 50 MG tablet; Take 1 tablet by mouth Daily. Indications: Abnormal Growth of Body or Facial Hair

## 2025-05-18 LAB — THYROPEROXIDASE AB SERPL-ACNC: <9 IU/ML (ref 0–34)

## 2025-05-20 LAB
A PHAGOCYTOPH IGG TITR SER IF: NEGATIVE {TITER}
A PHAGOCYTOPH IGM TITR SER IF: NEGATIVE {TITER}
B MICROTI IGG TITR SER: NORMAL {TITER}
B MICROTI IGM TITR SER: NORMAL {TITER}
E CHAFFEENSIS IGG TITR SER IF: NEGATIVE {TITER}
E CHAFFEENSIS IGM TITR SER IF: NEGATIVE {TITER}
RESULT COMMENT:: NORMAL
RESULT COMMENT:: NORMAL

## 2025-05-21 ENCOUNTER — OFFICE VISIT (OUTPATIENT)
Dept: GASTROENTEROLOGY | Facility: CLINIC | Age: 30
End: 2025-05-21
Payer: COMMERCIAL

## 2025-05-21 VITALS
BODY MASS INDEX: 28.51 KG/M2 | SYSTOLIC BLOOD PRESSURE: 105 MMHG | WEIGHT: 167 LBS | HEIGHT: 64 IN | DIASTOLIC BLOOD PRESSURE: 63 MMHG

## 2025-05-21 DIAGNOSIS — K21.00 GASTROESOPHAGEAL REFLUX DISEASE WITH ESOPHAGITIS WITHOUT HEMORRHAGE: ICD-10-CM

## 2025-05-21 DIAGNOSIS — K76.0 FATTY LIVER: Primary | ICD-10-CM

## 2025-05-21 DIAGNOSIS — Z87.19 HISTORY OF GASTRITIS: ICD-10-CM

## 2025-05-21 DIAGNOSIS — Z90.49 HISTORY OF CHOLECYSTECTOMY: ICD-10-CM

## 2025-05-21 LAB
B BURGDOR IGG PATRN SER IB-IMP: NEGATIVE
B BURGDOR IGM PATRN SER IB-IMP: NEGATIVE
B BURGDOR18KD IGG SER QL IB: NORMAL
B BURGDOR23KD IGG SER QL IB: NORMAL
B BURGDOR23KD IGM SER QL IB: NORMAL
B BURGDOR28KD IGG SER QL IB: NORMAL
B BURGDOR30KD IGG SER QL IB: NORMAL
B BURGDOR39KD IGG SER QL IB: NORMAL
B BURGDOR39KD IGM SER QL IB: NORMAL
B BURGDOR41KD IGG SER QL IB: PRESENT
B BURGDOR41KD IGM SER QL IB: NORMAL
B BURGDOR45KD IGG SER QL IB: NORMAL
B BURGDOR58KD IGG SER QL IB: NORMAL
B BURGDOR66KD IGG SER QL IB: NORMAL
B BURGDOR93KD IGG SER QL IB: NORMAL
Lab: NORMAL

## 2025-05-21 PROCEDURE — 1159F MED LIST DOCD IN RCRD: CPT | Performed by: NURSE PRACTITIONER

## 2025-05-21 PROCEDURE — 1160F RVW MEDS BY RX/DR IN RCRD: CPT | Performed by: NURSE PRACTITIONER

## 2025-05-21 PROCEDURE — 99214 OFFICE O/P EST MOD 30 MIN: CPT | Performed by: NURSE PRACTITIONER

## 2025-05-21 RX ORDER — ESOMEPRAZOLE MAGNESIUM 40 MG/1
40 CAPSULE, DELAYED RELEASE ORAL
Qty: 90 CAPSULE | Refills: 3 | Status: SHIPPED | OUTPATIENT
Start: 2025-05-21

## 2025-05-21 NOTE — PROGRESS NOTES
Chief Complaint   Heartburn and Fatty Liver    History of Present Illness       Palmira Carbajal is a 29 y.o. female who presents to [facility] for follow-up for fatty liver.         History of Present Illness  The patient is a 29-year-old female who presents today for an office follow-up for fatty liver disease. She was last seen in the office on 08/01/2024. She has a significant GI family history with a father with colon cancer. She underwent EGD with Dr. Griffin on 09/08/2023, which showed duodenal erosion with gastritis, LA grade A reflux esophagitis, and pathology positive for reflux esophagitis. Mild nonspecific changes in the duodenum were noted, and celiac testing was recommended. Celiac testing came back normal. She has a history of fatty liver disease. Most recent FibroScan showed mild fatty liver with an F2 fibrosis score. Liver serology was normal. She did have her gallbladder removed by Dr. Hickman in the summer of 2024. At the last visit, she was doing really well after surgery. Bowels were loose, but not unmanageable. She does have a history of reflux and that was well controlled with Nexium. She had been given a prescription for risdero for her fatty liver disease. As of the last visit, she had not started it yet after recovering from her gallbladder surgery.    She has a history of fatty liver disease and has initiated treatment with risdero, reporting no adverse effects.    She underwent cholecystectomy in the summer of 2024, performed by Dr. Hickman. Post-surgery, she experienced loose stools, which were manageable. However, she now reports irregular bowel movements, alternating between severe diarrhea and constipation, with few instances of normalcy. She is not currently taking any digestive enzymes.    She has a history of gastroesophageal reflux disease (GERD), which is well-managed with Nexium 40 mg daily. She requires a refill of this medication.    She has  been experiencing significant fatigue and cognitive difficulties for the past 1 to 1.5 months, prompting her physician to conduct extensive testing.    She underwent colonoscopy with Dr. Griffin on 10/11/2022, which was normal except for some nonbleeding internal and external hemorrhoids. Repeat colonoscopy is scheduled for 2027.    PAST SURGICAL HISTORY:  Cholecystectomy in summer 2024  Colonoscopy on 10/11/2022  EGD on 09/08/2023    FAMILY HISTORY  - Father: Colon cancer          Results       Result Review :       CMP          7/13/2024    21:20 7/14/2024    21:02 5/16/2025    09:43   CMP   Glucose 111  82  108    BUN 9  7  11    Creatinine 0.71  0.70  0.62    EGFR 116.7  118.7  122.3    Sodium 141  141  139    Potassium 3.7  3.7  4.0    Chloride 105  105  106    Calcium 9.0  8.8  8.9    Total Protein 7.0  6.6  6.7    Albumin 4.2  4.0  4.2    Globulin 2.8  2.6  2.5    Total Bilirubin <0.2  <0.2  0.3    Alkaline Phosphatase 82  83  81    AST (SGOT) 11  11  16    ALT (SGPT) 10  9  15    Albumin/Globulin Ratio 1.5  1.5  1.7    BUN/Creatinine Ratio 12.7  10.0  17.7    Anion Gap 15.8  13.5  11.9      CBC          7/14/2024    21:02 4/10/2025    09:20 5/16/2025    09:43   CBC   WBC 10.60  10.13  11.31    RBC 4.58  4.91  4.66    Hemoglobin 12.7  14.2  13.4    Hematocrit 37.7  42.5  40.1    MCV 82.3  86.6  86.1    MCH 27.7  28.9  28.8    MCHC 33.7  33.4  33.4    RDW 13.3  13.0  12.3    Platelets 456  340  344      CBC w/diff          7/14/2024    21:02 4/10/2025    09:20 5/16/2025    09:43   CBC w/Diff   WBC 10.60  10.13  11.31    RBC 4.58  4.91  4.66    Hemoglobin 12.7  14.2  13.4    Hematocrit 37.7  42.5  40.1    MCV 82.3  86.6  86.1    MCH 27.7  28.9  28.8    MCHC 33.7  33.4  33.4    RDW 13.3  13.0  12.3    Platelets 456  340  344    Neutrophil Rel % 47.6  64.0  61.7    Immature Granulocyte Rel % 0.3  0.4  0.4    Lymphocyte Rel % 43.2  25.4  29.4    Monocyte Rel % 6.4  7.5  5.9    Eosinophil Rel % 1.7  1.9  1.7   "  Basophil Rel % 0.8  0.8  0.9        TSH          5/16/2025    09:43   TSH   TSH 1.430      Electrolytes          7/13/2024    21:20 7/14/2024    21:02 5/16/2025    09:43   Electrolytes   Sodium 141  141  139    Potassium 3.7  3.7  4.0    Chloride 105  105  106    Calcium 9.0  8.8  8.9      Renal Profile          7/13/2024    21:20 7/14/2024    21:02 5/16/2025    09:43   Renal Profile   BUN 9  7  11    Creatinine 0.71  0.70  0.62        Lipase   Lipase   Date Value Ref Range Status   07/14/2024 33 13 - 60 U/L Final     Amylase   Amylase   Date Value Ref Range Status   09/14/2020 36 30 - 110 U/L Final     Iron Profile   Iron   Date Value Ref Range Status   10/31/2023 63 37 - 145 mcg/dL Final     TIBC   Date Value Ref Range Status   10/31/2023 371 298 - 536 mcg/dL Final     Iron Saturation (TSAT)   Date Value Ref Range Status   10/31/2023 17 (L) 20 - 50 % Final     Transferrin   Date Value Ref Range Status   10/31/2023 249 200 - 360 mg/dL Final     Ferritin No results found for: \"FERRITIN\"  ESR (Sed Rate)   Sed Rate   Date Value Ref Range Status   03/14/2023 25 (H) 0 - 20 mm/hr Final     CRP (C-Reactive)   C-Reactive Protein   Date Value Ref Range Status   09/08/2022 0.77 (H) 0.00 - 0.50 mg/dL Final     Liver Workup   ALPHA -1 ANTITRYPSIN   Date Value Ref Range Status   10/31/2023 156 90 - 200 mg/dL Final     dsDNA   Date Value Ref Range Status   09/08/2022 Negative Negative Final     Expanded ALISON Screen   Date Value Ref Range Status   09/08/2022 Negative Negative Final     Smooth Muscle Ab   Date Value Ref Range Status   10/31/2023 4 0 - 19 Units Final     Comment:                      Negative                     0 - 19                   Weak positive               20 - 30                   Moderate to strong positive     >30   Actin Antibodies are found in 52-85% of patients with   autoimmune hepatitis or chronic active hepatitis and   in 22% of patients with primary biliary cirrhosis.     Ceruloplasmin   Date " Value Ref Range Status   10/31/2023 26 19 - 39 mg/dL Final     Immunofixation Result, Serum   Date Value Ref Range Status   10/31/2023 Comment  Final     Comment:     No monoclonality detected.     IgG   Date Value Ref Range Status   10/31/2023 941 586 - 1602 mg/dL Final     IgA   Date Value Ref Range Status   10/31/2023 187 87 - 352 mg/dL Final     IgM   Date Value Ref Range Status   10/31/2023 93 26 - 217 mg/dL Final     Iron   Date Value Ref Range Status   10/31/2023 63 37 - 145 mcg/dL Final     TIBC   Date Value Ref Range Status   10/31/2023 371 298 - 536 mcg/dL Final     Iron Saturation (TSAT)   Date Value Ref Range Status   10/31/2023 17 (L) 20 - 50 % Final     Transferrin   Date Value Ref Range Status   10/31/2023 249 200 - 360 mg/dL Final     Mitochondrial Ab   Date Value Ref Range Status   10/31/2023 <20.0 0.0 - 20.0 Units Final     Comment:                                     Negative    0.0 - 20.0                                  Equivocal  20.1 - 24.9                                  Positive         >24.9  Mitochondrial (M2) Antibodies are found in 90-96% of  patients with primary biliary cirrhosis.     Protime   Date Value Ref Range Status   10/31/2023 13.6 11.8 - 14.9 Seconds Final     INR   Date Value Ref Range Status   10/31/2023 1.03 0.86 - 1.15 Final               Results  Labs   - Liver serology: Normal   - Celiac testing: Normal    Imaging   - FibroScan: Mild fatty liver with an F2 fibrosis score   - CT scan of the abdomen and pelvis: 09/2024, Moderate stool burden and no definite abnormality of the liver    Diagnostic Testing   - Colonoscopy: 10/11/2022, Normal except for some nonbleeding internal and external hemorrhoids   - EGD: 09/08/2023, Duodenal erosion with gastritis, LA grade A reflux esophagitis, mild nonspecific changes in the duodenum      Past Medical History       Past Medical History:   Diagnosis Date    Acid reflux     Anxiety     Chronic pain disorder     NO PAIN CLINIC,  PCP/PSYCH CONT W/AMITRIPTYLINE    Depression     Eczema     Fatty liver     Gallstones     Intractable migraine without aura and without status migrainosus 2023    Kidney stone     HX OF    Maltracking of right patella     Migraines     Orthostatic hypotension     WORKED UP FOR POTS, CONT W/MEDS, MONNIN. RX FLORINEF    Panic disorder     PONV (postoperative nausea and vomiting)     GOOD RESULTS WITH SCOPALAMINE    PTSD (post-traumatic stress disorder)     WAKES UP FROM ANESTHESIA WITH PANIC ATTACK    Seasonal allergic rhinitis 2022    Self-injurious behavior     as a teenager    Suicide attempt     NO ADULT ISSUES       Past Surgical History:   Procedure Laterality Date     SECTION      CHOLECYSTECTOMY N/A 2024    Procedure: CHOLECYSTECTOMY LAPAROSCOPIC;  Surgeon: Cisco Hickman MD;  Location: [facility];  Service: General;  Laterality: N/A;    COLONOSCOPY N/A 10/11/2022    Procedure: COLONOSCOPY;  Surgeon: Dyan Griffin MD;  Location: [facility];  Service: Gastroenterology;  Laterality: N/A;  HEMORRHOIDS    CYSTOSCOPY      CYSTOSCOPY URETEROSCOPY Left 2023    Procedure: CYSTOSCOPY URETEROSCOPY RETROGRADE PYELOGRAM HOLMIUM LASER STENT INSERTION, left;  Surgeon: Melisa Garcia MD;  Location: [facility] OR;  Service: Urology;  Laterality: Left;    ENDOSCOPY N/A 2023    Procedure: ESOPHAGOGASTRODUODENOSCOPY WITH BIOPSIES;  Surgeon: Dyan Griffin MD;  Location: [facility] END[facility];  Service: Gastroenterology;  Laterality: N/A;  ESOPHAGITIS, GASTRITIS    HYSTERECTOMY      KIDNEY STONE SURGERY      unspecified    KNEE ARTHROSCOPY Right 2022    Procedure: KNEE ARTHROSCOPY WITH A LATERAL RELEASE AND CHONDROPLASTY;  Surgeon: Marco A Pitts MD;  Location: [facility];  Service: Orthopedics;  Laterality: Right;    WISDOM TOOTH EXTRACTION           Current Outpatient Medications:     amitriptyline (ELAVIL) 10 MG tablet, Take 1 tablet by mouth At  Night As Needed for Sleep., Disp: 30 tablet, Rfl: 0    dicyclomine (BENTYL) 20 MG tablet, Take 1 tablet by mouth Every 6 (Six) Hours As Needed (abdominal cramping)., Disp: 60 tablet, Rfl: 3    Emgality 120 MG/ML auto-injector pen, INJECT AS DIRECTED 2ML SUBCUTANEOUSLY EVERY 30 DAYS FOR MIGRAINE, Disp: 1 mL, Rfl: 3    esomeprazole (nexIUM) 40 MG capsule, Take 1 capsule by mouth Every Morning Before Breakfast., Disp: 90 capsule, Rfl: 3    fludrocortisone 0.1 MG tablet, Take 1 tablet by mouth 2 (Two) Times a Day., Disp: 90 tablet, Rfl: 1    folic acid (FOLVITE) 1 MG tablet, Take 1 tablet by mouth Daily. Indications: Anemia From Inadequate Folic Acid, Disp: 90 tablet, Rfl: 1    lamoTRIgine (LaMICtal) 150 MG tablet, Take 1 tablet by mouth Daily., Disp: 30 tablet, Rfl: 0    metoprolol succinate XL (TOPROL-XL) 25 MG 24 hr tablet, Take 1 tablet by mouth Daily., Disp: 90 tablet, Rfl: 3    polyethylene glycol (MIRALAX) 17 g packet, Take 17 g by mouth Daily., Disp: 5 packet, Rfl: 0    Resmetirom (Rezdiffra) 80 MG tablet, Take 1 tablet by mouth Daily., Disp: 90 tablet, Rfl: 3    rimegepant sulfate ODT (Nurtec) 75 MG disintegrating tablet, Place 1 tablet under the tongue Daily As Needed (migraine). Indications: Migraine Headache, Disp: 8 tablet, Rfl: 5    sertraline (Zoloft) 50 MG tablet, Take 1 tablet by mouth Daily., Disp: 30 tablet, Rfl: 0    spironolactone (Aldactone) 50 MG tablet, Take 1 tablet by mouth Daily. Indications: Abnormal Growth of Body or Facial Hair, Disp: 30 tablet, Rfl: 5    vitamin D3 125 MCG (5000 UT) capsule capsule, Take 1 capsule by mouth Daily. Indications: Vitamin D Deficiency, Disp: 90 capsule, Rfl: 1     Allergies   Allergen Reactions    Cymbalta [Duloxetine Hcl] Mental Status Change     Jittery and insomnia    Penicillins Rash       Family History   Problem Relation Age of Onset    Arthritis Mother     Restless legs syndrome Mother     Thyroid disease Father     Colon polyps Father     Diabetes  "Father     Drug abuse Maternal Uncle     Alcohol abuse Maternal Uncle     Cancer Maternal Grandmother     Sleep apnea Son     Bo Hyperthermia Neg Hx         Social History     Social History Narrative    Not on file       Objective       Review of Systems   Constitutional:  Negative for appetite change, fatigue, fever, unexpected weight gain and unexpected weight loss.   HENT:  Negative for trouble swallowing.    Respiratory:  Negative for cough, choking, chest tightness, shortness of breath, wheezing and stridor.    Cardiovascular:  Negative for chest pain, palpitations and leg swelling.   Gastrointestinal:  Positive for constipation and diarrhea. Negative for abdominal distention, abdominal pain, anal bleeding, blood in stool, nausea, rectal pain, vomiting, GERD and indigestion.        Vital Signs:   /63 (BP Location: Left arm, Patient Position: Sitting, Cuff Size: Adult)   Ht 162.6 cm (64\")   Wt 75.8 kg (167 lb)   BMI 28.67 kg/m²       Physical Exam  Constitutional:       General: She is not in acute distress.     Appearance: She is well-developed. She is not ill-appearing.   HENT:      Head: Normocephalic.   Eyes:      Pupils: Pupils are equal, round, and reactive to light.   Cardiovascular:      Rate and Rhythm: Normal rate and regular rhythm.      Heart sounds: Normal heart sounds.   Pulmonary:      Effort: Pulmonary effort is normal.      Breath sounds: Normal breath sounds.   Abdominal:      General: Bowel sounds are normal. There is no distension.      Palpations: Abdomen is soft. There is no mass.      Tenderness: There is no abdominal tenderness. There is no guarding or rebound.      Hernia: No hernia is present.   Musculoskeletal:         General: Normal range of motion.   Skin:     General: Skin is warm and dry.   Neurological:      Mental Status: She is alert and oriented to person, place, and time.   Psychiatric:         Speech: Speech normal.         Behavior: Behavior normal.         " Judgment: Judgment normal.         Physical Exam  General: Patient appears well and in no acute distress.  Heart: Regular rate and rhythm, no murmurs or abnormal sounds.  Lungs: Clear to auscultation bilaterally, no wheezes, rales, or rhonchi.  Abdomen: Soft, non-tender, no organomegaly or masses noted.        Assessment & Plan          Assessment and Plan    Diagnoses and all orders for this visit:    1. Fatty liver (Primary)  -     US Liver; Future    2. Gastroesophageal reflux disease with esophagitis without hemorrhage  -     esomeprazole (nexIUM) 40 MG capsule; Take 1 capsule by mouth Every Morning Before Breakfast.  Dispense: 90 capsule; Refill: 3    3. History of gastritis    4. History of cholecystectomy        Assessment & Plan  1. Fatty liver disease:  - Order liver ultrasound to assess current state.  - Contact with results.  - Continue REZDIFFRA daily  - Continue a high-protein/low-carb diet, avoid alcohol, avoid tobacco, avoid NSAIDs    2. Gastroesophageal reflux disease (GERD):  - Refill Nexium 40 mg once daily, 90-day supply.    3. Post-cholecystectomy and irritable bowel syndrome:  - Recommend over-the-counter digestive enzymes.  - Take enzymes as getting ready to eat.  - Continue for 2-3 weeks before assessing effectiveness.  - MyChart progress in a couple of weeks.    Follow-up: Follow up in 6 months.          Follow Up       Follow Up   No follow-ups on file.  Patient was given instructions and counseling regarding her condition or for health maintenance advice. Please see specific information pulled into the AVS if appropriate.       Patient or patient representative verbalized consent for the use of Ambient Listening during the visit with  MINI Bridges for chart documentation. 5/21/2025  09:11 EDT

## 2025-05-22 LAB
ALPHA-GAL IGE QN: <0.1 KU/L
BEEF IGE QN: <0.1 KU/L
CONV CLASS DESCRIPTION: NORMAL
IGE SERPL-ACNC: 102 IU/ML (ref 6–495)
LAMB IGE QN: <0.1 KU/L
PORK IGE QN: <0.1 KU/L

## 2025-05-23 LAB — METHYLMALONATE SERPL-SCNC: 180 NMOL/L (ref 0–378)

## 2025-05-29 ENCOUNTER — HOSPITAL ENCOUNTER (OUTPATIENT)
Dept: ULTRASOUND IMAGING | Facility: HOSPITAL | Age: 30
Discharge: HOME | End: 2025-05-29
Admitting: NURSE PRACTITIONER
Payer: COMMERCIAL

## 2025-05-29 DIAGNOSIS — K76.0 FATTY LIVER: ICD-10-CM

## 2025-05-29 PROCEDURE — 76705 ECHO EXAM OF ABDOMEN: CPT

## 2025-06-02 ENCOUNTER — RESULTS FOLLOW-UP (OUTPATIENT)
Dept: GASTROENTEROLOGY | Facility: CLINIC | Age: 30
End: 2025-06-02
Payer: COMMERCIAL

## 2025-06-02 NOTE — TELEPHONE ENCOUNTER
Liver ultrasound shows no definite findings to suggest fatty liver disease. Excellent news. Thanks     Pt notified of results.

## 2025-06-13 DIAGNOSIS — F33.0 MILD EPISODE OF RECURRENT MAJOR DEPRESSIVE DISORDER: Chronic | ICD-10-CM

## 2025-06-16 RX ORDER — LAMOTRIGINE 150 MG/1
150 TABLET ORAL DAILY
Qty: 30 TABLET | Refills: 0 | Status: SHIPPED | OUTPATIENT
Start: 2025-06-16

## 2025-06-19 DIAGNOSIS — G47.9 SLEEPING DIFFICULTIES: ICD-10-CM

## 2025-06-19 DIAGNOSIS — F41.1 GENERALIZED ANXIETY DISORDER: Chronic | ICD-10-CM

## 2025-06-19 DIAGNOSIS — F33.0 MILD EPISODE OF RECURRENT MAJOR DEPRESSIVE DISORDER: Chronic | ICD-10-CM

## 2025-06-19 RX ORDER — PROPRANOLOL HYDROCHLORIDE 10 MG/1
10 TABLET ORAL EVERY 12 HOURS SCHEDULED
Qty: 60 TABLET | Refills: 2 | OUTPATIENT
Start: 2025-06-19

## 2025-06-19 RX ORDER — AMITRIPTYLINE HYDROCHLORIDE 10 MG/1
TABLET ORAL
Qty: 30 TABLET | Refills: 0 | Status: SHIPPED | OUTPATIENT
Start: 2025-06-19

## 2025-06-25 ENCOUNTER — TELEMEDICINE (OUTPATIENT)
Dept: PSYCHIATRY | Facility: CLINIC | Age: 30
End: 2025-06-25
Payer: COMMERCIAL

## 2025-06-25 DIAGNOSIS — F33.0 MILD EPISODE OF RECURRENT MAJOR DEPRESSIVE DISORDER: Chronic | ICD-10-CM

## 2025-06-25 DIAGNOSIS — F41.1 GENERALIZED ANXIETY DISORDER: Primary | Chronic | ICD-10-CM

## 2025-06-25 DIAGNOSIS — G47.9 SLEEPING DIFFICULTIES: ICD-10-CM

## 2025-06-25 RX ORDER — LAMOTRIGINE 150 MG/1
150 TABLET ORAL DAILY
Qty: 30 TABLET | Refills: 1 | Status: SHIPPED | OUTPATIENT
Start: 2025-06-25

## 2025-06-25 RX ORDER — AMITRIPTYLINE HYDROCHLORIDE 10 MG/1
10 TABLET ORAL NIGHTLY PRN
Qty: 30 TABLET | Refills: 1 | Status: SHIPPED | OUTPATIENT
Start: 2025-06-25

## 2025-06-25 NOTE — PROGRESS NOTES
This provider is located at home office working remotely through the [facility] (through [facility]), [address], [facility], [address] using a secure [employer] Video Visit through [employer]. Patient is being seen remotely via telehealth at their home address in [place], and stated they are in a secure environment for this session. The patient's condition being diagnosed/treated is appropriate for telemedicine. The provider identified herself as well as her credentials.   The patient, and/or patients guardian, consent to be seen remotely, and when consent is given they understand that the consent allows for patient identifiable information to be sent to a third party as needed.   They may refuse to be seen remotely at any time. The electronic data is encrypted and password protected, and the patient and/or guardian has been advised of the potential risks to privacy not withstanding such measures.    You have chosen to receive care through a telehealth visit.  Do you consent to use a video/audio connection for your medical care today? Yes    Patient identifiers utilized: Name and date of birth.    Patient verbally confirmed consent for today's encounter 06/25/2025.    The patient does verbally confirm they are being seen today while physically located in the [facility].  This provider/this APRN is licensed in the [facility] where the patient is located/being seen.     Subjective   Palmira Carbajal is a 30 y.o. female who presents today for follow up    Chief Complaint: Medication management follow-up: Anxiety, depression, and sleeping difficulties follow-up    Accompanied by: The patient is seen alone at today's encounter    History of Present Illness:   -Last encounter with this APRN/Provider: 05/06/2025   -Since last encounter with this APRN/Office: The patient reports her recent trip to [place] with her spouse was a good getaway for  them.  -Mood reported as: Doing good and remaining stable on current treatment regimen.  The patient denies any depressive symptoms, and reports she does not feel depressed.  The patient reports her anxiety symptoms feel like normal every day anxiety, which is a significant improvement for her overall.  -Patient rates symptoms of depression at a 0/10 on a 0-10 scale, with 10 being the worst.  -Patient rates symptoms of anxiety at a 4-5/10 on a 0-10 scale, with 10 being the worst.    -Appetite reported as: Good, no change from typical baseline  -Sleep reported as: Good until the past week or 2 when she reports she has been waking up more at night, but reports when she wakes up she is able to fall back to sleep easily    -Changes in medications or new medical problems/concerns since last visit: Denies any  -Reported medication compliance: The patient reports compliance with current psychotropic medication regimen.  -Reported medication side effects or concerns: Denies any  -This APRN has discussed with the patient certain psychotropic medications can increase sensitivity to heat, make the skin photosensitive, impair the bodies ability to regulate temperature, and increase sweating.  Safety with psychotropic medications and heat/sun/sweating has been discussed with the patient.  Avoiding overheating, excessive sweating, and prolonged exposure to high temperatures as well as encouraging hydration has been discussed with the patient.    -Auditory or visual hallucinations: Denies  -Behaviors different from patient baseline, or any reckless, impulsive, or risky behaviors: Denies  -Symptoms of karl or psychosis: Denies  -Self-injurious behavior: Denies  -SI/HI: The patient adamantly denies any suicidal or homicidal ideations, plans, or intent at the time of this encounter and is convincing.    -Using a shared decision-making approach the patient reports she would like to continue her current treatment/medication regimen  without any adjustments/changes at this time.  When discussing medication efficacy with the patient, and reassessing the need and appropriateness of continued psychotropic medication treatment and doses, she reports she is pleased with management of symptoms at this time, and that her current treatment/medication regimen has continued to be effective for her and she does not want to make any changes or adjustments at today's encounter.    -The patient does verbally contract for safety at today's encounter and is in verbal agreement with the safety/crisis plan. The patient reports in her own words that she will reach out to this APRN/office prior to next scheduled appointment if there is any worsening of mood, any new psychiatric symptoms, any medication side effects or concerns, any concern for safety to self or others, any suicidal or homicidal ideations plans or intent, or any concerns, or she will call 911, call or text the suicide and crisis lifeline at [phone], or go to the closest emergency department.      Patient Health Questionnaire-9 (PHQ-9) (Depression Screening Tool)  Little interest or pleasure in doing things? (Patient-Rptd) Not at all   Feeling down, depressed, or hopeless? (Patient-Rptd) Not at all   PHQ-2 Total Score (Patient-Rptd) 0   Trouble falling or staying asleep, or sleeping too much? (Patient-Rptd) Several days   Feeling tired or having little energy? (Patient-Rptd) Several days   Poor appetite or overeating? (Patient-Rptd) Not at all   Feeling bad about yourself - or that you are a failure or have let yourself or your family down? (Patient-Rptd) Several days   Trouble concentrating on things, such as reading the newspaper or watching television? (Patient-Rptd) Not at all   Moving or speaking so slowly that other people could have noticed? Or the opposite - being so fidgety or restless that you have been moving around a lot more than usual? (Patient-Rptd) Not at all   Thoughts that you would be  better off dead, or of hurting yourself in some way? (Patient-Rptd) Not at all   PHQ-9 Total Score (Patient-Rptd) 3   If you checked off any problems, how difficult have these problems made it for you to do your work, take care of things at home, or get along with other people? (Patient-Rptd) Not difficult at all         PHQ-9 Total Score: (Patient-Rptd) 3       Generalized Anxiety Disorder 7-Item (STACY-7) Screening Tool  Feeling nervous, anxious or on edge: (Patient-Rptd) Several days  Not being able to stop or control worrying: (Patient-Rptd) Several days  Worrying too much about different things: (Patient-Rptd) More than half the days  Trouble Relaxing: (Patient-Rptd) Several days  Being so restless that it is hard to sit still: (Patient-Rptd) Not at all  Feeling afraid as if something awful might happen: (Patient-Rptd) Several days  Becoming easily annoyed or irritable: (Patient-Rptd) Several days  STACY 7 Total Score: (Patient-Rptd) 7  If you checked any problems, how difficult have these problems made it for you to do your work, take care of things at home, or get along with other people: (Patient-Rptd) Somewhat difficult      All Known Prior Psychiatric Medications and Responses if Known:  -Zoloft - does not remember response, possible lack of efficacy  -Paxil - does not remember response, possible lack of efficacy  -Prozac - does not remember response, possible lack of efficacy  -Lexapro - does not remember response, possible lack of efficacy  -Celexa - does not remember response, possible lack of efficacy  -Desvenlafaxine/Pristiq - does not remember response, possible lack of efficacy  -Effexor - does not remember response, possible lack of efficacy  -Cymbalta - caused jitteriness and insomnia  -Viibryd - reports she cannot remember why she could not take this medication, but feels there was some kind of side effect  -Wellbutrin SR - did not like the way it made her feel, made her feel drunk and  woozy  -Quetiapine - does not remember response, possible lack of efficacy  -Abilify - does not remember response, possible lack of efficacy  -Trazodone - does not remember response, possible lack of efficacy  -Hydroxyzine - Lack of efficacy  -Ambien - does not remember response, possible lack of efficacy  -Lorazepam - does not remember response, possible lack of efficacy  -Buspar - reported lack of efficacy, and also caused dizziness and vivid dreams  -Propranolol - the patient reports lack of efficacy and decreased heart rate when taking for mental health reasons  -The patient reports some of the medications that she cannot remember the response to listed above included side effects such as restless leg, nightmares, and heart palpitations, but she cannot remember which medications caused these side effects  -Olanzapine - patient reports possible partial efficacy, if any efficacy, unsure  -Trintellix - patient reports effective for depressive symptoms, but not for anxiety  -Lamictal - patient reports effective for depression/mood  -Amitriptyline - patient reports effective  -Sertraline - patient reports effective    Previous Suicide Attempts:  The patient reports one previous suicide attempt as a teenager by overdosing on pills.  She reports no psychiatric hospitalization after this attempt, but she reports she was taken to the emergency department.  The patient denies any other previous suicide attempts.     Previous Self-Harming Behavior:  The patient reports a history of self harming by cutting in her teenage years, but none as an adult.    History of Seizures or TBI:  The patient denies any    Patient's Support Network Includes:   and mother    Last Menstrual Period:  Hysterectomy - partial.        The following portions of the patient's history were reviewed and updated as appropriate: allergies, current medications, past family history, past medical history, past social history, past surgical history  and problem list.          Past Medical History:  Past Medical History:   Diagnosis Date    Acid reflux     Anxiety     Chronic pain disorder     NO PAIN CLINIC, PCP/PSYCH CONT W/AMITRIPTYLINE    Depression     Eczema     Fatty liver     Gallstones     Intractable migraine without aura and without status migrainosus 2023    Kidney stone     HX OF    Maltracking of right patella     Migraines     Orthostatic hypotension     WORKED UP FOR POTS, CONT W/MEDS, MONNIN. RX FLORINEF    Panic disorder     PONV (postoperative nausea and vomiting)     GOOD RESULTS WITH SCOPALAMINE    PTSD (post-traumatic stress disorder)     WAKES UP FROM ANESTHESIA WITH PANIC ATTACK    Seasonal allergic rhinitis 2022    Self-injurious behavior     as a teenager    Suicide attempt     NO ADULT ISSUES       Social History:  Social History     Socioeconomic History    Marital status:      Spouse name: TRISTAN    Number of children: 3   Tobacco Use    Smoking status: Former     Current packs/day: 0.00     Average packs/day: 0.5 packs/day for 15.0 years (7.5 ttl pk-yrs)     Types: Cigarettes     Start date: 2007     Quit date: 2022     Years since quitting: 3.3    Smokeless tobacco: Never   Vaping Use    Vaping status: Every Day    Substances: Nicotine, Flavoring    Devices: Disposable   Substance and Sexual Activity    Alcohol use: Not Currently    Drug use: Not Currently    Sexual activity: Defer     Partners: Male     Birth control/protection: Hysterectomy       Family History:  Family History   Problem Relation Age of Onset    Arthritis Mother     Restless legs syndrome Mother     Thyroid disease Father     Colon polyps Father     Diabetes Father     Drug abuse Maternal Uncle     Alcohol abuse Maternal Uncle     Cancer Maternal Grandmother     Sleep apnea Son     Malig Hyperthermia Neg Hx        Past Surgical History:  Past Surgical History:   Procedure Laterality Date     SECTION      CHOLECYSTECTOMY N/A  7/18/2024    Procedure: CHOLECYSTECTOMY LAPAROSCOPIC;  Surgeon: Cisco Hickman MD;  Location: [facility] OR OSC;  Service: General;  Laterality: N/A;    COLONOSCOPY N/A 10/11/2022    Procedure: COLONOSCOPY;  Surgeon: Dyan Griffin MD;  Location: [facility] ENDOSCOPY;  Service: Gastroenterology;  Laterality: N/A;  HEMORRHOIDS    CYSTOSCOPY      CYSTOSCOPY URETEROSCOPY Left 1/30/2023    Procedure: CYSTOSCOPY URETEROSCOPY RETROGRADE PYELOGRAM HOLMIUM LASER STENT INSERTION, left;  Surgeon: Melisa Garcia MD;  Location: [facility] MAIN OR;  Service: Urology;  Laterality: Left;    ENDOSCOPY N/A 9/8/2023    Procedure: ESOPHAGOGASTRODUODENOSCOPY WITH BIOPSIES;  Surgeon: Dyan Griffin MD;  Location: [facility] ENDOSCOPY;  Service: Gastroenterology;  Laterality: N/A;  ESOPHAGITIS, GASTRITIS    HYSTERECTOMY      KIDNEY STONE SURGERY      unspecified    KNEE ARTHROSCOPY Right 7/11/2022    Procedure: KNEE ARTHROSCOPY WITH A LATERAL RELEASE AND CHONDROPLASTY;  Surgeon: Marco A Pitts MD;  Location: [facility] OR [facility];  Service: Orthopedics;  Laterality: Right;    WISDOM TOOTH EXTRACTION         Problem List:  Patient Active Problem List   Diagnosis    Maltracking of right patella    Impingement syndrome involving patellar fat pad of right knee    Chondromalacia    Patellar malalignment syndrome of right knee    Binocular vision disorder with diplopia    Generalized anxiety disorder    Acid reflux    PTSD (post-traumatic stress disorder)    Kidney stone on left side    Seasonal allergic rhinitis    Burn of finger and thumb of right hand, second degree    Hemorrhoids    Right hand pain    Rectal bleeding    Anal or rectal pain    Decreased appetite    Aftercare following surgery of right knee arthroscopic chondroplasty and lateral release, 7/11/2022    Generalized body aches    Epigastric pain    Female hirsutism    Right ovarian cyst    Moderate episode of recurrent major depressive disorder    Primary insomnia     Vitamin D deficiency    Intractable migraine without aura and without status migrainosus    Calcified granuloma of lung    Nipple discharge    Nausea and vomiting    Overweight (BMI 25.0-29.9)    Acne vulgaris    Vaping nicotine dependence, tobacco product    Achilles tendinitis of both lower extremities    BRADY (nonalcoholic steatohepatitis)    Symptomatic cholelithiasis       Allergy:   Allergies   Allergen Reactions    Cymbalta [Duloxetine Hcl] Mental Status Change     Jittery and insomnia    Penicillins Rash        Current Medications:   Current Outpatient Medications   Medication Sig Dispense Refill    amitriptyline (ELAVIL) 10 MG tablet Take 1 tablet by mouth At Night As Needed for Sleep. 30 tablet 1    lamoTRIgine (LaMICtal) 150 MG tablet Take 1 tablet by mouth Daily. 30 tablet 1    sertraline (ZOLOFT) 50 MG tablet Take 1 tablet by mouth Daily. 30 tablet 1    dicyclomine (BENTYL) 20 MG tablet Take 1 tablet by mouth Every 6 (Six) Hours As Needed (abdominal cramping). 60 tablet 3    Emgality 120 MG/ML auto-injector pen INJECT AS DIRECTED 2ML SUBCUTANEOUSLY EVERY 30 DAYS FOR MIGRAINE 1 mL 3    esomeprazole (nexIUM) 40 MG capsule Take 1 capsule by mouth Every Morning Before Breakfast. 90 capsule 3    fludrocortisone 0.1 MG tablet Take 1 tablet by mouth 2 (Two) Times a Day. 90 tablet 1    folic acid (FOLVITE) 1 MG tablet Take 1 tablet by mouth Daily. Indications: Anemia From Inadequate Folic Acid 90 tablet 1    metoprolol succinate XL (TOPROL-XL) 25 MG 24 hr tablet Take 1 tablet by mouth Daily. 90 tablet 3    polyethylene glycol (MIRALAX) 17 g packet Take 17 g by mouth Daily. 5 packet 0    Resmetirom (Rezdiffra) 80 MG tablet Take 1 tablet by mouth Daily. 90 tablet 3    rimegepant sulfate ODT (Nurtec) 75 MG disintegrating tablet Place 1 tablet under the tongue Daily As Needed (migraine). Indications: Migraine Headache 8 tablet 5    spironolactone (Aldactone) 50 MG tablet Take 1 tablet by mouth Daily. Indications:  Abnormal Growth of Body or Facial Hair 30 tablet 5    vitamin D3 125 MCG (5000 UT) capsule capsule Take 1 capsule by mouth Daily. Indications: Vitamin D Deficiency 90 capsule 1     No current facility-administered medications for this visit.         Review of Symptoms:    Review of Systems   Psychiatric/Behavioral:  Positive for sleep disturbance and stress. Negative for agitation, behavioral problems, dysphoric mood, hallucinations, self-injury, suicidal ideas, negative for hyperactivity and depressed mood. The patient is nervous/anxious.          Physical Exam:   not currently breastfeeding. There is no height or weight on file to calculate BMI.   Due to the remote nature of this encounter (virtual encounter), vitals were unable to be obtained.  Height stated at 64 inches.  Weight reported at around 161-162 pounds.        Physical Exam  Constitutional:       General: She is not in acute distress.  Neurological:      Mental Status: She is alert and oriented to person, place, and time.   Psychiatric:         Attention and Perception: Attention normal.         Mood and Affect: Mood and affect normal.         Speech: Speech normal. Speech is not rapid and pressured, slurred or tangential.         Behavior: Behavior normal. Behavior is cooperative.         Thought Content: Thought content normal. Thought content is not paranoid or delusional. Thought content does not include homicidal or suicidal ideation. Thought content does not include homicidal or suicidal plan.         Cognition and Memory: Cognition and memory normal.         Judgment: Judgment normal.         Mental Status Exam:   Hygiene:   good  Cooperation:  Cooperative and attentive  Eye Contact:  Good  Psychomotor Behavior:  Appropriate  Affect:  Appropriate  Mood: normal  Hopelessness: Denies  Speech:  Normal  Thought Process:  Goal directed and Linear  Thought Content:  Mood congruent  Suicidal:  None  Homicidal:  None  Hallucinations:  None  Delusion:   None  Memory:  Intact  Orientation:  Person, Place, Time, and Situation  Reliability:  good  Insight:  Good  Judgement:  Good  Impulse Control:  Good  Physical/Medical Issues:  No            Wt Readings from Last 3 Encounters:   05/21/25 75.8 kg (167 lb)   05/16/25 74.9 kg (165 lb 1.6 oz)   05/13/25 74.6 kg (164 lb 6.4 oz)     Temp Readings from Last 3 Encounters:   05/16/25 97.8 °F (36.6 °C)   04/10/25 97.9 °F (36.6 °C) (Temporal)   08/08/24 97.2 °F (36.2 °C)     BP Readings from Last 3 Encounters:   05/21/25 105/63   05/16/25 114/73   05/13/25 113/43     Pulse Readings from Last 3 Encounters:   05/16/25 81   05/13/25 80   04/10/25 73      BMI Readings from Last 3 Encounters:   05/21/25 28.67 kg/m²   05/16/25 28.33 kg/m²   05/13/25 28.21 kg/m²       Lab Results:   Office Visit on 05/16/2025   Component Date Value Ref Range Status    WBC 05/16/2025 11.31 (H)  3.40 - 10.80 10*3/mm3 Final    RBC 05/16/2025 4.66  3.77 - 5.28 10*6/mm3 Final    Hemoglobin 05/16/2025 13.4  12.0 - 15.9 g/dL Final    Hematocrit 05/16/2025 40.1  34.0 - 46.6 % Final    MCV 05/16/2025 86.1  79.0 - 97.0 fL Final    MCH 05/16/2025 28.8  26.6 - 33.0 pg Final    MCHC 05/16/2025 33.4  31.5 - 35.7 g/dL Final    RDW 05/16/2025 12.3  12.3 - 15.4 % Final    RDW-SD 05/16/2025 38.8  37.0 - 54.0 fl Final    MPV 05/16/2025 11.1  6.0 - 12.0 fL Final    Platelets 05/16/2025 344  140 - 450 10*3/mm3 Final    Neutrophil % 05/16/2025 61.7  42.7 - 76.0 % Final    Lymphocyte % 05/16/2025 29.4  19.6 - 45.3 % Final    Monocyte % 05/16/2025 5.9  5.0 - 12.0 % Final    Eosinophil % 05/16/2025 1.7  0.3 - 6.2 % Final    Basophil % 05/16/2025 0.9  0.0 - 1.5 % Final    Immature Grans % 05/16/2025 0.4  0.0 - 0.5 % Final    Neutrophils, Absolute 05/16/2025 6.98  1.70 - 7.00 10*3/mm3 Final    Lymphocytes, Absolute 05/16/2025 3.32 (H)  0.70 - 3.10 10*3/mm3 Final    Monocytes, Absolute 05/16/2025 0.67  0.10 - 0.90 10*3/mm3 Final    Eosinophils, Absolute 05/16/2025 0.19  0.00 -  0.40 10*3/mm3 Final    Basophils, Absolute 05/16/2025 0.10  0.00 - 0.20 10*3/mm3 Final    Immature Grans, Absolute 05/16/2025 0.05  0.00 - 0.05 10*3/mm3 Final    nRBC 05/16/2025 0.0  0.0 - 0.2 /100 WBC Final    Glucose 05/16/2025 108 (H)  65 - 99 mg/dL Final    BUN 05/16/2025 11  6 - 20 mg/dL Final    Creatinine 05/16/2025 0.62  0.57 - 1.00 mg/dL Final    Sodium 05/16/2025 139  136 - 145 mmol/L Final    Potassium 05/16/2025 4.0  3.5 - 5.2 mmol/L Final    Chloride 05/16/2025 106  98 - 107 mmol/L Final    CO2 05/16/2025 21.1 (L)  22.0 - 29.0 mmol/L Final    Calcium 05/16/2025 8.9  8.6 - 10.5 mg/dL Final    Total Protein 05/16/2025 6.7  6.0 - 8.5 g/dL Final    Albumin 05/16/2025 4.2  3.5 - 5.2 g/dL Final    ALT (SGPT) 05/16/2025 15  1 - 33 U/L Final    AST (SGOT) 05/16/2025 16  1 - 32 U/L Final    Alkaline Phosphatase 05/16/2025 81  39 - 117 U/L Final    Total Bilirubin 05/16/2025 0.3  0.0 - 1.2 mg/dL Final    Globulin 05/16/2025 2.5  gm/dL Final    A/G Ratio 05/16/2025 1.7  g/dL Final    BUN/Creatinine Ratio 05/16/2025 17.7  7.0 - 25.0 Final    Anion Gap 05/16/2025 11.9  5.0 - 15.0 mmol/L Final    eGFR 05/16/2025 122.3  >60.0 mL/min/1.73 Final    TSH 05/16/2025 1.430  0.270 - 4.200 uIU/mL Final    Free T4 05/16/2025 1.19  0.92 - 1.68 ng/dL Final    Folate 05/16/2025 4.43 (L)  4.78 - 24.20 ng/mL Final    Vitamin B-12 05/16/2025 422  211 - 946 pg/mL Final    E. chaffeensis (HME) IgG Titer 05/16/2025 Negative  Neg:<1:64 Final    E. chaffeensis (HME) IgM Titer 05/16/2025 Negative  Neg:<1:20 Final    HGE IgG Titer 05/16/2025 Negative  Neg:<1:64 Final    HGE IgM Titer 05/16/2025 Negative  Neg:<1:20 Final    Due to a reagent backorder, this test was performed using a different  assay. The reference interval for this alternate assay is:                                         Negative      <1:64                                         Positive       1:64 or greater    RESULT COMMENT: 05/16/2025 Comment   Final    Antibody  titers may be negative in the first 7-10 days of illness.  A four-fold rise in IgG antibody titers for Anaplasma phagocytophilum  and/or Ehrlichia chaffeensis in paired samples (acute and  convalescent) supports the diagnosis of anaplasmosis and/or  ehrlichiosis, respectively.    Thyroid Peroxidase Antibody 05/16/2025 <9  0 - 34 IU/mL Final    25 Hydroxy, Vitamin D 05/16/2025 27.3 (L)  30.0 - 100.0 ng/ml Final    Methylmalonic Acid 05/16/2025 180  0 - 378 nmol/L Final    Babesia microti IgG 05/16/2025 <1:10  Neg:<1:10 Final    Babesia microti IgM 05/16/2025 <1:10  Neg:<1:10 Final    RESULT COMMENT: 05/16/2025 Comment   Final    Diagnosis of acute babesiosis cannot be confirmed solely by the  presence of antibody in a serum sample collected at a single time  point. Babesia microti (B. microti) IgG antibody titers >=1:1024  and/or a positive B. microti IgM antibody IFA result are suggestive  of active or recent B. microti infection. PCR or peripheral blood  smear should be considered to confirm acute infection.    Class Description 05/16/2025 Comment   Final        Levels of Specific IgE       Class  Description of Class      ---------------------------  -----  --------------------                     < 0.10         0         Negative             0.10 -    0.31         0/I       Equivocal/Low             0.32 -    0.55         I         Low             0.56 -    1.40         II        Moderate             1.41 -    3.90         III       High             3.91 -   19.00         IV        Very High            19.01 -  100.00         V         Very High                    >100.00         VI        Very High    IgE 05/16/2025 102  6 - 495 IU/mL Final    Pork 05/16/2025 <0.10  Class 0 kU/L Final    Beef 05/16/2025 <0.10  Class 0 kU/L Final    Lamb 05/16/2025 <0.10  Class 0 kU/L Final    [id number] Alpha-Gal 05/16/2025 <0.10  Class 0 kU/L Final    Lyme IgG Western Blot Interpretati* 05/16/2025 Negative  Negative Final    IgG  P93 Ab. 05/16/2025 Absent   Final    IgG P66 Ab. 05/16/2025 Absent   Final    IgG P58 Ab. 05/16/2025 Absent   Final    IgG P45 Ab. 05/16/2025 Absent   Final    IgG P41 Ab. 05/16/2025 Present   Final    IgG P39 Ab. 05/16/2025 Absent   Final    IgG P30 Ab. 05/16/2025 Absent   Final    IgG P28 Ab. 05/16/2025 Absent   Final    IgG P23 Ab. 05/16/2025 Absent   Final    IgG P18 Ab. 05/16/2025 Absent   Final    Lyme IgM Western Blot Interpretati* 05/16/2025 Negative  Negative Final    Please Note: Lyme immunoblot alone is not recommended for the  diagnosis of Lyme disease. Current guidelines recommend the use  of a two-tiered approach to Lyme serology testing to improve the  sensitivity and specificity of testing. [facility] offers test code  990216 Lyme Disease Serology with Reflex to aid in the diagnosis  of Lyme Disease.    IgM P41 Ab. 05/16/2025 Absent   Final    IgM P39 Ab. 05/16/2025 Absent   Final    IgM P23 Ab. 05/16/2025 Absent   Final    Additional Information 05/16/2025 Comment   Final    Per CDC criteria, the Lyme IgG Immunoblot is interpreted as positive  if IgG-class antibodies are detected to 5 or more B. burgdorferi  proteins, and the Lyme IgM Immunoblot is interpreted as positive if  IgM-class antibodies are detected to 2 or more B. burgdorferi  proteins. Immunoblot patterns not meeting these criteria should not  be interpreted as positive. Epitopes from certain B. burgdorferi  proteins (e.g., p41) are conserved across other bacteria, which may  lead to the detection of IgM-and/or IgG class antibodies on the Lyme  disease immunoblots in patients without Lyme disease. Immunoblot  should only be ordered on specimens that are positive or equivocal  by an FDA-licensed Lyme disease antibody screening test (e.g., EIA).  Results of the Lyme IgM immunoblot should not be considered in  patients with 30 or more days of symptoms.   Lab on 04/10/2025   Component Date Value Ref Range Status    WBC 04/10/2025 10.13  3.40 -  10.80 10*3/mm3 Final    RBC 04/10/2025 4.91  3.77 - 5.28 10*6/mm3 Final    Hemoglobin 04/10/2025 14.2  12.0 - 15.9 g/dL Final    Hematocrit 04/10/2025 42.5  34.0 - 46.6 % Final    MCV 04/10/2025 86.6  79.0 - 97.0 fL Final    MCH 04/10/2025 28.9  26.6 - 33.0 pg Final    MCHC 04/10/2025 33.4  31.5 - 35.7 g/dL Final    RDW 04/10/2025 13.0  12.3 - 15.4 % Final    RDW-SD 04/10/2025 40.4  37.0 - 54.0 fl Final    MPV 04/10/2025 10.3  6.0 - 12.0 fL Final    Platelets 04/10/2025 340  140 - 450 10*3/mm3 Final    Neutrophil % 04/10/2025 64.0  42.7 - 76.0 % Final    Lymphocyte % 04/10/2025 25.4  19.6 - 45.3 % Final    Monocyte % 04/10/2025 7.5  5.0 - 12.0 % Final    Eosinophil % 04/10/2025 1.9  0.3 - 6.2 % Final    Basophil % 04/10/2025 0.8  0.0 - 1.5 % Final    Immature Grans % 04/10/2025 0.4  0.0 - 0.5 % Final    Neutrophils, Absolute 04/10/2025 6.49  1.70 - 7.00 10*3/mm3 Final    Lymphocytes, Absolute 04/10/2025 2.57  0.70 - 3.10 10*3/mm3 Final    Monocytes, Absolute 04/10/2025 0.76  0.10 - 0.90 10*3/mm3 Final    Eosinophils, Absolute 04/10/2025 0.19  0.00 - 0.40 10*3/mm3 Final    Basophils, Absolute 04/10/2025 0.08  0.00 - 0.20 10*3/mm3 Final    Immature Grans, Absolute 04/10/2025 0.04  0.00 - 0.05 10*3/mm3 Final    nRBC 04/10/2025 0.0  0.0 - 0.2 /100 WBC Final   Office Visit on 08/08/2024   Component Date Value Ref Range Status    Penicillin G Allergen IgE 08/08/2024 <0.10  Class 0 kU/L Final    Class Description 08/08/2024 Comment   Final        Levels of Specific IgE       Class  Description of Class      ---------------------------  -----  --------------------                     < 0.10         0         Negative             0.10 -    0.31         0/I       Equivocal/Low             0.32 -    0.55         I         Low             0.56 -    1.40         II        Moderate             1.41 -    3.90         III       High             3.91 -   19.00         IV        Very High            19.01 -  100.00         V          "Very High                    >100.00         VI        Very High    25 Hydroxy, Vitamin D 08/08/2024 37.6  30.0 - 100.0 ng/ml Final   Admission on 07/18/2024, Discharged on 07/18/2024   Component Date Value Ref Range Status    Case Report 07/18/2024    Final                    Value:Surgical Pathology Report                         Case: [id number]                                  Authorizing Provider:  Cisco Hickman MD        Collected:           07/18/2024 12:54 PM          Ordering Location:     [facility]  Received:            07/19/2024 07:01 AM                                 OR                                                                           Pathologist:           Sue Frankel DO                                                       Specimen:    Gallbladder, gallbladder and contents                                                      Clinical Information 07/18/2024    Final                    Value:Symptomatic cholelithiasis    Final Diagnosis 07/18/2024    Final                    Value:Gallbladder, cholecystectomy:                           - Mild chronic cholecystitis                           - Focal cholesterolosis                           - Cholelithiasis     Gross Description 07/18/2024    Final                    Value:1. Gallbladder.                          Received in formalin and labeled \"gallbladder and contents\" is an 8.0 x                           3.0 x 3.0 cm intact gallbladder received with a clipped cystic duct                           margin.  The dusky serosa is smooth, and the opposing cauterized                           adventitia is shaggy.  Opening reveals dark green, tenacious bile fluid                           with 2 yellow, nodular gallstones (averaging 0.3 cm), dark green, velvety                           mucosa with mild yellow stippling, and an average wall thickness of 0.2                           cm.  No distinct lesions or lymph nodes " are identified.  Representative                           sections are submitted in 1 cassette.                           YOBANI    Microscopic Description 07/18/2024    Final                    Value:Microscopic examination performed.   Admission on 07/14/2024, Discharged on 07/14/2024   Component Date Value Ref Range Status    Glucose 07/14/2024 82  65 - 99 mg/dL Final    BUN 07/14/2024 7  6 - 20 mg/dL Final    Creatinine 07/14/2024 0.70  0.57 - 1.00 mg/dL Final    Sodium 07/14/2024 141  136 - 145 mmol/L Final    Potassium 07/14/2024 3.7  3.5 - 5.2 mmol/L Final    Chloride 07/14/2024 105  98 - 107 mmol/L Final    CO2 07/14/2024 22.5  22.0 - 29.0 mmol/L Final    Calcium 07/14/2024 8.8  8.6 - 10.5 mg/dL Final    Total Protein 07/14/2024 6.6  6.0 - 8.5 g/dL Final    Albumin 07/14/2024 4.0  3.5 - 5.2 g/dL Final    ALT (SGPT) 07/14/2024 9  1 - 33 U/L Final    AST (SGOT) 07/14/2024 11  1 - 32 U/L Final    Alkaline Phosphatase 07/14/2024 83  39 - 117 U/L Final    Total Bilirubin 07/14/2024 <0.2  0.0 - 1.2 mg/dL Final    Globulin 07/14/2024 2.6  gm/dL Final    A/G Ratio 07/14/2024 1.5  g/dL Final    BUN/Creatinine Ratio 07/14/2024 10.0  7.0 - 25.0 Final    Anion Gap 07/14/2024 13.5  5.0 - 15.0 mmol/L Final    eGFR 07/14/2024 118.7  >60.0 mL/min/1.73 Final    Lipase 07/14/2024 33  13 - 60 U/L Final    Color, UA 07/14/2024 Yellow  Yellow, Straw Final    Appearance, UA 07/14/2024 Clear  Clear Final    pH, UA 07/14/2024 8.0  5.0 - 8.0 Final    Specific Gravity, UA 07/14/2024 <=1.005  1.005 - 1.030 Final    Glucose, UA 07/14/2024 Negative  Negative Final    Ketones, UA 07/14/2024 Negative  Negative Final    Bilirubin, UA 07/14/2024 Negative  Negative Final    Blood, UA 07/14/2024 Negative  Negative Final    Protein, UA 07/14/2024 Negative  Negative Final    Leuk Esterase, UA 07/14/2024 Negative  Negative Final    Nitrite, UA 07/14/2024 Negative  Negative Final    Urobilinogen, UA 07/14/2024 0.2 E.U./dL  0.2 - 1.0 E.U./dL Final     Extra Tube 07/14/2024 Hold for add-ons.   Final    Auto resulted.    Extra Tube 07/14/2024 hold for add-on   Final    Auto resulted    Extra Tube 07/14/2024 Hold for add-ons.   Final    Auto resulted.    Extra Tube 07/14/2024 Hold for add-ons.   Final    Auto resulted    WBC 07/14/2024 10.60  3.40 - 10.80 10*3/mm3 Final    RBC 07/14/2024 4.58  3.77 - 5.28 10*6/mm3 Final    Hemoglobin 07/14/2024 12.7  12.0 - 15.9 g/dL Final    Hematocrit 07/14/2024 37.7  34.0 - 46.6 % Final    MCV 07/14/2024 82.3  79.0 - 97.0 fL Final    MCH 07/14/2024 27.7  26.6 - 33.0 pg Final    MCHC 07/14/2024 33.7  31.5 - 35.7 g/dL Final    RDW 07/14/2024 13.3  12.3 - 15.4 % Final    RDW-SD 07/14/2024 39.8  37.0 - 54.0 fl Final    MPV 07/14/2024 10.1  6.0 - 12.0 fL Final    Platelets 07/14/2024 456 (H)  140 - 450 10*3/mm3 Final    Neutrophil % 07/14/2024 47.6  42.7 - 76.0 % Final    Lymphocyte % 07/14/2024 43.2  19.6 - 45.3 % Final    Monocyte % 07/14/2024 6.4  5.0 - 12.0 % Final    Eosinophil % 07/14/2024 1.7  0.3 - 6.2 % Final    Basophil % 07/14/2024 0.8  0.0 - 1.5 % Final    Immature Grans % 07/14/2024 0.3  0.0 - 0.5 % Final    Neutrophils, Absolute 07/14/2024 5.04  1.70 - 7.00 10*3/mm3 Final    Lymphocytes, Absolute 07/14/2024 4.58 (H)  0.70 - 3.10 10*3/mm3 Final    Monocytes, Absolute 07/14/2024 0.68  0.10 - 0.90 10*3/mm3 Final    Eosinophils, Absolute 07/14/2024 0.18  0.00 - 0.40 10*3/mm3 Final    Basophils, Absolute 07/14/2024 0.09  0.00 - 0.20 10*3/mm3 Final    Immature Grans, Absolute 07/14/2024 0.03  0.00 - 0.05 10*3/mm3 Final    nRBC 07/14/2024 0.0  0.0 - 0.2 /100 WBC Final    RBC Morphology 07/14/2024 Normal  Normal Final    WBC Morphology 07/14/2024 Normal  Normal Final    Platelet Morphology 07/14/2024 Normal  Normal Final   Admission on 07/13/2024, Discharged on 07/14/2024   Component Date Value Ref Range Status    Glucose 07/13/2024 111 (H)  65 - 99 mg/dL Final    BUN 07/13/2024 9  6 - 20 mg/dL Final    Creatinine 07/13/2024  0.71  0.57 - 1.00 mg/dL Final    Sodium 07/13/2024 141  136 - 145 mmol/L Final    Potassium 07/13/2024 3.7  3.5 - 5.2 mmol/L Final    Chloride 07/13/2024 105  98 - 107 mmol/L Final    CO2 07/13/2024 20.2 (L)  22.0 - 29.0 mmol/L Final    Calcium 07/13/2024 9.0  8.6 - 10.5 mg/dL Final    Total Protein 07/13/2024 7.0  6.0 - 8.5 g/dL Final    Albumin 07/13/2024 4.2  3.5 - 5.2 g/dL Final    ALT (SGPT) 07/13/2024 10  1 - 33 U/L Final    AST (SGOT) 07/13/2024 11  1 - 32 U/L Final    Alkaline Phosphatase 07/13/2024 82  39 - 117 U/L Final    Total Bilirubin 07/13/2024 <0.2  0.0 - 1.2 mg/dL Final    Globulin 07/13/2024 2.8  gm/dL Final    A/G Ratio 07/13/2024 1.5  g/dL Final    BUN/Creatinine Ratio 07/13/2024 12.7  7.0 - 25.0 Final    Anion Gap 07/13/2024 15.8 (H)  5.0 - 15.0 mmol/L Final    eGFR 07/13/2024 116.7  >60.0 mL/min/1.73 Final    Lipase 07/13/2024 37  13 - 60 U/L Final    Color, UA 07/13/2024 Yellow  Yellow, Straw Final    Appearance, UA 07/13/2024 Turbid (A)  Clear Final    pH, UA 07/13/2024 >=9.0 (H)  5.0 - 8.0 Final    Specific [place], UA 07/13/2024 1.021  1.005 - 1.030 Final    Glucose, UA 07/13/2024 Negative  Negative Final    Ketones, UA 07/13/2024 15 mg/dL (1+) (A)  Negative Final    Bilirubin, UA 07/13/2024 Negative  Negative Final    Blood, UA 07/13/2024 Negative  Negative Final    Protein, UA 07/13/2024 Trace (A)  Negative Final    Leuk Esterase, UA 07/13/2024 Negative  Negative Final    Nitrite, UA 07/13/2024 Negative  Negative Final    Urobilinogen, UA 07/13/2024 1.0 E.U./dL  0.2 - 1.0 E.U./dL Final    Extra Tube 07/13/2024 Hold for add-ons.   Final    Auto resulted.    Extra Tube 07/13/2024 hold for add-on   Final    Auto resulted    Extra Tube 07/13/2024 Hold for add-ons.   Final    Auto resulted.    Extra Tube 07/13/2024 Hold for add-ons.   Final    Auto resulted    WBC 07/13/2024 14.01 (H)  3.40 - 10.80 10*3/mm3 Final    RBC 07/13/2024 4.85  3.77 - 5.28 10*6/mm3 Final    Hemoglobin 07/13/2024 13.3   12.0 - 15.9 g/dL Final    Hematocrit 07/13/2024 39.7  34.0 - 46.6 % Final    MCV 07/13/2024 81.9  79.0 - 97.0 fL Final    MCH 07/13/2024 27.4  26.6 - 33.0 pg Final    MCHC 07/13/2024 33.5  31.5 - 35.7 g/dL Final    RDW 07/13/2024 13.1  12.3 - 15.4 % Final    RDW-SD 07/13/2024 39.0  37.0 - 54.0 fl Final    MPV 07/13/2024 10.3  6.0 - 12.0 fL Final    Platelets 07/13/2024 461 (H)  140 - 450 10*3/mm3 Final    Neutrophil % 07/13/2024 54.4  42.7 - 76.0 % Final    Lymphocyte % 07/13/2024 37.3  19.6 - 45.3 % Final    Monocyte % 07/13/2024 6.2  5.0 - 12.0 % Final    Eosinophil % 07/13/2024 1.1  0.3 - 6.2 % Final    Basophil % 07/13/2024 0.6  0.0 - 1.5 % Final    Immature Grans % 07/13/2024 0.4  0.0 - 0.5 % Final    Neutrophils, Absolute 07/13/2024 7.61 (H)  1.70 - 7.00 10*3/mm3 Final    Lymphocytes, Absolute 07/13/2024 5.23 (H)  0.70 - 3.10 10*3/mm3 Final    Monocytes, Absolute 07/13/2024 0.87  0.10 - 0.90 10*3/mm3 Final    Eosinophils, Absolute 07/13/2024 0.16  0.00 - 0.40 10*3/mm3 Final    Basophils, Absolute 07/13/2024 0.09  0.00 - 0.20 10*3/mm3 Final    Immature Grans, Absolute 07/13/2024 0.05  0.00 - 0.05 10*3/mm3 Final    nRBC 07/13/2024 0.0  0.0 - 0.2 /100 WBC Final    Anisocytosis 07/13/2024 Slight/1+  None Seen Final    Poikilocytes 07/13/2024 Slight/1+  None Seen Final    WBC Morphology 07/13/2024 Normal  Normal Final    Large Platelets 07/13/2024 Slight/1+  None Seen Final           Assessment & Plan   Problems Addressed this Visit          Mental Health    Generalized anxiety disorder - Primary    Relevant Medications    sertraline (ZOLOFT) 50 MG tablet    amitriptyline (ELAVIL) 10 MG tablet     Other Visit Diagnoses         Mild episode of recurrent major depressive disorder  (Chronic)       R/O BPAD/BPD    Relevant Medications    sertraline (ZOLOFT) 50 MG tablet    lamoTRIgine (LaMICtal) 150 MG tablet    amitriptyline (ELAVIL) 10 MG tablet      Sleeping difficulties        Relevant Medications    amitriptyline  (ELAVIL) 10 MG tablet          Diagnoses         Codes Comments      Generalized anxiety disorder    -  Primary ICD-10-CM: F41.1  ICD-9-CM: 300.02       Mild episode of recurrent major depressive disorder     ICD-10-CM: F33.0  ICD-9-CM: 296.31 R/O BPAD/BPD      Sleeping difficulties     ICD-10-CM: G47.9  ICD-9-CM: 780.50             Visit Diagnoses:    ICD-10-CM ICD-9-CM   1. Generalized anxiety disorder  F41.1 300.02   2. Mild episode of recurrent major depressive disorder  F33.0 296.31   3. Sleeping difficulties  G47.9 780.50           GOALS:  Short Term Goals: Patient will be compliant with medication, and patient will have no significant medication related side effects.  Patient will be engaged in psychotherapy as indicated.  Patient will report subjective improvement of symptoms.  Long term goals: To stabilize mood and treat/improve subjective symptoms, the patient will stay out of the hospital, the patient will be at an optimal level of functioning, and the patient will take all medications as prescribed.  The patient verbalized understanding and agreement with goals that were mutually set.      TREATMENT PLAN: Begin supportive psychotherapy efforts and take medications as indicated.  Medication and treatment options, both pharmacological and non-pharmacological treatment options, discussed during today's visit, including any off label use of medication. Patient acknowledged and verbally consented with current treatment plan and was educated on the importance of compliance with treatment and follow-up appointments.  The patient has previously declined a referral to begin psychotherapy, but has verbalized she will reach out to this APRN/office if she changes her mind.    -Continue amitriptyline 10 mg by mouth once nightly to assist with sleeping difficulties and also can assist with mood.  -Continue lamotrigine 150 mg by mouth once daily for mood.  -Continue Sertraline 50 mg by mouth once daily for  mood.      MEDICATION ISSUES:  Discussed medication options and treatment plan of prescribed medication, any off label use of medication, as well as the risks, benefits, any black box warnings including increased suicidality, and side effects including but not limited to potential falls, dizziness, possible impaired driving, GI side effects (change in appetite, abdominal discomfort, nausea, vomiting, diarrhea, and/or constipation), dry mouth, somnolence, sedation, insomnia, activation, agitation, irritation, tremors, abnormal muscle movements or disorders, tardive dyskinesia, akathisia, asthenia, headache, sweating, possible bruising or rare bleeding, electrolyte and/or fluid abnormalities, change in blood pressure/heart rate/and or heart rhythm, hypotension, sexual dysfunction, rare impulse control problems, rare seizures, rare neuroleptic malignant syndrome, increased risk of death and cerebrovascular events, change in blood glucose and increased risk for diabetes, change in triglycerides and cholesterol and increased risk for dyslipidemia,  weight gain, weight gain that can become problematic to health, skin conditions and reactions, and metabolic adversities among others. Patient and/or guardian are agreeable to call the office with any worsening of symptoms or onset of side effects, or if any concerns or questions arise.  The contact information for the office is made available to the patient and/or guardian. Patient and/or guardian are agreeable to call 911 or go to the nearest ER should they begin having any SI/HI, or if any urgent concerns arise.    Important educational information made available and provided in after visit summary for patient to review.    Due to the nature of virtual visits and inability to monitor vital signs and weight with virtual visits, the patient has been encouraged to monitor their vital signs and weight regularly either through self-monitoring via home device(s) or with their  Primary Care Provider, and the patient has been instructed to notify this APRN of any abnormalities or significant changes from baseline.     Patient aware of limitations of provider's availability and office hours, and how to reach provider/office if needed (office number for patient to call for any questions/concerns: [phone]). If the patient's needs require more frequent or intensive management/monitoring than can be provided from this provider utilizing a strictly virtual platform, or care that is outside of this provider's scope, then patient may be referred to more appropriate provider or modality.    This APRN has discussed the benefits and risks of taking/continuing Lamictal (Lamotrigine).  The side effects of Lamictal can include a benign rash, blurred or double vision, dizziness, ataxia, sedation, headache, tremor, insomnia, poor coordination, fatigue,  nausea, vomiting, dyspepsia, rhinitis, infection, pharyngitis, asthenia, a rare but serious rash, rare multi-organ failure associated with Bull-Avelino Syndrome, toxic epidermal necrolysis, drug hypersensitivity syndrome, rare blood dyscrasias, rare aseptic meningitis, rare sudden unexplained deaths in people with epilepsy, withdrawal seizures upon abrupt withdrawal, and rare activation of suicidal ideation and behavior (suicidality).  This APRN has discussed that a very slow dose titration when starting, or changing doses, of Lamictal may reduce the incidence of skin rash and other side effects.  The dosage should not be titrated upwards or increased faster than recommended due to the possibility of the discussed side effects and risk of development of a skin rash (which can become life threatening).    This APRN has also discussed that if the patient stops taking the Lamictal for 3-5 days or longer, it will be necessary to restart the drug with an initial dose titration, as rashes have been reported on reexposure.  If the patient and Provider  decide to stop the Lamictal, the patient will follow the directions of this APRN/this office as a guided taper over about two weeks is appropriate due to the risk of relapse in bipolar disorder with those with a mood or bipolar disorder, the risk of seizures in those with epilepsy, and discontinuation symptoms upon rapid discontinuation of Lamictal.    The patient verbalizes understanding of benefits and risks as discussed, the patient feels the benefits outweigh the risks and is agreeable to continue/take Lamictal as discussed.  The patient is advised should any side effects or rash develops they are to stop the Lamictal immediately and contact this APRN/this office or go to the emergency department immediately.  The patient verbalizes understanding and agreement with treatment plan in their own words.      VERBAL INFORMED CONSENT FOR MEDICATION:  The patient was educated that their proposed/prescribed psychotropic medication(s) has potential risks, side effects, adverse effects, and black box warnings; and these have been discussed with the patient.  The patient has been informed that their treatment and medication dosage is to be individualized, and may even be above or below the recommended range/dosage due to patient individualization and response, but medication is prescribed using a shared decision making approach, and no medication or dosage will be prescribed without the patient's verbal consent.  The reason for the use of the medication including any off label use and alternative modes of treatment other than or in addition to medication has been considered and discussed, the probable consequences of not receiving the proposed treatment have been discussed, and any treatment side effects, black box warnings, and cautions associated with treatment have been discussed with the patient.  The patient is allowed ample time to openly discuss and ask questions regarding the proposed medication(s) and treatment  plan and the patient verbalizes understanding the reasons for the use of the medication, its potential risks and benefits, other alternative treatment(s), and the probable consequences that may occur if the proposed medication is not given.  The patient has been given ample time to ask questions and study the information and find the information to be specific, accurate, and complete.  The patient gives verbal consent for the medication(s) proposed/prescribed, they verbalized understanding that they can refuse and withdraw consent at any time with the assistance of this APRN, and the patient has verbally confirmed that they are aware, and are willing, to take the prescribed medication and follow the treatment plan with the known possible risks, side effect, black box warnings, and any potential medication interactions, and the patient reports they will be worse off without this medication and treatment plan.  The patient is advised to contact this APRN/this office if any questions or concerns arise at any time (at [phone]), or call 911/go to the closest emergency department if needed or outside of office hours.      SUICIDE RISK ASSESSMENT AND SAFETY PLAN: Unalterable demographics and a history of mental health intervention indicate this patient is in a high risk category compared to the general population. At present, the patient denies active SI/HI, intentions, or plans at this time and agrees to seek immediate care should such thoughts develop. The patient verbalizes understanding of how to access emergency care if needed and agrees to do so. Consideration of suicide risk and protective factors such as history, current presentation, individual strengths and weaknesses, psychosocial and environmental stressors and variables, psychiatric illness and symptoms, medical conditions and pain, took place in this interview. Based on those considerations, the patient is determined: within individual baseline and  presenting no imminent risk for suicide or homicide. Other recommendations: The patient does not meet the criteria for inpatient admission and is not a safety risk to self or others at today's visit. Inpatient treatment offers no significant advantages over outpatient treatment for this patient at today's visit.  The patient was given ample time for questions and fully participated in treatment planning.  The patient was encouraged to call the clinic with any questions or concerns.  The patient was informed of access to emergency care. If patient were to develop any significant symptomatology, suicidal ideation, homicidal ideation, any concerns, or feel unsafe at any time they are to call the clinic and if unable to get immediate assistance should immediately call 911 or go to the nearest emergency room.  Patient contracted verbally for the following: If you are experiencing an emotional crisis or have thoughts of harming yourself or others, please go to your nearest local emergency room or call 911. Will continue to re-assess medication response and side effects frequently to establish efficacy and ensure safety. Risks, any black box warnings, side effects, off label usage, and benefits of medication and treatment discussed with patient, along with potential adverse side effects of current and/or newly prescribed medication, alternative treatment options, and OTC medications.  Patient verbalized understanding of potential risks, any off label use of medication, any black box warnings, and any side effects in their own words. The patient verbalized understanding and agreed to comply with the safety plan discussed in their own words.  Patient given the number to the office. Number also discussed of the 24- hour suicide hotline.           MEDS ORDERED DURING VISIT:  New Medications Ordered This Visit   Medications    sertraline (ZOLOFT) 50 MG tablet     Sig: Take 1 tablet by mouth Daily.     Dispense:  30 tablet      Refill:  1    lamoTRIgine (LaMICtal) 150 MG tablet     Sig: Take 1 tablet by mouth Daily.     Dispense:  30 tablet     Refill:  1    amitriptyline (ELAVIL) 10 MG tablet     Sig: Take 1 tablet by mouth At Night As Needed for Sleep.     Dispense:  30 tablet     Refill:  1       Return in about 8 weeks (around 8/20/2025), or if symptoms worsen or fail to improve, for Next scheduled follow up and Recheck.         Progress toward goal: Not at goal    Functional Status: Mild impairment     Prognosis: Good with Ongoing Treatment             This document has been electronically signed by MINI Conte  June 25, 2025 13:22 EDT    Some of the data in this electronic note has been brought forward from a previous encounter, any necessary changes have been made, it has been reviewed by this APRN, and it is accurate.    Please note that portions of this note were completed with a voice recognition program.

## 2025-08-20 ENCOUNTER — TELEMEDICINE (OUTPATIENT)
Dept: PSYCHIATRY | Facility: CLINIC | Age: 30
End: 2025-08-20
Payer: COMMERCIAL

## 2025-08-20 DIAGNOSIS — F33.0 MILD EPISODE OF RECURRENT MAJOR DEPRESSIVE DISORDER: Chronic | ICD-10-CM

## 2025-08-20 DIAGNOSIS — G47.9 SLEEPING DIFFICULTIES: ICD-10-CM

## 2025-08-20 DIAGNOSIS — F41.1 GENERALIZED ANXIETY DISORDER: Primary | Chronic | ICD-10-CM

## 2025-08-20 RX ORDER — AMITRIPTYLINE HYDROCHLORIDE 10 MG/1
10 TABLET ORAL NIGHTLY PRN
Qty: 90 TABLET | Refills: 0 | Status: SHIPPED | OUTPATIENT
Start: 2025-08-20

## 2025-08-20 RX ORDER — LAMOTRIGINE 150 MG/1
150 TABLET ORAL DAILY
Qty: 90 TABLET | Refills: 0 | Status: SHIPPED | OUTPATIENT
Start: 2025-08-20

## (undated) DEVICE — SUT ETHLN 3-0 FS118IN 663H

## (undated) DEVICE — BNDG ESMARK STRL 6INX12FT LF

## (undated) DEVICE — LAPAROSCOPIC SCISSORS: Brand: EPIX LAPAROSCOPIC SCISSORS

## (undated) DEVICE — FMS FLUID MANAGEMENT SYSTEM INFLOW TUBING (FMS VUE): Brand: FMS

## (undated) DEVICE — ENDOPATH XCEL WITH OPTIVIEW TECHNOLOGY BLADELESS TROCARS WITH STABILITY SLEEVES: Brand: ENDOPATH XCEL OPTIVIEW

## (undated) DEVICE — URETERAL ACCESS SHEATH SET: Brand: NAVIGATOR HD

## (undated) DEVICE — Device: Brand: DEFENDO AIR/WATER/SUCTION AND BIOPSY VALVE

## (undated) DEVICE — LAPAROVUE VISIBILITY SYSTEM LAPAROSCOPIC SOLUTIONS: Brand: LAPAROVUE

## (undated) DEVICE — FMS FLUID MANAGEMENT SYSTEM OUTFLOW TUBING WITHOUT ONE-WAY VALVE (FMS VUE OR FMS DUO PLUS): Brand: FMS

## (undated) DEVICE — ENDOPATH XCEL BLADELESS TROCARS WITH STABILITY SLEEVES: Brand: ENDOPATH XCEL

## (undated) DEVICE — NITINOL STONE RETRIEVAL BASKET: Brand: ESCAPE

## (undated) DEVICE — CONN JET HYDRA H20 AUXILIARY DISP

## (undated) DEVICE — CATH URETRL FLXITP POLLACK STD 5F 70CM

## (undated) DEVICE — SYS IRR PUMP SGL ACTN VAC SYR 10CC

## (undated) DEVICE — SOLIDIFIER LIQLOC PLS 1500CC BT

## (undated) DEVICE — STERILE POLYISOPRENE POWDER-FREE SURGICAL GLOVES WITH EMOLLIENT COATING: Brand: PROTEXIS

## (undated) DEVICE — Device

## (undated) DEVICE — GLV SURG SENSICARE SLT PF LF 7 STRL

## (undated) DEVICE — SUT PDS2 0 CT1 27IN Z340H MF VIL

## (undated) DEVICE — SKIN PREP TRAY W/CHG: Brand: MEDLINE INDUSTRIES, INC.

## (undated) DEVICE — SOL IRRG H2O PL/BG 1000ML STRL

## (undated) DEVICE — CYSTO PACK: Brand: MEDLINE INDUSTRIES, INC.

## (undated) DEVICE — GOWN,REINFRCE,POLY,SIRUS,BREATH SLV,XXLG: Brand: MEDLINE

## (undated) DEVICE — LINER SURG CANSTR SXN S/RIGD 1500CC

## (undated) DEVICE — DRSNG GZ PETROLTM XEROFORM CURAD 1X8IN STRL

## (undated) DEVICE — Y-TYPE TUR/BLADDER IRRIGATION SET, REGULATING CLAMP

## (undated) DEVICE — BLCK/BITE BLOX WO/DENTL/RIM W/STRAP 54F

## (undated) DEVICE — BLD CUT FORMLA AGGR PLS 5.0MM

## (undated) DEVICE — ENDOPATH XCEL WITH OPTIVIEW TECHNOLOGY UNIVERSAL TROCAR STABILITY SLEEVES: Brand: ENDOPATH XCEL OPTIVIEW

## (undated) DEVICE — GAUZE,SPONGE,4"X4",16PLY,STRL,LF,10/TRAY: Brand: MEDLINE

## (undated) DEVICE — BASIC SINGLE BASIN-LF: Brand: MEDLINE INDUSTRIES, INC.

## (undated) DEVICE — GLV SURG SENSICARE PI ORTHO SZ6.5 LF STRL

## (undated) DEVICE — TOWEL,OR,DSP,ST,BLUE,STD,4/PK,20PK/CS: Brand: MEDLINE

## (undated) DEVICE — SOL IRR NACL 0.9PCT 3000ML

## (undated) DEVICE — GW ULTRATRACK HYBRID STR/TP .035IN 3X150CM EA/5

## (undated) DEVICE — APPL CHLORAPREP HI/LITE 26ML ORNG

## (undated) DEVICE — BNDG ELAS ECON W/CLIP 6IN 5YD LF STRL

## (undated) DEVICE — ENDOSCOPIC VALVE WITH ADAPTER.: Brand: SURSEAL® II

## (undated) DEVICE — STERILE POLYISOPRENE POWDER-FREE SURGICAL GLOVES: Brand: PROTEXIS

## (undated) DEVICE — FIBR LASR HOLMIUM COMPAT 272MH DISP

## (undated) DEVICE — TROCARS: Brand: KII® BALLOON BLUNT TIP SYSTEM

## (undated) DEVICE — PROB ABLAT SERFAS ENERGY HK 3.5MM

## (undated) DEVICE — ADHS SKIN SURG TISS VISC PREMIERPRO EXOFIN HI/VISC FAST/DRY

## (undated) DEVICE — GLV SURG SENSICARE PI ORTHO SZ8 LF STRL

## (undated) DEVICE — KNEE ARTHROSCOPY-LF: Brand: MEDLINE INDUSTRIES, INC.

## (undated) DEVICE — SLV SCD KN/LEN ADJ EXPRSS BLENDED MD 1P/U

## (undated) DEVICE — SINGLE-USE BIOPSY FORCEPS: Brand: RADIAL JAW 4

## (undated) DEVICE — SUT MNCRYL PLS ANTIB UD 4/0 PS2 18IN

## (undated) DEVICE — INTENDED FOR TISSUE SEPARATION, AND OTHER PROCEDURES THAT REQUIRE A SHARP SURGICAL BLADE TO PUNCTURE OR CUT.: Brand: BARD-PARKER ® CARBON RIB-BACK BLADES

## (undated) DEVICE — TISSUE RETRIEVAL SYSTEM: Brand: INZII RETRIEVAL SYSTEM

## (undated) DEVICE — GLV SURG SENSICARE SLT PF LF 7.5 STRL

## (undated) DEVICE — GENERAL LAPAROSCOPY-LF: Brand: MEDLINE INDUSTRIES, INC.

## (undated) DEVICE — 2, DISPOSABLE SUCTION/IRRIGATOR WITHOUT DISPOSABLE TIP: Brand: STRYKEFLOW

## (undated) DEVICE — LAPAROSCOPIC SMOKE EVACUATION SYSTEM ACTIVE AND PASSIVE: Brand: VALLEYLAB

## (undated) DEVICE — SOL IRR NACL 0.9PCT 1000ML